# Patient Record
Sex: FEMALE | Race: BLACK OR AFRICAN AMERICAN | Employment: UNEMPLOYED | URBAN - METROPOLITAN AREA
[De-identification: names, ages, dates, MRNs, and addresses within clinical notes are randomized per-mention and may not be internally consistent; named-entity substitution may affect disease eponyms.]

---

## 2017-01-04 ENCOUNTER — APPOINTMENT (OUTPATIENT)
Dept: CT IMAGING | Age: 57
End: 2017-01-04
Attending: EMERGENCY MEDICINE
Payer: MEDICARE

## 2017-01-04 ENCOUNTER — HOSPITAL ENCOUNTER (EMERGENCY)
Age: 57
Discharge: HOME | End: 2017-01-04
Attending: EMERGENCY MEDICINE
Payer: MEDICARE

## 2017-01-04 VITALS
HEART RATE: 99 BPM | RESPIRATION RATE: 17 BRPM | DIASTOLIC BLOOD PRESSURE: 73 MMHG | OXYGEN SATURATION: 99 % | WEIGHT: 211 LBS | BODY MASS INDEX: 34.06 KG/M2 | SYSTOLIC BLOOD PRESSURE: 123 MMHG | TEMPERATURE: 99 F

## 2017-01-04 DIAGNOSIS — F10.10 ALCOHOL ABUSE: ICD-10-CM

## 2017-01-04 DIAGNOSIS — K86.0 ALCOHOL-INDUCED CHRONIC PANCREATITIS (HCC): ICD-10-CM
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
## 2017-01-04 DIAGNOSIS — R58 ECCHYMOSIS: ICD-10-CM

## 2017-01-04 DIAGNOSIS — T14.8XXA HEMATOMA: ICD-10-CM

## 2017-01-04 LAB
ALBUMIN SERPL BCP-MCNC: 3.8 G/DL (ref 3.4–5)
ALBUMIN/GLOB SERPL: 1.2 {RATIO} (ref 0.8–1.7)
ALP SERPL-CCNC: 157 U/L (ref 45–117)
ALT SERPL-CCNC: 55 U/L (ref 13–56)
ANION GAP BLD CALC-SCNC: 11 MMOL/L (ref 3–18)
APPEARANCE UR: CLEAR
AST SERPL W P-5'-P-CCNC: 184 U/L (ref 15–37)
BASOPHILS # BLD AUTO: 0 K/UL (ref 0–0.06)
BASOPHILS # BLD: 1 % (ref 0–2)
BILIRUB SERPL-MCNC: 0.4 MG/DL (ref 0.2–1)
BILIRUB UR QL: NEGATIVE
BUN SERPL-MCNC: 6 MG/DL (ref 7–18)
BUN/CREAT SERPL: 9 (ref 12–20)
CALCIUM SERPL-MCNC: 8.4 MG/DL (ref 8.5–10.1)
CHLORIDE SERPL-SCNC: 103 MMOL/L (ref 100–108)
CO2 SERPL-SCNC: 26 MMOL/L (ref 21–32)
COLOR UR: YELLOW
CREAT SERPL-MCNC: 0.66 MG/DL (ref 0.6–1.3)
DIFFERENTIAL METHOD BLD: ABNORMAL
EOSINOPHIL # BLD: 0 K/UL (ref 0–0.4)
EOSINOPHIL NFR BLD: 0 % (ref 0–5)
ERYTHROCYTE [DISTWIDTH] IN BLOOD BY AUTOMATED COUNT: 13.6 % (ref 11.6–14.5)
ETHANOL SERPL-MCNC: 191 MG/DL (ref 0–3)
GLOBULIN SER CALC-MCNC: 3.2 G/DL (ref 2–4)
GLUCOSE SERPL-MCNC: 127 MG/DL (ref 74–99)
GLUCOSE UR STRIP.AUTO-MCNC: NEGATIVE MG/DL
HCT VFR BLD AUTO: 31.5 % (ref 35–45)
HGB BLD-MCNC: 10.4 G/DL (ref 12–16)
HGB UR QL STRIP: NEGATIVE
KETONES UR QL STRIP.AUTO: NEGATIVE MG/DL
LEUKOCYTE ESTERASE UR QL STRIP.AUTO: NEGATIVE
LIPASE SERPL-CCNC: 484 U/L (ref 73–393)
LYMPHOCYTES # BLD AUTO: 37 % (ref 21–52)
LYMPHOCYTES # BLD: 1.7 K/UL (ref 0.9–3.6)
MCH RBC QN AUTO: 31.6 PG (ref 24–34)
MCHC RBC AUTO-ENTMCNC: 33 G/DL (ref 31–37)
MCV RBC AUTO: 95.7 FL (ref 74–97)
MONOCYTES # BLD: 0.3 K/UL (ref 0.05–1.2)
MONOCYTES NFR BLD AUTO: 7 % (ref 3–10)
NEUTS SEG # BLD: 2.6 K/UL (ref 1.8–8)
NEUTS SEG NFR BLD AUTO: 55 % (ref 40–73)
NITRITE UR QL STRIP.AUTO: NEGATIVE
PH UR STRIP: 6 [PH] (ref 5–8)
PLATELET # BLD AUTO: 216 K/UL (ref 135–420)
PMV BLD AUTO: 9 FL (ref 9.2–11.8)
POTASSIUM SERPL-SCNC: 4.2 MMOL/L (ref 3.5–5.5)
PROT SERPL-MCNC: 7 G/DL (ref 6.4–8.2)
PROT UR STRIP-MCNC: NEGATIVE MG/DL
RBC # BLD AUTO: 3.29 M/UL (ref 4.2–5.3)
SODIUM SERPL-SCNC: 140 MMOL/L (ref 136–145)
SP GR UR REFRACTOMETRY: 1 (ref 1–1.03)
UROBILINOGEN UR QL STRIP.AUTO: 0.2 EU/DL (ref 0.2–1)
WBC # BLD AUTO: 4.7 K/UL (ref 4.6–13.2)

## 2017-01-04 PROCEDURE — 74176 CT ABD & PELVIS W/O CONTRAST: CPT

## 2017-01-04 PROCEDURE — 85025 COMPLETE CBC W/AUTO DIFF WBC: CPT | Performed by: PHYSICIAN ASSISTANT

## 2017-01-04 PROCEDURE — 96376 TX/PRO/DX INJ SAME DRUG ADON: CPT

## 2017-01-04 PROCEDURE — 83690 ASSAY OF LIPASE: CPT | Performed by: PHYSICIAN ASSISTANT

## 2017-01-04 PROCEDURE — 99283 EMERGENCY DEPT VISIT LOW MDM: CPT

## 2017-01-04 PROCEDURE — 80307 DRUG TEST PRSMV CHEM ANLYZR: CPT | Performed by: EMERGENCY MEDICINE

## 2017-01-04 PROCEDURE — 80053 COMPREHEN METABOLIC PANEL: CPT | Performed by: PHYSICIAN ASSISTANT

## 2017-01-04 PROCEDURE — 81003 URINALYSIS AUTO W/O SCOPE: CPT | Performed by: PHYSICIAN ASSISTANT

## 2017-01-04 PROCEDURE — 96374 THER/PROPH/DIAG INJ IV PUSH: CPT

## 2017-01-04 PROCEDURE — 74011250636 HC RX REV CODE- 250/636: Performed by: PHYSICIAN ASSISTANT

## 2017-01-04 PROCEDURE — 96361 HYDRATE IV INFUSION ADD-ON: CPT

## 2017-01-04 RX ORDER — SODIUM CHLORIDE 9 MG/ML
1000 INJECTION, SOLUTION INTRAVENOUS ONCE
Status: COMPLETED | OUTPATIENT
Start: 2017-01-04 | End: 2017-01-04

## 2017-01-04 RX ORDER — HYDROMORPHONE HYDROCHLORIDE 1 MG/ML
0.5 INJECTION, SOLUTION INTRAMUSCULAR; INTRAVENOUS; SUBCUTANEOUS
Status: COMPLETED | OUTPATIENT
Start: 2017-01-04 | End: 2017-01-04

## 2017-01-04 RX ADMIN — HYDROMORPHONE HYDROCHLORIDE 0.5 MG: 1 INJECTION, SOLUTION INTRAMUSCULAR; INTRAVENOUS; SUBCUTANEOUS at 21:02

## 2017-01-04 RX ADMIN — HYDROMORPHONE HYDROCHLORIDE 0.5 MG: 1 INJECTION, SOLUTION INTRAMUSCULAR; INTRAVENOUS; SUBCUTANEOUS at 22:59

## 2017-01-04 RX ADMIN — SODIUM CHLORIDE 1000 ML: 9 INJECTION, SOLUTION INTRAVENOUS at 21:30

## 2017-01-05 NOTE — ED PROVIDER NOTES
HPI Comments: 55 yr old female, hx htn, fatty liver disease, alcohol induced pancreatitis, alcohol induced hepatitis, depression, and bipolar disorder presents to the ED complaining of R hip pain and RLQ abdominal pain after a fall 2 days ago. Pt states she [place] the \"ran over some piece of metal in the road. \" Pt states they pulled the car over and she got out to check the tires. Pt states \"I saw that the piece of metal was stuck under the car so I tried to pull it out. I guess my mother didn't have the car in park or something because the car moved forward and I fell back. I fell over the guard rail and landed on my right side. \" Pt reports continued pain and swelling over the R hip, pain extending down the R side of the body and pain in the R side of the lower abdomen. Bruising over the R elbow is also reported although the pt denies elbow pain at present. Denies head injury, LOC, and neck pain. Pt reports a long hx of loose stool, which she states is \"normal.\" Pt states she thinks she noticed some blood in the stool earlier today. Denies rectal bleeding or constipation. No other complaints. Patient is a 55 y.o. female presenting with hip pain and arm pain. Hip Injury    Pertinent negatives include no numbness, no back pain and no neck pain. Arm Pain    Pertinent negatives include no numbness, no back pain and no neck pain.         Past Medical History:   Diagnosis Date    Alcoholic hepatitis     Alcoholic pancreatitis     Anemia     Bipolar disorder (HCC)      Dr. Cal Brown [facility])    Chronic abdominal pain     Compression fracture of lumbar vertebra (HCC)      L4, L5     Depression      Dr. [facility])    Elevated LFTs     ETOH abuse     Fatty liver     GERD (gastroesophageal reflux disease)     Hyperlipidemia     Hypertension     Hypothyroidism     Lower GI bleeding     Morbid obesity ([address]     Obstructive sleep apnea     Substance induced mood disorder (HCC)     Vitamin D deficiency        Past Surgical History:   Procedure Laterality Date    Diskectomy, anterior, with d  8/1995, 11/1997    Repair nonunion scaphoid carpal bone Right 2010     Wrist    Biopsy liver  08/15/2012     Lap Left hepatic liver wedge by Dr. Kannan Carrion; BX Revealed: Hepatic Steatosis, AVM w/ associated Subcapsular Fibrosis.  Hx hysterectomy  2006    Hx tonsillectomy  1992    Hx abdominal laparoscopy  12/02/2014     Laparoscopic Gastrostomy w/ Partial Gastrectomy for assistance in placement of Endoscopic Retrograde Cholangiopancreatography & Diagnostic Lap.  Hx carpal tunnel release      Hx cholecystectomy  09/16/2014     Dr. Argentina Mcneill Hx colonoscopy  05/12/2012     Colonoscopy by Dr. Hussain Rdz. Abdi Beer: Bx Revealed Colon Polyps (Tubular Adenoma), Hemorrhoids.  Hx hemorrhoidectomy  04/30/2015     Dr. Leandrew Dakins. Meghna Minora Hx gastric bypass  08/15/2012     Lap Christian-en-y         Family History:   Problem Relation Age of Onset    Cancer Father     Cancer Sister     Obesity Brother        Social History     Social History    Marital status:      Spouse name: N/A    Number of children: N/A    Years of education: N/A     Occupational History    Not on file. Social History Main Topics    Smoking status: Never Smoker    Smokeless tobacco: Never Used    Alcohol use 0.0 oz/week     0 Standard drinks or equivalent per week      Comment: 6 pack of beer a day    Drug use: No    Sexual activity: Yes     Partners: Male     Birth control/ protection: Condom     Other Topics Concern    Not on file     Social History Narrative         ALLERGIES: Codeine; Lamictal [lamotrigine]; Morphine; Pcn [penicillins]; Vancomycin; Doxycycline; Percocet [oxycodone-acetaminophen]; Tetracycline; and Tomato    Review of Systems   Constitutional: Negative for chills, diaphoresis, fatigue and fever.    HENT: Negative for congestion, ear pain, rhinorrhea and sore throat. Eyes: Negative for pain and redness. Respiratory: Negative for cough, shortness of breath, wheezing and stridor. Cardiovascular: Negative for chest pain, palpitations and leg swelling. Gastrointestinal: Positive for abdominal pain and blood in stool. Negative for anal bleeding, constipation, diarrhea, nausea and vomiting. Genitourinary: Negative for dysuria, flank pain, frequency and hematuria. Musculoskeletal: Negative for back pain, myalgias, neck pain and neck stiffness. Hip pain   Skin: Positive for wound. Negative for rash. bruising   Neurological: Negative for dizziness, seizures, syncope, light-headedness, numbness and headaches. All other systems reviewed and are negative. Vitals:    01/04/17 2010   BP: (!) 148/103   Pulse: (!) 111   Resp: 17   Temp: 99 °F (37.2 °C)   SpO2: 99%   Weight: 95.7 kg (211 lb)            Physical Exam   Constitutional: She is oriented to person, place, and time. She appears well-developed and well-nourished. She appears distressed. Moderately distressed, morbidly obese   HENT:   Head: Normocephalic. Neck: Normal range of motion. Neck supple. Cardiovascular: Regular rhythm and normal heart sounds. Exam reveals no gallop and no friction rub. No murmur heard. tachycardiac   Pulmonary/Chest: Effort normal and breath sounds normal. No stridor. No respiratory distress. She has no wheezes. She has no rales. Abdominal: Soft. Bowel sounds are normal. She exhibits no distension and no mass. There is tenderness. There is no rebound and no guarding. TTP noted in the RLQ, without guarding   Genitourinary: Rectal exam shows guaiac negative stool. Genitourinary Comments: No hemorrhoids noted on rectal exam, good sphincter tone, No gross blood noted on exam, guaiac negative    Musculoskeletal: Normal range of motion. She exhibits edema and tenderness. She exhibits no deformity.    Edema and TTP noted over the R hip, full ROM of the hip noted, ecchymosis noted over the hip with no obvious deformity. No TTP noted to the R olecranon, humerus, radius, or ulna. Full ROM of the arm noted. Pulses intact. Neurological: She is alert and oriented to person, place, and time. She exhibits normal muscle tone. Gait is steady. Able to ambulate without difficulty. Skin: Skin is warm and dry. She is not diaphoretic. Ecchymosis noted over the R hip and gluteal region, R olecranon, and R proximal radius/ulna   Psychiatric: She has a normal mood and affect. Her behavior is normal. Thought content normal.   Nursing note and vitals reviewed. MDM  Number of Diagnoses or Management Options  Alcohol abuse:   Alcohol-induced chronic pancreatitis ([facility] [address]):   Ecchymosis:   Fall, initial encounter:   Hematoma:   Diagnosis management comments: Impression:  Fall, ecchymosis, alcohol abuse, pancreatitis, hematoma     IV inserted 0.5 mg dilaudid and 1 L saline given  RBC 3.29, Hgb 10.4, HCT 31.5, MPLV 9, glucose 127, BUN 6, BUCR 9,   Ca 8.4, lipase 484, ethyl alcohol 191, UA unremarkable. CT abd/pelvis/hip: hematoma R lateral pelvis soft tissues with surrounding post traumatic stranding, peripancreatic stranding secondary to pancreatitis and concordant with elevated lipase levels, fecalization of small bowel loops due to slow transit time. Hepatic steatosis, underdistended IVC, correlate with hydration status, cholecystectomy, arnulfo en y gastric bypass, hysterectomy, T12 compression fx-chronic, thoracic spondylosis, subcentimeter granuloma, R lung base    Pt still having pain, second dose dilaudid given. Pt's vital signs have improved. Patient is stable for discharge at this time. No new rx given. Rest and follow-up with PCP this week. Return to the ED immediately for any new or worsening sx.   Randee Little PA-C 10:52 PM          Amount and/or Complexity of Data Reviewed  Clinical lab tests: reviewed and ordered  Tests in the radiology section of CPT®: reviewed and ordered    Risk of Complications, Morbidity, and/or Mortality  Presenting problems: moderate  Diagnostic procedures: moderate  Management options: low    Patient Progress  Patient progress: stable    ED Course       Procedures

## 2017-01-05 NOTE — ED NOTES
I have reviewed discharge instructions with the patient. The patient verbalized understanding. Patient armband removed and shredded. Pt d/c'd to home awake, alert and in NAD. Pt driven home by her brother who is present. All questions answered.

## 2017-01-05 NOTE — ED TRIAGE NOTES
Pt reports she was pulling an object stuck under her car when she fell backwards and into a guardrail x 2 days ago. Pt reports large hematoma to right hip area with pain extending down leg and into abdomen and right elbow pain. Pt rocking back and forth reporting pain 8/10.

## 2017-01-05 NOTE — DISCHARGE INSTRUCTIONS
[employer] Activation    Thank you for requesting access to [employer]. Please follow the instructions below to securely access and download your online medical record. [employer] allows you to send messages to your doctor, view your test results, renew your prescriptions, schedule appointments, and more. How Do I Sign Up? 1. In your internet browser, go to [email or web address]  2. Click on the First Time User? Click Here link in the Sign In box. You will be redirect to the New Member Sign Up page. 3. Enter your [employer] Access Code exactly as it appears below. You will not need to use this code after youve completed the sign-up process. If you do not sign up before the expiration date, you must request a new code. [employer] Access Code: [email or web address]  Expires: 2017  8:31 PM (This is the date your [employer] access code will )    4. Enter the last four digits of your Social Security Number (xxxx) and Date of Birth (mm/dd/yyyy) as indicated and click Submit. You will be taken to the next sign-up page. 5. Create a [employer] ID. This will be your [employer] login ID and cannot be changed, so think of one that is secure and easy to remember. 6. Create a [employer] password. You can change your password at any time. 7. Enter your Password Reset Question and Answer. This can be used at a later time if you forget your password. 8. Enter your e-mail address. You will receive e-mail notification when new information is available in [address] 9. Click Sign Up. You can now view and download portions of your medical record. 10. Click the Download Summary menu link to download a portable copy of your medical information. Additional Information    If you have questions, please visit the Frequently Asked Questions section of the [employer] website at [email or web address] Remember, [employer] is NOT to be used for urgent needs. For medical emergencies, dial 911.

## 2017-01-09 ENCOUNTER — APPOINTMENT (OUTPATIENT)
Dept: GENERAL RADIOLOGY | Age: 57
DRG: 871 | End: 2017-01-09
Attending: EMERGENCY MEDICINE
Payer: MEDICARE

## 2017-01-09 ENCOUNTER — HOSPITAL ENCOUNTER (INPATIENT)
Age: 57
LOS: 7 days | Discharge: HOME | DRG: 871 | End: 2017-01-16
Attending: EMERGENCY MEDICINE | Admitting: HOSPITALIST
Payer: MEDICARE

## 2017-01-09 DIAGNOSIS — A41.9 SEPSIS, DUE TO UNSPECIFIED ORGANISM: Primary | ICD-10-CM

## 2017-01-09 DIAGNOSIS — N39.0 URINARY TRACT INFECTION, SITE UNSPECIFIED: ICD-10-CM

## 2017-01-09 DIAGNOSIS — K85.80 OTHER ACUTE PANCREATITIS: ICD-10-CM

## 2017-01-09 DIAGNOSIS — F10.10 ALCOHOL ABUSE: ICD-10-CM

## 2017-01-09 DIAGNOSIS — D64.9 CHRONIC ANEMIA: ICD-10-CM

## 2017-01-09 DIAGNOSIS — R11.2 NON-INTRACTABLE VOMITING WITH NAUSEA, UNSPECIFIED VOMITING TYPE: ICD-10-CM

## 2017-01-09 PROBLEM — R11.10 NON-INTRACTABLE VOMITING: Status: ACTIVE | Noted: 2017-01-09

## 2017-01-09 LAB
ALBUMIN SERPL BCP-MCNC: 3.6 G/DL (ref 3.4–5)
ALBUMIN/GLOB SERPL: 1.2 {RATIO} (ref 0.8–1.7)
ALP SERPL-CCNC: 157 U/L (ref 45–117)
ALT SERPL-CCNC: 56 U/L (ref 13–56)
ANION GAP BLD CALC-SCNC: 11 MMOL/L (ref 3–18)
APPEARANCE UR: CLEAR
AST SERPL W P-5'-P-CCNC: 245 U/L (ref 15–37)
BACTERIA URNS QL MICRO: ABNORMAL /HPF
BASOPHILS # BLD AUTO: 0 K/UL (ref 0–0.06)
BASOPHILS # BLD: 0 % (ref 0–2)
BILIRUB SERPL-MCNC: 0.4 MG/DL (ref 0.2–1)
BILIRUB UR QL: NEGATIVE
BUN SERPL-MCNC: 8 MG/DL (ref 7–18)
BUN/CREAT SERPL: 12 (ref 12–20)
CALCIUM SERPL-MCNC: 7.4 MG/DL (ref 8.5–10.1)
CHLORIDE SERPL-SCNC: 97 MMOL/L (ref 100–108)
CO2 SERPL-SCNC: 23 MMOL/L (ref 21–32)
COLOR UR: YELLOW
CREAT SERPL-MCNC: 0.66 MG/DL (ref 0.6–1.3)
DIFFERENTIAL METHOD BLD: ABNORMAL
EOSINOPHIL # BLD: 0 K/UL (ref 0–0.4)
EOSINOPHIL NFR BLD: 0 % (ref 0–5)
EPITH CASTS URNS QL MICRO: ABNORMAL /LPF (ref 0–5)
ERYTHROCYTE [DISTWIDTH] IN BLOOD BY AUTOMATED COUNT: 13.1 % (ref 11.6–14.5)
FLUAV AG NPH QL IA: NEGATIVE
FLUBV AG NOSE QL IA: NEGATIVE
GLOBULIN SER CALC-MCNC: 2.9 G/DL (ref 2–4)
GLUCOSE SERPL-MCNC: 155 MG/DL (ref 74–99)
GLUCOSE UR STRIP.AUTO-MCNC: NEGATIVE MG/DL
HCT VFR BLD AUTO: 24.1 % (ref 35–45)
HGB BLD-MCNC: 8.2 G/DL (ref 12–16)
HGB UR QL STRIP: NEGATIVE
KETONES UR QL STRIP.AUTO: NEGATIVE MG/DL
LACTATE BLD-SCNC: 3.6 MMOL/L (ref 0.4–2)
LACTATE BLD-SCNC: 4.3 MMOL/L (ref 0.4–2)
LEUKOCYTE ESTERASE UR QL STRIP.AUTO: ABNORMAL
LIPASE SERPL-CCNC: 1227 U/L (ref 73–393)
LYMPHOCYTES # BLD AUTO: 8 % (ref 21–52)
LYMPHOCYTES # BLD: 0.6 K/UL (ref 0.9–3.6)
MCH RBC QN AUTO: 32.2 PG (ref 24–34)
MCHC RBC AUTO-ENTMCNC: 34 G/DL (ref 31–37)
MCV RBC AUTO: 94.5 FL (ref 74–97)
MONOCYTES # BLD: 0.6 K/UL (ref 0.05–1.2)
MONOCYTES NFR BLD AUTO: 9 % (ref 3–10)
NEUTS SEG # BLD: 6.1 K/UL (ref 1.8–8)
NEUTS SEG NFR BLD AUTO: 83 % (ref 40–73)
NITRITE UR QL STRIP.AUTO: NEGATIVE
PH UR STRIP: 5 [PH] (ref 5–8)
PLATELET # BLD AUTO: 173 K/UL (ref 135–420)
PMV BLD AUTO: 9.2 FL (ref 9.2–11.8)
POTASSIUM SERPL-SCNC: 4.1 MMOL/L (ref 3.5–5.5)
PROT SERPL-MCNC: 6.5 G/DL (ref 6.4–8.2)
PROT UR STRIP-MCNC: NEGATIVE MG/DL
RBC # BLD AUTO: 2.55 M/UL (ref 4.2–5.3)
RBC #/AREA URNS HPF: ABNORMAL /HPF (ref 0–5)
SODIUM SERPL-SCNC: 131 MMOL/L (ref 136–145)
SP GR UR REFRACTOMETRY: 1.02 (ref 1–1.03)
UROBILINOGEN UR QL STRIP.AUTO: 0.2 EU/DL (ref 0.2–1)
WBC # BLD AUTO: 7.4 K/UL (ref 4.6–13.2)
WBC URNS QL MICRO: ABNORMAL /HPF (ref 0–4)

## 2017-01-09 PROCEDURE — 81001 URINALYSIS AUTO W/SCOPE: CPT | Performed by: EMERGENCY MEDICINE

## 2017-01-09 PROCEDURE — 74011250636 HC RX REV CODE- 250/636: Performed by: EMERGENCY MEDICINE

## 2017-01-09 PROCEDURE — 96365 THER/PROPH/DIAG IV INF INIT: CPT

## 2017-01-09 PROCEDURE — 85025 COMPLETE CBC W/AUTO DIFF WBC: CPT | Performed by: EMERGENCY MEDICINE

## 2017-01-09 PROCEDURE — 74011000250 HC RX REV CODE- 250: Performed by: HOSPITALIST

## 2017-01-09 PROCEDURE — 73502 X-RAY EXAM HIP UNI 2-3 VIEWS: CPT

## 2017-01-09 PROCEDURE — 87040 BLOOD CULTURE FOR BACTERIA: CPT | Performed by: EMERGENCY MEDICINE

## 2017-01-09 PROCEDURE — 83605 ASSAY OF LACTIC ACID: CPT

## 2017-01-09 PROCEDURE — 87077 CULTURE AEROBIC IDENTIFY: CPT | Performed by: EMERGENCY MEDICINE

## 2017-01-09 PROCEDURE — 87086 URINE CULTURE/COLONY COUNT: CPT | Performed by: EMERGENCY MEDICINE

## 2017-01-09 PROCEDURE — 74011250636 HC RX REV CODE- 250/636: Performed by: HOSPITALIST

## 2017-01-09 PROCEDURE — 93005 ELECTROCARDIOGRAM TRACING: CPT

## 2017-01-09 PROCEDURE — 65270000029 HC RM PRIVATE

## 2017-01-09 PROCEDURE — 80053 COMPREHEN METABOLIC PANEL: CPT | Performed by: EMERGENCY MEDICINE

## 2017-01-09 PROCEDURE — 83690 ASSAY OF LIPASE: CPT | Performed by: EMERGENCY MEDICINE

## 2017-01-09 PROCEDURE — 71010 XR CHEST SNGL V: CPT

## 2017-01-09 PROCEDURE — 74011000250 HC RX REV CODE- 250: Performed by: EMERGENCY MEDICINE

## 2017-01-09 PROCEDURE — 99284 EMERGENCY DEPT VISIT MOD MDM: CPT

## 2017-01-09 PROCEDURE — 96375 TX/PRO/DX INJ NEW DRUG ADDON: CPT

## 2017-01-09 PROCEDURE — 87804 INFLUENZA ASSAY W/OPTIC: CPT | Performed by: EMERGENCY MEDICINE

## 2017-01-09 PROCEDURE — 87186 SC STD MICRODIL/AGAR DIL: CPT | Performed by: EMERGENCY MEDICINE

## 2017-01-09 RX ORDER — ENOXAPARIN SODIUM 100 MG/ML
40 INJECTION SUBCUTANEOUS EVERY 24 HOURS
Status: DISCONTINUED | OUTPATIENT
Start: 2017-01-09 | End: 2017-01-12

## 2017-01-09 RX ORDER — KETOROLAC TROMETHAMINE 30 MG/ML
15 INJECTION, SOLUTION INTRAMUSCULAR; INTRAVENOUS
Status: DISPENSED | OUTPATIENT
Start: 2017-01-10 | End: 2017-01-13

## 2017-01-09 RX ORDER — SODIUM CHLORIDE 0.9 % (FLUSH) 0.9 %
5-10 SYRINGE (ML) INJECTION AS NEEDED
Status: DISCONTINUED | OUTPATIENT
Start: 2017-01-09 | End: 2017-01-15 | Stop reason: SDUPTHER

## 2017-01-09 RX ORDER — SODIUM CHLORIDE 0.9 % (FLUSH) 0.9 %
5-10 SYRINGE (ML) INJECTION AS NEEDED
Status: DISCONTINUED | OUTPATIENT
Start: 2017-01-09 | End: 2017-01-16 | Stop reason: HOSPADM

## 2017-01-09 RX ORDER — SODIUM CHLORIDE 0.9 % (FLUSH) 0.9 %
5-10 SYRINGE (ML) INJECTION EVERY 8 HOURS
Status: DISCONTINUED | OUTPATIENT
Start: 2017-01-09 | End: 2017-01-15 | Stop reason: SDUPTHER

## 2017-01-09 RX ORDER — KETOROLAC TROMETHAMINE 30 MG/ML
30 INJECTION, SOLUTION INTRAMUSCULAR; INTRAVENOUS
Status: COMPLETED | OUTPATIENT
Start: 2017-01-09 | End: 2017-01-09

## 2017-01-09 RX ORDER — TRAMADOL HYDROCHLORIDE 50 MG/1
50 TABLET ORAL
Status: DISCONTINUED | OUTPATIENT
Start: 2017-01-09 | End: 2017-01-09

## 2017-01-09 RX ORDER — LORAZEPAM 2 MG/ML
1 INJECTION INTRAMUSCULAR
Status: COMPLETED | OUTPATIENT
Start: 2017-01-09 | End: 2017-01-09

## 2017-01-09 RX ORDER — LEVOFLOXACIN 5 MG/ML
750 INJECTION, SOLUTION INTRAVENOUS ONCE
Status: COMPLETED | OUTPATIENT
Start: 2017-01-09 | End: 2017-01-09

## 2017-01-09 RX ADMIN — LEVOFLOXACIN 750 MG: 5 INJECTION, SOLUTION INTRAVENOUS at 19:24

## 2017-01-09 RX ADMIN — SODIUM CHLORIDE 25 MG: 9 INJECTION INTRAMUSCULAR; INTRAVENOUS; SUBCUTANEOUS at 22:18

## 2017-01-09 RX ADMIN — ENOXAPARIN SODIUM 40 MG: 40 INJECTION SUBCUTANEOUS at 22:59

## 2017-01-09 RX ADMIN — Medication 10 ML: at 22:20

## 2017-01-09 RX ADMIN — FOLIC ACID: 5 INJECTION, SOLUTION INTRAMUSCULAR; INTRAVENOUS; SUBCUTANEOUS at 22:59

## 2017-01-09 RX ADMIN — LORAZEPAM 1 MG: 2 INJECTION, SOLUTION INTRAMUSCULAR; INTRAVENOUS at 22:15

## 2017-01-09 RX ADMIN — SODIUM CHLORIDE 2871 ML: 900 INJECTION, SOLUTION INTRAVENOUS at 19:24

## 2017-01-09 RX ADMIN — KETOROLAC TROMETHAMINE 30 MG: 30 INJECTION, SOLUTION INTRAMUSCULAR at 21:18

## 2017-01-09 NOTE — IP AVS SNAPSHOT
[address] [address] 
[phone] Patient: Shant So MRN: [id number] You are allergic to the following Allergen Reactions Codeine Rash Lamictal (Lamotrigine) Anaphylaxis Morphine Anaphylaxis Per patient, has tolerated dilaudid in the past.  
    
 Pcn (Penicillins) Anaphylaxis Vancomycin Rash Doxycycline Rash Percocet (Oxycodone-Acetaminophen) Hives Itching Tetracycline Rash Tomato Hives Recent Documentation Height Weight Breastfeeding? BMI OB Status Smoking Status 1.702 m 99.1 kg No 34.21 kg/m2 Hysterectomy Never Smoker Unresulted Labs Order Current Status LUPUS ANTICOAGULANT PANEL W/ REFLEX In process MIXING STUDY, APTT In process MIXING STUDY, PT In process Emergency Contacts Name Discharge Info Relation Home Work Mobile Gena Khan DISCHARGE CAREGIVER [3] Child [2] [phone] About your hospitalization You were admitted on:  January 9, 2017 You last received care in the:  [address] [place], [place] [address] Road You were discharged on:  January 16, 2017 Unit phone number:  [phone] Why you were hospitalized Your primary diagnosis was:  Not on File Your diagnoses also included:  Uti (Urinary Tract Infection), Acute Pancreatitis, Sepsis (Hcc), Non-Intractable Vomiting, Thrombocytopenia (Hcc) Providers Seen During Your Hospitalizations Provider Role Specialty Primary office phone Bert Doan DO Attending Provider Emergency Medicine [phone] Fernando Cheung MD Attending Provider [facility] [phone] Eloina Richard MD Attending Provider Internal Medicine [phone] Steven Rubi MD Attending Provider Internal Medicine [phone] Your Primary Care Physician (PCP) Primary Care Physician Office Phone Office Fax Lavelle Gannon [phone] Follow-up Information Follow up With Details Comments Contact Info Dae Dove MD Schedule an appointment as soon as possible for a visit in 1 week  [address] [facility] [address] [phone] Monika Carranza MD Go on 1/17/2017 EGD/Colonoscopy  [address] [facility] [address] [facility] [address] [phone] Your Appointments Tuesday January 17, 2017 ESOPHAGOGASTRODUODENOSCOPY (EGD), COLONOSCOPY with Estella Quinones MD  
[facility] ENDOSCOPY [facility]) [address]  
[phone] Current Discharge Medication List  
  
START taking these medications Dose & Instructions Dispensing Information Comments Morning Noon Evening Bedtime  
 levoFLOXacin 750 mg tablet Commonly known as:  Lamount Chalet Your next dose is: Today, Tomorrow Other:  _________ Dose:  750 mg Take 1 Tab by mouth daily. Quantity:  6 Tab Refills:  0  
     
   
   
   
  
 potassium chloride 20 mEq tablet Commonly known as:  K-DUR, KLOR-CON Your next dose is: Today, Tomorrow Other:  _________ Dose:  20 mEq Take 1 Tab by mouth two (2) times a day for 2 doses. Quantity:  2 Tab Refills:  0 CONTINUE these medications which have CHANGED Dose & Instructions Dispensing Information Comments Morning Noon Evening Bedtime  
 levothyroxine 25 mcg tablet Commonly known as:  SYNTHROID What changed:  how much to take Your next dose is: Today, Tomorrow Other:  _________ Dose:  25 mcg Take 1 Tab by mouth Daily (before breakfast). Indications: HYPOTHYROIDISM Quantity:  30 Tab Refills:  0  
     
   
   
   
  
 VISTARIL 50 mg capsule Generic drug:  hydrOXYzine pamoate What changed:  Another medication with the same name was removed.  Continue taking this medication, and follow the directions you see here. Your next dose is: Today, Tomorrow Other:  _________ Dose:  50 mg Take 50 mg by mouth two (2) times a day. Refills:  0 CONTINUE these medications which have NOT CHANGED Dose & Instructions Dispensing Information Comments Morning Noon Evening Bedtime  
 alendronate 70 mg tablet Commonly known as:  FOSAMAX Your next dose is: Today, Tomorrow Other:  _________ Dose:  70 mg Take 70 mg by mouth Every Saturday. Refills:  0 BENTYL 20 mg tablet Generic drug:  dicyclomine Your next dose is: Today, Tomorrow Other:  _________ Dose:  20 mg Take 20 mg by mouth every six (6) hours as needed. Refills:  0  
     
   
   
   
  
 biotin 10,000 mcg Cap Your next dose is: Today, Tomorrow Other:  _________ Dose:  1 Cap Take 1 Cap by mouth. Refills:  0  
     
   
   
   
  
 calcium citrate-vitamin d3 315-200 mg-unit Tab Commonly known as:  CITRACAL+D Your next dose is: Today, Tomorrow Other:  _________ Dose:  1 Tab Take 1 Tab by mouth two (2) times daily (with meals). Refills:  0  
     
   
   
   
  
 cholecalciferol 1,000 unit tablet Commonly known as:  VITAMIN D3 Your next dose is: Today, Tomorrow Other:  _________ Take  by mouth daily. Refills:  0  
     
   
   
   
  
 cloNIDine HCl 0.1 mg tablet Commonly known as:  CATAPRES Your next dose is: Today, Tomorrow Other:  _________ Take  by mouth two (2) times a day. Refills:  0  
     
   
   
   
  
 colesevelam 625 mg tablet Commonly known as:  [facility] Your next dose is: Today, Tomorrow Other:  _________ Dose:  1250 mg Take 2 Tabs by mouth two (2) times daily (with meals). Quantity:  120 Tab Refills:  0 ergocalciferol 50,000 unit capsule Commonly known as:  ERGOCALCIFEROL Your next dose is: Today, Tomorrow Other:  _________ Dose:  [address] Units Take 50,000 Units by mouth every seven (7) days. Refills:  0  
     
   
   
   
  
 escitalopram oxalate 10 mg tablet Commonly known as:  Cloria Kohut Your next dose is: Today, Tomorrow Other:  _________ Dose:  10 mg Take 1 Tab by mouth daily. Quantity:  10 Tab Refills:  0  
     
   
   
   
  
 famotidine 20 mg tablet Commonly known as:  PEPCID Your next dose is: Today, Tomorrow Other:  _________ Dose:  20 mg Take 20 mg by mouth daily. Indications: HEARTBURN Refills:  0  
     
   
   
   
  
 FEOSOL 325 mg (65 mg iron) tablet Generic drug:  ferrous sulfate Your next dose is: Today, Tomorrow Other:  _________ Dose:  325 mg Take 325 mg by mouth daily. Refills:  0  
     
   
   
   
  
 fish oil-dha-epa 1,200-144-216 mg Cap Your next dose is: Today, Tomorrow Other:  _________ Take  by mouth. Refills:  0  
     
   
   
   
  
 folic acid 773 mcg tablet Your next dose is: Today, Tomorrow Other:  _________ Dose:  800 mcg Take 800 mcg by mouth daily. Refills:  0 HYDROcodone-acetaminophen 5-325 mg per tablet Commonly known as:  Kayley Parisian Your next dose is: Today, Tomorrow Other:  _________ Dose:  1 Tab Take 1 Tab by mouth every four (4) hours as needed for Pain. Max Daily Amount: 6 Tabs. Quantity:  12 Tab Refills:  0 NEURONTIN 600 mg tablet Generic drug:  gabapentin Your next dose is: Today, Tomorrow Other:  _________ Dose:  600 mg Take 600 mg by mouth three (3) times daily. Indications: NEUROPATHIC PAIN Refills:  0  
     
   
   
   
  
 OLANZapine 10 mg disintegrating tablet Commonly known as:  ZyPREXA zydis Your next dose is: Today, Tomorrow Other:  _________ Dose:  10 mg Take 1 Tab by mouth nightly. Quantity:  30 Tab Refills:  0  
     
   
   
   
  
 ondansetron 4 mg disintegrating tablet Commonly known as:  ZOFRAN ODT Your next dose is: Today, Tomorrow Other:  _________ Dose:  4 mg Take 1 Tab by mouth every eight (8) hours as needed for Nausea. Quantity:  15 Tab Refills:  0  
     
   
   
   
  
 pantoprazole 20 mg tablet Commonly known as:  PROTONIX Your next dose is: Today, Tomorrow Other:  _________ Dose:  20 mg Take 20 mg by mouth daily. Refills:  0  
     
   
   
   
  
 VITAMIN B-12 1,000 mcg sublingual tablet Generic drug:  cyanocobalamin Your next dose is: Today, Tomorrow Other:  _________ Dose:  1000 mcg  
1,000 mcg by SubLINGual route daily. Refills:  0  
     
   
   
   
  
 VITAMIN C 500 mg tablet Generic drug:  ascorbic acid (vitamin C) Your next dose is: Today, Tomorrow Other:  _________ Dose:  1000 mg Take 1,000 mg by mouth daily. Refills:  0 Where to Get Your Medications Information on where to get these meds will be given to you by the nurse or doctor. ! Ask your nurse or doctor about these medications  
  levoFLOXacin 750 mg tablet  
 potassium chloride 20 mEq tablet Discharge Instructions Patient armband removed and shredded MyChart Activation Thank you for requesting access to [facility]. Please follow the instructions below to securely access and download your online medical record. [facility] allows you to send messages to your doctor, view your test results, renew your prescriptions, schedule appointments, and more. How Do I Sign Up? 1. In your internet browser, go to [email or web address] 
 2. Click on the First Time User? Click Here link in the Sign In box. You will be redirect to the New Member Sign Up page. 3. Enter your Shutter Guardian Access Code exactly as it appears below. You will not need to use this code after youve completed the sign-up process. If you do not sign up before the expiration date, you must request a new code. Shutter Guardian Access Code: [email or web address] Expires: 2017  8:31 PM (This is the date your Shutter Guardian access code will ) 4. Enter the last four digits of your Social Security Number (xxxx) and Date of Birth (mm/dd/yyyy) as indicated and click Submit. You will be taken to the next sign-up page. 5. Create a Shutter Guardian ID. This will be your Shutter Guardian login ID and cannot be changed, so think of one that is secure and easy to remember. 6. Create a Shutter Guardian password. You can change your password at any time. 7. Enter your Password Reset Question and Answer. This can be used at a later time if you forget your password. 8. Enter your e-mail address. You will receive e-mail notification when new information is available in [address] 9. Click Sign Up. You can now view and download portions of your medical record. 10. Click the Download Summary menu link to download a portable copy of your medical information. Additional Information If you have questions, please visit the Frequently Asked Questions section of the Shutter Guardian website at [email or web address] Remember, Sh[employer] Guardian is NOT to be used for urgent needs. For medical emergencies, dial 911. DISCHARGE SUMMARY from Nurse The following personal items are in your possession at time of discharge: 
 
Dental Appliances: None Visual Aid: Glasses Home Medications: None Jewelry: None Clothing: Jacket/Coat, Socks, Footwear, Undergarments Other Valuables: Cell Phone, Wallet, Money (comment) ($81) PATIENT INSTRUCTIONS: 
 
 
F-face looks uneven A-arms unable to move or move unevenly S-speech slurred or non-existent T-time-call 911 as soon as signs and symptoms begin-DO NOT go Back to bed or wait to see if you get better-TIME IS BRAIN. Warning Signs of HEART ATTACK Call 911 if you have these symptoms: 
? Chest discomfort. Most heart attacks involve discomfort in the center of the chest that lasts more than a few minutes, or that goes away and comes back. It can feel like uncomfortable pressure, squeezing, fullness, or pain. ? Discomfort in other areas of the upper body. Symptoms can include pain or discomfort in one or both arms, the back, neck, jaw, or stomach. ? Shortness of breath with or without chest discomfort. ? Other signs may include breaking out in a cold sweat, nausea, or lightheadedness. Don't wait more than five minutes to call [address]! Fast action can save your life. Calling 911 is almost always the fastest way to get lifesaving treatment. Emergency Medical Services staff can begin treatment when they arrive  up to an hour sooner than if someone gets to the hospital by car. The discharge information has been reviewed with the patient. The patient verbalized understanding. Discharge medications reviewed with the patient and appropriate educational materials and side effects teaching were provided. Acute Alcohol Intoxication: Care Instructions Your Care Instructions You have had treatment to help your body rid itself of alcohol. Too much alcohol upsets the body's fluid balance. Your doctor may have given you fluids and vitamins. For some people, drinking too much alcohol is a one-time event.  For others, it is an ongoing problem. In either case, it is serious. It can be life-threatening. Follow-up care is a key part of your treatment and safety. Be sure to make and go to all appointments, and call your doctor if you are having problems. It's also a good idea to know your test results and keep a list of the medicines you take. How can you care for yourself at home? · Be safe with medicines. Take your medicines exactly as prescribed. Call your doctor if you think you are having a problem with your medicine. · Your doctor may have prescribed disulfiram (Antabuse). Do not drink any alcohol while you are taking this medicine. You may have severe or even life-threatening side effects from even small amounts of alcohol. · If you were given medicine to prevent nausea, be sure to take it exactly as prescribed. · Before you take any medicine, tell your doctor if: 
¨ You have had a bad reaction to any medicines in the past. 
¨ You are taking other medicines, including over-the-counter ones, or have other health problems. ¨ You are or could be pregnant. · Be prepared to have some symptoms of withdrawal in the next few days. · Drink plenty of liquids in the next few days. · Seek help if you need it to stop drinking. Getting counseling and joining a support group can help you stay sober. Try a support group such as Alcoholics Anonymous. · Avoid alcohol when you take medicines. It can react with many medicines and cause serious problems. When should you call for help? Call 911 anytime you think you may need emergency care. For example, call if: 
· You feel confused and are seeing things that are not there. · You are thinking about killing yourself or hurting others. · You have a seizure. · You vomit blood or what looks like coffee grounds. Call your doctor now or seek immediate medical care if: 
· You have trembling, restlessness, sweating, and other withdrawal symptoms that are new or that get worse. · Your withdrawal symptoms come back after not bothering you for days or weeks. · You can't stop vomiting. Watch closely for changes in your health, and be sure to contact your doctor if: 
· You need help to stop drinking. Where can you learn more? Go to http://salvatore-izabela.info/. Enter T102 in the search box to learn more about \"Acute Alcohol Intoxication: Care Instructions. \" Current as of: May 27, 2016 Content Version: 11.1 © 2512-5358 Flux. Care instructions adapted under license by [employer] (which disclaims liability or warranty for this information). If you have questions about a medical condition or this instruction, always ask your healthcare professional. [address] any warranty or liability for your use of this information. Clotting Factor Deficiencies: Care Instructions Your Care Instructions Clotting factors are substances in the blood that help stop bleeding after a cut or injury. They also prevent sudden bleeding. In people who have clotting factor problems, the clotting factors don't work right or, in some cases, are missing. When blood does not clot well, even minor injuries can cause serious bleeding. This can lead to blood loss, injury to internal organs, or damage to muscles or joints. Several conditions, including hemophilia, can make it hard for the blood to clot. Your doctor can treat you by giving you replacement clotting factors. You also may take medicine to prevent bleeding. You may often have clotting factors transfused into a vein to prevent bleeding, or you may get them as needed. You may eventually learn to do this at home. You can also try to prevent injuries that can cause you to bleed. Follow-up care is a key part of your treatment and safety.  Be sure to make and go to all appointments, and call your doctor if you are having problems. It's also a good idea to know your test results and keep a list of the medicines you take. How can you care for yourself at home? · Take your medicines exactly as prescribed. Call your doctor if you think you are having a problem with your medicine. You will get more details on the specific medicines your doctor prescribes. · Stay at a healthy body weight. If you are overweight, the additional stress on joints can trigger bleeding. · Exercise safely. Avoid contact sports. Swim or walk to avoid excess pressure on your joints. Check with your doctor before doing activities that put you at high risk for falls, such as riding a bike. · Brush and floss your teeth daily. This may help you avoid problems that could lead to having a tooth pulled. · Avoid aspirin and nonsteroidal anti-inflammatory drugs (NSAIDs), such as ibuprofen (Advil, Motrin) or naproxen (Aleve). They can increase the chance of bleeding. · Take pain medicines exactly as directed. ¨ If the doctor gave you a prescription medicine for pain, take it as prescribed. ¨ If you are not taking a prescription pain medicine, ask your doctor if you can take an over-the-counter medicine. · Take care to prevent accidents at home: ¨ Make sure rugs are tacked down so you do not slip. ¨ Keep furniture with sharp edges out of pathways. ¨ Use nonskid floor wax. ¨ Wipe up spills quickly. ¨ If you live in an area that gets snow and ice in the winter, sprinkle salt on steps and sidewalks. ¨ Avoid loose-fitting shoes. You might lose your balance and fall. · Wear medical alert jewelry that lists your clotting problem. You can buy this at most drugsYouSciencees. When should you call for help? Call 911 anytime you think you may need emergency care. For example, call if: 
· You passed out (lost consciousness). · You have a head injury. · You have sudden, severe pain, especially in a joint.  
· You have a sudden, severe headache that is different from past headaches. Call your doctor now or seek immediate medical care if: 
· You are dizzy or lightheaded, or you feel like you may faint. · You are unable to stop bleeding by giving clotting factors after an injury. · You have an injury but are not sure whether you need treatment. · You have any abnormal bleeding, such as: 
¨ Nosebleeds. ¨ Vaginal bleeding that is different (heavier, more frequent, at a different time of the month) than what you are used to. ¨ Bloody or black stools, or rectal bleeding. ¨ Bloody or pink urine. Watch closely for changes in your health, and be sure to contact your doctor if: 
· You have joint pain or swelling. Where can you learn more? Go to http://salvatore-izabela.info/. Enter F781 in the search box to learn more about \"Clotting Factor Deficiencies: Care Instructions. \" Current as of: February 5, 2016 Content Version: 11.1 © 0761-4281 Next Performance. Care instructions adapted under license by [employer] (which disclaims liability or warranty for this information). If you have questions about a medical condition or this instruction, always ask your healthcare professional. [address] [address] any warranty or liability for your use of this information. Discharge Orders None [facility] Announcement We are excited to announce that we are making your provider's discharge notes available to you in [facility]ce. You will see these notes when they are completed and signed by the physician that discharged you from your recent hospital stay. If you have any questions or concerns about any information you see in [facility]ce, please call the Health Information Department where you were seen or reach out to your Primary Care Provider for more information about your plan of care. Introducing [facility]!    
 Vickey Carrero introduces [facility]ce patient portal. Now you can access parts of your medical record, email your doctor's office, and request medication refills online. 1. In your internet browser, go to [email or web address] "[email or web address] ([email or web address]"/[email or web address] TV 2. Click on the First Time User? Click Here link in the Sign In box. You will see the New Member Sign Up page. 3. Enter your RentMatch Access Code exactly as it appears below. You will not need to use this code after youve completed the sign-up process. If you do not sign up before the expiration date, you must request a new code. · RentMatch Access Code: [email or web address] Expires: 4/4/2017  8:31 PM 
 
4. Enter the last four digits of your Social Security Number (xxxx) and Date of Birth (mm/dd/yyyy) as indicated and click Submit. You will be taken to the next sign-up page. 5. Create a RentMatch ID. This will be your RentMatch login ID and cannot be changed, so think of one that is secure and easy to remember. 6. Create a RentMatch password. You can change your password at any time. 7. Enter your Password Reset Question and Answer. This can be used at a later time if you forget your password. 8. Enter your e-mail address. You will receive e-mail notification when new information is available in [address] 9. Click Sign Up. You can now view and download portions of your medical record. 10. Click the Download Summary menu link to download a portable copy of your medical information. If you have questions, please visit the Frequently Asked Questions section of the RentMatch website. Remember, RentMatch is NOT to be used for urgent needs. For medical emergencies, dial 911. Now available from your iPhone and Android! General Information Please provide this summary of care documentation to your next provider. Patient Signature:  ____________________________________________________________ Date:  ____________________________________________________________  
  
Janyth Mins  Provider Signature: ____________________________________________________________ Date:  ____________________________________________________________

## 2017-01-09 NOTE — IP AVS SNAPSHOT
Current Discharge Medication List  
  
Take these medications at their scheduled times Dose & Instructions Dispensing Information Comments Morning Noon Evening Bedtime  
 alendronate 70 mg tablet Commonly known as:  FOSAMAX Your next dose is: Today, Tomorrow Other:  ____________ Dose:  70 mg Take 70 mg by mouth Every Saturday. Refills:  0  
     
   
   
   
  
 calcium citrate-vitamin d3 315-200 mg-unit Tab Commonly known as:  CITRACAL+D Your next dose is: Today, Tomorrow Other:  ____________ Dose:  1 Tab Take 1 Tab by mouth two (2) times daily (with meals). Refills:  0  
     
   
   
   
  
 cholecalciferol 1,000 unit tablet Commonly known as:  VITAMIN D3 Your next dose is: Today, Tomorrow Other:  ____________ Take  by mouth daily. Refills:  0  
     
   
   
   
  
 cloNIDine HCl 0.1 mg tablet Commonly known as:  CATAPRES Your next dose is: Today, Tomorrow Other:  ____________ Take  by mouth two (2) times a day. Refills:  0  
     
   
   
   
  
 colesevelam 625 mg tablet Commonly known as:  [facility] Your next dose is: Today, Tomorrow Other:  ____________ Dose:  1250 mg Take 2 Tabs by mouth two (2) times daily (with meals). Quantity:  120 Tab Refills:  0  
     
   
   
   
  
 ergocalciferol 50,000 unit capsule Commonly known as:  ERGOCALCIFEROL Your next dose is: Today, Tomorrow Other:  ____________ Dose:  33111 Units Take 50,000 Units by mouth every seven (7) days. Refills:  0  
     
   
   
   
  
 escitalopram oxalate 10 mg tablet Commonly known as:  Kristy Von Your next dose is: Today, Tomorrow Other:  ____________ Dose:  10 mg Take 1 Tab by mouth daily. Quantity:  10 Tab Refills:  0  
     
   
   
   
  
 famotidine 20 mg tablet Commonly known as:  PEPCID  
   
 Your next dose is: Today, Tomorrow Other:  ____________ Dose:  20 mg Take 20 mg by mouth daily. Indications: HEARTBURN Refills:  0  
     
   
   
   
  
 FEOSOL 325 mg (65 mg iron) tablet Generic drug:  ferrous sulfate Your next dose is: Today, Tomorrow Other:  ____________ Dose:  325 mg Take 325 mg by mouth daily. Refills:  0  
     
   
   
   
  
 folic acid 042 mcg tablet Your next dose is: Today, Tomorrow Other:  ____________ Dose:  800 mcg Take 800 mcg by mouth daily. Refills:  0  
     
   
   
   
  
 levoFLOXacin 750 mg tablet Commonly known as:  Aldean Lefort Your next dose is: Today, Tomorrow Other:  ____________ Dose:  750 mg Take 1 Tab by mouth daily. Quantity:  6 Tab Refills:  0  
     
   
   
   
  
 levothyroxine 25 mcg tablet Commonly known as:  SYNTHROID Your next dose is: Today, Tomorrow Other:  ____________ Dose:  25 mcg Take 1 Tab by mouth Daily (before breakfast). Indications: HYPOTHYROIDISM Quantity:  30 Tab Refills:  0 NEURONTIN 600 mg tablet Generic drug:  gabapentin Your next dose is: Today, Tomorrow Other:  ____________ Dose:  600 mg Take 600 mg by mouth three (3) times daily. Indications: NEUROPATHIC PAIN Refills:  0  
     
   
   
   
  
 OLANZapine 10 mg disintegrating tablet Commonly known as:  ZyPREXA zydis Your next dose is: Today, Tomorrow Other:  ____________ Dose:  10 mg Take 1 Tab by mouth nightly. Quantity:  30 Tab Refills:  0  
     
   
   
   
  
 pantoprazole 20 mg tablet Commonly known as:  PROTONIX Your next dose is: Today, Tomorrow Other:  ____________ Dose:  20 mg Take 20 mg by mouth daily. Refills:  0  
     
   
   
   
  
 potassium chloride 20 mEq tablet Commonly known as:  K-DUR, KLOR-CON  
   
 Your next dose is: Today, Tomorrow Other:  ____________ Dose:  20 mEq Take 1 Tab by mouth two (2) times a day for 2 doses. Quantity:  2 Tab Refills:  0  
     
   
   
   
  
 VISTARIL 50 mg capsule Generic drug:  hydrOXYzine pamoate Your next dose is: Today, Tomorrow Other:  ____________ Dose:  50 mg Take 50 mg by mouth two (2) times a day. Refills:  0  
     
   
   
   
  
 VITAMIN B-12 1,000 mcg sublingual tablet Generic drug:  cyanocobalamin Your next dose is: Today, Tomorrow Other:  ____________ Dose:  1000 mcg  
1,000 mcg by SubLINGual route daily. Refills:  0  
     
   
   
   
  
 VITAMIN C 500 mg tablet Generic drug:  ascorbic acid (vitamin C) Your next dose is: Today, Tomorrow Other:  ____________ Dose:  1000 mg Take 1,000 mg by mouth daily. Refills:  0 Take these medications as needed Dose & Instructions Dispensing Information Comments Morning Noon Evening Bedtime BENTYL 20 mg tablet Generic drug:  dicyclomine Your next dose is: Today, Tomorrow Other:  ____________ Dose:  20 mg Take 20 mg by mouth every six (6) hours as needed. Refills:  0 HYDROcodone-acetaminophen 5-325 mg per tablet Commonly known as:  Bethel Bent Your next dose is: Today, Tomorrow Other:  ____________ Dose:  1 Tab Take 1 Tab by mouth every four (4) hours as needed for Pain. Max Daily Amount: 6 Tabs. Quantity:  12 Tab Refills:  0  
     
   
   
   
  
 ondansetron 4 mg disintegrating tablet Commonly known as:  ZOFRAN ODT Your next dose is: Today, Tomorrow Other:  ____________ Dose:  4 mg Take 1 Tab by mouth every eight (8) hours as needed for Nausea. Quantity:  15 Tab Refills:  0 Take these medications as directed Dose & Instructions Dispensing Information Comments Morning Noon Evening Bedtime  
 biotin 10,000 mcg Cap Your next dose is: Today, Tomorrow Other:  ____________ Dose:  1 Cap Take 1 Cap by mouth. Refills:  0  
     
   
   
   
  
 fish oil-dha-epa 1,200-144-216 mg Cap Your next dose is: Today, Tomorrow Other:  ____________ Take  by mouth. Refills:  0 Where to Get Your Medications Information about where to get these medications is not yet available ! Ask your nurse or doctor about these medications  
  levoFLOXacin 750 mg tablet  
 potassium chloride 20 mEq tablet

## 2017-01-10 LAB
ALBUMIN SERPL BCP-MCNC: 2.9 G/DL (ref 3.4–5)
ALBUMIN/GLOB SERPL: 1.1 {RATIO} (ref 0.8–1.7)
ALP SERPL-CCNC: 133 U/L (ref 45–117)
ALT SERPL-CCNC: 45 U/L (ref 13–56)
ANION GAP BLD CALC-SCNC: 8 MMOL/L (ref 3–18)
AST SERPL W P-5'-P-CCNC: 202 U/L (ref 15–37)
ATRIAL RATE: 118 BPM
BILIRUB DIRECT SERPL-MCNC: 0.3 MG/DL (ref 0–0.2)
BILIRUB SERPL-MCNC: 0.7 MG/DL (ref 0.2–1)
BUN SERPL-MCNC: 8 MG/DL (ref 7–18)
BUN/CREAT SERPL: 11 (ref 12–20)
CALCIUM SERPL-MCNC: 6.7 MG/DL (ref 8.5–10.1)
CALCULATED P AXIS, ECG09: 50 DEGREES
CALCULATED R AXIS, ECG10: 67 DEGREES
CALCULATED T AXIS, ECG11: -39 DEGREES
CHLORIDE SERPL-SCNC: 102 MMOL/L (ref 100–108)
CHOLEST SERPL-MCNC: 122 MG/DL
CO2 SERPL-SCNC: 25 MMOL/L (ref 21–32)
CREAT SERPL-MCNC: 0.76 MG/DL (ref 0.6–1.3)
DIAGNOSIS, 93000: NORMAL
FOLATE SERPL-MCNC: >20 NG/ML (ref 3.1–17.5)
GLOBULIN SER CALC-MCNC: 2.7 G/DL (ref 2–4)
GLUCOSE SERPL-MCNC: 150 MG/DL (ref 74–99)
HDLC SERPL-MCNC: 28 MG/DL (ref 40–60)
HDLC SERPL: 4.4 {RATIO} (ref 0–5)
IRON SATN MFR SERPL: 13 %
IRON SERPL-MCNC: 35 UG/DL (ref 50–175)
LACTATE BLD-SCNC: 2.4 MMOL/L (ref 0.4–2)
LACTATE SERPL-SCNC: 0.9 MMOL/L (ref 0.4–2)
LDLC SERPL CALC-MCNC: 43 MG/DL (ref 0–100)
LIPASE SERPL-CCNC: 174 U/L (ref 73–393)
LIPASE SERPL-CCNC: 230 U/L (ref 73–393)
LIPID PROFILE,FLP: ABNORMAL
P-R INTERVAL, ECG05: 144 MS
POTASSIUM SERPL-SCNC: 3.9 MMOL/L (ref 3.5–5.5)
PROT SERPL-MCNC: 5.6 G/DL (ref 6.4–8.2)
Q-T INTERVAL, ECG07: 322 MS
QRS DURATION, ECG06: 74 MS
QTC CALCULATION (BEZET), ECG08: 451 MS
SODIUM SERPL-SCNC: 135 MMOL/L (ref 136–145)
TIBC SERPL-MCNC: 268 UG/DL (ref 250–450)
TRIGL SERPL-MCNC: 255 MG/DL (ref ?–150)
VENTRICULAR RATE, ECG03: 118 BPM
VIT B12 SERPL-MCNC: 512 PG/ML (ref 211–911)
VIT B12 SERPL-MCNC: 649 PG/ML (ref 211–911)
VLDLC SERPL CALC-MCNC: 51 MG/DL

## 2017-01-10 PROCEDURE — 65270000029 HC RM PRIVATE

## 2017-01-10 PROCEDURE — 80076 HEPATIC FUNCTION PANEL: CPT | Performed by: HOSPITALIST

## 2017-01-10 PROCEDURE — 80061 LIPID PANEL: CPT | Performed by: HOSPITALIST

## 2017-01-10 PROCEDURE — 82607 VITAMIN B-12: CPT | Performed by: HOSPITALIST

## 2017-01-10 PROCEDURE — 83605 ASSAY OF LACTIC ACID: CPT | Performed by: HOSPITALIST

## 2017-01-10 PROCEDURE — 74011250636 HC RX REV CODE- 250/636: Performed by: HOSPITALIST

## 2017-01-10 PROCEDURE — 83605 ASSAY OF LACTIC ACID: CPT

## 2017-01-10 PROCEDURE — 74011000250 HC RX REV CODE- 250: Performed by: HOSPITALIST

## 2017-01-10 PROCEDURE — 74011250637 HC RX REV CODE- 250/637: Performed by: HOSPITALIST

## 2017-01-10 PROCEDURE — 83540 ASSAY OF IRON: CPT | Performed by: HOSPITALIST

## 2017-01-10 PROCEDURE — 80048 BASIC METABOLIC PNL TOTAL CA: CPT | Performed by: HOSPITALIST

## 2017-01-10 PROCEDURE — 36415 COLL VENOUS BLD VENIPUNCTURE: CPT | Performed by: HOSPITALIST

## 2017-01-10 PROCEDURE — 77030020263 HC SOL INJ SOD CL0.9% LFCR 1000ML

## 2017-01-10 PROCEDURE — 83690 ASSAY OF LIPASE: CPT | Performed by: HOSPITALIST

## 2017-01-10 RX ORDER — LORAZEPAM 1 MG/1
2 TABLET ORAL
Status: DISCONTINUED | OUTPATIENT
Start: 2017-01-10 | End: 2017-01-16 | Stop reason: HOSPADM

## 2017-01-10 RX ORDER — SODIUM CHLORIDE 9 MG/ML
50 INJECTION, SOLUTION INTRAVENOUS CONTINUOUS
Status: DISCONTINUED | OUTPATIENT
Start: 2017-01-10 | End: 2017-01-15

## 2017-01-10 RX ORDER — ASPIRIN 325 MG/1
100 TABLET, FILM COATED ORAL DAILY
Status: DISCONTINUED | OUTPATIENT
Start: 2017-01-10 | End: 2017-01-16 | Stop reason: HOSPADM

## 2017-01-10 RX ORDER — LORAZEPAM 2 MG/ML
2 INJECTION INTRAMUSCULAR
Status: DISCONTINUED | OUTPATIENT
Start: 2017-01-10 | End: 2017-01-16 | Stop reason: HOSPADM

## 2017-01-10 RX ORDER — HYDROXYZINE PAMOATE 50 MG/1
50 CAPSULE ORAL 2 TIMES DAILY
COMMUNITY

## 2017-01-10 RX ORDER — SODIUM CHLORIDE 0.9 % (FLUSH) 0.9 %
5-10 SYRINGE (ML) INJECTION EVERY 8 HOURS
Status: DISCONTINUED | OUTPATIENT
Start: 2017-01-10 | End: 2017-01-16 | Stop reason: HOSPADM

## 2017-01-10 RX ORDER — LORAZEPAM 2 MG/ML
1 INJECTION INTRAMUSCULAR
Status: DISCONTINUED | OUTPATIENT
Start: 2017-01-10 | End: 2017-01-16 | Stop reason: HOSPADM

## 2017-01-10 RX ORDER — SODIUM CHLORIDE 0.9 % (FLUSH) 0.9 %
5-10 SYRINGE (ML) INJECTION AS NEEDED
Status: DISCONTINUED | OUTPATIENT
Start: 2017-01-10 | End: 2017-01-15 | Stop reason: SDUPTHER

## 2017-01-10 RX ORDER — FOLIC ACID 1 MG/1
1 TABLET ORAL DAILY
Status: DISCONTINUED | OUTPATIENT
Start: 2017-01-10 | End: 2017-01-16 | Stop reason: HOSPADM

## 2017-01-10 RX ORDER — LORAZEPAM 2 MG/ML
3 INJECTION INTRAMUSCULAR
Status: DISCONTINUED | OUTPATIENT
Start: 2017-01-10 | End: 2017-01-16 | Stop reason: HOSPADM

## 2017-01-10 RX ORDER — ACETAMINOPHEN AND CODEINE PHOSPHATE 300; 30 MG/1; MG/1
1 TABLET ORAL
Status: DISCONTINUED | OUTPATIENT
Start: 2017-01-10 | End: 2017-01-16 | Stop reason: HOSPADM

## 2017-01-10 RX ORDER — LORAZEPAM 1 MG/1
1 TABLET ORAL
Status: DISCONTINUED | OUTPATIENT
Start: 2017-01-10 | End: 2017-01-16 | Stop reason: HOSPADM

## 2017-01-10 RX ADMIN — KETOROLAC TROMETHAMINE 15 MG: 30 INJECTION, SOLUTION INTRAMUSCULAR at 03:33

## 2017-01-10 RX ADMIN — LORAZEPAM 1 MG: 2 INJECTION, SOLUTION INTRAMUSCULAR; INTRAVENOUS at 18:58

## 2017-01-10 RX ADMIN — FOLIC ACID 1 MG: 1 TABLET ORAL at 18:07

## 2017-01-10 RX ADMIN — LORAZEPAM 1 MG: 2 INJECTION, SOLUTION INTRAMUSCULAR; INTRAVENOUS at 08:37

## 2017-01-10 RX ADMIN — Medication 10 ML: at 09:54

## 2017-01-10 RX ADMIN — Medication 10 ML: at 23:07

## 2017-01-10 RX ADMIN — KETOROLAC TROMETHAMINE 15 MG: 30 INJECTION, SOLUTION INTRAMUSCULAR at 09:51

## 2017-01-10 RX ADMIN — SODIUM CHLORIDE 125 ML/HR: 900 INJECTION, SOLUTION INTRAVENOUS at 21:06

## 2017-01-10 RX ADMIN — THIAMINE HCL TAB 100 MG 100 MG: 100 TAB at 18:07

## 2017-01-10 RX ADMIN — LORAZEPAM 1 MG: 2 INJECTION, SOLUTION INTRAMUSCULAR; INTRAVENOUS at 23:03

## 2017-01-10 RX ADMIN — ACETAMINOPHEN AND CODEINE PHOSPHATE 1 TABLET: 300; 30 TABLET ORAL at 20:57

## 2017-01-10 RX ADMIN — LEVOTHYROXINE SODIUM ANHYDROUS 12.5 MCG: 100 INJECTION, POWDER, LYOPHILIZED, FOR SOLUTION INTRAVENOUS at 11:26

## 2017-01-10 RX ADMIN — LORAZEPAM 1 MG: 2 INJECTION, SOLUTION INTRAMUSCULAR; INTRAVENOUS at 20:57

## 2017-01-10 RX ADMIN — LORAZEPAM 1 MG: 2 INJECTION, SOLUTION INTRAMUSCULAR; INTRAVENOUS at 03:32

## 2017-01-10 RX ADMIN — KETOROLAC TROMETHAMINE 15 MG: 30 INJECTION, SOLUTION INTRAMUSCULAR at 18:13

## 2017-01-10 RX ADMIN — Medication 10 ML: at 02:27

## 2017-01-10 RX ADMIN — LORAZEPAM 1 MG: 2 INJECTION, SOLUTION INTRAMUSCULAR; INTRAVENOUS at 14:46

## 2017-01-10 RX ADMIN — LORAZEPAM 1 MG: 2 INJECTION, SOLUTION INTRAMUSCULAR; INTRAVENOUS at 02:27

## 2017-01-10 RX ADMIN — SODIUM CHLORIDE 125 ML/HR: 900 INJECTION, SOLUTION INTRAVENOUS at 12:29

## 2017-01-10 RX ADMIN — Medication 10 ML: at 14:47

## 2017-01-10 RX ADMIN — ENOXAPARIN SODIUM 40 MG: 40 INJECTION SUBCUTANEOUS at 23:02

## 2017-01-10 NOTE — ROUTINE PROCESS
0800 - assumed care of patient - alert and oriented - on CIWA protocol. 0830 - Ativan 1 mg IV per CIWA protocol    1000 - Toradol for complaints of pain.     1145 - IDR's at bedside - patient has no complaints at this time    0 - daughter at bedside    1445 - Ativan 1 mg IV per CIWA protocol    1600 - up in chair -  No complaints    1845 - alerted by CNA to fever and tachycardia - patient complaining of mild HA - medicated with Ativan per [facility] protocol - Dr. Matti Barclay paged for update and Tylenol order

## 2017-01-10 NOTE — PROGRESS NOTES
Chart reviewed for interdisciplinary rounds/audit purposes. Dr. Keri Cameron requesting PT orders for eval & tx; will enter accordingly.         Amy Sanabria

## 2017-01-10 NOTE — PROGRESS NOTES
Nutrition initial assessment/  Plan of care      RECOMMENDATIONS:     1. Advance diet when medically indicated  2. Monitor weight, labs and PO intake  3. RD to follow     GOALS:     1. Patient tolerates diet advancement by 1/14  2. PO intake meets >75% of protein/calorie needs by 1/14  3. Weight Maintenance/Gradual weight loss (1-2 lb by 1/18)    ASSESSMENT:     Weight status is classified as obese per BMI of 34.1. Currently not meeting nutrition needs due to NPO diet order. Labs noted. Elevated LFT's. Nutrition recommendations listed. RD to follow. Nutrition Diagnoses:   Abnormal nutrition related lab values related to pancratitis as evidenced by lipase level of 1227 on admission. Nutrition Risk:  [x] High  [] Moderate []  Low    SUBJECTIVE/OBJECTIVE:      Patient admitted with sepsis and pancreatitis. S/P fall yesterday. Patient with hx of Gastric By-Pass surgery (2012), liver disease and pancreatitis. Patient with history of alcohol abuse. Patient is allergic to tomatoes. Denies problems with chewing/swallowing. Complains of nausea but denies vomiting and abdominal pain. Patient reports UBW of 211 lb and was eating small frequent meals PTA. Currently with NPO diet order. Will follow diet advancement. Information Obtained from:    [x] Chart Review   [x] Patient   [] Family/Caregiver   [] Nurse/Physician   [] Interdisciplinary Meeting/Rounds    Diet: NPO  Medications: [x] Reviewed    Allergies: [x] Reviewed (tomato)  Encounter Diagnoses     ICD-10-CM ICD-9-CM   1. Sepsis, due to unspecified organism ([facility] A41.9 038.9     995.91   2. Urinary tract infection, site unspecified N39.0    3. Non-intractable vomiting with nausea, unspecified vomiting type R11.2 787.01   4. Other acute pancreatitis K85.80    5. Alcohol abuse F10.10 305.00   6.  Chronic anemia D64.9 285.9     Past Medical History   Diagnosis Date    Alcohol abuse     Alcoholic hepatitis     Alcoholic pancreatitis     Anemia     Bipolar disorder ([place] [address]      Dr. Jade Lovett [facility])    Chronic abdominal pain     Compression fracture of lumbar vertebra ([facility]      L4, L5     Depression      Dr. Jade Lovett [facility])    Elevated LFTs     ETOH abuse     Fatty liver     GERD (gastroesophageal reflux disease)     Hyperlipidemia     Hypertension     Hypothyroidism     Lower GI bleeding     Morbid obesity ([facility]     Obstructive sleep apnea     Substance induced mood disorder ([place] [address].)     Vitamin D deficiency       Labs:    Lab Results   Component Value Date/Time    Sodium 136 01/11/2017 04:50 AM    Potassium 3.5 01/11/2017 04:50 AM    Chloride 103 01/11/2017 04:50 AM    CO2 23 01/11/2017 04:50 AM    Anion gap 10 01/11/2017 04:50 AM    Glucose 166 01/11/2017 04:50 AM    BUN 6 01/11/2017 04:50 AM    Creatinine 0.63 01/11/2017 04:50 AM    Calcium 6.5 01/11/2017 04:50 AM    Magnesium 1.6 01/11/2017 04:50 AM    Phosphorus 2.6 02/07/2016 01:17 AM    Albumin 2.8 01/11/2017 04:50 AM     Anthropometrics: BMI (calculated): 34.1  Last 3 Recorded Weights in this Encounter    01/09/17 1702 01/10/17 0639   Weight: 95.7 kg (211 lb) 98.4 kg (217 lb)      Ht Readings from Last 1 Encounters:   01/10/17 5' 7\" (1.702 m)     No data found. IBW: 135 lb %IBW: 160% UBW: 211 lb %UBW: 103%   [] Weight Loss [x] Weight Gain [] Weight Stable    Estimated Nutrition Needs: [x] MSJ  [] Other:  Calories: 0014-0295 Kcal Based on:   [x] Actual BW    Protein:   80-98 g Based on:   [x] Actual BW    Fluid:       5005-0670 ml Based on:   [x] Actual BW      [x] No Cultural, Confucianist or ethnic dietary need identified.     [] Cultural, Confucianist and ethnic food preferences identified and addressed     Wt Status:  [] Normal (18.6 - 24.9) [] Underweight (<18.5) [] Overweight (25 - 29.9) [x] Mild Obesity (30 - 34.9)  [] Moderate Obesity (35 - 39.9) [] Morbid Obesity (40+)     Nutrition Problems Identified:   [x] Suboptimal PO intake   [x] Food Allergies (tomato)  [] Difficulty chewing/swallowing/poor dentition  [] Constipation/Diarrhea   [] Nausea/Vomiting   [] None  [] Other:     Plan:   [] Therapeutic Diet  []  Obtained/adjusted food preferences/tolerances and/or snacks options   []  Supplements added   [] Occupational therapy following for feeding techniques  []  HS snack added   []  Modify diet texture   []  Modify diet for food allergies   []  Educate patient   []  Assist with menu selection   []  Monitor PO intake on meal rounds   [x]  Continue inpatient monitoring and intervention   []  Participated in discharge planning/Interdisciplinary rounds/Team meetings   []  Other:     Education Needs:   [x] Not appropriate for teaching at this time due to: NPO   [] Identified and addressed    Nutrition Monitoring and Evaluation:  [x] Continue ongoing monitoring and intervention  [] Other    Chencho Leon

## 2017-01-10 NOTE — PROGRESS NOTES
Progress Note      Patient: Jerald Duckworth               Sex: female          DOA: 1/9/2017       YOB: 1960      Age:  55 y.o.        LOS:  LOS: 1 day               Subjective:   Pt has a h/o alcohol dependence . She has mild pancreatitis  By ct scan . She has a h/o gastric bypass and is probably noncompliant with her multivitamins . .she is febrile  At 100.3  Degrees . Her abdomen is only mildly tender .  Expect that her rcovery should be reasonably fast       Objective:      Visit Vitals    /71 (BP 1 Location: Left arm, BP Patient Position: At rest)    Pulse 86    Temp 100.3 °F (37.9 °C)    Resp 22    Ht 5' 7\" (1.702 m)    Wt 98.4 kg (217 lb)    SpO2 93%    Breastfeeding No    BMI 33.99 kg/m2       Physical Exam:  Pt is obese and has probable sleep apnea   Heart reg rate and rhythm   Lungs good breath sounds heard   Abdomen soft and  very little tenderness on palpationn  Neuro no evidence of dts so far      Lab/Data Reviewed:  CMP:   Lab Results   Component Value Date/Time     (L) 01/10/2017 04:50 AM    K 3.9 01/10/2017 04:50 AM     01/10/2017 04:50 AM    CO2 25 01/10/2017 04:50 AM    AGAP 8 01/10/2017 04:50 AM     (H) 01/10/2017 04:50 AM    BUN 8 01/10/2017 04:50 AM    CREA 0.76 01/10/2017 04:50 AM    GFRAA >60 01/10/2017 04:50 AM    GFRNA >60 01/10/2017 04:50 AM    CA 6.7 (L) 01/10/2017 04:50 AM    ALB 2.9 (L) 01/10/2017 04:50 AM    TP 5.6 (L) 01/10/2017 04:50 AM    GLOB 2.7 01/10/2017 04:50 AM    AGRAT 1.1 01/10/2017 04:50 AM    SGOT 202 (H) 01/10/2017 04:50 AM    ALT 45 01/10/2017 04:50 AM     CBC:   Lab Results   Component Value Date/Time    WBC 7.4 01/09/2017 05:25 PM    HGB 8.2 (L) 01/09/2017 05:25 PM    HCT 24.1 (L) 01/09/2017 05:25 PM     01/09/2017 05:25 PM           Assessment/Plan     Active Problems:    Acute pancreatitis (10/20/2014)      UTI (urinary tract infection) (3/25/2016)      Sepsis ([facility] (1/9/2017)      Non-intractable vomiting (1/9/2017)  Alcohol dependence   Hematoma in the right thigh       Plan:continue to hydrate and she will remain npo .

## 2017-01-10 NOTE — H&P
History and Physical    Patient: Ana Laura Burton               Sex: female          DOA: 1/9/2017       YOB: 1960      Age:  55 y.o.        LOS:  LOS: 0 days        HPI:     Ana Laura Burton is a 55 y.o. female who presents to the ED complaining of hip pain, cough and vomiting. Patient states that yesterday while out shopping, she slipped and fell on the ice, landing on her right hip. She reports that it was very painfull especially because over one week ago, she fell onto the same hip on the roadside. She was seen for that fall and released to home with no positive findings. She adds that she has had productive cough and vomiting for one week. Vomiting is usually in the morning and occurs after she has a beer. Diarrhea up to four times daily for the past one week, non bloody. Fever up to 100.4 for 2 days. While in the ED, positive findings included: tachycardia, elevated lactic acid, UTI. Patient admitted for further management.   Past Medical History   Diagnosis Date    Alcohol abuse     Alcoholic hepatitis     Alcoholic pancreatitis     Anemia     Bipolar disorder (HCC)      [facility])    Chronic abdominal pain     Compression fracture of lumbar vertebra (HCC)      L4, L5     Depression      [facility])    Elevated LFTs     ETOH abuse     Fatty liver     GERD (gastroesophageal reflux disease)     Hyperlipidemia     Hypertension     Hypothyroidism     Lower GI bleeding     Morbid obesity ([address]     Obstructive sleep apnea     Substance induced mood disorder ([address]     Vitamin D deficiency        Past Surgical History   Procedure Laterality Date    Diskectomy, anterior, with d  8/1995, 11/1997    Repair nonunion scaphoid carpal bone Right 2010     Wrist    Biopsy liver  08/15/2012     Lap Left hepatic liver wedge by Dr. Sergei Bradford; BX Revealed: Hepatic Steatosis, AVM w/ associated Subcapsular Fibrosis.  Hx hysterectomy  2006    Hx tonsillectomy  1992    Hx abdominal laparoscopy  12/02/2014     Laparoscopic Gastrostomy w/ Partial Gastrectomy for assistance in placement of Endoscopic Retrograde Cholangiopancreatography & Diagnostic Lap.  Hx carpal tunnel release      Hx cholecystectomy  09/16/2014     Dr. Arley Charles Hx colonoscopy  05/12/2012     Colonoscopy by Dr. Sudheer Cervantes. Dennisbisi Perez: Bx Revealed Colon Polyps (Tubular Adenoma), Hemorrhoids.  Hx hemorrhoidectomy  04/30/2015     Dr. Awa Jurado. Ganga Mendoza Hx gastric bypass  08/15/2012     Lap Christian-en-y       Social History     Social History    Marital status:      Spouse name: N/A    Number of children: N/A    Years of education: N/A     Occupational History    Not on file. Social History Main Topics    Smoking status: Never Smoker    Smokeless tobacco: Never Used    Alcohol use 0.0 oz/week     0 Standard drinks or equivalent per week      Comment: 6 pack of beer a week    Drug use: No    Sexual activity: Yes     Partners: Male     Birth control/ protection: Condom     Other Topics Concern    Not on file     Social History Narrative       Family History   Problem Relation Age of Onset    Cancer Father     Cancer Sister     Obesity Brother        Allergies   Allergen Reactions    Codeine Rash    Lamictal [Lamotrigine] Anaphylaxis    Morphine Anaphylaxis     Per patient, has tolerated dilaudid in the past.    Pcn [Penicillins] Anaphylaxis    Vancomycin Rash    Doxycycline Rash    Percocet [Oxycodone-Acetaminophen] Hives and Itching    Tetracycline Rash    Tomato Hives       Review of Systems:  Positive in bold. Constitutional:  No fever or weight loss  HEENT:  No headache or visual changes  Cardiovascular:  No chest pain, no palpitations. Respiratory:  + coughing, wheezing, or shortness of breath. GI:  No abdominal pain. + nausea or vomiting,  diarrhea  :  No hematuria or dysuria.  No frequency, retention, urinary incontinence. Skin:  No rashes or moles  Neuro:  No seizures or syncope  Hematological:  No bruising or bleeding  Endocrine:  No diabetes or thyroid disease    Physical Exam:      Visit Vitals    /90    Pulse (!) 115    Temp 99.6 °F (37.6 °C)    Resp 16    Ht 5' 6\" (1.676 m)    Wt 95.7 kg (211 lb)    SpO2 100%    BMI 34.06 kg/m2       Physical Exam:  Gen:  No distress, alert, awake and oriented x 4  HEENT:  Normal cephalic atraumatic, extra-occular movements are intact. Neck:  Supple, No JVD  Lungs:  Clear bilaterally, no wheeze, no rales, normal effort  Cardiovascular:  Regular Rate and Rhythm, normal S1 and S2, no audible murmur. Capillary refill: < 3 seconds. Abdomen:  Soft, epigastric tenderness only to palpation, non distended, normal bowel sounds, no guarding. Extremities:  Well perfused, no cyanosis, no edema   Peripheral pulse: 2+  Neurological:  Awake and alert, CN's are intact, normal strength throughout extremities  Skin:  No rashes or moles. Turgor and color normal  Mental Status:  Normal thought process, does not appear anxious      Laboratory Studies: All lab results for the last 24 hours reviewed. Assessment/Plan     Active Problems:    Acute pancreatitis (10/20/2014)      UTI (urinary tract infection) (3/25/2016)      Sepsis ([facility] (1/9/2017)      Non-intractable vomiting (1/9/2017)        PLAN:        Infectious: UTI   Continue IV antibiotics- levaquin   Repeat lactic acid levels every 4 hours until normal    CV: HTN- uncontrolled.    Continue home antihypertensive medications    Heme:  DVT prophylaxis   Lovenox 40 mg SQ daily    Bilateral lower extremity compression devices    Metabolic/Endo: hypothyroid, alcoholism   Continue home medication-   Banana bag every 24 hours   CIWA protocol     GI: N/V/D, pancreatitis-    Phenergan as needed for nausea and vomiting   Keep NPO   Normal saline between banana bag    Neuro: alcohol withdrawal    See withdrawal protocol    Misc:  FULL CODE   Physical therapy to evaluate and treat   Fall precautions   Ambulate with assistance. Monitor intake and output   Monitor vitals as per unit   Replace electrolytes as needed. Follow up am labs.              Natividad Allison MD

## 2017-01-10 NOTE — PROGRESS NOTES
Patient has designated her daughter to participate in his/her discharge plan and to receive any needed information.      Name:   Angelic Castillo  daughter   [phone]      Guillermo 10, 2017     Address:  Phone number:

## 2017-01-10 NOTE — ED NOTES
Patient stating she has a hx of alcohol abuse and has had withdrawals in the past. States her last drink was about 1800 last night (1/8/2017)

## 2017-01-10 NOTE — PROGRESS NOTES
Problem: Discharge Planning  Goal: *Discharge to safe environment  Outcome: Progressing Towards Goal  Plan is to discharge to a safe environement

## 2017-01-10 NOTE — PROGRESS NOTES
[facility]   Discharge Planning/ Assessment    Reasons for Intervention: Interviewed patient, she agrees to share her discharge information with her daughter, see below. She lives with her . She was independent prior to admission and see Dr Guillermo Huitron for her primary care needs.   Her discharge plan is to return home, Case management will follow for discharge needs    High Risk Criteria  [x] Yes  []No   Physician Referral  [] Yes  [x]No        Date    Nursing Referral  [] Yes  []No        Date    Patient/Family Request  [] Yes  [x]No        Date       Resources:    Medicare  [x] Yes  []No   Medicaid  [] Yes  [x]No   No Resources  [] Yes  [x]No   Private Insurance  [] Yes  [x]No    Name/Phone Number    Other  [] Yes  [x]No        (i.e. Workman's Comp)         Prior Services:    Prior Services  [] Yes  [x]No   Home Health  [] Yes  [x]No   [address]  [] Yes  [x]No        Number of [address]  [] Yes  [x]No       Meals on Wheels  [] Yes  [x]No   Office on Aging  [] Yes  [x]No   Transportation Services  [] Yes  [x]No   [address]  [] Yes  [x]No        Nursing Home Name    [address]  [] Yes  [x]No        [address] Name    Other       Information Source:      Information obtained from  [x] Patient  [] Parent   [] [address]  [] Child  [] Spouse   [] Significant Other/Partner   [] Friend      [] EMS    [] Nursing Home Chart          [x] Other: chart   Chart Review  [x] Yes  []No     Family/Support System:    Patient lives with  [] Alone    [x] Spouse   [] Significant Other  [] Children  [] Caretaker   [] Parent  [] Sibling     [] Other       Other Support System:    Is the patient responsible for care of others  [] Yes  [x]No   Information of person caring for patient on  discharge self   Managers financial affairs independently  [x] Yes  []No   If no, explain:      Status Prior to Admission:    Mental Status  [x] Awake  [x] Alert [x] Oriented  [] Quiet/Calm [] Lethargic/Sedated   [] Disoriented  [] Restless/Anxious  [] Combative   Personal Care  [] Dependent  [x] Independent Personal Care  [] Requires Assistance   Meal Preparation Ability  [x] Independent   [] Standby Assistance   [] Minimal Assistance   [] Moderate Assistance  [] Maximum Assistance     [] Total Assistance   Chores  [x] Independent with Chores   [] N/A Nursing Home Resident   [] Requires Assistance   Bowel/Bladder  [x] Continent  [] Catheter  [] Incontinent  [] Ostomy Self-Care    [] Urine Diversion Self-Care  [] Maximum Assistance     [] Total Assistance   Number of Persons needed for assistance    DME at home  [] [place], Theodoro Sis  [] [place], Straight   [] Commode    [] Bathroom/Grab Bars  [] Hospital Bed  [] Nebulizer  [] Oxygen           [] Raised Toilet Seat  [] Shower Chair  [] Side Rails for Bed   [] Tub Transfer Bench   [] Antoinette Varma  [] Braden Canavan, Standard      [] Other:   Vendor      Treatment Presently Receiving:    Current Treatments  [] Chemotherapy  [] Dialysis  [] Insulin  [] IVAB [x] IVF   [] O2  [] PCA   [] PT   [] RT   [] Tube Feedings   [] Wound Care     Psychosocial Evaluation:    Verbalized Knowledge of Disease Process  [x] Patient  [x]Family   Coping with Disease Process  [x] Patient  [x]Family   Requires Further Counseling Coping with Disease Process  [] Patient  []Family     Identified Projected Needs:    Home Health Aid  [] Yes  [x]No   Transportation  [] Yes  [x]No   Education  [] Yes  [x]No        Specific Education     Financial Counseling  [] Yes  [x]No   Inability to Care for Self/Will Require 24 hour care  [] Yes  [x]No   Pain Management  [] Yes  [x]No   Home Infusion Therapy  [] Yes  [x]No   Oxygen Therapy  [] Yes  [x]No   DME  [] Yes  [x]No   Long Term Care Placement  [] Yes  [x]No   Rehab  [] Yes  [x]No   Physical Therapy  [] Yes  [x]No   Needs Anticipated At This Time  [] Yes  [x]No     Intra-Hospital Referral:    [facility]  [] Yes [x]No     [] Yes  [x]No   Patient Representative  [] Yes  [x]No   Staff for Teaching Needs  [] Yes  [x]No   Specialty Teaching Needs     Diabetic Educator  [] Yes  [x]No   Referral for Diabetic Educator Needed  [] Yes  [x]No  If Yes, place order for Nutritionist or Diabetic Consult     Tentative Discharge Plan:    Home with No Services  [x] Yes  []No   Home with [address]  [] Yes  [x]No        If Yes, specify type    [address]  [] Yes  [x]No        If Yes, specify type    Meals on Wheels  [] Yes  [x]No   Office of Aging  [] Yes  [x]No   NHP  [] Yes  [x]No   Return to the Nursing Home  [] Yes  [x]No   Rehab Therapy  [] Yes  [x]No   Acute Rehab  [] Yes  [x]No   Subacute Rehab  [] Yes  [x]No   Private Care  [] Yes  [x]No   Substance Abuse Referral  [] Yes  [x]No   Transportation  [] Yes  [x]No   Chore Service  [] Yes  [x]No   Inpatient Hospice  [] Yes  [x]No   OP RT  [] Yes  [x] No   OP Hemo  [] Yes  [x] No   OP PT  [] Yes  [x]No   Support Group  [] Yes  [x]No   Reach to Recovery  [] Yes  [x]No   OP Oncology Clinic  [] Yes  [x]No   Clinic Appointment  [] Yes  [x]No   DME  [] Yes  [x]No   Comments    Name of D/C Planner or  Given to Patient or Family Nikolay Ray RN   Phone Number [phone] [facility]   Date Guillermo 10, 2017   Time 909 am   If you are discharged home, whom do you designate to participate in your discharge plan and receive any information needed?      Enter name of [address]  daughter        Phone # of designee         Address of designee         Updated Guillermo 10, 2017        Patient refused to designate any           individual

## 2017-01-10 NOTE — PROGRESS NOTES
0255: Admitted from ED, AOX4, ambulates with unsteady gait, needs 1-person assist; conversant, answers question appropriately; oriented to unit, room, routines, telemonitoring; Sinus tachycardia on tele; admission history and assessment done; Multivitamin bag infusing well; with large bruised area on right hip from \"falling hard on the ice yesterday\"; with scattered bruises and scabs on bilateral legs; vital signs monitored; bedside commode provided, callbell within reach; bed alarms on    0335: Toradol 15 mg given IV for c/o right upper quadrant abdomen; Ativan 1 mg given IV per CIWA protocol    0420: sleeping; no apparent distress; Sinus Tachycardia on tele    0530: sleeping; needs within reach; bed alarms on    0720: Bedside and Verbal shift change report given to Keaton Moffett RN (oncoming nurse) by Dede Bonds RN (offgoing nurse). Report included the following information SBAR, Kardex, Intake/Output, Recent Results and Cardiac Rhythm sinus tachycardia.

## 2017-01-10 NOTE — ED PROVIDER NOTES
HPI Comments: Mani Mg is a 55 y.o. female who presents to the ED with complaints of R hip pain. Pt states she fell on ice yesterday. Pt denies LOC, neck pain, numbness, weakness, and back pain. Pt also c/o cough and vomiting for 1 week. Minimal abdominal discomfort with subjective fever. No sick contacts. Body aches for 1 week. No other injuries from fall. Ambulaing since fall yesterday. Admits to hx of Hep C, HTN, Anemia, ETOH abuse, and Reflux. Pt states her last etoh drink was yesterday. Pt denies tremors, confusion, or illicit drugs. No further symptoms or complaints expressed at this time. PCP: Sussy Bernstein MD      The history is provided by the patient. Past Medical History:   Diagnosis Date    Alcoholic hepatitis     Alcoholic pancreatitis     Anemia     Bipolar disorder (HCC)      Dr. Cazares [facility])    Chronic abdominal pain     Compression fracture of lumbar vertebra (HCC)      L4, L5     Depression      Dr. Cazares [facility])    Elevated LFTs     ETOH abuse     Fatty liver     GERD (gastroesophageal reflux disease)     Hyperlipidemia     Hypertension     Hypothyroidism     Lower GI bleeding     Morbid obesity ([address]     Obstructive sleep apnea     Substance induced mood disorder ([facility] [address]     Vitamin D deficiency        Past Surgical History:   Procedure Laterality Date    Diskectomy, anterior, with d  8/1995, 11/1997    Repair nonunion scaphoid carpal bone Right 2010     Wrist    Biopsy liver  08/15/2012     Lap Left hepatic liver wedge by Dr. Georgina Lobato; BX Revealed: Hepatic Steatosis, AVM w/ associated Subcapsular Fibrosis.  Hx hysterectomy  2006    Hx tonsillectomy  1992    Hx abdominal laparoscopy  12/02/2014     Laparoscopic Gastrostomy w/ Partial Gastrectomy for assistance in placement of Endoscopic Retrograde Cholangiopancreatography & Diagnostic Lap.     Hx carpal tunnel release      Hx cholecystectomy 09/16/2014     Dr. Itz Reddy Hx colonoscopy  05/12/2012     Colonoscopy by Dr. Aishwarya Vargas. [facility]: Bx Revealed Colon Polyps (Tubular Adenoma), Hemorrhoids.  Hx hemorrhoidectomy  04/30/2015     Dr. Agapito Lowry. Sonia Livingston Hx gastric bypass  08/15/2012     Lap Christian-en-y         Family History:   Problem Relation Age of Onset    Cancer Father     Cancer Sister     Obesity Brother        Social History     Social History    Marital status:      Spouse name: N/A    Number of children: N/A    Years of education: N/A     Occupational History    Not on file. Social History Main Topics    Smoking status: Never Smoker    Smokeless tobacco: Never Used    Alcohol use 0.0 oz/week     0 Standard drinks or equivalent per week      Comment: 6 pack of beer a week    Drug use: No    Sexual activity: Yes     Partners: Male     Birth control/ protection: Condom     Other Topics Concern    Not on file     Social History Narrative         ALLERGIES: Codeine; Lamictal [lamotrigine]; Morphine; Pcn [penicillins]; Vancomycin; Doxycycline; Percocet [oxycodone-acetaminophen]; Tetracycline; and Tomato    Review of Systems   Constitutional: Positive for fever. HENT: Positive for congestion. Negative for postnasal drip, rhinorrhea, sinus pressure and sore throat. Eyes: Negative for visual disturbance. Respiratory: Positive for cough. Negative for shortness of breath and wheezing. Cardiovascular: Negative for chest pain. Gastrointestinal: Positive for vomiting. Abdominal discomfort     Endocrine: Negative for polyuria. Genitourinary: Negative for difficulty urinating and dysuria. Musculoskeletal: Positive for myalgias. Negative for back pain, neck pain and neck stiffness. Right hip pain. No swelling. Skin:        Bruise right hip   Neurological: Negative for dizziness, seizures, speech difficulty, weakness, numbness and headaches.    Psychiatric/Behavioral: Negative for agitation and confusion. Vitals:    01/09/17 1702 01/09/17 2114   BP: 128/82 151/90   Pulse: (!) 130 (!) 115   Resp: 14 16   Temp: 100.3 °F (37.9 °C)    SpO2: 98% 100%   Weight: 95.7 kg (211 lb)    Height: 5' 6\" (1.676 m)             Physical Exam   Constitutional: She is oriented to person, place, and time. She appears well-developed and well-nourished. No distress. Temp 100.3   HENT:   Head: Normocephalic and atraumatic. Dry mucus membranes   Eyes: Conjunctivae and EOM are normal. Right eye exhibits no discharge. Left eye exhibits no discharge. Neck: Normal range of motion. Neck supple. No meningismus   Cardiovascular: Intact distal pulses. Exam reveals no gallop. Cap refill <3 seconds  Tachycardia    Pulmonary/Chest: Effort normal and breath sounds normal. No stridor. No respiratory distress. She has no wheezes. She has no rales. Abdominal: Soft. Bowel sounds are normal. She exhibits no distension and no mass. There is no rebound and no guarding. Minimal mid abdomen tenderness   Musculoskeletal: Normal range of motion. She exhibits tenderness. She exhibits no edema or deformity. Tender right hip, pelvis stable    No swelling of legs, no tibfib, knee, or ankle tenderness. No spinal midline tenderness. Lymphadenopathy:     She has no cervical adenopathy. Neurological: She is alert and oriented to person, place, and time. She has normal reflexes. No cranial nerve deficit. She exhibits normal muscle tone. Coordination normal.   Skin: Skin is warm. No rash noted. She is not diaphoretic. Large bruise/contusion to the R hip area. Psychiatric: Her behavior is normal. Thought content normal.   Nursing note and vitals reviewed. MDM  Number of Diagnoses or Management Options  Alcohol abuse:   Chronic anemia:   Non-intractable vomiting with nausea, unspecified vomiting type:    Other acute pancreatitis:   Sepsis, due to unspecified organism [facility]): new and requires workup  Urinary tract infection, site unspecified:   Diagnosis management comments: ddx sepsis, PNA, URI, metabolic, hip contusion vs fx, dehydration    Labs, LA, IVF  Analgesia, antiemetic   LA 3.5      IVF bolus: had 30cc/kg IVF, IV abx levaquin. Pt allergic to pcn, vancomycin, doxycyclin    CxR, hip xray    Has UTI    WBC wnl  Cr wnl  Lipase 1227    Discussed results and plan to admit with pt who agrees    Pt states she feels anxious, no confusion. will give dose ativan. No etoh since yesterday. Concern for w/d. Discussed this with Dr. Rita Sutton. Amount and/or Complexity of Data Reviewed  Clinical lab tests: ordered and reviewed  Tests in the radiology section of CPT®: ordered and reviewed  Tests in the medicine section of CPT®: ordered and reviewed  Discussion of test results with the performing providers: yes  Discuss the patient with other providers: yes    Risk of Complications, Morbidity, and/or Mortality  Presenting problems: high  Diagnostic procedures: high  Management options: high    Critical Care  Total time providing critical care: 30-74 minutes    Critical care time:   I have spent 40 minutes of critical care time involved in lab review, consultations with specialist, family decision making, and documentation. During this entire length of time I was immediately available to the patient. Critical care: The reason for providing this level of medical care for this critically ill patient was due to a critical illness that impaired one or more vital organ systems such that there was a high probability of imminent or life threatening deterioration in the patients condition. This care involved high complexity decision making to assess, manipulate and support vital system functions, to treat this degree vital organ system failure and to prevent further life threatening deterioration of the patient's condition.        ED Course       Procedures    PROGRESS NOTES  9:12 PM: Ankur Sher DO arrives to the bedside to evaluate the patient. Answered the patient's questions regarding the treatment plan. 9:12 PM Patient states she is not allergic to Toradol. Patient is resting comfortably. Patient has stopped vomiting in the ED. Sepsis 3-6 hour reevaluation and exam:       Reevaluation:    Vital Signs:   Patient Vitals for the past 12 hrs:   Temp Pulse Resp BP SpO2   01/09/17 2150 99.6 °F (37.6 °C) - - - -   01/09/17 2114 - (!) 115 16 151/90 100 %   01/09/17 1702 100.3 °F (37.9 °C) (!) 130 14 128/82 98 %       Cardiopulmonary assessment:  RESP: Chest clear, equal breath sounds. CV: S1 and S2 WNL; Tachycardia  Capillary refill:  <3 seconds  Peripheral pulse:   Symmetric dorsalis pedal pulses    Skin exam:  Skin color: pale   Skin Turgor: good    Sepsis 3-6 hour reevaluation and exam performed at 2125.       ED PHYSICIAN ORDERS  Orders Placed This Encounter    SEVERE SEPSIS AND SEPTIC SHOCK BUNDLE INITIATED     Standing Status:   Standing     Number of Occurrences:   1    CULTURE, BLOOD     Standing Status:   Standing     Number of Occurrences:   1    CULTURE, BLOOD     Standing Status:   Standing     Number of Occurrences:   1    CULTURE, URINE     Standing Status:   Standing     Number of Occurrences:   1     Order Specific Question:   Reason for Culture     Answer:   Eval of sepsis without a clear source    INFLUENZA A & B AG (RAPID TEST)     Standing Status:   Standing     Number of Occurrences:   1    XR HIP RT W OR WO PELV 2-3 VWS     Standing Status:   Standing     Number of Occurrences:   1     Order Specific Question:   Transport     Answer:   Wheelchair [7]     Order Specific Question:   Reason for Exam     Answer:   fall on ice; pain    XR CHEST SNGL V     Standing Status:   Standing     Number of Occurrences:   1     Order Specific Question:   Transport     Answer:   Wheelchair [7]     Order Specific Question:   Reason for Exam     Answer:   Sepsis    CBC WITH AUTOMATED DIFF     Standing Status: Standing     Number of Occurrences:   1    METABOLIC PANEL, COMPREHENSIVE     Standing Status:   Standing     Number of Occurrences:   1    LACTIC ACID, PLASMA     If initial lactate level is greater than or equal to 2, draw second lactate in 4 hours. Standing Status:   Standing     Number of Occurrences:   2    URINALYSIS W/ RFLX MICROSCOPIC     Standing Status:   Standing     Number of Occurrences:   1    LIPASE     Standing Status:   Standing     Number of Occurrences:   1    URINE MICROSCOPIC ONLY     Standing Status:   Standing     Number of Occurrences:   1    POC LACTIC ACID     Standing Status:   Standing     Number of Occurrences:   1    VITAL SIGNS - PER UNIT ROUTINE     PER UNIT ROUTINE     Standing Status:   Standing     Number of Occurrences:   1    STRICT I & O     Standing Status:   Standing     Number of Occurrences:   1    NOTIFY PROVIDER: SPECIFY Notify provider within one hour to start vasopressors if patient is unable to maintain a MAP of greater than or equal to 65 mmHg despite fluid resuscitation CONTINUOUS STAT     Standing Status:   Standing     Number of Occurrences:   1     Order Specific Question:   Please describe the test or procedure you would like to order.      Answer:   Notify provider within one hour to start vasopressors if patient is unable to maintain a MAP of greater than or equal to 65 mmHg despite fluid resuscitation    POC LACTIC ACID     Standing Status:   Standing     Number of Occurrences:   1    EKG, 12 LEAD, INITIAL     Standing Status:   Standing     Number of Occurrences:   1     Order Specific Question:   Reason for Exam:     Answer:   Sepsis    SALINE LOCK IV ONE TIME STAT     Standing Status:   Standing     Number of Occurrences:   1    sodium chloride (NS) flush 5-10 mL    sodium chloride 0.9 % bolus infusion 2,871 mL    levoFLOXacin (LEVAQUIN) 750 mg in D5W IVPB     Order Specific Question:   Antibiotic Indications     Answer:   Sepsis of Unknown Etiology    DISCONTD: traMADol (ULTRAM) tablet 50 mg    ketorolac (TORADOL) injection 30 mg    DISCONTD: promethazine (PHENERGAN) with saline injection 25 mg    promethazine (PHENERGAN) with saline injection 25 mg           MEDICATIONS ORDERED  Medications   sodium chloride (NS) flush 5-10 mL (not administered)   promethazine (PHENERGAN) with saline injection 25 mg (not administered)   LORazepam (ATIVAN) injection 1 mg (not administered)   sodium chloride 0.9 % bolus infusion 2,871 mL (2,871 mL IntraVENous New Bag 1/9/17 1924)   levoFLOXacin (LEVAQUIN) 750 mg in D5W IVPB (0 mg IntraVENous IV Completed 1/9/17 2000)   ketorolac (TORADOL) injection 30 mg (30 mg IntraVENous Given 1/9/17 2118)         Vitals:  Patient Vitals for the past 12 hrs:   Temp Pulse Resp BP SpO2   01/09/17 2114 - (!) 115 16 151/90 100 %   01/09/17 1702 100.3 °F (37.9 °C) (!) 130 14 128/82 98 %           RADIOLOGY INTERPRETATIONS  XR HIP RT W OR WO PELV 2-3 VWS       No acute process. No fractures per Dr Kiara Rai prelim read   XR CHEST SNGL V       No acute process per dr. Fidencio Silva' prelim read           LABORATORY RESULTS  Labs Reviewed   CBC WITH AUTOMATED DIFF - Abnormal; Notable for the following:        Result Value    RBC 2.55 (*)     HGB 8.2 (*)     HCT 24.1 (*)     NEUTROPHILS 83 (*)     LYMPHOCYTES 8 (*)     ABS. LYMPHOCYTES 0.6 (*)     All other components within normal limits   METABOLIC PANEL, COMPREHENSIVE - Abnormal; Notable for the following:     Sodium 131 (*)     Chloride 97 (*)     Glucose 155 (*)     Calcium 7.4 (*)      (*)     Alk.  phosphatase 157 (*)     All other components within normal limits   URINALYSIS W/ RFLX MICROSCOPIC - Abnormal; Notable for the following:     Leukocyte Esterase SMALL (*)     All other components within normal limits   LIPASE - Abnormal; Notable for the following:     Lipase 1227 (*)     All other components within normal limits   URINE MICROSCOPIC ONLY - Abnormal; Notable for the following:     Bacteria 2+ (*)     All other components within normal limits   POC LACTIC ACID - Abnormal; Notable for the following:     Lactic Acid (POC) 3.6 (*)     All other components within normal limits   CULTURE, BLOOD   CULTURE, BLOOD   CULTURE, URINE   INFLUENZA A & B AG (RAPID TEST)   LACTIC ACID, PLASMA         ED DIAGNOSIS & DISPOSITION INFORMATION  Diagnosis:   1. Sepsis, due to unspecified organism ([address]    2. Urinary tract infection, site unspecified    3. Non-intractable vomiting with nausea, unspecified vomiting type    4. Other acute pancreatitis    5. Alcohol abuse    6. Chronic anemia      Discussed case and results with Dr. To Cyr, hospitalist, who accepts admit. Disposition: ADMIT    Follow-up Information     None            ATTESTATION STATEMENT  Scribe Attestation:   ketan Vasquezing for and in the presence of Vinny Hi DO January 09, 2017 at 9:41 PM     Physician Attestation:   I personally performed the services described in this documentation, reviewed and edited the documentation which was dictated to the scribe in my presence, and it accurately records my words and actions. Vinny Hi DO January 09, 2017 at 9:41 PM    Signed by: Yue Banks.  9:24 PM

## 2017-01-10 NOTE — PROGRESS NOTES
conducted an initial consultation and Spiritual Assessment for Elsi Alanis, who is a 55 y.o.,female. Patients Primary Language is: [place]. According to the patients EMR Worship Affiliation is: Episcopalian. The reason the Patient came to the hospital is:   Patient Active Problem List    Diagnosis Date Noted    Sepsis ([address] 01/09/2017    Non-intractable vomiting 01/09/2017    Elevated LFTs     Fatty liver     Bipolar disorder ([address]     Depression     Anemia     Chronic pain     Chronic abdominal pain     Substance induced mood disorder (HCC)     GI bleed     Lower GI bleeding     Compression fracture of lumbar vertebra (HCC)     Alcoholic hepatitis     Alcoholic pancreatitis     Hypothyroidism     Vitamin D deficiency     Hyponatremia 05/22/2016    Alcohol withdrawal ([address] 03/26/2016    UTI (urinary tract infection) 03/25/2016    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    Alcohol abuse 03/24/2016    Acute hyponatremia 02/03/2016    Hemorrhoids with complication 16/64/9697    Gallstone pancreatitis 11/26/2014    Acute pancreatitis 10/20/2014    Pancreatitis 08/28/2014    Bipolar affective disorder, depressed, severe ([address] 05/09/2014    Altered mental status 05/06/2014    Overdose 05/06/2014    Alcohol abuse with intoxication delirium ([address] 05/06/2014    H/O gastric bypass 05/06/2014    HERMELINDA (obstructive sleep apnea) 05/06/2014    Essential hypertension, benign 05/06/2014    Malabsorption     History of Lap Christian-en-Y gastric bypass- 8/15/12 w/BMI 39     Morbid obesity ([address]     Hyperlipidemia     Obstructive sleep apnea         The  provided the following Interventions:  Initiated a relationship of care and support. Explored issues of juana, belief, spirituality and Moravian/ritual needs while hospitalized. Listened empathically. Provided chaplaincy education. Provided information about Spiritual Care Services.   Offered prayer and assurance of continued prayers on patient's behalf. Chart reviewed. The following outcomes were achieved:  Patient shared limited information about both their medical narrative and spiritual journey/beliefs. Patient processed feeling about current hospitalization. Patient expressed gratitude for the 's visit. Assessment:  Patient does not have any Catholic/cultural needs that will affect patients preferences in health care. Patient did not indicate any spiritual or Catholic issues which require Spiritual Care Services interventions at this time. Plan:  Chaplains will continue to follow and will provide pastoral care on an as needed/requested basis.  recommends bedside caregivers page  on duty if patient shows signs of acute spiritual or emotional distress.     [facility]   Staff [address] [facility] [place]   [phone]

## 2017-01-11 LAB
ALBUMIN SERPL BCP-MCNC: 2.8 G/DL (ref 3.4–5)
ALBUMIN/GLOB SERPL: 1.1 {RATIO} (ref 0.8–1.7)
ALP SERPL-CCNC: 121 U/L (ref 45–117)
ALT SERPL-CCNC: 30 U/L (ref 13–56)
ANION GAP BLD CALC-SCNC: 10 MMOL/L (ref 3–18)
APPEARANCE UR: CLEAR
AST SERPL W P-5'-P-CCNC: 120 U/L (ref 15–37)
BACTERIA URNS QL MICRO: NEGATIVE /HPF
BILIRUB SERPL-MCNC: 0.6 MG/DL (ref 0.2–1)
BILIRUB UR QL: NEGATIVE
BUN SERPL-MCNC: 6 MG/DL (ref 7–18)
BUN/CREAT SERPL: 10 (ref 12–20)
CALCIUM SERPL-MCNC: 6.5 MG/DL (ref 8.5–10.1)
CHLORIDE SERPL-SCNC: 103 MMOL/L (ref 100–108)
CK SERPL-CCNC: 130 U/L (ref 26–192)
CO2 SERPL-SCNC: 23 MMOL/L (ref 21–32)
COLOR UR: YELLOW
CREAT SERPL-MCNC: 0.63 MG/DL (ref 0.6–1.3)
EPITH CASTS URNS QL MICRO: NORMAL /LPF (ref 0–5)
ERYTHROCYTE [DISTWIDTH] IN BLOOD BY AUTOMATED COUNT: 13 % (ref 11.6–14.5)
GLOBULIN SER CALC-MCNC: 2.5 G/DL (ref 2–4)
GLUCOSE SERPL-MCNC: 166 MG/DL (ref 74–99)
GLUCOSE UR STRIP.AUTO-MCNC: NEGATIVE MG/DL
HCT VFR BLD AUTO: 15.1 % (ref 35–45)
HCT VFR BLD AUTO: 16.2 % (ref 35–45)
HCT VFR BLD AUTO: 21.2 % (ref 35–45)
HGB BLD-MCNC: 5 G/DL (ref 12–16)
HGB BLD-MCNC: 5.3 G/DL (ref 12–16)
HGB BLD-MCNC: 7 G/DL (ref 12–16)
HGB UR QL STRIP: NEGATIVE
KETONES UR QL STRIP.AUTO: NEGATIVE MG/DL
LDH SERPL L TO P-CCNC: 210 U/L (ref 81–234)
LEUKOCYTE ESTERASE UR QL STRIP.AUTO: NEGATIVE
MAGNESIUM SERPL-MCNC: 1.6 MG/DL (ref 1.8–2.4)
MCH RBC QN AUTO: 31.9 PG (ref 24–34)
MCHC RBC AUTO-ENTMCNC: 32.7 G/DL (ref 31–37)
MCV RBC AUTO: 97.6 FL (ref 74–97)
NITRITE UR QL STRIP.AUTO: NEGATIVE
PH UR STRIP: 5 [PH] (ref 5–8)
PLATELET # BLD AUTO: 87 K/UL (ref 135–420)
PMV BLD AUTO: 9.2 FL (ref 9.2–11.8)
POTASSIUM SERPL-SCNC: 3.5 MMOL/L (ref 3.5–5.5)
PROT SERPL-MCNC: 5.3 G/DL (ref 6.4–8.2)
PROT UR STRIP-MCNC: NEGATIVE MG/DL
RBC # BLD AUTO: 1.66 M/UL (ref 4.2–5.3)
RBC #/AREA URNS HPF: 0 /HPF (ref 0–5)
RETICS/RBC NFR AUTO: 3.2 % (ref 0.5–2.3)
SODIUM SERPL-SCNC: 136 MMOL/L (ref 136–145)
SP GR UR REFRACTOMETRY: 1.01 (ref 1–1.03)
UROBILINOGEN UR QL STRIP.AUTO: 1 EU/DL (ref 0.2–1)
WBC # BLD AUTO: 3.7 K/UL (ref 4.6–13.2)
WBC URNS QL MICRO: NORMAL /HPF (ref 0–4)

## 2017-01-11 PROCEDURE — P9016 RBC LEUKOCYTES REDUCED: HCPCS | Performed by: HOSPITALIST

## 2017-01-11 PROCEDURE — 74011250636 HC RX REV CODE- 250/636: Performed by: HOSPITALIST

## 2017-01-11 PROCEDURE — 74011250637 HC RX REV CODE- 250/637: Performed by: HOSPITALIST

## 2017-01-11 PROCEDURE — 36415 COLL VENOUS BLD VENIPUNCTURE: CPT | Performed by: HOSPITALIST

## 2017-01-11 PROCEDURE — 82550 ASSAY OF CK (CPK): CPT | Performed by: HOSPITALIST

## 2017-01-11 PROCEDURE — 83615 LACTATE (LD) (LDH) ENZYME: CPT | Performed by: HOSPITALIST

## 2017-01-11 PROCEDURE — 86900 BLOOD TYPING SEROLOGIC ABO: CPT | Performed by: HOSPITALIST

## 2017-01-11 PROCEDURE — 81001 URINALYSIS AUTO W/SCOPE: CPT | Performed by: HOSPITALIST

## 2017-01-11 PROCEDURE — 83735 ASSAY OF MAGNESIUM: CPT | Performed by: HOSPITALIST

## 2017-01-11 PROCEDURE — 77030020250 HC SOL INJ D 5% LFCR 1000ML BG LF

## 2017-01-11 PROCEDURE — 83010 ASSAY OF HAPTOGLOBIN QUANT: CPT | Performed by: HOSPITALIST

## 2017-01-11 PROCEDURE — 36430 TRANSFUSION BLD/BLD COMPNT: CPT

## 2017-01-11 PROCEDURE — 85045 AUTOMATED RETICULOCYTE COUNT: CPT | Performed by: HOSPITALIST

## 2017-01-11 PROCEDURE — 65270000029 HC RM PRIVATE

## 2017-01-11 PROCEDURE — 74011000250 HC RX REV CODE- 250: Performed by: HOSPITALIST

## 2017-01-11 PROCEDURE — 86920 COMPATIBILITY TEST SPIN: CPT | Performed by: HOSPITALIST

## 2017-01-11 PROCEDURE — 30233N1 TRANSFUSION OF NONAUTOLOGOUS RED BLOOD CELLS INTO PERIPHERAL VEIN, PERCUTANEOUS APPROACH: ICD-10-PCS | Performed by: HOSPITALIST

## 2017-01-11 PROCEDURE — 85018 HEMOGLOBIN: CPT | Performed by: HOSPITALIST

## 2017-01-11 PROCEDURE — 80053 COMPREHEN METABOLIC PANEL: CPT | Performed by: HOSPITALIST

## 2017-01-11 PROCEDURE — 77030020263 HC SOL INJ SOD CL0.9% LFCR 1000ML

## 2017-01-11 PROCEDURE — 85027 COMPLETE CBC AUTOMATED: CPT | Performed by: EMERGENCY MEDICINE

## 2017-01-11 RX ORDER — SODIUM CHLORIDE 9 MG/ML
250 INJECTION, SOLUTION INTRAVENOUS AS NEEDED
Status: DISCONTINUED | OUTPATIENT
Start: 2017-01-11 | End: 2017-01-15 | Stop reason: SDUPTHER

## 2017-01-11 RX ORDER — DIPHENHYDRAMINE HCL 25 MG
25 CAPSULE ORAL ONCE
Status: COMPLETED | OUTPATIENT
Start: 2017-01-12 | End: 2017-01-11

## 2017-01-11 RX ORDER — MAGNESIUM SULFATE HEPTAHYDRATE 40 MG/ML
2 INJECTION, SOLUTION INTRAVENOUS ONCE
Status: COMPLETED | OUTPATIENT
Start: 2017-01-11 | End: 2017-01-11

## 2017-01-11 RX ADMIN — Medication 10 ML: at 23:15

## 2017-01-11 RX ADMIN — Medication 10 ML: at 23:16

## 2017-01-11 RX ADMIN — ACETAMINOPHEN AND CODEINE PHOSPHATE 1 TABLET: 300; 30 TABLET ORAL at 14:55

## 2017-01-11 RX ADMIN — SODIUM CHLORIDE 125 ML/HR: 900 INJECTION, SOLUTION INTRAVENOUS at 04:16

## 2017-01-11 RX ADMIN — Medication 10 ML: at 04:17

## 2017-01-11 RX ADMIN — LORAZEPAM 1 MG: 2 INJECTION, SOLUTION INTRAMUSCULAR; INTRAVENOUS at 08:54

## 2017-01-11 RX ADMIN — FOLIC ACID 1 MG: 1 TABLET ORAL at 08:59

## 2017-01-11 RX ADMIN — LORAZEPAM 1 MG: 2 INJECTION, SOLUTION INTRAMUSCULAR; INTRAVENOUS at 01:40

## 2017-01-11 RX ADMIN — DIPHENHYDRAMINE HYDROCHLORIDE 25 MG: 25 CAPSULE ORAL at 23:32

## 2017-01-11 RX ADMIN — ENOXAPARIN SODIUM 40 MG: 40 INJECTION SUBCUTANEOUS at 23:14

## 2017-01-11 RX ADMIN — Medication 10 ML: at 04:16

## 2017-01-11 RX ADMIN — Medication 5 ML: at 14:00

## 2017-01-11 RX ADMIN — SODIUM CHLORIDE 125 ML/HR: 900 INJECTION, SOLUTION INTRAVENOUS at 20:31

## 2017-01-11 RX ADMIN — ACETAMINOPHEN AND CODEINE PHOSPHATE 1 TABLET: 300; 30 TABLET ORAL at 20:57

## 2017-01-11 RX ADMIN — LEVOTHYROXINE SODIUM ANHYDROUS 12.5 MCG: 100 INJECTION, POWDER, LYOPHILIZED, FOR SOLUTION INTRAVENOUS at 15:02

## 2017-01-11 RX ADMIN — MULTIPLE VITAMINS W/ MINERALS TAB 1 TABLET: TAB at 08:59

## 2017-01-11 RX ADMIN — THIAMINE HCL TAB 100 MG 100 MG: 100 TAB at 09:00

## 2017-01-11 RX ADMIN — ACETAMINOPHEN AND CODEINE PHOSPHATE 1 TABLET: 300; 30 TABLET ORAL at 09:01

## 2017-01-11 RX ADMIN — ACETAMINOPHEN AND CODEINE PHOSPHATE 1 TABLET: 300; 30 TABLET ORAL at 03:11

## 2017-01-11 RX ADMIN — LORAZEPAM 1 MG: 2 INJECTION, SOLUTION INTRAMUSCULAR; INTRAVENOUS at 04:15

## 2017-01-11 RX ADMIN — LORAZEPAM 1 MG: 1 TABLET ORAL at 20:10

## 2017-01-11 RX ADMIN — MAGNESIUM SULFATE HEPTAHYDRATE 2 G: 40 INJECTION, SOLUTION INTRAVENOUS at 20:32

## 2017-01-11 RX ADMIN — LORAZEPAM 1 MG: 2 INJECTION, SOLUTION INTRAMUSCULAR; INTRAVENOUS at 05:52

## 2017-01-11 NOTE — PROGRESS NOTES
1925-Bedside verbal shift report given to Sri Tapia, [place] by Ying Valle RN. Patient is awake in bed. Patient states that the Toradol is not relieving her pain. Patient is leaned over in bed, with facial grimacing and moaning. Patient rates pain an 8 using the numeric scale. Nurse will follow-up with MD.     2030- Spoke to Dr. Jessy Rojo regarding fever and unrelieved pain. Order received. 2055- NS infusing at 125 ml/hr. Bag changed. Tylenol #3 given for fever and pain. CIWA scored; Ativan 1 mg given IV. Will reassess in 15 minutes. 2110- CIWA reassessed. Score: 8      2230- Temp reassessed. Oral temperature: 100.7     2300-CIWA scored. Score:8 Patient awake and very fidgety. Patient also presents with mild hand tremors. Patient states she feels nauseated but does not want me to call MD to get any new orders. Patient states \"I am going to try and get some rest.\" 1 mg of Ativan IV push given at this time. 1140- Bedside and verbal shift report given to Luis Panda from Sri Tapia RN.

## 2017-01-11 NOTE — PROGRESS NOTES
0745 - Pt alert x 4. C/o R hip pain; +hx back surgery. Does report blood in urine and dysuria. CIWA: 7; noted to be pacing in room (unsteady gait). Some nausea but no vomiting (since 2 days ago). 8821 - Informed Dr. Lm Clementsr of Hgb of 5.3. He wants phlebotomy to re-draw from L arm (where no fluid is infusing). Called phlebotomy; Wendie Aleman will redraw H & H. Pt does have bruises to legs. Says her last episode of hematuria was PTA. Urine franc currently; denies any other active bleeding. 1028 - H & H redrawn by lab from L arm. 5.0, 15.1. Dr. Lm Floyd informed. Plan is to transfuse 2-3 units PRBCs. 1357 - 1st of 2 units of PRBCs started. Pt gave verbal consent. 1600 - First unit of blood completed. Well tolerated. 1610 - Urine sample obtained and sent to lab.    1632 - 2nd of 2 units of PRBCs started. 1847 - 2/2 units PRBCs completed. Vitals stable. Temp has been slightly elevated this pm; currently 100.5 (oral). Pt resting in bed, talking on phone. Alert. ~ 1920 - Bedside and Verbal shift change report given to Jenna Kent RN (oncoming nurse) by Gunjan Caceres RN (offgoing nurse). Report included the following information SBAR, Kardex, Intake/Output, MAR and Recent Results. Pt handed off in stable condition.

## 2017-01-11 NOTE — ROUTINE PROCESS
Bedside and Verbal shift change report given to Alex Castaneda RN (oncoming nurse) by Elsie Dover RN (offgoing nurse). Report included the following information SBAR, Kardex and MAR.

## 2017-01-11 NOTE — PROGRESS NOTES
0100. Writer notified by other RN that pt is requesting Ativan. Went into check on pt, pt is now in bed sleeping. Will monitor. 0145. Ativan given for CIWA of 9. Pt in bed. Call bell in reach will monitor. 0345. Pt up in [place] asking for water. 2126. Ativan Given. For Ciwa of 9.     0440. Notified by aid that pt was not in room or bathroom. Pt found in [place] by elevators. When asked where she was pt states \"looking for a snack machine\". Pt escorted back to room by aid and writer. Pt stated \"Physical therapy said they were supposed to come see me\". Pt reminded that it was 0430 in the morning. Pt states \"Oh okay, I don't know what wrong with me\". Pt repositioned in bed. Bed alarm on. Will monitor. 6278. Writer in room after bed alarm sounding. Pt observed to be standing at bedside with tele monitor in her hand stated \"i'm trying to straighten up'. Pt clearly agitated and anxious. CIWA 14. 1 mg of Ativan given    0600. Pt with MEWS of 5. Charge nurse consulted. Dr. Costa Simmons paged  [phone]. Pt observed to be in bed sleeping.   0620. Bed alarm sounding. Pt sitting on edge of bed \"trying to find brown socks\". Pt assisted back to bed Vitals rechecked. VSA now 3. Waiting on call back for MD. Bed alarm on call bell in reach. 8979. Received call from lab reporting hgb 5.3. Redraw will be ordered to ensure accuracy. 8636. Bedside and Verbal shift change report given to [address] [place] [address] (oncoming nurse) by Julio Fang (offgoing nurse). Report included the following information SBAR, Kardex and MAR. Made aware of call placed to Dr. Costa Simmons with no call back at this time. Also notified of hgb of 5.3 and lab coming to redraw.

## 2017-01-11 NOTE — PROGRESS NOTES
Assumed Care. Bedside and verbal received from [address] , off going nurse. Sbar, Kardex, and Mar reviewed. Pt resting in bed. No distress noted. Will continue to monitor.

## 2017-01-11 NOTE — PROGRESS NOTES
Physical Therapy Note:    PT evaluation order received, chart reviewed. Pt noted to have critical H/H level at 0758 of 5.3/16. 2. Re-checked at 1230 and most recent H/H at 5.0/15.1. Will hold evaluation this date due to critical H/H and f/u as appropriate next date.     Thank you,  TAMIKO HammerT

## 2017-01-11 NOTE — MED STUDENT NOTES
*ATTENTION:  This note has been created by a medical student for educational purposes only. Please do not refer to the content of this note for clinical decision-making, billing, or other purposes. Please see attending physicians note to obtain clinical information on this patient. *         *ATTENTION:  This note has been created by a medical student for educational purposes only. Please do not refer to the content of this note for clinical decision-making, billing, or other purposes. Please see attending physicians note to obtain clinical information on this patient. *       Student Progress Note  Please refer to attendings daily rounding note for full details      Patient: Morgan Burris MRN: [id number]  CSN: [id number]    YOB: 1960  Age: 55 y.o. Sex: female    DOA: 1/9/2017 LOS:  LOS: 2 days          Chief Complaint:  Right hip pain and nausea and dark urine    Subjective:    She complains of dark urine for about 4 days. She denies dysuria but admits to increase in frequency and urgency. She fell on her hip Sunday 1/8/17 and has a large hematoma on her right thigh. She also complains of nausea with mid to RUQ pain. She denies vomiting, diarrhea or constipation. She denies CP or SOB. She said she has a dry cough. She also feels agitated and wants ativan. She is present with her .  Hx of gastric bypass      Objective:      Visit Vitals    /74 (BP 1 Location: Left arm, BP Patient Position: Supine)    Pulse 99    Temp 100.4 °F (38 °C)    Resp 20    Ht 5' 7\" (1.702 m)    Wt 98.4 kg (217 lb)    SpO2 95%    Breastfeeding No    BMI 33.99 kg/m2         Physical Exam:  Gen: AOx3, agitated and restless  CV: tachycardia regular rhythm without murmurs or gallops  Resp:CTA in all fields without wheezing or crackles  Abd: ttp midepigastric and RUQ, normal bowel sounds, no masses  Extrem:  Negative edema, +2 pulse  Skin: large hematoma right thigh  Neuro: no focal deficits       I have reviewed the patient's Labs and Radiology studies. Assessment:   UTI - repeat UA, previous UA was + for bacteria and leukocyte esterase and was treated with Levaquin.    Pancreatitis - mild by CT scan most likely from alcohol consumption- continue fluids and remain npo  Alcohol dependence - discussed the need to stop alcohol consumption - continue folic acid 1 mg, thera- m with iron, and thiamine 100 mg  Anemia - Hgb dropped to 5.0 from 8.2g/dl and low iron at 35 ug/dl - 2 units of PRBCs transfusion administrated - repeat CBC  Hematoma in right thigh - from falling  CK wnl at 130, pending UA to see blood in the urine   Hypomagnesemia - at 1.6mg/dl - start Mg  Hypothyroidism - synthroid 12.5mg  Agitation - administrate ativan 1 mg  DVT ppx - Lovenox 40 mg      Tiki Crouch  1/11/2017  2:46 PM

## 2017-01-12 PROBLEM — D69.6 THROMBOCYTOPENIA (HCC): Status: ACTIVE | Noted: 2017-01-12

## 2017-01-12 LAB
ALBUMIN SERPL BCP-MCNC: 2.5 G/DL (ref 3.4–5)
ALBUMIN/GLOB SERPL: 0.9 {RATIO} (ref 0.8–1.7)
ALP SERPL-CCNC: 99 U/L (ref 45–117)
ALT SERPL-CCNC: 24 U/L (ref 13–56)
ANION GAP BLD CALC-SCNC: 8 MMOL/L (ref 3–18)
APTT PPP: 39.8 SEC (ref 23–36.4)
AST SERPL W P-5'-P-CCNC: 75 U/L (ref 15–37)
BACTERIA SPEC CULT: NORMAL
BILIRUB SERPL-MCNC: 0.8 MG/DL (ref 0.2–1)
BUN SERPL-MCNC: 4 MG/DL (ref 7–18)
BUN/CREAT SERPL: 9 (ref 12–20)
CALCIUM SERPL-MCNC: 7 MG/DL (ref 8.5–10.1)
CHLORIDE SERPL-SCNC: 103 MMOL/L (ref 100–108)
CK SERPL-CCNC: 125 U/L (ref 26–192)
CO2 SERPL-SCNC: 26 MMOL/L (ref 21–32)
CREAT SERPL-MCNC: 0.46 MG/DL (ref 0.6–1.3)
ERYTHROCYTE [DISTWIDTH] IN BLOOD BY AUTOMATED COUNT: 16.6 % (ref 11.6–14.5)
FERRITIN SERPL-MCNC: 279 NG/ML (ref 8–388)
FIBRINOGEN PPP-MCNC: 421 MG/DL (ref 210–451)
GLOBULIN SER CALC-MCNC: 2.9 G/DL (ref 2–4)
GLUCOSE SERPL-MCNC: 127 MG/DL (ref 74–99)
HAPTOGLOB SERPL-MCNC: 170 MG/DL (ref 34–200)
HCT VFR BLD AUTO: 19 % (ref 35–45)
HCT VFR BLD AUTO: 22.2 % (ref 35–45)
HCT VFR BLD AUTO: 22.5 % (ref 35–45)
HEMOCCULT STL QL: POSITIVE
HGB BLD-MCNC: 6.1 G/DL (ref 12–16)
HGB BLD-MCNC: 7.2 G/DL (ref 12–16)
HGB BLD-MCNC: 7.3 G/DL (ref 12–16)
INR PPP: 1.3 (ref 0.8–1.2)
MAGNESIUM SERPL-MCNC: 2.4 MG/DL (ref 1.8–2.4)
MCH RBC QN AUTO: 30.2 PG (ref 24–34)
MCHC RBC AUTO-ENTMCNC: 32.1 G/DL (ref 31–37)
MCV RBC AUTO: 94.1 FL (ref 74–97)
PLATELET # BLD AUTO: 108 K/UL (ref 135–420)
PMV BLD AUTO: 9.6 FL (ref 9.2–11.8)
POTASSIUM SERPL-SCNC: 3.2 MMOL/L (ref 3.5–5.5)
PROT SERPL-MCNC: 5.4 G/DL (ref 6.4–8.2)
PROTHROMBIN TIME: 15.5 SEC (ref 11.5–15.2)
RBC # BLD AUTO: 2.02 M/UL (ref 4.2–5.3)
SERVICE CMNT-IMP: NORMAL
SODIUM SERPL-SCNC: 137 MMOL/L (ref 136–145)
WBC # BLD AUTO: 4.1 K/UL (ref 4.6–13.2)

## 2017-01-12 PROCEDURE — 82272 OCCULT BLD FECES 1-3 TESTS: CPT | Performed by: HOSPITALIST

## 2017-01-12 PROCEDURE — 74011250636 HC RX REV CODE- 250/636: Performed by: HOSPITALIST

## 2017-01-12 PROCEDURE — 65270000029 HC RM PRIVATE

## 2017-01-12 PROCEDURE — 74011250637 HC RX REV CODE- 250/637: Performed by: HOSPITALIST

## 2017-01-12 PROCEDURE — 82550 ASSAY OF CK (CPK): CPT | Performed by: HOSPITALIST

## 2017-01-12 PROCEDURE — 82728 ASSAY OF FERRITIN: CPT | Performed by: INTERNAL MEDICINE

## 2017-01-12 PROCEDURE — 36430 TRANSFUSION BLD/BLD COMPNT: CPT

## 2017-01-12 PROCEDURE — 36415 COLL VENOUS BLD VENIPUNCTURE: CPT | Performed by: HOSPITALIST

## 2017-01-12 PROCEDURE — 85576 BLOOD PLATELET AGGREGATION: CPT | Performed by: INTERNAL MEDICINE

## 2017-01-12 PROCEDURE — P9016 RBC LEUKOCYTES REDUCED: HCPCS | Performed by: HOSPITALIST

## 2017-01-12 PROCEDURE — 74011000250 HC RX REV CODE- 250: Performed by: HOSPITALIST

## 2017-01-12 PROCEDURE — 85384 FIBRINOGEN ACTIVITY: CPT | Performed by: INTERNAL MEDICINE

## 2017-01-12 PROCEDURE — 86022 PLATELET ANTIBODIES: CPT | Performed by: INTERNAL MEDICINE

## 2017-01-12 PROCEDURE — 83735 ASSAY OF MAGNESIUM: CPT | Performed by: HOSPITALIST

## 2017-01-12 PROCEDURE — 85610 PROTHROMBIN TIME: CPT | Performed by: INTERNAL MEDICINE

## 2017-01-12 PROCEDURE — 85018 HEMOGLOBIN: CPT | Performed by: HOSPITALIST

## 2017-01-12 PROCEDURE — 85027 COMPLETE CBC AUTOMATED: CPT | Performed by: HOSPITALIST

## 2017-01-12 PROCEDURE — 85730 THROMBOPLASTIN TIME PARTIAL: CPT | Performed by: INTERNAL MEDICINE

## 2017-01-12 PROCEDURE — 77030020263 HC SOL INJ SOD CL0.9% LFCR 1000ML

## 2017-01-12 PROCEDURE — 80053 COMPREHEN METABOLIC PANEL: CPT | Performed by: HOSPITALIST

## 2017-01-12 RX ORDER — SODIUM CHLORIDE 9 MG/ML
250 INJECTION, SOLUTION INTRAVENOUS AS NEEDED
Status: DISCONTINUED | OUTPATIENT
Start: 2017-01-12 | End: 2017-01-15 | Stop reason: SDUPTHER

## 2017-01-12 RX ADMIN — ACETAMINOPHEN AND CODEINE PHOSPHATE 1 TABLET: 300; 30 TABLET ORAL at 05:04

## 2017-01-12 RX ADMIN — FOLIC ACID 1 MG: 1 TABLET ORAL at 09:07

## 2017-01-12 RX ADMIN — MULTIPLE VITAMINS W/ MINERALS TAB 1 TABLET: TAB at 09:07

## 2017-01-12 RX ADMIN — Medication 10 ML: at 05:12

## 2017-01-12 RX ADMIN — LORAZEPAM 1 MG: 2 INJECTION, SOLUTION INTRAMUSCULAR; INTRAVENOUS at 05:05

## 2017-01-12 RX ADMIN — SODIUM CHLORIDE 125 ML/HR: 900 INJECTION, SOLUTION INTRAVENOUS at 05:56

## 2017-01-12 RX ADMIN — THIAMINE HCL TAB 100 MG 100 MG: 100 TAB at 09:07

## 2017-01-12 RX ADMIN — Medication 10 ML: at 21:15

## 2017-01-12 RX ADMIN — LORAZEPAM 1 MG: 1 TABLET ORAL at 22:13

## 2017-01-12 RX ADMIN — ACETAMINOPHEN AND CODEINE PHOSPHATE 1 TABLET: 300; 30 TABLET ORAL at 16:19

## 2017-01-12 RX ADMIN — LEVOTHYROXINE SODIUM ANHYDROUS 12.5 MCG: 100 INJECTION, POWDER, LYOPHILIZED, FOR SOLUTION INTRAVENOUS at 11:14

## 2017-01-12 NOTE — CDMP QUERY
Please clarify if this patient is being treated/managed for:    =>Acute blood loss anemia in setting of large hematoma in thigh and transfusion  =>Other Explanation of clinical findings  =>Unable to Determine (no explanation of clinical findings)    The medical record reflects the following clinical findings, treatment, and risk factors:    Patient presented with hematoma in right thigh and hemoglobin from 8 down to 5 and was transfused with 2 units prbc. If you DECLINE this query or would like to communicate with [facility], please utilize the \"[facility] message box\" at the TOP of the Progress Note on the right.       Thank you,  Mayte Stark -[phone]

## 2017-01-12 NOTE — PROGRESS NOTES
Progress Note      Patient: Marci Drummond               Sex: female          DOA: 1/9/2017       YOB: 1960      Age:  55 y.o.        LOS:  LOS: 3 days               Subjective:   Pt transfused with 2 units of prbcs and the h/h went up to   6.1/19.0 . Initially went up to 7.0/21.2 .suspect that the pt has a bleeding disorder like factor 8 or 9 deficiency or   Due to a platelet disorder . The plt count is low at 108  Will dc the lovenox. .doubt hit but this will need to be tested. the patient has a massive hematoma in her right thigh. the retic count is 3.2 .hematology has been asked to see the pt the patient has not had any previous bleeding episode. Potassium is 3.2. Pt is not c/o any pain from her pancreatitis which seems to have resolved       Objective:      Visit Vitals    /80 (BP 1 Location: Left arm)    Pulse 98    Temp 100 °F (37.8 °C)    Resp 20    Ht 5' 7\" (1.702 m)    Wt 98.4 kg (217 lb)    SpO2 99%    Breastfeeding No    BMI 33.99 kg/m2       Physical Exam:  Pt is awake and is alert . She walked to the bathroom . heart reg rate and rhythm   Lungs good breath sounds heard   Abdomen soft and obese   Extremities . Right thigh  Has a large hematoma . It is still soft  to palpation. Pt has pulses in the right foot .   Neuro intact       Lab/Data Reviewed:  CMP:   Lab Results   Component Value Date/Time     01/12/2017 04:15 AM    K 3.2 (L) 01/12/2017 04:15 AM     01/12/2017 04:15 AM    CO2 26 01/12/2017 04:15 AM    AGAP 8 01/12/2017 04:15 AM     (H) 01/12/2017 04:15 AM    BUN 4 (L) 01/12/2017 04:15 AM    CREA 0.46 (L) 01/12/2017 04:15 AM    GFRAA >60 01/12/2017 04:15 AM    GFRNA >60 01/12/2017 04:15 AM    CA 7.0 (L) 01/12/2017 04:15 AM    MG 2.4 01/12/2017 04:15 AM    ALB 2.5 (L) 01/12/2017 04:15 AM    TP 5.4 (L) 01/12/2017 04:15 AM    GLOB 2.9 01/12/2017 04:15 AM    AGRAT 0.9 01/12/2017 04:15 AM    SGOT 75 (H) 01/12/2017 04:15 AM    ALT 24 01/12/2017 04:15 AM     CBC:   Lab Results   Component Value Date/Time    WBC 4.1 (L) 01/12/2017 04:15 AM    HGB 6.1 (L) 01/12/2017 04:15 AM    HCT 19.0 (L) 01/12/2017 04:15 AM     (L) 01/12/2017 04:15 AM           Assessment/Plan     Active Problems:    Acute pancreatitis (10/20/2014)      UTI (urinary tract infection) (3/25/2016)      Sepsis ([facility] (1/9/2017)      Non-intractable vomiting (1/9/2017)  Anemia due to a fall with a large hematoma in the right thigh and hip r/o bleeding diathesis   decreased platelets . The lovenox has been dc'd .platelets on admission were 173    Plan:hem onc to see the pt . Will need to transfuse further . wonder about continued bleeding however

## 2017-01-12 NOTE — PROGRESS NOTES
Bedside and Verbal shift change report received from [id number] (offgoing nurse). Report included the following information SBAR, Kardex, Intake/Output, MAR and Recent Results. 1950-Assessment completed, pt bed locked in low position, on room air, on ciwa protocol. 2010-Pt agitated, ativan 1mg administered. H&H received Dr stoner, will recheck again at 0400.    2332-Pt complained of itchiness,  pageeric, order for benadryl received and administered to pt.     2350-Reassessment completed, ciwa protocol, currently sleeping, will continue to monitor pt.    0350-Completed reassessment, pt currently sleeping, no changes from previous. 0530-H/H results 6.1&19.0, Dr Mo Officer pageeric, orders given to transfuse 1 unit PRBCs, waiting on blood bank. 0650-PRBCs hung, vitals taken. Bedside and Verbal shift change report given to [id number] \[facility]\"" (oncoming nurse) by Marcell Tamez RN's (offgoing nurse). Report included the following information SBAR, Kardex, Intake/Output, MAR and Recent Results.

## 2017-01-12 NOTE — PROGRESS NOTES
Progress Note      Patient: Alphia Boxer               Sex: female          DOA: 1/9/2017       YOB: 1960      Age:  55 y.o.        LOS:  LOS: 2 days               Subjective:   Pt dropped her hemoglobin from 8 down to 5 . She has been given 2 units of prbcs , the pt has a large hematoma in the right thigh  And this is where some of the blood loss resides . This was seen in the ct scan theophylline thigh remains relatively soft so at the present time do not worry about a compartment syndrome . Will follow the cpk and myoglobin levels . ronny taylor the pulses in the legs       Objective:      Visit Vitals    BP 95/63    Pulse 95    Temp (!) 100.5 °F (38.1 °C)    Resp 18    Ht 5' 7\" (1.702 m)    Wt 98.4 kg (217 lb)    SpO2 97%    Breastfeeding No    BMI 33.99 kg/m2       Physical Exam:  Pt is awake and alert .  She does not have signs of alcohol withdrawal  Heart reg rate shiva rhythm   Lungs good breath sounds heard   Abdomen soft and obese   Extremities large hematoma in the right thigh   Neuro nonfocal              Lab/Data Reviewed:  BMP:   Lab Results   Component Value Date/Time     01/11/2017 04:50 AM    K 3.5 01/11/2017 04:50 AM     01/11/2017 04:50 AM    CO2 23 01/11/2017 04:50 AM    AGAP 10 01/11/2017 04:50 AM     (H) 01/11/2017 04:50 AM    BUN 6 (L) 01/11/2017 04:50 AM    CREA 0.63 01/11/2017 04:50 AM    GFRAA >60 01/11/2017 04:50 AM    GFRNA >60 01/11/2017 04:50 AM     CMP:   Lab Results   Component Value Date/Time     01/11/2017 04:50 AM    K 3.5 01/11/2017 04:50 AM     01/11/2017 04:50 AM    CO2 23 01/11/2017 04:50 AM    AGAP 10 01/11/2017 04:50 AM     (H) 01/11/2017 04:50 AM    BUN 6 (L) 01/11/2017 04:50 AM    CREA 0.63 01/11/2017 04:50 AM    GFRAA >60 01/11/2017 04:50 AM    GFRNA >60 01/11/2017 04:50 AM    CA 6.5 (L) 01/11/2017 04:50 AM    MG 1.6 (L) 01/11/2017 04:50 AM    ALB 2.8 (L) 01/11/2017 04:50 AM    TP 5.3 (L) 01/11/2017 04:50 AM    GLOB 2.5 01/11/2017 04:50 AM    AGRAT 1.1 01/11/2017 04:50 AM    SGOT 120 (H) 01/11/2017 04:50 AM    ALT 30 01/11/2017 04:50 AM     CBC:   Lab Results   Component Value Date/Time    WBC 3.7 (L) 01/11/2017 07:58 AM    HGB 5.0 (LL) 01/11/2017 09:28 AM    HCT 15.1 (LL) 01/11/2017 09:28 AM    PLT 87 (L) 01/11/2017 07:58 AM           Assessment/Plan     Active Problems:    Acute pancreatitis (10/20/2014)      UTI (urinary tract infection) (3/25/2016)      Sepsis ([address] (1/9/2017)      Non-intractable vomiting (1/9/2017)  Large hematoma in the right thigh the patient has been transfused with 2 units of prbcs   Alcohol dependence     Plan: monitor the h/h closely and the right thigh hematoma

## 2017-01-12 NOTE — CONSULTS
Consult Date: 1/12/2017    ASSESSMENT/ PLAN:    1.) ? Bleeding Diathesis/ Right thigh hematoma s/p fall  -Pt. reports a history of easy (intermittent) bleeding and bruising for many years  -Avoid ASA, NSAIDS, and anticoagulants  -Comprehensive laboratory work-up is in progress (PFA-100 to evaluate platelet function and coagulation studies)    2.) Anemia and thrombocytopenia  -Large hematoma at right thigh  -Lovenox discontinued  -HIT panel ordered due to drop in platelet count  -Stool guiac pending  -Iron studies reviewed  -No lab evidence of hemolysis  -Transfuse PRBCs to keep serum Hb greater than 7.0 gm/dL  -Platelet count is adequate for normal hemostasis  -? Disorder or platelet (qualitative) function vs. Factor deficiency or vWD    3.) Alcohol Abuse  -Heavy alcohol abuse over the past 2 years  -Pt. Reported consuming several beers/ day for past 2 years      Consults: for Hematology service for a history of easy bruising and bleeding and recent severe anemia after a recent accidental fall. ? Bleeding diathesis    Subjective:  Pt. complaning of pain swelling and severe bruising at left hip after fall that occured last weekend. She denies any recent hematuria, melena and/ or hematochezia. She denies hematemesis or hemoptysis. She reports a long history of easy bleeding and or bruising but denies any specific diagnosis of a factor deficiency (e.g. hemophilia), a qualitative platelet dysfunction, or von Willebrand's disease. She denies ever receiving a transfusion of FFP, cryoprecipitate or platelets. She estimates that she has received approximately 6-8 units of PRBC transfusions over her lifetime. She reports that her mother also experience easy bleeding and bruising.     Past Medical History    Alcohol abuse     Alcoholic hepatitis     Alcoholic pancreatitis     Anemia     Bipolar disorder ([facility] [address]     Comment: Dr. Heriberto Hardin [facility] [place] by [facility])    Chronic abdominal pain     Compression fracture of lumbar vertebra (HCC)     Comment: L4, L5     Depression     Comment: Dr. Mary Ramirez [facility])    Elevated LFTs     ETOH abuse     Fatty liver     GERD (gastroesophageal reflux disease)     Hyperlipidemia     Hypertension     Hypothyroidism     Lower GI bleeding     Morbid obesity ([address]     Obstructive sleep apnea     Substance induced mood disorder ([address]     Vitamin D deficiency         Past Surgical History    DISKECTOMY, ANTERIOR, WITH D  8/1995, 11/1997    REPAIR NONUNION SCAPHOID CARPAL BONE Right 2010    Comment Wrist    BIOPSY LIVER  08/15/2012    Comment Lap Left hepatic liver wedge by Dr. Joe Ruiz; BX Revealed: Hepatic Steatosis, AVM w/ associated Subcapsular Fibrosis. HX HYSTERECTOMY  2006    HX TONSILLECTOMY  1992    HX ABDOMINAL LAPAROSCOPY  12/02/2014    Comment Laparoscopic Gastrostomy w/ Partial Gastrectomy for assistance in placement of Endoscopic Retrograde Cholangiopancreatography & Diagnostic Lap. HX CARPAL TUNNEL RELEASE      HX CHOLECYSTECTOMY  09/16/2014    Comment Dr. Joe Ruiz    HX COLONOSCOPY  05/12/2012    Comment Colonoscopy by Dr. Clement Nicholas. Raz Joseph: Bx Revealed Colon Polyps (Tubular Adenoma), Hemorrhoids. HX HEMORRHOIDECTOMY  04/30/2015    Comment Dr. Sheree Burns. Miranda Dallas GASTRIC BYPASS  08/15/2012    Comment Lap Christian-en-y       Family History    Cancer Father     Cancer Sister     Obesity Brother          Smoking status: Never Smoker    Smokeless tobacco: Never Used    Alcohol use Yes  0.0 oz/week    0 Standard drinks or equivalent per week         Comment: 6 pack of beer a week            Review of Systems   Constitutional: Positive for fatigue. HENT: Negative. Eyes: Negative. Respiratory: Negative. Cardiovascular: Negative. Gastrointestinal: Positive for abdominal pain. Negative for anal bleeding and blood in stool. Endocrine: Negative for cold intolerance. Genitourinary: Negative.     Musculoskeletal: Positive for back pain, gait problem, joint swelling and myalgias. Negative for neck pain and neck stiffness. Skin: Positive for color change. Allergic/Immunologic: Negative. Hematological: Negative for adenopathy. Bruises/bleeds easily. Psychiatric/Behavioral: Positive for sleep disturbance. Negative for agitation, behavioral problems, confusion, decreased concentration, dysphoric mood, hallucinations, self-injury and suicidal ideas. The patient is nervous/anxious. The patient is not hyperactive. Objective:    Vital signs for last 24 hours:  /80 (BP 1 Location: Left arm)  Pulse 98  Temp 100 °F (37.8 °C)  Resp 20  Ht 5' 7\" (1.702 m)  Wt 98.4 kg (217 lb)  SpO2 99%  Breastfeeding?  No  BMI 33.99 kg/m2    Intake/Output this shift:  Current Shift:    Last 3 Shifts: 01/10 1901 - 01/12 0700  In: 3936.3 [P.O.:30; I.V.:3906.3]  Out: 400 [Urine:400]    Data Review:   Recent Results (from the past 24 hour(s))  -URINALYSIS W/MICROSCOPIC   Collection Time: 01/11/17  4:00 PM   Result Value Ref Range   Color YELLOW    Appearance CLEAR    Specific gravity 1.010 1.005 - 1.030     pH (UA) 5.0 5.0 - 8.0     Protein NEGATIVE  NEG mg/dL   Glucose NEGATIVE  NEG mg/dL   Ketone NEGATIVE  NEG mg/dL   Bilirubin NEGATIVE  NEG     Blood NEGATIVE  NEG     Urobilinogen 1.0 0.2 - 1.0 EU/dL   Nitrites NEGATIVE  NEG     Leukocyte Esterase NEGATIVE  NEG     WBC 0 to 2 0 - 4 /hpf   RBC 0 0 - 5 /hpf   Epithelial cells FEW 0 - 5 /lpf   Bacteria NEGATIVE  NEG /hpf   -HGB & HCT   Collection Time: 01/11/17  8:54 PM   Result Value Ref Range   HGB 7.0 (L) 12.0 - 16.0 g/dL   HCT 21.2 (L) 35.0 - 45.0 %   -CBC W/O DIFF   Collection Time: 01/12/17  4:15 AM   Result Value Ref Range   WBC 4.1 (L) 4.6 - 13.2 K/uL   RBC 2.02 (L) 4.20 - 5.30 M/uL   HGB 6.1 (L) 12.0 - 16.0 g/dL   HCT 19.0 (L) 35.0 - 45.0 %   MCV 94.1 74.0 - 97.0 FL   MCH 30.2 24.0 - 34.0 PG   MCHC 32.1 31.0 - 37.0 g/dL   RDW 16.6 (H) 11.6 - 14.5 %   PLATELET 202 (L) 739 - 420 K/uL   MPV 9.6 9.2 - 11.8 FL -METABOLIC PANEL, COMPREHENSIVE   Collection Time: 01/12/17  4:15 AM   Result Value Ref Range   Sodium 137 136 - 145 mmol/L   Potassium 3.2 (L) 3.5 - 5.5 mmol/L   Chloride 103 100 - 108 mmol/L   CO2 26 21 - 32 mmol/L   Anion gap 8 3.0 - 18 mmol/L   Glucose 127 (H) 74 - 99 mg/dL   BUN 4 (L) 7.0 - 18 MG/DL   Creatinine 0.46 (L) 0.6 - 1.3 MG/DL   BUN/Creatinine ratio 9 (L) 12 - 20     GFR est AA >60 >60 ml/min/1.73m2   GFR est non-AA >60 >60 ml/min/1.73m2   Calcium 7.0 (L) 8.5 - 10.1 MG/DL   Bilirubin, total 0.8 0.2 - 1.0 MG/DL   ALT 24 13 - 56 U/L   AST 75 (H) 15 - 37 U/L   Alk. phosphatase 99 45 - 117 U/L   Protein, total 5.4 (L) 6.4 - 8.2 g/dL   Albumin 2.5 (L) 3.4 - 5.0 g/dL   Globulin 2.9 2.0 - 4.0 g/dL   A-G Ratio 0.9 0.8 - 1.7     -MAGNESIUM   Collection Time: 01/12/17  4:15 AM   Result Value Ref Range   Magnesium 2.4 1.8 - 2.4 mg/dL   -CK   Collection Time: 01/12/17  4:15 AM   Result Value Ref Range    26 - 192 U/L       Physical Exam   Constitutional: She appears well-developed and well-nourished. No distress. HENT:   Head: Normocephalic and atraumatic. Nose: Nose normal.   Mouth/Throat: No oropharyngeal exudate. Eyes: Conjunctivae and EOM are normal. Pupils are equal, round, and reactive to light. No scleral icterus. Neck: Normal range of motion. No JVD present. Cardiovascular: Normal rate and normal heart sounds. Exam reveals no gallop. No murmur heard. Pulmonary/Chest: Effort normal and breath sounds normal. No respiratory distress. Abdominal: Soft. Bowel sounds are normal. She exhibits no distension. obese   Genitourinary:   Genitourinary Comments: deferred   Musculoskeletal: She exhibits tenderness and deformity. ++ bruising at right hip   Lymphadenopathy:     She has no cervical adenopathy. Skin: Skin is warm and dry. Bruising, ecchymosis and petechiae noted. No rash noted. She is not diaphoretic. No cyanosis. Nails show no clubbing.         Diffuse bruising/ swelling at right hip and thigh   Psychiatric: She has a normal mood and affect.  Her behavior is normal. Judgment and thought content normal.

## 2017-01-12 NOTE — MED STUDENT NOTES
*ATTENTION:  This note has been created by a medical student for educational purposes only. Please do not refer to the content of this note for clinical decision-making, billing, or other purposes. Please see attending physicians note to obtain clinical information on this patient. *       Student Progress Note  Please refer to attendings daily rounding note for full details      Patient: Ana Laura Burton MRN: [id number]  CSN: [id number]    YOB: 1960  Age: 55 y.o. Sex: female    DOA: 1/9/2017 LOS:  LOS: 3 days          Chief Complaint:  Abdominal pain    Assessment/ Plan:   Anemia - after the 2 units of PRBCs the Hgb elevated to 7.0 but after a day it dropped to 6.1g/dl, plts were 87 prior to transfusion but still low at 108 K/uL, WBC are still low at 4.1K/uL - pancytopenia - iron was 35 ug/dl with normal TIBC and Iron% saturation -d/c Lovenox 40mg,  start ferrous sulfate 325 mg, order PT/INR/PTT, stool guaiac, consult hematology   Pancreatitis - improving - continue NPO and IVF,  Hypothyroid - synthroid 12.5mg   UTI - resolved. Alcohol dependence -  Discussed alcohol cessation  Hypomagnesia - resolved. HPI:     Ana Laura Burton is a 55 y.o. female with a hx of gastric bypass, is complaining of abdominal pain at the middle of her stomach. She also complains of nausea without vomiting. She has bowel movements and stated that they looked darker than normal and that it is diarrhea. She has a stool specimen but it was never picked up. She states her right hip pain still hurts. Her urine looks clearer than yesterday. She denies CP or SOB.         Physical Exam:      Visit Vitals    /80 (BP 1 Location: Left arm)    Pulse 98    Temp 100 °F (37.8 °C)    Resp 20    Ht 5' 7\" (1.702 m)    Wt 98.4 kg (217 lb)    SpO2 99%    Breastfeeding No    BMI 33.99 kg/m2         Physical Exam:  Gen: AOx3, groggy   CV: RRR without murmurs or gallops  Resp:  CTA in all fields without wheezing or crackles  Abd: less ttp to midepigastric and RUQ, normal bowel sounds, no masses  Extrem: negative edema, +2 pulses  Skin: Large hematoma right thigh appears to be spreading caudally without necrosis but still soft   Neuro:  No focal deficits      I have reviewed the patient's Labs and Radiology studies.       Emiliano Meng  1/12/2017  1:58 PM

## 2017-01-13 LAB
ALBUMIN SERPL BCP-MCNC: 2.5 G/DL (ref 3.4–5)
ALBUMIN/GLOB SERPL: 0.9 {RATIO} (ref 0.8–1.7)
ALP SERPL-CCNC: 99 U/L (ref 45–117)
ALT SERPL-CCNC: 22 U/L (ref 13–56)
ANION GAP BLD CALC-SCNC: 7 MMOL/L (ref 3–18)
AST SERPL W P-5'-P-CCNC: 59 U/L (ref 15–37)
BASOPHILS # BLD AUTO: 0 K/UL (ref 0–0.06)
BASOPHILS # BLD: 0 % (ref 0–2)
BILIRUB SERPL-MCNC: 0.8 MG/DL (ref 0.2–1)
BUN SERPL-MCNC: 2 MG/DL (ref 7–18)
BUN/CREAT SERPL: 4 (ref 12–20)
CALCIUM SERPL-MCNC: 7.1 MG/DL (ref 8.5–10.1)
CHLORIDE SERPL-SCNC: 103 MMOL/L (ref 100–108)
CLOSURE TME COLL+ADP BLD: 86 SEC (ref 52–125)
CLOSURE TME COLL+EPINEP BLD: 107 SEC (ref 70–180)
CO2 SERPL-SCNC: 28 MMOL/L (ref 21–32)
CREAT SERPL-MCNC: 0.48 MG/DL (ref 0.6–1.3)
DIFFERENTIAL METHOD BLD: ABNORMAL
EOSINOPHIL # BLD: 0 K/UL (ref 0–0.4)
EOSINOPHIL NFR BLD: 1 % (ref 0–5)
ERYTHROCYTE [DISTWIDTH] IN BLOOD BY AUTOMATED COUNT: 16.3 % (ref 11.6–14.5)
GLOBULIN SER CALC-MCNC: 2.9 G/DL (ref 2–4)
GLUCOSE SERPL-MCNC: 122 MG/DL (ref 74–99)
HCT VFR BLD AUTO: 20.7 % (ref 35–45)
HCT VFR BLD AUTO: 24.4 % (ref 35–45)
HCT VFR BLD AUTO: 24.8 % (ref 35–45)
HGB BLD-MCNC: 6.8 G/DL (ref 12–16)
HGB BLD-MCNC: 8 G/DL (ref 12–16)
HGB BLD-MCNC: 8.2 G/DL (ref 12–16)
LYMPHOCYTES # BLD AUTO: 23 % (ref 21–52)
LYMPHOCYTES # BLD: 1.1 K/UL (ref 0.9–3.6)
MAGNESIUM SERPL-MCNC: 2.1 MG/DL (ref 1.8–2.4)
MCH RBC QN AUTO: 30.6 PG (ref 24–34)
MCHC RBC AUTO-ENTMCNC: 32.9 G/DL (ref 31–37)
MCV RBC AUTO: 93.2 FL (ref 74–97)
MONOCYTES # BLD: 0.5 K/UL (ref 0.05–1.2)
MONOCYTES NFR BLD AUTO: 12 % (ref 3–10)
NEUTS SEG # BLD: 2.9 K/UL (ref 1.8–8)
NEUTS SEG NFR BLD AUTO: 64 % (ref 40–73)
PLATELET # BLD AUTO: 134 K/UL (ref 135–420)
PMV BLD AUTO: 9.7 FL (ref 9.2–11.8)
POTASSIUM SERPL-SCNC: 3.2 MMOL/L (ref 3.5–5.5)
PROT SERPL-MCNC: 5.4 G/DL (ref 6.4–8.2)
RBC # BLD AUTO: 2.22 M/UL (ref 4.2–5.3)
SODIUM SERPL-SCNC: 138 MMOL/L (ref 136–145)
WBC # BLD AUTO: 4.5 K/UL (ref 4.6–13.2)

## 2017-01-13 PROCEDURE — 36415 COLL VENOUS BLD VENIPUNCTURE: CPT | Performed by: HOSPITALIST

## 2017-01-13 PROCEDURE — 74011250637 HC RX REV CODE- 250/637: Performed by: HOSPITALIST

## 2017-01-13 PROCEDURE — 83735 ASSAY OF MAGNESIUM: CPT | Performed by: HOSPITALIST

## 2017-01-13 PROCEDURE — 85732 THROMBOPLASTIN TIME PARTIAL: CPT | Performed by: INTERNAL MEDICINE

## 2017-01-13 PROCEDURE — 77030020263 HC SOL INJ SOD CL0.9% LFCR 1000ML

## 2017-01-13 PROCEDURE — 80053 COMPREHEN METABOLIC PANEL: CPT | Performed by: HOSPITALIST

## 2017-01-13 PROCEDURE — 74011250636 HC RX REV CODE- 250/636: Performed by: HOSPITALIST

## 2017-01-13 PROCEDURE — 74011250637 HC RX REV CODE- 250/637: Performed by: INTERNAL MEDICINE

## 2017-01-13 PROCEDURE — 85018 HEMOGLOBIN: CPT | Performed by: HOSPITALIST

## 2017-01-13 PROCEDURE — 85240 CLOT FACTOR VIII AHG 1 STAGE: CPT | Performed by: INTERNAL MEDICINE

## 2017-01-13 PROCEDURE — 36430 TRANSFUSION BLD/BLD COMPNT: CPT

## 2017-01-13 PROCEDURE — 74011000250 HC RX REV CODE- 250: Performed by: HOSPITALIST

## 2017-01-13 PROCEDURE — 85025 COMPLETE CBC W/AUTO DIFF WBC: CPT | Performed by: HOSPITALIST

## 2017-01-13 PROCEDURE — 65270000029 HC RM PRIVATE

## 2017-01-13 PROCEDURE — 85611 PROTHROMBIN TEST: CPT | Performed by: INTERNAL MEDICINE

## 2017-01-13 PROCEDURE — P9016 RBC LEUKOCYTES REDUCED: HCPCS | Performed by: HOSPITALIST

## 2017-01-13 RX ORDER — SODIUM CHLORIDE 9 MG/ML
250 INJECTION, SOLUTION INTRAVENOUS AS NEEDED
Status: DISCONTINUED | OUTPATIENT
Start: 2017-01-13 | End: 2017-01-15 | Stop reason: SDUPTHER

## 2017-01-13 RX ORDER — PHYTONADIONE 5 MG/1
5 TABLET ORAL
Status: COMPLETED | OUTPATIENT
Start: 2017-01-13 | End: 2017-01-13

## 2017-01-13 RX ADMIN — SODIUM CHLORIDE 250 ML: 900 INJECTION, SOLUTION INTRAVENOUS at 09:30

## 2017-01-13 RX ADMIN — Medication 10 ML: at 13:09

## 2017-01-13 RX ADMIN — ACETAMINOPHEN AND CODEINE PHOSPHATE 1 TABLET: 300; 30 TABLET ORAL at 08:44

## 2017-01-13 RX ADMIN — LORAZEPAM 2 MG: 1 TABLET ORAL at 20:55

## 2017-01-13 RX ADMIN — FOLIC ACID 1 MG: 1 TABLET ORAL at 08:42

## 2017-01-13 RX ADMIN — LORAZEPAM 1 MG: 1 TABLET ORAL at 10:03

## 2017-01-13 RX ADMIN — Medication 10 ML: at 23:33

## 2017-01-13 RX ADMIN — Medication 10 ML: at 06:40

## 2017-01-13 RX ADMIN — SODIUM CHLORIDE 50 ML/HR: 900 INJECTION, SOLUTION INTRAVENOUS at 17:59

## 2017-01-13 RX ADMIN — LEVOTHYROXINE SODIUM ANHYDROUS 12.5 MCG: 100 INJECTION, POWDER, LYOPHILIZED, FOR SOLUTION INTRAVENOUS at 13:00

## 2017-01-13 RX ADMIN — ACETAMINOPHEN AND CODEINE PHOSPHATE 1 TABLET: 300; 30 TABLET ORAL at 20:56

## 2017-01-13 RX ADMIN — THIAMINE HCL TAB 100 MG 100 MG: 100 TAB at 08:42

## 2017-01-13 RX ADMIN — MULTIPLE VITAMINS W/ MINERALS TAB 1 TABLET: TAB at 08:42

## 2017-01-13 RX ADMIN — LORAZEPAM 1 MG: 1 TABLET ORAL at 23:33

## 2017-01-13 RX ADMIN — PHYTONADIONE 5 MG: 5 TABLET ORAL at 13:06

## 2017-01-13 NOTE — MED STUDENT NOTES
*ATTENTION:  This note has been created by a medical student for educational purposes only. Please do not refer to the content of this note for clinical decision-making, billing, or other purposes. Please see attending physicians note to obtain clinical information on this patient. *         *ATTENTION:  This note has been created by a medical student for educational purposes only. Please do not refer to the content of this note for clinical decision-making, billing, or other purposes. Please see attending physicians note to obtain clinical information on this patient. *       Student Progress Note  Please refer to attendings daily rounding note for full details      Patient: Fernando Arredondo MRN: [id number]  CSN: [id number]    YOB: 1960  Age: 55 y.o. Sex: female    DOA: 1/9/2017 LOS:  LOS: 4 days          Chief Complaint:  Abdominal pain and right hip    Assessment/ Plan:   Bleeding Diathesis - right thigh hematoma status post fall - avoid ASA, NSAIDs, and anticoagulants, PT/INR (15.5/1.3) and PTT (39.8) are elevated - hematology on board, ortho stated no evidence of compartment syndrome and will not intervene at this time unless becomes warranted. GI bleeding - positive occult stool sample, consult GI for colonoscopy  Anemia and thrombocytopenia - plt count improved today and negative plt function test, large hematoma, d/c lovenox, HIT panel pending, not suggestive of hemolysis - transfuse PRBCs to keep serum Hgb >7.0gm/dl, monitor CBC  Alcohol abuse - discussed cessation of alcohol consumption  Pancreatitis - improving - continue NPO and IVF  Hypothyroid - synthroid 12.5 mg  UTI - resolved  Hypomagnesia - resolved    HPI:     She complains of right hip pain but denies weakness or numbness. Her stomach feels better but stated she has a loose stool today and that the stool was dark. She has had dark stool for awhile.  Hx of gastric bypass      Review of Systems:      Gen:  Feels well, denies fatigue, weight loss, fever, or chills  Cardiovascular: denies CP or palpitations  Pulm: denies SOB or cough  GI:   :  Denies dysuria  Musculoskeletal:  Right hip pain  Skin: large hematoma   Neuro: denies numbness or weakness in her right leg    Physical Exam:      Visit Vitals    /64    Pulse 87    Temp 98.6 °F (37 °C)    Resp 20    Ht 5' 7\" (1.702 m)    Wt 100.1 kg (220 lb 9.6 oz)    SpO2 99%    Breastfeeding No    BMI 34.55 kg/m2         Physical Exam:  Gen: AOx3, she appears tired, NAD  CV:  RRR without murmurs or gallops  Resp:  CTA in all fields without wheezing or crackles  Abd: non tender, soft abdomin, no mass  Extrem:  Negative edema, +1 pulse in her right and left LE  Skin: large hematoma right hip, still soft but more firm than yesterday  Neuro: sensation intact bilaterally, 5/5 strength in all extremities       I have reviewed the patient's Labs and Radiology studies.       Jim Cervantes  1/13/2017  1:18 PM

## 2017-01-13 NOTE — PROGRESS NOTES
@ 7677 Patient for blood transfusion today. Consent signed and in the chart. First unit PRBC started. V/S stable. Will monitor for any adverse reaction. @ 1300 Blood transfusion completed. No adverse reaction noted. Will monitor. @ 1600 Patient resting comfortably in the bed. @ [phone] Call received from Dr Mckeon Gary inquiring about the patient. @ 0765 Bedside and Verbal shift change report given to Kiersten Murrell RN (oncoming nurse) by Vandana Garzon RN (offgoing nurse). Report included the following information SBAR, Kardex, Intake/Output, Recent Results and Cardiac Rhythm NSR.

## 2017-01-13 NOTE — PROGRESS NOTES
Nutrition follow up/  Plan of care      RECOMMENDATIONS:     1. Low fat/GI soft diet (6 small meals)  2. Monitor weight, labs and PO intake  3. RD to follow     GOALS:     1. Met/Ongoing: PO intake meets >75% of protein/calorie needs by 1/18  2. Ongoing: Weight Maintenance/Gradual weight loss (1-2 lb by 1/20)    ASSESSMENT:     Weight status is classified as obese per BMI of 34.6. PO intake is adequate. Labs noted. Nutrition recommendations listed. RD to follow. Nutrition Risk:  [] High  [x] Moderate []  Low    SUBJECTIVE/OBJECTIVE:      Patient admitted with sepsis and pancreatitis. Patient with hx of Gastric By-Pass surgery (2012), liver disease and pancreatitis. Patient with history of alcohol abuse. Patient is allergic to tomatoes. Denies problems with chewing/swallowing. Patient reports UBW of 211 lb and was eating small frequent meals PTA. Patient reports tolerating diet advancement. Observed 100% intake of meal during visit. Requests Ensure in-between meals. Will monitor. Information Obtained from:    [x] Chart Review   [x] Patient   [] Family/Caregiver   [] Nurse/Physician   [] Interdisciplinary Meeting/Rounds    Diet: GI soft diet  Medications: [x] Reviewed    Allergies: [x] Reviewed (tomato)  Encounter Diagnoses     ICD-10-CM ICD-9-CM   1. Sepsis, due to unspecified organism ([facility] A41.9 038.9     995.91   2. Urinary tract infection, site unspecified N39.0    3. Non-intractable vomiting with nausea, unspecified vomiting type R11.2 787.01   4. Other acute pancreatitis K85.80    5. Alcohol abuse F10.10 305.00   6.  Chronic anemia D64.9 285.9     Past Medical History   Diagnosis Date    Alcohol abuse     Alcoholic hepatitis     Alcoholic pancreatitis     Anemia     Bipolar disorder (HCC)      [facility]    Chronic abdominal pain     Compression fracture of lumbar vertebra (HCC)      L4, L5     Depression      [facility]    Elevated LFTs     ETOH abuse     Fatty liver     GERD (gastroesophageal reflux disease)     Hyperlipidemia     Hypertension     Hypothyroidism     Lower GI bleeding     Morbid obesity ([address]     Obstructive sleep apnea     Substance induced mood disorder (HCC)     Vitamin D deficiency       Labs:    Lab Results   Component Value Date/Time    Sodium 138 01/13/2017 04:25 AM    Potassium 3.2 01/13/2017 04:25 AM    Chloride 103 01/13/2017 04:25 AM    CO2 28 01/13/2017 04:25 AM    Anion gap 7 01/13/2017 04:25 AM    Glucose 122 01/13/2017 04:25 AM    BUN 2 01/13/2017 04:25 AM    Creatinine 0.48 01/13/2017 04:25 AM    Calcium 7.1 01/13/2017 04:25 AM    Magnesium 2.1 01/13/2017 04:25 AM    Phosphorus 2.6 02/07/2016 01:17 AM    Albumin 2.5 01/13/2017 04:25 AM     Anthropometrics: BMI (calculated): 34.6  Last 3 Recorded Weights in this Encounter    01/09/17 1702 01/10/17 0639 01/13/17 0225   Weight: 95.7 kg (211 lb) 98.4 kg (217 lb) 100.1 kg (220 lb 9.6 oz)      Ht Readings from Last 1 Encounters:   01/10/17 5' 7\" (1.702 m)     Patient Vitals for the past 100 hrs:   % Diet Eaten   01/12/17 1825 50 %     [] Weight Loss [x] Weight Gain [] Weight Stable    Nutrition Needs:   Calories: 1706-9382 Kcal   Protein:   80-98 g      [x] No Cultural, Hindu or ethnic dietary need identified.     [] Cultural, Hindu and ethnic food preferences identified and addressed     Wt Status:  [] Normal (18.6 - 24.9) [] Underweight (<18.5) [] Overweight (25 - 29.9) [x] Mild Obesity (30 - 34.9)  [] Moderate Obesity (35 - 39.9) [] Morbid Obesity (40+)     Nutrition Problems Identified:   [] Suboptimal PO intake   [x] Food Allergies (tomato)  [] Difficulty chewing/swallowing/poor dentition  [] Constipation/Diarrhea   [] Nausea/Vomiting   [] None  [] Other:     Plan:   [x] Therapeutic Diet  [x]  Obtained/adjusted food preferences/tolerances and/or snacks options   []  Supplements added   [] Occupational therapy following for feeding techniques  []  HS snack added   []  Modify diet texture   [x]  Modify diet for food allergies   []  Assist with menu selection   [x]  Monitor PO intake on meal rounds   [x]  Continue inpatient monitoring and intervention   []  Participated in discharge planning/Interdisciplinary rounds/Team meetings   []  Other:     Education Needs:   [] Not appropriate for teaching at this time due to: NPO   [x] Identified and addressed    Nutrition Monitoring and Evaluation:  [x] Continue ongoing monitoring and intervention  [] Laurie Watson

## 2017-01-13 NOTE — PROGRESS NOTES
Spoke with Dr. Kylie Coy earlier in the night about pt's H/H and Hematology note to transfuse PRBCs to keep serum Hb greater than 7.0. At 2130 pt did not require blood transfusion. Blood draw at 0425 resulted after 0600 and H/H 6.8/20.7, order placed to transfuse 1 unit. Stool obtain on day shift and sent down shortly after shift change. Positive for occult blood.

## 2017-01-13 NOTE — PROGRESS NOTES
Bedside and Verbal shift change report received from [id number] [facility] (offgoing nurse). Report included the following information SBAR, Kardex, Intake/Output, MAR and Recent Results. 2000-Shift assessment completed, no complaints of pain, no distress noted, on CIWA protocol. 2214-Ciwa assessment done, ativan 1mg administered. 0000-Reassessment completed, pt currently sleeping. 0400-Reassessment completed, no changes from previous. Bedside and Verbal shift change report given to Mariana Yanez RN (oncoming nurse) by Shailesh Patient Summey/Lin QUIROGA/RN's (offgoing nurse). Report included the following information SBAR, Kardex, Intake/Output, MAR and Recent Results.

## 2017-01-13 NOTE — PROGRESS NOTES
Hematology/Oncology  Progress Note    Name: mDitriy Jones  Date: 2017  : 1960      ASSESSMENT/ PLAN:     1.) ? Bleeding Diathesis/ Right thigh hematoma s/p fall  -Pt. reports a subjective history of easy (intermittent) bleeding and bruising for many years   -Avoid ASA, NSAIDS, and anticoagulants  -Comprehensive laboratory work-up is in progress (PFA-100 to evaluate platelet function-pending)   -Coagulation studies-PT and INR both slightly prolonged  -PT and aPTT mixing studies ordered (to determine a possible factor deficiency vs. Factor inhibitor)  -Trial of oral vit. K ordered     2.) Anemia and thrombocytopenia  -Platelet count considerably improved today  -Large hematoma at right thigh  -Orthopedics evaluation noted  -Lovenox discontinued  -HIT panel pending  -Stool guiac Positive-GI evaluation is pending  -Iron studies reviewed  -No lab evidence of hemolysis  -Transfuse PRBCs to keep serum Hb greater than 7.0 gm/dL  -Closely monitor serial CBCs  -Platelet count remains adequate for normal hemostasis  -? Disorder or platelet (qualitative) function vs. Factor deficiency or vWD (von Willebrand's disease)     3.) Alcohol Abuse  -Heavy alcohol abuse over the past 2 years  -Pt. Reported consuming several beers/ day for past 2 years  -? Malnourished    -I will also see her one day over this weekend      Ms. Elisabeth Bass is a 55y.o. year old female who was seen for severe anemia (blood loss), hematoma at right thigh s/p fall, and questionable bleeding diathesis. Subjective:     Patient has a complaint of right thigh discomfort. .     Past Medical History   Diagnosis Date    Alcohol abuse     Alcoholic hepatitis     Alcoholic pancreatitis     Anemia     Bipolar disorder (HCC)      Dr. Tatyana Zapata [facility])    Chronic abdominal pain     Compression fracture of lumbar vertebra (HCC)      L4, L5     Depression      Dr. Tatyana Zapata [facility])    Elevated LFTs  ETOH abuse     Fatty liver     GERD (gastroesophageal reflux disease)     Hyperlipidemia     Hypertension     Hypothyroidism     Lower GI bleeding     Morbid obesity ([address]     Obstructive sleep apnea     Substance induced mood disorder ([address]     Vitamin D deficiency      Past Surgical History   Procedure Laterality Date    Diskectomy, anterior, with d  8/1995, 11/1997    Repair nonunion scaphoid carpal bone Right 2010     Wrist    Biopsy liver  08/15/2012     Lap Left hepatic liver wedge by Dr. Cristobal Mohan; BX Revealed: Hepatic Steatosis, AVM w/ associated Subcapsular Fibrosis.  Hx hysterectomy  2006    Hx tonsillectomy  1992    Hx abdominal laparoscopy  12/02/2014     Laparoscopic Gastrostomy w/ Partial Gastrectomy for assistance in placement of Endoscopic Retrograde Cholangiopancreatography & Diagnostic Lap.  Hx carpal tunnel release      Hx cholecystectomy  09/16/2014     Dr. Itz Reddy Hx colonoscopy  05/12/2012     Colonoscopy by Dr. Aishwarya Vargas. [facility]: Bx Revealed Colon Polyps (Tubular Adenoma), Hemorrhoids.  Hx hemorrhoidectomy  04/30/2015     Dr. Agapito Lowry. Sonia Livingston Hx gastric bypass  08/15/2012     Lap Christian-en-y     Social History     Social History    Marital status:      Spouse name: N/A    Number of children: N/A    Years of education: N/A     Occupational History    Not on file.      Social History Main Topics    Smoking status: Never Smoker    Smokeless tobacco: Never Used    Alcohol use 0.0 oz/week     0 Standard drinks or equivalent per week      Comment: 6 pack of beer a week    Drug use: No    Sexual activity: Yes     Partners: Male     Birth control/ protection: Condom     Other Topics Concern    Not on file     Social History Narrative     Family History   Problem Relation Age of Onset    Cancer Father     Cancer Sister     Obesity Brother      Current Facility-Administered Medications   Medication Dose Route Frequency Provider Last Rate Last Dose    0.9% sodium chloride infusion 250 mL  250 mL IntraVENous PRN Lex Allen MD        0.9% sodium chloride infusion 250 mL  250 mL IntraVENous PRN Lex Allen MD        0.9% sodium chloride infusion 250 mL  250 mL IntraVENous PRN Bhaskar Stoner MD        sodium chloride (NS) flush 5-10 mL  5-10 mL IntraVENous Q8H Lex Allen MD   10 mL at 01/13/17 0640    sodium chloride (NS) flush 5-10 mL  5-10 mL IntraVENous PRN Lex Allen MD        LORazepam (ATIVAN) tablet 1 mg  1 mg Oral Q1H PRN Lex Allen MD   1 mg at 01/12/17 2213    Or    LORazepam (ATIVAN) injection 1 mg  1 mg IntraVENous Q1H PRN Lex Allen MD   1 mg at 01/12/17 0505    LORazepam (ATIVAN) tablet 2 mg  2 mg Oral Q1H PRN Lex Allen MD        Or    LORazepam (ATIVAN) injection 2 mg  2 mg IntraVENous Q1H PRN Lex Allen MD        LORazepam (ATIVAN) injection 3 mg  3 mg IntraVENous Q15MIN PRN Lex Allen MD        levothyroxine (SYNTHROID) injection 12.5 mcg  12.5 mcg IntraVENous DAILY Lex Allen MD   12.5 mcg at 01/12/17 1114    0.9% sodium chloride infusion  50 mL/hr IntraVENous CONTINUOUS Bhaskar Stoner MD   Stopped at 01/13/17 0931    multivitamin, tx-iron-ca-min (THERA-M w/ IRON) tablet 1 Tab  1 Tab Oral DAILY Bhaskar Stoner MD   1 Tab at 01/13/17 7711    Thiamine Mononitrate (B-1) tablet 100 mg  100 mg Oral DAILY Bhaskar Stoner MD   100 mg at 74/97/65 8234    folic acid (FOLVITE) tablet 1 mg  1 mg Oral DAILY Bhaskar Stoner MD   1 mg at 01/13/17 0842    acetaminophen-codeine (TYLENOL #3) per tablet 1 Tab  1 Tab Oral Q6H PRN Lex Allen MD   1 Tab at 01/13/17 0844    sodium chloride (NS) flush 5-10 mL  5-10 mL IntraVENous PRN Tahira Aivla DO        sodium chloride (NS) flush 5-10 mL  5-10 mL IntraVENous Q8H Lex Allen MD   10 mL at 01/13/17 0640    sodium chloride (NS) flush 5-10 mL  5-10 mL IntraVENous PRN Lex Allen MD           Review of Systems    Constitutional: Positive for fatigue. HENT: Negative. Eyes: Negative. Respiratory: Negative. Cardiovascular: Negative. Gastrointestinal: Positive for abdominal pain. Negative for anal bleeding and blood in stool. Endocrine: Negative for cold intolerance. Genitourinary: Negative. Musculoskeletal: Positive for back pain, gait problem, joint swelling and myalgias. Negative for neck pain and neck stiffness. Skin: Positive for color change. Allergic/Immunologic: Negative. Hematological: Negative for adenopathy. Bruises/bleeds easily. Psychiatric/Behavioral: Positive for sleep disturbance. Negative for agitation, behavioral problems, confusion, decreased concentration, dysphoric mood, hallucinations, self-injury and suicidal ideas. The patient is nervous/anxious. The patient is not hyperactive. Objective:     Visit Vitals    /74    Pulse 87    Temp 99.2 °F (37.3 °C)    Resp 20    Ht 5' 7\" (1.702 m)    Wt 100.1 kg (220 lb 9.6 oz)    SpO2 100%    Breastfeeding No    BMI 34.55 kg/m2       Physical Exam:     Constitutional: She appears well-developed and well-nourished. No distress. HENT:   Head: Normocephalic and atraumatic. Nose: Nose normal.   Mouth/Throat: No Oropharyngeal exudate present. Eyes: Conjunctivae and EOM are normal. Pupils are equal, round, and reactive to light. No scleral icterus. Neck: Normal range of motion. No JVD present. Cardiovascular: Normal rate and normal heart sounds. Exam reveals no gallop. No murmur heard. Pulmonary/Chest: Effort normal and breath sounds normal. No Stridor present. No respiratory distress. Abdominal: Soft. Bowel sounds are normal. She exhibits no distension. obese   Musculoskeletal: She exhibits tenderness and deformity. ++ bruising at right hip   Lymphadenopathy:   She has no cervical adenopathy. Skin: Skin is warm and dry. Bruising, ecchymosis and petechiae noted. No rash noted.  She is not diaphoretic. No cyanosis. Nails show no clubbing. Lab data:      Recent Results (from the past 24 hour(s))   HGB & HCT    Collection Time: 01/12/17  2:24 PM   Result Value Ref Range    HGB 7.3 (L) 12.0 - 16.0 g/dL    HCT 22.2 (L) 35.0 - 45.0 %   PROTHROMBIN TIME + INR    Collection Time: 01/12/17  2:24 PM   Result Value Ref Range    Prothrombin time 15.5 (H) 11.5 - 15.2 sec    INR 1.3 (H) 0.8 - 1.2     PTT    Collection Time: 01/12/17  2:24 PM   Result Value Ref Range    aPTT 39.8 (H) 23.0 - 36.4 SEC   FIBRINOGEN    Collection Time: 01/12/17  2:24 PM   Result Value Ref Range    Fibrinogen 421 210 - 451 mg/dL   FERRITIN    Collection Time: 01/12/17  2:24 PM   Result Value Ref Range    Ferritin 279 8 - 388 NG/ML   OCCULT BLOOD, STOOL    Collection Time: 01/12/17  4:20 PM   Result Value Ref Range    Occult blood, stool POSITIVE (A) NEG     HGB & HCT    Collection Time: 01/12/17  9:31 PM   Result Value Ref Range    HGB 7.2 (L) 12.0 - 16.0 g/dL    HCT 22.5 (L) 35.0 - 45.0 %   CBC WITH AUTOMATED DIFF    Collection Time: 01/13/17  4:25 AM   Result Value Ref Range    WBC 4.5 (L) 4.6 - 13.2 K/uL    RBC 2.22 (L) 4.20 - 5.30 M/uL    HGB 6.8 (L) 12.0 - 16.0 g/dL    HCT 20.7 (L) 35.0 - 45.0 %    MCV 93.2 74.0 - 97.0 FL    MCH 30.6 24.0 - 34.0 PG    MCHC 32.9 31.0 - 37.0 g/dL    RDW 16.3 (H) 11.6 - 14.5 %    PLATELET 136 (L) 741 - 420 K/uL    MPV 9.7 9.2 - 11.8 FL    NEUTROPHILS 64 40 - 73 %    LYMPHOCYTES 23 21 - 52 %    MONOCYTES 12 (H) 3 - 10 %    EOSINOPHILS 1 0 - 5 %    BASOPHILS 0 0 - 2 %    ABS. NEUTROPHILS 2.9 1.8 - 8.0 K/UL    ABS. LYMPHOCYTES 1.1 0.9 - 3.6 K/UL    ABS. MONOCYTES 0.5 0.05 - 1.2 K/UL    ABS. EOSINOPHILS 0.0 0.0 - 0.4 K/UL    ABS.  BASOPHILS 0.0 0.0 - 0.06 K/UL    DF AUTOMATED     METABOLIC PANEL, COMPREHENSIVE    Collection Time: 01/13/17  4:25 AM   Result Value Ref Range    Sodium 138 136 - 145 mmol/L    Potassium 3.2 (L) 3.5 - 5.5 mmol/L    Chloride 103 100 - 108 mmol/L    CO2 28 21 - 32 mmol/L Anion gap 7 3.0 - 18 mmol/L    Glucose 122 (H) 74 - 99 mg/dL    BUN 2 (L) 7.0 - 18 MG/DL    Creatinine 0.48 (L) 0.6 - 1.3 MG/DL    BUN/Creatinine ratio 4 (L) 12 - 20      GFR est AA >60 >60 ml/min/1.73m2    GFR est non-AA >60 >60 ml/min/1.73m2    Calcium 7.1 (L) 8.5 - 10.1 MG/DL    Bilirubin, total 0.8 0.2 - 1.0 MG/DL    ALT 22 13 - 56 U/L    AST 59 (H) 15 - 37 U/L    Alk.  phosphatase 99 45 - 117 U/L    Protein, total 5.4 (L) 6.4 - 8.2 g/dL    Albumin 2.5 (L) 3.4 - 5.0 g/dL    Globulin 2.9 2.0 - 4.0 g/dL    A-G Ratio 0.9 0.8 - 1.7     MAGNESIUM    Collection Time: 01/13/17  4:25 AM   Result Value Ref Range    Magnesium 2.1 1.8 - 2.4 mg/dL               Arian Winkler MD, Hematology-Medical Oncology  1/13/2017

## 2017-01-13 NOTE — PROGRESS NOTES
Progress Note      Patient: Queen Soledad               Sex: female          DOA: 1/9/2017       YOB: 1960      Age:  55 y.o.        LOS:  LOS: 4 days               Subjective:   Help of hem onc and orthopedica greatly appreciated . The pt has an underlying bleeding diathesis and this is being worked up . The [id number]   seems to be negative and it is unlikely that she has a platelet disorder . The HIT and the Liliana Solis' s panel is pending . The inr with 1\"1  mixing is pending . Need to r/o a factor deficiency  Like factor 8 or 9 etc . R/o antibodies to coagulation factors eg 8 . the pt is being assessed by orthopedics to be sure she does not have a fracture in her right hip .the pt is followed by gi  And she was scheduled presumably for a colonoscopy . The pt has had black stools in the recent past . This may be because she has a clotting disorder . Gi has been asked to see the pt also . Objective:      Visit Vitals    /85    Pulse 93    Temp 99.5 °F (37.5 °C)    Resp 20    Ht 5' 7\" (1.702 m)    Wt 100.1 kg (220 lb 9.6 oz)    SpO2 99%    Breastfeeding No    BMI 34.55 kg/m2       Physical Exam:  Pt is alert and is fairly comfortable   Heart reg rate and rhythm   Lungs fair breath sounds heard   Abdomen soft and nontender   Extremities  Large hematoma in the right hip   it is still soft to touch. the pt has pulses in the right foot .    Neuro nonfocal    Lab/Data Reviewed:  CMP:   Lab Results   Component Value Date/Time     01/13/2017 04:25 AM    K 3.2 (L) 01/13/2017 04:25 AM     01/13/2017 04:25 AM    CO2 28 01/13/2017 04:25 AM    AGAP 7 01/13/2017 04:25 AM     (H) 01/13/2017 04:25 AM    BUN 2 (L) 01/13/2017 04:25 AM    CREA 0.48 (L) 01/13/2017 04:25 AM    GFRAA >60 01/13/2017 04:25 AM    GFRNA >60 01/13/2017 04:25 AM    CA 7.1 (L) 01/13/2017 04:25 AM    MG 2.1 01/13/2017 04:25 AM    ALB 2.5 (L) 01/13/2017 04:25 AM    TP 5.4 (L) 01/13/2017 04:25 AM    GLOB 2.9 01/13/2017 04:25 AM    AGRAT 0.9 01/13/2017 04:25 AM    SGOT 59 (H) 01/13/2017 04:25 AM    ALT 22 01/13/2017 04:25 AM     CBC:   Lab Results   Component Value Date/Time    WBC 4.5 (L) 01/13/2017 04:25 AM    HGB 8.2 (L) 01/13/2017 01:15 PM    HCT 24.8 (L) 01/13/2017 01:15 PM     (L) 01/13/2017 04:25 AM           Assessment/Plan     Active Problems:    Acute pancreatitis (10/20/2014)      UTI (urinary tract infection) (3/25/2016)      Sepsis ([address] (1/9/2017)      Non-intractable vomiting (1/9/2017)      Thrombocytopenia ([address] (1/12/2017)  Large hematoma in the right hip .  R/o bleeding diathesis   H/o probable gi bleeding     Plan:will continue the hematology workup and orthopedics is following Gastroenterology will see the pt

## 2017-01-13 NOTE — ROUTINE PROCESS
Bedside, Verbal and Written shift change report given to Russell Casas RN (oncoming nurse) by Tatum Lan RN/Cinthia Sevilla RN (offgoing nurse). Report included the following information SBAR, Kardex, Intake/Output, MAR, Recent Results, Med Rec Status and Cardiac Rhythm NSR/ST. Pt receiving 1 unit of PRBCs, blood transfusion verified.     0730 - Shift assessment completed. Pt alert and oriented x4. No respiratory distress noted. No c/o pain reported. Call bell within reach, bed in low position. Will continue to monitor. 1000 - 1 unit of PRBCs completed. 1230 - Shift re-assessment completed, no change in pt condition. 1619 - Pt's Temp 99.9 orally, PRN Tylenol #3 administered. 1630 - Shift re-assessment completed, no change in pt condition. 1730 - Pt pulled out PIV to right AC, new PIV started to right wrist.    Bedside, Verbal and Written shift change report given to Tatum Lan RN/Cinthia Eli RN (oncoming nurse) by Russell Casas RN (offgoing nurse). Report included the following information SBAR, Kardex, Intake/Output, MAR, Recent Results, Med Rec Status and Cardiac Rhythm NSR/ST.

## 2017-01-14 LAB
ABO + RH BLD: NORMAL
ALBUMIN SERPL BCP-MCNC: 2.6 G/DL (ref 3.4–5)
ALBUMIN/GLOB SERPL: 0.9 {RATIO} (ref 0.8–1.7)
ALP SERPL-CCNC: 100 U/L (ref 45–117)
ALT SERPL-CCNC: 21 U/L (ref 13–56)
ANION GAP BLD CALC-SCNC: 7 MMOL/L (ref 3–18)
AST SERPL W P-5'-P-CCNC: 52 U/L (ref 15–37)
BILIRUB SERPL-MCNC: 0.8 MG/DL (ref 0.2–1)
BLD PROD TYP BPU: NORMAL
BLOOD GROUP ANTIBODIES SERPL: NORMAL
BPU ID: NORMAL
BUN SERPL-MCNC: 3 MG/DL (ref 7–18)
BUN/CREAT SERPL: 8 (ref 12–20)
CALCIUM SERPL-MCNC: 7.7 MG/DL (ref 8.5–10.1)
CALLED TO:,BCALL1: NORMAL
CALLED TO:,BCALL2: NORMAL
CALLED TO:,BCALL3: NORMAL
CHLORIDE SERPL-SCNC: 104 MMOL/L (ref 100–108)
CO2 SERPL-SCNC: 30 MMOL/L (ref 21–32)
CREAT SERPL-MCNC: 0.38 MG/DL (ref 0.6–1.3)
CROSSMATCH RESULT,%XM: NORMAL
ERYTHROCYTE [DISTWIDTH] IN BLOOD BY AUTOMATED COUNT: 15.8 % (ref 11.6–14.5)
GLOBULIN SER CALC-MCNC: 3 G/DL (ref 2–4)
GLUCOSE SERPL-MCNC: 103 MG/DL (ref 74–99)
HCT VFR BLD AUTO: 23.1 % (ref 35–45)
HCT VFR BLD AUTO: 23.9 % (ref 35–45)
HEPARIN AGGREGATION,XHEAGT: NEGATIVE
HGB BLD-MCNC: 7.5 G/DL (ref 12–16)
HGB BLD-MCNC: 7.9 G/DL (ref 12–16)
MCH RBC QN AUTO: 30.3 PG (ref 24–34)
MCHC RBC AUTO-ENTMCNC: 33.1 G/DL (ref 31–37)
MCV RBC AUTO: 91.6 FL (ref 74–97)
PLATELET # BLD AUTO: 162 K/UL (ref 135–420)
PLATELET FACTOR 4,XPF4T: NEGATIVE
PMV BLD AUTO: 9.6 FL (ref 9.2–11.8)
POTASSIUM SERPL-SCNC: 3.2 MMOL/L (ref 3.5–5.5)
PROT SERPL-MCNC: 5.6 G/DL (ref 6.4–8.2)
RBC # BLD AUTO: 2.61 M/UL (ref 4.2–5.3)
SODIUM SERPL-SCNC: 141 MMOL/L (ref 136–145)
SPECIMEN EXP DATE BLD: NORMAL
STATUS OF UNIT,%ST: NORMAL
UNIT DIVISION, %UDIV: 0
WBC # BLD AUTO: 3 K/UL (ref 4.6–13.2)

## 2017-01-14 PROCEDURE — 97161 PT EVAL LOW COMPLEX 20 MIN: CPT

## 2017-01-14 PROCEDURE — 74011250637 HC RX REV CODE- 250/637: Performed by: HOSPITALIST

## 2017-01-14 PROCEDURE — 74011250636 HC RX REV CODE- 250/636: Performed by: HOSPITALIST

## 2017-01-14 PROCEDURE — 77030020263 HC SOL INJ SOD CL0.9% LFCR 1000ML

## 2017-01-14 PROCEDURE — 36415 COLL VENOUS BLD VENIPUNCTURE: CPT | Performed by: HOSPITALIST

## 2017-01-14 PROCEDURE — 65270000029 HC RM PRIVATE

## 2017-01-14 PROCEDURE — 85027 COMPLETE CBC AUTOMATED: CPT | Performed by: HOSPITALIST

## 2017-01-14 PROCEDURE — 74011000250 HC RX REV CODE- 250: Performed by: HOSPITALIST

## 2017-01-14 PROCEDURE — 85018 HEMOGLOBIN: CPT | Performed by: HOSPITALIST

## 2017-01-14 PROCEDURE — 97116 GAIT TRAINING THERAPY: CPT

## 2017-01-14 PROCEDURE — 80053 COMPREHEN METABOLIC PANEL: CPT | Performed by: HOSPITALIST

## 2017-01-14 RX ORDER — DIPHENHYDRAMINE HCL 25 MG
25 CAPSULE ORAL
Status: DISCONTINUED | OUTPATIENT
Start: 2017-01-14 | End: 2017-01-16 | Stop reason: HOSPADM

## 2017-01-14 RX ADMIN — THIAMINE HCL TAB 100 MG 100 MG: 100 TAB at 08:40

## 2017-01-14 RX ADMIN — LORAZEPAM 1 MG: 1 TABLET ORAL at 08:47

## 2017-01-14 RX ADMIN — LORAZEPAM 1 MG: 1 TABLET ORAL at 18:49

## 2017-01-14 RX ADMIN — Medication 10 ML: at 17:19

## 2017-01-14 RX ADMIN — Medication 10 ML: at 22:05

## 2017-01-14 RX ADMIN — SODIUM CHLORIDE 50 ML/HR: 900 INJECTION, SOLUTION INTRAVENOUS at 17:17

## 2017-01-14 RX ADMIN — ACETAMINOPHEN AND CODEINE PHOSPHATE 1 TABLET: 300; 30 TABLET ORAL at 08:47

## 2017-01-14 RX ADMIN — ACETAMINOPHEN AND CODEINE PHOSPHATE 1 TABLET: 300; 30 TABLET ORAL at 18:48

## 2017-01-14 RX ADMIN — LEVOTHYROXINE SODIUM ANHYDROUS 12.5 MCG: 100 INJECTION, POWDER, LYOPHILIZED, FOR SOLUTION INTRAVENOUS at 12:16

## 2017-01-14 RX ADMIN — Medication 10 ML: at 22:04

## 2017-01-14 RX ADMIN — DIPHENHYDRAMINE HYDROCHLORIDE 25 MG: 25 CAPSULE ORAL at 15:20

## 2017-01-14 RX ADMIN — FOLIC ACID 1 MG: 1 TABLET ORAL at 08:40

## 2017-01-14 RX ADMIN — Medication 10 ML: at 17:18

## 2017-01-14 RX ADMIN — Medication 10 ML: at 06:16

## 2017-01-14 RX ADMIN — Medication 10 ML: at 06:15

## 2017-01-14 RX ADMIN — LORAZEPAM 1 MG: 1 TABLET ORAL at 02:28

## 2017-01-14 RX ADMIN — MULTIPLE VITAMINS W/ MINERALS TAB 1 TABLET: TAB at 08:40

## 2017-01-14 NOTE — PROGRESS NOTES
Progress Note      Assessment:    1. Right thigh hematoma, no evidence of infection      PLAN:    1. Continue to monitor. H/H is stable. No recommendation for operative intervention at this time         HPI: Shiv Hopkins is a 55 y.o. female patient without new complaints  for Acute pancreatitis  UTI (urinary tract infection)  Sepsis ([facility]  Non-intractable vomiting 1/9/2017. No new orthopaedic changes. Blood pressure 125/83, pulse 95, temperature 98.2 °F (36.8 °C), resp. rate 16, height 5' 7\" (1.702 m), weight 100.1 kg (220 lb 9.6 oz), SpO2 94 %, not currently breastfeeding. CBC w/Diff   Lab Results   Component Value Date/Time    WBC 3.0 (L) 01/14/2017 03:58 AM    RBC 2.61 (L) 01/14/2017 03:58 AM    HCT 23.9 (L) 01/14/2017 03:58 AM          Physical Assessment:  General: in no apparent distress   Wound: none   Extremities:  Neurovascular intact. Right thigh hematoma with minimal tenderness at the proximal and lateral hip. No pain with hip motion. Compartments soft   Dressing:  none   DVT Exam:   No exam evidence to suggest DVT.  Compartments soft and NT.               - Jessica Gibson PA-C  1/14/2017  Pager [phone]  Office [phone] [place] [phone]

## 2017-01-14 NOTE — PROGRESS NOTES
Hematology/Oncology  Progress Note    Name: Azael Fofana  Date: 2017  : 1960      ASSESSMENT/ PLAN:     1.) ? Bleeding Diathesis/ Right thigh hematoma s/p fall  -Subjective history of easy (intermittent) bleeding and bruising for many years   -Continue to avoid ASA, NSAIDS, and anticoagulants  -Comprehensive laboratory work-up in progress   -PFA-100 screening-Negative for evidence of qualitative platelet dysfunction)  -PT and INR both slightly prolonged  -Final results of PT and aPTT mixing studies (to determine a possible factor deficiency vs. A factor inhibitor) pending  -Von Willebrand panel pending  -Trial of oral vit. K ordered     2.) Anemia and thrombocytopenia  -Hb/ Hct appear to be stabilizing overall-monitor closely  -Platelet count now WNL  -Large hematoma at right thigh  -Orthopedics comments noted  -Lovenox discontinued  -HIT panel negative  -Stool guiac Positive-Outpt. GI evaluation is pending  -Iron studies reviewed  -No lab evidence of hemolysis  -Transfuse PRBCs to keep serum Hb greater than 7.0 gm/dL     3.) Alcohol Abuse  -Heavy alcohol abuse over the past 2 years    -I will check on her again on 17      Ms. Cano Patient is a 55y.o. year old female who was seen for severe anemia (blood loss), hematoma at right thigh s/p fall, and questionable bleeding diathesis. Subjective:     Overall, she reports feeling improved today. Patient continues to complain of right thigh discomfort.      Past Medical History   Diagnosis Date    Alcohol abuse     Alcoholic hepatitis     Alcoholic pancreatitis     Anemia     Bipolar disorder (HCC)      Dr. Worley [place] [facility])    Chronic abdominal pain     Compression fracture of lumbar vertebra (HCC)      L4, L5     Depression      Dr. Meir Eduardo [facility])    Elevated LFTs     ETOH abuse     Fatty liver     GERD (gastroesophageal reflux disease)     Hyperlipidemia     Hypertension  Hypothyroidism     Lower GI bleeding     Morbid obesity ([facility] [address]     Obstructive sleep apnea     Substance induced mood disorder (HCC)     Vitamin D deficiency      Past Surgical History   Procedure Laterality Date    Diskectomy, anterior, with d  8/1995, 11/1997    Repair nonunion scaphoid carpal bone Right 2010     Wrist    Biopsy liver  08/15/2012     Lap Left hepatic liver wedge by Dr. Heather Sauer; BX Revealed: Hepatic Steatosis, AVM w/ associated Subcapsular Fibrosis.  Hx hysterectomy  2006    Hx tonsillectomy  1992    Hx abdominal laparoscopy  12/02/2014     Laparoscopic Gastrostomy w/ Partial Gastrectomy for assistance in placement of Endoscopic Retrograde Cholangiopancreatography & Diagnostic Lap.  Hx carpal tunnel release      Hx cholecystectomy  09/16/2014     Dr. Aris Kong Hx colonoscopy  05/12/2012     Colonoscopy by Dr. Ele Islas. Zoltan Collier: Bx Revealed Colon Polyps (Tubular Adenoma), Hemorrhoids.  Hx hemorrhoidectomy  04/30/2015     Dr. Lakia Hancock. Asa Agnieszka Hx gastric bypass  08/15/2012     Lap Christian-en-y     Social History     Social History    Marital status:      Spouse name: N/A    Number of children: N/A    Years of education: N/A     Occupational History    Not on file.      Social History Main Topics    Smoking status: Never Smoker    Smokeless tobacco: Never Used    Alcohol use 0.0 oz/week     0 Standard drinks or equivalent per week      Comment: 6 pack of beer a week    Drug use: No    Sexual activity: Yes     Partners: Male     Birth control/ protection: Condom     Other Topics Concern    Not on file     Social History Narrative     Family History   Problem Relation Age of Onset    Cancer Father     Cancer Sister     Obesity Brother      Current Facility-Administered Medications   Medication Dose Route Frequency Provider Last Rate Last Dose    0.9% sodium chloride infusion 250 mL  250 mL IntraVENous PRN Isai Ruby MD 15 mL/hr at 01/13/17 0930 250 mL at 01/13/17 0930    0.9% sodium chloride infusion 250 mL  250 mL IntraVENous PRN Ute Fuentes MD        0.9% sodium chloride infusion 250 mL  250 mL IntraVENous PRN Yasmine Lenz MD        sodium chloride (NS) flush 5-10 mL  5-10 mL IntraVENous Q8H Ute Fuentes MD   10 mL at 01/14/17 0616    sodium chloride (NS) flush 5-10 mL  5-10 mL IntraVENous PRN Ute Fuentes MD        LORazepam (ATIVAN) tablet 1 mg  1 mg Oral Q1H PRN Ute Fuentes MD   1 mg at 01/14/17 0847    Or    LORazepam (ATIVAN) injection 1 mg  1 mg IntraVENous Q1H PRN Ute Fuentes MD   1 mg at 01/12/17 0505    LORazepam (ATIVAN) tablet 2 mg  2 mg Oral Q1H PRN Ute Fuentes MD   2 mg at 01/13/17 2055    Or    LORazepam (ATIVAN) injection 2 mg  2 mg IntraVENous Q1H PRN Ute Fuentes MD        LORazepam (ATIVAN) injection 3 mg  3 mg IntraVENous Q15MIN PRN Ute Fuentes MD        levothyroxine (SYNTHROID) injection 12.5 mcg  12.5 mcg IntraVENous DAILY Ute Fuentes MD   12.5 mcg at 01/14/17 1216    0.9% sodium chloride infusion  50 mL/hr IntraVENous CONTINUOUS Yasmine Lenz MD 50 mL/hr at 01/13/17 1759 50 mL/hr at 01/13/17 1759    multivitamin, tx-iron-ca-min (THERA-M w/ IRON) tablet 1 Tab  1 Tab Oral DAILY Yasmine Lenz MD   1 Tab at 01/14/17 0840    Thiamine Mononitrate (B-1) tablet 100 mg  100 mg Oral DAILY Yasmine Lenz MD   100 mg at 91/38/71 6465    folic acid (FOLVITE) tablet 1 mg  1 mg Oral DAILY Yasmine Lenz MD   1 mg at 01/14/17 0840    acetaminophen-codeine (TYLENOL #3) per tablet 1 Tab  1 Tab Oral Q6H PRN Ute Fuentes MD   1 Tab at 01/14/17 0847    sodium chloride (NS) flush 5-10 mL  5-10 mL IntraVENous PRN Sean Solis DO        sodium chloride (NS) flush 5-10 mL  5-10 mL IntraVENous Q8H Ute Fuentes MD   10 mL at 01/14/17 0615    sodium chloride (NS) flush 5-10 mL  5-10 mL IntraVENous PRN Ute Fuentes MD           Review of Systems    Constitutional: Positive for fatigue. HENT: Negative. Eyes: Negative. Respiratory: Negative. Cardiovascular: Negative. Gastrointestinal: Positive for abdominal pain. Negative for anal bleeding and blood in stool. Endocrine: Negative for cold intolerance. Genitourinary: Negative. Musculoskeletal: Positive for back pain, gait problem, joint swelling and myalgias. Negative for neck pain and neck stiffness. Skin: Positive for color change. Allergic/Immunologic: Negative. Hematological: Negative for adenopathy. Bruises/bleeds easily. Psychiatric/Behavioral: Positive for sleep disturbance. Negative for agitation, behavioral problems, confusion, decreased concentration, dysphoric mood, hallucinations, self-injury and suicidal ideas. The patient is nervous/anxious. The patient is not hyperactive. Objective:     Visit Vitals    /83    Pulse 95    Temp 98.2 °F (36.8 °C)    Resp 16    Ht 5' 7\" (1.702 m)    Wt 100.1 kg (220 lb 9.6 oz)    SpO2 94%    Breastfeeding No    BMI 34.55 kg/m2       Physical Exam:     Constitutional: She appears well-developed and well-nourished. No distress. HENT:   Head: Normocephalic and atraumatic. Nose: Nose normal.   Mouth/Throat: No Oropharyngeal exudate present. Eyes: Conjunctivae and EOM are normal. Pupils are equal, round, and reactive to light. No scleral icterus. Neck: Normal range of motion. No JVD present. Cardiovascular: Normal rate and normal heart sounds. Exam reveals no gallop. No murmur heard. Pulmonary/Chest: Effort normal and breath sounds normal. No Stridor present. No respiratory distress. Abdominal: Soft. Bowel sounds are normal. She exhibits no distension. obese   Musculoskeletal: She exhibits tenderness and deformity at right hip/ thigh.   ++ bruising at right hip   Lymphadenopathy:   She has no cervical adenopathy. Skin: Skin is warm and dry. Bruising, ecchymosis and petechiae noted at right thigh. No rash noted.  She is not diaphoretic. No cyanosis. Nails show no clubbing. Lab data:      Recent Results (from the past 24 hour(s))   HGB & HCT    Collection Time: 01/13/17  1:15 PM   Result Value Ref Range    HGB 8.2 (L) 12.0 - 16.0 g/dL    HCT 24.8 (L) 35.0 - 45.0 %   PROTHROMBIN TIME + INR    Collection Time: 01/13/17  1:15 PM   Result Value Ref Range    Prothrombin time 14.7 (H) 11.5 - 14.0 seconds    INR 1.2 (H) 0.1 - 1.1     MIXING STUDY, PT    Collection Time: 01/13/17  1:15 PM   Result Value Ref Range    PT 1:1 mix patient 15.4 seconds   PTT    Collection Time: 01/13/17  1:15 PM   Result Value Ref Range    aPTT 36.0 (H) 24.9 - 35.6 seconds   MIXING STUDY, APTT    Collection Time: 01/13/17  1:15 PM   Result Value Ref Range    aPTT 1:1 mix patient 38.3 seconds   HGB & HCT    Collection Time: 01/13/17  8:00 PM   Result Value Ref Range    HGB 8.0 (L) 12.0 - 16.0 g/dL    HCT 24.4 (L) 35.0 - 45.0 %   CBC W/O DIFF    Collection Time: 01/14/17  3:58 AM   Result Value Ref Range    WBC 3.0 (L) 4.6 - 13.2 K/uL    RBC 2.61 (L) 4.20 - 5.30 M/uL    HGB 7.9 (L) 12.0 - 16.0 g/dL    HCT 23.9 (L) 35.0 - 45.0 %    MCV 91.6 74.0 - 97.0 FL    MCH 30.3 24.0 - 34.0 PG    MCHC 33.1 31.0 - 37.0 g/dL    RDW 15.8 (H) 11.6 - 14.5 %    PLATELET 346 227 - 385 K/uL    MPV 9.6 9.2 - 47.0 FL   METABOLIC PANEL, COMPREHENSIVE    Collection Time: 01/14/17  3:58 AM   Result Value Ref Range    Sodium 141 136 - 145 mmol/L    Potassium 3.2 (L) 3.5 - 5.5 mmol/L    Chloride 104 100 - 108 mmol/L    CO2 30 21 - 32 mmol/L    Anion gap 7 3.0 - 18 mmol/L    Glucose 103 (H) 74 - 99 mg/dL    BUN 3 (L) 7.0 - 18 MG/DL    Creatinine 0.38 (L) 0.6 - 1.3 MG/DL    BUN/Creatinine ratio 8 (L) 12 - 20      GFR est AA >60 >60 ml/min/1.73m2    GFR est non-AA >60 >60 ml/min/1.73m2    Calcium 7.7 (L) 8.5 - 10.1 MG/DL    Bilirubin, total 0.8 0.2 - 1.0 MG/DL    ALT 21 13 - 56 U/L    AST 52 (H) 15 - 37 U/L    Alk.  phosphatase 100 45 - 117 U/L    Protein, total 5.6 (L) 6.4 - 8.2 g/dL Albumin 2.6 (L) 3.4 - 5.0 g/dL    Globulin 3.0 2.0 - 4.0 g/dL    A-G Ratio 0.9 0.8 - 1.7     HGB & HCT    Collection Time: 01/14/17 11:00 AM   Result Value Ref Range    HGB 7.5 (L) 12.0 - 16.0 g/dL    HCT 23.1 (L) 35.0 - 45.0 %               Jael Amin MD, Hematology-Medical Oncology  1/14/2017

## 2017-01-14 NOTE — PROGRESS NOTES
Progress Note      Patient: Dmitriy Jones               Sex: female          DOA: 1/9/2017       YOB: 1960      Age:  55 y.o.        LOS:  LOS: 5 days               Subjective:   Help of the various subspecialists appreciated . The pt's h/h is  7.5/23.1  The pt 's right hip is hot to the touch but is soft to the touch and her pulses in her right foot are palpable theophylline pt's gi bleeding will be worked up as an outpatient as per gi . The h/h seems to be stable at this point . will discuss further with orthopedics . with respect to her bleeding the pt and th ptt did not correct with the 1:1 mix suspect that an antibody is present  Possibly to ine of the factors . will discuss with hem onc             Objective:      Visit Vitals    /88 (BP 1 Location: Left arm, BP Patient Position: At rest)    Pulse 93    Temp 98 °F (36.7 °C)    Resp 16    Ht 5' 7\" (1.702 m)    Wt 100.1 kg (220 lb 9.6 oz)    SpO2 96%    Breastfeeding No    BMI 34.55 kg/m2       Physical Exam:  Pt is awake and alert . Discussed with pt and her husbabd . Heart reg rate and rhythm   Lungs good breth sounds heard   Abdomen soft obese   extmities  Right hip hematoma is hot to touch and extensive .   neurointact       Lab/Data Reviewed:  CMP:   Lab Results   Component Value Date/Time     01/14/2017 03:58 AM    K 3.2 (L) 01/14/2017 03:58 AM     01/14/2017 03:58 AM    CO2 30 01/14/2017 03:58 AM    AGAP 7 01/14/2017 03:58 AM     (H) 01/14/2017 03:58 AM    BUN 3 (L) 01/14/2017 03:58 AM    CREA 0.38 (L) 01/14/2017 03:58 AM    GFRAA >60 01/14/2017 03:58 AM    GFRNA >60 01/14/2017 03:58 AM    CA 7.7 (L) 01/14/2017 03:58 AM    ALB 2.6 (L) 01/14/2017 03:58 AM    TP 5.6 (L) 01/14/2017 03:58 AM    GLOB 3.0 01/14/2017 03:58 AM    AGRAT 0.9 01/14/2017 03:58 AM    SGOT 52 (H) 01/14/2017 03:58 AM    ALT 21 01/14/2017 03:58 AM     CBC:   Lab Results   Component Value Date/Time    WBC 3.0 (L) 01/14/2017 03:58 AM    HGB 7.5 (L) 01/14/2017 11:00 AM    HCT 23.1 (L) 01/14/2017 11:00 AM     01/14/2017 03:58 AM           Assessment/Plan     Active Problems:    Acute pancreatitis (10/20/2014)      UTI (urinary tract infection) (3/25/2016)      Sepsis ([address] (1/9/2017)      Non-intractable vomiting (1/9/2017)      Thrombocytopenia ([address] (1/12/2017)  Alcohol dependence   Right hip hematoa from a fall .  r/o: bleeding diathesis   bipoplar disorder  Gi bleeding  Plan:as above continue to watch for now .will need to mobilize the pt soon .

## 2017-01-14 NOTE — CONSULTS
Kirk Demond    Name:  Lynnda Holter  MR#:  [id number]  :  1960  Account #:  [de-identified]  Date of Adm:  2017  Date of Consultation:  2017      GASTROENTEROLOGY CONSULTATION    LOCATION: 3008    DATE OF CONSULTATION: 2017    REFERRING PHYSICIAN: Karolyn Merlin. DO KIKE    CONSULTING PHYSICIAN: Caden Fernández MD    REASON FOR CONSULTATION: I was asked to see the patient to  arrange EGD and colonoscopy on Tuesday. It was supposed to be for  an outpatient basis, but performed it while she is in the hospital so as  not to have this postponed. HISTORY OF PRESENT ILLNESS: The patient is a 52-year-old  patient, well known to Dr. Joseph Bean, who just saw her on 2016, for a routine followup visit regarding fatty infiltration of liver in   the  setting of chronic alcoholism, chronic recurrent pancreatitis due to  alcohol, with multiple hospitalizations for this problem. She has a  history of colon polyps herself with family history of colon cancer and  has chronic anemia with a hemoglobin in July of 10.6 and  33 hematocrit, and chronically elevated liver enzymes, again thought  related to alcohol. She drinks beer 6 to 12 at a time, several days a  week. When she saw Dr. Joseph Bean, on December 3, 2016, she was  complaining of recurrent vomiting and nausea, and abdominal pain  which she and Dr. Joseph Bean were attributing to alcohol. She was behind  on followup colonoscopies and Dr. Joseph Bean suggested that she have  her colonoscopy on a routine health maintenance basis and have  upper endoscopy at the same time to rule out significant upper GI  pathology contributing to nausea and vomiting even though it was felt  to be alcohol-related, as she had not had that procedure in quite a few  years. She was not taking NSAIDs on a regular basis. The patient indicates since seeing Dr. Joseph Bean she has continued to  drink with nausea and vomiting.  When we had the snow last weekend  she slipped on the ice, fell on her hip, and had serious pain. She was  brought to the hospital and was found to have a large hematoma of the  hip and has been in the hospital since. She has the EGD and the  colonoscopy scheduled from the office visit December 30, 2016  scheduled for January 17, 2017, with Dr. Barbara Devlin. Apparently because  it is anticipated that she will still be in the hospital at that time, I have  been asked to see the patient to arrange these tests to be performed  on an inpatient basis by Dr. Barbara Devlin on January 17, 2017, as had been  scheduled on an outpatient basis. The patient has had no further  nausea or vomiting since being admitted to the hospital. She does  have mild constipation at times, which is responding to the laxative  ordered by the hospitalist physician. She has had no ongoing  abdominal pain during this admission. She does have significant hip  pain. She has had no melena or hematochezia, although there was  perhaps a streak of blood on a bowel movement yesterday noted by  the nurse, which also prompted us to be called today to see her. PAST MEDICAL HISTORY: Significant for bipolar disorder, chronic  recurrent pancreatitis thought to be alcohol-related, chronic alcoholism,  chronic anemia thought to be secondary to alcoholism, fatty liver, acid  reflux disease, hyperlipidemia, hypertension, hypothyroidism, morbid  obesity, sleep apnea, vitamin B deficiency. PAST SURGICAL HISTORY: She has had back surgery,  hysterectomy, tonsillectomy, carpal tunnel release, cholecystectomy in  2014, gastric bypass surgery, ERCP the excluded stomach 2014. Upper endoscopy in 2014. Last colonoscopy was 2012. Hemorrhoidectomy in 2015. ALLERGIES  1. MORPHINE. 2. TETRACYCLINE. 3. CODEINE. 4. LAMOTRIGINE. 5. DOXYCYCLINE. 6. VANCOMYCIN. 7. PENICILLIN. 8. PERCOCET. 9. LAMICTAL. CURRENT MEDICATIONS: Include:  1. Levothyroxine 50 mcg once a day.   2. Ativan 0.5 mg 4 times a day. 3.  Dicyclomine 20 mg 3 times a day. 4.  Welchol 625 mg twice a day. 5.  Pepcid 40 mg once a day. 6.  Vitamin B12 1000 mcg tablets once a day. 7.  Vitamin C 500 mg once a day. 8.  Biotin 1000 mcg tablets once a day. 9.  Vitamin D 50,000 international units capsules once a week. 10. Calcium carbonate 600 mg tablets 2 tablets twice a day. 11. Gabapentin 300 mg 3 times a day. 12. Lexapro 10 mg once a day. 13. Vitamin B complex once a day. 14. Multivitamin once a day. 15. Tramadol 50 mg every 4 hours. 16. Chromium picolinate 200 mcg tablets once a day. [address] liver oil once a day. 18. CoQ10 once a day. 19. Naltrexone 380 mg powder for injection extended release every 4  weeks. 20. Lorazepam 1 mg 3 times a day. 21. Acamprosate 333 mg delayed release capsule twice a day. SOCIAL HISTORY: The patient is ,  is disabled in a  wheelchair. She is also disabled. Never been a smoker. A 6-pack of  alcohol to 12-pack of alcohol every day or so for many years. Continues to drink regularly. Bridge transfusion in 2015. FAMILY HISTORY: Cancer in the patient's father and sister. Primary  site is not known to the patient. Paternal grandfather also had cancer. She is uncertain of the primary site. ASCVD in the family. Paternal  grandfather had colon cancer in his 76s. Brother had colon polyps. REVIEW OF SYSTEMS: A complete review was taken, positive as per  history of present illness. Negatives will not be listed here. PHYSICAL EXAMINATION  GENERAL: The patient is a morbidly obese female resting comfortably  in bed and in no obvious distress. CHEST: Clear. Symmetric breath sounds. Good air movement. HEART: First and second heart sounds are normal. No murmurs, rubs,  or gallops. ABDOMEN: Soft, nontender. No organomegaly or mass palpable. Bowel sounds are normal. There are no bruits. EXTREMITIES: No pedal edema. Significant hematoma, right hip.   RECTAL: No significant perianal disease. The rectal mucosa was  normal. Small amount of medium to light brown stool in the rectal vault. No red blood on the gloved finger. LABORATORY STUDIES: On January 13, 2017, hemoglobin and  hematocrit 6.8 and 20.7, today is 7.5 and 23.1. BMP today is sodium  141, potassium low at 3.2, chloride 104, bicarbonate of 30, glucose of  103, a BUN of 3, a creatinine of 0.38. Total protein 5.6, albumin 2.6,  ALT of 21, and AST elevated at 52, alkaline phosphatase is 100. Her  ferritin, on the 12th of this month, was 279, which is normal.  Haptoglobin was 170. On November 29, 2016, hemoglobin was 9.2,  with hematocrit of 28, MCV of 92, platelet count of 469. IMPRESSION  1. Trauma to the right hip after a fall resulting in a large hematoma and  significant drop in hemoglobin from a baseline of 9 down to as low as  6.8.  2. Report of a small amount of blood per rectum yesterday. The stool is  medium to light brown on exam today, no evidence of fresh bleeding. The patient does not have significant lower gastrointestinal bleed. 3. Alcoholism, complicated by alcohol abuse, hepatitis, and recurrent  pancreatitis by history. The patient continues to drink alcohol regularly. 4. History of acid reflux disease. 5. Bipolar disorder and depression. 6. Multiple medical problems. PLAN:  1. If the patient is in the hospital tomorrow, I will write orders for  colonoscopy prep for Monday. If the patient is discharged on Monday,  she has instructions already given to her by Dr. Nanette Henriquez which she  understands to follow. She knows she can return on an outpatient on  Tuesday for the planned esophagogastroduodenoscopy and  colonoscopy. 2. No plans for inpatient evaluation of any acute gastrointestinal  problems at this time. 3. This has been explained to the patient in detail today who is  agreeable to proceeding in this fashion.         MD LORE Solo / KARLEY  D:  01/14/2017   15:11  T: 01/14/2017   16:48  Job #:  [id number]    Yanira Ramirez MD

## 2017-01-15 LAB
ANION GAP BLD CALC-SCNC: 6 MMOL/L (ref 3–18)
BACTERIA SPEC CULT: NORMAL
BACTERIA SPEC CULT: NORMAL
BUN SERPL-MCNC: 3 MG/DL (ref 7–18)
BUN/CREAT SERPL: 8 (ref 12–20)
CALCIUM SERPL-MCNC: 7.9 MG/DL (ref 8.5–10.1)
CHLORIDE SERPL-SCNC: 103 MMOL/L (ref 100–108)
CO2 SERPL-SCNC: 30 MMOL/L (ref 21–32)
CREAT SERPL-MCNC: 0.39 MG/DL (ref 0.6–1.3)
ERYTHROCYTE [DISTWIDTH] IN BLOOD BY AUTOMATED COUNT: 15.3 % (ref 11.6–14.5)
FACT VIII ACT/NOR PPP: 258 % (ref 57–163)
GLUCOSE SERPL-MCNC: 116 MG/DL (ref 74–99)
HCT VFR BLD AUTO: 25.7 % (ref 35–45)
HGB BLD-MCNC: 8.2 G/DL (ref 12–16)
INTERPRETATION, 910378, CSIR1: ABNORMAL
MCH RBC QN AUTO: 29.7 PG (ref 24–34)
MCHC RBC AUTO-ENTMCNC: 31.9 G/DL (ref 31–37)
MCV RBC AUTO: 93.1 FL (ref 74–97)
NOTE, CSIR2: ABNORMAL
PLATELET # BLD AUTO: 217 K/UL (ref 135–420)
PMV BLD AUTO: 9.6 FL (ref 9.2–11.8)
POTASSIUM SERPL-SCNC: 3 MMOL/L (ref 3.5–5.5)
RBC # BLD AUTO: 2.76 M/UL (ref 4.2–5.3)
SERVICE CMNT-IMP: NORMAL
SERVICE CMNT-IMP: NORMAL
SODIUM SERPL-SCNC: 139 MMOL/L (ref 136–145)
VWF AG ACT/NOR PPP IA: 297 % (ref 50–200)
VWF:RCO ACT/NOR PPP PL AGG: 280 % (ref 50–200)
WBC # BLD AUTO: 3.5 K/UL (ref 4.6–13.2)

## 2017-01-15 PROCEDURE — 36415 COLL VENOUS BLD VENIPUNCTURE: CPT | Performed by: HOSPITALIST

## 2017-01-15 PROCEDURE — 74011250637 HC RX REV CODE- 250/637: Performed by: HOSPITALIST

## 2017-01-15 PROCEDURE — 80048 BASIC METABOLIC PNL TOTAL CA: CPT | Performed by: HOSPITALIST

## 2017-01-15 PROCEDURE — 65270000029 HC RM PRIVATE

## 2017-01-15 PROCEDURE — 74011000250 HC RX REV CODE- 250: Performed by: HOSPITALIST

## 2017-01-15 PROCEDURE — 85027 COMPLETE CBC AUTOMATED: CPT | Performed by: HOSPITALIST

## 2017-01-15 PROCEDURE — 74011250636 HC RX REV CODE- 250/636: Performed by: HOSPITALIST

## 2017-01-15 RX ORDER — POTASSIUM CHLORIDE 20 MEQ/1
20 TABLET, EXTENDED RELEASE ORAL 2 TIMES DAILY
Status: DISCONTINUED | OUTPATIENT
Start: 2017-01-15 | End: 2017-01-16 | Stop reason: HOSPADM

## 2017-01-15 RX ORDER — LEVOFLOXACIN 5 MG/ML
750 INJECTION, SOLUTION INTRAVENOUS EVERY 24 HOURS
Status: DISCONTINUED | OUTPATIENT
Start: 2017-01-15 | End: 2017-01-16 | Stop reason: HOSPADM

## 2017-01-15 RX ORDER — OLANZAPINE 2.5 MG/1
10 TABLET ORAL EVERY EVENING
Status: DISCONTINUED | OUTPATIENT
Start: 2017-01-15 | End: 2017-01-16 | Stop reason: HOSPADM

## 2017-01-15 RX ADMIN — LORAZEPAM 1 MG: 1 TABLET ORAL at 14:32

## 2017-01-15 RX ADMIN — ACETAMINOPHEN AND CODEINE PHOSPHATE 1 TABLET: 300; 30 TABLET ORAL at 00:08

## 2017-01-15 RX ADMIN — ACETAMINOPHEN AND CODEINE PHOSPHATE 1 TABLET: 300; 30 TABLET ORAL at 14:32

## 2017-01-15 RX ADMIN — MULTIPLE VITAMINS W/ MINERALS TAB 1 TABLET: TAB at 08:53

## 2017-01-15 RX ADMIN — LORAZEPAM 1 MG: 1 TABLET ORAL at 09:05

## 2017-01-15 RX ADMIN — Medication 10 ML: at 06:26

## 2017-01-15 RX ADMIN — DIPHENHYDRAMINE HYDROCHLORIDE 25 MG: 25 CAPSULE ORAL at 02:25

## 2017-01-15 RX ADMIN — FOLIC ACID 1 MG: 1 TABLET ORAL at 08:53

## 2017-01-15 RX ADMIN — Medication 10 ML: at 14:36

## 2017-01-15 RX ADMIN — ACETAMINOPHEN AND CODEINE PHOSPHATE 1 TABLET: 300; 30 TABLET ORAL at 09:05

## 2017-01-15 RX ADMIN — THIAMINE HCL TAB 100 MG 100 MG: 100 TAB at 08:53

## 2017-01-15 RX ADMIN — LEVOFLOXACIN 750 MG: 5 INJECTION, SOLUTION INTRAVENOUS at 14:36

## 2017-01-15 RX ADMIN — LEVOTHYROXINE SODIUM ANHYDROUS 12.5 MCG: 100 INJECTION, POWDER, LYOPHILIZED, FOR SOLUTION INTRAVENOUS at 12:37

## 2017-01-15 RX ADMIN — OLANZAPINE 10 MG: 2.5 TABLET, FILM COATED ORAL at 20:24

## 2017-01-15 RX ADMIN — LORAZEPAM 1 MG: 1 TABLET ORAL at 20:24

## 2017-01-15 RX ADMIN — ACETAMINOPHEN AND CODEINE PHOSPHATE 1 TABLET: 300; 30 TABLET ORAL at 20:24

## 2017-01-15 RX ADMIN — POTASSIUM CHLORIDE 20 MEQ: 20 TABLET, EXTENDED RELEASE ORAL at 17:51

## 2017-01-15 NOTE — ROUTINE PROCESS
@ 0767 Patient complained of pain to her right hip - pain med given. Rest of meds given. @ 1000 Patient watching TV. Denies any pain. PIV inserted to Right arm. IVF continued. @ 1300 Family at bedside. PIV infiltrated and removed. @ 1700 New PIV inserted to right arm near [facility] using 24 G needle. IVF continued. @ 1318 Patient starting to get agitated, CIWA done - ativan 1 mg given. Also complaining of pain - pain med given. @ 1915 Bedside and Verbal shift change report given to Manan RN (oncoming nurse) by Bhumika Hayden RN (offgoing nurse). Report included the following information SBAR, Kardex, Intake/Output, MAR, Recent Results and Cardiac Rhythm NSR.

## 2017-01-15 NOTE — PROGRESS NOTES
Problem: Mobility Impaired (Adult and Pediatric)  Goal: *Acute Goals and Plan of Care (Insert Text)  Physical Therapy Goals  Initiated 1/14/2017 and to be accomplished within 7 day(s)  1. Patient will move from supine to sit and sit to supine , scoot up and down and roll side to side in bed with independence. 2. Patient will transfer from bed to chair and chair to bed with independence using the least restrictive device. 3. Patient will perform sit to stand with independence. 4. Patient will ambulate with independence for >/= 150 feet with the least restrictive device. PHYSICAL THERAPY EVALUATION     Patient: Hilda Mondragon (38 y.o. female)  Date: 1/14/2017  Primary Diagnosis: Acute pancreatitis  UTI (urinary tract infection)  Sepsis (HCC)  Non-intractable vomiting        Precautions: Fall         ASSESSMENT :  Based on the objective data described below, the patient presents with c/o pain, decreased balance, decreased transfer independence, decreased gait independence compared with baseline. Patient will benefit from skilled intervention to address the above impairments. Patients rehabilitation potential is considered to be Good  Factors which may influence rehabilitation potential include:   [ ]         None noted  [ ]         Mental ability/status  [X]         Medical condition  [ ]         Home/family situation and support systems  [ ]         Safety awareness  [ ]         Pain tolerance/management  [ ]         Other:      Recommendations for nursing:  Written on communication board: OOB for meals 3x/day with Assist of 1. May ambulate in hallway with Assist of 1 and rolling walker.   Verbally communicated to: nurse Rowan       PLAN :  Recommendations and Planned Interventions:  [X]           Bed Mobility Training             [ ]    Neuromuscular Re-Education  [X]           Transfer Training                   [ ]    Orthotic/Prosthetic Training  [X]           Gait Training [ ]    Modalities  [ ]           Therapeutic Exercises          [ ]    Edema Management/Control  [ ]           Therapeutic Activities            [X]    Patient and Family      Training/Education  [ ]           Other (comment): Role of PT in acute care setting. Plan of care. Reminded not to get up without assistance for safety. Frequency/Duration: Patient will be followed by physical therapy 3-5x/week to address goals. Discharge Recommendations: ? Home Health  Further Equipment Recommendations for Discharge: Rolling Walker       SUBJECTIVE:   Patient stated 7/10 pain right hip area with weight bearing without walker and 4/10 with walker.       OBJECTIVE DATA SUMMARY:       Past Medical History   Diagnosis Date    Alcohol abuse      Alcoholic hepatitis      Alcoholic pancreatitis      Anemia      Bipolar disorder (HCC)         Dr. Cheyenne Rivas [facility])    Chronic abdominal pain      Compression fracture of lumbar vertebra (HCC)         L4, L5     Depression         Dr. Quinn [facility])    Elevated LFTs      ETOH abuse      Fatty liver      GERD (gastroesophageal reflux disease)      Hyperlipidemia      Hypertension      Hypothyroidism      Lower GI bleeding      Morbid obesity ([address]      Obstructive sleep apnea      Substance induced mood disorder ([facility]      Vitamin D deficiency       Past Surgical History   Procedure Laterality Date    Diskectomy, anterior, with d   8/1995, 11/1997    Repair nonunion scaphoid carpal bone Right 2010       Wrist    Biopsy liver   08/15/2012       Lap Left hepatic liver wedge by Dr. Morgan Andrea; BX Revealed: Hepatic Steatosis, AVM w/ associated Subcapsular Fibrosis.  Hx hysterectomy   2006    Hx tonsillectomy   1992    Hx abdominal laparoscopy   12/02/2014       Laparoscopic Gastrostomy w/ Partial Gastrectomy for assistance in placement of Endoscopic Retrograde Cholangiopancreatography & Diagnostic Lap.  Hx carpal tunnel release        Hx cholecystectomy   09/16/2014       Dr. Prashant Gudino Hx colonoscopy   05/12/2012       Colonoscopy by Dr. Ofelia Leyva. Siena Gonzalez: Bx Revealed Colon Polyps (Tubular Adenoma), Hemorrhoids.  Hx hemorrhoidectomy   04/30/2015       Dr. Yifan Hinds. Shasta Hartmann Hx gastric bypass   08/15/2012       Lap Christian-en-y     Barriers to Learning/Limitations: None  Compensate with: visual, verbal, tactile, kinesthetic cues/model     G CODE:Mobility  Current  CJ= 20-39%   Goal  CH= 0%. The severity rating is based on the Other [address] Sitting Balance Scale  0: Pt performs 25% or less of sitting activity (Max assist) CN, 100% impaired. 1: Pt supports self with upper extremities but requires therapist assistance. Pt performs 25-50% of effort (Mod assist) CM, 80% to <100% impaired. 1+: Pt supports self with upper extremities but requires therapist assistance. Pt performs >50% effort. (Min assist). CL, 60% to <80% impaired. 2: Pt supports self independently with both upper extremities. CL, 60% to <80% impaired. 2+: Pt support self independently with 1 upper extremity. CK, 40% to <60% impaired. 3: Pt sits without upper extremity support for up to 30 seconds. CK, 40% to <60% impaired. 3+: Pt sits without upper extremity support for 30 seconds or greater. CJ, 20% to <40% impaired. 4: Pt moves and returns trunkal midpoint 1-2 inches in one plane. CJ, 20% to <40% impaired. 4+: Pt moves and returns trunkal midpoint 1-2 inches in multiple planes. CI, 1% to <20% impaired. 5: Pt moves and returns trunkal midpoint in all planes greater than 2 inches. CH, 0% impaired. [address] Standing Balance Scale  0: Pt performs 25% or less of standing activity (Max assist) CN, 100% impaired. 1: Pt supports self with upper extremities but requires therapist assistance.  Pt performs 25-50% of effort (Mod assist) CM, 80% to <100% impaired. 1+: Pt supports self with upper extremities but requires therapist assistance. Pt performs >50% effort. (Min assist). CL, 60% to <80% impaired. 2: Pt supports self independently with both upper extremities (walker, crutches, parallel bars). CL, 60% to <80% impaired. 2+: Pt support self independently with 1 upper extremity (cane, crutch, 1 parallel bar). CK, 40% to <60% impaired. 3: Pt stands without upper extremity support for up to 30 seconds. CK, 40% to <60% impaired. 3+: Pt stands without upper extremity support for 30 seconds or greater. CJ, 20% to <40% impaired. 4: Pt independently moves and returns center of gravity 1-2 inches in one plane. CJ, 20% to <40% impaired. 4+: Pt independently moves and returns center of gravity 1-2 inches in multiple planes. CI, 1% to <20% impaired. 5: Pt independently moves and returns center of gravity in all planes greater than 2 inches. CH, 0% impaired.      Eval Complexity: History: HIGH Complexity :3+ comorbidities / personal factors will impact the outcome/ POC Exam:HIGH Complexity : 4+ Standardized tests and measures addressing body structure, function, activity limitation and / or participation in recreation  Presentation: LOW Complexity : Stable, uncomplicated  Clinical Decision Making:Low Complexity [facility] Sitting Balance Scale 4+/5 and [employer] Balance Scale 3/5 Overall Complexity:LOW      Prior Level of Function/Home Situation: Independent Ambulation without any assistive device  Home Situation  Home Environment: Private residence  # Steps to Enter: 0  One/Two Story Residence: One story  Living Alone: No  Support Systems: Family member(s)  Patient Expects to be Discharged to[de-identified] Private residence  Current DME Used/Available at Home: None     Strength:    Strength: Generally decreased, functional      Tone & Sensation:   Tone: Normal       Range Of Motion:  AROM: Generally decreased, functional      Functional Mobility:  Bed Mobility:  Rolling: Modified independent  Supine to Sit: Modified independent  Sit to Supine: Modified independent  Transfers:  Sit to Stand: Stand-by asssistance  Stand to Sit: Stand-by asssistance  Balance:   Sitting - Static: Good (unsupported)  Sitting - Dynamic: Good (unsupported)  Standing: With support  Standing - Static: Good  Standing - Dynamic : Fair  Ambulation/Gait Training:  Distance (ft): 100 Feet (ft)  Assistive Device: Walker, rolling  Ambulation - Level of Assistance: Stand-by asssistance  Gait Abnormalities: Decreased step clearance  Base of Support: Center of gravity altered  Speed/Cara: Pace decreased (<100 feet/min)  Step Length: Left shortened;Right shortened     Pain:  Pre treatment pain level:  7  Post treatment pain level: 4  Pain Scale 1: Numeric (0 - 10)  Pain Intensity 1: 8  Pain Location 1: Leg;Hip  Pain Orientation 1: Right  Pain Description 1: Aching  Pain Intervention(s) 1: Medication (see MAR)      Activity Tolerance:   Fair     Please refer to the flowsheet for vital signs taken during this treatment. After treatment:   [ ]         Patient left in no apparent distress sitting up in chair  [X]         Patient left in no apparent distress in bed  [X]         Call bell left within reach  [X]         Nursing notified  [ ]         Caregiver present  [ ]         Bed alarm activated      COMMUNICATION/EDUCATION:   [X]         Fall prevention education was provided and the patient/caregiver indicated understanding. [ ]         Patient/family have participated as able in goal setting and plan of care. [X]         Patient/family agree to work toward stated goals and plan of care. [ ]         Patient understands intent and goals of therapy, but is neutral about his/her participation. [ ]         Patient is unable to participate in goal setting and plan of care.      Thank you for this referral.  Sergo Hogan, PT   Time Calculation: 23 mins

## 2017-01-15 NOTE — PROGRESS NOTES
Progress Note      Patient: Arminda Marie               Sex: female          DOA: 1/9/2017       YOB: 1960      Age:  55 y.o.        LOS:  LOS: 6 days               Subjective:   Pt's h/h remains fairly stable . It is 8.2/25.7. The pt is afebrile and her wbc is   3.5 . The potassium is low at 3.0 and will be supplemented . Workup for the etiology of her bleeding diathesis is still underway . Results are pending . will discuss with hem onc . the pt is having more pain from her right hip as the blood in the hip congeals theright hip is now hot to the touch and is firmer . Her pulses in the right foot are present. Pt is asking for more pain meds . Lora Peterson Objective:      Visit Vitals    /87    Pulse 86    Temp 98.1 °F (36.7 °C)    Resp 18    Ht 5' 7\" (1.702 m)    Wt 100.1 kg (220 lb 9.6 oz)    SpO2 96%    Breastfeeding No    BMI 34.55 kg/m2       Physical Exam:  Pt is awake and is c/opain in her right hip   hert reg rate and rhythm   Lungs good breath sounds heard   Abdomen soft and obese   Extremities right hip is hot to the touch and is much firmer than before .  pulses are present however   Neuro intact       Lab/Data Reviewed:  CMP:   Lab Results   Component Value Date/Time     01/15/2017 04:02 AM    K 3.0 (L) 01/15/2017 04:02 AM     01/15/2017 04:02 AM    CO2 30 01/15/2017 04:02 AM    AGAP 6 01/15/2017 04:02 AM     (H) 01/15/2017 04:02 AM    BUN 3 (L) 01/15/2017 04:02 AM    CREA 0.39 (L) 01/15/2017 04:02 AM    GFRAA >60 01/15/2017 04:02 AM    GFRNA >60 01/15/2017 04:02 AM    CA 7.9 (L) 01/15/2017 04:02 AM     CBC:   Lab Results   Component Value Date/Time    WBC 3.5 (L) 01/15/2017 04:02 AM    HGB 8.2 (L) 01/15/2017 04:02 AM    HCT 25.7 (L) 01/15/2017 04:02 AM     01/15/2017 04:02 AM           Assessment/Plan     Active Problems:    Acute pancreatitis (10/20/2014)now resolved       UTI (urinary tract infection) (3/25/2016)on levaquin Non-intractable vomiting (1/9/2017)      Thrombocytopenia ([facility] [address] [address] (1/12/2017) resolved   Hemorrhage into the right hip  From the fall . Alcohol dependence   Bipolar disorder    Hemorrhage into the right  hip     Plan: pt will be given iv analgesics as the pain is expected to become more severe.

## 2017-01-15 NOTE — PROGRESS NOTES
Progress Note    Assessment:    1. Right thigh hematoma    PLAN:    1. Her H/H has remained stable. Ready for d/c from ortho standpoint possibly tomorrow if mobilization has improved with PT            HPI: Elaine Shea is a 55 y.o. female patient without new complaints for Acute pancreatitis  UTI (urinary tract infection)  Sepsis ([facility]  Non-intractable vomiting 1/9/2017. No new orthopaedic changes. Blood pressure 135/76, pulse 90, temperature 98.7 °F (37.1 °C), resp. rate 18, height 5' 7\" (1.702 m), weight 100.1 kg (220 lb 9.6 oz), SpO2 93 %, not currently breastfeeding. CBC w/Diff   Lab Results   Component Value Date/Time    WBC 3.5 (L) 01/15/2017 04:02 AM    RBC 2.76 (L) 01/15/2017 04:02 AM    HCT 25.7 (L) 01/15/2017 04:02 AM          Physical Assessment:  General: in no apparent distress   Wound: none   Extremities:  Neurovascular intact. Hematoma proximal and lateral thigh with ecchymosis to the posterior aspect of the thigh. DVT Exam:   No exam evidence to suggest DVT. Compartments soft and NT.           Radiology: none ortho         Clare Delgadillo PA-C  1/15/2017  Pager [phone]  Office [phone]  Cell [phone]

## 2017-01-15 NOTE — ROUTINE PROCESS
@ 0900 Med given. Patient complaining of pain to her right hip - pain med given. @ 1000 Spoke with Dr Anthony Monte regarding IV pain medicine, home medications and potassium of 3.0.   @ 1200 Patient resting comfortably in the bed. @ 1330 Patient sitting in the sitting area, watching TV with . @ 1430 Patient back in her room. Pain med given. @ [phone] Patient resting comfortably in the bed. @ 1930 Bedside and Verbal shift change report given to THERON Castle RN (oncoming nurse) by Estefani Sinha RN (offgoing nurse). Report included the following information SBAR, Kardex, Intake/Output, MAR, Recent Results and Cardiac Rhythm NSR.

## 2017-01-15 NOTE — PROGRESS NOTES
Bedside and Verbal shift change report given to Kimberly Lei RN (oncoming nurse) Report included the following information SBAR, Kardex, Intake/Output and MAR

## 2017-01-15 NOTE — PROGRESS NOTES
Gastrointestinal Progress Note    Patient Name: Coleen Sarkar    RCIWX'F Date: 1/15/2017    Admit Date: 1/9/2017    Subjective: Coleen Sarkar is a 55 y.o. Female who we are seeing b/o profound anemia, N/V thought to be related to alcoholism, and personal history of colonic adenomas. She reports significant improvement in her right hip pain since yesterday. She is ambulating more easily. She has had no N/V in the hospital and reports no melena or hematochezia. She is hungry.     Current Facility-Administered Medications   Medication Dose Route Frequency    OLANZapine (ZyPREXA) tablet 10 mg  10 mg Oral QPM    levoFLOXacin (LEVAQUIN) 750 mg in D5W IVPB  750 mg IntraVENous Q24H    diphenhydrAMINE (BENADRYL) capsule 25 mg  25 mg Oral Q6H PRN    0.9% sodium chloride infusion 250 mL  250 mL IntraVENous PRN    0.9% sodium chloride infusion 250 mL  250 mL IntraVENous PRN    0.9% sodium chloride infusion 250 mL  250 mL IntraVENous PRN    sodium chloride (NS) flush 5-10 mL  5-10 mL IntraVENous Q8H    sodium chloride (NS) flush 5-10 mL  5-10 mL IntraVENous PRN    LORazepam (ATIVAN) tablet 1 mg  1 mg Oral Q1H PRN    Or    LORazepam (ATIVAN) injection 1 mg  1 mg IntraVENous Q1H PRN    LORazepam (ATIVAN) tablet 2 mg  2 mg Oral Q1H PRN    Or    LORazepam (ATIVAN) injection 2 mg  2 mg IntraVENous Q1H PRN    LORazepam (ATIVAN) injection 3 mg  3 mg IntraVENous Q15MIN PRN    levothyroxine (SYNTHROID) injection 12.5 mcg  12.5 mcg IntraVENous DAILY    multivitamin, tx-iron-ca-min (THERA-M w/ IRON) tablet 1 Tab  1 Tab Oral DAILY    Thiamine Mononitrate (B-1) tablet 100 mg  100 mg Oral DAILY    folic acid (FOLVITE) tablet 1 mg  1 mg Oral DAILY    acetaminophen-codeine (TYLENOL #3) per tablet 1 Tab  1 Tab Oral Q6H PRN    sodium chloride (NS) flush 5-10 mL  5-10 mL IntraVENous PRN    sodium chloride (NS) flush 5-10 mL  5-10 mL IntraVENous Q8H    sodium chloride (NS) flush 5-10 mL  5-10 mL IntraVENous PRN          Objective:     Visit Vitals    /87    Pulse 86    Temp 98.1 °F (36.7 °C)    Resp 18    Ht 5' 7\" (1.702 m)    Wt 100.1 kg (220 lb 9.6 oz)    SpO2 96%    Breastfeeding No    BMI 34.55 kg/m2       General appearance: alert, cooperative, no distress, appears stated age    Data Review:      Labs: Results:       Chemistry Recent Labs      01/15/17   0402  01/14/17 0358 01/13/17 0425   GLU  116*  103*  122*   NA  139  141  138   K  3.0*  3.2*  3.2*   CL  103  104  103   CO2  30  30  28   BUN  3*  3*  2*   CREA  0.39*  0.38*  0.48*   CA  7.9*  7.7*  7.1*   AGAP  6  7  7   BUCR  8*  8*  4*      CBC w/Diff Recent Labs      01/15/17   0402  01/14/17   1100  01/14/17 0358 01/13/17 0425   WBC  3.5*   --   3.0*   --   4.5*   RBC  2.76*   --   2.61*   --   2.22*   HGB  8.2*  7.5*  7.9*   < >  6.8*   HCT  25.7*  23.1*  23.9*   < >  20.7*   PLT  217   --   162   --   134*   GRANS   --    --    --    --   64   LYMPH   --    --    --    --   23   EOS   --    --    --    --   1    < > = values in this interval not displayed. Coagulation Recent Labs      01/13/17   1315  01/12/17   1424   PTP  14.7*  15.5*   INR  1.2*  1.3*   APTT  36.0*  39.8*       Liver Enzymes Recent Labs      01/14/17 0358 01/13/17   0425   TP  5.6*  5.4*   ALB  2.6*  2.5*   TBILI  0.8  0.8   AP  100  99   SGOT  52*  59*   ALT  21  22      Lipase No results for input(s): LPSE in the last 72 hours. IMPRESSION  1. Trauma to the right hip after a fall resulting in a large hematoma and  significant drop in hemoglobin from a baseline of 9 down to as low as  6.8.  2. Report of a small amount of blood per rectum yesterday. The stool was  medium to light brown on exam yesterday, no evidence of fresh bleeding. The patient does not have significant lower gastrointestinal bleed. 3. Alcoholism, complicated by alcohol abuse, hepatitis, and recurrent  pancreatitis by history.  The patient continues to drink alcohol regularly. 4. History of acid reflux disease. 5. Bipolar disorder and depression. 6. Multiple medical problems.     PLAN:  1. I will write orders for colonoscopy prep for tomorrow/Tues AM. If the patient is discharged on tomorrow, then  she has instructions already given to her by Dr. Susanne Luis which she  understands to follow. She knows she can return on an outpatient on  Tuesday for the planned esophagogastroduodenoscopy and  colonoscopy. 2. No plans for inpatient evaluation of any acute gastrointestinal  problems at this time----unless still inpatient on Tuesday Luis Alberto Bellamy MD, [place] Ulloa, [facility]  January 15, 2017  Meghann Atkins

## 2017-01-15 NOTE — PROGRESS NOTES
Bedside and Verbal shift change report received from [address] [place] [address] (offgoing nurse). Report included the following information SBAR, Kardex, Intake/Output, MAR and Recent Results. 1945-Shift assessment completed,bed locked in low position, call bell within reach, no distress noted, pt on ciwa protocol. 2345-Reassessment completed, no changes from previous, pt currently sleeping. 0010-Pt complains of pain on right hip, tylenol #3 administered. 0225-Benadryl administered, pt c/o itching. 0345-Reassessment completed, no changes from previous. Pt currently sleeping. Bedside and Verbal shift change report given to Isadora Cavanaugh RN (oncoming nurse) by Jossue Castle RN's (offgoing nurse). Report included the following information SBAR, Kardex, Intake/Output, MAR and Recent Results.

## 2017-01-16 VITALS
OXYGEN SATURATION: 96 % | HEIGHT: 67 IN | BODY MASS INDEX: 34.28 KG/M2 | SYSTOLIC BLOOD PRESSURE: 116 MMHG | HEART RATE: 87 BPM | DIASTOLIC BLOOD PRESSURE: 80 MMHG | TEMPERATURE: 98.2 F | WEIGHT: 218.4 LBS | RESPIRATION RATE: 20 BRPM

## 2017-01-16 LAB
ALBUMIN SERPL BCP-MCNC: 2.7 G/DL (ref 3.4–5)
ALBUMIN/GLOB SERPL: 0.9 {RATIO} (ref 0.8–1.7)
ALP SERPL-CCNC: 113 U/L (ref 45–117)
ALT SERPL-CCNC: 27 U/L (ref 13–56)
ANION GAP BLD CALC-SCNC: 6 MMOL/L (ref 3–18)
AST SERPL W P-5'-P-CCNC: 55 U/L (ref 15–37)
BILIRUB SERPL-MCNC: 0.8 MG/DL (ref 0.2–1)
BUN SERPL-MCNC: 3 MG/DL (ref 7–18)
BUN/CREAT SERPL: 7 (ref 12–20)
CALCIUM SERPL-MCNC: 8.5 MG/DL (ref 8.5–10.1)
CHLORIDE SERPL-SCNC: 102 MMOL/L (ref 100–108)
CO2 SERPL-SCNC: 34 MMOL/L (ref 21–32)
CREAT SERPL-MCNC: 0.42 MG/DL (ref 0.6–1.3)
ERYTHROCYTE [DISTWIDTH] IN BLOOD BY AUTOMATED COUNT: 15.1 % (ref 11.6–14.5)
GLOBULIN SER CALC-MCNC: 3 G/DL (ref 2–4)
GLUCOSE SERPL-MCNC: 120 MG/DL (ref 74–99)
HCT VFR BLD AUTO: 25.2 % (ref 35–45)
HGB BLD-MCNC: 8 G/DL (ref 12–16)
MAGNESIUM SERPL-MCNC: 1.8 MG/DL (ref 1.8–2.4)
MCH RBC QN AUTO: 30 PG (ref 24–34)
MCHC RBC AUTO-ENTMCNC: 31.7 G/DL (ref 31–37)
MCV RBC AUTO: 94.4 FL (ref 74–97)
PLATELET # BLD AUTO: 281 K/UL (ref 135–420)
PMV BLD AUTO: 9.5 FL (ref 9.2–11.8)
POTASSIUM SERPL-SCNC: 3.7 MMOL/L (ref 3.5–5.5)
PROT SERPL-MCNC: 5.7 G/DL (ref 6.4–8.2)
RBC # BLD AUTO: 2.67 M/UL (ref 4.2–5.3)
SODIUM SERPL-SCNC: 142 MMOL/L (ref 136–145)
WBC # BLD AUTO: 3.3 K/UL (ref 4.6–13.2)

## 2017-01-16 PROCEDURE — 74011250637 HC RX REV CODE- 250/637: Performed by: HOSPITALIST

## 2017-01-16 PROCEDURE — 85613 RUSSELL VIPER VENOM DILUTED: CPT | Performed by: INTERNAL MEDICINE

## 2017-01-16 PROCEDURE — 85027 COMPLETE CBC AUTOMATED: CPT | Performed by: HOSPITALIST

## 2017-01-16 PROCEDURE — 83735 ASSAY OF MAGNESIUM: CPT | Performed by: HOSPITALIST

## 2017-01-16 PROCEDURE — 36415 COLL VENOUS BLD VENIPUNCTURE: CPT | Performed by: HOSPITALIST

## 2017-01-16 PROCEDURE — 80053 COMPREHEN METABOLIC PANEL: CPT | Performed by: HOSPITALIST

## 2017-01-16 RX ORDER — POTASSIUM CHLORIDE 20 MEQ/1
20 TABLET, EXTENDED RELEASE ORAL 2 TIMES DAILY
Qty: 2 TAB | Refills: 0 | Status: SHIPPED | OUTPATIENT
Start: 2017-01-16 | End: 2017-01-17

## 2017-01-16 RX ORDER — POLYETHYLENE GLYCOL 3350, SODIUM SULFATE, SODIUM CHLORIDE, POTASSIUM CHLORIDE, SODIUM ASCORBATE, AND ASCORBIC ACID 7.5-2.691G
1 KIT ORAL EVERY 12 HOURS
Status: CANCELLED | OUTPATIENT
Start: 2017-01-16 | End: 2017-01-17

## 2017-01-16 RX ORDER — MAGNESIUM CITRATE
296 SOLUTION, ORAL ORAL
Status: CANCELLED | OUTPATIENT
Start: 2017-01-17 | End: 2017-01-17

## 2017-01-16 RX ORDER — LEVOFLOXACIN 750 MG/1
750 TABLET ORAL DAILY
Qty: 6 TAB | Refills: 0 | Status: SHIPPED | OUTPATIENT
Start: 2017-01-16 | End: 2017-02-10 | Stop reason: ALTCHOICE

## 2017-01-16 RX ADMIN — FOLIC ACID 1 MG: 1 TABLET ORAL at 09:46

## 2017-01-16 RX ADMIN — MULTIPLE VITAMINS W/ MINERALS TAB 1 TABLET: TAB at 09:46

## 2017-01-16 RX ADMIN — POTASSIUM CHLORIDE 20 MEQ: 20 TABLET, EXTENDED RELEASE ORAL at 09:46

## 2017-01-16 RX ADMIN — Medication 10 ML: at 06:00

## 2017-01-16 RX ADMIN — THIAMINE HCL TAB 100 MG 100 MG: 100 TAB at 09:46

## 2017-01-16 RX ADMIN — ACETAMINOPHEN AND CODEINE PHOSPHATE 1 TABLET: 300; 30 TABLET ORAL at 09:46

## 2017-01-16 NOTE — PROGRESS NOTES
Ordered the pt's rolling walker from [facility] # [phone]. Sent the referral to them via [address]. Pt verfiid the contact information on the face sheet is correct for delivery. Informed the pt delivery may be made in 1-2 if she is willing to provide [employer] with a credit card to keep on record if there will be a copay. Pt verbalized understanding.

## 2017-01-16 NOTE — PROGRESS NOTES
conducted a Follow up consultation and Spiritual Assessment for Dmitriy Jones, who is a 55 y.o.,female. The  provided the following Interventions:  Continued the relationship of care and support. Listened empathically. Offered prayer and assurance of continued prayer on patients behalf. Chart reviewed. The following outcomes were achieved:  Patient expressed gratitude for pastoral care visit. Assessment:  There are no further spiritual or Spiritism issues which require Spiritual Care Services interventions at this time. Plan:  Chaplains will continue to follow and will provide pastoral care on an as needed/requested basis.  recommends bedside caregivers page  on duty if patient shows signs of acute spiritual or emotional distress.      [address] [facility]   Staff [address] [facility]   [phone]

## 2017-01-16 NOTE — PROGRESS NOTES
conducted a Follow up consultation and Spiritual Assessment for Mandy Ferguson, who is a 55 y.o.,female. The  provided the following Interventions:  Continued the relationship of care and support. Listened empathically. Patient expressed niki that she may get discharged today, even if she may need to return tomorrow for tests, or just stay until after tomorrow's tests. Offered assurance of prayer on patient's behalf.  reiterated how [address] [place] [address] may be supportive of patient. Chart reviewed. The following outcomes were achieved:  Patient expressed gratitude for pastoral care visit. Assessment:  There are no further spiritual or Nondenominational issues which require Spiritual Care Services interventions at this time. Plan:  Chaplains will continue to follow and will provide pastoral care as needed or requested.  recommends bedside caregivers page  on duty if patient shows signs of acute spiritual or emotional distress. The .  [facility], [address] [place] [address],[address]  [facility] [phone] / [facility] [phone]

## 2017-01-16 NOTE — DISCHARGE INSTRUCTIONS
Patient armband removed and shredded  MyChart Activation    Thank you for requesting access to [facility]. Please follow the instructions below to securely access and download your online medical record. AppFirst allows you to send messages to your doctor, view your test results, renew your prescriptions, schedule appointments, and more. How Do I Sign Up? 1. In your internet browser, go to [email or web address]  2. Click on the First Time User? Click Here link in the Sign In box. You will be redirect to the New Member Sign Up page. 3. Enter your AppFirst Access Code exactly as it appears below. You will not need to use this code after youve completed the sign-up process. If you do not sign up before the expiration date, you must request a new code. AppFirst Access Code: [email or web address]  Expires: 2017  8:31 PM (This is the date your AppFirst access code will )    4. Enter the last four digits of your Social Security Number (xxxx) and Date of Birth (mm/dd/yyyy) as indicated and click Submit. You will be taken to the next sign-up page. 5. Create a AppFirst ID. This will be your AppFirst login ID and cannot be changed, so think of one that is secure and easy to remember. 6. Create a AppFirst password. You can change your password at any time. 7. Enter your Password Reset Question and Answer. This can be used at a later time if you forget your password. 8. Enter your e-mail address. You will receive e-mail notification when new information is available in [address]. 9. Click Sign Up. You can now view and download portions of your medical record. 10. Click the Download Summary menu link to download a portable copy of your medical information. Additional Information    If you have questions, please visit the Frequently Asked Questions section of the AppFirst website at [email or web address] Remember, AppFirst is NOT to be used for urgent needs.  For medical emergencies, dial 911.        DISCHARGE SUMMARY from Nurse    The following personal items are in your possession at time of discharge:    Dental Appliances: None  Visual Aid: Glasses     Home Medications: None  Jewelry: None  Clothing: Jacket/Coat, Socks, Footwear, Undergarments  Other Valuables: Cell Phone, Wallet, Money (comment) ($27)             PATIENT INSTRUCTIONS:    After general anesthesia or intravenous sedation, for 24 hours or while taking prescription Narcotics:  · Limit your activities  · Do not drive and operate hazardous machinery  · Do not make important personal or business decisions  · Do  not drink alcoholic beverages  · If you have not urinated within 8 hours after discharge, please contact your surgeon on call. Report the following to your surgeon:  · Excessive pain, swelling, redness or odor of or around the surgical area  · Temperature over 100.5  · Nausea and vomiting lasting longer than 4 hours or if unable to take medications  · Any signs of decreased circulation or nerve impairment to extremity: change in color, persistent  numbness, tingling, coldness or increase pain  · Any questions        What to do at Home:  Recommended activity: Activity as tolerated, no alcohol    If you experience any of the following symptoms bleeding, please follow up with MD or ER. *  Please give a list of your current medications to your Primary Care Provider. *  Please update this list whenever your medications are discontinued, doses are      changed, or new medications (including over-the-counter products) are added. *  Please carry medication information at all times in case of emergency situations. These are general instructions for a healthy lifestyle:    No smoking/ No tobacco products/ Avoid exposure to second hand smoke    Surgeon General's Warning:  Quitting smoking now greatly reduces serious risk to your health.     Obesity, smoking, and sedentary lifestyle greatly increases your risk for illness    A healthy diet, regular physical exercise & weight monitoring are important for maintaining a healthy lifestyle    You may be retaining fluid if you have a history of heart failure or if you experience any of the following symptoms:  Weight gain of 3 pounds or more overnight or 5 pounds in a week, increased swelling in our hands or feet or shortness of breath while lying flat in bed. Please call your doctor as soon as you notice any of these symptoms; do not wait until your next office visit. Recognize signs and symptoms of STROKE:    F-face looks uneven    A-arms unable to move or move unevenly    S-speech slurred or non-existent    T-time-call 911 as soon as signs and symptoms begin-DO NOT go       Back to bed or wait to see if you get better-TIME IS BRAIN. Warning Signs of HEART ATTACK     Call 911 if you have these symptoms:   Chest discomfort. Most heart attacks involve discomfort in the center of the chest that lasts more than a few minutes, or that goes away and comes back. It can feel like uncomfortable pressure, squeezing, fullness, or pain.  Discomfort in other areas of the upper body. Symptoms can include pain or discomfort in one or both arms, the back, neck, jaw, or stomach.  Shortness of breath with or without chest discomfort.  Other signs may include breaking out in a cold sweat, nausea, or lightheadedness. Don't wait more than five minutes to call 911 - MINUTES MATTER! Fast action can save your life. Calling 911 is almost always the fastest way to get lifesaving treatment. Emergency Medical Services staff can begin treatment when they arrive -- up to an hour sooner than if someone gets to the hospital by car. The discharge information has been reviewed with the patient. The patient verbalized understanding. Discharge medications reviewed with the patient and appropriate educational materials and side effects teaching were provided.                Acute Alcohol Intoxication: Care Instructions  Your Care Instructions  You have had treatment to help your body rid itself of alcohol. Too much alcohol upsets the body's fluid balance. Your doctor may have given you fluids and vitamins. For some people, drinking too much alcohol is a one-time event. For others, it is an ongoing problem. In either case, it is serious. It can be life-threatening. Follow-up care is a key part of your treatment and safety. Be sure to make and go to all appointments, and call your doctor if you are having problems. It's also a good idea to know your test results and keep a list of the medicines you take. How can you care for yourself at home? · Be safe with medicines. Take your medicines exactly as prescribed. Call your doctor if you think you are having a problem with your medicine. · Your doctor may have prescribed disulfiram (Antabuse). Do not drink any alcohol while you are taking this medicine. You may have severe or even life-threatening side effects from even small amounts of alcohol. · If you were given medicine to prevent nausea, be sure to take it exactly as prescribed. · Before you take any medicine, tell your doctor if:  ¨ You have had a bad reaction to any medicines in the past.  ¨ You are taking other medicines, including over-the-counter ones, or have other health problems. ¨ You are or could be pregnant. · Be prepared to have some symptoms of withdrawal in the next few days. · Drink plenty of liquids in the next few days. · Seek help if you need it to stop drinking. Getting counseling and joining a support group can help you stay sober. Try a support group such as Alcoholics Anonymous. · Avoid alcohol when you take medicines. It can react with many medicines and cause serious problems. When should you call for help? Call 911 anytime you think you may need emergency care. For example, call if:  · You feel confused and are seeing things that are not there.   · You are thinking about killing yourself or hurting others. · You have a seizure. · You vomit blood or what looks like coffee grounds. Call your doctor now or seek immediate medical care if:  · You have trembling, restlessness, sweating, and other withdrawal symptoms that are new or that get worse. · Your withdrawal symptoms come back after not bothering you for days or weeks. · You can't stop vomiting. Watch closely for changes in your health, and be sure to contact your doctor if:  · You need help to stop drinking. Where can you learn more? Go to http://salvatore91 [employer].info/. Enter T102 in the search box to learn more about \"Acute Alcohol Intoxication: Care Instructions. \"  Current as of: May 27, 2016  Content Version: 11.1  © 1588-1710 [employer]. Care instructions adapted under license by [employer] (which disclaims liability or warranty for this information). If you have questions about a medical condition or this instruction, always ask your healthcare professional. [address] any warranty or liability for your use of this information. Clotting Factor Deficiencies: Care Instructions  Your Care Instructions    Clotting factors are substances in the blood that help stop bleeding after a cut or injury. They also prevent sudden bleeding. In people who have clotting factor problems, the clotting factors don't work right or, in some cases, are missing. When blood does not clot well, even minor injuries can cause serious bleeding. This can lead to blood loss, injury to internal organs, or damage to muscles or joints. Several conditions, including hemophilia, can make it hard for the blood to clot. Your doctor can treat you by giving you replacement clotting factors. You also may take medicine to prevent bleeding. You may often have clotting factors transfused into a vein to prevent bleeding, or you may get them as needed.  You may eventually learn to do this at home. You can also try to prevent injuries that can cause you to bleed. Follow-up care is a key part of your treatment and safety. Be sure to make and go to all appointments, and call your doctor if you are having problems. It's also a good idea to know your test results and keep a list of the medicines you take. How can you care for yourself at home? · Take your medicines exactly as prescribed. Call your doctor if you think you are having a problem with your medicine. You will get more details on the specific medicines your doctor prescribes. · Stay at a healthy body weight. If you are overweight, the additional stress on joints can trigger bleeding. · Exercise safely. Avoid contact sports. Swim or walk to avoid excess pressure on your joints. Check with your doctor before doing activities that put you at high risk for falls, such as riding a bike. · Brush and floss your teeth daily. This may help you avoid problems that could lead to having a tooth pulled. · Avoid aspirin and nonsteroidal anti-inflammatory drugs (NSAIDs), such as ibuprofen (Advil, Motrin) or naproxen (Aleve). They can increase the chance of bleeding. · Take pain medicines exactly as directed. ¨ If the doctor gave you a prescription medicine for pain, take it as prescribed. ¨ If you are not taking a prescription pain medicine, ask your doctor if you can take an over-the-counter medicine. · Take care to prevent accidents at home:  ¨ Make sure rugs are tacked down so you do not slip. ¨ Keep furniture with sharp edges out of pathways. ¨ Use nonskid floor wax. ¨ Wipe up spills quickly. ¨ If you live in an area that gets snow and ice in the winter, sprinkle salt on steps and sidewalks. ¨ Avoid loose-fitting shoes. You might lose your balance and fall. · Wear medical alert jewelry that lists your clotting problem. You can buy this at most drugstores. When should you call for help?   Call 911 anytime you think you may need emergency care. For example, call if:  · You passed out (lost consciousness). · You have a head injury. · You have sudden, severe pain, especially in a joint. · You have a sudden, severe headache that is different from past headaches. Call your doctor now or seek immediate medical care if:  · You are dizzy or lightheaded, or you feel like you may faint. · You are unable to stop bleeding by giving clotting factors after an injury. · You have an injury but are not sure whether you need treatment. · You have any abnormal bleeding, such as:  ¨ Nosebleeds. ¨ Vaginal bleeding that is different (heavier, more frequent, at a different time of the month) than what you are used to. ¨ Bloody or black stools, or rectal bleeding. ¨ Bloody or pink urine. Watch closely for changes in your health, and be sure to contact your doctor if:  · You have joint pain or swelling. Where can you learn more? Go to http://salvatore-izabela.info/. Enter F043 in the search box to learn more about \"Clotting Factor Deficiencies: Care Instructions. \"  Current as of: February 5, 2016  Content Version: 11.1  © 1367-6009 [employer]. Care instructions adapted under license by [employer] (which disclaims liability or warranty for this information). If you have questions about a medical condition or this instruction, always ask your healthcare professional. [address] any warranty or liability for your use of this information.

## 2017-01-16 NOTE — DISCHARGE SUMMARY
[facility]    Discharge Summary    Patient: Arminda Marie MRN: [id number]  CSN: [id number]    YOB: 1960  Age: 55 y.o. Sex: female    DOA: 1/9/2017 LOS:  LOS: 7 days   Discharge Date:      Admission Diagnoses: Acute pancreatitis  UTI (urinary tract infection)  Sepsis ([facility]  Non-intractable vomiting    Discharge Diagnoses:    Problem List as of 1/16/2017  Date Reviewed: 9/30/2016          Codes Class Noted - Resolved    Thrombocytopenia ([facility] ICD-10-CM: D69.6  ICD-9-CM: 287.5  1/12/2017 - Present        Sepsis ([facility] ICD-10-CM: A41.9  ICD-9-CM: 038.9, 995.91  1/9/2017 - Present        Non-intractable vomiting ICD-10-CM: R11.10  ICD-9-CM: 787.03  1/9/2017 - Present        Elevated LFTs ICD-10-CM: R79.89  ICD-9-CM: 790.6  Unknown - Present        Fatty liver ICD-10-CM: K76.0  ICD-9-CM: 571.8  Unknown - Present        Bipolar disorder ([facility] ICD-10-CM: F31.9  ICD-9-CM: 296.80  Unknown - Present        Depression ICD-10-CM: F32.9  ICD-9-CM: 311  Unknown - Present        Anemia ICD-10-CM: D64.9  ICD-9-CM: 285. 9  Unknown - Present        Chronic pain ICD-10-CM: G89.29  ICD-9-CM: 338.29  Unknown - Present    Overview Signed 8/19/2016  1:31 PM by JOHN Pires     abd pain             Chronic abdominal pain ICD-10-CM: R10.9, G89.29  ICD-9-CM: 789.00, 338.29  Unknown - Present        Substance induced mood disorder ([facility] ICD-10-CM: F19.94  ICD-9-CM: 292.84  Unknown - Present        GI bleed ICD-10-CM: K92.2  ICD-9-CM: 578.9  Unknown - Present        Lower GI bleeding ICD-10-CM: K92.2  ICD-9-CM: 578.9  Unknown - Present        Compression fracture of lumbar vertebra (HCC) ICD-10-CM: S32.000A  ICD-9-CM: 805.4  Unknown - Present    Overview Signed 8/19/2016  1:39 PM by JOHN Pires     L4, L5              Alcoholic hepatitis ZBT-06-IL: K70.10  ICD-9-CM: 571.1  Unknown - Present        Alcoholic pancreatitis JZP-33-JA: K85.20  ICD-9-CM: 577.0  Unknown - Present        Hypothyroidism ICD-10-CM: E03.9  ICD-9-CM: 244.9  Unknown - Present        Vitamin D deficiency ICD-10-CM: E55.9  ICD-9-CM: 268.9  Unknown - Present        Hyponatremia ICD-10-CM: E87.1  ICD-9-CM: 276.1  5/22/2016 - Present        Alcohol withdrawal ([facility] ICD-10-CM: E91.714  ICD-9-CM: 291.81  3/26/2016 - Present        UTI (urinary tract infection) ICD-10-CM: N39.0  ICD-9-CM: 599.0  3/25/2016 - Present        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  ICD-9-CM: 577.8  3/24/2016 - Present        Alcohol abuse ICD-10-CM: F10.10  ICD-9-CM: 305.00  3/24/2016 - Present        Acute hyponatremia ICD-10-CM: E87.1  ICD-9-CM: 276.1  2/3/2016 - Present        Hemorrhoids with complication [facility]-ND: T23.6  ICD-9-CM: 455.8  4/21/2015 - Present        Gallstone pancreatitis ICD-10-CM: K85.10  ICD-9-CM: 577.0, 574.20  11/26/2014 - Present        Acute pancreatitis (Chronic) ICD-10-CM: K85.90  ICD-9-CM: 577.0  10/20/2014 - Present        Pancreatitis ICD-10-CM: K85.90  ICD-9-CM: 577.0  8/28/2014 - Present        Bipolar affective disorder, depressed, severe ([facility] ICD-10-CM: F31.4  ICD-9-CM: 296.53  5/9/2014 - Present        Altered mental status ICD-10-CM: R41.82  ICD-9-CM: 780.97  5/6/2014 - Present        Overdose ICD-10-CM: T50.901A  ICD-9-CM: 977.9, E980.5  5/6/2014 - Present        Alcohol abuse with intoxication delirium ([facility] ICD-10-CM: F10.121  ICD-9-CM: 291.0, 305.00  5/6/2014 - Present        H/O gastric bypass ICD-10-CM: Z98.890  ICD-9-CM: V45.86  5/6/2014 - Present        HERMELINDA (obstructive sleep apnea) ICD-10-CM: G47.33  ICD-9-CM: 327.23  5/6/2014 - Present        Essential hypertension, benign ICD-10-CM: I10  ICD-9-CM: 401.1  5/6/2014 - Present        Malabsorption ICD-10-CM: K90.9  ICD-9-CM: 579.9  Unknown - Present        History of Lap Christian-en-Y gastric bypass- 8/15/12 w/BMI 39 ICD-10-CM: Z98.84  ICD-9-CM: V45.86  Unknown - Present        Morbid obesity ([address] ICD-10-CM: E66.01  ICD-9-CM: 278.01  Unknown - Present        Hyperlipidemia ICD-10-CM: E78.5  ICD-9-CM: 272.4  Unknown - Present        Obstructive sleep apnea ICD-10-CM: G47.33  ICD-9-CM: 327.23  Unknown - Present              Discharge Condition: Stable    Discharge To: Home    Hospital Course: Duane Cramer is a 55 y.o. female who presented to the ED complaining of hip pain, cough and vomiting. Patient stated that yesterday while out shopping, she slipped and fell on the ice, landing on her right hip. She reported that it was very painfull especially because over one week ago, she fell onto the same hip on the roadside. She was seen for that fall and released to home with no positive findings. She adds that she has had productive cough and vomiting for one week. Vomiting is usually in the morning and occurs after she has a beer. Diarrhea up to four times daily for the past one week, non bloody. Fever up to 100.4 for 2 days. While in the ED, positive findings included: tachycardia, elevated lactic acid, UTI. Patient was admitted for further management. Hgb decreased to 5.0. The patient was transfused with a total of 4 units of PRBC. Heme/Onc was consulted 2/2 to anemia and thrombocytopenia. Patient was found to have consistencies with a factor inhibitor to Factor (s) V, or IX or X. Factor VIII was also elevated. Ad Héctor was negative. Patient was counseled on the importance of ETOH cessation. Dr. Renate Serrano will discuss outpatient management from Heme/Onc perspective with the patient's PCP, Dr. Vern De La Paz. GI was consulted due to patient being scheduled for outpatient EGD/Conoloscopy on 1/17/17 and streak of blood with bowel movement to be noted on 1/13. Patient continued to mobilize and Hgb remained stable over there weekend and is stable for discharge home. Patient will complete EGD/Colonoscopy as planned as an outpatient on 1/17/17. Colonoscopy prep and clear liquid diet were reviewed with patient in addition to ETOH cessation.        Consults: Gastroenterology and Hospitalist    Significant Diagnostic Studies:   Recent Results (from the past 24 hour(s))   CBC W/O DIFF    Collection Time: 01/16/17  4:50 AM   Result Value Ref Range    WBC 3.3 (L) 4.6 - 13.2 K/uL    RBC 2.67 (L) 4.20 - 5.30 M/uL    HGB 8.0 (L) 12.0 - 16.0 g/dL    HCT 25.2 (L) 35.0 - 45.0 %    MCV 94.4 74.0 - 97.0 FL    MCH 30.0 24.0 - 34.0 PG    MCHC 31.7 31.0 - 37.0 g/dL    RDW 15.1 (H) 11.6 - 14.5 %    PLATELET 612 045 - 010 K/uL    MPV 9.5 9.2 - 15.3 FL   METABOLIC PANEL, COMPREHENSIVE    Collection Time: 01/16/17  4:50 AM   Result Value Ref Range    Sodium 142 136 - 145 mmol/L    Potassium 3.7 3.5 - 5.5 mmol/L    Chloride 102 100 - 108 mmol/L    CO2 34 (H) 21 - 32 mmol/L    Anion gap 6 3.0 - 18 mmol/L    Glucose 120 (H) 74 - 99 mg/dL    BUN 3 (L) 7.0 - 18 MG/DL    Creatinine 0.42 (L) 0.6 - 1.3 MG/DL    BUN/Creatinine ratio 7 (L) 12 - 20      GFR est AA >60 >60 ml/min/1.73m2    GFR est non-AA >60 >60 ml/min/1.73m2    Calcium 8.5 8.5 - 10.1 MG/DL    Bilirubin, total 0.8 0.2 - 1.0 MG/DL    ALT 27 13 - 56 U/L    AST 55 (H) 15 - 37 U/L    Alk. phosphatase 113 45 - 117 U/L    Protein, total 5.7 (L) 6.4 - 8.2 g/dL    Albumin 2.7 (L) 3.4 - 5.0 g/dL    Globulin 3.0 2.0 - 4.0 g/dL    A-G Ratio 0.9 0.8 - 1.7     MAGNESIUM    Collection Time: 01/16/17  4:50 AM   Result Value Ref Range    Magnesium 1.8 1.8 - 2.4 mg/dL         Discharge Medications:     Current Discharge Medication List      START taking these medications    Details   potassium chloride (K-DUR, KLOR-CON) 20 mEq tablet Take 1 Tab by mouth two (2) times a day for 2 doses. Qty: 2 Tab, Refills: 0      levoFLOXacin (LEVAQUIN) 750 mg tablet Take 1 Tab by mouth daily. Qty: 6 Tab, Refills: 0         CONTINUE these medications which have NOT CHANGED    Details   hydrOXYzine pamoate (VISTARIL) 50 mg capsule Take 50 mg by mouth two (2) times a day.       ondansetron (ZOFRAN ODT) 4 mg disintegrating tablet Take 1 Tab by mouth every eight (8) hours as needed for Nausea. Qty: 15 Tab, Refills: 0      HYDROcodone-acetaminophen (NORCO) 5-325 mg per tablet Take 1 Tab by mouth every four (4) hours as needed for Pain. Max Daily Amount: 6 Tabs. Qty: 12 Tab, Refills: 0      gabapentin (NEURONTIN) 600 mg tablet Take 600 mg by mouth three (3) times daily. Indications: NEUROPATHIC PAIN      ferrous sulfate (FEOSOL) 325 mg (65 mg iron) tablet Take 325 mg by mouth daily. biotin 10,000 mcg cap Take 1 Cap by mouth. famotidine (PEPCID) 20 mg tablet Take 20 mg by mouth daily. Indications: HEARTBURN      cholecalciferol (VITAMIN D3) 1,000 unit tablet Take  by mouth daily. folic acid 880 mcg tablet Take 800 mcg by mouth daily. calcium citrate-vitamin d3 (CITRACAL+D) 315-200 mg-unit tab Take 1 Tab by mouth two (2) times daily (with meals). fish oil-dha-epa 1,200-144-216 mg cap Take  by mouth.      pantoprazole (PROTONIX) 20 mg tablet Take 20 mg by mouth daily. cloNIDine HCl (CATAPRES) 0.1 mg tablet Take  by mouth two (2) times a day. alendronate (FOSAMAX) 70 mg tablet Take 70 mg by mouth Every Saturday. ergocalciferol (ERGOCALCIFEROL) 50,000 unit capsule Take 50,000 Units by mouth every seven (7) days. colesevelam (WELCHOL) 625 mg tablet Take 2 Tabs by mouth two (2) times daily (with meals). Qty: 120 Tab, Refills: 0      escitalopram oxalate (LEXAPRO) 10 mg tablet Take 1 Tab by mouth daily. Qty: 10 Tab, Refills: 0      levothyroxine (SYNTHROID) 25 mcg tablet Take 1 Tab by mouth Daily (before breakfast). Indications: HYPOTHYROIDISM  Qty: 30 Tab, Refills: 0      OLANZapine (ZYPREXA ZYDIS) 10 mg disintegrating tablet Take 1 Tab by mouth nightly. Qty: 30 Tab, Refills: 0      ascorbic acid (VITAMIN C) 500 mg tablet Take 1,000 mg by mouth daily. dicyclomine (BENTYL) 20 mg tablet Take 20 mg by mouth every six (6) hours as needed. cyanocobalamin (VITAMIN B-12) 1,000 mcg Subl 1,000 mcg by SubLINGual route daily.              Activity: Activity as tolerated    Diet: Clear liquids, Colonoscopy prep evening of 1/16/2017, further diet orders to be given by GI after EGD/Colonoscopy on 1/17/20173    Wound Care: None needed    Follow-up: EGD/Conolonoscopy at outpatient on 1/17/17  PCP within 1 week     Discharge time: 45 minutes   Gerardo Woodward NP  1/16/2017, 11:45 AM      I have  reviewed the history, labs, and radiology reports  independently. I have reviewed the NP documentation, agree with the documentation, medical decision making and treatment plan as outlined by my NP.     Bruno Bowen MD

## 2017-01-16 NOTE — PROGRESS NOTES
Patient without new complaints, r buttock /thigh bruise/hematoma pain slowly resolving    Visit Vitals    /80 (BP 1 Location: Right arm)    Pulse 87    Temp 98.2 °F (36.8 °C)    Resp 20    Ht 5' 7\" (1.702 m)    Wt 99.1 kg (218 lb 6.4 oz)    SpO2 96%    Breastfeeding No    BMI 34.21 kg/m2       CBC w/Diff    Lab Results   Component Value Date/Time    WBC 3.3 (L) 01/16/2017 04:50 AM    RBC 2.67 (L) 01/16/2017 04:50 AM    HCT 25.2 (L) 01/16/2017 04:50 AM    MCV 94.4 01/16/2017 04:50 AM    MCH 30.0 01/16/2017 04:50 AM    MCHC 31.7 01/16/2017 04:50 AM    RDW 15.1 (H) 01/16/2017 04:50 AM    Lab Results   Component Value Date/Time    MONOS 12 (H) 01/13/2017 04:25 AM    EOS 1 01/13/2017 04:25 AM    BASOS 0 01/13/2017 04:25 AM    RDW 15.1 (H) 01/16/2017 04:50 AM          Physical exam: aaox3, ttp and echymosis over right lumbar and buttock region extending to posterior thigh, all compartments soft and supple ,. Buttock with edema and tender but no evidence of compartment syndrome, RLE warm and perfused, no pain with range of motion at hip, knee nontender, calf soft, At/GS/EHL are 5/5, sensation intact, 2+ DP pulse . Assessment:  56 yo female with R thigh hematoma after fall    PLAN:    H/H has remains stable. Ready for d/c from ortho standpoint. Will sign off. No surgical intervention recommended.       JOHN Gibbs  January 16, 2017

## 2017-01-16 NOTE — PROGRESS NOTES
Pt with discharge orders, states she has prep and instructions for scheduled colonoscopy at home and understands all instructions.

## 2017-01-16 NOTE — PROGRESS NOTES
Gastrointestinal Progress Note    Patient Name: Ana Laura Burton    WDUNA'G Date: 1/16/2017    Admit Date: 1/9/2017      Assessment:   1. Large post-traumatic right hip hematoma w/o fracture. 2.   Severe acute on chronic anemia. Possible underlying chronic occult GI bleeding lesion with exacerbation from hematoma. 3.   Personal hx of colon polyps. 4.   Suspected bleeding diathesis but no discrete evidence yet. Thrombocytopenia resolved. Recommendation:   1. Patient prefers to go home and prep then return for her scheduled EGD and Colonoscopy tomorrow. Agree. 2.   Have placed prep orders in chart in case she stays in hospital overnight. Otherwise if discharged then she will follow the prep orders given to her by our office. Subjective:   She is feeling well. No weakness or dizziness. Able to ambulate with walker. Ortho not planning any intervention for her right hip hematoma. Hgb 8.0 today. Platelets 066,573.     Current Facility-Administered Medications   Medication Dose Route Frequency    OLANZapine (ZyPREXA) tablet 10 mg  10 mg Oral QPM    levoFLOXacin (LEVAQUIN) 750 mg in D5W IVPB  750 mg IntraVENous Q24H    potassium chloride (K-DUR, KLOR-CON) SR tablet 20 mEq  20 mEq Oral BID    diphenhydrAMINE (BENADRYL) capsule 25 mg  25 mg Oral Q6H PRN    sodium chloride (NS) flush 5-10 mL  5-10 mL IntraVENous Q8H    LORazepam (ATIVAN) tablet 1 mg  1 mg Oral Q1H PRN    Or    LORazepam (ATIVAN) injection 1 mg  1 mg IntraVENous Q1H PRN    LORazepam (ATIVAN) tablet 2 mg  2 mg Oral Q1H PRN    Or    LORazepam (ATIVAN) injection 2 mg  2 mg IntraVENous Q1H PRN    LORazepam (ATIVAN) injection 3 mg  3 mg IntraVENous Q15MIN PRN    levothyroxine (SYNTHROID) injection 12.5 mcg  12.5 mcg IntraVENous DAILY    multivitamin, tx-iron-ca-min (THERA-M w/ IRON) tablet 1 Tab  1 Tab Oral DAILY    Thiamine Mononitrate (B-1) tablet 100 mg  100 mg Oral DAILY    folic acid (FOLVITE) tablet 1 mg  1 mg Oral DAILY    acetaminophen-codeine (TYLENOL #3) per tablet 1 Tab  1 Tab Oral Q6H PRN    sodium chloride (NS) flush 5-10 mL  5-10 mL IntraVENous PRN          Objective:     Visit Vitals    /80 (BP 1 Location: Right arm)    Pulse 87    Temp 98.2 °F (36.8 °C)    Resp 20    Ht 5' 7\" (1.702 m)    Wt 99.1 kg (218 lb 6.4 oz)    SpO2 96%    Breastfeeding No    BMI 34.21 kg/m2           Intake/Output Summary (Last 24 hours) at 01/16/17 1124  Last data filed at 01/16/17 1015   Gross per 24 hour   Intake              630 ml   Output              201 ml   Net              429 ml       Examination:  Awake, alert, oriented. Abdomen soft, nontender, no mass or ascites. Large right hip hematoma. 2+ pulses, no edema. Data Review:    Labs: Results:   Chemistry Recent Labs      01/16/17   0450  01/15/17   0402  01/14/17   0358   GLU  120*  116*  103*   NA  142  139  141   K  3.7  3.0*  3.2*   CL  102  103  104   CO2  34*  30  30   BUN  3*  3*  3*   CREA  0.42*  0.39*  0.38*   CA  8.5  7.9*  7.7*   AGAP  6  6  7   BUCR  7*  8*  8*   AP  113   --   100   TP  5.7*   --   5.6*   ALB  2.7*   --   2.6*   GLOB  3.0   --   3.0   AGRAT  0.9   --   0.9    Estimated Creatinine Clearance: 183 mL/min (based on Cr of 0.42). CBC w/Diff Recent Labs      01/16/17   0450  01/15/17   0402  01/14/17   1100  01/14/17   0358   WBC  3.3*  3.5*   --   3.0*   RBC  2.67*  2.76*   --   2.61*   HGB  8.0*  8.2*  7.5*  7.9*   HCT  25.2*  25.7*  23.1*  23.9*   PLT  281  217   --   162      Coagulation Recent Labs      01/13/17   1315   PTP  14.7*   INR  1.2*   APTT  36.0*       Hepatitis Panel Lab Results   Component Value Date/Time    Hepatitis A, IgM NEGATIVE  10/01/2016 06:01 PM    Hepatitis B surface Ag <0.10 10/01/2016 06:01 PM    Hepatitis B core, IgM NEGATIVE  10/01/2016 06:01 PM    Hepatitis C virus Ab <0.02 10/01/2016 06:01 PM      Amylase Lipase .    Liver Enzymes Recent Labs      01/16/17   0450   TP  5.7*   ALB  2.7*   AP  113 SGOT  55*   ALT  27      Thyroid Studies No results for input(s): T4, T3U, TSH, TSHEXT in the last 72 hours. No lab exists for component: T3RU     Pathology pathology       Ancelmo Cotton MD, [place]  Pager: [phone]  January 16, 2017

## 2017-01-16 NOTE — ANCILLARY DISCHARGE INSTRUCTIONS
Patient and/or next of kin has been given the [facility] Important Message From Medicare About Your Rights\" letter and all questions were answered. Paper copy signed and placed in chart.

## 2017-01-16 NOTE — PROGRESS NOTES
Hematology/Oncology  Progress Note    Name: Arminda Marie  Date: 2017  : 1960      ASSESSMENT/ PLAN:     1.) ? Bleeding Diathesis/ Right thigh hematoma s/p fall  -Continue to avoid ASA, NSAIDS, and anticoagulants  -Comprehensive laboratory work-up reviewed   -PFA-100 screening-Negative for evidence of qualitative platelet dysfunction   -PT and INR both slightly prolonged  -Final results of PT and aPTT mixing studies consistent with a factor inhibitor to Factor(s) V (more likely) or IX, or X (less likely)  -A Factor VIII level was actually elevated  -Von Willebrand panel negative     2.) Anemia and thrombocytopenia  -Hb/ Hct has stabilized overall-monitor closely  -Platelet count now WNL  -Large hematoma at right thigh  -Orthopedics comments noted  -Lovenox discontinued  -HIT panel negative  -Stool guiac Positive-Outpt. GI evaluation is pending  -Iron studies reviewed   -No lab evidence of hemolysis  -Transfuse PRBCs to keep serum Hb greater than 7.0 gm/dL     3.) Alcohol Abuse  -Heavy alcohol abuse over the past 2 years    -I will discuss further outpatient management with Dr. Kaitlin Milan Son-PCP      Ms. Anjelica Ospina is a 55y.o. year old female who was seen for severe anemia (blood loss), hematoma at right thigh s/p fall, with clinical and laboratory evidence of a  bleeding diathesis. Subjective:     Overall, she reports feeling better today. Patient continues to complain of right thigh discomfort which is unchanged.      Past Medical History   Diagnosis Date    Alcohol abuse     Alcoholic hepatitis     Alcoholic pancreatitis     Anemia     Bipolar disorder (HCC)      Dr. Radha Nazario [facility])    Chronic abdominal pain     Compression fracture of lumbar vertebra (HCC)      L4, L5     Depression      Dr. Radha Nazario [facility])    Elevated LFTs     ETOH abuse     Fatty liver     GERD (gastroesophageal reflux disease)     Hyperlipidemia     Hypertension  Hypothyroidism     Lower GI bleeding     Morbid obesity ([facility] [address]     Obstructive sleep apnea     Substance induced mood disorder (HCC)     Vitamin D deficiency      Past Surgical History   Procedure Laterality Date    Diskectomy, anterior, with d  8/1995, 11/1997    Repair nonunion scaphoid carpal bone Right 2010     Wrist    Biopsy liver  08/15/2012     Lap Left hepatic liver wedge by Dr. Keshawn De Santiago; BX Revealed: Hepatic Steatosis, AVM w/ associated Subcapsular Fibrosis.  Hx hysterectomy  2006    Hx tonsillectomy  1992    Hx abdominal laparoscopy  12/02/2014     Laparoscopic Gastrostomy w/ Partial Gastrectomy for assistance in placement of Endoscopic Retrograde Cholangiopancreatography & Diagnostic Lap.  Hx carpal tunnel release      Hx cholecystectomy  09/16/2014     Dr. Arley Charles Hx colonoscopy  05/12/2012     Colonoscopy by Dr. Sudheer Cervantes. Dennis Perez: Bx Revealed Colon Polyps (Tubular Adenoma), Hemorrhoids.  Hx hemorrhoidectomy  04/30/2015     Dr. Awa Jurado. Ganga Mendoza Hx gastric bypass  08/15/2012     Lap Christian-en-y     Social History     Social History    Marital status:      Spouse name: N/A    Number of children: N/A    Years of education: N/A     Occupational History    Not on file.      Social History Main Topics    Smoking status: Never Smoker    Smokeless tobacco: Never Used    Alcohol use 0.0 oz/week     0 Standard drinks or equivalent per week      Comment: 6 pack of beer a week    Drug use: No    Sexual activity: Yes     Partners: Male     Birth control/ protection: Condom     Other Topics Concern    Not on file     Social History Narrative     Family History   Problem Relation Age of Onset    Cancer Father     Cancer Sister     Obesity Brother      Current Facility-Administered Medications   Medication Dose Route Frequency Provider Last Rate Last Dose    OLANZapine (ZyPREXA) tablet 10 mg  10 mg Oral QPM Jolie Cardoza MD   10 mg at 01/15/17 2024    levoFLOXacin (LEVAQUIN) 750 mg in D5W IVPB  750 mg IntraVENous Q24H Jenna Lowe  mL/hr at 01/15/17 1436 750 mg at 01/15/17 1436    potassium chloride (K-DUR, KLOR-CON) SR tablet 20 mEq  20 mEq Oral BID Jenna Loew MD   20 mEq at 01/16/17 0946    diphenhydrAMINE (BENADRYL) capsule 25 mg  25 mg Oral Q6H PRN Jenna Lowe MD   25 mg at 01/15/17 0225    sodium chloride (NS) flush 5-10 mL  5-10 mL IntraVENous Q8H Katey Hunter MD   10 mL at 01/16/17 0600    LORazepam (ATIVAN) tablet 1 mg  1 mg Oral Q1H PRN Martha Pierce MD   1 mg at 01/15/17 2024    Or    LORazepam (ATIVAN) injection 1 mg  1 mg IntraVENous Q1H PRN Martha Pierce MD   1 mg at 01/12/17 0505    LORazepam (ATIVAN) tablet 2 mg  2 mg Oral Q1H PRN Martha Pierce MD   2 mg at 01/13/17 2055    Or    LORazepam (ATIVAN) injection 2 mg  2 mg IntraVENous Q1H PRN Martha Pierce MD        LORazepam (ATIVAN) injection 3 mg  3 mg IntraVENous Q15MIN PRN Martha Pierce MD        levothyroxine (SYNTHROID) injection 12.5 mcg  12.5 mcg IntraVENous DAILY Martha Pierce MD   12.5 mcg at 01/15/17 1237    multivitamin, tx-iron-ca-min (THERA-M w/ IRON) tablet 1 Tab  1 Tab Oral DAILY Jenna Lowe MD   1 Tab at 01/16/17 0946    Thiamine Mononitrate (B-1) tablet 100 mg  100 mg Oral DAILY Jenna Lowe MD   100 mg at 00/86/91 0747    folic acid (FOLVITE) tablet 1 mg  1 mg Oral DAILY Jenna Lowe MD   1 mg at 01/16/17 0946    acetaminophen-codeine (TYLENOL #3) per tablet 1 Tab  1 Tab Oral Q6H PRN Martha Pierce MD   1 Tab at 01/16/17 0946    sodium chloride (NS) flush 5-10 mL  5-10 mL IntraVENous PRN Conrado Velázquez DO           Review of Systems    Constitutional: Positive for fatigue. HENT: Negative. Eyes: Negative. Respiratory: Negative. Cardiovascular: Negative. Gastrointestinal: Positive for abdominal pain. Negative for anal bleeding and blood in stool. Endocrine: Negative for cold intolerance.    Genitourinary: Negative. Musculoskeletal: Positive for back pain, gait problem, joint swelling and myalgias. Negative for neck pain and neck stiffness. Skin: Positive for color change. Allergic/Immunologic: Negative. Hematological: Negative for adenopathy. Bruises/bleeds easily. Psychiatric/Behavioral: Positive for sleep disturbance. Negative for agitation, behavioral problems, confusion, decreased concentration, dysphoric mood, hallucinations, self-injury and suicidal ideas. The patient is nervous/anxious. The patient is not hyperactive. Objective:     Visit Vitals    /80 (BP 1 Location: Right arm)    Pulse 87    Temp 98.2 °F (36.8 °C)    Resp 20    Ht 5' 7\" (1.702 m)    Wt 99.1 kg (218 lb 6.4 oz)    SpO2 96%    Breastfeeding No    BMI 34.21 kg/m2       Physical Exam:     Constitutional: She appears well-developed and well-nourished. No distress. HENT: Head: Normocephalic and atraumatic. Nose: Nose normal.   Eyes: Conjunctivae and EOM are normal. Pupils are equal, round, and reactive to light. No scleral icterus. Neck: Normal range of motion. No JVD present. Cardiovascular: Normal rate and normal heart sounds. Exam reveals no gallop. No murmur heard. Pulmonary/Chest: Effort normal and breath sounds normal.   Abdominal: Soft. Bowel sounds are normal. She exhibits no distension. obese   Musculoskeletal: She exhibits tenderness and deformity at right hip/ thigh.   ++ bruising at right hip   Lymphadenopathy:   She has no cervical adenopathy. Skin: Skin is warm and dry. Bruising, ecchymosis and petechiae noted at right thigh. No rash noted. She is not diaphoretic. No cyanosis. Nails show no clubbing.       Lab data:      Recent Results (from the past 24 hour(s))   CBC W/O DIFF    Collection Time: 01/16/17  4:50 AM   Result Value Ref Range    WBC 3.3 (L) 4.6 - 13.2 K/uL    RBC 2.67 (L) 4.20 - 5.30 M/uL    HGB 8.0 (L) 12.0 - 16.0 g/dL    HCT 25.2 (L) 35.0 - 45.0 %    MCV 94.4 74.0 - 97.0 FL    MCH 30.0 24.0 - 34.0 PG    MCHC 31.7 31.0 - 37.0 g/dL    RDW 15.1 (H) 11.6 - 14.5 %    PLATELET 244 866 - 374 K/uL    MPV 9.5 9.2 - 75.8 FL   METABOLIC PANEL, COMPREHENSIVE    Collection Time: 01/16/17  4:50 AM   Result Value Ref Range    Sodium 142 136 - 145 mmol/L    Potassium 3.7 3.5 - 5.5 mmol/L    Chloride 102 100 - 108 mmol/L    CO2 34 (H) 21 - 32 mmol/L    Anion gap 6 3.0 - 18 mmol/L    Glucose 120 (H) 74 - 99 mg/dL    BUN 3 (L) 7.0 - 18 MG/DL    Creatinine 0.42 (L) 0.6 - 1.3 MG/DL    BUN/Creatinine ratio 7 (L) 12 - 20      GFR est AA >60 >60 ml/min/1.73m2    GFR est non-AA >60 >60 ml/min/1.73m2    Calcium 8.5 8.5 - 10.1 MG/DL    Bilirubin, total 0.8 0.2 - 1.0 MG/DL    ALT 27 13 - 56 U/L    AST 55 (H) 15 - 37 U/L    Alk.  phosphatase 113 45 - 117 U/L    Protein, total 5.7 (L) 6.4 - 8.2 g/dL    Albumin 2.7 (L) 3.4 - 5.0 g/dL    Globulin 3.0 2.0 - 4.0 g/dL    A-G Ratio 0.9 0.8 - 1.7     MAGNESIUM    Collection Time: 01/16/17  4:50 AM   Result Value Ref Range    Magnesium 1.8 1.8 - 2.4 mg/dL           Brian Dela Cruz MD, Hematology-Medical Oncology  1/16/2017

## 2017-01-16 NOTE — PROGRESS NOTES
2000: bedside shift report received from [address] [place] [address],[address]; Kady Byrne, on room air, resting in bed, watching tv; shift assessment done; NSR on tele    2024: Ativan given per CIWA protocol; Tylenol given po for c/o right hip pain; see flowsheets    2200: resting comfortably; no apparent distress noted    0030: sleeping; NSR on tele    0230: sleeping    0600: sleeping, NSR, No apparent distress noted    0735: Bedside and Verbal shift change report given to Annmarie Negrete RN (oncoming nurse) by Ragini Goyal RN (offgoing nurse). Report included the following information SBAR, Kardex, Intake/Output, Recent Results and Cardiac Rhythm NSR.

## 2017-01-17 ENCOUNTER — HOSPITAL ENCOUNTER (OUTPATIENT)
Age: 57
Setting detail: OUTPATIENT SURGERY
Discharge: HOME | End: 2017-01-17
Attending: INTERNAL MEDICINE | Admitting: INTERNAL MEDICINE
Payer: MEDICARE

## 2017-01-17 ENCOUNTER — ANESTHESIA EVENT (OUTPATIENT)
Dept: ENDOSCOPY | Age: 57
End: 2017-01-17
Payer: MEDICARE

## 2017-01-17 ENCOUNTER — ANESTHESIA (OUTPATIENT)
Dept: ENDOSCOPY | Age: 57
End: 2017-01-17
Payer: MEDICARE

## 2017-01-17 ENCOUNTER — SURGERY (OUTPATIENT)
Age: 57
End: 2017-01-17

## 2017-01-17 VITALS
BODY MASS INDEX: 33.91 KG/M2 | SYSTOLIC BLOOD PRESSURE: 109 MMHG | HEIGHT: 66 IN | DIASTOLIC BLOOD PRESSURE: 72 MMHG | TEMPERATURE: 98.4 F | RESPIRATION RATE: 16 BRPM | HEART RATE: 86 BPM | OXYGEN SATURATION: 97 % | WEIGHT: 211 LBS

## 2017-01-17 LAB
LA PPP-IMP: NORMAL
SCREEN APTT: 34.7 SEC (ref 0–40.6)
SCREEN DRVVT: 26 SEC (ref 0–44)

## 2017-01-17 PROCEDURE — 74011250636 HC RX REV CODE- 250/636

## 2017-01-17 PROCEDURE — 74011000250 HC RX REV CODE- 250

## 2017-01-17 PROCEDURE — 76060000031 HC ANESTHESIA FIRST 0.5 HR: Performed by: INTERNAL MEDICINE

## 2017-01-17 PROCEDURE — 77030009426 HC FCPS BIOP ENDOSC BSC -B: Performed by: INTERNAL MEDICINE

## 2017-01-17 PROCEDURE — 88305 TISSUE EXAM BY PATHOLOGIST: CPT | Performed by: COLON & RECTAL SURGERY

## 2017-01-17 PROCEDURE — 76040000019: Performed by: INTERNAL MEDICINE

## 2017-01-17 PROCEDURE — 74011250636 HC RX REV CODE- 250/636: Performed by: NURSE ANESTHETIST, CERTIFIED REGISTERED

## 2017-01-17 PROCEDURE — 77030031670 HC DEV INFL ENDOTEK BIG60 MRTM -B: Performed by: INTERNAL MEDICINE

## 2017-01-17 RX ORDER — SODIUM CHLORIDE 0.9 % (FLUSH) 0.9 %
5-10 SYRINGE (ML) INJECTION EVERY 8 HOURS
Status: CANCELLED | OUTPATIENT
Start: 2017-01-17 | End: 2017-01-17

## 2017-01-17 RX ORDER — MAGNESIUM CITRATE
296 SOLUTION, ORAL ORAL
Status: DISCONTINUED | OUTPATIENT
Start: 2017-01-17 | End: 2017-01-17 | Stop reason: HOSPADM

## 2017-01-17 RX ORDER — SODIUM CHLORIDE 0.9 % (FLUSH) 0.9 %
5-10 SYRINGE (ML) INJECTION AS NEEDED
Status: CANCELLED | OUTPATIENT
Start: 2017-01-17 | End: 2017-01-17

## 2017-01-17 RX ORDER — POLYETHYLENE GLYCOL 3350, SODIUM SULFATE, SODIUM CHLORIDE, POTASSIUM CHLORIDE, SODIUM ASCORBATE, AND ASCORBIC ACID 7.5-2.691G
1 KIT ORAL EVERY 12 HOURS
Status: DISCONTINUED | OUTPATIENT
Start: 2017-01-17 | End: 2017-01-17 | Stop reason: HOSPADM

## 2017-01-17 RX ORDER — ONDANSETRON 2 MG/ML
4 INJECTION INTRAMUSCULAR; INTRAVENOUS ONCE
Status: CANCELLED | OUTPATIENT
Start: 2017-01-17 | End: 2017-01-17

## 2017-01-17 RX ORDER — PROPOFOL 10 MG/ML
INJECTION, EMULSION INTRAVENOUS AS NEEDED
Status: DISCONTINUED | OUTPATIENT
Start: 2017-01-17 | End: 2017-01-17 | Stop reason: HOSPADM

## 2017-01-17 RX ORDER — TRAMADOL HYDROCHLORIDE 50 MG/1
50 TABLET ORAL
COMMUNITY
End: 2017-02-27

## 2017-01-17 RX ORDER — PROPOFOL 10 MG/ML
INJECTION, EMULSION INTRAVENOUS
Status: DISCONTINUED | OUTPATIENT
Start: 2017-01-17 | End: 2017-01-17 | Stop reason: HOSPADM

## 2017-01-17 RX ORDER — DEXTROMETHORPHAN/PSEUDOEPHED 2.5-7.5/.8
1.2 DROPS ORAL
Status: CANCELLED | OUTPATIENT
Start: 2017-01-17

## 2017-01-17 RX ORDER — SODIUM CHLORIDE, SODIUM LACTATE, POTASSIUM CHLORIDE, CALCIUM CHLORIDE 600; 310; 30; 20 MG/100ML; MG/100ML; MG/100ML; MG/100ML
25 INJECTION, SOLUTION INTRAVENOUS CONTINUOUS
Status: DISCONTINUED | OUTPATIENT
Start: 2017-01-17 | End: 2017-01-17 | Stop reason: HOSPADM

## 2017-01-17 RX ORDER — LIDOCAINE HYDROCHLORIDE 20 MG/ML
INJECTION, SOLUTION EPIDURAL; INFILTRATION; INTRACAUDAL; PERINEURAL AS NEEDED
Status: DISCONTINUED | OUTPATIENT
Start: 2017-01-17 | End: 2017-01-17 | Stop reason: HOSPADM

## 2017-01-17 RX ORDER — SODIUM CHLORIDE, SODIUM LACTATE, POTASSIUM CHLORIDE, CALCIUM CHLORIDE 600; 310; 30; 20 MG/100ML; MG/100ML; MG/100ML; MG/100ML
25 INJECTION, SOLUTION INTRAVENOUS CONTINUOUS
Status: CANCELLED | OUTPATIENT
Start: 2017-01-17

## 2017-01-17 RX ADMIN — PROPOFOL 50 MG: 10 INJECTION, EMULSION INTRAVENOUS at 08:42

## 2017-01-17 RX ADMIN — LIDOCAINE HYDROCHLORIDE 50 MG: 20 INJECTION, SOLUTION EPIDURAL; INFILTRATION; INTRACAUDAL; PERINEURAL at 08:41

## 2017-01-17 RX ADMIN — PROPOFOL 10 MG: 10 INJECTION, EMULSION INTRAVENOUS at 08:53

## 2017-01-17 RX ADMIN — PROPOFOL 50 MG: 10 INJECTION, EMULSION INTRAVENOUS at 08:48

## 2017-01-17 RX ADMIN — PROPOFOL 50 MG: 10 INJECTION, EMULSION INTRAVENOUS at 08:43

## 2017-01-17 RX ADMIN — SODIUM CHLORIDE, SODIUM LACTATE, POTASSIUM CHLORIDE, AND CALCIUM CHLORIDE 25 ML/HR: 600; 310; 30; 20 INJECTION, SOLUTION INTRAVENOUS at 07:59

## 2017-01-17 RX ADMIN — PROPOFOL 140 MCG/KG/MIN: 10 INJECTION, EMULSION INTRAVENOUS at 08:43

## 2017-01-17 RX ADMIN — PROPOFOL 10 MG: 10 INJECTION, EMULSION INTRAVENOUS at 08:50

## 2017-01-17 NOTE — ANESTHESIA PREPROCEDURE EVALUATION
Anesthetic History     PONV          Review of Systems / Medical History  Patient summary reviewed    Pulmonary  Within defined limits      Sleep apnea           Neuro/Psych   Within defined limits           Cardiovascular    Hypertension                   GI/Hepatic/Renal     GERD      Liver disease     Endo/Other      Hypothyroidism  Morbid obesity     Other Findings   Comments: Current Smoker? NO       Elective Surgery? Yes       Abstained from smoking 24 hours prior to anesthesia? N/A    Risk Factors for Postoperative nausea/vomiting:       History of postoperative nausea/vomiting? NO       Female? YES       Motion sickness? NO       Intended opioid administration for postoperative analgesia?   NO           Physical Exam    Airway  Mallampati: II  TM Distance: 4 - 6 cm  Neck ROM: normal range of motion   Mouth opening: Normal     Cardiovascular    Rhythm: regular  Rate: normal         Dental      Comments: Broken teeth   Pulmonary  Breath sounds clear to auscultation               Abdominal  GI exam deferred       Other Findings            Anesthetic Plan    ASA: 3  Anesthesia type: MAC          Induction: Intravenous  Anesthetic plan and risks discussed with: Patient

## 2017-01-17 NOTE — DISCHARGE INSTRUCTIONS
Patient Discharge Instructions    Adina Willis / [id number] : 1960    Admitted 2017 Discharged: 2017         Procedure Impression:  1. Mild gastritis or gastropathy changes. Biopsies taken for H. Pylori. 2. 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 3. Small internal hemorrhoids. 4. Otherwise normal colonoscopy including terminal ileum evaluation. Recommendation:  1. Resume regular diet. 2. Will contact with biopsy results in 2 weeks   3. Continue protonix. 4. Avoid Alcohol, NSAIDS and Tobacco.    5. Repeat colonoscopy in 5 years. Recommended Diet: Regular Diet    Recommended Activity:    1. Do not drink alcohol, drive or operate machinery for 12 hours   2. Call if any fever, abdominal pain or bleeding noted. Signed By: Squire Soulier, MD     2017            Upper GI Endoscopy: What to Expect at [address] [place] [address]  After you have an endoscopy, you will stay at the hospital or clinic for 1 to 2 hours. This will allow the medicine to wear off. You will be able to go home after your doctor or nurse checks to make sure you are not having any problems. You may have to stay overnight if you had treatment during the test. You may have a sore throat for a day or two after the test.  This care sheet gives you a general idea about what to expect after the test.  How can you care for yourself at home? Activity  · Rest as much as you need to after you go home. · You should be able to go back to your usual activities the day after the test.  Diet  · Follow your doctor's directions for eating after the test.  · Drink plenty of fluids (unless your doctor has told you not to). Medications  · If you have a sore throat the day after the test, use an over-the-counter spray to numb your throat. Follow-up care is a key part of your treatment and safety. Be sure to make and go to all appointments, and call your doctor if you are having problems.  It's also a good idea to know your test results and keep a list of the medicines you take. When should you call for help? Call 911 anytime you think you may need emergency care. For example, call if:  · You passed out (lost consciousness). · You cough up blood. · You vomit blood or what looks like coffee grounds. · You pass maroon or very bloody stools. Call your doctor now or seek immediate medical care if:  · You have trouble swallowing. · You have belly pain. · Your stools are black and tarlike or have streaks of blood. · You are sick to your stomach or cannot keep fluids down. Watch closely for changes in your health, and be sure to contact your doctor if:  · Your throat still hurts after a day or two. · You do not get better as expected. Where can you learn more? Go to http://salvatore-izabela.info/. Enter [phone] in the search box to learn more about \"Upper GI Endoscopy: What to Expect at Home. \"  Current as of: August 9, 2016  Content Version: 11.1  © 3997-4979 [employer]. Care instructions adapted under license by [employer] (which disclaims liability or warranty for this information). If you have questions about a medical condition or this instruction, always ask your healthcare professional. [place] [address] any warranty or liability for your use of this information. Colonoscopy: What to Expect at [address] [place] [address]  After you have a colonoscopy, you will stay at the clinic for 1 to 2 hours until the medicines wear off. Then you can go home. But you will need to arrange for a ride. Your doctor will tell you when you can eat and do your other usual activities. Your doctor will talk to you about when you will need your next colonoscopy. Your doctor can help you decide how often you need to be checked. This will depend on the results of your test and your risk for colorectal cancer. After the test, you may be bloated or have gas pains. You may need to pass gas. If a biopsy was done or a polyp was removed, you may have streaks of blood in your stool (feces) for a few days. This care sheet gives you a general idea about how long it will take for you to recover. But each person recovers at a different pace. Follow the steps below to get better as quickly as possible. How can you care for yourself at home? Activity  · Rest when you feel tired. · You can do your normal activities when it feels okay to do so. Diet  · Follow your doctor's directions for eating. · Unless your doctor has told you not to, drink plenty of fluids. This helps to replace the fluids that were lost during the colon prep. · Do not drink alcohol. Medicines  · Your doctor will tell you if and when you can restart your medicines. He or she will also give you instructions about taking any new medicines. · If you take blood thinners, such as warfarin (Coumadin), clopidogrel (Plavix), or aspirin, be sure to talk to your doctor. He or she will tell you if and when to start taking those medicines again. Make sure that you understand exactly what your doctor wants you to do. · If polyps were removed or a biopsy was done during the test, your doctor may tell you not to take aspirin or other anti-inflammatory medicines for a few days. These include ibuprofen (Advil, Motrin) and naproxen (Aleve). Other instructions  · For your safety, do not drive or operate machinery until the medicine wears off and you can think clearly. Your doctor may tell you not to drive or operate machinery until the day after your test.  · Do not sign legal documents or make major decisions until the medicine wears off and you can think clearly. The anesthesia can make it hard for you to fully understand what you are agreeing to. Follow-up care is a key part of your treatment and safety. Be sure to make and go to all appointments, and call your doctor if you are having problems.  It's also a good idea to know your test results and keep a list of the medicines you take. When should you call for help? Call 911 anytime you think you may need emergency care. For example, call if:  · You passed out (lost consciousness). · You pass maroon or bloody stools. · You have severe belly pain. Call your doctor now or seek immediate medical care if:  · Your stools are black and tarlike. · Your stools have streaks of blood, but you did not have a biopsy or any polyps removed. · You have belly pain, or your belly is swollen and firm. · You vomit. · You have a fever. · You are very dizzy. Watch closely for changes in your health, and be sure to contact your doctor if you have any problems. Where can you learn more? Go to http://salvatore-izabela.info/. Enter E264 in the search box to learn more about \"Colonoscopy: What to Expect at Home. \"  Current as of: August 9, 2016  Content Version: 11.1  © 9236-2142 [employer]. Care instructions adapted under license by [employer] (which disclaims liability or warranty for this information). If you have questions about a medical condition or this instruction, always ask your healthcare professional. [place] [address] any warranty or liability for your use of this information. DISCHARGE SUMMARY from Nurse    The following personal items are in your possession at time of discharge:    Dental Appliances: None  Visual Aid: Glasses, Contacts                            PATIENT INSTRUCTIONS:    After general anesthesia or intravenous sedation, for 24 hours or while taking prescription Narcotics:  · Limit your activities  · Do not drive and operate hazardous machinery  · Do not make important personal or business decisions  · Do  not drink alcoholic beverages  · If you have not urinated within 8 hours after discharge, please contact your surgeon on call.     Report the following to your surgeon:  · Excessive pain, swelling, redness or odor of or around the surgical area  · Temperature over 100.5  · Nausea and vomiting lasting longer than 4 hours or if unable to take medications  · Any signs of decreased circulation or nerve impairment to extremity: change in color, persistent  numbness, tingling, coldness or increase pain  · Any questions        What to do at Home:  These are general instructions for a healthy lifestyle:    No smoking/ No tobacco products/ Avoid exposure to second hand smoke    Surgeon General's Warning:  Quitting smoking now greatly reduces serious risk to your health. Obesity, smoking, and sedentary lifestyle greatly increases your risk for illness    A healthy diet, regular physical exercise & weight monitoring are important for maintaining a healthy lifestyle    You may be retaining fluid if you have a history of heart failure or if you experience any of the following symptoms:  Weight gain of 3 pounds or more overnight or 5 pounds in a week, increased swelling in our hands or feet or shortness of breath while lying flat in bed. Please call your doctor as soon as you notice any of these symptoms; do not wait until your next office visit. Recognize signs and symptoms of STROKE:    F-face looks uneven    A-arms unable to move or move unevenly    S-speech slurred or non-existent    T-time-call 911 as soon as signs and symptoms begin-DO NOT go       Back to bed or wait to see if you get better-TIME IS BRAIN. Warning Signs of HEART ATTACK     Call 911 if you have these symptoms:   Chest discomfort. Most heart attacks involve discomfort in the center of the chest that lasts more than a few minutes, or that goes away and comes back. It can feel like uncomfortable pressure, squeezing, fullness, or pain.  Discomfort in other areas of the upper body. Symptoms can include pain or discomfort in one or both arms, the back, neck, jaw, or stomach.  Shortness of breath with or without chest discomfort.    Other signs may include breaking out in a cold sweat, nausea, or lightheadedness. Don't wait more than five minutes to call 911 - MINUTES MATTER! Fast action can save your life. Calling 911 is almost always the fastest way to get lifesaving treatment. Emergency Medical Services staff can begin treatment when they arrive -- up to an hour sooner than if someone gets to the hospital by car. The discharge information has been reviewed with the patient. The patient verbalized understanding. Discharge medications reviewed with the patient and appropriate educational materials and side effects teaching were provided. Patient armband removed and given to patient to take home.   Patient was informed of the privacy risks if armband lost or stolen

## 2017-01-17 NOTE — IP AVS SNAPSHOT
Ivania Brooks 
 
 
 [address] 
[phone] Patient: Elsi Alanis MRN: [id number] KVI:[birth year] You are allergic to the following Allergen Reactions Codeine Rash Lamictal (Lamotrigine) Anaphylaxis Morphine Anaphylaxis Per patient, has tolerated dilaudid in the past.  
    
 Pcn (Penicillins) Anaphylaxis Vancomycin Rash Doxycycline Rash Percocet (Oxycodone-Acetaminophen) Hives Itching Tetracycline Rash Tomato Hives Recent Documentation Height Weight BMI OB Status Smoking Status 1.676 m 95.7 kg 34.06 kg/m2 Hysterectomy Never Smoker Emergency Contacts Name Discharge Info Relation Home Work Mobile [address] CAREGIVER [3] Other Relative [6] [phone] Gena Khan DISCHARGE CAREGIVER [3] Child [2] [phone] Troy Lopez N/A  AT THIS TIME [6] Spouse [3] [phone] About your hospitalization You were admitted on:  January 17, 2017 [facility] You were discharged on:  January 17, 2017 Unit phone number:  [phone] Why you were hospitalized Your primary diagnosis was:  Not on File Providers Seen During Your Hospitalizations Provider Role Specialty Primary office phone Lucila Felix MD Attending Provider Gastroenterology [phone] Your Primary Care Physician (PCP) Primary Care Physician Office Phone Office Fax Anamika Lan [phone] Follow-up Information Follow up With Details Comments Contact Info Otoniel Herrera MD   [address] [place] [address] [phone] Current Discharge Medication List  
  
CONTINUE these medications which have CHANGED Dose & Instructions Dispensing Information Comments Morning Noon Evening Bedtime levothyroxine 25 mcg tablet Commonly known as:  SYNTHROID What changed:  how much to take Your next dose is: Today, Tomorrow Other:  _________ Dose:  25 mcg Take 1 Tab by mouth Daily (before breakfast). Indications: HYPOTHYROIDISM Quantity:  30 Tab Refills:  0 CONTINUE these medications which have NOT CHANGED Dose & Instructions Dispensing Information Comments Morning Noon Evening Bedtime  
 alendronate 70 mg tablet Commonly known as:  FOSAMAX Your next dose is: Today, Tomorrow Other:  _________ Dose:  70 mg Take 70 mg by mouth Every Saturday. Refills:  0 BENTYL 20 mg tablet Generic drug:  dicyclomine Your next dose is: Today, Tomorrow Other:  _________ Dose:  20 mg Take 20 mg by mouth every six (6) hours as needed. Refills:  0  
     
   
   
   
  
 biotin 10,000 mcg Cap Your next dose is: Today, Tomorrow Other:  _________ Dose:  1 Cap Take 1 Cap by mouth. Refills:  0  
     
   
   
   
  
 calcium citrate-vitamin d3 315-200 mg-unit Tab Commonly known as:  CITRACAL+D Your next dose is: Today, Tomorrow Other:  _________ Dose:  1 Tab Take 1 Tab by mouth two (2) times daily (with meals). Refills:  0  
     
   
   
   
  
 cholecalciferol 1,000 unit tablet Commonly known as:  VITAMIN D3 Your next dose is: Today, Tomorrow Other:  _________ Take  by mouth daily. Refills:  0  
     
   
   
   
  
 cloNIDine HCl 0.1 mg tablet Commonly known as:  CATAPRES Your next dose is: Today, Tomorrow Other:  _________ Take  by mouth two (2) times a day. Refills:  0  
     
   
   
   
  
 colesevelam 625 mg tablet Commonly known as:  [facility] Your next dose is: Today, Tomorrow Other:  _________  Dose:  1250 mg  
 Take 2 Tabs by mouth two (2) times daily (with meals). Quantity:  120 Tab Refills:  0  
     
   
   
   
  
 ergocalciferol 50,000 unit capsule Commonly known as:  ERGOCALCIFEROL Your next dose is: Today, Tomorrow Other:  _________ Dose:  81374 Units Take 50,000 Units by mouth every seven (7) days. Refills:  0  
     
   
   
   
  
 escitalopram oxalate 10 mg tablet Commonly known as:  Darcus Skillern Your next dose is: Today, Tomorrow Other:  _________ Dose:  10 mg Take 1 Tab by mouth daily. Quantity:  10 Tab Refills:  0  
     
   
   
   
  
 famotidine 20 mg tablet Commonly known as:  PEPCID Your next dose is: Today, Tomorrow Other:  _________ Dose:  20 mg Take 20 mg by mouth daily. Indications: HEARTBURN Refills:  0  
     
   
   
   
  
 FEOSOL 325 mg (65 mg iron) tablet Generic drug:  ferrous sulfate Your next dose is: Today, Tomorrow Other:  _________ Dose:  325 mg Take 325 mg by mouth daily. Refills:  0  
     
   
   
   
  
 fish oil-dha-epa 1,200-144-216 mg Cap Your next dose is: Today, Tomorrow Other:  _________ Take  by mouth. Refills:  0  
     
   
   
   
  
 folic acid 559 mcg tablet Your next dose is: Today, Tomorrow Other:  _________ Dose:  800 mcg Take 800 mcg by mouth daily. Refills:  0 HYDROcodone-acetaminophen 5-325 mg per tablet Commonly known as:  March Castles Your next dose is: Today, Tomorrow Other:  _________ Dose:  1 Tab Take 1 Tab by mouth every four (4) hours as needed for Pain. Max Daily Amount: 6 Tabs. Quantity:  12 Tab Refills:  0  
     
   
   
   
  
 levoFLOXacin 750 mg tablet Commonly known as:  Jocy Radon Your next dose is: Today, Tomorrow Other:  _________ Dose:  750 mg Take 1 Tab by mouth daily. Quantity:  6 Tab Refills:  0 NEURONTIN 600 mg tablet Generic drug:  gabapentin Your next dose is: Today, Tomorrow Other:  _________ Dose:  600 mg Take 600 mg by mouth three (3) times daily. Indications: NEUROPATHIC PAIN Refills:  0  
     
   
   
   
  
 OLANZapine 10 mg disintegrating tablet Commonly known as:  ZyPREXA zydis Your next dose is: Today, Tomorrow Other:  _________ Dose:  10 mg Take 1 Tab by mouth nightly. Quantity:  30 Tab Refills:  0  
     
   
   
   
  
 ondansetron 4 mg disintegrating tablet Commonly known as:  ZOFRAN ODT Your next dose is: Today, Tomorrow Other:  _________ Dose:  4 mg Take 1 Tab by mouth every eight (8) hours as needed for Nausea. Quantity:  15 Tab Refills:  0  
     
   
   
   
  
 pantoprazole 20 mg tablet Commonly known as:  PROTONIX Your next dose is: Today, Tomorrow Other:  _________ Dose:  20 mg Take 20 mg by mouth daily. Refills:  0  
     
   
   
   
  
 potassium chloride 20 mEq tablet Commonly known as:  K-DUR, KLOR-CON Your next dose is: Today, Tomorrow Other:  _________ Dose:  20 mEq Take 1 Tab by mouth two (2) times a day for 2 doses. Quantity:  2 Tab Refills:  0  
     
   
   
   
  
 traMADol 50 mg tablet Commonly known as:  ULTRAM  
   
Your next dose is: Today, Tomorrow Other:  _________ Dose:  50 mg Take 50 mg by mouth every six (6) hours as needed for Pain. Refills:  0  
     
   
   
   
  
 VISTARIL 50 mg capsule Generic drug:  hydrOXYzine pamoate Your next dose is: Today, Tomorrow Other:  _________ Dose:  50 mg Take 50 mg by mouth two (2) times a day. Refills:  0  
     
   
   
   
  
 VITAMIN B-12 1,000 mcg sublingual tablet Generic drug:  cyanocobalamin Your next dose is: Today, Tomorrow Other:  _________ Dose:  1000 mcg  
1,000 mcg by SubLINGual route daily. Refills:  0  
     
   
   
   
  
 VITAMIN C 500 mg tablet Generic drug:  ascorbic acid (vitamin C) Your next dose is: Today, Tomorrow Other:  _________ Dose:  1000 mg Take 1,000 mg by mouth daily. Refills:  0 Discharge Instructions Patient Discharge Instructions Seamus Betancourt / [id number] : 1960 Admitted 2017 Discharged: 2017 Procedure Impression: 1. Mild gastritis or gastropathy changes. Biopsies taken for H. Pylori. 2. 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 3. Small internal hemorrhoids. 4. Otherwise normal colonoscopy including terminal ileum evaluation. Recommendation: 1. Resume regular diet. 2. Will contact with biopsy results in 2 weeks 3. Continue protonix. 4. Avoid Alcohol, NSAIDS and Tobacco.   
5. Repeat colonoscopy in 5 years. Recommended Diet: Regular Diet Recommended Activity: 1. Do not drink alcohol, drive or operate machinery for 12 hours 2. Call if any fever, abdominal pain or bleeding noted. Signed By: North De Jesus MD   
 2017 Upper GI Endoscopy: What to Expect at [facility] Your Recovery After you have an endoscopy, you will stay at the hospital or clinic for 1 to 2 hours. This will allow the medicine to wear off. You will be able to go home after your doctor or nurse checks to make sure you are not having any problems. You may have to stay overnight if you had treatment during the test. You may have a sore throat for a day or two after the test. 
This care sheet gives you a general idea about what to expect after the test. 
How can you care for yourself at home? Activity · Rest as much as you need to after you go home. · You should be able to go back to your usual activities the day after the test. 
Diet · Follow your doctor's directions for eating after the test. 
· Drink plenty of fluids (unless your doctor has told you not to). Medications · If you have a sore throat the day after the test, use an over-the-counter spray to numb your throat. Follow-up care is a key part of your treatment and safety. Be sure to make and go to all appointments, and call your doctor if you are having problems. It's also a good idea to know your test results and keep a list of the medicines you take. When should you call for help? Call 911 anytime you think you may need emergency care. For example, call if: 
· You passed out (lost consciousness). · You cough up blood. · You vomit blood or what looks like coffee grounds. · You pass maroon or very bloody stools. Call your doctor now or seek immediate medical care if: 
· You have trouble swallowing. · You have belly pain. · Your stools are black and tarlike or have streaks of blood. · You are sick to your stomach or cannot keep fluids down. Watch closely for changes in your health, and be sure to contact your doctor if: 
· Your throat still hurts after a day or two. · You do not get better as expected. Where can you learn more? Go to http://salvatore-izabela.info/. Enter [phone] in the search box to learn more about \"Upper GI Endoscopy: What to Expect at Home. \" Current as of: August 9, 2016 Content Version: 11.1 © 1907-8562 [employer]. Care instructions adapted under license by [employer] (which disclaims liability or warranty for this information). If you have questions about a medical condition or this instruction, always ask your healthcare professional. [place] [address] any warranty or liability for your use of this information. Colonoscopy: What to Expect at [facility] Your Recovery After you have a colonoscopy, you will stay at the clinic for 1 to 2 hours until the medicines wear off. Then you can go home. But you will need to arrange for a ride. Your doctor will tell you when you can eat and do your other usual activities. Your doctor will talk to you about when you will need your next colonoscopy. Your doctor can help you decide how often you need to be checked. This will depend on the results of your test and your risk for colorectal cancer. After the test, you may be bloated or have gas pains. You may need to pass gas. If a biopsy was done or a polyp was removed, you may have streaks of blood in your stool (feces) for a few days. This care sheet gives you a general idea about how long it will take for you to recover. But each person recovers at a different pace. Follow the steps below to get better as quickly as possible. How can you care for yourself at home? Activity · Rest when you feel tired. · You can do your normal activities when it feels okay to do so. Diet · Follow your doctor's directions for eating. · Unless your doctor has told you not to, drink plenty of fluids. This helps to replace the fluids that were lost during the colon prep. · Do not drink alcohol. Medicines · Your doctor will tell you if and when you can restart your medicines. He or she will also give you instructions about taking any new medicines. · If you take blood thinners, such as warfarin (Coumadin), clopidogrel (Plavix), or aspirin, be sure to talk to your doctor. He or she will tell you if and when to start taking those medicines again. Make sure that you understand exactly what your doctor wants you to do. · If polyps were removed or a biopsy was done during the test, your doctor may tell you not to take aspirin or other anti-inflammatory medicines for a few days. These include ibuprofen (Advil, Motrin) and naproxen (Aleve). Other instructions · For your safety, do not drive or operate machinery until the medicine wears off and you can think clearly. Your doctor may tell you not to drive or operate machinery until the day after your test. 
· Do not sign legal documents or make major decisions until the medicine wears off and you can think clearly. The anesthesia can make it hard for you to fully understand what you are agreeing to. Follow-up care is a key part of your treatment and safety. Be sure to make and go to all appointments, and call your doctor if you are having problems. It's also a good idea to know your test results and keep a list of the medicines you take. When should you call for help? Call 911 anytime you think you may need emergency care. For example, call if: 
· You passed out (lost consciousness). · You pass maroon or bloody stools. · You have severe belly pain. Call your doctor now or seek immediate medical care if: 
· Your stools are black and tarlike. · Your stools have streaks of blood, but you did not have a biopsy or any polyps removed. · You have belly pain, or your belly is swollen and firm. · You vomit. · You have a fever. · You are very dizzy. Watch closely for changes in your health, and be sure to contact your doctor if you have any problems. Where can you learn more? Go to http://salvatore-izabela.info/. Enter E264 in the search box to learn more about \"Colonoscopy: What to Expect at Home. \" Current as of: August 9, 2016 Content Version: 11.1 © 8890-6705 [employer]. Care instructions adapted under license by [employer] (which disclaims liability or warranty for this information). If you have questions about a medical condition or this instruction, always ask your healthcare professional. [place] [address] any warranty or liability for your use of this information. DISCHARGE SUMMARY from Nurse The following personal items are in your possession at time of discharge: 
 
Dental Appliances: None Visual Aid: Glasses, Contacts PATIENT INSTRUCTIONS: 
 
After general anesthesia or intravenous sedation, for 24 hours or while taking prescription Narcotics: · Limit your activities · Do not drive and operate hazardous machinery · Do not make important personal or business decisions · Do  not drink alcoholic beverages · If you have not urinated within 8 hours after discharge, please contact your surgeon on call. Report the following to your surgeon: 
· Excessive pain, swelling, redness or odor of or around the surgical area · Temperature over 100.5 · Nausea and vomiting lasting longer than 4 hours or if unable to take medications · Any signs of decreased circulation or nerve impairment to extremity: change in color, persistent  numbness, tingling, coldness or increase pain · Any questions What to do at Home: These are general instructions for a healthy lifestyle: No smoking/ No tobacco products/ Avoid exposure to second hand smoke Surgeon General's Warning:  Quitting smoking now greatly reduces serious risk to your health. Obesity, smoking, and sedentary lifestyle greatly increases your risk for illness A healthy diet, regular physical exercise & weight monitoring are important for maintaining a healthy lifestyle You may be retaining fluid if you have a history of heart failure or if you experience any of the following symptoms:  Weight gain of 3 pounds or more overnight or 5 pounds in a week, increased swelling in our hands or feet or shortness of breath while lying flat in bed. Please call your doctor as soon as you notice any of these symptoms; do not wait until your next office visit. Recognize signs and symptoms of STROKE: 
 
F-face looks uneven A-arms unable to move or move unevenly S-speech slurred or non-existent T-time-call 911 as soon as signs and symptoms begin-DO NOT go  
 Back to bed or wait to see if you get better-TIME IS BRAIN. Warning Signs of HEART ATTACK Call 911 if you have these symptoms: 
? Chest discomfort. Most heart attacks involve discomfort in the center of the chest that lasts more than a few minutes, or that goes away and comes back. It can feel like uncomfortable pressure, squeezing, fullness, or pain. ? Discomfort in other areas of the upper body. Symptoms can include pain or discomfort in one or both arms, the back, neck, jaw, or stomach. ? Shortness of breath with or without chest discomfort. ? Other signs may include breaking out in a cold sweat, nausea, or lightheadedness. Don't wait more than five minutes to call [address]! Fast action can save your life. Calling 911 is almost always the fastest way to get lifesaving treatment. Emergency Medical Services staff can begin treatment when they arrive  up to an hour sooner than if someone gets to the hospital by car. The discharge information has been reviewed with the patient. The patient verbalized understanding. Discharge medications reviewed with the patient and appropriate educational materials and side effects teaching were provided. Patient armband removed and given to patient to take home. Patient was informed of the privacy risks if armband lost or stolen Discharge Orders None Introducing [facility] SERVICES! Torri Escudero introduces [employer] patient portal. Now you can access parts of your medical record, email your doctor's office, and request medication refills online. 1. In your internet browser, go to [email or web address] Up A Child Toyst 2. Click on the First Time User? Click Here link in the Sign In box. You will see the New Member Sign Up page. 3. Enter your [employer] Access Code exactly as it appears below. You will not need to use this code after youve completed the sign-up process.  If you do not sign up before the expiration date, you must request a new code. · [employer] Access Code: [email or web address] Expires: 4/4/2017  8:31 PM 
 
4. Enter the last four digits of your Social Security Number (xxxx) and Date of Birth (mm/dd/yyyy) as indicated and click Submit. You will be taken to the next sign-up page. 5. Create a [employer] ID. This will be your [employer] login ID and cannot be changed, so think of one that is secure and easy to remember. 6. Create a [employer] password. You can change your password at any time. 7. Enter your Password Reset Question and Answer. This can be used at a later time if you forget your password. 8. Enter your e-mail address. You will receive e-mail notification when new information is available in [address] 9. Click Sign Up. You can now view and download portions of your medical record. 10. Click the Download Summary menu link to download a portable copy of your medical information. If you have questions, please visit the Frequently Asked Questions section of the [employer] website. Remember, [employer] is NOT to be used for urgent needs. For medical emergencies, dial 911. Now available from your iPhone and Android! General Information Please provide this summary of care documentation to your next provider. Patient Signature:  ____________________________________________________________ Date:  ____________________________________________________________  
  
Irlanda Carpenter Provider Signature:  ____________________________________________________________ Date:  ____________________________________________________________

## 2017-01-17 NOTE — ANESTHESIA POSTPROCEDURE EVALUATION
Post-Anesthesia Evaluation & Assessment    Visit Vitals    /67 (BP 1 Location: Left arm, BP Patient Position: At rest)    Pulse 86    Temp 36.9 °C (98.4 °F)    Resp 16    Ht 5' 6\" (1.676 m)    Wt 95.7 kg (211 lb)    SpO2 94%    BMI 34.06 kg/m2       Nausea/Vomiting: no nausea and no vomiting    Pain score (VAS): 0    Post-operative hydration adequate.     Mental status & Level of consciousness: orientation per pre-anesthetic level    Neurological status: moves all extremities, sensation grossly intact    Pulmonary status: airway patent, no supplemental oxygen required    Complications related to anesthesia: none    Additional comments:        Paulette Villalba CRNA  January 17, 2017

## 2017-01-18 ENCOUNTER — OFFICE VISIT (OUTPATIENT)
Dept: ONCOLOGY | Age: 57
End: 2017-01-18

## 2017-01-18 ENCOUNTER — HOSPITAL ENCOUNTER (OUTPATIENT)
Dept: INFUSION THERAPY | Age: 57
Discharge: HOME | End: 2017-01-18
Payer: MEDICARE

## 2017-01-18 VITALS
TEMPERATURE: 98.4 F | BODY MASS INDEX: 35.39 KG/M2 | WEIGHT: 220.2 LBS | HEART RATE: 83 BPM | HEIGHT: 66 IN | RESPIRATION RATE: 16 BRPM | OXYGEN SATURATION: 96 % | DIASTOLIC BLOOD PRESSURE: 70 MMHG | SYSTOLIC BLOOD PRESSURE: 104 MMHG

## 2017-01-18 VITALS
DIASTOLIC BLOOD PRESSURE: 64 MMHG | TEMPERATURE: 99.1 F | SYSTOLIC BLOOD PRESSURE: 106 MMHG | OXYGEN SATURATION: 94 % | RESPIRATION RATE: 18 BRPM | HEART RATE: 86 BPM

## 2017-01-18 DIAGNOSIS — D69.9 BLEEDING DISORDER (HCC): ICD-10-CM

## 2017-01-18 DIAGNOSIS — D69.6 THROMBOCYTOPENIA (HCC): ICD-10-CM

## 2017-01-18 DIAGNOSIS — D64.89 ANEMIA DUE TO OTHER CAUSE: Primary | ICD-10-CM

## 2017-01-18 DIAGNOSIS — D64.89 ANEMIA DUE TO OTHER CAUSE: ICD-10-CM

## 2017-01-18 LAB
BASO+EOS+MONOS # BLD AUTO: 0.8 K/UL (ref 0–2.3)
BASO+EOS+MONOS # BLD AUTO: 16 % (ref 0.1–17)
DIFFERENTIAL METHOD BLD: ABNORMAL
ERYTHROCYTE [DISTWIDTH] IN BLOOD BY AUTOMATED COUNT: 14.4 % (ref 11.5–14.5)
HCT VFR BLD AUTO: 27.2 % (ref 36–48)
HGB BLD-MCNC: 8.5 G/DL (ref 12–16)
LYMPHOCYTES # BLD AUTO: 27 % (ref 14–44)
LYMPHOCYTES # BLD: 1.3 K/UL (ref 1.1–5.9)
MCH RBC QN AUTO: 30 PG (ref 25–35)
MCHC RBC AUTO-ENTMCNC: 31.3 G/DL (ref 31–37)
MCV RBC AUTO: 96.1 FL (ref 78–102)
NEUTS SEG # BLD: 2.9 K/UL (ref 1.8–9.5)
NEUTS SEG NFR BLD AUTO: 57 % (ref 40–70)
PLATELET # BLD AUTO: 511 K/UL (ref 140–440)
RBC # BLD AUTO: 2.83 M/UL (ref 4.1–5.1)
WBC # BLD AUTO: 5 K/UL (ref 4.5–13)

## 2017-01-18 PROCEDURE — 85250 CLOT FACTOR IX PTC/CHRSTMAS: CPT | Performed by: INTERNAL MEDICINE

## 2017-01-18 PROCEDURE — 85220 BLOOC CLOT FACTOR V TEST: CPT | Performed by: INTERNAL MEDICINE

## 2017-01-18 PROCEDURE — 85025 COMPLETE CBC W/AUTO DIFF WBC: CPT | Performed by: INTERNAL MEDICINE

## 2017-01-18 PROCEDURE — 85260 CLOT FACTOR X STUART-POWER: CPT | Performed by: INTERNAL MEDICINE

## 2017-01-18 PROCEDURE — 36415 COLL VENOUS BLD VENIPUNCTURE: CPT

## 2017-01-18 NOTE — PROGRESS NOTES
[facility] Progress Note    Date: 2017    Name: Hecotr Gomez    MRN: [id number]         : 1960      Ms. Anton Locke was assessed and education was provided. Ms. Latesha Ivey vitals were reviewed and patient was observed for 5 minutes prior to treatment. Visit Vitals    /64 (BP 1 Location: Left arm, BP Patient Position: Sitting)    Pulse 86    Temp 99.1 °F (37.3 °C)    Resp 18    SpO2 94%       Lab results were obtained and reviewed. Recent Results (from the past 12 hour(s))   CBC WITH 3 PART DIFF    Collection Time: 17  3:45 PM   Result Value Ref Range    WBC 5.0 4.5 - 13.0 K/uL    RBC 2.83 (L) 4.10 - 5.10 M/uL    HGB 8.5 (L) 12.0 - 16.0 g/dL    HCT 27.2 (L) 36 - 48 %    MCV 96.1 78 - 102 FL    MCH 30.0 25.0 - 35.0 PG    MCHC 31.3 31 - 37 g/dL    RDW 14.4 11.5 - 14.5 %    PLATELET 781 (H) 458 - 440 K/uL    NEUTROPHILS 57 40 - 70 %    MIXED CELLS 16 0.1 - 17 %    LYMPHOCYTES 27 14 - 44 %    ABS. NEUTROPHILS 2.9 1.8 - 9.5 K/UL    ABS. MIXED CELLS 0.8 0.0 - 2.3 K/uL    ABS. LYMPHOCYTES 1.3 1.1 - 5.9 K/UL    DF AUTOMATED       Venipuncture to left hand x1 attempt using butterfly collection needle. Gauze and coban applied to site. Ms. Anton Locke tolerated the infusion, and had no complaints. Patient armband removed and shredded. Ms. Anton Locke was discharged from [place] [address] in stable condition at [phone]. She is to follow up with physician for her next appointment.     Bhumi Gibson RN  2017  4:29 PM

## 2017-01-18 NOTE — PROGRESS NOTES
Hematology/Oncology Consultation Note    Name: Andrea Mars  Date: 2017  : 1960    Harry Lee MD       Ms. Santy Kevin  is a 55y.o.  year old female who was seen for severe anemia (blood loss), hematoma at right thigh s/p fall, with clinical and laboratory evidence of a  bleeding diathesis. Her recent lab workup was consistent with a possible factor inhibitor. Subjective: She continues to have discomfort at her right hip. Overall, she is feeling better. Past Medical History   Diagnosis Date    Alcohol abuse     Alcoholic hepatitis     Alcoholic pancreatitis     Anemia     Bipolar disorder (HCC)      Dr. Stephan Anders [facility])    Chronic abdominal pain     Chronic pancreatitis ([facility] [address]     Compression fracture of lumbar vertebra (HCC)      L4, L5     Depression      Dr. Stephan Anders ([facility])    Elevated LFTs     ETOH abuse     Fatty liver     GERD (gastroesophageal reflux disease)     Hyperlipidemia     Hypertension     Hypothyroidism     Lower GI bleeding     Morbid obesity ([address]     Obstructive sleep apnea     Substance induced mood disorder (HCC)     Vitamin D deficiency        Allergies   Allergen Reactions    Codeine Rash    Lamictal [Lamotrigine] Anaphylaxis    Morphine Anaphylaxis     Per patient, has tolerated dilaudid in the past.    Pcn [Penicillins] Anaphylaxis    Vancomycin Rash    Doxycycline Rash    Percocet [Oxycodone-Acetaminophen] Hives and Itching    Tetracycline Rash    Tomato Hives       Past Surgical History   Procedure Laterality Date    Diskectomy, anterior, with d  1995, 1997    Repair nonunion scaphoid carpal bone Right 2010     Wrist    Biopsy liver  08/15/2012     Lap Left hepatic liver wedge by Dr. Joe Wood; BX Revealed: Hepatic Steatosis, AVM w/ associated Subcapsular Fibrosis.      Hx hysterectomy      Hx tonsillectomy      Hx abdominal laparoscopy  2014 Laparoscopic Gastrostomy w/ Partial Gastrectomy for assistance in placement of Endoscopic Retrograde Cholangiopancreatography & Diagnostic Lap.  Hx carpal tunnel release      Hx cholecystectomy  09/16/2014     Dr. Luann Kirkland Hx colonoscopy  05/12/2012     Colonoscopy by Dr. Magnus Jones. Bell Correa: Bx Revealed Colon Polyps (Tubular Adenoma), Hemorrhoids.  Hx hemorrhoidectomy  04/30/2015     Dr. Jefferson Gomez    Hx gastric bypass  08/15/2012     Lap Christian-en-y    Colonoscopy N/A 1/17/2017     COLONOSCOPY performed by Hayley Pereira MD at [address] Marital status:      Spouse name: N/A    Number of children: N/A    Years of education: N/A     Occupational History    Not on file. Social History Main Topics    Smoking status: Never Smoker    Smokeless tobacco: Never Used    Alcohol use 0.0 oz/week     0 Standard drinks or equivalent per week      Comment: 6 pack of beer a week- 1/2/17 last use    Drug use: No    Sexual activity: Yes     Partners: Male     Birth control/ protection: Condom     Other Topics Concern    Not on file     Social History Narrative       Family History   Problem Relation Age of Onset    Cancer Father     Cancer Sister     Obesity Brother        Current Outpatient Prescriptions   Medication Sig Dispense Refill    traMADol (ULTRAM) 50 mg tablet Take 50 mg by mouth every six (6) hours as needed for Pain.  levoFLOXacin (LEVAQUIN) 750 mg tablet Take 1 Tab by mouth daily. 6 Tab 0    hydrOXYzine pamoate (VISTARIL) 50 mg capsule Take 50 mg by mouth two (2) times a day.  gabapentin (NEURONTIN) 600 mg tablet Take 600 mg by mouth three (3) times daily. Indications: NEUROPATHIC PAIN      ferrous sulfate (FEOSOL) 325 mg (65 mg iron) tablet Take 325 mg by mouth daily.  biotin 10,000 mcg cap Take 1 Cap by mouth.  famotidine (PEPCID) 20 mg tablet Take 20 mg by mouth daily.  Indications: HEARTBURN      cholecalciferol (VITAMIN D3) 1,000 unit tablet Take  by mouth daily.  folic acid 295 mcg tablet Take 800 mcg by mouth daily.  calcium citrate-vitamin d3 (CITRACAL+D) 315-200 mg-unit tab Take 1 Tab by mouth two (2) times daily (with meals).  fish oil-dha-epa 1,200-144-216 mg cap Take  by mouth.  pantoprazole (PROTONIX) 20 mg tablet Take 20 mg by mouth daily.  cloNIDine HCl (CATAPRES) 0.1 mg tablet Take  by mouth two (2) times a day.  alendronate (FOSAMAX) 70 mg tablet Take 70 mg by mouth Every Saturday.  ergocalciferol (ERGOCALCIFEROL) 50,000 unit capsule Take 50,000 Units by mouth every seven (7) days.  colesevelam (WELCHOL) 625 mg tablet Take 2 Tabs by mouth two (2) times daily (with meals). 120 Tab 0    escitalopram oxalate (LEXAPRO) 10 mg tablet Take 1 Tab by mouth daily. 10 Tab 0    levothyroxine (SYNTHROID) 25 mcg tablet Take 1 Tab by mouth Daily (before breakfast). Indications: HYPOTHYROIDISM (Patient taking differently: Take 50 mcg by mouth Daily (before breakfast). Indications: hypothyroidism) 30 Tab 0    OLANZapine (ZYPREXA ZYDIS) 10 mg disintegrating tablet Take 1 Tab by mouth nightly. 30 Tab 0    ascorbic acid (VITAMIN C) 500 mg tablet Take 1,000 mg by mouth daily.  dicyclomine (BENTYL) 20 mg tablet Take 20 mg by mouth every six (6) hours as needed.  cyanocobalamin (VITAMIN B-12) 1,000 mcg Subl 1,000 mcg by SubLINGual route daily.  ondansetron (ZOFRAN ODT) 4 mg disintegrating tablet Take 1 Tab by mouth every eight (8) hours as needed for Nausea. 15 Tab 0    HYDROcodone-acetaminophen (NORCO) 5-325 mg per tablet Take 1 Tab by mouth every four (4) hours as needed for Pain. Max Daily Amount: 6 Tabs. 12 Tab 0                   Review of Systems    Constitutional: Positive for fatigue. HENT: Negative. Eyes: Negative. Respiratory: Negative. Cardiovascular: Negative. Gastrointestinal: Positive for abdominal pain.  Negative for anal bleeding and blood in stool. Endocrine: Negative for cold intolerance. Genitourinary: Negative. Musculoskeletal: Positive for back pain, gait problem, joint swelling and myalgias. Negative for neck pain and neck stiffness. Skin: Positive for color change. Allergic/Immunologic: Negative. Hematological: Negative for adenopathy. Bruises/bleeds easily. Psychiatric/Behavioral: Positive for sleep disturbance. Negative for agitation, behavioral problems, confusion, decreased concentration, dysphoric mood, hallucinations, self-injury and suicidal ideas. The patient is nervous/anxious. The patient is not hyperactive      Objective:     Visit Vitals    /70 (BP 1 Location: Left arm, BP Patient Position: Sitting)    Pulse 83    Temp 98.4 °F (36.9 °C) (Oral)    Resp 16    Ht 5' 6\" (1.676 m)    Wt 99.9 kg (220 lb 3.2 oz)    SpO2 96%    BMI 35.54 kg/m2        Physical Exam:   Constitutional: She appears well-developed and well-nourished. No distress. HENT: Head: Normocephalic and atraumatic. Nose: Nose normal.   Eyes: Conjunctivae and EOM are normal. Pupils are equal, round, and reactive to light. No scleral icterus. Neck: Normal range of motion. No JVD present. Cardiovascular: Normal rate and normal heart sounds. Exam reveals no gallop. No murmur heard. Pulmonary/Chest: Effort normal and breath sounds normal.   Abdominal: Soft. Bowel sounds are normal. She exhibits no distension. obese   Musculoskeletal: She exhibits tenderness and deformity at right hip/ thigh.   ++ bruising at right hip   Lymphadenopathy:   She has no cervical adenopathy. Skin: Skin is warm and dry. Bruising, ecchymosis and petechiae noted at right thigh. No rash noted. She is not diaphoretic. No cyanosis. Nails show no clubbing.         Lab data: pending               Assessment/ Plan:         1.) ?  Bleeding Diathesis/ Right thigh hematoma s/p fall  -Continue to avoid ASA, NSAIDS, and anticoagulants  -PFA-100 screening-Negative for evidence of qualitative platelet dysfunction   -PT and INR both slightly prolonged  -Final results of PT and aPTT mixing studies consistent with a factor inhibitor to Factor(s) V (more likely) or IX, or X (less likely)  -A Factor VIII level was actually elevated  -A Von Willebrand panel was negative    -Serum Factor V, IX, and X activity levels ordered today  -RTC. In 3 weeks         2.) Anemia and thrombocytopenia  -Hb/ Hct had stabilized overall by the time of discharge from her recent hospitalization  -Platelet count now WNL  -Large hematoma at right thigh  -A HIT panel negative  -Stool guiac Positive-Outpt. GI evaluation apparently revealed gastritis  -Iron studies reviewed   -No lab evidence of hemolysis  -Transfuse PRBCs to keep serum Hb greater than 7.0 gm/dL  -Repeat CBC from today is pending      3.) Alcohol Abuse  -Heavy alcohol abuse over the past 2 years       Kendrick Sheriff MD Hematology-Medical Oncology  1/18/2017

## 2017-01-19 ENCOUNTER — APPOINTMENT (OUTPATIENT)
Dept: INFUSION THERAPY | Age: 57
End: 2017-01-19

## 2017-01-19 LAB
FACT IX ACT/NOR PPP: 124 % (ref 60–177)
FACT V ACT/NOR PPP: 70 % (ref 70–150)
FACT X ACT/NOR PPP: 98 % (ref 76–183)

## 2017-01-23 LAB
APTT IMM NP PPP: 38.3 SEC
APTT PPP: 36 SEC
PROTHROMBIN TIME: 14.7 SEC
PT IMM NP PPP: 15.4 SEC

## 2017-01-23 NOTE — ANCILLARY DISCHARGE INSTRUCTIONS
[facility] [address]  Discharge Phone Call       After-Care Discharge Phone Call Questions:    Were you able to get your prescriptions filled? Comment:      [x] Yes  []No    Comment if answer is \"No\"   Are you taking your medication(s) as your doctor ordered? Do you understand the purpose of your medications? Comment:    [x] Yes  []No    Comment if answer is \"No\"   Are you taking any other medications that are not on the list?  Comment:      [x] Yes  []No    Comment if answer is \"Yes\"   Do you have any questions about your medications? Comment:    [] Yes  [x]No    Comment if answer is \"Yes\"   Did you make your follow-up appointments (if the hospital did not do this before  discharge)? Comment:    [x] Yes  []No    Comment if answer is \"No\"   Is there any reason you might not be able to keep your follow-up appointments? Comment:     [] Yes  [x]No    Comment if answer is \"Yes\"   Do you have any questions about your care plan? Comment:    [] Yes  [x]No    Comment if answer is \"Yes\"   Do you have a good understanding of how you should manage your health? Comment:    [x] Yes  []No    Comment if answer is \"Yes\"   Do you know which symptoms to watch for that would mean you would need to call your doctor right away? Comment:      [x] Yes  []No    Comment if answer is \"No\"   Do you have any questions about the follow up process or any instructions that we have provided? Comment:    [] Yes  [x]No    Comment if answer is \"Yes\"   Did staff take your preferences into account?

## 2017-01-31 DIAGNOSIS — D69.9 BLEEDING DISORDER (HCC): Primary | ICD-10-CM

## 2017-02-08 DIAGNOSIS — D69.9 BLEEDING DISORDER (HCC): ICD-10-CM

## 2017-02-10 ENCOUNTER — HOSPITAL ENCOUNTER (OUTPATIENT)
Dept: INFUSION THERAPY | Age: 57
Discharge: HOME | End: 2017-02-10
Payer: MEDICARE

## 2017-02-10 ENCOUNTER — OFFICE VISIT (OUTPATIENT)
Dept: ONCOLOGY | Age: 57
End: 2017-02-10

## 2017-02-10 VITALS
SYSTOLIC BLOOD PRESSURE: 127 MMHG | RESPIRATION RATE: 18 BRPM | OXYGEN SATURATION: 95 % | HEART RATE: 99 BPM | TEMPERATURE: 98.2 F | DIASTOLIC BLOOD PRESSURE: 76 MMHG

## 2017-02-10 VITALS
HEART RATE: 91 BPM | BODY MASS INDEX: 29.89 KG/M2 | SYSTOLIC BLOOD PRESSURE: 115 MMHG | DIASTOLIC BLOOD PRESSURE: 71 MMHG | WEIGHT: 186 LBS | HEIGHT: 66 IN | OXYGEN SATURATION: 91 % | TEMPERATURE: 97 F | RESPIRATION RATE: 17 BRPM

## 2017-02-10 DIAGNOSIS — D69.6 THROMBOCYTOPENIA (HCC): ICD-10-CM

## 2017-02-10 DIAGNOSIS — D69.9 BLEEDING DISORDER (HCC): Primary | ICD-10-CM

## 2017-02-10 DIAGNOSIS — D64.89 ANEMIA DUE TO OTHER CAUSE: ICD-10-CM

## 2017-02-10 LAB
APTT PPP: 35.6 SEC (ref 23–36.4)
BASOPHILS # BLD AUTO: 0 K/UL (ref 0–0.06)
BASOPHILS # BLD: 1 % (ref 0–2)
DIFFERENTIAL METHOD BLD: ABNORMAL
EOSINOPHIL # BLD: 0.1 K/UL (ref 0–0.4)
EOSINOPHIL NFR BLD: 2 % (ref 0–5)
ERYTHROCYTE [DISTWIDTH] IN BLOOD BY AUTOMATED COUNT: 14.3 % (ref 11.6–14.5)
HCT VFR BLD AUTO: 36.2 % (ref 35–45)
HGB BLD-MCNC: 11.4 G/DL (ref 12–16)
INR PPP: 1 (ref 0.8–1.2)
LYMPHOCYTES # BLD AUTO: 34 % (ref 21–52)
LYMPHOCYTES # BLD: 1.4 K/UL (ref 0.9–3.6)
MCH RBC QN AUTO: 28.6 PG (ref 24–34)
MCHC RBC AUTO-ENTMCNC: 31.5 G/DL (ref 31–37)
MCV RBC AUTO: 91 FL (ref 74–97)
MONOCYTES # BLD: 0.4 K/UL (ref 0.05–1.2)
MONOCYTES NFR BLD AUTO: 9 % (ref 3–10)
NEUTS SEG # BLD: 2.2 K/UL (ref 1.8–8)
NEUTS SEG NFR BLD AUTO: 54 % (ref 40–73)
PLATELET # BLD AUTO: 282 K/UL (ref 135–420)
PMV BLD AUTO: 10.2 FL (ref 9.2–11.8)
PROTHROMBIN TIME: 13.1 SEC (ref 11.5–15.2)
RBC # BLD AUTO: 3.98 M/UL (ref 4.2–5.3)
WBC # BLD AUTO: 4.1 K/UL (ref 4.6–13.2)

## 2017-02-10 PROCEDURE — 36415 COLL VENOUS BLD VENIPUNCTURE: CPT

## 2017-02-10 PROCEDURE — 85025 COMPLETE CBC W/AUTO DIFF WBC: CPT | Performed by: INTERNAL MEDICINE

## 2017-02-10 PROCEDURE — 85730 THROMBOPLASTIN TIME PARTIAL: CPT | Performed by: INTERNAL MEDICINE

## 2017-02-10 PROCEDURE — 85210 CLOT FACTOR II PROTHROM SPEC: CPT | Performed by: INTERNAL MEDICINE

## 2017-02-10 PROCEDURE — 85610 PROTHROMBIN TIME: CPT | Performed by: INTERNAL MEDICINE

## 2017-02-10 NOTE — PROGRESS NOTES
[facility] Short Consult Note                  (Labs/Type & Cross/Portflush/Antibiotic/Non-Chemo injections)      Date: February 10, 2017    Name: Chinmay Gabriel    MRN: [id number]         : 1960    Treatment: Routine labs      Ms. Kristan Baxter was assessed and education was provided. Ms. Brooklynn Amanda vitals were reviewed and patient was observed for 5 minutes prior to treatment. Visit Vitals    /76    Pulse 99    Temp 98.2 °F (36.8 °C)    Resp 18    SpO2 95%       Lab results were obtained and reviewed. Recent Results (from the past 12 hour(s))   CBC WITH AUTOMATED DIFF    Collection Time: 02/10/17 12:10 PM   Result Value Ref Range    WBC 4.1 (L) 4.6 - 13.2 K/uL    RBC 3.98 (L) 4.20 - 5.30 M/uL    HGB 11.4 (L) 12.0 - 16.0 g/dL    HCT 36.2 35.0 - 45.0 %    MCV 91.0 74.0 - 97.0 FL    MCH 28.6 24.0 - 34.0 PG    MCHC 31.5 31.0 - 37.0 g/dL    RDW 14.3 11.6 - 14.5 %    PLATELET 976 583 - 117 K/uL    MPV 10.2 9.2 - 11.8 FL    NEUTROPHILS 54 40 - 73 %    LYMPHOCYTES 34 21 - 52 %    MONOCYTES 9 3 - 10 %    EOSINOPHILS 2 0 - 5 %    BASOPHILS 1 0 - 2 %    ABS. NEUTROPHILS 2.2 1.8 - 8.0 K/UL    ABS. LYMPHOCYTES 1.4 0.9 - 3.6 K/UL    ABS. MONOCYTES 0.4 0.05 - 1.2 K/UL    ABS. EOSINOPHILS 0.1 0.0 - 0.4 K/UL    ABS. BASOPHILS 0.0 0.0 - 0.06 K/UL    DF AUTOMATED     PROTHROMBIN TIME + INR    Collection Time: 02/10/17 12:10 PM   Result Value Ref Range    Prothrombin time 13.1 11.5 - 15.2 sec    INR 1.0 0.8 - 1.2     PTT    Collection Time: 02/10/17 12:10 PM   Result Value Ref Range    aPTT 35.6 23.0 - 36.4 SEC       Labs drawn via venipuncture from right hand X1 attempt. Ms. Kristan Baxter was discharged from [place] [address] in stable condition at 1215. She is to return as scheduled with Dr Varun Benedict  as for her next appointment.     Ivory Srivastava RN  February 10, 2017  4:08 PM

## 2017-02-10 NOTE — PROGRESS NOTES
Hematology/Oncology Consultation Note    Name: Silver Drummond  Date: 2/10/2017  : 1960    Dae Dove MD       Ms. Melissa Goel  is a 55y.o.  year old female who was seen for severe anemia (blood loss), hematoma at right thigh s/p fall, with clinical and laboratory evidence of a  bleeding diathesis. Her recent lab workup was consistent with a possible factor inhibitor, but her lab results have been non-diagnostic. Subjective:     She continues to have swelling and discomfort at her right hip. She denies any more falls. Overall, she is feeling improved. Problem List:    1.) ? Bleeding Diathesis/ Right Thigh Hematoma s/p fall  -Continue to avoid ASA, NSAIDS, and anticoagulants  -A PFA-100 screening-Negative for evidence of qualitative platelet dysfunction   -Most recent PT and INR both slightly prolonged 2017, repeat a PT/INR today  -Results of PT and aPTT mixing studies consistent with a factor inhibitor -Her Factor(s) V  or IX, or X level were a normal  -A Factor VIII level was actually elevated  -A Von Willebrand panel was negative    2.) Anemia and Thrombocytopenia      3.) Alcohol Abuse  -Heavy alcohol abuse over the past 2 years up to recent fall episode  -She reports that she has been abstaining from alcohol from mid.  2017 to now      Past Medical History   Diagnosis Date    Alcohol abuse     Alcoholic hepatitis     Alcoholic pancreatitis     Anemia     Bipolar disorder (HCC)      Dr. Nunes Anruadha [facility])    Chronic abdominal pain     Chronic pancreatitis ([facility] [address]     Compression fracture of lumbar vertebra (HCC)      L4, L5     Depression      Dr. Nunes Anuradha [facility])    Elevated LFTs     ETOH abuse     Fatty liver     GERD (gastroesophageal reflux disease)     Hyperlipidemia     Hypertension     Hypothyroidism     Lower GI bleeding     Morbid obesity ([address]     Obstructive sleep apnea     Substance induced mood disorder ([facility] [address]     Vitamin D deficiency        Allergies   Allergen Reactions    Codeine Rash    Lamictal [Lamotrigine] Anaphylaxis    Morphine Anaphylaxis     Per patient, has tolerated dilaudid in the past.    Pcn [Penicillins] Anaphylaxis    Vancomycin Rash    Doxycycline Rash    Percocet [Oxycodone-Acetaminophen] Hives and Itching    Tetracycline Rash    Tomato Hives       Past Surgical History   Procedure Laterality Date    Diskectomy, anterior, with d  8/1995, 11/1997    Repair nonunion scaphoid carpal bone Right 2010     Wrist    Biopsy liver  08/15/2012     Lap Left hepatic liver wedge by Dr. Sade Santana; BX Revealed: Hepatic Steatosis, AVM w/ associated Subcapsular Fibrosis.  Hx hysterectomy  2006    Hx tonsillectomy  1992    Hx abdominal laparoscopy  12/02/2014     Laparoscopic Gastrostomy w/ Partial Gastrectomy for assistance in placement of Endoscopic Retrograde Cholangiopancreatography & Diagnostic Lap.  Hx carpal tunnel release      Hx cholecystectomy  09/16/2014     Dr. Yasemin Moses Hx colonoscopy  05/12/2012     Colonoscopy by Dr. Denise Garcia. Anu Menendez: Bx Revealed Colon Polyps (Tubular Adenoma), Hemorrhoids.  Hx hemorrhoidectomy  04/30/2015     Dr. Jordana Vazquez. Jason    Hx gastric bypass  08/15/2012     Lap Christian-en-y    Colonoscopy N/A 1/17/2017     COLONOSCOPY performed by Kimberlee Ortiz MD at [address] [address] [address] Marital status:      Spouse name: N/A    Number of children: N/A    Years of education: N/A     Occupational History    Not on file.      Social History Main Topics    Smoking status: Never Smoker    Smokeless tobacco: Never Used    Alcohol use 0.0 oz/week     0 Standard drinks or equivalent per week      Comment: 6 pack of beer a week- 1/2/17 last use    Drug use: No    Sexual activity: Yes     Partners: Male     Birth control/ protection: Condom     Other Topics Concern    Not on file     Social History Narrative Family History   Problem Relation Age of Onset    Cancer Father     Cancer Sister     Obesity Brother        Current Outpatient Prescriptions   Medication Sig Dispense Refill    traMADol (ULTRAM) 50 mg tablet Take 50 mg by mouth every six (6) hours as needed for Pain.  hydrOXYzine pamoate (VISTARIL) 50 mg capsule Take 50 mg by mouth two (2) times a day.  gabapentin (NEURONTIN) 600 mg tablet Take 600 mg by mouth three (3) times daily. Indications: NEUROPATHIC PAIN      ferrous sulfate (FEOSOL) 325 mg (65 mg iron) tablet Take 325 mg by mouth daily.  biotin 10,000 mcg cap Take 1 Cap by mouth.  famotidine (PEPCID) 20 mg tablet Take 20 mg by mouth daily. Indications: HEARTBURN      cholecalciferol (VITAMIN D3) 1,000 unit tablet Take  by mouth daily.  folic acid 277 mcg tablet Take 800 mcg by mouth daily.  calcium citrate-vitamin d3 (CITRACAL+D) 315-200 mg-unit tab Take 1 Tab by mouth two (2) times daily (with meals).  fish oil-dha-epa 1,200-144-216 mg cap Take  by mouth.  pantoprazole (PROTONIX) 20 mg tablet Take 20 mg by mouth daily.  cloNIDine HCl (CATAPRES) 0.1 mg tablet Take  by mouth two (2) times a day.  alendronate (FOSAMAX) 70 mg tablet Take 70 mg by mouth Every Saturday.  ergocalciferol (ERGOCALCIFEROL) 50,000 unit capsule Take 50,000 Units by mouth every seven (7) days.  colesevelam (WELCHOL) 625 mg tablet Take 2 Tabs by mouth two (2) times daily (with meals). 120 Tab 0    escitalopram oxalate (LEXAPRO) 10 mg tablet Take 1 Tab by mouth daily. 10 Tab 0    levothyroxine (SYNTHROID) 25 mcg tablet Take 1 Tab by mouth Daily (before breakfast). Indications: HYPOTHYROIDISM (Patient taking differently: Take 50 mcg by mouth Daily (before breakfast). Indications: hypothyroidism) 30 Tab 0    OLANZapine (ZYPREXA ZYDIS) 10 mg disintegrating tablet Take 1 Tab by mouth nightly.  30 Tab 0    ascorbic acid (VITAMIN C) 500 mg tablet Take 1,000 mg by mouth daily.      dicyclomine (BENTYL) 20 mg tablet Take 20 mg by mouth every six (6) hours as needed.  cyanocobalamin (VITAMIN B-12) 1,000 mcg Subl 1,000 mcg by SubLINGual route daily.  ondansetron (ZOFRAN ODT) 4 mg disintegrating tablet Take 1 Tab by mouth every eight (8) hours as needed for Nausea. 15 Tab 0    HYDROcodone-acetaminophen (NORCO) 5-325 mg per tablet Take 1 Tab by mouth every four (4) hours as needed for Pain. Max Daily Amount: 6 Tabs. 12 Tab 0         Review of Systems    Constitutional: Positive for fatigue. HENT: Negative. Eyes: Negative. Respiratory: Negative. Cardiovascular: Negative. Gastrointestinal: Positive for abdominal pain. Negative for anal bleeding and blood in stool. Endocrine: Negative for cold intolerance. Genitourinary: Negative. Musculoskeletal: Positive for back pain, gait problem, joint swelling and myalgias. Negative for neck pain and neck stiffness. Skin: Positive for color change. Allergic/Immunologic: Negative. Hematological: Negative for adenopathy. Bruises/bleeds easily. Psychiatric/Behavioral: Positive for sleep disturbance. Negative for agitation, behavioral problems, confusion, decreased concentration, dysphoric mood, hallucinations, self-injury and suicidal ideas. The patient is nervous/anxious. The patient is not hyperactive      Objective:     Visit Vitals    /71 (BP 1 Location: Right arm, BP Patient Position: Sitting)    Pulse 91    Temp 97 °F (36.1 °C) (Oral)    Resp 17    Ht 5' 6\" (1.676 m)    Wt 84.4 kg (186 lb)    SpO2 91%    BMI 30.02 kg/m2        Physical Exam:   Constitutional: She appears well-developed and well-nourished. No distress. HENT: Head: Normocephalic and atraumatic. Nose: Nose normal.   Eyes: Conjunctivae and EOM are normal. Pupils are equal, round, and reactive to light. No scleral icterus. Neck: Normal range of motion. No JVD present. Cardiovascular: Normal rate and normal heart sounds.  Exam reveals no gallop. No murmur heard. Pulmonary/Chest: Effort normal and breath sounds normal.   Abdominal: Soft. Bowel sounds are normal. She exhibits no distension. obese   Musculoskeletal: She exhibits tenderness and deformity at right hip/ thigh.   ++ swelling at right hip with mild TTP  Lymphadenopathy:   She has no cervical adenopathy. Skin: Skin is warm and dry. Bruising, ecchymosis and petechiae noted at right thigh. No rash noted. She is not diaphoretic. No cyanosis. Nails show no clubbing.         Lab data: pending           Assessment/ Plan:       1.) ? Bleeding Diathesis/ Right Thigh Hematoma s/p fall  -Continue to avoid ASA, NSAIDS, and anticoagulants  -A PFA-100 screening-Negative for evidence of qualitative platelet dysfunction   -Most recent PT and INR both slightly prolonged 1/2017, repeat a PT/INR today  -Results of PT and aPTT mixing studies consistent with a factor inhibitor -Her Factor(s) V  or IX, or X level were a normal  -A Factor VIII level was actually elevated  -A Von Willebrand panel was also negative  -A repeat PT/INr and aPTT from today are pending    2.) Anemia and Thrombocytopenia   -A repeat CBC from today is pending      3.) Alcohol Abuse  -Heavy alcohol abuse over the past 2 years up to recent fall episode  -She reports that she has been abstaining from alcohol     -RTC. In 2 months    John Judge MD, Hematology-Medical Oncology  2/10/2017

## 2017-02-12 LAB — PROTHROM ACT/NOR PPP: 129 % (ref 50–154)

## 2017-02-27 ENCOUNTER — HOSPITAL ENCOUNTER (EMERGENCY)
Age: 57
Discharge: HOME | End: 2017-02-28
Attending: EMERGENCY MEDICINE
Payer: MEDICARE

## 2017-02-27 ENCOUNTER — APPOINTMENT (OUTPATIENT)
Dept: GENERAL RADIOLOGY | Age: 57
End: 2017-02-27
Attending: NURSE PRACTITIONER
Payer: MEDICARE

## 2017-02-27 ENCOUNTER — APPOINTMENT (OUTPATIENT)
Dept: CT IMAGING | Age: 57
End: 2017-02-27
Attending: NURSE PRACTITIONER
Payer: MEDICARE

## 2017-02-27 DIAGNOSIS — F10.929 ALCOHOL INTOXICATION, WITH UNSPECIFIED COMPLICATION (HCC): ICD-10-CM

## 2017-02-27 DIAGNOSIS — K86.0 CHRONIC PANCREATITIS DUE TO ACUTE ALCOHOL INTOXICATION (HCC): Primary | ICD-10-CM

## 2017-02-27 DIAGNOSIS — R79.89 INCREASED AMMONIA LEVEL: ICD-10-CM

## 2017-02-27 DIAGNOSIS — R74.8 ELEVATED LIVER ENZYMES: ICD-10-CM

## 2017-02-27 DIAGNOSIS — F10.929 CHRONIC PANCREATITIS DUE TO ACUTE ALCOHOL INTOXICATION (HCC): Primary | ICD-10-CM

## 2017-02-27 LAB
ALBUMIN SERPL BCP-MCNC: 3.7 G/DL (ref 3.4–5)
ALBUMIN/GLOB SERPL: 1 {RATIO} (ref 0.8–1.7)
ALP SERPL-CCNC: 251 U/L (ref 45–117)
ALT SERPL-CCNC: 160 U/L (ref 13–56)
AMMONIA PLAS-SCNC: 41 UMOL/L (ref 11–32)
AMPHET UR QL SCN: NEGATIVE
ANION GAP BLD CALC-SCNC: 8 MMOL/L (ref 3–18)
APPEARANCE UR: CLEAR
APTT PPP: 35.3 SEC (ref 23–36.4)
AST SERPL W P-5'-P-CCNC: 460 U/L (ref 15–37)
BACTERIA URNS QL MICRO: NEGATIVE /HPF
BARBITURATES UR QL SCN: NEGATIVE
BASOPHILS # BLD AUTO: 0.1 K/UL (ref 0–0.06)
BASOPHILS # BLD: 2 % (ref 0–2)
BENZODIAZ UR QL: NEGATIVE
BILIRUB DIRECT SERPL-MCNC: 0.2 MG/DL (ref 0–0.2)
BILIRUB SERPL-MCNC: 0.3 MG/DL (ref 0.2–1)
BILIRUB UR QL: NEGATIVE
BUN SERPL-MCNC: 12 MG/DL (ref 7–18)
BUN/CREAT SERPL: 16 (ref 12–20)
CALCIUM SERPL-MCNC: 8.1 MG/DL (ref 8.5–10.1)
CANNABINOIDS UR QL SCN: NEGATIVE
CHLORIDE SERPL-SCNC: 100 MMOL/L (ref 100–108)
CK MB CFR SERPL CALC: NORMAL % (ref 0–4)
CK MB SERPL-MCNC: <1 NG/ML (ref 5–25)
CK SERPL-CCNC: 109 U/L (ref 26–192)
CO2 SERPL-SCNC: 26 MMOL/L (ref 21–32)
COCAINE UR QL SCN: NEGATIVE
COLOR UR: YELLOW
CREAT SERPL-MCNC: 0.76 MG/DL (ref 0.6–1.3)
DIFFERENTIAL METHOD BLD: ABNORMAL
EOSINOPHIL # BLD: 0 K/UL (ref 0–0.4)
EOSINOPHIL NFR BLD: 1 % (ref 0–5)
EPITH CASTS URNS QL MICRO: NORMAL /LPF (ref 0–5)
ERYTHROCYTE [DISTWIDTH] IN BLOOD BY AUTOMATED COUNT: 14.1 % (ref 11.6–14.5)
ETHANOL SERPL-MCNC: 220 MG/DL (ref 0–3)
GLOBULIN SER CALC-MCNC: 3.6 G/DL (ref 2–4)
GLUCOSE SERPL-MCNC: 121 MG/DL (ref 74–99)
GLUCOSE UR STRIP.AUTO-MCNC: NEGATIVE MG/DL
HCT VFR BLD AUTO: 36.8 % (ref 35–45)
HDSCOM,HDSCOM: NORMAL
HGB BLD-MCNC: 12.4 G/DL (ref 12–16)
HGB UR QL STRIP: NEGATIVE
INR PPP: 1.3 (ref 0.8–1.2)
KETONES UR QL STRIP.AUTO: NEGATIVE MG/DL
LEUKOCYTE ESTERASE UR QL STRIP.AUTO: ABNORMAL
LIPASE SERPL-CCNC: 558 U/L (ref 73–393)
LYMPHOCYTES # BLD AUTO: 47 % (ref 21–52)
LYMPHOCYTES # BLD: 2 K/UL (ref 0.9–3.6)
MCH RBC QN AUTO: 28.6 PG (ref 24–34)
MCHC RBC AUTO-ENTMCNC: 33.7 G/DL (ref 31–37)
MCV RBC AUTO: 84.8 FL (ref 74–97)
METHADONE UR QL: NEGATIVE
MONOCYTES # BLD: 0.5 K/UL (ref 0.05–1.2)
MONOCYTES NFR BLD AUTO: 11 % (ref 3–10)
NEUTS SEG # BLD: 1.6 K/UL (ref 1.8–8)
NEUTS SEG NFR BLD AUTO: 39 % (ref 40–73)
NITRITE UR QL STRIP.AUTO: NEGATIVE
OPIATES UR QL: NEGATIVE
PCP UR QL: NEGATIVE
PH UR STRIP: 5 [PH] (ref 5–8)
PLATELET # BLD AUTO: 260 K/UL (ref 135–420)
PMV BLD AUTO: 8.7 FL (ref 9.2–11.8)
POTASSIUM SERPL-SCNC: 4 MMOL/L (ref 3.5–5.5)
PROT SERPL-MCNC: 7.3 G/DL (ref 6.4–8.2)
PROT UR STRIP-MCNC: NEGATIVE MG/DL
PROTHROMBIN TIME: 15.4 SEC (ref 11.5–15.2)
RBC # BLD AUTO: 4.34 M/UL (ref 4.2–5.3)
RBC #/AREA URNS HPF: 0 /HPF (ref 0–5)
SODIUM SERPL-SCNC: 134 MMOL/L (ref 136–145)
SP GR UR REFRACTOMETRY: 1.01 (ref 1–1.03)
TROPONIN I SERPL-MCNC: <0.02 NG/ML (ref 0–0.04)
UROBILINOGEN UR QL STRIP.AUTO: 0.2 EU/DL (ref 0.2–1)
WBC # BLD AUTO: 4.1 K/UL (ref 4.6–13.2)
WBC URNS QL MICRO: NORMAL /HPF (ref 0–4)

## 2017-02-27 PROCEDURE — 81001 URINALYSIS AUTO W/SCOPE: CPT | Performed by: EMERGENCY MEDICINE

## 2017-02-27 PROCEDURE — 80307 DRUG TEST PRSMV CHEM ANLYZR: CPT | Performed by: EMERGENCY MEDICINE

## 2017-02-27 PROCEDURE — 96372 THER/PROPH/DIAG INJ SC/IM: CPT

## 2017-02-27 PROCEDURE — 85025 COMPLETE CBC W/AUTO DIFF WBC: CPT | Performed by: EMERGENCY MEDICINE

## 2017-02-27 PROCEDURE — 80076 HEPATIC FUNCTION PANEL: CPT | Performed by: EMERGENCY MEDICINE

## 2017-02-27 PROCEDURE — 85730 THROMBOPLASTIN TIME PARTIAL: CPT | Performed by: EMERGENCY MEDICINE

## 2017-02-27 PROCEDURE — 80048 BASIC METABOLIC PNL TOTAL CA: CPT | Performed by: EMERGENCY MEDICINE

## 2017-02-27 PROCEDURE — 96361 HYDRATE IV INFUSION ADD-ON: CPT

## 2017-02-27 PROCEDURE — 85610 PROTHROMBIN TIME: CPT | Performed by: EMERGENCY MEDICINE

## 2017-02-27 PROCEDURE — 99284 EMERGENCY DEPT VISIT MOD MDM: CPT

## 2017-02-27 PROCEDURE — 74000 XR ABD (KUB): CPT

## 2017-02-27 PROCEDURE — 83690 ASSAY OF LIPASE: CPT | Performed by: EMERGENCY MEDICINE

## 2017-02-27 PROCEDURE — 96374 THER/PROPH/DIAG INJ IV PUSH: CPT

## 2017-02-27 PROCEDURE — 70450 CT HEAD/BRAIN W/O DYE: CPT

## 2017-02-27 PROCEDURE — 82550 ASSAY OF CK (CPK): CPT | Performed by: EMERGENCY MEDICINE

## 2017-02-27 PROCEDURE — 82140 ASSAY OF AMMONIA: CPT | Performed by: EMERGENCY MEDICINE

## 2017-02-27 PROCEDURE — 74011250636 HC RX REV CODE- 250/636: Performed by: NURSE PRACTITIONER

## 2017-02-27 RX ORDER — ONDANSETRON 2 MG/ML
4 INJECTION INTRAMUSCULAR; INTRAVENOUS
Status: COMPLETED | OUTPATIENT
Start: 2017-02-27 | End: 2017-02-27

## 2017-02-27 RX ORDER — SODIUM CHLORIDE 0.9 % (FLUSH) 0.9 %
5-10 SYRINGE (ML) INJECTION AS NEEDED
Status: DISCONTINUED | OUTPATIENT
Start: 2017-02-27 | End: 2017-02-28 | Stop reason: HOSPADM

## 2017-02-27 RX ORDER — SODIUM CHLORIDE 0.9 % (FLUSH) 0.9 %
5-10 SYRINGE (ML) INJECTION EVERY 8 HOURS
Status: DISCONTINUED | OUTPATIENT
Start: 2017-02-27 | End: 2017-02-28 | Stop reason: HOSPADM

## 2017-02-27 RX ORDER — LACTULOSE 10 G/15ML
15 SOLUTION ORAL 3 TIMES DAILY
Qty: 15 CUP | Refills: 0 | Status: SHIPPED | OUTPATIENT
Start: 2017-02-27 | End: 2017-08-31 | Stop reason: ALTCHOICE

## 2017-02-27 RX ORDER — THIAMINE HYDROCHLORIDE 100 MG/ML
100 INJECTION, SOLUTION INTRAMUSCULAR; INTRAVENOUS
Status: COMPLETED | OUTPATIENT
Start: 2017-02-27 | End: 2017-02-27

## 2017-02-27 RX ADMIN — ONDANSETRON 4 MG: 2 INJECTION INTRAMUSCULAR; INTRAVENOUS at 22:10

## 2017-02-27 RX ADMIN — THIAMINE HYDROCHLORIDE 100 MG: 100 INJECTION, SOLUTION INTRAMUSCULAR; INTRAVENOUS at 23:34

## 2017-02-27 RX ADMIN — SODIUM CHLORIDE 2000 ML: 900 INJECTION, SOLUTION INTRAVENOUS at 23:34

## 2017-02-27 NOTE — ED TRIAGE NOTES
\" I keep falling, I can't stand on my feet, can't hold anything\", c/o abdominal pain , nausea, diarrhea, unable to eat for three days. Patient reports alcohol abuse. She drank last night. Pt reports hemophelia A. Patient reports falling this afternoon.

## 2017-02-27 NOTE — ED NOTES
I performed a brief evaluation, including history and physical, of the patient here in triage and I have determined that pt will need further treatment and evaluation from the main side ER physician. I have placed initial orders to help in expediting patients care.      February 27, 2017 at 6:43 PM - Kylee Schulz DO        Visit Vitals    /68 (BP 1 Location: Right arm, BP Patient Position: Sitting)    Pulse 84    Temp 98.1 °F (36.7 °C)    Resp 16    Ht 5' 6\" (1.676 m)    Wt 89.4 kg (197 lb)    SpO2 97%    BMI 31.8 kg/m2

## 2017-02-28 VITALS
OXYGEN SATURATION: 99 % | RESPIRATION RATE: 16 BRPM | SYSTOLIC BLOOD PRESSURE: 136 MMHG | HEIGHT: 66 IN | WEIGHT: 197 LBS | DIASTOLIC BLOOD PRESSURE: 89 MMHG | TEMPERATURE: 98 F | HEART RATE: 88 BPM | BODY MASS INDEX: 31.66 KG/M2

## 2017-02-28 PROCEDURE — 74011250637 HC RX REV CODE- 250/637: Performed by: NURSE PRACTITIONER

## 2017-02-28 RX ADMIN — LACTULOSE 30 G: 10 SOLUTION ORAL at 01:15

## 2017-02-28 NOTE — ED NOTES
Pt leaving the unit without prescription NP Thur ordered. Pt verbalized understanding when RN educate pt regard discharged instruction and regard new meds ordered. Staff attempt to find pt, but unsuccessful to locate pt.

## 2017-02-28 NOTE — DISCHARGE INSTRUCTIONS
Acute Alcohol Intoxication: Care Instructions  Your Care Instructions  You have had treatment to help your body rid itself of alcohol. Too much alcohol upsets the body's fluid balance. Your doctor may have given you fluids and vitamins. For some people, drinking too much alcohol is a one-time event. For others, it is an ongoing problem. In either case, it is serious. It can be life-threatening. Follow-up care is a key part of your treatment and safety. Be sure to make and go to all appointments, and call your doctor if you are having problems. It's also a good idea to know your test results and keep a list of the medicines you take. How can you care for yourself at home? · Be safe with medicines. Take your medicines exactly as prescribed. Call your doctor if you think you are having a problem with your medicine. · Your doctor may have prescribed disulfiram (Antabuse). Do not drink any alcohol while you are taking this medicine. You may have severe or even life-threatening side effects from even small amounts of alcohol. · If you were given medicine to prevent nausea, be sure to take it exactly as prescribed. · Before you take any medicine, tell your doctor if:  ¨ You have had a bad reaction to any medicines in the past.  ¨ You are taking other medicines, including over-the-counter ones, or have other health problems. ¨ You are or could be pregnant. · Be prepared to have some symptoms of withdrawal in the next few days. · Drink plenty of liquids in the next few days. · Seek help if you need it to stop drinking. Getting counseling and joining a support group can help you stay sober. Try a support group such as Alcoholics Anonymous. · Avoid alcohol when you take medicines. It can react with many medicines and cause serious problems. When should you call for help? Call 911 anytime you think you may need emergency care.  For example, call if:  · You feel confused and are seeing things that are not there.  · You are thinking about killing yourself or hurting others. · You have a seizure. · You vomit blood or what looks like coffee grounds. Call your doctor now or seek immediate medical care if:  · You have trembling, restlessness, sweating, and other withdrawal symptoms that are new or that get worse. · Your withdrawal symptoms come back after not bothering you for days or weeks. · You can't stop vomiting. Watch closely for changes in your health, and be sure to contact your doctor if:  · You need help to stop drinking. Where can you learn more? Go to http://salvatore-izabela.info/. Enter T102 in the search box to learn more about \"Acute Alcohol Intoxication: Care Instructions. \"  Current as of: May 27, 2016  Content Version: 11.1  © 9832-6913 [employer]. Care instructions adapted under license by [employer] (which disclaims liability or warranty for this information). If you have questions about a medical condition or this instruction, always ask your healthcare professional. [address] any warranty or liability for your use of this information. FedBid Activation    Thank you for requesting access to FedBid. Please follow the instructions below to securely access and download your online medical record. FedBid allows you to send messages to your doctor, view your test results, renew your prescriptions, schedule appointments, and more. How Do I Sign Up? 1. In your internet browser, go to [email or web address]  2. Click on the First Time User? Click Here link in the Sign In box. You will be redirect to the New Member Sign Up page. 3. Enter your FedBid Access Code exactly as it appears below. You will not need to use this code after youve completed the sign-up process. If you do not sign up before the expiration date, you must request a new code. FedBid Access Code:  Activation code not generated  Current FedBid Status: Patient Declined (This is the date your ensembli access code will )    4. Enter the last four digits of your Social Security Number (xxxx) and Date of Birth (mm/dd/yyyy) as indicated and click Submit. You will be taken to the next sign-up page. 5. Create a ensembli ID. This will be your ensembli login ID and cannot be changed, so think of one that is secure and easy to remember. 6. Create a ensembli password. You can change your password at any time. 7. Enter your Password Reset Question and Answer. This can be used at a later time if you forget your password. 8. Enter your e-mail address. You will receive e-mail notification when new information is available in [address] 9. Click Sign Up. You can now view and download portions of your medical record. 10. Click the Download Summary menu link to download a portable copy of your medical information. Additional Information    If you have questions, please visit the Frequently Asked Questions section of the ensembli website at [email or web address] Remember, ensembli is NOT to be used for urgent needs. For medical emergencies, dial 911. ]Keep well hydrated. Be sure to drink 2-3  Liters of water daily. It is very important that you  Stop drinking alcohol.

## 2017-02-28 NOTE — ED PROVIDER NOTES
HPI Comments: Aicha Summers is a 65year old female who presetns to the ED with a c/o upper quadrants abdominal pain that has persisted all day today. Pt states she has a Hx of pancreatitis and alcohol abuse, as well as alcohol related hepatitis. The pain has persisted all day today, adding that she has been nauseated and dizzy as well as having abdominal pain. Patient is a 65 y.o. female presenting with abdominal pain, dizziness, nausea, and alcohol problem. The history is provided by the patient. History limited by: No communication barrier. Abdominal Pain    This is a new problem. The current episode started 12 to 24 hours ago. The problem occurs constantly. The problem has not changed since onset. Associated with: Pt makes an association with previously diagnosed pancreatitis. The pain is moderate. Associated symptoms include nausea. Nothing worsens the pain. The pain is relieved by nothing. Dizziness   Associated symptoms include nausea. Nausea    Associated symptoms include abdominal pain. Alcohol Problem   Associated symptoms include nausea.         Past Medical History:   Diagnosis Date    Alcohol abuse     Alcoholic hepatitis     Alcoholic pancreatitis     Anemia     Bipolar disorder (HCC)     Dr. Cao [facility])    Chronic abdominal pain     Chronic pancreatitis ([address]     Compression fracture of lumbar vertebra (HCC)     L4, L5     Depression     Dr. Cao [facility])    Elevated LFTs     ETOH abuse     Fatty liver     GERD (gastroesophageal reflux disease)     Hyperlipidemia     Hypertension     Hypothyroidism     Lower GI bleeding     Morbid obesity ([address]     Obstructive sleep apnea     Substance induced mood disorder ([address]     Vitamin D deficiency        Past Surgical History:   Procedure Laterality Date    BIOPSY LIVER  08/15/2012    Lap Left hepatic liver wedge by Dr. Heather Sauer; BX Revealed: Hepatic Steatosis, AVM w/ associated Subcapsular Fibrosis.  COLONOSCOPY N/A 1/17/2017    COLONOSCOPY performed by Nicolas Mahoney MD at [address] DISKECTOMY, ANTERIOR, WITH D  8/1995, 11/1997    HX ABDOMINAL LAPAROSCOPY  12/02/2014    Laparoscopic Gastrostomy w/ Partial Gastrectomy for assistance in placement of Endoscopic Retrograde Cholangiopancreatography & Diagnostic Lap.  HX CARPAL TUNNEL RELEASE      HX CHOLECYSTECTOMY  09/16/2014    Dr. Arley Charles HX COLONOSCOPY  05/12/2012    Colonoscopy by Dr. Sudheer Cervantes. Dennis Perez: Bx Revealed Colon Polyps (Tubular Adenoma), Hemorrhoids.  HX GASTRIC BYPASS  08/15/2012    Lap Christian-en-y    HX HEMORRHOIDECTOMY  04/30/2015    Dr. Awa Jurado. Jason    HX HYSTERECTOMY  2006    HX TONSILLECTOMY  1992    REPAIR NONUNION SCAPHOID CARPAL BONE Right 2010    Wrist         Family History:   Problem Relation Age of Onset    Cancer Father     Cancer Sister     Obesity Brother        Social History     Social History    Marital status:      Spouse name: N/A    Number of children: N/A    Years of education: N/A     Occupational History    Not on file. Social History Main Topics    Smoking status: Never Smoker    Smokeless tobacco: Never Used    Alcohol use 0.0 oz/week     0 Standard drinks or equivalent per week      Comment: 6 pack of beer a week- 1/2/17 last use    Drug use: No    Sexual activity: Yes     Partners: Male     Birth control/ protection: Condom     Other Topics Concern    Not on file     Social History Narrative         ALLERGIES: Codeine; Lamictal [lamotrigine]; Morphine; Pcn [penicillins]; Vancomycin; Doxycycline; Percocet [oxycodone-acetaminophen]; Tetracycline; and Tomato    Review of Systems   Constitutional: Negative. HENT: Negative. Eyes: Negative. Respiratory: Negative. Cardiovascular: Negative. Gastrointestinal: Positive for abdominal pain and nausea. Endocrine: Negative. Genitourinary: Negative.     Musculoskeletal: Negative. Skin: Negative. Allergic/Immunologic: Negative. Neurological: Positive for dizziness. Hematological: Negative. Psychiatric/Behavioral: Negative. Vitals:    02/27/17 1840   BP: 107/68   Pulse: 84   Resp: 16   Temp: 98.1 °F (36.7 °C)   SpO2: 97%   Weight: 89.4 kg (197 lb)   Height: 5' 6\" (1.676 m)            Physical Exam   Constitutional: She is oriented to person, place, and time. She appears well-developed and well-nourished. No distress. HENT:   Head: Normocephalic and atraumatic. Eyes: EOM are normal. Pupils are equal, round, and reactive to light. Neck: Normal range of motion. Neck supple. Cardiovascular: Normal rate, regular rhythm, normal heart sounds and intact distal pulses. Pulmonary/Chest: Effort normal and breath sounds normal. No respiratory distress. She has no wheezes. She has no rales. Abdominal: Soft. She exhibits distension (secondary to obesity). There is no tenderness. There is no rebound and no guarding. Genitourinary:   Genitourinary Comments: NE   Musculoskeletal: Normal range of motion. Neurological: She is alert and oriented to person, place, and time. No cranial nerve deficit. Coordination normal.   Skin: Skin is warm and dry. Psychiatric: She has a normal mood and affect. Nursing note and vitals reviewed. MDM  Number of Diagnoses or Management Options  Alcohol intoxication, with unspecified complication [facility]):   Chronic pancreatitis due to acute alcohol intoxication [facility]):   Elevated liver enzymes:   Increased ammonia level:   Diagnosis management comments: The CT scan wet read was reviewed. No cute findings. The abdomen plain film was reviewed. There is non obstructive stooling pattern noted. Padmini Carpenter NP  11:38 PM    CONHSULT:  Discussed disposition with Dr Garrett Brown, who agreed with the plan of action.   Padmini Carpenter NP  11:46 PM           Amount and/or Complexity of Data Reviewed  Clinical lab tests: ordered  Tests in the radiology section of CPT®: ordered and reviewed  Discuss the patient with other providers: yes (Dr Francis Arteaga  )  Independent visualization of images, tracings, or specimens: yes (Abdomen plain film  )    Risk of Complications, Morbidity, and/or Mortality  Presenting problems: moderate  Diagnostic procedures: moderate  Management options: moderate    Patient Progress  Patient progress: stable    ED Course       Procedures    Diagnosis:   1. Chronic pancreatitis due to acute alcohol intoxication (HCC)    2. Elevated liver enzymes    3. Increased ammonia level    4. Alcohol intoxication, with unspecified complication ([facility]          Disposition:   Discharged to Home. Follow-up Information     Follow up With Details Comments Amelia Bradford MD Call in the morning to arrange follow up. [facility]  [phone]            Patient's Medications   Start Taking    LACTULOSE 10 GRAM/15 ML (15 ML) SOLN    Take 15 mL by mouth three (3) times daily. Continue Taking    ALENDRONATE (FOSAMAX) 70 MG TABLET    Take 70 mg by mouth Every Saturday. ASCORBIC ACID (VITAMIN C) 500 MG TABLET    Take 1,000 mg by mouth daily. BIOTIN 10,000 MCG CAP    Take 1 Cap by mouth. CALCIUM CITRATE-VITAMIN D3 (CITRACAL+D) 315-200 MG-UNIT TAB    Take 1 Tab by mouth two (2) times daily (with meals). CHOLECALCIFEROL (VITAMIN D3) 1,000 UNIT TABLET    Take  by mouth daily. CLONIDINE HCL (CATAPRES) 0.1 MG TABLET    Take  by mouth two (2) times a day. COLESEVELAM (WELCHOL) 625 MG TABLET    Take 2 Tabs by mouth two (2) times daily (with meals). CYANOCOBALAMIN (VITAMIN B-12) 1,000 MCG SUBL    1,000 mcg by SubLINGual route daily. DICYCLOMINE (BENTYL) 20 MG TABLET    Take 20 mg by mouth every six (6) hours as needed. ERGOCALCIFEROL (ERGOCALCIFEROL) 50,000 UNIT CAPSULE    Take 50,000 Units by mouth every seven (7) days.     ESCITALOPRAM OXALATE (LEXAPRO) 10 MG TABLET    Take 1 Tab by mouth daily.    FAMOTIDINE (PEPCID) 20 MG TABLET    Take 20 mg by mouth daily. Indications: HEARTBURN    FERROUS SULFATE (FEOSOL) 325 MG (65 MG IRON) TABLET    Take 325 mg by mouth daily. FISH OIL-DHA-EPA 1,200-144-216 MG CAP    Take  by mouth. FOLIC ACID 568 MCG TABLET    Take 800 mcg by mouth daily. GABAPENTIN (NEURONTIN) 600 MG TABLET    Take 600 mg by mouth three (3) times daily. Indications: NEUROPATHIC PAIN    HYDROXYZINE PAMOATE (VISTARIL) 50 MG CAPSULE    Take 50 mg by mouth two (2) times a day. LEVOTHYROXINE (SYNTHROID) 25 MCG TABLET    Take 1 Tab by mouth Daily (before breakfast). Indications: HYPOTHYROIDISM    OLANZAPINE (ZYPREXA ZYDIS) 10 MG DISINTEGRATING TABLET    Take 1 Tab by mouth nightly. PANTOPRAZOLE (PROTONIX) 20 MG TABLET    Take 20 mg by mouth daily. These Medications have changed    No medications on file   Stop Taking    HYDROCODONE-ACETAMINOPHEN (NORCO) 5-325 MG PER TABLET    Take 1 Tab by mouth every four (4) hours as needed for Pain. Max Daily Amount: 6 Tabs. ONDANSETRON (ZOFRAN ODT) 4 MG DISINTEGRATING TABLET    Take 1 Tab by mouth every eight (8) hours as needed for Nausea. TRAMADOL (ULTRAM) 50 MG TABLET    Take 50 mg by mouth every six (6) hours as needed for Pain.

## 2017-05-18 ENCOUNTER — APPOINTMENT (OUTPATIENT)
Dept: CT IMAGING | Age: 57
End: 2017-05-18
Attending: PHYSICIAN ASSISTANT
Payer: MEDICARE

## 2017-05-18 ENCOUNTER — APPOINTMENT (OUTPATIENT)
Dept: GENERAL RADIOLOGY | Age: 57
End: 2017-05-18
Attending: PHYSICIAN ASSISTANT
Payer: MEDICARE

## 2017-05-18 ENCOUNTER — HOSPITAL ENCOUNTER (EMERGENCY)
Age: 57
Discharge: HOME | End: 2017-05-18
Attending: EMERGENCY MEDICINE
Payer: MEDICARE

## 2017-05-18 VITALS
SYSTOLIC BLOOD PRESSURE: 128 MMHG | HEART RATE: 104 BPM | OXYGEN SATURATION: 97 % | RESPIRATION RATE: 21 BRPM | WEIGHT: 210 LBS | TEMPERATURE: 99.3 F | BODY MASS INDEX: 33.89 KG/M2 | DIASTOLIC BLOOD PRESSURE: 84 MMHG

## 2017-05-18 DIAGNOSIS — R03.0 ELEVATED BLOOD PRESSURE READING: ICD-10-CM

## 2017-05-18 DIAGNOSIS — K85.90 ACUTE PANCREATITIS, UNSPECIFIED COMPLICATION STATUS, UNSPECIFIED PANCREATITIS TYPE: Primary | ICD-10-CM

## 2017-05-18 LAB
ALBUMIN SERPL BCP-MCNC: 4.2 G/DL (ref 3.4–5)
ALBUMIN/GLOB SERPL: 1.1 {RATIO} (ref 0.8–1.7)
ALP SERPL-CCNC: 228 U/L (ref 45–117)
ALT SERPL-CCNC: 76 U/L (ref 13–56)
ANION GAP BLD CALC-SCNC: 12 MMOL/L (ref 3–18)
APPEARANCE UR: CLEAR
AST SERPL W P-5'-P-CCNC: 165 U/L (ref 15–37)
ATRIAL RATE: 94 BPM
BACTERIA URNS QL MICRO: ABNORMAL /HPF
BASOPHILS # BLD AUTO: 0 K/UL (ref 0–0.06)
BASOPHILS # BLD: 0 % (ref 0–2)
BILIRUB SERPL-MCNC: 0.7 MG/DL (ref 0.2–1)
BILIRUB UR QL: NEGATIVE
BUN SERPL-MCNC: 9 MG/DL (ref 7–18)
BUN/CREAT SERPL: 13 (ref 12–20)
CALCIUM SERPL-MCNC: 8.9 MG/DL (ref 8.5–10.1)
CALCULATED P AXIS, ECG09: 66 DEGREES
CALCULATED R AXIS, ECG10: 9 DEGREES
CALCULATED T AXIS, ECG11: 50 DEGREES
CHLORIDE SERPL-SCNC: 94 MMOL/L (ref 100–108)
CO2 SERPL-SCNC: 25 MMOL/L (ref 21–32)
COLOR UR: YELLOW
CREAT SERPL-MCNC: 0.67 MG/DL (ref 0.6–1.3)
DIAGNOSIS, 93000: NORMAL
DIFFERENTIAL METHOD BLD: ABNORMAL
EOSINOPHIL # BLD: 0 K/UL (ref 0–0.4)
EOSINOPHIL NFR BLD: 0 % (ref 0–5)
EPITH CASTS URNS QL MICRO: ABNORMAL /LPF (ref 0–5)
ERYTHROCYTE [DISTWIDTH] IN BLOOD BY AUTOMATED COUNT: 14.7 % (ref 11.6–14.5)
GLOBULIN SER CALC-MCNC: 3.7 G/DL (ref 2–4)
GLUCOSE SERPL-MCNC: 132 MG/DL (ref 74–99)
GLUCOSE UR STRIP.AUTO-MCNC: NEGATIVE MG/DL
HCT VFR BLD AUTO: 38.5 % (ref 35–45)
HGB BLD-MCNC: 13.3 G/DL (ref 12–16)
HGB UR QL STRIP: NEGATIVE
KETONES UR QL STRIP.AUTO: NEGATIVE MG/DL
LACTATE BLD-SCNC: 2.4 MMOL/L (ref 0.4–2)
LACTATE BLD-SCNC: 3.6 MMOL/L (ref 0.4–2)
LEUKOCYTE ESTERASE UR QL STRIP.AUTO: ABNORMAL
LIPASE SERPL-CCNC: 317 U/L (ref 73–393)
LYMPHOCYTES # BLD AUTO: 11 % (ref 21–52)
LYMPHOCYTES # BLD: 1.1 K/UL (ref 0.9–3.6)
MCH RBC QN AUTO: 30.5 PG (ref 24–34)
MCHC RBC AUTO-ENTMCNC: 34.5 G/DL (ref 31–37)
MCV RBC AUTO: 88.3 FL (ref 74–97)
MONOCYTES # BLD: 0.6 K/UL (ref 0.05–1.2)
MONOCYTES NFR BLD AUTO: 6 % (ref 3–10)
NEUTS SEG # BLD: 7.8 K/UL (ref 1.8–8)
NEUTS SEG NFR BLD AUTO: 83 % (ref 40–73)
NITRITE UR QL STRIP.AUTO: NEGATIVE
P-R INTERVAL, ECG05: 150 MS
PH UR STRIP: 5 [PH] (ref 5–8)
PLATELET # BLD AUTO: 284 K/UL (ref 135–420)
PMV BLD AUTO: 8.7 FL (ref 9.2–11.8)
POTASSIUM SERPL-SCNC: 4.5 MMOL/L (ref 3.5–5.5)
PROT SERPL-MCNC: 7.9 G/DL (ref 6.4–8.2)
PROT UR STRIP-MCNC: NEGATIVE MG/DL
Q-T INTERVAL, ECG07: 370 MS
QRS DURATION, ECG06: 84 MS
QTC CALCULATION (BEZET), ECG08: 462 MS
RBC # BLD AUTO: 4.36 M/UL (ref 4.2–5.3)
RBC #/AREA URNS HPF: NEGATIVE /HPF (ref 0–5)
SODIUM SERPL-SCNC: 131 MMOL/L (ref 136–145)
SP GR UR REFRACTOMETRY: 1.02 (ref 1–1.03)
UROBILINOGEN UR QL STRIP.AUTO: 0.2 EU/DL (ref 0.2–1)
VENTRICULAR RATE, ECG03: 94 BPM
WBC # BLD AUTO: 9.5 K/UL (ref 4.6–13.2)
WBC URNS QL MICRO: ABNORMAL /HPF (ref 0–4)

## 2017-05-18 PROCEDURE — 99285 EMERGENCY DEPT VISIT HI MDM: CPT

## 2017-05-18 PROCEDURE — 83690 ASSAY OF LIPASE: CPT | Performed by: EMERGENCY MEDICINE

## 2017-05-18 PROCEDURE — 85025 COMPLETE CBC W/AUTO DIFF WBC: CPT | Performed by: EMERGENCY MEDICINE

## 2017-05-18 PROCEDURE — 74011250636 HC RX REV CODE- 250/636: Performed by: PHYSICIAN ASSISTANT

## 2017-05-18 PROCEDURE — 80053 COMPREHEN METABOLIC PANEL: CPT | Performed by: EMERGENCY MEDICINE

## 2017-05-18 PROCEDURE — 74011000258 HC RX REV CODE- 258: Performed by: PHYSICIAN ASSISTANT

## 2017-05-18 PROCEDURE — 96365 THER/PROPH/DIAG IV INF INIT: CPT

## 2017-05-18 PROCEDURE — 74011000250 HC RX REV CODE- 250: Performed by: PHYSICIAN ASSISTANT

## 2017-05-18 PROCEDURE — 87040 BLOOD CULTURE FOR BACTERIA: CPT | Performed by: PHYSICIAN ASSISTANT

## 2017-05-18 PROCEDURE — 74177 CT ABD & PELVIS W/CONTRAST: CPT

## 2017-05-18 PROCEDURE — 96368 THER/DIAG CONCURRENT INF: CPT

## 2017-05-18 PROCEDURE — 74011250636 HC RX REV CODE- 250/636: Performed by: EMERGENCY MEDICINE

## 2017-05-18 PROCEDURE — 74011636320 HC RX REV CODE- 636/320: Performed by: EMERGENCY MEDICINE

## 2017-05-18 PROCEDURE — 96376 TX/PRO/DX INJ SAME DRUG ADON: CPT

## 2017-05-18 PROCEDURE — 83605 ASSAY OF LACTIC ACID: CPT

## 2017-05-18 PROCEDURE — 96375 TX/PRO/DX INJ NEW DRUG ADDON: CPT

## 2017-05-18 PROCEDURE — 81001 URINALYSIS AUTO W/SCOPE: CPT | Performed by: EMERGENCY MEDICINE

## 2017-05-18 PROCEDURE — 71010 XR CHEST PORT: CPT

## 2017-05-18 PROCEDURE — 96361 HYDRATE IV INFUSION ADD-ON: CPT

## 2017-05-18 PROCEDURE — 93005 ELECTROCARDIOGRAM TRACING: CPT

## 2017-05-18 RX ORDER — HYDROMORPHONE HYDROCHLORIDE 1 MG/ML
1 INJECTION, SOLUTION INTRAMUSCULAR; INTRAVENOUS; SUBCUTANEOUS
Status: COMPLETED | OUTPATIENT
Start: 2017-05-18 | End: 2017-05-18

## 2017-05-18 RX ORDER — HYDROCODONE BITARTRATE AND ACETAMINOPHEN 5; 325 MG/1; MG/1
1 TABLET ORAL
Qty: 20 TAB | Refills: 0 | Status: SHIPPED | OUTPATIENT
Start: 2017-05-18 | End: 2017-05-22

## 2017-05-18 RX ORDER — SODIUM CHLORIDE 0.9 % (FLUSH) 0.9 %
5-10 SYRINGE (ML) INJECTION AS NEEDED
Status: DISCONTINUED | OUTPATIENT
Start: 2017-05-18 | End: 2017-05-18 | Stop reason: HOSPADM

## 2017-05-18 RX ORDER — METRONIDAZOLE 500 MG/100ML
500 INJECTION, SOLUTION INTRAVENOUS EVERY 8 HOURS
Status: DISCONTINUED | OUTPATIENT
Start: 2017-05-18 | End: 2017-05-18 | Stop reason: HOSPADM

## 2017-05-18 RX ORDER — ONDANSETRON 4 MG/1
4 TABLET, ORALLY DISINTEGRATING ORAL
Qty: 12 TAB | Refills: 0 | Status: SHIPPED | OUTPATIENT
Start: 2017-05-18 | End: 2017-05-22

## 2017-05-18 RX ORDER — ONDANSETRON 2 MG/ML
4 INJECTION INTRAMUSCULAR; INTRAVENOUS
Status: COMPLETED | OUTPATIENT
Start: 2017-05-18 | End: 2017-05-18

## 2017-05-18 RX ADMIN — HYDROMORPHONE HYDROCHLORIDE 1 MG: 1 INJECTION, SOLUTION INTRAMUSCULAR; INTRAVENOUS; SUBCUTANEOUS at 16:29

## 2017-05-18 RX ADMIN — IOPAMIDOL 100 ML: 612 INJECTION, SOLUTION INTRAVENOUS at 14:02

## 2017-05-18 RX ADMIN — ONDANSETRON 4 MG: 2 INJECTION INTRAMUSCULAR; INTRAVENOUS at 13:28

## 2017-05-18 RX ADMIN — SODIUM CHLORIDE 2859 ML: 900 INJECTION, SOLUTION INTRAVENOUS at 13:29

## 2017-05-18 RX ADMIN — AZTREONAM 2 G: 2 INJECTION, POWDER, LYOPHILIZED, FOR SOLUTION INTRAMUSCULAR; INTRAVENOUS at 13:45

## 2017-05-18 RX ADMIN — METRONIDAZOLE 500 MG: 500 INJECTION, SOLUTION INTRAVENOUS at 14:34

## 2017-05-18 RX ADMIN — HYDROMORPHONE HYDROCHLORIDE 1 MG: 1 INJECTION, SOLUTION INTRAMUSCULAR; INTRAVENOUS; SUBCUTANEOUS at 13:28

## 2017-05-18 NOTE — ED NOTES
The Sepsis Screening has been completed on arrival in the Emergency Department. Vital signs:  Patient Vitals for the past 4 hrs:   Temp Pulse Resp BP SpO2   05/18/17 1330 - 90 16 111/64 97 %   05/18/17 1305 - - - 144/90 -   05/18/17 1226 99.3 °F (37.4 °C) 96 15 (!) 147/101 100 %       MAP (Monitor): 75    =monitored (data validate)  MAP (Calculated): 116    =calculated (manual entry)    Is patient is 31y/o or older, meets 2 or more ABNORMAL VITAL SIGNS below, associated with SUSPECTED INFECTION?   NO         Temperature < 96.8°F (36°C) or > 100.9°F (38.3°C)   Heart Rate > 90 beats per minute   Respiratory Rate > 20 beats per minute   BP < 90 systolic    MAP < 65    IF ANSWER IS YES, CALL A CODE SEPSIS  POC LACTIC ACID ASAP AND BEGIN NURSE DRIVEN SEPSIS ORDERS WHILE AWAITING PROVIDER

## 2017-05-18 NOTE — ED PROVIDER NOTES
Patient is a 65 y.o. female presenting with abdominal pain. Abdominal Pain      Marian Larger is a 65 y.o. female presents with c/o abdominal pain, nausea, and vomiting for the past day. States pain shoots through her chest to her back from her abdomen. Has history of pancreatitis. Denies fever. States feels like her pancreatitis flare up. Past Medical History:   Diagnosis Date    Alcohol abuse     Alcoholic hepatitis     Alcoholic pancreatitis     Anemia     Bipolar disorder (HCC)     Dr. Shay Mercer [facility] [place] [facility])    Chronic abdominal pain     Chronic pancreatitis ([facility]     Compression fracture of lumbar vertebra (HCC)     L4, L5     Depression     Dr. Shay Mercer [facility] [place] [facility])    Elevated LFTs     ETOH abuse     Fatty liver     GERD (gastroesophageal reflux disease)     Hyperlipidemia     Hypertension     Hypothyroidism     Lower GI bleeding     Morbid obesity ([facility]     Obstructive sleep apnea     [facility]     Vitamin D deficiency        Past Surgical History:   Procedure Laterality Date    BIOPSY LIVER  08/15/2012    Lap Left hepatic liver wedge by Dr. Wilmer Drummond; BX Revealed: Hepatic Steatosis, AVM w/ associated Subcapsular Fibrosis.  COLONOSCOPY N/A 1/17/2017    COLONOSCOPY performed by Chavez Herring MD at [address] [place] [address] DISKECTOMY, ANTERIOR, WITH D  8/1995, 11/1997    HX ABDOMINAL LAPAROSCOPY  12/02/2014    Laparoscopic Gastrostomy w/ Partial Gastrectomy for assistance in placement of Endoscopic Retrograde Cholangiopancreatography & Diagnostic Lap.  HX CARPAL TUNNEL RELEASE      HX CHOLECYSTECTOMY  09/16/2014    Dr. Charisse Carrel HX COLONOSCOPY  05/12/2012    Colonoscopy by Dr. Guero Ballesteros. Albert Laurent: Bx Revealed Colon Polyps (Tubular Adenoma), Hemorrhoids.  HX GASTRIC BYPASS  08/15/2012    Lap Christian-en-y    HX HEMORRHOIDECTOMY  04/30/2015    Dr. Pavan Gaytan.  Martha Pennington    HX HYSTERECTOMY  2006    HX TONSILLECTOMY  1992    REPAIR NONUNION SCAPHOID CARPAL BONE Right 2010    Wrist         Family History:   Problem Relation Age of Onset    Cancer Father     Cancer Sister     Obesity Brother        Social History     Social History    Marital status:      Spouse name: N/A    Number of children: N/A    Years of education: N/A     Occupational History    Not on file. Social History Main Topics    Smoking status: Never Smoker    Smokeless tobacco: Never Used    Alcohol use 0.0 oz/week     0 Standard drinks or equivalent per week      Comment: 6 pack of beer a week- 1/2/17 last use    Drug use: No    Sexual activity: Yes     Partners: Male     Birth control/ protection: Condom     Other Topics Concern    Not on file     Social History Narrative         ALLERGIES: Codeine; Lamictal [lamotrigine]; Morphine; Pcn [penicillins]; Vancomycin; Doxycycline; Percocet [oxycodone-acetaminophen]; Tetracycline; and Tomato    Review of Systems   Gastrointestinal: Positive for abdominal pain. Constitutional:  Denies malaise, fever, chills. Head:  Denies injury. Face:  Denies injury or pain. ENMT:  Denies sore throat. Neck:  Denies injury or pain. Chest:  Denies injury. Cardiac:  Denies chest pain or palpitations. Respiratory:  Denies cough, wheezing, difficulty breathing, shortness of breath. GI/ABD:   pain, nausea, vomiting, Denies injury,diarrhea. :  Denies injury, pain, dysuria or urgency. Back:  Denies injury or pain. Pelvis:  Denies injury or pain. Extremity/MS:  Denies injury or pain. Neuro:  Denies headache, LOC, dizziness, neurologic symptoms/deficits/paresthesias. Skin: Denies injury, rash, itching or skin changes.       Vitals:    05/18/17 1410 05/18/17 1430 05/18/17 1500 05/18/17 1530   BP:  127/79 118/74 127/79   Pulse: 95 98 99 97   Resp: 20 19 19 20   Temp:       SpO2: 90% 95% 95% 96%   Weight:                Physical Exam   Nursing note and vitals reviewed. CONSTITUTIONAL: Alert, in no apparent distress; well-developed; well-nourished. HEAD:  Normocephalic, atraumatic. EYES: PERRL; EOM's intact. ENTM: Nose: No rhinorrhea; Throat: mucous membranes moist. Posterior pharynx-normal.  Neck:  No JVD, supple without lymphadenopathy. RESP: Chest clear, equal breath sounds. CV: S1 and S2 WNL; No murmurs, gallops or rubs. GI: Abdomen soft and generalized tenderness, +BS, NG, NR. No masses or organomegaly. UPPER EXT:  Normal inspection. LOWER EXT: Normal inspection. NEURO: strength 5/5 and sym, sensation intact. SKIN: No rashes; Normal for age and stage. PSYCH:  Alert and oriented, normal affect. MDM  Number of Diagnoses or Management Options  Diagnosis management comments: DDx: gastroenteritis, GERD, hernia, hepatitis, pancreatitis, gallbladder etiology, constipation, adhesions, UTI, pyelo, kidney stones, STD,  Hghz-Akko-Rivadx syndrome, preg, ectopic, ovarian cyst, ovarian torsion, tubo-ovarian abscess, appendicitis, diverticulitis, SBO, GI bleed, mesenteric ischemia, AAA, cardiac etiology, musculoskeletal pain/spasm, malignancy  IMPRESSION AND MEDICAL DECISION MAKING:  Based upon the patient's presentation with noted HPI and PE, along with the work up done in the emergency department, I believe that the patient is having a flare up of her Pancreatitis. She is able to tolerate PO fluids at this time. Her lactic is trending down. Believe she is safe to go home with follow up tomorrow with her GI provider. PROGRESS NOTE: One or more blood pressure readings were noted elevated during the Pt's presentation in the emergency department this date. This abnormal reading has been cited in the Pt's diagnosis, and they have been encouraged to follow up with their primary care physician, or referred to a consultant for further evaluation and treatment. Pt advised of elevated BP. Advised Pt the need for follow up for the elevated BP.  Advised Pt to monitor and record BP readings. ACEP guidelines are  Initiating treatment for asymptomatic hypertension in the ED is not necessary when patients have follow-up; (2) Rapidly lowering blood pressure in asymptomatic patients in the ED is unnecessary and may be harmful in some patients; (3) When ED treatment for asymptomatic hypertension is initiated, blood pressure management should attempt to gradually lower blood pressure and should not be expected to be normalized during the initial ED visit. The patient will be discharged home. Warning signs of worsening condition were discussed and understood by the patient. Based on patient's age, coexisting illness, exam, and the results of this ED evaluation, the decision to treat as an outpatient was made. Based on the information available at time of discharge, acute pathology requiring immediate intervention was deemed relative unlikely. While it is impossible to completely exclude the possibility of underlying serious disease or worsening of condition, I feel the relative likelihood is extremely low. I discussed this uncertainty with the patient, who understood ED evaluation and treatment and felt comfortable with the outpatient treatment plan. All questions regarding care, test results, and follow up were answered. The patient is stable and appropriate to discharge. They understand that they should return to the emergency department for any new or worsening symptoms. I stressed the importance of follow up for repeat assessment and possibly further evaluation/treatment.            Amount and/or Complexity of Data Reviewed  Clinical lab tests: ordered and reviewed  Tests in the radiology section of CPT®: ordered and reviewed  Tests in the medicine section of CPT®: ordered and reviewed  Review and summarize past medical records: yes  Independent visualization of images, tracings, or specimens: yes      ED Course     Sepsis 3-6 hour reevaluation and exam: Reevaluation:    Vital Signs:   Patient Vitals for the past 12 hrs:   Temp Pulse Resp BP SpO2   05/18/17 1530 - 97 20 127/79 96 %   05/18/17 1500 - 99 19 118/74 95 %   05/18/17 1430 - 98 19 127/79 95 %   05/18/17 1410 - 95 20 - 90 %   05/18/17 1330 - 90 16 111/64 97 %   05/18/17 1305 - - - 144/90 -   05/18/17 1226 99.3 °F (37.4 °C) 96 15 (!) 147/101 100 %       Cardiopulmonary assessment:  RESP: Chest clear, equal breath sounds. CV: S1 and S2 WNL; No murmurs, gallops or rubs.   Capillary refill:  <2  Peripheral pulse:   good    Skin exam:  Skin color:   Skin Turgor:     Sepsis 3-6 hour reevaluation and exam performed at 4:25 PM       Procedures        Vitals:  Patient Vitals for the past 12 hrs:   Temp Pulse Resp BP SpO2   05/18/17 1630 - 98 16 131/79 96 %   05/18/17 1530 - 97 20 127/79 96 %   05/18/17 1500 - 99 19 118/74 95 %   05/18/17 1430 - 98 19 127/79 95 %   05/18/17 1410 - 95 20 - 90 %   05/18/17 1330 - 90 16 111/64 97 %   05/18/17 1305 - - - 144/90 -   05/18/17 1226 99.3 °F (37.4 °C) 96 15 (!) 147/101 100 %         Medications ordered:   Medications   sodium chloride (NS) flush 5-10 mL (not administered)   aztreonam (AZACTAM) 2 g in 0.9% sodium chloride (MBP/ADV) 100 mL MBP (0 g IntraVENous IV Completed 5/18/17 1435)   metroNIDAZOLE (FLAGYL) IVPB premix 500 mg (0 mg IntraVENous IV Completed 5/18/17 1543)   ondansetron (ZOFRAN) injection 4 mg (4 mg IntraVENous Given 5/18/17 1328)   HYDROmorphone (PF) (DILAUDID) injection 1 mg (1 mg IntraVENous Given 5/18/17 1328)   sodium chloride 0.9 % bolus infusion 2,859 mL (0 mL/kg × 95.3 kg IntraVENous IV Completed 5/18/17 1700)   iopamidol (ISOVUE 300) 61 % contrast injection 100 mL (100 mL IntraVENous Given 5/18/17 1402)   HYDROmorphone (PF) (DILAUDID) injection 1 mg (1 mg IntraVENous Given 5/18/17 1629)         Lab findings:  Recent Results (from the past 12 hour(s))   EKG, 12 LEAD, INITIAL    Collection Time: 05/18/17 12:29 PM   Result Value Ref Range Ventricular Rate 94 BPM    Atrial Rate 94 BPM    P-R Interval 150 ms    QRS Duration 84 ms    Q-T Interval 370 ms    QTC Calculation (Bezet) 462 ms    Calculated P Axis 66 degrees    Calculated R Axis 9 degrees    Calculated T Axis 50 degrees    Diagnosis       Normal sinus rhythm  Prolonged QT  Abnormal ECG  When compared with ECG of 09-JAN-2017 17:39,  Nonspecific ST and T wave abnormality , decrease of  Confirmed by Hope Liriano ([id number]) on 5/18/2017 4:06:30 PM     CBC WITH AUTOMATED DIFF    Collection Time: 05/18/17 12:30 PM   Result Value Ref Range    WBC 9.5 4.6 - 13.2 K/uL    RBC 4.36 4.20 - 5.30 M/uL    HGB 13.3 12.0 - 16.0 g/dL    HCT 38.5 35.0 - 45.0 %    MCV 88.3 74.0 - 97.0 FL    MCH 30.5 24.0 - 34.0 PG    MCHC 34.5 31.0 - 37.0 g/dL    RDW 14.7 (H) 11.6 - 14.5 %    PLATELET 945 525 - 462 K/uL    MPV 8.7 (L) 9.2 - 11.8 FL    NEUTROPHILS 83 (H) 40 - 73 %    LYMPHOCYTES 11 (L) 21 - 52 %    MONOCYTES 6 3 - 10 %    EOSINOPHILS 0 0 - 5 %    BASOPHILS 0 0 - 2 %    ABS. NEUTROPHILS 7.8 1.8 - 8.0 K/UL    ABS. LYMPHOCYTES 1.1 0.9 - 3.6 K/UL    ABS. MONOCYTES 0.6 0.05 - 1.2 K/UL    ABS. EOSINOPHILS 0.0 0.0 - 0.4 K/UL    ABS. BASOPHILS 0.0 0.0 - 0.06 K/UL    DF AUTOMATED     METABOLIC PANEL, COMPREHENSIVE    Collection Time: 05/18/17 12:30 PM   Result Value Ref Range    Sodium 131 (L) 136 - 145 mmol/L    Potassium 4.5 3.5 - 5.5 mmol/L    Chloride 94 (L) 100 - 108 mmol/L    CO2 25 21 - 32 mmol/L    Anion gap 12 3.0 - 18 mmol/L    Glucose 132 (H) 74 - 99 mg/dL    BUN 9 7.0 - 18 MG/DL    Creatinine 0.67 0.6 - 1.3 MG/DL    BUN/Creatinine ratio 13 12 - 20      GFR est AA >60 >60 ml/min/1.73m2    GFR est non-AA >60 >60 ml/min/1.73m2    Calcium 8.9 8.5 - 10.1 MG/DL    Bilirubin, total 0.7 0.2 - 1.0 MG/DL    ALT (SGPT) 76 (H) 13 - 56 U/L    AST (SGOT) 165 (H) 15 - 37 U/L    Alk.  phosphatase 228 (H) 45 - 117 U/L    Protein, total 7.9 6.4 - 8.2 g/dL    Albumin 4.2 3.4 - 5.0 g/dL    Globulin 3.7 2.0 - 4.0 g/dL    A-G Ratio 1.1 0.8 - 1.7     LIPASE    Collection Time: 05/18/17 12:30 PM   Result Value Ref Range    Lipase 317 73 - 393 U/L   URINALYSIS W/ RFLX MICROSCOPIC    Collection Time: 05/18/17 12:48 PM   Result Value Ref Range    Color YELLOW      Appearance CLEAR      Specific gravity 1.019 1.005 - 1.030      pH (UA) 5.0 5.0 - 8.0      Protein NEGATIVE  NEG mg/dL    Glucose NEGATIVE  NEG mg/dL    Ketone NEGATIVE  NEG mg/dL    Bilirubin NEGATIVE  NEG      Blood NEGATIVE  NEG      Urobilinogen 0.2 0.2 - 1.0 EU/dL    Nitrites NEGATIVE  NEG      Leukocyte Esterase SMALL (A) NEG     URINE MICROSCOPIC ONLY    Collection Time: 05/18/17 12:48 PM   Result Value Ref Range    WBC 4 to 10 0 - 4 /hpf    RBC NEGATIVE  0 - 5 /hpf    Epithelial cells 4+ 0 - 5 /lpf    Bacteria 4+ (A) NEG /hpf   POC LACTIC ACID    Collection Time: 05/18/17  1:02 PM   Result Value Ref Range    Lactic Acid (POC) 3.6 (HH) 0.4 - 2.0 mmol/L   POC LACTIC ACID    Collection Time: 05/18/17  5:02 PM   Result Value Ref Range    Lactic Acid (POC) 2.4 (HH) 0.4 - 2.0 mmol/L       EKG interpretation by ED Physician:      X-Ray, CT or other radiology findings or impressions:  XR CHEST PORT   Final Result      CT ABD PELV W CONT   Final Result          Progress notes, Consult notes or additional Procedure notes:       Disposition:  Diagnosis:   1. Acute pancreatitis, unspecified complication status, unspecified pancreatitis type    2. Elevated blood pressure reading        Disposition:   5:31 PM  Pt reevaluated at this time and is resting comfortably in NAD. Discussed results and findings, as well as, diagnosis and plan for discharge. Pt verbalizes understanding and agreement with plan. All questions addressed at this time.      Follow-up Information     Follow up With Details Comments Amelia Bradford MD Schedule an appointment as soon as possible for a visit in 2 days  [facility] [address]      Andrzej Gonzalez MD Schedule an appointment as soon as possible for a visit in 1 day  [address]  [address] [facility],[address]  [facility] [address] [facility] EMERGENCY DEPT  If symptoms worsen [address]  [phone]           Patient's Medications   Start Taking    HYDROCODONE-ACETAMINOPHEN (NORCO) 5-325 MG PER TABLET    Take 1 Tab by mouth every four (4) hours as needed for Pain. Max Daily Amount: 6 Tabs. ONDANSETRON (ZOFRAN ODT) 4 MG DISINTEGRATING TABLET    Take 1 Tab by mouth every eight (8) hours as needed for Nausea. Continue Taking    ALENDRONATE (FOSAMAX) 70 MG TABLET    Take 70 mg by mouth Every Saturday. ASCORBIC ACID (VITAMIN C) 500 MG TABLET    Take 1,000 mg by mouth daily. BIOTIN 10,000 MCG CAP    Take 1 Cap by mouth. CALCIUM CITRATE-VITAMIN D3 (CITRACAL+D) 315-200 MG-UNIT TAB    Take 1 Tab by mouth two (2) times daily (with meals). CHOLECALCIFEROL (VITAMIN D3) 1,000 UNIT TABLET    Take  by mouth daily. CLONIDINE HCL (CATAPRES) 0.1 MG TABLET    Take  by mouth two (2) times a day. COLESEVELAM (WELCHOL) 625 MG TABLET    Take 2 Tabs by mouth two (2) times daily (with meals). CYANOCOBALAMIN (VITAMIN B-12) 1,000 MCG SUBL    1,000 mcg by SubLINGual route daily. DICYCLOMINE (BENTYL) 20 MG TABLET    Take 20 mg by mouth every six (6) hours as needed. ERGOCALCIFEROL (ERGOCALCIFEROL) 50,000 UNIT CAPSULE    Take 50,000 Units by mouth every seven (7) days. ESCITALOPRAM OXALATE (LEXAPRO) 10 MG TABLET    Take 1 Tab by mouth daily. FAMOTIDINE (PEPCID) 20 MG TABLET    Take 20 mg by mouth daily. Indications: HEARTBURN    FERROUS SULFATE (FEOSOL) 325 MG (65 MG IRON) TABLET    Take 325 mg by mouth daily. FISH OIL-DHA-EPA 1,200-144-216 MG CAP    Take  by mouth. FOLIC ACID 892 MCG TABLET    Take 800 mcg by mouth daily. GABAPENTIN (NEURONTIN) 600 MG TABLET    Take 600 mg by mouth three (3) times daily.  Indications: NEUROPATHIC PAIN    HYDROXYZINE PAMOATE (VISTARIL) 50 MG CAPSULE Take 50 mg by mouth two (2) times a day. LACTULOSE 10 GRAM/15 ML (15 ML) SOLN    Take 15 mL by mouth three (3) times daily. LEVOTHYROXINE (SYNTHROID) 25 MCG TABLET    Take 1 Tab by mouth Daily (before breakfast). Indications: HYPOTHYROIDISM    OLANZAPINE (ZYPREXA ZYDIS) 10 MG DISINTEGRATING TABLET    Take 1 Tab by mouth nightly. PANTOPRAZOLE (PROTONIX) 20 MG TABLET    Take 20 mg by mouth daily.    These Medications have changed    No medications on file   Stop Taking    No medications on file

## 2017-05-18 NOTE — ED NOTES
Assumed care of patient for discharge. Patient had 20 gauge IV in right ac that was removed prior to discharge. I have reviewed discharge instructions with the patient. The patient verbalized understanding. Patient armband removed and shredded. VSS.

## 2017-05-18 NOTE — DISCHARGE INSTRUCTIONS
Elevated Blood Pressure: Care Instructions  Your Care Instructions    Blood pressure is a measure of how hard the blood pushes against the walls of your arteries. It's normal for blood pressure to go up and down throughout the day. But if it stays up over time, you have high blood pressure. Two numbers tell you your blood pressure. The first number is the systolic pressure. It shows how hard the blood pushes when your heart is pumping. The second number is the diastolic pressure. It shows how hard the blood pushes between heartbeats, when your heart is relaxed and filling with blood. An ideal blood pressure in adults is less than 120/80 (say \"120 over 80\"). High blood pressure is 140/90 or higher. You have high blood pressure if your top number is 140 or higher or your bottom number is 90 or higher, or both. The main test for high blood pressure is simple, fast, and painless. To diagnose high blood pressure, your doctor will test your blood pressure at different times. After testing your blood pressure, your doctor may ask you to test it again when you are home. If you are diagnosed with high blood pressure, you can work with your doctor to make a long-term plan to manage it. Follow-up care is a key part of your treatment and safety. Be sure to make and go to all appointments, and call your doctor if you are having problems. It's also a good idea to know your test results and keep a list of the medicines you take. How can you care for yourself at home? · Do not smoke. Smoking increases your risk for heart attack and stroke. If you need help quitting, talk to your doctor about stop-smoking programs and medicines. These can increase your chances of quitting for good. · Stay at a healthy weight. · Try to limit how much sodium you eat to less than 2,300 milligrams (mg) a day. Your doctor may ask you to try to eat less than 1,500 mg a day. · Be physically active.  Get at least 30 minutes of exercise on most days of the week. Walking is a good choice. You also may want to do other activities, such as running, swimming, cycling, or playing tennis or team sports. · Avoid or limit alcohol. Talk to your doctor about whether you can drink any alcohol. · Eat plenty of fruits, vegetables, and low-fat dairy products. Eat less saturated and total fats. · Learn how to check your blood pressure at home. When should you call for help? Call your doctor now or seek immediate medical care if:  · Your blood pressure is much higher than normal (such as 180/110 or higher). · You think high blood pressure is causing symptoms such as:  ¨ Severe headache. ¨ Blurry vision. Watch closely for changes in your health, and be sure to contact your doctor if:  · You do not get better as expected. Where can you learn more? Go to http://salvatore-izabela.info/. Enter T048 in the search box to learn more about \"Elevated Blood Pressure: Care Instructions. \"  Current as of: October 19, 2016  Content Version: 11.2  © 1532-9054 [employer]. Care instructions adapted under license by [employer] (which disclaims liability or warranty for this information). If you have questions about a medical condition or this instruction, always ask your healthcare professional. [address] any warranty or liability for your use of this information.

## 2017-05-22 ENCOUNTER — HOSPITAL ENCOUNTER (EMERGENCY)
Age: 57
Discharge: HOME | End: 2017-05-22
Attending: EMERGENCY MEDICINE
Payer: MEDICARE

## 2017-05-22 VITALS
HEART RATE: 100 BPM | TEMPERATURE: 99.2 F | RESPIRATION RATE: 20 BRPM | OXYGEN SATURATION: 97 % | DIASTOLIC BLOOD PRESSURE: 90 MMHG | SYSTOLIC BLOOD PRESSURE: 136 MMHG

## 2017-05-22 DIAGNOSIS — F10.10 ALCOHOL ABUSE: Primary | ICD-10-CM

## 2017-05-22 DIAGNOSIS — D69.6 THROMBOCYTOPENIA (HCC): ICD-10-CM

## 2017-05-22 DIAGNOSIS — G89.29 OTHER CHRONIC PAIN: ICD-10-CM

## 2017-05-22 DIAGNOSIS — R10.13 ABDOMINAL PAIN, EPIGASTRIC: ICD-10-CM

## 2017-05-22 LAB
ALBUMIN SERPL BCP-MCNC: 3 G/DL (ref 3.4–5)
ALBUMIN/GLOB SERPL: 0.8 {RATIO} (ref 0.8–1.7)
ALP SERPL-CCNC: 163 U/L (ref 45–117)
ALT SERPL-CCNC: 25 U/L (ref 13–56)
ANION GAP BLD CALC-SCNC: 12 MMOL/L (ref 3–18)
APPEARANCE UR: CLEAR
AST SERPL W P-5'-P-CCNC: 34 U/L (ref 15–37)
BACTERIA URNS QL MICRO: NEGATIVE /HPF
BASOPHILS # BLD AUTO: 0 K/UL (ref 0–0.06)
BASOPHILS # BLD: 0 % (ref 0–2)
BILIRUB SERPL-MCNC: 0.3 MG/DL (ref 0.2–1)
BILIRUB UR QL: NEGATIVE
BUN SERPL-MCNC: 4 MG/DL (ref 7–18)
BUN/CREAT SERPL: 6 (ref 12–20)
CALCIUM SERPL-MCNC: 8.3 MG/DL (ref 8.5–10.1)
CHLORIDE SERPL-SCNC: 95 MMOL/L (ref 100–108)
CK MB CFR SERPL CALC: NORMAL % (ref 0–4)
CK MB SERPL-MCNC: <1 NG/ML (ref 5–25)
CK SERPL-CCNC: 53 U/L (ref 26–192)
CO2 SERPL-SCNC: 24 MMOL/L (ref 21–32)
COLOR UR: YELLOW
CREAT SERPL-MCNC: 0.65 MG/DL (ref 0.6–1.3)
DIFFERENTIAL METHOD BLD: ABNORMAL
EOSINOPHIL # BLD: 0 K/UL (ref 0–0.4)
EOSINOPHIL NFR BLD: 0 % (ref 0–5)
EPITH CASTS URNS QL MICRO: NORMAL /LPF (ref 0–5)
ERYTHROCYTE [DISTWIDTH] IN BLOOD BY AUTOMATED COUNT: 14 % (ref 11.6–14.5)
ETHANOL SERPL-MCNC: 88 MG/DL (ref 0–3)
GLOBULIN SER CALC-MCNC: 3.7 G/DL (ref 2–4)
GLUCOSE SERPL-MCNC: 205 MG/DL (ref 74–99)
GLUCOSE UR STRIP.AUTO-MCNC: NEGATIVE MG/DL
HCT VFR BLD AUTO: 32 % (ref 35–45)
HGB BLD-MCNC: 10.9 G/DL (ref 12–16)
HGB UR QL STRIP: NEGATIVE
INR PPP: 1.2 (ref 0.8–1.2)
KETONES UR QL STRIP.AUTO: NEGATIVE MG/DL
LACTATE BLD-SCNC: 1.7 MMOL/L (ref 0.4–2)
LEUKOCYTE ESTERASE UR QL STRIP.AUTO: ABNORMAL
LIPASE SERPL-CCNC: 260 U/L (ref 73–393)
LYMPHOCYTES # BLD AUTO: 14 % (ref 21–52)
LYMPHOCYTES # BLD: 1.2 K/UL (ref 0.9–3.6)
MAGNESIUM SERPL-MCNC: 2.2 MG/DL (ref 1.6–2.6)
MCH RBC QN AUTO: 30.9 PG (ref 24–34)
MCHC RBC AUTO-ENTMCNC: 34.1 G/DL (ref 31–37)
MCV RBC AUTO: 90.7 FL (ref 74–97)
MONOCYTES # BLD: 0.7 K/UL (ref 0.05–1.2)
MONOCYTES NFR BLD AUTO: 9 % (ref 3–10)
NEUTS SEG # BLD: 6.4 K/UL (ref 1.8–8)
NEUTS SEG NFR BLD AUTO: 77 % (ref 40–73)
NITRITE UR QL STRIP.AUTO: NEGATIVE
PH UR STRIP: 5.5 [PH] (ref 5–8)
PLATELET # BLD AUTO: 102 K/UL (ref 135–420)
PMV BLD AUTO: 9.8 FL (ref 9.2–11.8)
POTASSIUM SERPL-SCNC: 4.2 MMOL/L (ref 3.5–5.5)
PROT SERPL-MCNC: 6.7 G/DL (ref 6.4–8.2)
PROT UR STRIP-MCNC: NEGATIVE MG/DL
PROTHROMBIN TIME: 14.8 SEC (ref 11.5–15.2)
RBC # BLD AUTO: 3.53 M/UL (ref 4.2–5.3)
RBC #/AREA URNS HPF: 0 /HPF (ref 0–5)
SODIUM SERPL-SCNC: 131 MMOL/L (ref 136–145)
SP GR UR REFRACTOMETRY: 1 (ref 1–1.03)
TROPONIN I SERPL-MCNC: <0.02 NG/ML (ref 0–0.04)
UROBILINOGEN UR QL STRIP.AUTO: 0.2 EU/DL (ref 0.2–1)
WBC # BLD AUTO: 8.3 K/UL (ref 4.6–13.2)
WBC URNS QL MICRO: NORMAL /HPF (ref 0–4)

## 2017-05-22 PROCEDURE — 82550 ASSAY OF CK (CPK): CPT | Performed by: EMERGENCY MEDICINE

## 2017-05-22 PROCEDURE — 85610 PROTHROMBIN TIME: CPT | Performed by: EMERGENCY MEDICINE

## 2017-05-22 PROCEDURE — 81001 URINALYSIS AUTO W/SCOPE: CPT

## 2017-05-22 PROCEDURE — 85025 COMPLETE CBC W/AUTO DIFF WBC: CPT | Performed by: EMERGENCY MEDICINE

## 2017-05-22 PROCEDURE — 80053 COMPREHEN METABOLIC PANEL: CPT | Performed by: EMERGENCY MEDICINE

## 2017-05-22 PROCEDURE — 99284 EMERGENCY DEPT VISIT MOD MDM: CPT

## 2017-05-22 PROCEDURE — 80307 DRUG TEST PRSMV CHEM ANLYZR: CPT | Performed by: EMERGENCY MEDICINE

## 2017-05-22 PROCEDURE — 93005 ELECTROCARDIOGRAM TRACING: CPT

## 2017-05-22 PROCEDURE — 96375 TX/PRO/DX INJ NEW DRUG ADDON: CPT

## 2017-05-22 PROCEDURE — 83690 ASSAY OF LIPASE: CPT | Performed by: EMERGENCY MEDICINE

## 2017-05-22 PROCEDURE — 74011250636 HC RX REV CODE- 250/636: Performed by: PHYSICIAN ASSISTANT

## 2017-05-22 PROCEDURE — 96361 HYDRATE IV INFUSION ADD-ON: CPT

## 2017-05-22 PROCEDURE — 83735 ASSAY OF MAGNESIUM: CPT | Performed by: EMERGENCY MEDICINE

## 2017-05-22 PROCEDURE — 96374 THER/PROPH/DIAG INJ IV PUSH: CPT

## 2017-05-22 PROCEDURE — 83605 ASSAY OF LACTIC ACID: CPT

## 2017-05-22 RX ORDER — ONDANSETRON 2 MG/ML
4 INJECTION INTRAMUSCULAR; INTRAVENOUS
Status: COMPLETED | OUTPATIENT
Start: 2017-05-22 | End: 2017-05-22

## 2017-05-22 RX ORDER — HYDROMORPHONE HYDROCHLORIDE 1 MG/ML
2 INJECTION, SOLUTION INTRAMUSCULAR; INTRAVENOUS; SUBCUTANEOUS
Status: COMPLETED | OUTPATIENT
Start: 2017-05-22 | End: 2017-05-22

## 2017-05-22 RX ORDER — HYDROMORPHONE HYDROCHLORIDE 1 MG/ML
2 INJECTION, SOLUTION INTRAMUSCULAR; INTRAVENOUS; SUBCUTANEOUS
Status: DISCONTINUED | OUTPATIENT
Start: 2017-05-22 | End: 2017-05-22

## 2017-05-22 RX ORDER — ONDANSETRON 4 MG/1
4 TABLET, FILM COATED ORAL
Qty: 12 TAB | Refills: 0 | Status: SHIPPED | OUTPATIENT
Start: 2017-05-22 | End: 2017-08-31 | Stop reason: ALTCHOICE

## 2017-05-22 RX ADMIN — SODIUM CHLORIDE 1000 ML: 900 INJECTION, SOLUTION INTRAVENOUS at 20:12

## 2017-05-22 RX ADMIN — HYDROMORPHONE HYDROCHLORIDE 2 MG: 1 INJECTION, SOLUTION INTRAMUSCULAR; INTRAVENOUS; SUBCUTANEOUS at 20:25

## 2017-05-22 RX ADMIN — ONDANSETRON 4 MG: 2 INJECTION INTRAMUSCULAR; INTRAVENOUS at 20:13

## 2017-05-22 NOTE — ED PROVIDER NOTES
HPI Comments: Pt is a 64 yo female with PMH ETOH abuse, chronic pancreatitis, and chronic abd pain presents to the ED for epigastric abd pain that has been ongoing for a week. Pt was seen for the pain a week ago, reports she \"was almost admitted but I was able to keep food down so I was discharged home\". Has not seen GI since then. Pt abd pain has persisted. She reports not being able to keep down food/liquids for 2 days. 3 episodes non-bloody emesis today. She has had right flank pain as well since yesterday. She went to PCP today and was told she had a fever, given tylenol, sent here. Pt tried tylenol, pepcid, and reglan at home without relief. Pt denies ETOH use in past week. Pt has chronic diarrhea unchanged from baseline. No other complaints at this time. Patient is a 65 y.o. female presenting with abdominal pain. The history is provided by the patient. Abdominal Pain    The pain is located in the epigastric region. The pain is severe. Associated symptoms include diarrhea, nausea and vomiting. Pertinent negatives include no anorexia, no fever, no belching, no flatus, no hematochezia, no melena, no constipation, no dysuria, no frequency, no hematuria, no headaches, no arthralgias, no myalgias, no trauma, no chest pain and no back pain. Past workup includes CT scan, esophagogastroduodenoscopy. Her past medical history is significant for pancreatitis. The patient's surgical history includes cholecystectomy, hysterectomy and gastric bypass.        Past Medical History:   Diagnosis Date    Alcohol abuse     Alcoholic hepatitis     Alcoholic pancreatitis     Anemia     Bipolar disorder (HCC)     Dr. [facility])    Chronic abdominal pain     Chronic pancreatitis ([address]     Compression fracture of lumbar vertebra (HCC)     L4, L5     Depression     [facility])    Elevated LFTs     ETOH abuse     Fatty liver     GERD (gastroesophageal reflux disease)  Hyperlipidemia     Hypertension     Hypothyroidism     Lower GI bleeding     Morbid obesity ([facility] [address] [facility]     Obstructive sleep apnea     Substance induced mood disorder (HCC)     Vitamin D deficiency        Past Surgical History:   Procedure Laterality Date    BIOPSY LIVER  08/15/2012    Lap Left hepatic liver wedge by Dr. Delfino Uriostegui; BX Revealed: Hepatic Steatosis, AVM w/ associated Subcapsular Fibrosis.  COLONOSCOPY N/A 1/17/2017    COLONOSCOPY performed by Johanna Espinal MD at [address] [place] [address] DISKECTOMY, ANTERIOR, WITH D  8/1995, 11/1997    HX ABDOMINAL LAPAROSCOPY  12/02/2014    Laparoscopic Gastrostomy w/ Partial Gastrectomy for assistance in placement of Endoscopic Retrograde Cholangiopancreatography & Diagnostic Lap.  HX CARPAL TUNNEL RELEASE      HX CHOLECYSTECTOMY  09/16/2014     [facility] HX COLONOSCOPY  05/12/2012    Colonoscopy by Dr. Karen Rose. [place]: Bx Revealed Colon Polyps (Tubular Adenoma), Hemorrhoids.  HX GASTRIC BYPASS  08/15/2012    Lap Christian-en-y    HX HEMORRHOIDECTOMY  04/30/2015    Dr. Natalie Fuller. [place]    HX HYSTERECTOMY  2006    HX TONSILLECTOMY  1992    REPAIR NONUNION SCAPHOID CARPAL BONE Right 2010    Wrist         Family History:   Problem Relation Age of Onset    Cancer Father     Cancer Sister     Obesity Brother        Social History     Social History    Marital status:      Spouse name: N/A    Number of children: N/A    Years of education: N/A     Occupational History    Not on file. Social History Main Topics    Smoking status: Never Smoker    Smokeless tobacco: Never Used    Alcohol use 0.0 oz/week     0 Standard drinks or equivalent per week      Comment: 6 pack of beer a week- 1/2/17 last use    Drug use: No    Sexual activity: Yes     Partners: Male     Birth control/ protection: Condom     Other Topics Concern    Not on file     Social History Narrative         ALLERGIES: Codeine; Lamictal [lamotrigine];  Morphine; Pcn [penicillins]; Vancomycin; Doxycycline; Percocet [oxycodone-acetaminophen]; Tetracycline; and Tomato    Review of Systems   Constitutional: Negative for chills and fever. HENT: Negative for ear pain, rhinorrhea and sore throat. Eyes: Negative for pain and redness. Respiratory: Negative for cough and shortness of breath. Cardiovascular: Negative for chest pain. Gastrointestinal: Positive for abdominal pain, diarrhea, nausea and vomiting. Negative for abdominal distention, anal bleeding, anorexia, blood in stool, constipation, flatus, hematochezia, melena and rectal pain. Genitourinary: Positive for flank pain. Negative for dysuria, frequency, hematuria, vaginal bleeding, vaginal discharge and vaginal pain. Musculoskeletal: Negative for arthralgias, back pain and myalgias. Skin: Negative. Neurological: Negative for dizziness, weakness, light-headedness and headaches. Psychiatric/Behavioral: Negative. Vitals:    05/22/17 1810   BP: 136/90   Pulse: 100   Resp: 20   Temp: 99.2 °F (37.3 °C)   SpO2: 97%            Physical Exam   Constitutional: She is oriented to person, place, and time. She appears well-developed and well-nourished. No distress. HENT:   Head: Normocephalic and atraumatic. Right Ear: Tympanic membrane, external ear and ear canal normal.   Left Ear: Tympanic membrane, external ear and ear canal normal.   Nose: Nose normal.   Mouth/Throat: Oropharynx is clear and moist and mucous membranes are normal. No oropharyngeal exudate. Eyes: Conjunctivae and EOM are normal. Pupils are equal, round, and reactive to light. Right eye exhibits no discharge. Left eye exhibits no discharge. No scleral icterus. Neck: Normal range of motion. Neck supple. Cardiovascular: Normal rate, regular rhythm, normal heart sounds and intact distal pulses. Exam reveals no gallop and no friction rub. No murmur heard.   Pulmonary/Chest: Effort normal and breath sounds normal. No respiratory distress. She has no wheezes. She has no rales. Abdominal: Soft. Bowel sounds are normal. She exhibits no distension. There is tenderness in the epigastric area. There is no rigidity, no rebound, no guarding, no CVA tenderness, no tenderness at McBurney's point and negative Fuentes's sign. Genitourinary: Rectum normal. Rectal exam shows no external hemorrhoid, no internal hemorrhoid, no fissure, no mass, no tenderness, anal tone normal and guaiac negative stool. Musculoskeletal: Normal range of motion. Neurological: She is alert and oriented to person, place, and time. Skin: Skin is warm and dry. She is not diaphoretic. Psychiatric: She has a normal mood and affect. Her behavior is normal. Judgment and thought content normal.   Nursing note and vitals reviewed. MDM  Number of Diagnoses or Management Options  Abdominal pain, epigastric: established and worsening  Alcohol abuse: established and worsening  Other chronic pain: established and worsening  Thrombocytopenia ([address]):   Diagnosis management comments: DDx: gastroenteritis, GERD, hernia, hepatitis, pancreatitis, gallbladder etiology, constipation, adhesions, UTI, pyelo, kidney stones, STD,  Pfmd-Wuxx-Yxtocu syndrome, preg, ectopic, ovarian cyst, ovarian torsion, tubo-ovarian abscess, appendicitis, diverticulitis, SBO, GI bleed, mesenteric ischemia, AAA, cardiac etiology, musculoskeletal pain/spasm, malignancy    EKG NSR, cardiac panel neg. Lipase is normal. Some elev alk phos, c/w alcohol, ETOH present, although pt denies drinking in week. Discussed again importance of ceasing alcohol. CBC with decreased RBC, Hgb, Hct, platelets from last week, however c/w history of multiple draws with low counts. Will check guaiac. Guaiac negative. Carin Cortes PA-C 9:25 PM     UA without UTI. Do not feel further workup indicated. Pt reassessed and without any vomiting. Tolerating PO. Pt feels improved. Pt to follow up with GI, PCP, and hematology.  Pt results have been reviewed with them. They have been counseled regarding diagnosis, treatment, and plan. Pt verbally conveys understanding and agreement of the signs, symptoms, diagnosis, treatment and prognosis and additionally agrees to follow up as discussed. Pt also agrees with the care-plan and conveys that all of their questions have been answered. I have also provided discharge instructions for them that include: educational information regarding their diagnosis and treatment, and list of reasons why they would want to return to the ED prior to their follow-up appointment, should their condition change. Mook Lozada PA-C 10:01 PM        Amount and/or Complexity of Data Reviewed  Clinical lab tests: ordered and reviewed  Tests in the radiology section of CPT®: reviewed  Tests in the medicine section of CPT®: ordered and reviewed  Decide to obtain previous medical records or to obtain history from someone other than the patient: yes  Obtain history from someone other than the patient: yes  Review and summarize past medical records: yes  Discuss the patient with other providers: yes    Risk of Complications, Morbidity, and/or Mortality  Presenting problems: moderate  Diagnostic procedures: moderate  Management options: moderate    Patient Progress  Patient progress: stable    ED Course       EKG  Date/Time: 5/22/2017 6:55 PM  Performed by: Mary Contreras   Authorized by: Mary Contreras     ECG reviewed by ED Physician in the absence of a cardiologist: yes    Interpretation:     Interpretation: normal    Rate:     ECG rate:  97    ECG rate assessment: normal    Rhythm:     Rhythm: sinus rhythm        Labs Reviewed   CBC WITH AUTOMATED DIFF - Abnormal; Notable for the following:        Result Value    RBC 3.53 (*)     HGB 10.9 (*)     HCT 32.0 (*)     PLATELET 465 (*)     NEUTROPHILS 77 (*)     LYMPHOCYTES 14 (*)     All other components within normal limits   METABOLIC PANEL, COMPREHENSIVE - Abnormal; Notable for the following:     Sodium 131 (*)     Chloride 95 (*)     Glucose 205 (*)     BUN 4 (*)     BUN/Creatinine ratio 6 (*)     Calcium 8.3 (*)     Alk. phosphatase 163 (*)     Albumin 3.0 (*)     All other components within normal limits   ETHYL ALCOHOL - Abnormal; Notable for the following:     ALCOHOL(ETHYL),SERUM 88 (*)     All other components within normal limits   URINALYSIS W/ RFLX MICROSCOPIC - Abnormal; Notable for the following:     Leukocyte Esterase TRACE (*)     All other components within normal limits   MAGNESIUM   LIPASE   CARDIAC PANEL,(CK, CKMB & TROPONIN)   PROTHROMBIN TIME + INR   URINE MICROSCOPIC ONLY   POC LACTIC ACID      Diagnosis:   1. Alcohol abuse    2. Abdominal pain, epigastric    3. Thrombocytopenia ([address]    4. Other chronic pain          Disposition: home    Follow-up Information     Follow up With Details Comments Contact [facility] EMERGENCY DEPT  As needed, If symptoms worsen [address] [facility]    Tashi Grissom MD Go in 1 day  [place] [address]      Andrzej Gonzalez MD Go in 1 week  [address]  [address] [place] [address]  [phone]            Patient's Medications   Start Taking    ONDANSETRON HCL (ZOFRAN, AS HYDROCHLORIDE,) 4 MG TABLET    Take 1 Tab by mouth every eight (8) hours as needed for Nausea. Continue Taking    ALENDRONATE (FOSAMAX) 70 MG TABLET    Take 70 mg by mouth Every Saturday. ASCORBIC ACID (VITAMIN C) 500 MG TABLET    Take 1,000 mg by mouth daily. BIOTIN 10,000 MCG CAP    Take 1 Cap by mouth. CALCIUM CITRATE-VITAMIN D3 (CITRACAL+D) 315-200 MG-UNIT TAB    Take 1 Tab by mouth two (2) times daily (with meals). CHOLECALCIFEROL (VITAMIN D3) 1,000 UNIT TABLET    Take  by mouth daily. CLONIDINE HCL (CATAPRES) 0.1 MG TABLET    Take  by mouth two (2) times a day. COLESEVELAM (WELCHOL) 625 MG TABLET    Take 2 Tabs by mouth two (2) times daily (with meals). CYANOCOBALAMIN (VITAMIN B-12) 1,000 MCG SUBL    1,000 mcg by SubLINGual route daily. DICYCLOMINE (BENTYL) 20 MG TABLET    Take 20 mg by mouth every six (6) hours as needed. ERGOCALCIFEROL (ERGOCALCIFEROL) 50,000 UNIT CAPSULE    Take 50,000 Units by mouth every seven (7) days. ESCITALOPRAM OXALATE (LEXAPRO) 10 MG TABLET    Take 1 Tab by mouth daily. FAMOTIDINE (PEPCID) 20 MG TABLET    Take 20 mg by mouth daily. Indications: HEARTBURN    FERROUS SULFATE (FEOSOL) 325 MG (65 MG IRON) TABLET    Take 325 mg by mouth daily. FISH OIL-DHA-EPA 1,200-144-216 MG CAP    Take  by mouth. FOLIC ACID 272 MCG TABLET    Take 800 mcg by mouth daily. GABAPENTIN (NEURONTIN) 600 MG TABLET    Take 600 mg by mouth three (3) times daily. Indications: NEUROPATHIC PAIN    HYDROXYZINE PAMOATE (VISTARIL) 50 MG CAPSULE    Take 50 mg by mouth two (2) times a day. LACTULOSE 10 GRAM/15 ML (15 ML) SOLN    Take 15 mL by mouth three (3) times daily. LEVOTHYROXINE (SYNTHROID) 25 MCG TABLET    Take 1 Tab by mouth Daily (before breakfast). Indications: HYPOTHYROIDISM    OLANZAPINE (ZYPREXA ZYDIS) 10 MG DISINTEGRATING TABLET    Take 1 Tab by mouth nightly. PANTOPRAZOLE (PROTONIX) 20 MG TABLET    Take 20 mg by mouth daily. These Medications have changed    No medications on file   Stop Taking    HYDROCODONE-ACETAMINOPHEN (NORCO) 5-325 MG PER TABLET    Take 1 Tab by mouth every four (4) hours as needed for Pain. Max Daily Amount: 6 Tabs. ONDANSETRON (ZOFRAN ODT) 4 MG DISINTEGRATING TABLET    Take 1 Tab by mouth every eight (8) hours as needed for Nausea.

## 2017-05-22 NOTE — Clinical Note
PLEASE FOLLOW-UP AS DIRECTED WITHOUT FAIL WITHIN THE TIME FRAME RECOMMENDED AS FAILURE TO DO SO COULD RESULT IN WORSENING OF YOUR PHYSICAL CONDITION, DEATH, AND OR PERMANENT DISABILITY.  
  
RETURN TO THE EMERGENCY DEPARTMENT AT IF YOU ARE UNABLE TO FOLL OW-UP AS DIRECTED.  
  
RETURN TO THE EMERGENCY DEPARTMENT AT ONCE IF YOU HAVE SYMPTOMS THAT DO NOT IMPROVE WITH TREATMENT, NEW SYMPTOMS, WORSENING SYMPTOMS, OR ANY OTHER CONCERNS.  
  
You are the most important component in your health care. Please fol low up with the GI referral below for management of your chronic abdominal pain as the ED is not equipped to manage chronic pain, and will not be able to continue to treat these symptoms without proper follow up.

## 2017-05-22 NOTE — ED TRIAGE NOTES
PT ARRIVED THROUGH TRIAGE WITH C/O ABDOMINAL PAIN AND FLANK PAIN. SEEN LAST WEEK FOR SAME AND TOLD SHE HAS PANCREATITIS.

## 2017-05-23 LAB
ATRIAL RATE: 97 BPM
CALCULATED P AXIS, ECG09: 44 DEGREES
CALCULATED R AXIS, ECG10: 23 DEGREES
CALCULATED T AXIS, ECG11: 53 DEGREES
DIAGNOSIS, 93000: NORMAL
P-R INTERVAL, ECG05: 152 MS
Q-T INTERVAL, ECG07: 358 MS
QRS DURATION, ECG06: 86 MS
QTC CALCULATION (BEZET), ECG08: 454 MS
VENTRICULAR RATE, ECG03: 97 BPM

## 2017-05-23 NOTE — DISCHARGE INSTRUCTIONS

## 2017-05-24 LAB
BACTERIA SPEC CULT: NORMAL
BACTERIA SPEC CULT: NORMAL
SERVICE CMNT-IMP: NORMAL
SERVICE CMNT-IMP: NORMAL

## 2017-06-21 ENCOUNTER — APPOINTMENT (OUTPATIENT)
Dept: CT IMAGING | Age: 57
End: 2017-06-21
Attending: EMERGENCY MEDICINE
Payer: MEDICARE

## 2017-06-21 ENCOUNTER — HOSPITAL ENCOUNTER (EMERGENCY)
Age: 57
Discharge: HOME | End: 2017-06-21
Attending: EMERGENCY MEDICINE
Payer: MEDICARE

## 2017-06-21 ENCOUNTER — APPOINTMENT (OUTPATIENT)
Dept: GENERAL RADIOLOGY | Age: 57
End: 2017-06-21
Attending: EMERGENCY MEDICINE
Payer: MEDICARE

## 2017-06-21 VITALS
HEART RATE: 112 BPM | SYSTOLIC BLOOD PRESSURE: 129 MMHG | TEMPERATURE: 98.4 F | DIASTOLIC BLOOD PRESSURE: 99 MMHG | WEIGHT: 163 LBS | OXYGEN SATURATION: 97 % | BODY MASS INDEX: 26.31 KG/M2 | RESPIRATION RATE: 17 BRPM
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
## 2017-06-21 DIAGNOSIS — S69.91XA THUMB INJURY, RIGHT, INITIAL ENCOUNTER: ICD-10-CM

## 2017-06-21 DIAGNOSIS — S62.511A CLOSED DISPLACED FRACTURE OF PROXIMAL PHALANX OF RIGHT THUMB, INITIAL ENCOUNTER: ICD-10-CM

## 2017-06-21 DIAGNOSIS — R03.0 ELEVATED BLOOD PRESSURE READING: ICD-10-CM

## 2017-06-21 LAB
ALBUMIN SERPL BCP-MCNC: 4.2 G/DL (ref 3.4–5)
ALBUMIN/GLOB SERPL: 1.1 {RATIO} (ref 0.8–1.7)
ALP SERPL-CCNC: 211 U/L (ref 45–117)
ALT SERPL-CCNC: 77 U/L (ref 13–56)
ANION GAP BLD CALC-SCNC: 14 MMOL/L (ref 3–18)
APPEARANCE UR: CLEAR
AST SERPL W P-5'-P-CCNC: 179 U/L (ref 15–37)
BACTERIA URNS QL MICRO: NEGATIVE /HPF
BILIRUB SERPL-MCNC: 0.5 MG/DL (ref 0.2–1)
BILIRUB UR QL: NEGATIVE
BUN SERPL-MCNC: 10 MG/DL (ref 7–18)
BUN/CREAT SERPL: 14 (ref 12–20)
CALCIUM SERPL-MCNC: 8.5 MG/DL (ref 8.5–10.1)
CHLORIDE SERPL-SCNC: 91 MMOL/L (ref 100–108)
CK MB CFR SERPL CALC: NORMAL % (ref 0–4)
CK MB SERPL-MCNC: <1 NG/ML (ref 5–25)
CK SERPL-CCNC: 179 U/L (ref 26–192)
CO2 SERPL-SCNC: 23 MMOL/L (ref 21–32)
COLOR UR: YELLOW
CREAT SERPL-MCNC: 0.72 MG/DL (ref 0.6–1.3)
EPITH CASTS URNS QL MICRO: NORMAL /LPF (ref 0–5)
ERYTHROCYTE [DISTWIDTH] IN BLOOD BY AUTOMATED COUNT: 13.2 % (ref 11.6–14.5)
GLOBULIN SER CALC-MCNC: 4 G/DL (ref 2–4)
GLUCOSE SERPL-MCNC: 131 MG/DL (ref 74–99)
GLUCOSE UR STRIP.AUTO-MCNC: NEGATIVE MG/DL
HCT VFR BLD AUTO: 38 % (ref 35–45)
HGB BLD-MCNC: 13.3 G/DL (ref 12–16)
HGB UR QL STRIP: NEGATIVE
HYALINE CASTS URNS QL MICRO: NORMAL /LPF (ref 0–2)
INR PPP: 1.2 (ref 0.8–1.2)
KETONES UR QL STRIP.AUTO: ABNORMAL MG/DL
LEUKOCYTE ESTERASE UR QL STRIP.AUTO: NEGATIVE
MCH RBC QN AUTO: 30.1 PG (ref 24–34)
MCHC RBC AUTO-ENTMCNC: 35 G/DL (ref 31–37)
MCV RBC AUTO: 86 FL (ref 74–97)
NITRITE UR QL STRIP.AUTO: NEGATIVE
PH UR STRIP: 5 [PH] (ref 5–8)
PLATELET # BLD AUTO: 334 K/UL (ref 135–420)
PMV BLD AUTO: 8.5 FL (ref 9.2–11.8)
POTASSIUM SERPL-SCNC: 4 MMOL/L (ref 3.5–5.5)
PROT SERPL-MCNC: 8.2 G/DL (ref 6.4–8.2)
PROT UR STRIP-MCNC: ABNORMAL MG/DL
PROTHROMBIN TIME: 15.1 SEC (ref 11.5–15.2)
RBC # BLD AUTO: 4.42 M/UL (ref 4.2–5.3)
RBC #/AREA URNS HPF: NORMAL /HPF (ref 0–5)
SODIUM SERPL-SCNC: 128 MMOL/L (ref 136–145)
SP GR UR REFRACTOMETRY: 1.02 (ref 1–1.03)
TROPONIN I SERPL-MCNC: <0.02 NG/ML (ref 0–0.04)
UROBILINOGEN UR QL STRIP.AUTO: 0.2 EU/DL (ref 0.2–1)
WBC # BLD AUTO: 6.1 K/UL (ref 4.6–13.2)
WBC URNS QL MICRO: NORMAL /HPF (ref 0–4)

## 2017-06-21 PROCEDURE — 93005 ELECTROCARDIOGRAM TRACING: CPT

## 2017-06-21 PROCEDURE — 85610 PROTHROMBIN TIME: CPT | Performed by: EMERGENCY MEDICINE

## 2017-06-21 PROCEDURE — 70450 CT HEAD/BRAIN W/O DYE: CPT

## 2017-06-21 PROCEDURE — 96360 HYDRATION IV INFUSION INIT: CPT

## 2017-06-21 PROCEDURE — 82550 ASSAY OF CK (CPK): CPT | Performed by: EMERGENCY MEDICINE

## 2017-06-21 PROCEDURE — 96361 HYDRATE IV INFUSION ADD-ON: CPT

## 2017-06-21 PROCEDURE — 80053 COMPREHEN METABOLIC PANEL: CPT | Performed by: EMERGENCY MEDICINE

## 2017-06-21 PROCEDURE — 85027 COMPLETE CBC AUTOMATED: CPT | Performed by: EMERGENCY MEDICINE

## 2017-06-21 PROCEDURE — 74011250636 HC RX REV CODE- 250/636: Performed by: EMERGENCY MEDICINE

## 2017-06-21 PROCEDURE — 71010 XR CHEST SNGL V: CPT

## 2017-06-21 PROCEDURE — 99284 EMERGENCY DEPT VISIT MOD MDM: CPT

## 2017-06-21 PROCEDURE — 81001 URINALYSIS AUTO W/SCOPE: CPT | Performed by: EMERGENCY MEDICINE

## 2017-06-21 PROCEDURE — 73130 X-RAY EXAM OF HAND: CPT

## 2017-06-21 RX ADMIN — SODIUM CHLORIDE 1000 ML: 900 INJECTION, SOLUTION INTRAVENOUS at 13:44

## 2017-06-21 NOTE — ED PROVIDER NOTES
Patient is a 65 y.o. female presenting with fall. Fall   Pertinent negatives include no fever, no abdominal pain, no nausea, no vomiting and no hematuria. Louis Garcia is a 65 y.o. female with hx of alcohol abuse, pancreatitis chronic, thrombocytopenia, presents with R thumb pain x \" since yesterday\". Per patient she had some beer last night \" and I passed out and woke up today with pain around my R thumb today\"  Denies of any pain to R elbow, shoulder, chest pain or headaches. Denies of any abdominal pain, n/v or blood in stools. Past Medical History:   Diagnosis Date    Alcohol abuse     Alcoholic hepatitis     Alcoholic pancreatitis     Anemia     Bipolar disorder (HCC)     [facility]. [facility])    Chronic abdominal pain     Chronic pancreatitis ([facility] [address]     Compression fracture of lumbar vertebra (HCC)     L4, L5     Depression     Dr. Silvestre [facility])    Elevated LFTs     ETOH abuse     Fatty liver     GERD (gastroesophageal reflux disease)     Hyperlipidemia     Hypertension     Hypothyroidism     Lower GI bleeding     Morbid obesity ([facility] [address] [facility]     Obstructive sleep apnea     Substance induced mood disorder ([facility] [address] [facility]     Vitamin D deficiency        Past Surgical History:   Procedure Laterality Date    BIOPSY LIVER  08/15/2012    Lap Left hepatic liver wedge by Dr. Hussain Rodriguez; BX Revealed: Hepatic Steatosis, AVM w/ associated Subcapsular Fibrosis.  COLONOSCOPY N/A 1/17/2017    COLONOSCOPY performed by Lula Cabrera MD at [address] [place] [address], [facility], WITH D  8/1995, 11/1997    HX ABDOMINAL LAPAROSCOPY  12/02/2014    Laparoscopic Gastrostomy w/ Partial Gastrectomy for assistance in placement of Endoscopic Retrograde Cholangiopancreatography & Diagnostic Lap.  HX CARPAL TUNNEL RELEASE      HX CHOLECYSTECTOMY  09/16/2014    Dr. Sri Weber HX COLONOSCOPY  05/12/2012    Colonoscopy by Dr. Eun Liang. Selina Lanier:  Bx Revealed Colon Polyps (Tubular Adenoma), Hemorrhoids.  HX GASTRIC BYPASS  08/15/2012    Lap Christian-en-y    HX HEMORRHOIDECTOMY  04/30/2015    Dr. Mihaela Dickinson. [place]    HX HYSTERECTOMY  2006    HX TONSILLECTOMY  1992    REPAIR NONUNION SCAPHOID CARPAL BONE Right 2010    Wrist         Family History:   Problem Relation Age of Onset    Cancer Father     Cancer Sister     Obesity Brother        Social History     Social History    Marital status:      Spouse name: N/A    Number of children: N/A    Years of education: N/A     Occupational History    Not on file. Social History Main Topics    Smoking status: Never Smoker    Smokeless tobacco: Never Used    Alcohol use 0.0 oz/week     0 Standard drinks or equivalent per week      Comment: 6 pack of beer a week- 1/2/17 last use    Drug use: No    Sexual activity: Yes     Partners: Male     Birth control/ protection: Condom     Other Topics Concern    Not on file     Social History Narrative         ALLERGIES: Codeine; Lamictal [lamotrigine]; Morphine; Pcn [penicillins]; Vancomycin; Doxycycline; Percocet [oxycodone-acetaminophen]; Tetracycline; and Tomato    Review of Systems   Constitutional: Negative for chills, diaphoresis, fatigue and fever. HENT: Negative for dental problem, ear discharge, ear pain, facial swelling, hearing loss, nosebleeds and voice change. Eyes: Negative for photophobia, pain, redness and visual disturbance. Respiratory: Negative for cough and wheezing. Cardiovascular: Negative for chest pain, palpitations and leg swelling. Gastrointestinal: Negative for abdominal pain, blood in stool, diarrhea, nausea and vomiting. Genitourinary: Negative for hematuria. Musculoskeletal: Negative for back pain, gait problem, joint swelling, myalgias, neck pain and neck stiffness.        Vitals:    06/21/17 1210   BP: (!) 129/99   Pulse: (!) 112   Resp: 17   Temp: 98.4 °F (36.9 °C)   SpO2: 97%   Weight: 73.9 kg (163 lb) Physical Exam   Constitutional: She appears well-developed and well-nourished. HENT:   Head: Normocephalic. Eyes: Conjunctivae and EOM are normal. Pupils are equal, round, and reactive to light. Cardiovascular: Normal rate and regular rhythm. Pulmonary/Chest: Effort normal and breath sounds normal.   Abdominal: Soft. Bowel sounds are normal.   Musculoskeletal:        Arms:       MDM  Number of Diagnoses or Management Options  Closed displaced fracture of proximal phalanx of right thumb, initial encounter:   Elevated blood pressure reading:   Fall, initial encounter:   Thumb injury, right, initial encounter:   Diagnosis management comments: CT head, CXR wnl  R thumb fracture/   Labs reviewed. Referral given to orthopedic.      ED Course       Procedures  Recent Results (from the past 12 hour(s))   EKG, 12 LEAD, INITIAL    Collection Time: 06/21/17 12:24 PM   Result Value Ref Range    Ventricular Rate 99 BPM    Atrial Rate 99 BPM    P-R Interval 146 ms    QRS Duration 86 ms    Q-T Interval 372 ms    QTC Calculation (Bezet) 477 ms    Calculated P Axis 73 degrees    Calculated R Axis 56 degrees    Calculated T Axis 66 degrees    Diagnosis       Normal sinus rhythm  Possible Left atrial enlargement  Nonspecific T wave abnormality  Prolonged QT  Abnormal ECG  When compared with ECG of 22-MAY-2017 18:55,  No significant change was found     CARDIAC PANEL,(CK, CKMB & TROPONIN)    Collection Time: 06/21/17 12:42 PM   Result Value Ref Range     26 - 192 U/L    CK - MB <1.0 <3.6 ng/ml    CK-MB Index Cannot be calulated 0.0 - 4.0 %    Troponin-I, Qt. <0.02 0.0 - 0.045 NG/ML   CBC W/O DIFF    Collection Time: 06/21/17 12:42 PM   Result Value Ref Range    WBC 6.1 4.6 - 13.2 K/uL    RBC 4.42 4.20 - 5.30 M/uL    HGB 13.3 12.0 - 16.0 g/dL    HCT 38.0 35.0 - 45.0 %    MCV 86.0 74.0 - 97.0 FL    MCH 30.1 24.0 - 34.0 PG    MCHC 35.0 31.0 - 37.0 g/dL    RDW 13.2 11.6 - 14.5 %    PLATELET 692 942 - 851 K/uL    MPV 8.5 (L) 9.2 - 81.4 FL   METABOLIC PANEL, COMPREHENSIVE    Collection Time: 06/21/17 12:42 PM   Result Value Ref Range    Sodium 128 (L) 136 - 145 mmol/L    Potassium 4.0 3.5 - 5.5 mmol/L    Chloride 91 (L) 100 - 108 mmol/L    CO2 23 21 - 32 mmol/L    Anion gap 14 3.0 - 18 mmol/L    Glucose 131 (H) 74 - 99 mg/dL    BUN 10 7.0 - 18 MG/DL    Creatinine 0.72 0.6 - 1.3 MG/DL    BUN/Creatinine ratio 14 12 - 20      GFR est AA >60 >60 ml/min/1.73m2    GFR est non-AA >60 >60 ml/min/1.73m2    Calcium 8.5 8.5 - 10.1 MG/DL    Bilirubin, total 0.5 0.2 - 1.0 MG/DL    ALT (SGPT) 77 (H) 13 - 56 U/L    AST (SGOT) 179 (H) 15 - 37 U/L    Alk. phosphatase 211 (H) 45 - 117 U/L    Protein, total 8.2 6.4 - 8.2 g/dL    Albumin 4.2 3.4 - 5.0 g/dL    Globulin 4.0 2.0 - 4.0 g/dL    A-G Ratio 1.1 0.8 - 1.7     PROTHROMBIN TIME + INR    Collection Time: 06/21/17 12:42 PM   Result Value Ref Range    Prothrombin time 15.1 11.5 - 15.2 sec    INR 1.2 0.8 - 1.2     URINALYSIS W/ RFLX MICROSCOPIC    Collection Time: 06/21/17  1:50 PM   Result Value Ref Range    Color YELLOW      Appearance CLEAR      Specific gravity 1.020 1.005 - 1.030      pH (UA) 5.0 5.0 - 8.0      Protein TRACE (A) NEG mg/dL    Glucose NEGATIVE  NEG mg/dL    Ketone TRACE (A) NEG mg/dL    Bilirubin NEGATIVE  NEG      Blood NEGATIVE  NEG      Urobilinogen 0.2 0.2 - 1.0 EU/dL    Nitrites NEGATIVE  NEG      Leukocyte Esterase NEGATIVE  NEG     URINE MICROSCOPIC ONLY    Collection Time: 06/21/17  1:50 PM   Result Value Ref Range    WBC 0 to 3 0 - 4 /hpf    RBC 0 to 3 0 - 5 /hpf    Epithelial cells 2+ 0 - 5 /lpf    Bacteria NEGATIVE  NEG /hpf    Hyaline cast 4 to 10 0 - 2 /lpf           DISCHARGE NOTE:  2:42 PM    Og Garcia  results have been reviewed with her. She has been counseled regarding her diagnosis, treatment, and plan.   She verbally conveys understanding and agreement of the signs, symptoms, diagnosis, treatment and prognosis and additionally agrees to follow up as discussed. She also agrees with the care-plan and conveys that all of her questions have been answered. I have also provided discharge instructions for her that include: educational information regarding their diagnosis and treatment, and list of reasons why they would want to return to the ED prior to their follow-up appointment, should her condition change. CLINICAL IMPRESSION:    1. Fall, initial encounter    2. Thumb injury, right, initial encounter    3. Elevated blood pressure reading    4.  Closed displaced fracture of proximal phalanx of right thumb, initial encounter        AFTER VISIT PLAN:    Current Discharge Medication List           Follow-up Information     Follow up With Details Comments Amelia [address] MD Sanchez In 2 days  [address]      Ibrahima Hamlin MD Schedule an appointment as soon as possible for a visit in 1 day  [address] [facility] [address] [facility] [address]  [phone]      [facility] EMERGENCY DEPT  If symptoms worsen [address]  [phone]           Written by Liz Valderrama PA-C

## 2017-06-21 NOTE — DISCHARGE INSTRUCTIONS
Elevated Blood Pressure: Care Instructions  Your Care Instructions    Blood pressure is a measure of how hard the blood pushes against the walls of your arteries. It's normal for blood pressure to go up and down throughout the day. But if it stays up over time, you have high blood pressure. Two numbers tell you your blood pressure. The first number is the systolic pressure. It shows how hard the blood pushes when your heart is pumping. The second number is the diastolic pressure. It shows how hard the blood pushes between heartbeats, when your heart is relaxed and filling with blood. An ideal blood pressure in adults is less than 120/80 (say \"120 over 80\"). High blood pressure is 140/90 or higher. You have high blood pressure if your top number is 140 or higher or your bottom number is 90 or higher, or both. The main test for high blood pressure is simple, fast, and painless. To diagnose high blood pressure, your doctor will test your blood pressure at different times. After testing your blood pressure, your doctor may ask you to test it again when you are home. If you are diagnosed with high blood pressure, you can work with your doctor to make a long-term plan to manage it. Follow-up care is a key part of your treatment and safety. Be sure to make and go to all appointments, and call your doctor if you are having problems. It's also a good idea to know your test results and keep a list of the medicines you take. How can you care for yourself at home? · Do not smoke. Smoking increases your risk for heart attack and stroke. If you need help quitting, talk to your doctor about stop-smoking programs and medicines. These can increase your chances of quitting for good. · Stay at a healthy weight. · Try to limit how much sodium you eat to less than 2,300 milligrams (mg) a day. Your doctor may ask you to try to eat less than 1,500 mg a day. · Be physically active.  Get at least 30 minutes of exercise on most days of the week. Walking is a good choice. You also may want to do other activities, such as running, swimming, cycling, or playing tennis or team sports. · Avoid or limit alcohol. Talk to your doctor about whether you can drink any alcohol. · Eat plenty of fruits, vegetables, and low-fat dairy products. Eat less saturated and total fats. · Learn how to check your blood pressure at home. When should you call for help? Call your doctor now or seek immediate medical care if:  · Your blood pressure is much higher than normal (such as 180/110 or higher). · You think high blood pressure is causing symptoms such as:  ¨ Severe headache. ¨ Blurry vision. Watch closely for changes in your health, and be sure to contact your doctor if:  · You do not get better as expected. Where can you learn more? Go to http://salvatoreMomspotizabela.info/. Enter Q906 in the search box to learn more about \"Elevated Blood Pressure: Care Instructions. \"  Current as of: April 3, 2017  Content Version: 11.3  © 1101-9956 [employer]. Care instructions adapted under license by [employer] (which disclaims liability or warranty for this information). If you have questions about a medical condition or this instruction, always ask your healthcare professional. [address] any warranty or liability for your use of this information. Preventing Falls: Care Instructions  Your Care Instructions  Getting around your home safely can be a challenge if you have injuries or health problems that make it easy for you to fall. Loose rugs and furniture in walkways are among the dangers for many older people who have problems walking or who have poor eyesight. People who have conditions such as arthritis, osteoporosis, or dementia also have to be careful not to fall. You can make your home safer with a few simple measures. Follow-up care is a key part of your treatment and safety.  Be sure to make and go to all appointments, and call your doctor if you are having problems. It's also a good idea to know your test results and keep a list of the medicines you take. How can you care for yourself at home? Taking care of yourself  · You may get dizzy if you do not drink enough water. To prevent dehydration, drink plenty of fluids, enough so that your urine is light yellow or clear like water. Choose water and other caffeine-free clear liquids. If you have kidney, heart, or liver disease and have to limit fluids, talk with your doctor before you increase the amount of fluids you drink. · Exercise regularly to improve your strength, muscle tone, and balance. Walk if you can. Swimming may be a good choice if you cannot walk easily. · Have your vision and hearing checked each year or any time you notice a change. If you have trouble seeing and hearing, you might not be able to avoid objects and could lose your balance. · Know the side effects of the medicines you take. Ask your doctor or pharmacist whether the medicines you take can affect your balance. Sleeping pills or sedatives can affect your balance. · Limit the amount of alcohol you drink. Alcohol can impair your balance and other senses. · Ask your doctor whether calluses or corns on your feet need to be removed. If you wear loose-fitting shoes because of calluses or corns, you can lose your balance and fall. · Talk to your doctor if you have numbness in your feet. Preventing falls at home  · Remove raised doorway thresholds, throw rugs, and clutter. Repair loose carpet or raised areas in the floor. · Move furniture and electrical cords to keep them out of walking paths. · Use nonskid floor wax, and wipe up spills right away, especially on ceramic tile floors. · If you use a walker or cane, put rubber tips on it. If you use crutches, clean the bottoms of them regularly with an abrasive pad, such as steel wool.   · Keep your house well lit, especially stairways, porches, and outside walkways. Use night-lights in areas such as hallways and bathrooms. Add extra light switches or use remote switches (such as switches that go on or off when you clap your hands) to make it easier to turn lights on if you have to get up during the night. · Install sturdy handrails on stairways. · Move items in your cabinets so that the things you use a lot are on the lower shelves (about waist level). · Keep a cordless phone and a flashlight with new batteries by your bed. If possible, put a phone in each of the main rooms of your house, or carry a cell phone in case you fall and cannot reach a phone. Or, you can wear a device around your neck or wrist. You push a button that sends a signal for help. · Wear low-heeled shoes that fit well and give your feet good support. Use footwear with nonskid soles. Check the heels and soles of your shoes for wear. Repair or replace worn heels or soles. · Do not wear socks without shoes on wood floors. · Walk on the grass when the sidewalks are slippery. If you live in an area that gets snow and ice in the winter, sprinkle salt on slippery steps and sidewalks. Preventing falls in the bath  · Install grab bars and nonskid mats inside and outside your shower or tub and near the toilet and sinks. · Use shower chairs and bath benches. · Use a hand-held shower head that will allow you to sit while showering. · Get into a tub or shower by putting the weaker leg in first. Get out of a tub or shower with your strong side first.  · Repair loose toilet seats and consider installing a raised toilet seat to make getting on and off the toilet easier. · Keep your bathroom door unlocked while you are in the shower. Where can you learn more? Go to http://salvatore-izabela.info/. Enter [id number] [id number] in the search box to learn more about \"Preventing Falls: Care Instructions. \"  Current as of: August 4, 2016  Content Version: 11.3  © 9310-0087 [employer]. Care instructions adapted under license by [employer] (which disclaims liability or warranty for this information). If you have questions about a medical condition or this instruction, always ask your healthcare professional. [address] any warranty or liability for your use of this information. Finger Fracture: Care Instructions  Your Care Instructions    Breaks in the bones of the finger usually heal well in about 3 to 4 weeks. The pain and swelling from a broken finger can last for weeks. But it should steadily improve, starting a few days after you break it. It is very important that you wear and take care of the cast or splint exactly as your doctor tells you to so that your finger heals properly and does not end up crooked. Wearing a splint may interfere with your normal activities. Ask for help with daily tasks if you need it. You heal best when you take good care of yourself. Eat a variety of healthy foods, and don't smoke. Follow-up care is a key part of your treatment and safety. Be sure to make and go to all appointments, and call your doctor if you are having problems. It's also a good idea to know your test results and keep a list of the medicines you take. How can you care for yourself at home? · If your doctor put a splint on your finger, wear the splint exactly as directed. Do not remove it until your doctor says that you can. · Keep your hand raised above the level of your heart as much as you can. This will help reduce swelling. · Put ice or a cold pack on your finger for 10 to 20 minutes at a time. Try to do this every 1 to 2 hours for the next 3 days (when you are awake) or until the swelling goes down. Put a thin cloth between the ice and your skin. Keep the splint dry. · Be safe with medicines. Take pain medicines exactly as directed.   ¨ If the doctor gave you a prescription medicine for pain, take it as prescribed. ¨ If you are not taking a prescription pain medicine, ask your doctor if you can take an over-the-counter medicine. When should you call for help? Call 911 anytime you think you may need emergency care. For example, call if:  · Your finger is cool or pale or changes color. Call your doctor now or seek immediate medical care if:  · Your pain gets much worse. · You have tingling, weakness, or numbness in your finger. · You have signs of infection, such as:  ¨ Increased pain, swelling, warmth, or redness. ¨ Red streaks leading from the area. ¨ Pus draining from the area. ¨ Swollen lymph nodes in your neck, armpits, or groin. ¨ A fever. Watch closely for changes in your health, and be sure to contact your doctor if:  · Your finger is not steadily improving. Where can you learn more? Go to http://salvatore-izbaela.info/. Enter B987 in the search box to learn more about \"Finger Fracture: Care Instructions. \"  Current as of: March 21, 2017  Content Version: 11.3  © 1589-9859 [employer]. Care instructions adapted under license by [employer] (which disclaims liability or warranty for this information). If you have questions about a medical condition or this instruction, always ask your healthcare professional. [address] any warranty or liability for your use of this information. [employer] Activation    Thank you for requesting access to [employer]. Please follow the instructions below to securely access and download your online medical record. [employer] allows you to send messages to your doctor, view your test results, renew your prescriptions, schedule appointments, and more. How Do I Sign Up? 1. In your internet browser, go to [email or web address]  2. Click on the First Time User? Click Here link in the Sign In box. You will be redirect to the New Member Sign Up page. 3. Enter your [employer] Access Code exactly as it appears below. You will not need to use this code after youve completed the sign-up process. If you do not sign up before the expiration date, you must request a new code. [employer] Access Code: Activation code not generated  Current [facility] Status: Patient Declined (This is the date your ilabt access code will )    4. Enter the last four digits of your Social Security Number (xxxx) and Date of Birth (mm/dd/yyyy) as indicated and click Submit. You will be taken to the next sign-up page. 5. Create a ilabt ID. This will be your [facility] login ID and cannot be changed, so think of one that is secure and easy to remember. 6. Create a ilabt password. You can change your password at any time. 7. Enter your Password Reset Question and Answer. This can be used at a later time if you forget your password. 8. Enter your e-mail address. You will receive e-mail notification when new information is available in [address]. 9. Click Sign Up. You can now view and download portions of your medical record. 10. Click the Download Summary menu link to download a portable copy of your medical information. Additional Information    If you have questions, please visit the Frequently Asked Questions section of the [facility] website at [email or web address] Remember, [facility] is NOT to be used for urgent needs. For medical emergencies, dial 911.

## 2017-06-21 NOTE — ED TRIAGE NOTES
Ginny Quinones  2 days ago, falling out of bed during drinking binge. Now right thumb swollen and bruised.  Hx thrombocytopenia

## 2017-06-22 LAB
ATRIAL RATE: 99 BPM
CALCULATED P AXIS, ECG09: 73 DEGREES
CALCULATED R AXIS, ECG10: 56 DEGREES
CALCULATED T AXIS, ECG11: 66 DEGREES
DIAGNOSIS, 93000: NORMAL
P-R INTERVAL, ECG05: 146 MS
Q-T INTERVAL, ECG07: 372 MS
QRS DURATION, ECG06: 86 MS
QTC CALCULATION (BEZET), ECG08: 477 MS
VENTRICULAR RATE, ECG03: 99 BPM

## 2017-06-30 ENCOUNTER — APPOINTMENT (OUTPATIENT)
Dept: CT IMAGING | Age: 57
DRG: 092 | End: 2017-06-30
Attending: EMERGENCY MEDICINE
Payer: MEDICARE

## 2017-06-30 ENCOUNTER — APPOINTMENT (OUTPATIENT)
Dept: GENERAL RADIOLOGY | Age: 57
DRG: 092 | End: 2017-06-30
Attending: EMERGENCY MEDICINE
Payer: MEDICARE

## 2017-06-30 ENCOUNTER — HOSPITAL ENCOUNTER (INPATIENT)
Age: 57
LOS: 2 days | Discharge: HOME | DRG: 092 | End: 2017-07-03
Attending: EMERGENCY MEDICINE | Admitting: HOSPITALIST
Payer: MEDICARE

## 2017-06-30 DIAGNOSIS — R50.9 FEVER, UNSPECIFIED FEVER CAUSE: Primary | ICD-10-CM

## 2017-06-30 DIAGNOSIS — A41.9 SEPTIC SHOCK (HCC): ICD-10-CM

## 2017-06-30 DIAGNOSIS — R40.0 SOMNOLENCE: ICD-10-CM

## 2017-06-30 DIAGNOSIS — R65.21 SEPTIC SHOCK (HCC): ICD-10-CM

## 2017-06-30 LAB
ALBUMIN SERPL BCP-MCNC: 3.1 G/DL (ref 3.4–5)
ALBUMIN/GLOB SERPL: 0.8 {RATIO} (ref 0.8–1.7)
ALP SERPL-CCNC: 210 U/L (ref 45–117)
ALT SERPL-CCNC: 55 U/L (ref 13–56)
AMPHET UR QL SCN: NEGATIVE
ANION GAP BLD CALC-SCNC: 10 MMOL/L (ref 3–18)
APAP SERPL-MCNC: <2 UG/ML (ref 10–30)
APPEARANCE UR: CLEAR
AST SERPL W P-5'-P-CCNC: 77 U/L (ref 15–37)
BACTERIA URNS QL MICRO: NEGATIVE /HPF
BARBITURATES UR QL SCN: NEGATIVE
BASOPHILS # BLD AUTO: 0 K/UL (ref 0–0.06)
BASOPHILS # BLD: 0 % (ref 0–2)
BENZODIAZ UR QL: POSITIVE
BILIRUB SERPL-MCNC: 0.3 MG/DL (ref 0.2–1)
BILIRUB UR QL: NEGATIVE
BUN SERPL-MCNC: 7 MG/DL (ref 7–18)
BUN/CREAT SERPL: 5 (ref 12–20)
CALCIUM SERPL-MCNC: 8.8 MG/DL (ref 8.5–10.1)
CANNABINOIDS UR QL SCN: NEGATIVE
CAOX CRY URNS QL MICRO: ABNORMAL
CHLORIDE SERPL-SCNC: 94 MMOL/L (ref 100–108)
CO2 SERPL-SCNC: 27 MMOL/L (ref 21–32)
COCAINE UR QL SCN: NEGATIVE
COLOR UR: YELLOW
CREAT SERPL-MCNC: 1.31 MG/DL (ref 0.6–1.3)
DIFFERENTIAL METHOD BLD: ABNORMAL
EOSINOPHIL # BLD: 0 K/UL (ref 0–0.4)
EOSINOPHIL NFR BLD: 0 % (ref 0–5)
EPITH CASTS URNS QL MICRO: NEGATIVE /LPF (ref 0–5)
ERYTHROCYTE [DISTWIDTH] IN BLOOD BY AUTOMATED COUNT: 13.1 % (ref 11.6–14.5)
ETHANOL SERPL-MCNC: <3 MG/DL (ref 0–3)
GLOBULIN SER CALC-MCNC: 3.8 G/DL (ref 2–4)
GLUCOSE SERPL-MCNC: 143 MG/DL (ref 74–99)
GLUCOSE UR STRIP.AUTO-MCNC: 100 MG/DL
GRAN CASTS URNS QL MICRO: ABNORMAL /LPF
HCT VFR BLD AUTO: 31.8 % (ref 35–45)
HDSCOM,HDSCOM: ABNORMAL
HGB BLD-MCNC: 10.6 G/DL (ref 12–16)
HGB UR QL STRIP: NEGATIVE
KETONES UR QL STRIP.AUTO: NEGATIVE MG/DL
LACTATE BLD-SCNC: 2.7 MMOL/L (ref 0.4–2)
LEUKOCYTE ESTERASE UR QL STRIP.AUTO: NEGATIVE
LIPASE SERPL-CCNC: 176 U/L (ref 73–393)
LYMPHOCYTES # BLD AUTO: 8 % (ref 21–52)
LYMPHOCYTES # BLD: 0.5 K/UL (ref 0.9–3.6)
MCH RBC QN AUTO: 29.6 PG (ref 24–34)
MCHC RBC AUTO-ENTMCNC: 33.3 G/DL (ref 31–37)
MCV RBC AUTO: 88.8 FL (ref 74–97)
METHADONE UR QL: NEGATIVE
MONOCYTES # BLD: 0.4 K/UL (ref 0.05–1.2)
MONOCYTES NFR BLD AUTO: 7 % (ref 3–10)
NEUTS SEG # BLD: 4.5 K/UL (ref 1.8–8)
NEUTS SEG NFR BLD AUTO: 85 % (ref 40–73)
NITRITE UR QL STRIP.AUTO: NEGATIVE
OPIATES UR QL: NEGATIVE
PCP UR QL: NEGATIVE
PH UR STRIP: 7 [PH] (ref 5–8)
PLATELET # BLD AUTO: 212 K/UL (ref 135–420)
PMV BLD AUTO: 9.3 FL (ref 9.2–11.8)
POTASSIUM SERPL-SCNC: 4.5 MMOL/L (ref 3.5–5.5)
PROT SERPL-MCNC: 6.9 G/DL (ref 6.4–8.2)
PROT UR STRIP-MCNC: ABNORMAL MG/DL
RBC # BLD AUTO: 3.58 M/UL (ref 4.2–5.3)
RBC #/AREA URNS HPF: NEGATIVE /HPF (ref 0–5)
SALICYLATES SERPL-MCNC: <2.8 MG/DL (ref 2.8–20)
SODIUM SERPL-SCNC: 131 MMOL/L (ref 136–145)
SP GR UR REFRACTOMETRY: 1.01 (ref 1–1.03)
UROBILINOGEN UR QL STRIP.AUTO: 0.2 EU/DL (ref 0.2–1)
WBC # BLD AUTO: 5.4 K/UL (ref 4.6–13.2)
WBC URNS QL MICRO: ABNORMAL /HPF (ref 0–4)

## 2017-06-30 PROCEDURE — 83605 ASSAY OF LACTIC ACID: CPT

## 2017-06-30 PROCEDURE — 87040 BLOOD CULTURE FOR BACTERIA: CPT | Performed by: EMERGENCY MEDICINE

## 2017-06-30 PROCEDURE — 80307 DRUG TEST PRSMV CHEM ANLYZR: CPT | Performed by: EMERGENCY MEDICINE

## 2017-06-30 PROCEDURE — 74011250636 HC RX REV CODE- 250/636

## 2017-06-30 PROCEDURE — 80053 COMPREHEN METABOLIC PANEL: CPT | Performed by: EMERGENCY MEDICINE

## 2017-06-30 PROCEDURE — 87086 URINE CULTURE/COLONY COUNT: CPT | Performed by: EMERGENCY MEDICINE

## 2017-06-30 PROCEDURE — 70450 CT HEAD/BRAIN W/O DYE: CPT

## 2017-06-30 PROCEDURE — 77030011943

## 2017-06-30 PROCEDURE — 96367 TX/PROPH/DG ADDL SEQ IV INF: CPT

## 2017-06-30 PROCEDURE — 71010 XR CHEST PORT: CPT

## 2017-06-30 PROCEDURE — 96365 THER/PROPH/DIAG IV INF INIT: CPT

## 2017-06-30 PROCEDURE — 87186 SC STD MICRODIL/AGAR DIL: CPT | Performed by: EMERGENCY MEDICINE

## 2017-06-30 PROCEDURE — 83690 ASSAY OF LIPASE: CPT | Performed by: EMERGENCY MEDICINE

## 2017-06-30 PROCEDURE — 74011636320 HC RX REV CODE- 636/320: Performed by: EMERGENCY MEDICINE

## 2017-06-30 PROCEDURE — 74177 CT ABD & PELVIS W/CONTRAST: CPT

## 2017-06-30 PROCEDURE — 96375 TX/PRO/DX INJ NEW DRUG ADDON: CPT

## 2017-06-30 PROCEDURE — 85025 COMPLETE CBC W/AUTO DIFF WBC: CPT | Performed by: EMERGENCY MEDICINE

## 2017-06-30 PROCEDURE — 74011250637 HC RX REV CODE- 250/637: Performed by: EMERGENCY MEDICINE

## 2017-06-30 PROCEDURE — 81001 URINALYSIS AUTO W/SCOPE: CPT | Performed by: EMERGENCY MEDICINE

## 2017-06-30 PROCEDURE — 75810000137 HC PLCMT CENT VENOUS CATH

## 2017-06-30 PROCEDURE — 74011250636 HC RX REV CODE- 250/636: Performed by: EMERGENCY MEDICINE

## 2017-06-30 PROCEDURE — 96361 HYDRATE IV INFUSION ADD-ON: CPT

## 2017-06-30 PROCEDURE — 93005 ELECTROCARDIOGRAM TRACING: CPT

## 2017-06-30 PROCEDURE — 99285 EMERGENCY DEPT VISIT HI MDM: CPT

## 2017-06-30 PROCEDURE — 96366 THER/PROPH/DIAG IV INF ADDON: CPT

## 2017-06-30 PROCEDURE — 96376 TX/PRO/DX INJ SAME DRUG ADON: CPT

## 2017-06-30 PROCEDURE — 02HV33Z INSERTION OF INFUSION DEVICE INTO SUPERIOR VENA CAVA, PERCUTANEOUS APPROACH: ICD-10-PCS | Performed by: EMERGENCY MEDICINE

## 2017-06-30 PROCEDURE — 87077 CULTURE AEROBIC IDENTIFY: CPT | Performed by: EMERGENCY MEDICINE

## 2017-06-30 RX ORDER — ONDANSETRON 2 MG/ML
4 INJECTION INTRAMUSCULAR; INTRAVENOUS
Status: COMPLETED | OUTPATIENT
Start: 2017-06-30 | End: 2017-06-30

## 2017-06-30 RX ORDER — LEVOFLOXACIN 5 MG/ML
750 INJECTION, SOLUTION INTRAVENOUS
Status: COMPLETED | OUTPATIENT
Start: 2017-06-30 | End: 2017-06-30

## 2017-06-30 RX ORDER — NALOXONE HYDROCHLORIDE 0.4 MG/ML
0.4 INJECTION, SOLUTION INTRAMUSCULAR; INTRAVENOUS; SUBCUTANEOUS
Status: COMPLETED | OUTPATIENT
Start: 2017-06-30 | End: 2017-06-30

## 2017-06-30 RX ORDER — SODIUM CHLORIDE 9 MG/ML
1000 INJECTION, SOLUTION INTRAVENOUS CONTINUOUS
Status: DISCONTINUED | OUTPATIENT
Start: 2017-06-30 | End: 2017-07-03 | Stop reason: HOSPADM

## 2017-06-30 RX ORDER — NALOXONE HYDROCHLORIDE 0.4 MG/ML
INJECTION, SOLUTION INTRAMUSCULAR; INTRAVENOUS; SUBCUTANEOUS
Status: DISPENSED
Start: 2017-06-30 | End: 2017-07-01

## 2017-06-30 RX ORDER — NALOXONE HYDROCHLORIDE 0.4 MG/ML
INJECTION, SOLUTION INTRAMUSCULAR; INTRAVENOUS; SUBCUTANEOUS
Status: COMPLETED
Start: 2017-06-30 | End: 2017-06-30

## 2017-06-30 RX ORDER — HYDROMORPHONE HYDROCHLORIDE 1 MG/ML
1 INJECTION, SOLUTION INTRAMUSCULAR; INTRAVENOUS; SUBCUTANEOUS ONCE
Status: COMPLETED | OUTPATIENT
Start: 2017-06-30 | End: 2017-06-30

## 2017-06-30 RX ORDER — ACETAMINOPHEN 500 MG
1000 TABLET ORAL ONCE
Status: COMPLETED | OUTPATIENT
Start: 2017-06-30 | End: 2017-06-30

## 2017-06-30 RX ORDER — SODIUM CHLORIDE 0.9 % (FLUSH) 0.9 %
5-10 SYRINGE (ML) INJECTION AS NEEDED
Status: DISCONTINUED | OUTPATIENT
Start: 2017-06-30 | End: 2017-07-03 | Stop reason: HOSPADM

## 2017-06-30 RX ADMIN — SODIUM CHLORIDE 2367 ML: 900 INJECTION, SOLUTION INTRAVENOUS at 20:29

## 2017-06-30 RX ADMIN — HYDROMORPHONE HYDROCHLORIDE 1 MG: 1 INJECTION, SOLUTION INTRAMUSCULAR; INTRAVENOUS; SUBCUTANEOUS at 21:14

## 2017-06-30 RX ADMIN — LEVOFLOXACIN 750 MG: 5 INJECTION, SOLUTION INTRAVENOUS at 20:42

## 2017-06-30 RX ADMIN — IOPAMIDOL 100 ML: 612 INJECTION, SOLUTION INTRAVENOUS at 21:37

## 2017-06-30 RX ADMIN — NALOXONE HYDROCHLORIDE 0.4 MG: 0.4 INJECTION, SOLUTION INTRAMUSCULAR; INTRAVENOUS; SUBCUTANEOUS at 23:10

## 2017-06-30 RX ADMIN — SODIUM CHLORIDE 1000 ML: 9 INJECTION, SOLUTION INTRAVENOUS at 22:48

## 2017-06-30 RX ADMIN — ONDANSETRON 4 MG: 2 INJECTION INTRAMUSCULAR; INTRAVENOUS at 21:15

## 2017-06-30 RX ADMIN — ACETAMINOPHEN 1000 MG: 500 TABLET ORAL at 21:15

## 2017-06-30 NOTE — IP AVS SNAPSHOT
Naomy Gill 
 
 
 [address] [address] 
[phone] Patient: Lucas Winn MRN: [id number] AIR:4/26/2065 You are allergic to the following Allergen Reactions Codeine Rash Lamictal (Lamotrigine) Anaphylaxis Morphine Anaphylaxis Per patient, has tolerated dilaudid in the past.  
    
 Pcn (Penicillins) Anaphylaxis Vancomycin Rash Doxycycline Rash Percocet (Oxycodone-Acetaminophen) Hives Itching Tetracycline Rash Tomato Hives Recent Documentation Height Weight BMI OB Status Smoking Status 1.702 m 87.6 kg 30.26 kg/m2 Hysterectomy Never Smoker Unresulted Labs Order Current Status CULTURE, BLOOD Preliminary result CULTURE, BLOOD Preliminary result CULTURE, BLOOD Preliminary result Emergency Contacts Name Discharge Info Relation Home Work Mobile [address] CAREGIVER [3] Other Relative [6] [phone] Gena Khan DISCHARGE CAREGIVER [3] Child [2] [phone] Troy Lopez N/A  AT THIS TIME [6] Spouse [3] [phone] About your hospitalization You were admitted on:  July 1, 2017 You last received care in the:  [address] [facility] You were discharged on:  July 3, 2017 Unit phone number:  [phone] Why you were hospitalized Your primary diagnosis was:  Hypotension Your diagnoses also included:  Fever Of Unknown Origin, Altered Mental State, Altered Mental Status Providers Seen During Your Hospitalizations Provider Role Specialty Primary office phone Mg Galvez MD Attending Provider Emergency Medicine [phone] Fela Triplett MD Attending Provider [facility] [phone] Avila Torres MD Attending Provider Internal Medicine [phone] Julianna Combs DO Attending Provider Internal Medicine [phone] Your Primary Care Physician (PCP) Primary Care Physician Office Phone Office Fax Bethany Roberts [phone] Follow-up Information Follow up With Details Comments Contact Info Melodi Landau, MD  Please follow up with your PCP within 7 days to discuss your recent hospitalization. Patient to arrange. [address] [facility] [id number] 
[phone] Current Discharge Medication List  
  
START taking these medications Dose & Instructions Dispensing Information Comments Morning Noon Evening Bedtime  
 levoFLOXacin 750 mg tablet Commonly known as:  Romayne Potters Your last dose was: Your next dose is:    
   
   
 Dose:  750 mg Take 1 Tab by mouth daily for 5 days. Quantity:  5 Tab Refills:  0 CONTINUE these medications which have CHANGED Dose & Instructions Dispensing Information Comments Morning Noon Evening Bedtime  
 levothyroxine 25 mcg tablet Commonly known as:  SYNTHROID What changed:  how much to take Your last dose was: Your next dose is:    
   
   
 Dose:  25 mcg Take 1 Tab by mouth Daily (before breakfast). Indications: HYPOTHYROIDISM Quantity:  30 Tab Refills:  0 CONTINUE these medications which have NOT CHANGED Dose & Instructions Dispensing Information Comments Morning Noon Evening Bedtime  
 alendronate 70 mg tablet Commonly known as:  FOSAMAX Your last dose was: Your next dose is:    
   
   
 Dose:  70 mg Take 70 mg by mouth Every Saturday. Refills:  0 BENTYL 20 mg tablet Generic drug:  dicyclomine Your last dose was: Your next dose is:    
   
   
 Dose:  20 mg Take 20 mg by mouth three (3) times daily. Refills:  0  
     
   
   
   
  
 biotin 10,000 mcg Cap Your last dose was: Your next dose is:    
   
   
 Dose:  1 Cap Take 1 Cap by mouth. Refills:  0  
     
   
   
   
  
 calcium citrate-vitamin d3 315-200 mg-unit Tab Commonly known as:  CITRACAL+D Your last dose was: Your next dose is:    
   
   
 Dose:  1 Tab Take 1 Tab by mouth two (2) times daily (with meals). Refills:  0  
     
   
   
   
  
 cholecalciferol 1,000 unit tablet Commonly known as:  VITAMIN D3 Your last dose was: Your next dose is: Take  by mouth daily. Refills:  0  
     
   
   
   
  
 cloNIDine HCl 0.1 mg tablet Commonly known as:  CATAPRES Your last dose was: Your next dose is: Take  by mouth two (2) times a day. Refills:  0  
     
   
   
   
  
 colesevelam 625 mg tablet Commonly known as:  [facility] Your last dose was: Your next dose is:    
   
   
 Dose:  1250 mg Take 2 Tabs by mouth two (2) times daily (with meals). Quantity:  120 Tab Refills:  0  
     
   
   
   
  
 ergocalciferol 50,000 unit capsule Commonly known as:  ERGOCALCIFEROL Your last dose was: Your next dose is:    
   
   
 Dose:  82990 Units Take 50,000 Units by mouth every seven (7) days. Refills:  0  
     
   
   
   
  
 escitalopram oxalate 10 mg tablet Commonly known as:  Robert Estrin Your last dose was: Your next dose is:    
   
   
 Dose:  10 mg Take 1 Tab by mouth daily. Quantity:  10 Tab Refills:  0  
     
   
   
   
  
 famotidine 20 mg tablet Commonly known as:  PEPCID Your last dose was: Your next dose is:    
   
   
 Dose:  20 mg Take 20 mg by mouth daily. Indications: HEARTBURN Refills:  0  
     
   
   
   
  
 FEOSOL 325 mg (65 mg iron) tablet Generic drug:  ferrous sulfate Your last dose was: Your next dose is:    
   
   
 Dose:  325 mg Take 325 mg by mouth daily. Refills:  0  
     
   
   
   
  
 fish oil-dha-epa 1,200-144-216 mg Cap Your last dose was: Your next dose is: Take  by mouth. Refills:  0  
     
   
   
   
  
 folic acid 186 mcg tablet Your last dose was: Your next dose is:    
   
   
 Dose:  800 mcg Take 800 mcg by mouth daily. Refills:  0  
     
   
   
   
  
 lactulose 10 gram/15 mL (15 mL) Soln Your last dose was: Your next dose is:    
   
   
 Dose:  15 mL Take 15 mL by mouth three (3) times daily. Quantity:  15 CUP Refills:  0 NEURONTIN 600 mg tablet Generic drug:  gabapentin Your last dose was: Your next dose is:    
   
   
 Dose:  600 mg Take 600 mg by mouth three (3) times daily. Indications: NEUROPATHIC PAIN Refills:  0  
     
   
   
   
  
 OLANZapine 10 mg disintegrating tablet Commonly known as:  ZyPREXA zydis Your last dose was: Your next dose is:    
   
   
 Dose:  10 mg Take 1 Tab by mouth nightly. Quantity:  30 Tab Refills:  0  
     
   
   
   
  
 ondansetron hcl 4 mg tablet Commonly known as:  ZOFRAN (AS HYDROCHLORIDE) Your last dose was: Your next dose is:    
   
   
 Dose:  4 mg Take 1 Tab by mouth every eight (8) hours as needed for Nausea. Quantity:  12 Tab Refills:  0  
     
   
   
   
  
 pantoprazole 20 mg tablet Commonly known as:  PROTONIX Your last dose was: Your next dose is:    
   
   
 Dose:  40 mg Take 40 mg by mouth daily. Refills:  0  
     
   
   
   
  
 VISTARIL 50 mg capsule Generic drug:  hydrOXYzine pamoate Your last dose was: Your next dose is:    
   
   
 Dose:  50 mg Take 50 mg by mouth two (2) times a day. Refills:  0  
     
   
   
   
  
 VITAMIN B-12 1,000 mcg sublingual tablet Generic drug:  cyanocobalamin Your last dose was: Your next dose is:    
   
   
 Dose:  1000 mcg  
1,000 mcg by SubLINGual route daily. Refills:  0  
     
   
   
   
  
 VITAMIN C 500 mg tablet Generic drug:  ascorbic acid (vitamin C) Your last dose was: Your next dose is:    
   
   
 Dose:  1000 mg Take 1,000 mg by mouth daily. Refills:  0 Where to Get Your Medications These medications were sent to [address] [place] [address]  AT [address]  [address] [facility], [address] [place] [id number] Phone:  [phone]  
  levoFLOXacin 750 mg tablet Discharge Instructions DISCHARGE SUMMARY from Nurse The following personal items are in your possession at time of discharge: 
 
Dental Appliances: None Visual Aid: None Home Medications: None Jewelry: None Clothing: At bedside Other Valuables: None PATIENT INSTRUCTIONS: 
 
After general anesthesia or intravenous sedation, for 24 hours or while taking prescription Narcotics: · Limit your activities · Do not drive and operate hazardous machinery · Do not make important personal or business decisions · Do  not drink alcoholic beverages · If you have not urinated within 8 hours after discharge, please contact your surgeon on call. Report the following to your surgeon: 
· Excessive pain, swelling, redness or odor of or around the surgical area · Temperature over 100.5 · Nausea and vomiting lasting longer than 4 hours or if unable to take medications · Any signs of decreased circulation or nerve impairment to extremity: change in color, persistent  numbness, tingling, coldness or increase pain · Any questions What to do at Home: 
* Recommended activity: as tolerated * If you experience any of the following symptoms as listed under Recognize signs and symptoms of STROKE: FAST and/ or Warning Signs of HEART ATTACK Call 911. And if you experience any of the following symptoms as listed in your teaching for Hypotension;  Altered Mental Status; and Fever under \"When should you call for help? \", please call 911 and/ or follow up with your Primary Care Physician. *  Please give a list of your current medications to your Primary Care Provider. *  Please update this list whenever your medications are discontinued, doses are 
    changed, or new medications (including over-the-counter products) are added. *  Please carry medication information at all times in case of emergency situations. These are general instructions for a healthy lifestyle: No smoking/ No tobacco products/ Avoid exposure to second hand smoke Surgeon General's Warning:  Quitting smoking now greatly reduces serious risk to your health. Obesity, smoking, and sedentary lifestyle greatly increases your risk for illness A healthy diet, regular physical exercise & weight monitoring are important for maintaining a healthy lifestyle You may be retaining fluid if you have a history of heart failure or if you experience any of the following symptoms:  Weight gain of 3 pounds or more overnight or 5 pounds in a week, increased swelling in our hands or feet or shortness of breath while lying flat in bed. Please call your doctor as soon as you notice any of these symptoms; do not wait until your next office visit. Recognize signs and symptoms of STROKE: 
 
F-face looks uneven A-arms unable to move or move unevenly S-speech slurred or non-existent T-time-call 911 as soon as signs and symptoms begin-DO NOT go Back to bed or wait to see if you get better-TIME IS BRAIN. Warning Signs of HEART ATTACK Call 911 if you have these symptoms: 
? Chest discomfort. Most heart attacks involve discomfort in the center of the chest that lasts more than a few minutes, or that goes away and comes back. It can feel like uncomfortable pressure, squeezing, fullness, or pain. ? Discomfort in other areas of the upper body.  Symptoms can include pain or discomfort in one or both arms, the back, neck, jaw, or stomach. ? Shortness of breath with or without chest discomfort. ? Other signs may include breaking out in a cold sweat, nausea, or lightheadedness. Don't wait more than five minutes to call [address]! Fast action can save your life. Calling 911 is almost always the fastest way to get lifesaving treatment. Emergency Medical Services staff can begin treatment when they arrive  up to an hour sooner than if someone gets to the hospital by car. MyChart Activation Thank you for requesting access to [employer]. Please follow the instructions below to securely access and download your online medical record. [employer] allows you to send messages to your doctor, view your test results, renew your prescriptions, schedule appointments, and more. How Do I Sign Up? 1. In your internet browser, go to [email or web address]. 2. Click on the First Time User? Click Here link in the Sign In box. You will see the New Member Sign Up page. 3. Enter your [employer] Access Code exactly as it appears below. You will not need to use this code after youve completed the sign-up process. If you do not sign up before the expiration date, you must request a new code. MyChart Access Code: Activation code not generated Current [employer] Status: Patient Declined (This is the date your [employer] access code will ) 4. Enter the last four digits of your Social Security Number (xxxx) and Date of Birth (mm/dd/yyyy) as indicated and click Submit. You will be taken to the next sign-up page. 5. Create a [employer] ID. This will be your [employer] login ID and cannot be changed, so think of one that is secure and easy to remember. 6. Create a [employer] password. You can change your password at any time. 7. Enter your Password Reset Question and Answer. This can be used at a later time if you forget your password. 8. Enter your e-mail address. You will receive e-mail notification when new information is available in [address] 9. Click Sign Up. You can now view and download portions of your medical record. 10. Click the Download Summary menu link to download a portable copy of your medical information. Additional Information If you have questions, please visit the Frequently Asked Questions section of the [employer] website at [email or web address] Remember, [employer] is NOT to be used for urgent needs. For medical emergencies, dial 911. Patient armband removed and shredded The discharge information has been reviewed with the patient. The patient verbalized understanding. Discharge medications reviewed with the patient and appropriate educational materials and side effects teaching were provided. Discharge Instructions Attachments/References HYPOTENSION (ENGLISH) ALTERED MENTAL STATUS (ENGLISH) FEVER: GENERAL INFO (ENGLISH) Discharge Orders None General Information Please provide this summary of care documentation to your next provider. Patient Signature:  ____________________________________________________________ Date:  ____________________________________________________________  
  
Rowena Miguel Provider Signature:  ____________________________________________________________ Date:  ____________________________________________________________ More Information Low Blood Pressure: Care Instructions Your Care Instructions Blood pressure is a measurement of the force of the blood against the walls of the blood vessels during and after each beat of the heart. Low blood pressure (hypotension) means that your blood pressure is much lower than normal. Some people, especially young, slim women, may have slightly low blood pressure without symptoms.  However, in many people, low blood pressure can cause symptoms such as dizziness or lightheadedness. When your blood pressure is too low, your heart, brain, and other organs do not get enough blood. Low blood pressure can be caused by many things, including heart problems and some medicines. Uncontrolled diabetes can cause your blood pressure to drop, and so can a severe allergic reaction or infection. Another cause is dehydration, which is when your body loses too much fluid. Treatment for low blood pressure depends on the cause. Follow-up care is a key part of your treatment and safety. Be sure to make and go to all appointments, and call your doctor if you are having problems. It's also a good idea to know your test results and keep a list of the medicines you take. How can you care for yourself at home? · Drink plenty of fluids, enough so that your urine is light yellow or clear like water. If you have kidney, heart, or liver disease and have to limit fluids, talk with your doctor before you increase the amount of fluids you drink. · Be safe with medicines. Call your doctor if you think you are having a problem with your medicine. You will get more details on the specific medicines your doctor prescribes. · Stand up or get out of bed very slowly to allow your body to adjust. 
· Get plenty of rest. 
· Do not smoke. Smoking increases your risk of heart attack. If you need help quitting, talk to your doctor about stop-smoking programs and medicines. These can increase your chances of quitting for good. · Limit alcohol to 2 drinks a day for men and 1 drink a day for women. Alcohol may interfere with your medicine. In addition, alcohol can make your low blood pressure worse by causing your body to lose water. When should you call for help? Call 911 anytime you think you may need emergency care. For example, call if: 
· You have symptoms of a heart attack. These may include: ¨ Chest pain or pressure, or a strange feeling in the chest. 
 ¨ Sweating. ¨ Shortness of breath. ¨ Nausea or vomiting. ¨ Pain, pressure, or a strange feeling in the back, neck, jaw, or upper belly or in one or both shoulders or arms. ¨ Lightheadedness or sudden weakness. ¨ A fast or irregular heartbeat. After you call 911, the  may tell you to chew 1 adult-strength or 2 to 4 low-dose aspirin. Wait for an ambulance. Do not try to drive yourself. · You have symptoms of a stroke. These may include: 
¨ Sudden numbness, tingling, weakness, or loss of movement in your face, arm, or leg, especially on only one side of your body. ¨ Sudden vision changes. ¨ Sudden trouble speaking. ¨ Sudden confusion or trouble understanding simple statements. ¨ Sudden problems with walking or balance. ¨ A sudden, severe headache that is different from past headaches. · You passed out (lost consciousness). Call your doctor now or seek immediate medical care if: 
· You are dizzy or lightheaded, or you feel like you may faint. · You have signs of needing more fluids. You have sunken eyes and a dry mouth, and you pass only a little dark urine. · You cannot keep down fluids. Watch closely for changes in your health, and be sure to contact your doctor if: 
· You do not get better as expected. Where can you learn more? Go to http://salvatore-izabela.info/. Enter C304 in the search box to learn more about \"Low Blood Pressure: Care Instructions. \" Current as of: April 21, 2016 Content Version: 11.3 © 1615-5311 InfoDif. Care instructions adapted under license by [employer] (which disclaims liability or warranty for this information). If you have questions about a medical condition or this instruction, always ask your healthcare professional. [place] [address] any warranty or liability for your use of this information. Altered Mental Status: Care Instructions Your Care Instructions Altered mental status is a change in how well your brain is working. As a result, you may be confused, be less alert than usual, or act in odd ways. This may include seeing or hearing things that aren't really there (hallucinations). A mental status change has many possible causes. For example, it may be the result of an infection, an imbalance of chemicals in the body, or a chronic disease such as diabetes or COPD. It can also be caused by things such as a head injury, taking certain medicines, or using alcohol or drugs. The doctor may do tests to look for the cause. These tests may include urine tests, blood tests, and imaging tests such as a CT scan. Sometimes a clear cause isn't found. But tests can help the doctor rule out a serious cause of your symptoms. A change in mental status can be scary. But mental status will often return to normal when the cause is treated. So it is important to get any follow-up testing or treatment the doctor has suggested. The doctor has checked you carefully, but problems can develop later. If you notice any problems or new symptoms, get medical treatment right away. Follow-up care is a key part of your treatment and safety. Be sure to make and go to all appointments, and call your doctor if you are having problems. It's also a good idea to know your test results and keep a list of the medicines you take. How can you care for yourself at home? · Be safe with medicines. Take your medicines exactly as prescribed. Call your doctor if you think you are having a problem with your medicine. · Have another adult stay with you until you are better. This can help keep you safe. Ask that person to watch for signs that your mental status is getting worse. When should you call for help? Call 911 anytime you think you may need emergency care. For example, call if: 
· You passed out (lost consciousness). Call your doctor now or seek immediate medical care if: · Your mental status is getting worse. · You have new symptoms, such as a fever, chills, or shortness of breath. · You do not feel safe. Watch closely for changes in your health, and be sure to contact your doctor if: 
· You do not get better as expected. Where can you learn more? Go to http://salvatore-izabela.info/. Enter D807 in the search box to learn more about \"Altered Mental Status: Care Instructions. \" Current as of: October 14, 2016 Content Version: 11.3 © 9994-0287 Tivra. Care instructions adapted under license by [employer] (which disclaims liability or warranty for this information). If you have questions about a medical condition or this instruction, always ask your healthcare professional. [address] any warranty or liability for your use of this information. Learning About Fever What is a fever? A fever is a high body temperature. It's one way your body fights being sick. A fever shows that the body is responding to infection or other illnesses, both minor and severe. A fever is a symptom, not an illness by itself. A fever can be a sign that you are ill, but most fevers are not caused by a serious problem. You may have a fever with a minor illness, such as a cold. But sometimes a very serious infection may cause little or no fever. It is important to look at other symptoms, other conditions you have, and how you feel in general. In children, notice how they act and see what symptoms they complain of. What is a normal body temperature? A normal body temperature is about 98. 6ºF. Some people have a normal temperature that is a little higher or a little lower than this. Your temperature may be a little lower in the morning than it is later in the day. It may go up during hot weather or when you exercise, wear heavy clothes, or take a hot bath. Your temperature may also be different depending on how you take it. A temperature taken in the mouth (oral) or under the arm may be a little lower than your core temperature (rectal). What is a fever temperature? A core temperature of 100.4°F or above is considered a fever. What can cause a fever? A fever may be caused by: · Infections. This is the most common cause of a fever. Examples of infections that can cause a fever include the flu, a kidney infection, or pneumonia. · Some medicines. · Severe trauma or injury, such as a heart attack, stroke, heatstroke, or burns. · Other medical conditions, such as arthritis and some cancers. How can you treat a fever at home? · Ask your doctor if you can take an over-the-counter pain medicine, such as acetaminophen (Tylenol), ibuprofen (Advil, Motrin), or naproxen (Aleve). Be safe with medicines. Read and follow all instructions on the label. · To prevent dehydration, drink plenty of fluids. Choose water and other caffeine-free clear liquids until you feel better. If you have kidney, heart, or liver disease and have to limit fluids, talk with your doctor before you increase the amount of fluids you drink. Follow-up care is a key part of your treatment and safety. Be sure to make and go to all appointments, and call your doctor if you are having problems. It's also a good idea to know your test results and keep a list of the medicines you take. Where can you learn more? Go to http://salvatore-izabela.info/. Enter G956 in the search box to learn more about \"Learning About Fever. \" Current as of: March 20, 2017 Content Version: 11.3 © 3227-4201 [employer] Labs. Care instructions adapted under license by [employer] (which disclaims liability or warranty for this information).  If you have questions about a medical condition or this instruction, always ask your healthcare professional. [employer] disclaims any warranty or liability for your use of this information.

## 2017-06-30 NOTE — Clinical Note
Status[de-identified] Inpatient [101] Type of Bed: Medical [8] Inpatient Hospitalization Certified Necessary for the Following Reasons: 3. Patient receiving treatment that can only be provided in an inpatient setting (further clarification in H&P documentation) Admitting Diagnosis: Fever of unknown origin [813004] Admitting Diagnosis: Altered mental state [708704] Admitting Physician: Ollie GÓMEZ [[id number]] Attending Physician: Carolyne Valles [[id number]] Estimated Length of Stay: 2 Midnights Discharge Plan[de-identified] Home with Office Follow-up

## 2017-06-30 NOTE — IP AVS SNAPSHOT
Current Discharge Medication List  
  
START taking these medications Dose & Instructions Dispensing Information Comments Morning Noon Evening Bedtime  
 levoFLOXacin 750 mg tablet Commonly known as:  Fanny Carnes Your last dose was: Your next dose is:    
   
   
 Dose:  750 mg Take 1 Tab by mouth daily for 5 days. Quantity:  5 Tab Refills:  0 CONTINUE these medications which have CHANGED Dose & Instructions Dispensing Information Comments Morning Noon Evening Bedtime  
 levothyroxine 25 mcg tablet Commonly known as:  SYNTHROID What changed:  how much to take Your last dose was: Your next dose is:    
   
   
 Dose:  25 mcg Take 1 Tab by mouth Daily (before breakfast). Indications: HYPOTHYROIDISM Quantity:  30 Tab Refills:  0 CONTINUE these medications which have NOT CHANGED Dose & Instructions Dispensing Information Comments Morning Noon Evening Bedtime  
 alendronate 70 mg tablet Commonly known as:  FOSAMAX Your last dose was: Your next dose is:    
   
   
 Dose:  70 mg Take 70 mg by mouth Every Saturday. Refills:  0 BENTYL 20 mg tablet Generic drug:  dicyclomine Your last dose was: Your next dose is:    
   
   
 Dose:  20 mg Take 20 mg by mouth three (3) times daily. Refills:  0  
     
   
   
   
  
 biotin 10,000 mcg Cap Your last dose was: Your next dose is:    
   
   
 Dose:  1 Cap Take 1 Cap by mouth. Refills:  0  
     
   
   
   
  
 calcium citrate-vitamin d3 315-200 mg-unit Tab Commonly known as:  CITRACAL+D Your last dose was: Your next dose is:    
   
   
 Dose:  1 Tab Take 1 Tab by mouth two (2) times daily (with meals). Refills:  0  
     
   
   
   
  
 cholecalciferol 1,000 unit tablet Commonly known as:  VITAMIN D3 Your last dose was: Your next dose is: Take  by mouth daily. Refills:  0  
     
   
   
   
  
 cloNIDine HCl 0.1 mg tablet Commonly known as:  CATAPRES Your last dose was: Your next dose is: Take  by mouth two (2) times a day. Refills:  0  
     
   
   
   
  
 colesevelam 625 mg tablet Commonly known as:  [facility] Your last dose was: Your next dose is:    
   
   
 Dose:  1250 mg Take 2 Tabs by mouth two (2) times daily (with meals). Quantity:  120 Tab Refills:  0  
     
   
   
   
  
 ergocalciferol 50,000 unit capsule Commonly known as:  ERGOCALCIFEROL Your last dose was: Your next dose is:    
   
   
 Dose:  19226 Units Take 50,000 Units by mouth every seven (7) days. Refills:  0  
     
   
   
   
  
 escitalopram oxalate 10 mg tablet Commonly known as:  Cy Null Your last dose was: Your next dose is:    
   
   
 Dose:  10 mg Take 1 Tab by mouth daily. Quantity:  10 Tab Refills:  0  
     
   
   
   
  
 famotidine 20 mg tablet Commonly known as:  PEPCID Your last dose was: Your next dose is:    
   
   
 Dose:  20 mg Take 20 mg by mouth daily. Indications: HEARTBURN Refills:  0  
     
   
   
   
  
 FEOSOL 325 mg (65 mg iron) tablet Generic drug:  ferrous sulfate Your last dose was: Your next dose is:    
   
   
 Dose:  325 mg Take 325 mg by mouth daily. Refills:  0  
     
   
   
   
  
 fish oil-dha-epa 1,200-144-216 mg Cap Your last dose was: Your next dose is: Take  by mouth. Refills:  0  
     
   
   
   
  
 folic acid 110 mcg tablet Your last dose was: Your next dose is:    
   
   
 Dose:  800 mcg Take 800 mcg by mouth daily. Refills:  0  
     
   
   
   
  
 lactulose 10 gram/15 mL (15 mL) Soln Your last dose was: Your next dose is:    
   
   
 Dose:  15 mL Take 15 mL by mouth three (3) times daily. Quantity:  15 CUP Refills:  0 NEURONTIN 600 mg tablet Generic drug:  gabapentin Your last dose was: Your next dose is:    
   
   
 Dose:  600 mg Take 600 mg by mouth three (3) times daily. Indications: NEUROPATHIC PAIN Refills:  0  
     
   
   
   
  
 OLANZapine 10 mg disintegrating tablet Commonly known as:  ZyPREXA zydis Your last dose was: Your next dose is:    
   
   
 Dose:  10 mg Take 1 Tab by mouth nightly. Quantity:  30 Tab Refills:  0  
     
   
   
   
  
 ondansetron hcl 4 mg tablet Commonly known as:  ZOFRAN (AS HYDROCHLORIDE) Your last dose was: Your next dose is:    
   
   
 Dose:  4 mg Take 1 Tab by mouth every eight (8) hours as needed for Nausea. Quantity:  12 Tab Refills:  0  
     
   
   
   
  
 pantoprazole 20 mg tablet Commonly known as:  PROTONIX Your last dose was: Your next dose is:    
   
   
 Dose:  40 mg Take 40 mg by mouth daily. Refills:  0  
     
   
   
   
  
 VISTARIL 50 mg capsule Generic drug:  hydrOXYzine pamoate Your last dose was: Your next dose is:    
   
   
 Dose:  50 mg Take 50 mg by mouth two (2) times a day. Refills:  0  
     
   
   
   
  
 VITAMIN B-12 1,000 mcg sublingual tablet Generic drug:  cyanocobalamin Your last dose was: Your next dose is:    
   
   
 Dose:  1000 mcg  
1,000 mcg by SubLINGual route daily. Refills:  0  
     
   
   
   
  
 VITAMIN C 500 mg tablet Generic drug:  ascorbic acid (vitamin C) Your last dose was: Your next dose is:    
   
   
 Dose:  1000 mg Take 1,000 mg by mouth daily. Refills:  0 Where to Get Your Medications These medications were sent to [address]  [place] [address], [address] [facility] [address] Phone:  [phone]  
  levoFLOXacin 750 mg tablet

## 2017-06-30 NOTE — ED TRIAGE NOTES
Patient arrived out front of ER with  and husbands caregiver. Caregiver stated that patient took more medication than she was supposed to but patient states she did not take more than normal dosage. Patient is alert and oriented X3.

## 2017-07-01 ENCOUNTER — APPOINTMENT (OUTPATIENT)
Dept: GENERAL RADIOLOGY | Age: 57
DRG: 092 | End: 2017-07-01
Attending: EMERGENCY MEDICINE
Payer: MEDICARE

## 2017-07-01 PROBLEM — R50.9 FEVER OF UNKNOWN ORIGIN: Status: ACTIVE | Noted: 2017-07-01

## 2017-07-01 PROBLEM — I95.9 HYPOTENSION: Status: ACTIVE | Noted: 2017-07-01

## 2017-07-01 PROBLEM — R41.82 ALTERED MENTAL STATE: Status: ACTIVE | Noted: 2017-07-01

## 2017-07-01 LAB
ANION GAP BLD CALC-SCNC: 8 MMOL/L (ref 3–18)
ARTERIAL PATENCY WRIST A: ABNORMAL
ATRIAL RATE: 111 BPM
BASE EXCESS BLDV CALC-SCNC: 3 MMOL/L
BASOPHILS # BLD AUTO: 0 K/UL (ref 0–0.06)
BASOPHILS # BLD: 0 % (ref 0–2)
BDY SITE: ABNORMAL
BODY TEMPERATURE: 100.5
BUN SERPL-MCNC: 5 MG/DL (ref 7–18)
BUN/CREAT SERPL: 7 (ref 12–20)
CALCIUM SERPL-MCNC: 7.7 MG/DL (ref 8.5–10.1)
CALCULATED P AXIS, ECG09: 18 DEGREES
CALCULATED R AXIS, ECG10: 6 DEGREES
CALCULATED T AXIS, ECG11: 19 DEGREES
CHLORIDE SERPL-SCNC: 104 MMOL/L (ref 100–108)
CO2 SERPL-SCNC: 27 MMOL/L (ref 21–32)
CREAT SERPL-MCNC: 0.7 MG/DL (ref 0.6–1.3)
DIAGNOSIS, 93000: NORMAL
DIFFERENTIAL METHOD BLD: ABNORMAL
EOSINOPHIL # BLD: 0 K/UL (ref 0–0.4)
EOSINOPHIL NFR BLD: 0 % (ref 0–5)
ERYTHROCYTE [DISTWIDTH] IN BLOOD BY AUTOMATED COUNT: 13.5 % (ref 11.6–14.5)
GAS FLOW.O2 O2 DELIVERY SYS: ABNORMAL L/MIN
GAS FLOW.O2 SETTING OXYMISER: 4 L/M
GLUCOSE SERPL-MCNC: 124 MG/DL (ref 74–99)
HCO3 BLDV-SCNC: 28.1 MMOL/L (ref 23–28)
HCT VFR BLD AUTO: 29.2 % (ref 35–45)
HGB BLD-MCNC: 9.7 G/DL (ref 12–16)
LACTATE BLD-SCNC: 0.5 MMOL/L (ref 0.4–2)
LYMPHOCYTES # BLD AUTO: 14 % (ref 21–52)
LYMPHOCYTES # BLD: 0.7 K/UL (ref 0.9–3.6)
MCH RBC QN AUTO: 29.8 PG (ref 24–34)
MCHC RBC AUTO-ENTMCNC: 33.2 G/DL (ref 31–37)
MCV RBC AUTO: 89.6 FL (ref 74–97)
MONOCYTES # BLD: 1 K/UL (ref 0.05–1.2)
MONOCYTES NFR BLD AUTO: 20 % (ref 3–10)
NEUTS SEG # BLD: 3.1 K/UL (ref 1.8–8)
NEUTS SEG NFR BLD AUTO: 66 % (ref 40–73)
P-R INTERVAL, ECG05: 132 MS
PCO2 BLDV: 52.4 MMHG (ref 41–51)
PH BLDV: 7.34 [PH] (ref 7.32–7.42)
PLATELET # BLD AUTO: 217 K/UL (ref 135–420)
PMV BLD AUTO: 8.7 FL (ref 9.2–11.8)
PO2 BLDV: 82 MMHG (ref 25–40)
POTASSIUM SERPL-SCNC: 3.7 MMOL/L (ref 3.5–5.5)
Q-T INTERVAL, ECG07: 312 MS
QRS DURATION, ECG06: 74 MS
QTC CALCULATION (BEZET), ECG08: 424 MS
RBC # BLD AUTO: 3.26 M/UL (ref 4.2–5.3)
SAO2 % BLDV: 94 % (ref 65–88)
SERVICE CMNT-IMP: ABNORMAL
SODIUM SERPL-SCNC: 139 MMOL/L (ref 136–145)
SPECIMEN TYPE: ABNORMAL
TOTAL RESP. RATE, ITRR: 9
VENTRICULAR RATE, ECG03: 111 BPM
WBC # BLD AUTO: 4.8 K/UL (ref 4.6–13.2)

## 2017-07-01 PROCEDURE — 82803 BLOOD GASES ANY COMBINATION: CPT

## 2017-07-01 PROCEDURE — 74011250636 HC RX REV CODE- 250/636: Performed by: HOSPITALIST

## 2017-07-01 PROCEDURE — 74011250636 HC RX REV CODE- 250/636: Performed by: EMERGENCY MEDICINE

## 2017-07-01 PROCEDURE — 71010 XR CHEST PORT: CPT

## 2017-07-01 PROCEDURE — 36415 COLL VENOUS BLD VENIPUNCTURE: CPT | Performed by: HOSPITALIST

## 2017-07-01 PROCEDURE — 74011250636 HC RX REV CODE- 250/636

## 2017-07-01 PROCEDURE — 74011250636 HC RX REV CODE- 250/636: Performed by: INTERNAL MEDICINE

## 2017-07-01 PROCEDURE — C1751 CATH, INF, PER/CENT/MIDLINE: HCPCS

## 2017-07-01 PROCEDURE — 87040 BLOOD CULTURE FOR BACTERIA: CPT | Performed by: HOSPITALIST

## 2017-07-01 PROCEDURE — 74011250637 HC RX REV CODE- 250/637: Performed by: HOSPITALIST

## 2017-07-01 PROCEDURE — 77030034850

## 2017-07-01 PROCEDURE — 80048 BASIC METABOLIC PNL TOTAL CA: CPT | Performed by: HOSPITALIST

## 2017-07-01 PROCEDURE — 77030011256 HC DRSG MEPILEX <16IN NO BORD MOLN -A

## 2017-07-01 PROCEDURE — 74011000250 HC RX REV CODE- 250: Performed by: EMERGENCY MEDICINE

## 2017-07-01 PROCEDURE — 83605 ASSAY OF LACTIC ACID: CPT

## 2017-07-01 PROCEDURE — 77030032490 HC SLV COMPR SCD KNE COVD -B

## 2017-07-01 PROCEDURE — 77030020263 HC SOL INJ SOD CL0.9% LFCR 1000ML

## 2017-07-01 PROCEDURE — 65270000029 HC RM PRIVATE

## 2017-07-01 PROCEDURE — 85025 COMPLETE CBC W/AUTO DIFF WBC: CPT | Performed by: HOSPITALIST

## 2017-07-01 RX ORDER — NALOXONE HYDROCHLORIDE 1 MG/ML
2 INJECTION INTRAMUSCULAR; INTRAVENOUS; SUBCUTANEOUS
Status: COMPLETED | OUTPATIENT
Start: 2017-07-01 | End: 2017-07-01

## 2017-07-01 RX ORDER — MICONAZOLE NITRATE 2 %
1 CREAM WITH APPLICATOR VAGINAL 2 TIMES DAILY WITH MEALS
Status: DISCONTINUED | OUTPATIENT
Start: 2017-07-01 | End: 2017-07-03 | Stop reason: HOSPADM

## 2017-07-01 RX ORDER — OLANZAPINE 5 MG/1
15 TABLET, ORALLY DISINTEGRATING ORAL
Status: DISCONTINUED | OUTPATIENT
Start: 2017-07-01 | End: 2017-07-03 | Stop reason: HOSPADM

## 2017-07-01 RX ORDER — ALENDRONATE SODIUM 70 MG/1
70 TABLET ORAL
Status: DISCONTINUED | OUTPATIENT
Start: 2017-07-01 | End: 2017-07-03 | Stop reason: HOSPADM

## 2017-07-01 RX ORDER — PANTOPRAZOLE SODIUM 20 MG/1
20 TABLET, DELAYED RELEASE ORAL
Status: DISCONTINUED | OUTPATIENT
Start: 2017-07-01 | End: 2017-07-03 | Stop reason: HOSPADM

## 2017-07-01 RX ORDER — SODIUM CHLORIDE 9 MG/ML
125 INJECTION, SOLUTION INTRAVENOUS CONTINUOUS
Status: DISCONTINUED | OUTPATIENT
Start: 2017-07-01 | End: 2017-07-03 | Stop reason: HOSPADM

## 2017-07-01 RX ORDER — ASCORBIC ACID 250 MG
1000 TABLET ORAL DAILY
Status: DISCONTINUED | OUTPATIENT
Start: 2017-07-01 | End: 2017-07-03 | Stop reason: HOSPADM

## 2017-07-01 RX ORDER — POTASSIUM CHLORIDE 7.45 MG/ML
10 INJECTION INTRAVENOUS
Status: DISPENSED | OUTPATIENT
Start: 2017-07-01 | End: 2017-07-01

## 2017-07-01 RX ORDER — ENOXAPARIN SODIUM 100 MG/ML
40 INJECTION SUBCUTANEOUS EVERY 24 HOURS
Status: DISCONTINUED | OUTPATIENT
Start: 2017-07-01 | End: 2017-07-03 | Stop reason: HOSPADM

## 2017-07-01 RX ORDER — CLONIDINE HYDROCHLORIDE 0.1 MG/1
0.1 TABLET ORAL 2 TIMES DAILY
Status: DISCONTINUED | OUTPATIENT
Start: 2017-07-01 | End: 2017-07-03 | Stop reason: HOSPADM

## 2017-07-01 RX ORDER — LEVOTHYROXINE SODIUM 50 UG/1
50 TABLET ORAL
Status: DISCONTINUED | OUTPATIENT
Start: 2017-07-02 | End: 2017-07-03 | Stop reason: HOSPADM

## 2017-07-01 RX ORDER — GABAPENTIN 400 MG/1
400 CAPSULE ORAL 3 TIMES DAILY
Status: DISCONTINUED | OUTPATIENT
Start: 2017-07-01 | End: 2017-07-03 | Stop reason: HOSPADM

## 2017-07-01 RX ORDER — NOREPINEPHRINE BITARTRATE/D5W 8 MG/250ML
2-16 PLASTIC BAG, INJECTION (ML) INTRAVENOUS
Status: DISCONTINUED | OUTPATIENT
Start: 2017-07-01 | End: 2017-07-03 | Stop reason: HOSPADM

## 2017-07-01 RX ORDER — NALOXONE HYDROCHLORIDE 1 MG/ML
0.4 INJECTION INTRAMUSCULAR; INTRAVENOUS; SUBCUTANEOUS
Status: COMPLETED | OUTPATIENT
Start: 2017-07-01 | End: 2017-07-01

## 2017-07-01 RX ADMIN — Medication 1 TABLET: at 17:00

## 2017-07-01 RX ADMIN — NALOXONE HYDROCHLORIDE 0.4 MG: 1 INJECTION PARENTERAL at 00:18

## 2017-07-01 RX ADMIN — SODIUM CHLORIDE 125 ML/HR: 900 INJECTION, SOLUTION INTRAVENOUS at 20:28

## 2017-07-01 RX ADMIN — LACTULOSE 10 G: 20 SOLUTION ORAL at 15:58

## 2017-07-01 RX ADMIN — POTASSIUM CHLORIDE 10 MEQ: 7.46 INJECTION, SOLUTION INTRAVENOUS at 11:40

## 2017-07-01 RX ADMIN — POTASSIUM CHLORIDE 10 MEQ: 7.46 INJECTION, SOLUTION INTRAVENOUS at 09:55

## 2017-07-01 RX ADMIN — PANTOPRAZOLE SODIUM 20 MG: 40 TABLET, DELAYED RELEASE ORAL at 16:00

## 2017-07-01 RX ADMIN — ENOXAPARIN SODIUM 40 MG: 40 INJECTION SUBCUTANEOUS at 08:15

## 2017-07-01 RX ADMIN — GABAPENTIN 400 MG: 400 CAPSULE ORAL at 23:34

## 2017-07-01 RX ADMIN — POTASSIUM CHLORIDE 10 MEQ: 7.46 INJECTION, SOLUTION INTRAVENOUS at 11:08

## 2017-07-01 RX ADMIN — POTASSIUM CHLORIDE 10 MEQ: 7.46 INJECTION, SOLUTION INTRAVENOUS at 14:00

## 2017-07-01 RX ADMIN — GABAPENTIN 400 MG: 400 CAPSULE ORAL at 15:59

## 2017-07-01 RX ADMIN — NALOXONE HYDROCHLORIDE 0.4 MG: 1 INJECTION PARENTERAL at 00:31

## 2017-07-01 RX ADMIN — ALENDRONATE SODIUM 70 MG: 70 TABLET ORAL at 11:49

## 2017-07-01 RX ADMIN — Medication 1000 MG: at 09:55

## 2017-07-01 RX ADMIN — NALOXONE HYDROCHLORIDE 0.4 MG: 1 INJECTION PARENTERAL at 00:26

## 2017-07-01 RX ADMIN — GABAPENTIN 400 MG: 400 CAPSULE ORAL at 09:55

## 2017-07-01 RX ADMIN — Medication 5 MCG/MIN: at 02:09

## 2017-07-01 RX ADMIN — SODIUM CHLORIDE 125 ML/HR: 900 INJECTION, SOLUTION INTRAVENOUS at 08:16

## 2017-07-01 RX ADMIN — CLONIDINE HYDROCHLORIDE 0.1 MG: 0.1 TABLET ORAL at 09:55

## 2017-07-01 RX ADMIN — OLANZAPINE 15 MG: 5 TABLET, ORALLY DISINTEGRATING ORAL at 23:35

## 2017-07-01 RX ADMIN — Medication 8 MCG/MIN: at 08:51

## 2017-07-01 RX ADMIN — CLONIDINE HYDROCHLORIDE 0.1 MG: 0.1 TABLET ORAL at 17:41

## 2017-07-01 RX ADMIN — LACTULOSE 10 G: 20 SOLUTION ORAL at 09:55

## 2017-07-01 RX ADMIN — NALOXONE HYDROCHLORIDE 0.4 MG: 1 INJECTION PARENTERAL at 00:02

## 2017-07-01 NOTE — PROGRESS NOTES
0900 .TRANSFER - IN REPORT:    Verbal report received from [address] [place] [address] RN(name) on Rosa M Blanchard  being received from Tonio RN(unit) for change in patient condition(Hypotensive)      Report consisted of patients Situation, Background, Assessment and   Recommendations(SBAR). Information from the following report(s) {SBAR REPORTS [id number]} was reviewed with the receiving nurse. Opportunity for questions and clarification was provided. Assessment completed upon patients arrival to unit and care assumed.      0900 Performed shift assessment  1000 Initially patient was alert but has became very drowsy but she is still oriented  1035 Turned off patients Levophed

## 2017-07-01 NOTE — CONSULTS
Pulmonary / Critical Care Medicine  7/1/2017  EMR reviewed and case discussed on Multidisciplinary Rounds with [facility]. Pt appears on the Intensivist/Pulmonary Service List but I was not called.     Dx: brief hypotension due to sedative medications (librium). She has already been transferred out of ICU. CXR report:   1. Right jugular central line good position. No pneumothorax. 2. Suboptimal inspiration which limits evaluation. No definite acute infiltrate. 3. Borderline heart size with possible mild central vascular congestion which is  probably accentuated by the poor inspiration. IMPRESSION:  Management by Hospitalist.  I have ordered continuous pulse-ox & telemetry but will not plan to see.    Please call for questions or concerns.  -blanca [phone]

## 2017-07-01 NOTE — ED NOTES
Purposeful rounding completed:     Side rails up x 2:  YES  Bed low and wheels and locked: YES  Call bell in reach: YES  Comfort addressed: YES     Toileting needs addressed: YES  Plan of care reviewed/updated with patient and or family members: YES  IV site assessed: YES  Pain assessed and addressed: Pt resting comfortably on the stretcher in no apparent distress.  Rousable but falls right back to sleep.

## 2017-07-01 NOTE — ED NOTES
MD questioned on why ABT hasn't been ordered yet at this point. Informed that there are allergies to the routine ABT and he has asked the pharmacist to help with determining appropriate ABT for the patient.

## 2017-07-01 NOTE — ED NOTES
Narcan administered, pt is more awake and RR icreased from 7 to 20. BP, however, continues to be low (70/46). Pt states she just feels tired and is easily wakened. Dr. Jose Travis at the bedside.

## 2017-07-01 NOTE — ED NOTES
Purposeful rounding completed:     Side rails up x 2:  YES  Bed low and wheels and locked: YES  Call bell in reach: YES  Comfort addressed: YES     Toileting needs addressed: YES  Plan of care reviewed/updated with patient and or family members: YES  IV site assessed: YES  Pain assessed and addressed: Pt resting comfortably on the stretcher in no apparent distress.  Woke patient up - she then asked for her cellular phone

## 2017-07-01 NOTE — H&P
Progress Note      Patient: Hayes Chilel               Sex: female          DOA: 6/30/2017       YOB: 1960      Age:  65 y.o.        LOS:  LOS: 0 days               Subjective:   Pt is bipolar and has been in [facility] . She has been on large dose of librium as well blane zyprexq . she has become hypotensive probably dure to the librium which has a long half life . She is now on levophed to support her bp . Presently the pt looks well and will send her out of the icu when she is off the levophed   She is presently febrile at 100.2    was repeated and her temp is 98.2 .she is not toxic appearing. Her   wbc is 4.8      Objective:      Visit Vitals    /77    Pulse 77    Temp 100.2 °F (37.9 °C)    Resp 13    Ht 5' 7\" (1.702 m)    Wt 78.9 kg (174 lb)    SpO2 100%    BMI 27.25 kg/m2       Physical Exam:  Pt is awake and is alert . she has no evidence of confusion   Heart reg rate and rhythm  Lungs good breath sounds heard   Abdomen soft and nontender   Neuro nonfocal      Lab/Data Reviewed:  CMP:   Lab Results   Component Value Date/Time     07/01/2017 08:00 AM    K 3.7 07/01/2017 08:00 AM     07/01/2017 08:00 AM    CO2 27 07/01/2017 08:00 AM    AGAP 8 07/01/2017 08:00 AM     (H) 07/01/2017 08:00 AM    BUN 5 (L) 07/01/2017 08:00 AM    CREA 0.70 07/01/2017 08:00 AM    GFRAA >60 07/01/2017 08:00 AM    GFRNA >60 07/01/2017 08:00 AM    CA 7.7 (L) 07/01/2017 08:00 AM    ALB 3.1 (L) 06/30/2017 08:10 PM    TP 6.9 06/30/2017 08:10 PM    GLOB 3.8 06/30/2017 08:10 PM    AGRAT 0.8 06/30/2017 08:10 PM    SGOT 77 (H) 06/30/2017 08:10 PM    ALT 55 06/30/2017 08:10 PM     CBC:   Lab Results   Component Value Date/Time    WBC 4.8 07/01/2017 08:00 AM    HGB 9.7 (L) 07/01/2017 08:00 AM    HCT 29.2 (L) 07/01/2017 08:00 AM     07/01/2017 08:00 AM           Assessment/Plan     Principal Problem:    Hypotension (7/1/2017)    Active Problems: Altered mental status (5/6/2014)      Fever of unknown origin (7/1/2017)      Altered mental state (7/1/2017)  Bipolar disorder . Pt haqs been treated with high doses of librium and with the zyprexa she became hypotensive       Plan: will continue the fluids and levophed . when she is taopered off the zyprexa will put her on the floor

## 2017-07-01 NOTE — ED NOTES
This RN was called to assist patient out of car, patient arrived with  and husbands caregiver,  is nonverbal.  Per the caregiver the patient started taking \"all her pills together\" around 2pm today and then \"started acting weird\". The caregiver states that she called 911 but patient refused to go to hospital until they drove here. Per patient [facility] \"[facility]\" and is still wearing the wrist band.  Patient was verbal but slurring and unable to stand on her arrival.

## 2017-07-01 NOTE — ED NOTES
The Sepsis Screening has been completed on arrival in the Emergency Department.     Vital signs:  Patient Vitals for the past 4 hrs:   Temp Pulse Resp BP SpO2   06/30/17 2005 - (!) 108 18 121/75 96 %   06/30/17 1958 - (!) 110 21 - 98 %   06/30/17 1957 (!) 103.2 °F (39.6 °C) - - - -            =monitored (data validate)  MAP (Calculated): 90    =calculated (manual entry)    Is patient is 29y/o or older, meets 2 or more ABNORMAL VITAL SIGNS below, associated with SUSPECTED INFECTION?  YES     Temperature < 96.8°F (36°C) or > 100.9°F (38.3°C)   Heart Rate > 90 beats per minute   Respiratory Rate > 20 beats per minute   BP < 90 systolic    MAP < 65    IF ANSWER IS YES, CALL A CODE SEPSIS  POC LACTIC ACID ASAP AND BEGIN NURSE DRIVEN SEPSIS ORDERS WHILE AWAITING PROVIDER

## 2017-07-01 NOTE — ED PROVIDER NOTES
HPI Comments: 8:03 PM Reginaldo Cardoza is a 65 y.o. female with a hx of Bipolar disorder, substance induced mood disorder, HTN, fatty liver disease, EtOH abuse, alcoholic Hepatitis, alcoholic Pancreatitis, EtOH abuse, and other noted PMH who presents to the ED for evaluation of AMS. Per husbands caregiver, pt took more medication that than she was suppose to. Pt is denying overdosing on medications.  stated that the patient was drowsy, falling, and confused so he brought her in to the ED. Pt states that she was not feeling well last week after being discharged from an EtOH rehab center. She states that she was constantly vomiting her food, and was only able to \"hold down [place]". This evening, the patient states that when ever she tried to stand her legs became weak. She denies diarrhea, dysuria, hematuria, vaginal bleeding or discharge, and recreational drug use. She also denies taking Depakote and Tegretol. No other associated symptoms or modifying factors at this time. The history is provided by the spouse. Past Medical History:   Diagnosis Date    Alcohol abuse     Alcoholic hepatitis     Alcoholic pancreatitis     Anemia     Bipolar disorder (HCC)      [facility])    Chronic abdominal pain     Chronic pancreatitis ([address]     Compression fracture of lumbar vertebra (HCC)     L4, L5     Depression      [facility])    Elevated LFTs     ETOH abuse     Fatty liver     GERD (gastroesophageal reflux disease)     Hyperlipidemia     Hypertension     Hypothyroidism     Lower GI bleeding     Morbid obesity ([address]     Obstructive sleep apnea     Substance induced mood disorder ([facility]     Vitamin D deficiency        Past Surgical History:   Procedure Laterality Date    BIOPSY LIVER  08/15/2012    Lap Left hepatic liver wedge by Dr. Yvette Munguia; BX Revealed: Hepatic Steatosis, AVM w/ associated Subcapsular Fibrosis.  COLONOSCOPY N/A 1/17/2017    COLONOSCOPY performed by Radha Neri MD at [facility] DISKECTOMY, ANTERIOR, WITH D  8/1995, 11/1997    HX ABDOMINAL LAPAROSCOPY  12/02/2014    Laparoscopic Gastrostomy w/ Partial Gastrectomy for assistance in placement of Endoscopic Retrograde Cholangiopancreatography & Diagnostic Lap.  HX CARPAL TUNNEL RELEASE      HX CHOLECYSTECTOMY  09/16/2014    Dr. Juan Snider HX COLONOSCOPY  05/12/2012    Colonoscopy by Dr. Gretchen Coats Hernandez: Bx Revealed Colon Polyps (Tubular Adenoma), Hemorrhoids.  HX GASTRIC BYPASS  08/15/2012    Lap Christian-en-y    HX HEMORRHOIDECTOMY  04/30/2015    Dr. Leena Tellez. Jason    HX HYSTERECTOMY  2006    HX TONSILLECTOMY  1992    REPAIR NONUNION SCAPHOID CARPAL BONE Right 2010    Wrist         Family History:   Problem Relation Age of Onset    Cancer Father     Cancer Sister     Obesity Brother        Social History     Social History    Marital status:      Spouse name: N/A    Number of children: N/A    Years of education: N/A     Occupational History    Not on file. Social History Main Topics    Smoking status: Never Smoker    Smokeless tobacco: Never Used    Alcohol use 0.0 oz/week     0 Standard drinks or equivalent per week      Comment: 6 pack of beer a week- 1/2/17 last use    Drug use: No    Sexual activity: Yes     Partners: Male     Birth control/ protection: Condom     Other Topics Concern    Not on file     Social History Narrative         ALLERGIES: Codeine; Lamictal [lamotrigine]; Morphine; Pcn [penicillins]; Vancomycin; Doxycycline; Percocet [oxycodone-acetaminophen]; Tetracycline; and Tomato    Review of Systems   Constitutional: Positive for fever (103.2). Negative for diaphoresis. HENT: Negative for congestion and sore throat. Eyes: Negative for pain and itching. Respiratory: Negative for cough and shortness of breath. Cardiovascular: Negative for chest pain and palpitations. Gastrointestinal: Positive for vomiting. Negative for abdominal pain and diarrhea. Endocrine: Negative for polydipsia and polyuria. Genitourinary: Negative for dysuria, hematuria, vaginal bleeding and vaginal discharge. Musculoskeletal: Negative for arthralgias and myalgias. Skin: Negative for rash and wound. Neurological: Positive for weakness (leg). Negative for seizures and syncope. Hematological: Does not bruise/bleed easily. Psychiatric/Behavioral: Negative for agitation and hallucinations. All other systems reviewed and are negative. Vitals:    07/01/17 0200 07/01/17 0224 07/01/17 0227 07/01/17 0230   BP: (!) 76/47 (!) 88/69 97/70 102/63   Pulse: 75 74 76 75   Resp: 10 9 10 10   Temp:       SpO2: 99% 100% 100% 100%   Weight:       Height:                Physical Exam   Constitutional: She appears well-developed and well-nourished. HENT:   Head: Normocephalic and atraumatic. Eyes: Conjunctivae are normal. No scleral icterus. Neck: Normal range of motion. Neck supple. No JVD present. Cardiovascular: Normal rate, regular rhythm and normal heart sounds. 4 intact extremity pulses   Pulmonary/Chest: Effort normal and breath sounds normal.   Abdominal: Soft. Bowel sounds are normal.   Musculoskeletal: Normal range of motion. Lymphadenopathy:     She has no cervical adenopathy. Neurological: She is alert. She has normal strength. No cranial nerve deficit or sensory deficit. Coordination normal.   Vertical nystagmus noted. Skin: Skin is warm and dry. Nursing note and vitals reviewed. MDM  ED Course   Infect, drug reaction, intoxication. Altered MS, nystagmus - etoh neg, will get head CT  Fever - SIRS, abx ordered, cxr, cultured, tylenol  Abd pain - lipase, ct abd pel. S/p cholecystectomy.      Considered neuroleptic malig syndrome and serotonin synd but no abnormal muscle tone  Considered meningitis but no meningismus    CRITICAL CARE (ASAP ONLY)  Performed by: Darvin Richards  Authorized by: Darvin Richards     Critical care provider statement:     Critical care time (minutes):  60    Critical care was necessary to treat or prevent imminent or life-threatening deterioration of the following conditions:  Circulatory failure and respiratory failure    Critical care was time spent personally by me on the following activities:  Obtaining history from patient or surrogate, interpretation of cardiac output measurements, examination of patient, discussions with consultants, blood draw for specimens, ordering and performing treatments and interventions, ordering and review of laboratory studies, ordering and review of radiographic studies, re-evaluation of patient's condition, review of old charts and evaluation of patient's response to treatment  Central Line  Date/Time: 7/1/2017 1:25 AM  Performed by: Jannette Yost by: Darvin Richards     Consent:     Consent obtained:  Verbal    Consent given by:  Patient    Risks discussed:  Bleeding, infection, incorrect placement, nerve damage, pneumothorax and arterial puncture    Alternatives discussed:  No treatment  Universal protocol:     Procedure explained and questions answered to patient or proxy's satisfaction: yes      Patient identity confirmed:  Verbally with patient  Pre-procedure details:     Sterile barrier technique: All elements of maximal sterile technique followed      Skin preparation agent: Skin preparation agent completely dried prior to procedure    Anesthesia (see MAR for exact dosages):      Anesthesia method:  Local infiltration    Local anesthetic:  Lidocaine 1% w/o epi  Procedure details:     Location:  R internal jugular    Patient position:  Flat    Procedural supplies:  Triple lumen    Landmarks identified: yes      Ultrasound guidance: yes      Sterile ultrasound techniques: Sterile gel and sterile probe covers were used      Number of attempts:  1    Successful placement: yes    Post-procedure details:     Post-procedure:  Dressing applied and line sutured    Assessment:  Blood return through all ports, free fluid flow, no pneumothorax on x-ray and placement verified by x-ray    Patient tolerance of procedure: Tolerated well, no immediate complications  Comments:      vbg c/w venous placement. Guidewire visible on ultrasound in IJ prior to dialation.        Vitals:  Patient Vitals for the past 12 hrs:   Temp Pulse Resp BP SpO2   07/01/17 0230 - 75 10 102/63 100 %   07/01/17 0227 - 76 10 97/70 100 %   07/01/17 0224 - 74 9 (!) 88/69 100 %   07/01/17 0200 - 75 10 (!) 76/47 99 %   07/01/17 0150 - 75 9 (!) 85/53 100 %   07/01/17 0140 - 75 10 (!) 81/59 100 %   07/01/17 0130 - 74 10 (!) 80/48 100 %   07/01/17 0120 - 75 11 (!) 82/49 100 %   07/01/17 0110 - 79 10 (!) 80/57 100 %   07/01/17 0100 - 72 9 (!) 75/51 100 %   07/01/17 0050 - 72 12 (!) 75/43 100 %   07/01/17 0040 - 75 10 (!) 75/51 100 %   07/01/17 0030 - 76 10 (!) 74/49 100 %   07/01/17 0021 - 78 11 - 99 %   07/01/17 0020 - 78 10 (!) 71/45 99 %   07/01/17 0010 - 83 11 (!) 69/38 91 %   07/01/17 0006 - 81 22 (!) 62/42 98 %   06/30/17 2350 - 83 9 (!) 77/44 98 %   06/30/17 2348 - 81 8 (!) 80/51 99 %   06/30/17 2340 - 81 9 (!) 73/44 99 %   06/30/17 2330 - 84 10 (!) 80/49 98 %   06/30/17 2328 - 85 10 (!) 84/47 98 %   06/30/17 2315 - 86 14 - 98 %   06/30/17 2314 - 87 20 (!) 79/39 99 %   06/30/17 2310 - 86 11 (!) 70/46 99 %   06/30/17 2308 - 87 12 (!) 72/42 100 %   06/30/17 2300 - 87 8 (!) 75/52 97 %   06/30/17 2250 - 85 (!) 6 (!) 70/47 98 %   06/30/17 2247 100.2 °F (37.9 °C) - - - -   06/30/17 2242 - 85 (!) 7 - 98 %   06/30/17 2241 - 84 12 (!) 70/45 99 %   06/30/17 2230 - 96 16 (!) 67/53 95 %   06/30/17 2220 - 93 13 (!) 70/46 96 %   06/30/17 2210 - 92 9 (!) 80/50 97 %   06/30/17 2209 - 92 14 (!) 83/54 98 %   06/30/17 2156 - 89 11 (!) 80/47 95 %   06/30/17 2153 - 93 13 (!) 65/36 (!) 88 %   06/30/17 2100 - 100 18 115/79 97 %   06/30/17 2040 - (!) 101 17 120/71 97 %   06/30/17 2034 - (!) 103 20 116/80 95 %   06/30/17 2030 - (!) 102 17 (!) 87/39 98 %   06/30/17 2020 - (!) 104 20 107/73 97 %   06/30/17 2005 - (!) 108 18 121/75 96 %   06/30/17 2000 - (!) 111 18 121/75 97 %   06/30/17 1958 - (!) 110 21 - 98 %   06/30/17 1957 (!) 103.2 °F (39.6 °C) - - - -       Medications ordered:   Medications   sodium chloride (NS) flush 5-10 mL (not administered)   0.9% sodium chloride infusion 1,000 mL (0 mL IntraVENous IV Completed 6/30/17 2328)   NOREPINephrine (LEVOPHED) 8,000 mcg in dextrose 5% 250 mL infusion (5 mcg/min IntraVENous New Bag 7/1/17 0209)   sodium chloride 0.9 % bolus infusion 2,367 mL (0 mL/kg × 78.9 kg IntraVENous IV Completed 6/30/17 2250)   levoFLOXacin (LEVAQUIN) 750 mg in D5W IVPB (0 mg IntraVENous IV Completed 6/30/17 2250)   acetaminophen (TYLENOL) tablet 1,000 mg (1,000 mg Oral Given 6/30/17 2115)   HYDROmorphone (PF) (DILAUDID) injection 1 mg (1 mg IntraVENous Given 6/30/17 2114)   ondansetron (ZOFRAN) injection 4 mg (4 mg IntraVENous Given 6/30/17 2115)   iopamidol (ISOVUE 300) 61 % contrast injection 100 mL (100 mL IntraVENous Given 6/30/17 2137)   naloxone [facility]) injection 0.4 mg (0.4 mg IntraVENous Canceled Entry 7/1/17 0001)   naloxone (NARCAN) injection 0.4 mg (0.4 mg IntraVENous Given 7/1/17 0002)   naloxone (NARCAN) injection 2 mg (0.4 mg IntraVENous Given 7/1/17 0031)         Lab findings:  Recent Results (from the past 12 hour(s))   EKG, 12 LEAD, INITIAL    Collection Time: 06/30/17  8:01 PM   Result Value Ref Range    Ventricular Rate 111 BPM    Atrial Rate 111 BPM    P-R Interval 132 ms    QRS Duration 74 ms    Q-T Interval 312 ms    QTC Calculation (Bezet) 424 ms    Calculated P Axis 18 degrees    Calculated R Axis 6 degrees    Calculated T Axis 19 degrees    Diagnosis       Sinus tachycardia  Nonspecific T wave abnormality  Abnormal ECG  When compared with ECG of 21-JUN-2017 12:24,  ST now depressed in Inferior leads  Nonspecific T wave abnormality now evident in Lateral leads     CBC WITH AUTOMATED DIFF    Collection Time: 06/30/17  8:10 PM   Result Value Ref Range    WBC 5.4 4.6 - 13.2 K/uL    RBC 3.58 (L) 4.20 - 5.30 M/uL    HGB 10.6 (L) 12.0 - 16.0 g/dL    HCT 31.8 (L) 35.0 - 45.0 %    MCV 88.8 74.0 - 97.0 FL    MCH 29.6 24.0 - 34.0 PG    MCHC 33.3 31.0 - 37.0 g/dL    RDW 13.1 11.6 - 14.5 %    PLATELET 689 321 - 021 K/uL    MPV 9.3 9.2 - 11.8 FL    NEUTROPHILS 85 (H) 40 - 73 %    LYMPHOCYTES 8 (L) 21 - 52 %    MONOCYTES 7 3 - 10 %    EOSINOPHILS 0 0 - 5 %    BASOPHILS 0 0 - 2 %    ABS. NEUTROPHILS 4.5 1.8 - 8.0 K/UL    ABS. LYMPHOCYTES 0.5 (L) 0.9 - 3.6 K/UL    ABS. MONOCYTES 0.4 0.05 - 1.2 K/UL    ABS. EOSINOPHILS 0.0 0.0 - 0.4 K/UL    ABS. BASOPHILS 0.0 0.0 - 0.06 K/UL    DF AUTOMATED     METABOLIC PANEL, COMPREHENSIVE    Collection Time: 06/30/17  8:10 PM   Result Value Ref Range    Sodium 131 (L) 136 - 145 mmol/L    Potassium 4.5 3.5 - 5.5 mmol/L    Chloride 94 (L) 100 - 108 mmol/L    CO2 27 21 - 32 mmol/L    Anion gap 10 3.0 - 18 mmol/L    Glucose 143 (H) 74 - 99 mg/dL    BUN 7 7.0 - 18 MG/DL    Creatinine 1.31 (H) 0.6 - 1.3 MG/DL    BUN/Creatinine ratio 5 (L) 12 - 20      GFR est AA 51 (L) >60 ml/min/1.73m2    GFR est non-AA 42 (L) >60 ml/min/1.73m2    Calcium 8.8 8.5 - 10.1 MG/DL    Bilirubin, total 0.3 0.2 - 1.0 MG/DL    ALT (SGPT) 55 13 - 56 U/L    AST (SGOT) 77 (H) 15 - 37 U/L    Alk.  phosphatase 210 (H) 45 - 117 U/L    Protein, total 6.9 6.4 - 8.2 g/dL    Albumin 3.1 (L) 3.4 - 5.0 g/dL    Globulin 3.8 2.0 - 4.0 g/dL    A-G Ratio 0.8 0.8 - 1.7     CULTURE, BLOOD    Collection Time: 06/30/17  8:10 PM   Result Value Ref Range    Special Requests: PERIPHERAL      Culture result: PENDING    URINALYSIS W/ RFLX MICROSCOPIC    Collection Time: 06/30/17  8:10 PM   Result Value Ref Range    Color YELLOW      Appearance CLEAR      Specific gravity 1.013 1.005 - 1.030      pH (UA) 7.0 5.0 - 8.0      Protein TRACE (A) NEG mg/dL    Glucose 100 (A) NEG mg/dL    Ketone NEGATIVE  NEG mg/dL    Bilirubin NEGATIVE  NEG      Blood NEGATIVE  NEG      Urobilinogen 0.2 0.2 - 1.0 EU/dL    Nitrites NEGATIVE  NEG      Leukocyte Esterase NEGATIVE  NEG     ACETAMINOPHEN    Collection Time: 06/30/17  8:10 PM   Result Value Ref Range    Acetaminophen level <2 (L) 10 - 30 ug/mL   ETHYL ALCOHOL    Collection Time: 06/30/17  8:10 PM   Result Value Ref Range    ALCOHOL(ETHYL),SERUM <3 0 - 3 MG/DL   DRUG SCREEN, URINE    Collection Time: 06/30/17  8:10 PM   Result Value Ref Range    BENZODIAZEPINE POSITIVE (A) NEG      BARBITURATES NEGATIVE  NEG      THC (TH-CANNABINOL) NEGATIVE  NEG      OPIATES NEGATIVE  NEG      PCP(PHENCYCLIDINE) NEGATIVE  NEG      COCAINE NEGATIVE  NEG      AMPHETAMINE NEGATIVE  NEG      METHADONE NEGATIVE       HDSCOM (NOTE)    SALICYLATE    Collection Time: 06/30/17  8:10 PM   Result Value Ref Range    SALICYLATE <6.1 (L) 2.8 - 20.0 MG/DL   URINE MICROSCOPIC ONLY    Collection Time: 06/30/17  8:10 PM   Result Value Ref Range    WBC 0 to 3 0 - 4 /hpf    RBC NEGATIVE  0 - 5 /hpf    Epithelial cells NEGATIVE  0 - 5 /lpf    Bacteria NEGATIVE  NEG /hpf    CA Oxalate crystals 2+ (A) NEG    Granular cast 4 to 10 NEG /lpf   LIPASE    Collection Time: 06/30/17  8:10 PM   Result Value Ref Range    Lipase 176 73 - 393 U/L   POC LACTIC ACID    Collection Time: 06/30/17  8:13 PM   Result Value Ref Range    Lactic Acid (POC) 2.7 (HH) 0.4 - 2.0 mmol/L   CULTURE, BLOOD    Collection Time: 06/30/17  8:15 PM   Result Value Ref Range    Special Requests: PERIPHERAL      Culture result: PENDING    POC LACTIC ACID    Collection Time: 07/01/17 12:09 AM   Result Value Ref Range    Lactic Acid (POC) 0.5 0.4 - 2.0 mmol/L   POC VENOUS BLOOD GAS    Collection Time: 07/01/17  2:07 AM   Result Value Ref Range    Device: NASAL CANNULA      Flow rate (POC) 4 L/M    pH, venous (POC) 7.342 7.32 - 7.42      pCO2, venous (POC) 52.4 (H) 41 - 51 MMHG    pO2, venous (POC) 82 (H) 25 - 40 mmHg    HCO3, venous (POC) 28.1 (H) 23.0 - 28.0 MMOL/L    sO2, venous (POC) 94 (H) 65 - 88 %    Base excess, venous (POC) 3 mmol/L    Allens test (POC) N/A      Total resp. rate 9      Site OTHER      Patient temp. 100.5      Specimen type (POC) VENOUS BLOOD      Performed by Ferny Conley        EKG interpretation by ED Physician:  8:09 PM Sinus tachycardia with a rate of 111. Nonspecific T-wave abnormality. No ischemia. X-Ray, CT or other radiology findings or impressions:  XR CHEST PORT    IMPRESSION: No acute disease. Per Swathi Cho MD 9:36 PM     CT ABD PELV W CONT   Findings:   -Stranding around the pancreas, especially the tail, mildly decreased from prior, c/w pancreatitis. No abscess or pseudocyst. Few small retroperitoneal LNs may be reactive   -Steatosis and hepatomegaly   -Irregular collection in the R lateral buttock, midlly decreased in size from prior, could be chronic hematoma or other fluid collection   -Appendix normal   -DDD, stable height loss T12   -Gastric bypass. hysterectomy. CT HEAD WO CONT    Findings:   No acute intracranial abnormality      XR CHEST PORT (2)  IMPRESSION: Internal jugular line in superior vena cava. Per Swathi Cho MD 2:42 AM       Reevaluation of patient:   20:02 06/30/17 SEPSIS BUNDLE INITIATED  Vitals:    07/01/17 0200 07/01/17 0224 07/01/17 0227 07/01/17 0230   BP: (!) 76/47 (!) 88/69 97/70 102/63   Pulse: 75 74 76 75   Resp: 10 9 10 10   Temp:       SpO2: 99% 100% 100% 100%     8:18 PM Patient has an anaphylactic reaction to Penicillin. Consulted pharmacy for drug compatibility with Fluoroquinolones. 8:33 PM Pharmacist recommends levoquin, low risk of complications and has tolerated before. Also vanc, but pt has rash to vanc. 10:01 PM Patient rechecked. MAP of 47. She is alert and has no specific pain. 11:07 PM Patient has been persistently hypotensive and respiration rate as low as 7. Appears drowsy. Labs are not revealing a source of sepsis. May be a reaction to the Dilaudid. Her drug screen resulted positive foe Benzo's. Will give a small amount of Narcan.     Sepsis 3-6 hour reevaluation and exam:  12:24 AM   07/01/17    Patient Vitals for the past 12 hrs:   Temp Pulse Resp BP SpO2   07/01/17 0230 - 75 10 102/63 100 %   07/01/17 0227 - 76 10 97/70 100 %   07/01/17 0224 - 74 9 (!) 88/69 100 %   07/01/17 0200 - 75 10 (!) 76/47 99 %   07/01/17 0150 - 75 9 (!) 85/53 100 %   07/01/17 0140 - 75 10 (!) 81/59 100 %   07/01/17 0130 - 74 10 (!) 80/48 100 %   07/01/17 0120 - 75 11 (!) 82/49 100 %   07/01/17 0110 - 79 10 (!) 80/57 100 %   07/01/17 0100 - 72 9 (!) 75/51 100 %   07/01/17 0050 - 72 12 (!) 75/43 100 %   07/01/17 0040 - 75 10 (!) 75/51 100 %   07/01/17 0030 - 76 10 (!) 74/49 100 %   07/01/17 0021 - 78 11 - 99 %   07/01/17 0020 - 78 10 (!) 71/45 99 %   07/01/17 0010 - 83 11 (!) 69/38 91 %   07/01/17 0006 - 81 22 (!) 62/42 98 %   06/30/17 2350 - 83 9 (!) 77/44 98 %   06/30/17 2348 - 81 8 (!) 80/51 99 %   06/30/17 2340 - 81 9 (!) 73/44 99 %   06/30/17 2330 - 84 10 (!) 80/49 98 %   06/30/17 2328 - 85 10 (!) 84/47 98 %   06/30/17 2315 - 86 14 - 98 %   06/30/17 2314 - 87 20 (!) 79/39 99 %   06/30/17 2310 - 86 11 (!) 70/46 99 %   06/30/17 2308 - 87 12 (!) 72/42 100 %   06/30/17 2300 - 87 8 (!) 75/52 97 %   06/30/17 2250 - 85 (!) 6 (!) 70/47 98 %   06/30/17 2247 100.2 °F (37.9 °C) - - - -   06/30/17 2242 - 85 (!) 7 - 98 %   06/30/17 2241 - 84 12 (!) 70/45 99 %   06/30/17 2230 - 96 16 (!) 67/53 95 %   06/30/17 2220 - 93 13 (!) 70/46 96 %   06/30/17 2210 - 92 9 (!) 80/50 97 %   06/30/17 2209 - 92 14 (!) 83/54 98 %   06/30/17 2156 - 89 11 (!) 80/47 95 %   06/30/17 2153 - 93 13 (!) 65/36 (!) 88 %   06/30/17 2100 - 100 18 115/79 97 %   06/30/17 2040 - (!) 101 17 120/71 97 %   06/30/17 2034 - (!) 103 20 116/80 95 %   06/30/17 2030 - (!) 102 17 (!) 87/39 98 %   06/30/17 2020 - (!) 104 20 107/73 97 %   06/30/17 2005 - (!) 108 18 121/75 96 %   06/30/17 2000 - (!) 111 18 121/75 97 %   06/30/17 1958 - (!) 110 21 - 98 %   06/30/17 1957 (!) 103.2 °F (39.6 °C) - - - -     Sepsis 3-6 hour reevaluation and exam performed at 12:24 AM     1:56 AM Pt tolerated central line well, seems well sedated. So wonder if etiology of her condition is mostly drug interaction. 2:09 AM Paged hospitalist for admission. 2:37 AM Discussed with Dr. Diana Garzon, hospitalist. She agrees to admit the patient. Calling intensivist    Disposition:  Diagnosis:   1. Fever, unspecified fever cause    2. Septic shock ([address]    3. Somnolence        Disposition: Admit    Follow-up Information     None            Patient's Medications   Start Taking    No medications on file   Continue Taking    ALENDRONATE (FOSAMAX) 70 MG TABLET    Take 70 mg by mouth Every Saturday. ASCORBIC ACID (VITAMIN C) 500 MG TABLET    Take 1,000 mg by mouth daily. BIOTIN 10,000 MCG CAP    Take 1 Cap by mouth. CALCIUM CITRATE-VITAMIN D3 (CITRACAL+D) 315-200 MG-UNIT TAB    Take 1 Tab by mouth two (2) times daily (with meals). CHOLECALCIFEROL (VITAMIN D3) 1,000 UNIT TABLET    Take  by mouth daily. CLONIDINE HCL (CATAPRES) 0.1 MG TABLET    Take  by mouth two (2) times a day. COLESEVELAM (WELCHOL) 625 MG TABLET    Take 2 Tabs by mouth two (2) times daily (with meals). CYANOCOBALAMIN (VITAMIN B-12) 1,000 MCG SUBL    1,000 mcg by SubLINGual route daily. DICYCLOMINE (BENTYL) 20 MG TABLET    Take 20 mg by mouth three (3) times daily. ERGOCALCIFEROL (ERGOCALCIFEROL) 50,000 UNIT CAPSULE    Take 50,000 Units by mouth every seven (7) days. ESCITALOPRAM OXALATE (LEXAPRO) 10 MG TABLET    Take 1 Tab by mouth daily. FAMOTIDINE (PEPCID) 20 MG TABLET    Take 20 mg by mouth daily. Indications: HEARTBURN    FERROUS SULFATE (FEOSOL) 325 MG (65 MG IRON) TABLET    Take 325 mg by mouth daily. FISH OIL-DHA-EPA 1,200-144-216 MG CAP    Take  by mouth.     FOLIC ACID 501 MCG TABLET    Take 800 mcg by mouth daily. GABAPENTIN (NEURONTIN) 600 MG TABLET    Take 600 mg by mouth three (3) times daily. Indications: NEUROPATHIC PAIN    HYDROXYZINE PAMOATE (VISTARIL) 50 MG CAPSULE    Take 50 mg by mouth two (2) times a day. LACTULOSE 10 GRAM/15 ML (15 ML) SOLN    Take 15 mL by mouth three (3) times daily. LEVOTHYROXINE (SYNTHROID) 25 MCG TABLET    Take 1 Tab by mouth Daily (before breakfast). Indications: HYPOTHYROIDISM    OLANZAPINE (ZYPREXA ZYDIS) 10 MG DISINTEGRATING TABLET    Take 1 Tab by mouth nightly. ONDANSETRON HCL (ZOFRAN, AS HYDROCHLORIDE,) 4 MG TABLET    Take 1 Tab by mouth every eight (8) hours as needed for Nausea. PANTOPRAZOLE (PROTONIX) 20 MG TABLET    Take 40 mg by mouth daily. These Medications have changed    No medications on file   Stop Taking    No medications on file       SCRIBE ATTESTATION STATEMENT  Documented by: Amanda Harkins for, and in the presence of, Tristen Nicholas MD 8:02 PM    Signed by: Yue Ramirez, 06/30/17 8:02 PM    PROVIDER ATTESTATION STATEMENT  I personally performed the services described in the documentation, reviewed the documentation, as recorded by the scribe in my presence, and it accurately and completely records my words and actions.   Tristen Nicholas MD

## 2017-07-01 NOTE — ED NOTES
Pt more alert and awake and texting her family on her phone. Asking for breakfast- order placed by Dr. Kannan Ocasio. Will continue to monitor pt.

## 2017-07-01 NOTE — ED NOTES
Antibiotics started. While in room pt indicated pain to the left upper quadrant of her abdomen but stated she had a hx of pancreatitis, pt also states she has been vomiting all day and would like something for pain and nausea. Provider made aware, and in to see the patient.

## 2017-07-01 NOTE — ED NOTES
BP continues to be low 70/50. Pt is responsive but sleepy. Another liter of NS hung as per Dr. Saurabh Miner, who is at the bedside. pt remains in slight trendelenburg. Family updated regarding pt CT scan results. BP and cardiac monitoring continued.

## 2017-07-01 NOTE — ED NOTES
Rounded on patient. IV levoquin infusing to left hand- it was noted that above the site there are raised, reddened areas that have appeared. Pt denies itching and airway is patent. Dr. Easton Call to pt bedside to evaluate the area- instructed to continue antibiotics and continue to monitor patient.

## 2017-07-01 NOTE — ED NOTES
The Sepsis Screening has been completed on arrival in the Emergency Department. Vital signs:  Patient Vitals for the past 4 hrs:   Temp Pulse Resp SpO2   06/30/17 1958 - (!) 110 21 98 %   06/30/17 1957 (!) 103.2 °F (39.6 °C) - - -            =monitored (data validate)       =calculated (manual entry)    Is patient is 29y/o or older, meets 2 or more ABNORMAL VITAL SIGNS below, associated with SUSPECTED INFECTION?  YES    Assumed care of patient for triage. Dr. Heriberto Warren notified of code sepsis and triage sepsis bundle initiated.  Charge nurse Stacia Whiteside and primary nurse Jordan Cavazos made aware of code sepsis     Temperature < 96.8°F (36°C) or > 100.9°F (38.3°C)   Heart Rate > 90 beats per minute   Respiratory Rate > 20 beats per minute   BP < 90 systolic    MAP < 65    IF ANSWER IS YES, CALL A CODE SEPSIS  POC LACTIC ACID ASAP AND BEGIN NURSE DRIVEN SEPSIS ORDERS WHILE AWAITING PROVIDER

## 2017-07-01 NOTE — ED NOTES
Pt respirations improved with the narcan, however, she remains very drowsy. Verbal order for narcan given by Dr. Tristan Ricci for 2mg.

## 2017-07-01 NOTE — PROGRESS NOTES
1505 critical result taken on pt, blood culture collected 6/30/17, aerobic bottle, gram negative rods. Dr Amber Cope called to inform, he ordered repeat blood culture x2.

## 2017-07-01 NOTE — ED NOTES
Pt continues to be hypotensive with BP improved to 85/53 and a MAP of 61. Monitoring continued  Dr. Farheen mahan.

## 2017-07-01 NOTE — ED NOTES
Pt on the stretcher with snoring respirations 6 per min, BP 74/40  Dr. Farheen Haywood made aware and narcan ordered.

## 2017-07-01 NOTE — ED NOTES
Pt returned from CT scan, rash to left arm is no longer present. Pt blood pressure, however dropped to 65/47. Fluid bolus continued as they were held during CT scan. Pt also placed on 2LNC as saturations dropped to 87% on room air. Pt is responsive but sleepy and had received pain medication prior to CT scan. Dr. Yara Garcia at bedside to evaluate patient. Fluids placed to pressure bags.

## 2017-07-01 NOTE — ED NOTES
Pt BP is 70/45- sepsis protocol NS infustion completed and another liter infusing with pressure bag. Pt is responsive but drowsy. Erik Willis and Dr. Clint Yang aware of pt blood pressure.  Monitoring continued

## 2017-07-02 LAB
ALBUMIN SERPL BCP-MCNC: 2.3 G/DL (ref 3.4–5)
ALBUMIN/GLOB SERPL: 0.8 {RATIO} (ref 0.8–1.7)
ALP SERPL-CCNC: 158 U/L (ref 45–117)
ALT SERPL-CCNC: 50 U/L (ref 13–56)
ANION GAP BLD CALC-SCNC: 10 MMOL/L (ref 3–18)
AST SERPL W P-5'-P-CCNC: 79 U/L (ref 15–37)
BACTERIA SPEC CULT: NORMAL
BASOPHILS # BLD AUTO: 0 K/UL (ref 0–0.06)
BASOPHILS # BLD: 1 % (ref 0–2)
BILIRUB SERPL-MCNC: 0.2 MG/DL (ref 0.2–1)
BUN SERPL-MCNC: 5 MG/DL (ref 7–18)
BUN/CREAT SERPL: 11 (ref 12–20)
CALCIUM SERPL-MCNC: 7.9 MG/DL (ref 8.5–10.1)
CHLORIDE SERPL-SCNC: 109 MMOL/L (ref 100–108)
CO2 SERPL-SCNC: 25 MMOL/L (ref 21–32)
CREAT SERPL-MCNC: 0.44 MG/DL (ref 0.6–1.3)
DIFFERENTIAL METHOD BLD: ABNORMAL
EOSINOPHIL # BLD: 0.1 K/UL (ref 0–0.4)
EOSINOPHIL NFR BLD: 2 % (ref 0–5)
ERYTHROCYTE [DISTWIDTH] IN BLOOD BY AUTOMATED COUNT: 13.5 % (ref 11.6–14.5)
GLOBULIN SER CALC-MCNC: 3 G/DL (ref 2–4)
GLUCOSE SERPL-MCNC: 92 MG/DL (ref 74–99)
HCT VFR BLD AUTO: 27.2 % (ref 35–45)
HGB BLD-MCNC: 8.7 G/DL (ref 12–16)
LYMPHOCYTES # BLD AUTO: 32 % (ref 21–52)
LYMPHOCYTES # BLD: 1.1 K/UL (ref 0.9–3.6)
MCH RBC QN AUTO: 29.2 PG (ref 24–34)
MCHC RBC AUTO-ENTMCNC: 32 G/DL (ref 31–37)
MCV RBC AUTO: 91.3 FL (ref 74–97)
MONOCYTES # BLD: 0.7 K/UL (ref 0.05–1.2)
MONOCYTES NFR BLD AUTO: 21 % (ref 3–10)
NEUTS SEG # BLD: 1.4 K/UL (ref 1.8–8)
NEUTS SEG NFR BLD AUTO: 44 % (ref 40–73)
PLATELET # BLD AUTO: 231 K/UL (ref 135–420)
PMV BLD AUTO: 9.2 FL (ref 9.2–11.8)
POTASSIUM SERPL-SCNC: 3.4 MMOL/L (ref 3.5–5.5)
PROT SERPL-MCNC: 5.3 G/DL (ref 6.4–8.2)
RBC # BLD AUTO: 2.98 M/UL (ref 4.2–5.3)
SERVICE CMNT-IMP: NORMAL
SODIUM SERPL-SCNC: 144 MMOL/L (ref 136–145)
TSH SERPL DL<=0.05 MIU/L-ACNC: 1.61 UIU/ML (ref 0.36–3.74)
WBC # BLD AUTO: 3.2 K/UL (ref 4.6–13.2)

## 2017-07-02 PROCEDURE — 84443 ASSAY THYROID STIM HORMONE: CPT | Performed by: HOSPITALIST

## 2017-07-02 PROCEDURE — 80053 COMPREHEN METABOLIC PANEL: CPT | Performed by: HOSPITALIST

## 2017-07-02 PROCEDURE — 74011250637 HC RX REV CODE- 250/637: Performed by: HOSPITALIST

## 2017-07-02 PROCEDURE — 74011250636 HC RX REV CODE- 250/636: Performed by: HOSPITALIST

## 2017-07-02 PROCEDURE — 36415 COLL VENOUS BLD VENIPUNCTURE: CPT | Performed by: HOSPITALIST

## 2017-07-02 PROCEDURE — 77030020263 HC SOL INJ SOD CL0.9% LFCR 1000ML

## 2017-07-02 PROCEDURE — 65270000029 HC RM PRIVATE

## 2017-07-02 PROCEDURE — 85025 COMPLETE CBC W/AUTO DIFF WBC: CPT | Performed by: HOSPITALIST

## 2017-07-02 RX ADMIN — Medication 1000 MG: at 09:40

## 2017-07-02 RX ADMIN — PANTOPRAZOLE SODIUM 20 MG: 40 TABLET, DELAYED RELEASE ORAL at 09:41

## 2017-07-02 RX ADMIN — PANTOPRAZOLE SODIUM 20 MG: 40 TABLET, DELAYED RELEASE ORAL at 16:58

## 2017-07-02 RX ADMIN — GABAPENTIN 400 MG: 400 CAPSULE ORAL at 09:40

## 2017-07-02 RX ADMIN — ENOXAPARIN SODIUM 40 MG: 40 INJECTION SUBCUTANEOUS at 03:36

## 2017-07-02 RX ADMIN — SODIUM CHLORIDE 125 ML/HR: 900 INJECTION, SOLUTION INTRAVENOUS at 22:13

## 2017-07-02 RX ADMIN — OLANZAPINE 15 MG: 5 TABLET, ORALLY DISINTEGRATING ORAL at 22:12

## 2017-07-02 RX ADMIN — LACTULOSE 10 G: 20 SOLUTION ORAL at 09:42

## 2017-07-02 RX ADMIN — SODIUM CHLORIDE 125 ML/HR: 900 INJECTION, SOLUTION INTRAVENOUS at 13:33

## 2017-07-02 RX ADMIN — GABAPENTIN 400 MG: 400 CAPSULE ORAL at 22:12

## 2017-07-02 RX ADMIN — LEVOTHYROXINE SODIUM 50 MCG: 50 TABLET ORAL at 09:39

## 2017-07-02 RX ADMIN — Medication 1 TABLET: at 17:18

## 2017-07-02 RX ADMIN — SODIUM CHLORIDE 125 ML/HR: 900 INJECTION, SOLUTION INTRAVENOUS at 04:18

## 2017-07-02 RX ADMIN — CLONIDINE HYDROCHLORIDE 0.1 MG: 0.1 TABLET ORAL at 18:10

## 2017-07-02 RX ADMIN — Medication 1 TABLET: at 09:42

## 2017-07-02 RX ADMIN — GABAPENTIN 400 MG: 400 CAPSULE ORAL at 16:57

## 2017-07-02 RX ADMIN — CLONIDINE HYDROCHLORIDE 0.1 MG: 0.1 TABLET ORAL at 09:44

## 2017-07-02 NOTE — ROUTINE PROCESS
Bedside and Verbal shift change report given to Ad Knights (oncoming nurse) by alfonzo schwab rn (offgoing nurse). Report given with SBAR, Kardex, Intake/Output and Recent Results.

## 2017-07-02 NOTE — PROGRESS NOTES
Assumed patient care. Received patient asleep. No s/s of pain/ discomfort noted. On O2 @2L/min going on per nasal cannula. Bed is locked and in lowest position and call bell is within reach. Not in acute distress.

## 2017-07-02 NOTE — PROGRESS NOTES
Received awake,alert,in no acute distress. Hob elevated. Call light,phone with in reach. Instructed to call for assistance for safety.

## 2017-07-02 NOTE — PROGRESS NOTES
Progress Note      Patient: Rocco Escudero               Sex: female          DOA: 6/30/2017       YOB: 1960      Age:  65 y.o.        LOS:  LOS: 1 day               Subjective: The pt had one blood culture positive from cultures done in the er   Repeat  Blood  cultures x 2 have been done and the results are pending the. pt is not toxic and is afebrile . She has not ambrocio treated with antibiotics . she is reaching her baseline in terms of mental status ,. She will be dc'd once the culture results are available probably in the am. bp is 119/70       Objective:      Visit Vitals    /70 (BP 1 Location: Left arm, BP Patient Position: At rest)    Pulse 94    Temp 98 °F (36.7 °C)    Resp 16    Ht 5' 7\" (1.702 m)    Wt 88.2 kg (194 lb 7.1 oz)    SpO2 96%    BMI 30.45 kg/m2       Physical Exam:  Pt is awake and seems to be alert . she said that  She has been walking in the halls   heart reg rate and rhythm   Lungs good breath sounds heard   Abdomen soft and no masses found   Neuro  Pt is alert and is oriented she moves all extremities probably at her baseline         Lab/Data Reviewed:  CMP:   Lab Results   Component Value Date/Time     07/02/2017 04:40 AM    K 3.4 (L) 07/02/2017 04:40 AM     (H) 07/02/2017 04:40 AM    CO2 25 07/02/2017 04:40 AM    AGAP 10 07/02/2017 04:40 AM    GLU 92 07/02/2017 04:40 AM    BUN 5 (L) 07/02/2017 04:40 AM    CREA 0.44 (L) 07/02/2017 04:40 AM    GFRAA >60 07/02/2017 04:40 AM    GFRNA >60 07/02/2017 04:40 AM    CA 7.9 (L) 07/02/2017 04:40 AM    ALB 2.3 (L) 07/02/2017 04:40 AM    TP 5.3 (L) 07/02/2017 04:40 AM    GLOB 3.0 07/02/2017 04:40 AM    AGRAT 0.8 07/02/2017 04:40 AM    SGOT 79 (H) 07/02/2017 04:40 AM    ALT 50 07/02/2017 04:40 AM     CBC:   Lab Results   Component Value Date/Time    WBC 3.2 (L) 07/02/2017 04:40 AM    HGB 8.7 (L) 07/02/2017 04:40 AM    HCT 27.2 (L) 07/02/2017 04:40 AM     07/02/2017 04:40 AM Assessment/Plan     Principal Problem:    Hypotension (7/1/2017)now normotensive    Active Problems:    Altered mental status (5/6/2014)      Fever of unknown origin (7/1/2017)      Altered mental state (7/1/2017)        Plan: dc in the anm once the blood culture results are known

## 2017-07-03 VITALS
OXYGEN SATURATION: 96 % | HEART RATE: 88 BPM | BODY MASS INDEX: 30.32 KG/M2 | DIASTOLIC BLOOD PRESSURE: 85 MMHG | HEIGHT: 67 IN | RESPIRATION RATE: 18 BRPM | WEIGHT: 193.2 LBS | SYSTOLIC BLOOD PRESSURE: 134 MMHG | TEMPERATURE: 98.3 F

## 2017-07-03 LAB
BACTERIA SPEC CULT: ABNORMAL
GRAM STN SPEC: ABNORMAL
SERVICE CMNT-IMP: ABNORMAL

## 2017-07-03 PROCEDURE — 74011250637 HC RX REV CODE- 250/637: Performed by: HOSPITALIST

## 2017-07-03 PROCEDURE — 74011250636 HC RX REV CODE- 250/636: Performed by: HOSPITALIST

## 2017-07-03 RX ORDER — LEVOFLOXACIN 750 MG/1
750 TABLET ORAL DAILY
Qty: 5 TAB | Refills: 0 | Status: SHIPPED | OUTPATIENT
Start: 2017-07-03 | End: 2017-07-08

## 2017-07-03 RX ORDER — ESCITALOPRAM OXALATE 10 MG/1
10 TABLET ORAL DAILY
Status: DISCONTINUED | OUTPATIENT
Start: 2017-07-03 | End: 2017-07-03 | Stop reason: HOSPADM

## 2017-07-03 RX ORDER — HYDROXYZINE PAMOATE 50 MG/1
50 CAPSULE ORAL 2 TIMES DAILY
Status: DISCONTINUED | OUTPATIENT
Start: 2017-07-03 | End: 2017-07-03 | Stop reason: HOSPADM

## 2017-07-03 RX ORDER — LANOLIN ALCOHOL/MO/W.PET/CERES
325 CREAM (GRAM) TOPICAL DAILY
Status: DISCONTINUED | OUTPATIENT
Start: 2017-07-03 | End: 2017-07-03 | Stop reason: HOSPADM

## 2017-07-03 RX ADMIN — Medication 1 TABLET: at 09:27

## 2017-07-03 RX ADMIN — CLONIDINE HYDROCHLORIDE 0.1 MG: 0.1 TABLET ORAL at 09:24

## 2017-07-03 RX ADMIN — ENOXAPARIN SODIUM 40 MG: 40 INJECTION SUBCUTANEOUS at 04:20

## 2017-07-03 RX ADMIN — PANTOPRAZOLE SODIUM 20 MG: 40 TABLET, DELAYED RELEASE ORAL at 09:24

## 2017-07-03 RX ADMIN — Medication 1000 MG: at 09:24

## 2017-07-03 RX ADMIN — LEVOTHYROXINE SODIUM 50 MCG: 50 TABLET ORAL at 09:24

## 2017-07-03 RX ADMIN — GABAPENTIN 400 MG: 400 CAPSULE ORAL at 09:24

## 2017-07-03 RX ADMIN — ESCITALOPRAM OXALATE 10 MG: 10 TABLET ORAL at 09:24

## 2017-07-03 RX ADMIN — SODIUM CHLORIDE 125 ML/HR: 900 INJECTION, SOLUTION INTRAVENOUS at 06:40

## 2017-07-03 RX ADMIN — FERROUS SULFATE TAB 325 MG (65 MG ELEMENTAL FE) 325 MG: 325 (65 FE) TAB at 09:24

## 2017-07-03 NOTE — PROGRESS NOTES
Patient has designated ________________daughter________ to participate in his/her discharge plan and to receive any needed information.      Name: Waldo Roberts  Address:  Phone number:[phone]

## 2017-07-03 NOTE — DISCHARGE SUMMARY
Internal Medicine Discharge Summary        Patient: Eloisa Almeida    YOB: 1960    Age:  65 y.o. Admit Date: 6/30/2017    Discharge Date: 7/3/2017    LOS:  LOS: 2 days     Discharge To: Home    Consults: None    Admission Diagnoses: Fever of unknown origin  Altered mental state  Fever of unknown origin  Hypotension  Altered mental status    Discharge Diagnoses:    Problem List as of 7/3/2017  Date Reviewed: 2/10/2017          Codes Class Noted - Resolved    Fever of unknown origin ICD-10-CM: R50.9  ICD-9-CM: 780.60  7/1/2017 - Present        Altered mental state ICD-10-CM: R41.82  ICD-9-CM: 780.97  7/1/2017 - Present        * (Principal)Hypotension ICD-10-CM: I95.9  ICD-9-CM: 458.9  7/1/2017 - Present        Bleeding disorder ([facility] ICD-10-CM: D68.9  ICD-9-CM: 287.9  1/18/2017 - Present        Thrombocytopenia ([facility] ICD-10-CM: D69.6  ICD-9-CM: 287.5  1/12/2017 - Present        Sepsis ([facility] ICD-10-CM: A41.9  ICD-9-CM: 038.9, 995.91  1/9/2017 - Present        Non-intractable vomiting ICD-10-CM: R11.10  ICD-9-CM: 787.03  1/9/2017 - Present        Elevated LFTs ICD-10-CM: R94.5  ICD-9-CM: 790.6  Unknown - Present        Fatty liver ICD-10-CM: K76.0  ICD-9-CM: 571.8  Unknown - Present        Bipolar disorder ([facility] ICD-10-CM: F31.9  ICD-9-CM: 296.80  Unknown - Present        Depression ICD-10-CM: F32.9  ICD-9-CM: 311  Unknown - Present        Anemia ICD-10-CM: D64.9  ICD-9-CM: 285. 9  Unknown - Present        Chronic pain ICD-10-CM: G89.29  ICD-9-CM: 338.29  Unknown - Present    Overview Signed 8/19/2016  1:31 PM by JOHN Rodrigues     abd pain             Chronic abdominal pain ICD-10-CM: R10.9, G89.29  ICD-9-CM: 789.00, 338.29  Unknown - Present        Substance induced mood disorder ([facility] ICD-10-CM: F19.94  ICD-9-CM: 292.84  Unknown - Present        GI bleed ICD-10-CM: K92.2  ICD-9-CM: 578.9  Unknown - Present        Lower GI bleeding ICD-10-CM: K92.2  ICD-9-CM: 578.9 Unknown - Present        Compression fracture of lumbar vertebra (HCC) ICD-10-CM: S32.000A  ICD-9-CM: 805.4  Unknown - Present    Overview Signed 8/19/2016  1:39 PM by JOHN Vaz     L4, L5              Alcoholic hepatitis ZNB-24-FH: K70.10  ICD-9-CM: 571.1  Unknown - Present        Alcoholic pancreatitis EKQ-48-EX: K85.20  ICD-9-CM: 130.5  Unknown - Present        Hypothyroidism ICD-10-CM: E03.9  ICD-9-CM: 244.9  Unknown - Present        Vitamin D deficiency ICD-10-CM: E55.9  ICD-9-CM: 268.9  Unknown - Present        Hyponatremia ICD-10-CM: E87.1  ICD-9-CM: 276.1  5/22/2016 - Present        Alcohol withdrawal ([facility] ICD-10-CM: C75.251  ICD-9-CM: 291.81  3/26/2016 - Present        UTI (urinary tract infection) ICD-10-CM: N39.0  ICD-9-CM: 599.0  3/25/2016 - Present        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  ICD-9-CM: 577.8  3/24/2016 - Present        Alcohol abuse ICD-10-CM: F10.10  ICD-9-CM: 305.00  3/24/2016 - Present        Acute hyponatremia ICD-10-CM: E87.1  ICD-9-CM: 276.1  2/3/2016 - Present        Hemorrhoids with complication SAMM-03-NW: Q31.4  ICD-9-CM: 455.8  4/21/2015 - Present        Gallstone pancreatitis ICD-10-CM: K85.10  ICD-9-CM: 577.0, 574.20  11/26/2014 - Present        Acute pancreatitis (Chronic) ICD-10-CM: K85.90  ICD-9-CM: 577.0  10/20/2014 - Present        Pancreatitis ICD-10-CM: K85.90  ICD-9-CM: 577.0  8/28/2014 - Present        Bipolar affective disorder, depressed, severe ([facility] ICD-10-CM: F31.4  ICD-9-CM: 296.53  5/9/2014 - Present        Altered mental status ICD-10-CM: R41.82  ICD-9-CM: 780.97  5/6/2014 - Present        Overdose ICD-10-CM: T50.901A  ICD-9-CM: 977.9, E980.5  5/6/2014 - Present        Alcohol abuse with intoxication delirium ([facility] ICD-10-CM: F10.121  ICD-9-CM: 291.0, 305.00  5/6/2014 - Present        H/O gastric bypass ICD-10-CM: Z98.890  ICD-9-CM: V45.86  5/6/2014 - Present        HERMELINDA (obstructive sleep apnea) ICD-10-CM: A15.58  ICD-9-CM: 327.23  5/6/2014 - Present        Essential hypertension, benign ICD-10-CM: I10  ICD-9-CM: 401.1  5/6/2014 - Present        Malabsorption ICD-10-CM: K90.9  ICD-9-CM: 579.9  Unknown - Present        History of Lap Christian-en-Y gastric bypass- 8/15/12 w/BMI 39 ICD-10-CM: Z98.84  ICD-9-CM: V45.86  Unknown - Present        Morbid obesity ([address] ICD-10-CM: E66.01  ICD-9-CM: 278.01  Unknown - Present        Hyperlipidemia ICD-10-CM: E78.5  ICD-9-CM: 272.4  Unknown - Present        Obstructive sleep apnea ICD-10-CM: G47.33  ICD-9-CM: 327.23  Unknown - Present            Metabolic/Toxic Encephalopathy in the setting of confusion, lethargy s/p taking \"high doses od librium\"      Discharge Condition:  Improved    Procedures: None         Hospital Course: Pt is bipolar and has been in [facility] [place] [place] AT [facility] . She has been on large dose of librium as well blane zyprexq . she has become hypotensive probably dure to the librium which has a long half life . She is now on levophed to support her bp . Presently the pt looks well and will send her out of the icu when she is off the levophed   She is presently febrile at 100.2. Was repeated and her temp is 98.2 .she is not toxic appearing. Her wbc is 4.8. The pt had one blood culture positive from cultures done in the er   Repeat  Blood  cultures x 2 have been done and the results were negative. Initial culture came back as klebisella pneum. Will restart levaquin and discharge with 5 days to cover. Pt remained non-toxic and afebrile though her admission and was eager for discharge to home. The rest of the patient's chronic conditions were managed appropriately during their admission. They were medically stable at the time of discharge.     Visit Vitals    /85 (BP 1 Location: Left arm, BP Patient Position: At rest;Sitting)    Pulse 88    Temp 98.3 °F (36.8 °C)    Resp 18    Ht 5' 7\" (1.702 m)    Wt 87.6 kg (193 lb 3.2 oz)    SpO2 96%    BMI 30.26 kg/m2       Physical Exam at Discharge:  General Appearance: NAD, conversant  HENT: normocephalic/atraumatic, moist mucus membranes  Lungs: CTA with normal respiratory effort  CV: RRR, no m/r/g  Abdomen: soft, non-tender, normal bowel sounds  Extremities: no cyanosis, no peripheral edema  Neuro: moves all extremities, no focal deficits  Psych: appropriate affect, alert and oriented to person, place and time    Labs Prior to Discharge:  Labs: Results:       Chemistry Recent Labs      07/02/17 0440 07/01/17   0800 06/30/17 2010   GLU  92  124*  143*   NA  144  139  131*   K  3.4*  3.7  4.5   CL  109*  104  94*   CO2  25  27  27   BUN  5*  5*  7   CREA  0.44*  0.70  1.31*   CA  7.9*  7.7*  8.8   AGAP  10  8  10   BUCR  11*  7*  5*   AP  158*   --   210*   TP  5.3*   --   6.9   ALB  2.3*   --   3.1*   GLOB  3.0   --   3.8   AGRAT  0.8   --   0.8      CBC w/Diff Recent Labs      07/02/17 0440 07/01/17 0800 06/30/17 2010   WBC  3.2*  4.8  5.4   RBC  2.98*  3.26*  3.58*   HGB  8.7*  9.7*  10.6*   HCT  27.2*  29.2*  31.8*   PLT  231  217  212   GRANS  44  66  85*   LYMPH  32  14*  8*   EOS  2  0  0      Cardiac Enzymes No results for input(s): CPK, CKND1, MÓNICA in the last 72 hours. No lab exists for component: CKRMB, TROIP   Coagulation No results for input(s): PTP, INR, APTT in the last 72 hours. No lab exists for component: INREXT    Lipid Panel Lab Results   Component Value Date/Time    Cholesterol, total 122 01/10/2017 04:50 AM    HDL Cholesterol 28 01/10/2017 04:50 AM    LDL, calculated 43 01/10/2017 04:50 AM    VLDL, calculated 51 01/10/2017 04:50 AM    Triglyceride 255 01/10/2017 04:50 AM    CHOL/HDL Ratio 4.4 01/10/2017 04:50 AM      BNP No results for input(s): BNPP in the last 72 hours.    Liver Enzymes Recent Labs      07/02/17   0440   TP  5.3*   ALB  2.3*   AP  158*   SGOT  79*      Thyroid Studies Lab Results   Component Value Date/Time    TSH 1.61 07/02/2017 04:40 AM            Significant Imaging:  Xr Chest Sngl V    Result Date: 6/21/2017  CHEST PA Indication: Thrombocytopenia, sepsis. Comparison: 05/18/2017. Findings: The lungs appear clear. Cardiac silhouette and pulmonary vascularity appear within normal limits. Costophrenic angles appear sharp. No pneumothorax. IMPRESSION: No acute cardiopulmonary disease. Xr Hand Rt Min 3 V    Result Date: 6/21/2017  Indication: Fall, pain. Comments: AP, lateral, and oblique views of the right hand were obtained. There is slightly displaced intra-articular fracture at the base of the first proximal phalanx. There is no other evidence of fracture or dislocation. The regional soft tissue structures are unremarkable. Osseous mineralization is radiographically decreased. IMPRESSION: Slightly displaced intra-articular fracture at the base of the first proximal phalanx. Radiographic decreased mineralization. Ct Head Wo Cont    Result Date: 7/1/2017  EXAM: CT head INDICATION: Nystagmus COMPARISON: Noncontrast CT brain 6/21/2017 TECHNIQUE: Axial CT imaging of the head was performed without intravenous contrast. Coronal and sagittal reconstructions were performed. One or more dose reduction techniques were used on this CT: automated exposure control, adjustment of the mAs and/or kVp according to patient's size, and iterative reconstruction techniques. The specific techniques utilized on this CT exam have been documented in the patient's electronic medical record.  _______________ FINDINGS: BRAIN AND POSTERIOR FOSSA: The sulci, folia, ventricles and basal cisterns are within normal limits for the patient?s age. There is no intracranial hemorrhage, mass effect, or midline shift. There are no areas of abnormal parenchymal attenuation. EXTRA-AXIAL SPACES AND MENINGES: There are no abnormal extra-axial fluid collections. CALVARIUM: Intact. SINUSES: Mild mucosal thickening of the maxillary sinuses bilaterally.  OTHER: Visualized orbital structures and mastoid air cells are unremarkable. _______________     IMPRESSION: 1. No evidence of acute infarct, hemorrhage or mass. Normal noncontrast CT brain. As the attending radiologist I have personally reviewed the study and preliminary report, and concur with the preliminary findings. Ct Head Wo Cont    Result Date: 6/21/2017  EXAM: CT head INDICATION: Savanna Roy out of bed. COMPARISON: 2/27/2017. TECHNIQUE: Axial CT imaging of the head was performed without intravenous contrast. Coronal and sagittal reconstructions were obtained. Dose reduction: One or more dose reduction techniques were used on this CT: automated exposure control, adjustment of the mAs and/or kVp according to patient's size, and iterative reconstruction techniques. The specific techniques utilized on this CT exam have been documented in the patient's electronic medical record. _______________ FINDINGS: BRAIN PARENCHYMA: There is no evidence of acute intracranial hemorrhage, mass effect, midline shift, or herniation. No definite CT evidence of acute cortical infarct is seen. The gray-white matter differentiation is within normal limits. VENTRICLES/EXTRA-AXIAL SPACES/MENINGES: The ventricles and sulci are normal in their size and configuration. OSSEOUS STRUCTURES: No fracture is seen. PARANASAL SINUSES/MASTOIDS: Visualized paranasal sinuses and mastoid air cells are clear. ORBITS: The visualized orbits are unremarkable. OTHER: None.  _______________     IMPRESSION: No acute intracranial hemorrhage, mass effect, midline shift, or herniation. No definite CT evidence of acute cortical infarct is seen. No evidence of an acute traumatic injury noted. No significant new abnormality is seen developing since the prior study of 2/27/2017.     Ct Abd Pelv W Cont    Result Date: 7/1/2017  EXAM: CT of the abdomen and pelvis INDICATION: Abdominal pain and fever COMPARISON: CT abdomen and pelvis 5/18/2017 TECHNIQUE: Axial CT imaging of the abdomen and pelvis was performed with intravenous contrast. Multiplanar reformats were generated. One or more dose reduction techniques were used on this CT: automated exposure control, adjustment of the mAs and/or kVp according to patient's size, and iterative reconstruction techniques. The specific techniques utilized on this CT exam have been documented in the patient's electronic medical record. _______________ FINDINGS: LOWER CHEST: Mild dependent atelectasis bilaterally not significantly changed. Lung bases are otherwise unremarkable. LIVER, BILIARY: Liver is mildly decreased in attenuation compared to the spleen. No focal mass. Minimal intrahepatic biliary ductal dilatation is probably related to previous cholecystectomy. Gallbladder is surgically absent. PANCREAS: Minimal stranding adjacent to the body and tail of the pancreas are definitely improved compared to previous exam. Normal attenuation of the pancreas. No dilatation of the pancreatic duct. No pancreatic mass. SPLEEN: Upper limits of normal without focal abnormality. ADRENALS: Normal. KIDNEYS: Normal. LYMPH NODES: There are several stable left periaortic lymph nodes. GASTROINTESTINAL TRACT: Patient is status post gastric bypass. No evidence of bowel obstruction. Normal appendix. PELVIC ORGANS: Status post hysterectomy. Bladder is distended and grossly normal. VASCULATURE: Unremarkable. BONES: No acute or aggressive osseous abnormalities identified. Stable mild superior endplate compression deformity T12. OTHER: Stable or slightly decreased fluid density mass lateral right buttock region. _______________     IMPRESSION: 1. Improved pancreatitis compared to previous exam. No evidence of pseudocyst or abscess. 2. No acute findings to explain fever. No evidence of bowel obstruction. Normal appendix. 3. Stable or slightly decreased size fluid collection right lateral buttock region.  As the attending radiologist I have personally reviewed the study and preliminary report, and concur with the preliminary findings. Xr Chest Port    Result Date: 7/1/2017  EXAM: Chest portable INDICATION: Central line placement COMPARISON: Single view chest 6/30/2017 _______________ FINDINGS: AP portable chest film was performed. Right jugular central line is present. Tip is in the superior vena cava. No pneumothorax. Suboptimal inspiration. This may account for accentuated markings in the lower lung fields bilaterally. No definite acute infiltrate. No effusion or pneumothorax. Heart is probably at the upper limits of normal. Central vascular congestion may be accentuated by the poor inspiration. _______________     IMPRESSION: 1. Right jugular central line good position. No pneumothorax. 2. Suboptimal inspiration which limits evaluation. No definite acute infiltrate. 3. Borderline heart size with possible mild central vascular congestion which is probably accentuated by the poor inspiration. Xr Chest Port    Result Date: 7/1/2017  EXAM: Chest portable INDICATION: Sepsis COMPARISON: Single view chest 6/21/2017 _______________ FINDINGS: AP portable chest film was performed. Slightly lesser inspiration than comparison exam. This probably accounts for increased opacity in the right infrahilar region. No definite focal infiltrate, effusion or pneumothorax. Heart and pulmonary vascularity are normal for AP technique. _______________     IMPRESSION: 1. Mildly suboptimal inspiration. No acute cardiopulmonary disease. Discharge Medications:     Discharge Medication List as of 7/3/2017 12:14 PM      START taking these medications    Details   levoFLOXacin (LEVAQUIN) 750 mg tablet Take 1 Tab by mouth daily for 5 days. , Normal, Disp-5 Tab, R-0         CONTINUE these medications which have NOT CHANGED    Details   gabapentin (NEURONTIN) 600 mg tablet Take 600 mg by mouth three (3) times daily. Indications: NEUROPATHIC PAIN, Historical Med      pantoprazole (PROTONIX) 20 mg tablet Take 40 mg by mouth daily. , Historical Med cloNIDine HCl (CATAPRES) 0.1 mg tablet Take  by mouth two (2) times a day., Historical Med      colesevelam (WELCHOL) 625 mg tablet Take 2 Tabs by mouth two (2) times daily (with meals). , Print, Disp-120 Tab, R-0      escitalopram oxalate (LEXAPRO) 10 mg tablet Take 1 Tab by mouth daily. , Print, Disp-10 Tab, R-0      dicyclomine (BENTYL) 20 mg tablet Take 20 mg by mouth three (3) times daily. , Historical Med      ondansetron hcl (ZOFRAN, AS HYDROCHLORIDE,) 4 mg tablet Take 1 Tab by mouth every eight (8) hours as needed for Nausea. , Print, Disp-12 Tab, R-0      lactulose 10 gram/15 mL (15 mL) soln Take 15 mL by mouth three (3) times daily. , Print, Disp-15 CUP, R-0      hydrOXYzine pamoate (VISTARIL) 50 mg capsule Take 50 mg by mouth two (2) times a day., Historical Med      ferrous sulfate (FEOSOL) 325 mg (65 mg iron) tablet Take 325 mg by mouth daily. , Historical Med      biotin 10,000 mcg cap Take 1 Cap by mouth., Historical Med      famotidine (PEPCID) 20 mg tablet Take 20 mg by mouth daily. Indications: HEARTBURN, Historical Med      cholecalciferol (VITAMIN D3) 1,000 unit tablet Take  by mouth daily. , Historical Med      folic acid 295 mcg tablet Take 800 mcg by mouth daily. , Historical Med      calcium citrate-vitamin d3 (CITRACAL+D) 315-200 mg-unit tab Take 1 Tab by mouth two (2) times daily (with meals). , Historical Med      fish oil-dha-epa 1,200-144-216 mg cap Take  by mouth., Historical Med      alendronate (FOSAMAX) 70 mg tablet Take 70 mg by mouth Every Saturday., Historical Med      ergocalciferol (ERGOCALCIFEROL) 50,000 unit capsule Take 50,000 Units by mouth every seven (7) days. , Historical Med      levothyroxine (SYNTHROID) 25 mcg tablet Take 1 Tab by mouth Daily (before breakfast). Indications: HYPOTHYROIDISM, Print, Disp-30 Tab, R-0      OLANZapine (ZYPREXA ZYDIS) 10 mg disintegrating tablet Take 1 Tab by mouth nightly. , Print, Disp-30 Tab, R-0      ascorbic acid (VITAMIN C) 500 mg tablet Take 1,000 mg by mouth daily. , Historical Med      cyanocobalamin (VITAMIN B-12) 1,000 mcg Subl 1,000 mcg by SubLINGual route daily. , Historical Med             Activity: Activity as tolerated    Diet: Resume previous diet    Wound Care: None needed    Follow-up:   Please follow up with your PCP within 7 days to discuss your recent hospitalization. Patient to arrange.          Total time spent including time spent on final examination and discharge discussion, discharge documentation and records reviewed and medication reconciliation: > 30 minutes    Gabe Reynolds DO  Internal Medicine, Hospitalist  Pager: [facility]

## 2017-07-03 NOTE — PROGRESS NOTES
Patient received in bed awake. Patient A&Ox4, denies pain and discomfort. No distress noted. Frequently use items within reach. Bed locked in low position. Call bell within reach and Patient verbalized understanding of use for assistance and needs. 1026- Patient with orders for lab collection and has right IJ CVP. Dr. Mason Pablo was called; awaiting call back. 80- Dr. Mason Pablo return call made aware of issue as documented above said \" we don't need no more blood, you can dc the central.\"    [phone] 02- Pt discharge home, accompanied by WILMAN Fenton via of w/c to front entrance to friend awaiting to car; no distress noted. Pt has discharge instructions and belongings; Voiced understanding of discharge instructions.

## 2017-07-03 NOTE — PROGRESS NOTES
1930> Assumed care from Dragan Setswana, [facility]. Pt is alert, oriented x 4, denies any pain. Verbalized concern of still on morelos. Pt denies any history of retention. Pt is ambulatory and has been going to the bathroom by herself.   2055> Talked to Dr. Patrick Benoit re: Morelos still in place. MD ok'd to remove morelos. 2100> Morelos dc'd. Tolerated well.    0715> Bedside and Verbal shift change report given to Chan Vang RN (oncoming nurse) by Marilyn Delgado RN (offgoing nurse). Report included the following information SBAR, Kardex, Intake/Output, MAR, Recent Results and Cardiac Rhythm NSR.

## 2017-07-03 NOTE — PROGRESS NOTES
conducted a Follow up consultation and Spiritual Assessment for Mallie Mcardle, who is a 65 y.o.,female. The  provided the following Interventions:  Continued the relationship of care and support. Patient also stated that she had met the  during a previous hospital stay. Listened empathically to patient's concern about finding a Druze so she can go back to practicing her juana. Offered prayer and assurance of continued prayer on patient's behalf. Chart reviewed. The following outcomes were achieved:  Patient expressed gratitude for pastoral care visit. Assessment:  There are no further spiritual or Uatsdin issues which require Spiritual Care Services interventions at this time. Plan:  Chaplains will continue to follow and will provide pastoral care as needed or requested. The Rev.  [address]rahan Malone [address]  [facility] [phone] / [facility] [phone]

## 2017-07-03 NOTE — PROGRESS NOTES
conducted an initial consultation and Spiritual Assessment for Raji Pace, who is a 65 y.o.,female. Patients Primary Language is: [place]. According to the patients EMR Quaker Affiliation is: Methodist. The reason the Patient came to the hospital is:   Patient Active Problem List    Diagnosis Date Noted    Fever of unknown origin 07/01/2017    Altered mental state 07/01/2017    Hypotension 07/01/2017    Bleeding disorder ([address] 01/18/2017    Thrombocytopenia (HCC) 01/12/2017    Sepsis ([address] 01/09/2017    Non-intractable vomiting 01/09/2017    Elevated LFTs     Fatty liver     Bipolar disorder (HCC)     Depression     Anemia     Chronic pain     Chronic abdominal pain     Substance induced mood disorder (HCC)     GI bleed     Lower GI bleeding     Compression fracture of lumbar vertebra (HCC)     Alcoholic hepatitis     Alcoholic pancreatitis     Hypothyroidism     Vitamin D deficiency     Hyponatremia 05/22/2016    Alcohol withdrawal ([address] 03/26/2016    UTI (urinary tract infection) 03/25/2016    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    Alcohol abuse 03/24/2016    Acute hyponatremia 02/03/2016    Hemorrhoids with complication 62/48/9274    Gallstone pancreatitis 11/26/2014    Acute pancreatitis 10/20/2014    Pancreatitis 08/28/2014    Bipolar affective disorder, depressed, severe ([address] 05/09/2014    Altered mental status 05/06/2014    Overdose 05/06/2014    Alcohol abuse with intoxication delirium ([address] 05/06/2014    H/O gastric bypass 05/06/2014    HERMELINDA (obstructive sleep apnea) 05/06/2014    Essential hypertension, benign 05/06/2014    Malabsorption     History of Lap Christian-en-Y gastric bypass- 8/15/12 w/BMI 39     Morbid obesity ([address]     Hyperlipidemia     Obstructive sleep apnea         The  provided the following Interventions:  Initiated a relationship of care and support with patient in room 2108 around 0954 this morning.   Listened empathically as patient talked about her story and shared her hopes for recovery, knowing that she may be having some procedure done today. Provided information about Spiritual Care Services. Offered prayer and assurance of continued prayers on patients behalf. The following outcomes were achieved:  Patient shared limited information about her medical narrative and spiritual journey/beliefs. Patient processed feeling about current hospitalization. Patient expressed gratitude for pastoral care visit. Assessment:  Patient does not have any Judaism/cultural needs that will affect patients preferences in health care. There are no further spiritual or Judaism issues which require Spiritual Care Services interventions at this time. Plan:  Chaplains will continue to follow and will provide pastoral care on an as needed/requested basis    . Kim Phillips   Spiritual Care   [phone]

## 2017-07-03 NOTE — DISCHARGE INSTRUCTIONS
DISCHARGE SUMMARY from Nurse    The following personal items are in your possession at time of discharge:    Dental Appliances: None  Visual Aid: None     Home Medications: None  Jewelry: None  Clothing: At bedside  Other Valuables: None             PATIENT INSTRUCTIONS:    After general anesthesia or intravenous sedation, for 24 hours or while taking prescription Narcotics:  · Limit your activities  · Do not drive and operate hazardous machinery  · Do not make important personal or business decisions  · Do  not drink alcoholic beverages  · If you have not urinated within 8 hours after discharge, please contact your surgeon on call. Report the following to your surgeon:  · Excessive pain, swelling, redness or odor of or around the surgical area  · Temperature over 100.5  · Nausea and vomiting lasting longer than 4 hours or if unable to take medications  · Any signs of decreased circulation or nerve impairment to extremity: change in color, persistent  numbness, tingling, coldness or increase pain  · Any questions        What to do at Home:  * Recommended activity: as tolerated    * If you experience any of the following symptoms as listed under Recognize signs and symptoms of STROKE: FAST and/ or Warning Signs of HEART ATTACK Call 911. And if you experience any of the following symptoms as listed in your teaching for Hypotension; Altered Mental Status; and Fever under \"When should you call for help? \", please call 911 and/ or follow up with your Primary Care Physician. *  Please give a list of your current medications to your Primary Care Provider. *  Please update this list whenever your medications are discontinued, doses are      changed, or new medications (including over-the-counter products) are added. *  Please carry medication information at all times in case of emergency situations.           These are general instructions for a healthy lifestyle:    No smoking/ No tobacco products/ Avoid exposure to second hand smoke    Surgeon General's Warning:  Quitting smoking now greatly reduces serious risk to your health. Obesity, smoking, and sedentary lifestyle greatly increases your risk for illness    A healthy diet, regular physical exercise & weight monitoring are important for maintaining a healthy lifestyle    You may be retaining fluid if you have a history of heart failure or if you experience any of the following symptoms:  Weight gain of 3 pounds or more overnight or 5 pounds in a week, increased swelling in our hands or feet or shortness of breath while lying flat in bed. Please call your doctor as soon as you notice any of these symptoms; do not wait until your next office visit. Recognize signs and symptoms of STROKE:    F-face looks uneven    A-arms unable to move or move unevenly    S-speech slurred or non-existent    T-time-call 911 as soon as signs and symptoms begin-DO NOT go       Back to bed or wait to see if you get better-TIME IS BRAIN. Warning Signs of HEART ATTACK     Call 911 if you have these symptoms:   Chest discomfort. Most heart attacks involve discomfort in the center of the chest that lasts more than a few minutes, or that goes away and comes back. It can feel like uncomfortable pressure, squeezing, fullness, or pain.  Discomfort in other areas of the upper body. Symptoms can include pain or discomfort in one or both arms, the back, neck, jaw, or stomach.  Shortness of breath with or without chest discomfort.  Other signs may include breaking out in a cold sweat, nausea, or lightheadedness. Don't wait more than five minutes to call 911 - MINUTES MATTER! Fast action can save your life. Calling 911 is almost always the fastest way to get lifesaving treatment. Emergency Medical Services staff can begin treatment when they arrive -- up to an hour sooner than if someone gets to the hospital by car.        MyChart Activation    Thank you for requesting access to [employer]. Please follow the instructions below to securely access and download your online medical record. [facility] allows you to send messages to your doctor, view your test results, renew your prescriptions, schedule appointments, and more. How Do I Sign Up? 1. In your internet browser, go to [email or web address]. 2. Click on the First Time User? Click Here link in the Sign In box. You will see the New Member Sign Up page. 3. Enter your [employer] Access Code exactly as it appears below. You will not need to use this code after youve completed the sign-up process. If you do not sign up before the expiration date, you must request a new code. [email or web address] Access Code: Activation code not generated  Current [facility] Status: Patient Declined (This is the date your [employer] access code will )    4. Enter the last four digits of your Social Security Number (xxxx) and Date of Birth (mm/dd/yyyy) as indicated and click Submit. You will be taken to the next sign-up page. 5. Create a Social[facility]as ID. This will be your [employer] login ID and cannot be changed, so think of one that is secure and easy to remember. 6. Create a [employer] password. You can change your password at any time. 7. Enter your Password Reset Question and Answer. This can be used at a later time if you forget your password. 8. Enter your e-mail address. You will receive e-mail notification when new information is available in [address] 9. Click Sign Up. You can now view and download portions of your medical record. 10. Click the Download Summary menu link to download a portable copy of your medical information. Additional Information    If you have questions, please visit the Frequently Asked Questions section of the [employer] website at [email or web address] Remember, [employer] is NOT to be used for urgent needs. For medical emergencies, dial 911.         Patient armband removed and shredded    The discharge information has been reviewed with the patient. The patient verbalized understanding. Discharge medications reviewed with the patient and appropriate educational materials and side effects teaching were provided.

## 2017-07-03 NOTE — PROGRESS NOTES
[facility]   Discharge Planning/ Assessment    Reasons for Intervention:  spoke with pt  Lives with . States she  Was independent  With adls and amb. [facility]  On  Friday. . Designates  Her dtr  For dcp. pcp is dr katz. Plan home, dtr will likely transport home.     High Risk Criteria  [x] Yes  []No   Physician Referral  [] Yes  [x]No        Date    Nursing Referral  [] Yes  [x]No        Date    Patient/Family Request  [] Yes  [x]No        Date       Resources:    Medicare  [] Yes  []No   Medicaid  [] Yes  []No   No Resources  [] Yes  []No   Private Insurance  [x] Yes  []No    Name/Phone Number    Other  [] Yes  []No        (i.e. Workman's Comp)         Prior Services:    Prior Services  [] Yes  [x]No   Home Health  [] Yes  []No   [address]  [] Yes  []No        Number of [address]  [] Yes  []No       Meals on Wheels  [] Yes  []No   Office on Aging  [] Yes  []No   Transportation Services  [] Yes  []No   Nursing Home  [] Yes  []No        Nursing Home Name    [address]  [] Yes  []No        [address] Name    Other       Information Source:      Information obtained from  [x] Patient  [] Parent   [] [facility]  [] Child  [] Spouse   [] Significant Other/Partner   [] Friend      [] EMS    [] Nursing Home Chart          [] Other:   Chart Review  [] Yes  []No     Family/Support System:    Patient lives with  [] Alone    [x] Spouse   [] Significant Other  [] Children  [] Caretaker   [] Parent  [] Sibling     [] Other       Other Support System:    Is the patient responsible for care of others  [] Yes  [x]No   Information of person caring for patient on  discharge    Managers financial affairs independently  [] Yes  [x]No   If no, explain:      Status Prior to Admission:    Mental Status  [] Awake  [] Alert  [x] Oriented  [] Quiet/Calm [] Lethargic/Sedated   [] Disoriented  [] Restless/Anxious  [] Combative   Personal Care  [] Dependent  [x] Independent Personal Care  [] Requires Assistance   Meal Preparation Ability  [x] Independent   [] Standby Assistance   [] Minimal Assistance   [] Moderate Assistance  [] Maximum Assistance     [] Total Assistance   Chores  [x] Independent with Chores   [] N/A Nursing Home Resident   [] Requires Assistance   Bowel/Bladder  [x] Continent  [] Catheter  [] Incontinent  [] Ostomy Self-Care    [] Urine Diversion Self-Care  [] Maximum Assistance     [] Total Assistance   Number of Persons needed for assistance    DME at home  [] [address], [place]  [] [address] [facility], Straight   [] Commode    [] Bathroom/Grab Bars  [] Hospital Bed  [] Nebulizer  [] Oxygen           [] Raised Toilet Seat  [] Shower Chair  [] Side Rails for Bed   [] Tub Transfer Bench   [] Marion Analia  [] Aura Feldman, Standard      [] Other:   Vendor      Treatment Presently Receiving:    Current Treatments  [] Chemotherapy  [] Dialysis  [] Insulin  [] IVAB [] IVF   [] O2  [] PCA   [] PT   [] RT   [] Tube Feedings   [] Wound Care     Psychosocial Evaluation:    Verbalized Knowledge of Disease Process  [x] Patient  []Family   Coping with Disease Process  [] Patient  []Family   Requires Further Counseling Coping with Disease Process  [] Patient  []Family     Identified Projected Needs:    Home Health Aid  [] Yes  []No   Transportation  [] Yes  []No   Education  [] Yes  []No        Specific Education     Financial Counseling  [] Yes  []No   Inability to Care for Self/Will Require 24 hour care  [] Yes  []No   Pain Management  [] Yes  []No   Home Infusion Therapy  [] Yes  []No   Oxygen Therapy  [] Yes  []No   DME  [] Yes  []No   Long Term Care Placement  [] Yes  []No   Rehab  [] Yes  []No   Physical Therapy  [] Yes  []No   Needs Anticipated At This Time  [] Yes  [x]No     Intra-Hospital Referral:    [facility]  [] Yes  [x]No     [] Yes  [x]No   Patient Representative  [] Yes  [x]No   Staff for Teaching Needs  [] Yes  [x]No   Specialty Teaching Needs     Diabetic Educator  [] Yes  [x]No   Referral for Diabetic Educator Needed  [] Yes  [x]No  If Yes, place order for Nutritionist or Diabetic Consult     Tentative Discharge Plan:    Home with No Services  [x] Yes  []No   Home with [address]  [] Yes  []No        If Yes, specify type    Home Care Program  [] Yes  []No        If Yes, specify type    Meals on Wheels  [] Yes  []No   Office of Aging  [] Yes  []No   NHP  [] Yes  []No   Return to the Nursing Home  [] Yes  []No   Rehab Therapy  [] Yes  []No   Acute Rehab  [] Yes  []No   Subacute Rehab  [] Yes  []No   Private Care  [] Yes  []No   Substance Abuse Referral  [] Yes  []No   Transportation  [] Yes  []No   Chore Service  [] Yes  []No   Inpatient Hospice  [] Yes  []No   OP RT  [] Yes  [] No   OP Hemo  [] Yes  [] No   OP PT  [] Yes  []No   Support Group  [] Yes  []No   Reach to Recovery  [] Yes  []No   OP Oncology Clinic  [] Yes  []No   Clinic Appointment  [] Yes  []No   DME  [] Yes  []No   Comments    Name of D/C Planner or  Given to Patient or Family Sierra valenzuela rn cm    Phone Number [phone]        Extension    Date 7/3/17   Time    If you are discharged home, whom do you designate to participate in your discharge plan and receive any information needed?      Enter name of designee         Phone # of designee         Address of designee         Updated         Patient refused to designate any           individual

## 2017-07-06 LAB
BACTERIA SPEC CULT: NORMAL
SERVICE CMNT-IMP: NORMAL

## 2017-07-28 ENCOUNTER — APPOINTMENT (OUTPATIENT)
Dept: CT IMAGING | Age: 57
End: 2017-07-28
Attending: EMERGENCY MEDICINE
Payer: MEDICARE

## 2017-07-28 ENCOUNTER — HOSPITAL ENCOUNTER (EMERGENCY)
Age: 57
Discharge: HOME | End: 2017-07-28
Attending: EMERGENCY MEDICINE | Admitting: EMERGENCY MEDICINE
Payer: MEDICARE

## 2017-07-28 ENCOUNTER — APPOINTMENT (OUTPATIENT)
Dept: GENERAL RADIOLOGY | Age: 57
End: 2017-07-28
Attending: EMERGENCY MEDICINE
Payer: MEDICARE

## 2017-07-28 VITALS
OXYGEN SATURATION: 98 % | DIASTOLIC BLOOD PRESSURE: 75 MMHG | TEMPERATURE: 98.4 F | BODY MASS INDEX: 28.93 KG/M2 | WEIGHT: 180 LBS | RESPIRATION RATE: 17 BRPM | SYSTOLIC BLOOD PRESSURE: 116 MMHG | HEART RATE: 64 BPM | HEIGHT: 66 IN

## 2017-07-28 DIAGNOSIS — R07.89 OTHER CHEST PAIN: Primary | ICD-10-CM

## 2017-07-28 LAB
ALBUMIN SERPL BCP-MCNC: 3.8 G/DL (ref 3.4–5)
ALBUMIN/GLOB SERPL: 1.1 {RATIO} (ref 0.8–1.7)
ALP SERPL-CCNC: 115 U/L (ref 45–117)
ALT SERPL-CCNC: 37 U/L (ref 13–56)
ANION GAP BLD CALC-SCNC: 10 MMOL/L (ref 3–18)
AST SERPL W P-5'-P-CCNC: 35 U/L (ref 15–37)
BASOPHILS # BLD AUTO: 0.1 K/UL (ref 0–0.06)
BASOPHILS # BLD: 1 % (ref 0–2)
BILIRUB SERPL-MCNC: 0.3 MG/DL (ref 0.2–1)
BNP SERPL-MCNC: 209 PG/ML (ref 0–900)
BUN SERPL-MCNC: 7 MG/DL (ref 7–18)
BUN/CREAT SERPL: 9 (ref 12–20)
CALCIUM SERPL-MCNC: 8.8 MG/DL (ref 8.5–10.1)
CHLORIDE SERPL-SCNC: 97 MMOL/L (ref 100–108)
CK MB CFR SERPL CALC: 1 % (ref 0–4)
CK MB SERPL-MCNC: 1.1 NG/ML (ref 5–25)
CK SERPL-CCNC: 110 U/L (ref 26–192)
CO2 SERPL-SCNC: 25 MMOL/L (ref 21–32)
CREAT SERPL-MCNC: 0.81 MG/DL (ref 0.6–1.3)
D DIMER PPP FEU-MCNC: 0.52 UG/ML(FEU)
DIFFERENTIAL METHOD BLD: ABNORMAL
EOSINOPHIL # BLD: 0.2 K/UL (ref 0–0.4)
EOSINOPHIL NFR BLD: 4 % (ref 0–5)
ERYTHROCYTE [DISTWIDTH] IN BLOOD BY AUTOMATED COUNT: 13.2 % (ref 11.6–14.5)
GLOBULIN SER CALC-MCNC: 3.4 G/DL (ref 2–4)
GLUCOSE SERPL-MCNC: 72 MG/DL (ref 74–99)
HCT VFR BLD AUTO: 32.9 % (ref 35–45)
HGB BLD-MCNC: 11.1 G/DL (ref 12–16)
LIPASE SERPL-CCNC: 210 U/L (ref 73–393)
LYMPHOCYTES # BLD AUTO: 34 % (ref 21–52)
LYMPHOCYTES # BLD: 1.8 K/UL (ref 0.9–3.6)
MCH RBC QN AUTO: 28.7 PG (ref 24–34)
MCHC RBC AUTO-ENTMCNC: 33.7 G/DL (ref 31–37)
MCV RBC AUTO: 85 FL (ref 74–97)
MONOCYTES # BLD: 0.3 K/UL (ref 0.05–1.2)
MONOCYTES NFR BLD AUTO: 6 % (ref 3–10)
NEUTS SEG # BLD: 2.9 K/UL (ref 1.8–8)
NEUTS SEG NFR BLD AUTO: 55 % (ref 40–73)
PLATELET # BLD AUTO: 234 K/UL (ref 135–420)
PMV BLD AUTO: 9 FL (ref 9.2–11.8)
POTASSIUM SERPL-SCNC: 4 MMOL/L (ref 3.5–5.5)
PROT SERPL-MCNC: 7.2 G/DL (ref 6.4–8.2)
RBC # BLD AUTO: 3.87 M/UL (ref 4.2–5.3)
SODIUM SERPL-SCNC: 132 MMOL/L (ref 136–145)
TROPONIN I SERPL-MCNC: <0.02 NG/ML (ref 0–0.04)
TROPONIN I SERPL-MCNC: <0.02 NG/ML (ref 0–0.04)
WBC # BLD AUTO: 5.3 K/UL (ref 4.6–13.2)

## 2017-07-28 PROCEDURE — 74011250636 HC RX REV CODE- 250/636: Performed by: EMERGENCY MEDICINE

## 2017-07-28 PROCEDURE — 96375 TX/PRO/DX INJ NEW DRUG ADDON: CPT

## 2017-07-28 PROCEDURE — 74011250637 HC RX REV CODE- 250/637: Performed by: EMERGENCY MEDICINE

## 2017-07-28 PROCEDURE — 74011636320 HC RX REV CODE- 636/320: Performed by: EMERGENCY MEDICINE

## 2017-07-28 PROCEDURE — 83690 ASSAY OF LIPASE: CPT | Performed by: EMERGENCY MEDICINE

## 2017-07-28 PROCEDURE — 80053 COMPREHEN METABOLIC PANEL: CPT | Performed by: EMERGENCY MEDICINE

## 2017-07-28 PROCEDURE — 85379 FIBRIN DEGRADATION QUANT: CPT | Performed by: EMERGENCY MEDICINE

## 2017-07-28 PROCEDURE — 96374 THER/PROPH/DIAG INJ IV PUSH: CPT

## 2017-07-28 PROCEDURE — 85025 COMPLETE CBC W/AUTO DIFF WBC: CPT | Performed by: EMERGENCY MEDICINE

## 2017-07-28 PROCEDURE — 96361 HYDRATE IV INFUSION ADD-ON: CPT

## 2017-07-28 PROCEDURE — 84484 ASSAY OF TROPONIN QUANT: CPT | Performed by: EMERGENCY MEDICINE

## 2017-07-28 PROCEDURE — 71275 CT ANGIOGRAPHY CHEST: CPT

## 2017-07-28 PROCEDURE — 99284 EMERGENCY DEPT VISIT MOD MDM: CPT

## 2017-07-28 PROCEDURE — 83880 ASSAY OF NATRIURETIC PEPTIDE: CPT | Performed by: EMERGENCY MEDICINE

## 2017-07-28 PROCEDURE — 93005 ELECTROCARDIOGRAM TRACING: CPT

## 2017-07-28 PROCEDURE — 71020 XR CHEST PA LAT: CPT

## 2017-07-28 RX ORDER — HYDROMORPHONE HYDROCHLORIDE 1 MG/ML
0.5 INJECTION, SOLUTION INTRAMUSCULAR; INTRAVENOUS; SUBCUTANEOUS ONCE
Status: COMPLETED | OUTPATIENT
Start: 2017-07-28 | End: 2017-07-28

## 2017-07-28 RX ORDER — IBUPROFEN 400 MG/1
800 TABLET ORAL
Status: COMPLETED | OUTPATIENT
Start: 2017-07-28 | End: 2017-07-28

## 2017-07-28 RX ORDER — IBUPROFEN 600 MG/1
600 TABLET ORAL
Qty: 20 TAB | Refills: 0 | Status: SHIPPED | OUTPATIENT
Start: 2017-07-28 | End: 2017-08-12

## 2017-07-28 RX ORDER — KETOROLAC TROMETHAMINE 30 MG/ML
30 INJECTION, SOLUTION INTRAMUSCULAR; INTRAVENOUS
Status: COMPLETED | OUTPATIENT
Start: 2017-07-28 | End: 2017-07-28

## 2017-07-28 RX ADMIN — HYDROMORPHONE HYDROCHLORIDE 0.5 MG: 1 INJECTION, SOLUTION INTRAMUSCULAR; INTRAVENOUS; SUBCUTANEOUS at 18:04

## 2017-07-28 RX ADMIN — IBUPROFEN 800 MG: 400 TABLET, FILM COATED ORAL at 18:04

## 2017-07-28 RX ADMIN — KETOROLAC TROMETHAMINE 30 MG: 30 INJECTION, SOLUTION INTRAMUSCULAR at 20:38

## 2017-07-28 RX ADMIN — SODIUM CHLORIDE 1000 ML: 900 INJECTION, SOLUTION INTRAVENOUS at 19:21

## 2017-07-28 RX ADMIN — IOPAMIDOL 73 ML: 755 INJECTION, SOLUTION INTRAVENOUS at 21:18

## 2017-07-28 NOTE — DISCHARGE INSTRUCTIONS
Chest Pain: Care Instructions  Your Care Instructions  There are many things that can cause chest pain. Some are not serious and will get better on their own in a few days. But some kinds of chest pain need more testing and treatment. Your doctor may have recommended a follow-up visit in the next 8 to 12 hours. If you are not getting better, you may need more tests or treatment. Even though your doctor has released you, you still need to watch for any problems. The doctor carefully checked you, but sometimes problems can develop later. If you have new symptoms or if your symptoms do not get better, get medical care right away. If you have worse or different chest pain or pressure that lasts more than 5 minutes or you passed out (lost consciousness), call 911 or seek other emergency help right away. A medical visit is only one step in your treatment. Even if you feel better, you still need to do what your doctor recommends, such as going to all suggested follow-up appointments and taking medicines exactly as directed. This will help you recover and help prevent future problems. How can you care for yourself at home? · Rest until you feel better. · Take your medicine exactly as prescribed. Call your doctor if you think you are having a problem with your medicine. · Do not drive after taking a prescription pain medicine. When should you call for help? Call 911 if:  · You passed out (lost consciousness). · You have severe difficulty breathing. · You have symptoms of a heart attack. These may include:  ¨ Chest pain or pressure, or a strange feeling in your chest.  ¨ Sweating. ¨ Shortness of breath. ¨ Nausea or vomiting. ¨ Pain, pressure, or a strange feeling in your back, neck, jaw, or upper belly or in one or both shoulders or arms. ¨ Lightheadedness or sudden weakness. ¨ A fast or irregular heartbeat.   After you call 911, the  may tell you to chew 1 adult-strength or 2 to 4 low-dose aspirin. Wait for an ambulance. Do not try to drive yourself. Call your doctor today if:  · You have any trouble breathing. · Your chest pain gets worse. · You are dizzy or lightheaded, or you feel like you may faint. · You are not getting better as expected. · You are having new or different chest pain. Where can you learn more? Go to http://salvatore-izabela.info/. Enter A120 in the search box to learn more about \"Chest Pain: Care Instructions. \"  Current as of: March 20, 2017  Content Version: 11.3  © 0631-2396 [employer]. Care instructions adapted under license by [employer] (which disclaims liability or warranty for this information). If you have questions about a medical condition or this instruction, always ask your healthcare professional. [address] any warranty or liability for your use of this information.

## 2017-07-28 NOTE — ED PROVIDER NOTES
HPI Comments: 4:59 PM Cuauhtemoc Hernandez is a 65 y.o. female with h/o HTN, Bipolar, Anemia, and Pancreatitis who presents to ED complaining of constant mid chest pain onset 4 days ago. Patient says that the chest pain was not radiating before but is now radiating to the breast and back. She takes 800 mg of ibuprofen every 8 hrs with no relief. The pain is exacerbated with touch and deep breath. She denies cough, congestion, runny nose, and troubles urinating. No other concerns or symptoms at this time. PCP: Tamanna Ghosh MD      The history is provided by the patient. Past Medical History:   Diagnosis Date    Alcohol abuse     Alcoholic hepatitis     Alcoholic pancreatitis     Anemia     Bipolar disorder (HCC)     Dr. [facility])    Chronic abdominal pain     Chronic pancreatitis ([facility]     Compression fracture of lumbar vertebra (HCC)     L4, L5     Depression     Dr. [facility])    Elevated LFTs     ETOH abuse     Fatty liver     GERD (gastroesophageal reflux disease)     Hyperlipidemia     Hypertension     Hypothyroidism     Lower GI bleeding     Morbid obesity ([address]     Obstructive sleep apnea     Substance induced mood disorder ([facility] [address]     Vitamin D deficiency        Past Surgical History:   Procedure Laterality Date    BIOPSY LIVER  08/15/2012    Lap Left hepatic liver wedge by Dr. Luis E Duggan; BX Revealed: Hepatic Steatosis, AVM w/ associated Subcapsular Fibrosis.  COLONOSCOPY N/A 1/17/2017    COLONOSCOPY performed by Rj Kirk MD at [address] [place] [address] DISKECTOMY, ANTERIOR, WITH D  8/1995, 11/1997    HX ABDOMINAL LAPAROSCOPY  12/02/2014    Laparoscopic Gastrostomy w/ Partial Gastrectomy for assistance in placement of Endoscopic Retrograde Cholangiopancreatography & Diagnostic Lap.     HX CARPAL TUNNEL RELEASE      HX CHOLECYSTECTOMY  09/16/2014    Dr. Aquilino Mello COLONOSCOPY  05/12/2012    Colonoscopy by Dr. Marilu Montes. Aliza Zepeda: Bx Revealed Colon Polyps (Tubular Adenoma), Hemorrhoids.  HX GASTRIC BYPASS  08/15/2012    Lap Christian-en-y    HX HEMORRHOIDECTOMY  04/30/2015    Dr. Ming Loco. Jason    HX HYSTERECTOMY  2006    HX TONSILLECTOMY  1992    REPAIR NONUNION SCAPHOID CARPAL BONE Right 2010    Wrist         Family History:   Problem Relation Age of Onset    Cancer Father     Cancer Sister     Obesity Brother        Social History     Social History    Marital status:      Spouse name: N/A    Number of children: N/A    Years of education: N/A     Occupational History    Not on file. Social History Main Topics    Smoking status: Never Smoker    Smokeless tobacco: Never Used    Alcohol use 0.0 oz/week     0 Standard drinks or equivalent per week      Comment: 6 pack of beer a week- 1/2/17 last use    Drug use: No    Sexual activity: Yes     Partners: Male     Birth control/ protection: Condom     Other Topics Concern    Not on file     Social History Narrative         ALLERGIES: Codeine; Lamictal [lamotrigine]; Morphine; Pcn [penicillins]; Vancomycin; Doxycycline; Percocet [oxycodone-acetaminophen]; Tetracycline; and Tomato    Review of Systems   Constitutional: Negative for diaphoresis and fever. HENT: Negative for congestion, rhinorrhea, sneezing and sore throat. Eyes: Negative for visual disturbance. Respiratory: Negative for cough and shortness of breath. Cardiovascular: Positive for chest pain (mid chest pain). Gastrointestinal: Negative for abdominal pain, nausea and vomiting. Neurological: Negative for dizziness and headaches. Vitals:    07/28/17 1625 07/28/17 1632 07/28/17 1700 07/28/17 1710   BP: (!) 163/100 147/88 124/77 127/65   Pulse: 79  71 71   Resp: 20  19 18   Temp: 98.4 °F (36.9 °C)      SpO2: 100%  99% 100%   Weight: 81.6 kg (180 lb)      Height: 5' 6\" (1.676 m)               Physical Exam   Constitutional: She is oriented to person, place, and time. She appears well-developed and well-nourished. HENT:   Head: Normocephalic and atraumatic. Mouth/Throat: Mucous membranes are dry. Eyes: Pupils are equal, round, and reactive to light. Neck: Normal range of motion. Cardiovascular: Normal rate, regular rhythm and normal heart sounds. Pulmonary/Chest: Effort normal and breath sounds normal.   Abdominal: Soft. Musculoskeletal: Normal range of motion. Neurological: She is alert and oriented to person, place, and time. Skin: Skin is warm and dry. MDM  Number of Diagnoses or Management Options  Diagnosis management comments: Patient has chest pain onset 4 days ago. Will check D-dimer for possible PE. One set Troponin for constant pain; nonexertional. No traveling, hormonal therapy or calf swelling. Patient has no history of DVT. Will get chest Xray to rule out pathology. Will prescribe pain meds. DC home if all tests are negative.      ED Course       Procedures  Vitals:  Patient Vitals for the past 12 hrs:   Temp Pulse Resp BP SpO2   07/28/17 1710 - 71 18 127/65 100 %   07/28/17 1700 - 71 19 124/77 99 %   07/28/17 1632 - - - 147/88 -   07/28/17 1625 98.4 °F (36.9 °C) 79 20 (!) 163/100 100 %       Medications ordered:   Medications   HYDROmorphone (PF) (DILAUDID) injection 0.5 mg (0.5 mg IntraVENous Given 7/28/17 1804)   ibuprofen (MOTRIN) tablet 800 mg (800 mg Oral Given 7/28/17 1804)         Lab findings:  Recent Results (from the past 12 hour(s))   EKG, 12 LEAD, INITIAL    Collection Time: 07/28/17  4:33 PM   Result Value Ref Range    Ventricular Rate 77 BPM    Atrial Rate 77 BPM    P-R Interval 146 ms    QRS Duration 80 ms    Q-T Interval 386 ms    QTC Calculation (Bezet) 436 ms    Calculated P Axis 47 degrees    Calculated R Axis 24 degrees    Calculated T Axis 46 degrees    Diagnosis       Normal sinus rhythm  Normal ECG  When compared with ECG of 30-JUN-2017 20:01,  ST no longer depressed in Inferior leads  T wave inversion less evident in Anterolateral leads     CBC WITH AUTOMATED DIFF    Collection Time: 07/28/17  5:05 PM   Result Value Ref Range    WBC 5.3 4.6 - 13.2 K/uL    RBC 3.87 (L) 4.20 - 5.30 M/uL    HGB 11.1 (L) 12.0 - 16.0 g/dL    HCT 32.9 (L) 35.0 - 45.0 %    MCV 85.0 74.0 - 97.0 FL    MCH 28.7 24.0 - 34.0 PG    MCHC 33.7 31.0 - 37.0 g/dL    RDW 13.2 11.6 - 14.5 %    PLATELET 073 906 - 139 K/uL    MPV 9.0 (L) 9.2 - 11.8 FL    NEUTROPHILS 55 40 - 73 %    LYMPHOCYTES 34 21 - 52 %    MONOCYTES 6 3 - 10 %    EOSINOPHILS 4 0 - 5 %    BASOPHILS 1 0 - 2 %    ABS. NEUTROPHILS 2.9 1.8 - 8.0 K/UL    ABS. LYMPHOCYTES 1.8 0.9 - 3.6 K/UL    ABS. MONOCYTES 0.3 0.05 - 1.2 K/UL    ABS. EOSINOPHILS 0.2 0.0 - 0.4 K/UL    ABS. BASOPHILS 0.1 (H) 0.0 - 0.06 K/UL    DF AUTOMATED     METABOLIC PANEL, COMPREHENSIVE    Collection Time: 07/28/17  5:05 PM   Result Value Ref Range    Sodium 132 (L) 136 - 145 mmol/L    Potassium 4.0 3.5 - 5.5 mmol/L    Chloride 97 (L) 100 - 108 mmol/L    CO2 25 21 - 32 mmol/L    Anion gap 10 3.0 - 18 mmol/L    Glucose 72 (L) 74 - 99 mg/dL    BUN 7 7.0 - 18 MG/DL    Creatinine 0.81 0.6 - 1.3 MG/DL    BUN/Creatinine ratio 9 (L) 12 - 20      GFR est AA >60 >60 ml/min/1.73m2    GFR est non-AA >60 >60 ml/min/1.73m2    Calcium 8.8 8.5 - 10.1 MG/DL    Bilirubin, total 0.3 0.2 - 1.0 MG/DL    ALT (SGPT) 37 13 - 56 U/L    AST (SGOT) 35 15 - 37 U/L    Alk.  phosphatase 115 45 - 117 U/L    Protein, total 7.2 6.4 - 8.2 g/dL    Albumin 3.8 3.4 - 5.0 g/dL    Globulin 3.4 2.0 - 4.0 g/dL    A-G Ratio 1.1 0.8 - 1.7     CARDIAC PANEL,(CK, CKMB & TROPONIN)    Collection Time: 07/28/17  5:05 PM   Result Value Ref Range     26 - 192 U/L    CK - MB 1.1 <3.6 ng/ml    CK-MB Index 1.0 0.0 - 4.0 %    Troponin-I, Qt. <0.02 0.0 - 0.045 NG/ML   D DIMER    Collection Time: 07/28/17  5:05 PM   Result Value Ref Range    D DIMER 0.52 (H) <0.46 ug/ml(FEU)   LIPASE    Collection Time: 07/28/17  5:05 PM   Result Value Ref Range    Lipase 210 73 - 393 U/L   NT-PRO BNP    Collection Time: 07/28/17  5:05 PM   Result Value Ref Range    NT pro- 0 - 900 PG/ML       EKG interpretation by ED Physician  Sr, nl ashwini, nl axis    X-Ray, CT or other radiology findings or impressions:  XR CHEST PA LAT    (Results Pending)   IMPRESSION:  1) No acute process. Interpreted by Almita Ansari MD 6:13 PM      1929 much improved. No distress. No shortness of breath. No vomit or diarrhea. No rash. Chest pain much improved. Disposition:  Diagnosis:   1. Other chest pain        Disposition: TBD    Follow-up Information     None           Patient's Medications   Start Taking    No medications on file   Continue Taking    ALENDRONATE (FOSAMAX) 70 MG TABLET    Take 70 mg by mouth Every Saturday. ASCORBIC ACID (VITAMIN C) 500 MG TABLET    Take 1,000 mg by mouth daily. BIOTIN 10,000 MCG CAP    Take 1 Cap by mouth. CALCIUM CITRATE-VITAMIN D3 (CITRACAL+D) 315-200 MG-UNIT TAB    Take 1 Tab by mouth two (2) times daily (with meals). CHOLECALCIFEROL (VITAMIN D3) 1,000 UNIT TABLET    Take  by mouth daily. CLONIDINE HCL (CATAPRES) 0.1 MG TABLET    Take  by mouth two (2) times a day. COLESEVELAM (WELCHOL) 625 MG TABLET    Take 2 Tabs by mouth two (2) times daily (with meals). CYANOCOBALAMIN (VITAMIN B-12) 1,000 MCG SUBL    1,000 mcg by SubLINGual route daily. DICYCLOMINE (BENTYL) 20 MG TABLET    Take 20 mg by mouth three (3) times daily. ERGOCALCIFEROL (ERGOCALCIFEROL) 50,000 UNIT CAPSULE    Take 50,000 Units by mouth every seven (7) days. ESCITALOPRAM OXALATE (LEXAPRO) 10 MG TABLET    Take 1 Tab by mouth daily. FAMOTIDINE (PEPCID) 20 MG TABLET    Take 20 mg by mouth daily. Indications: HEARTBURN    FERROUS SULFATE (FEOSOL) 325 MG (65 MG IRON) TABLET    Take 325 mg by mouth daily. FISH OIL-DHA-EPA 1,200-144-216 MG CAP    Take  by mouth. FOLIC ACID 355 MCG TABLET    Take 800 mcg by mouth daily.     GABAPENTIN (NEURONTIN) 600 MG TABLET    Take 600 mg by mouth three (3) times daily. Indications: NEUROPATHIC PAIN    HYDROXYZINE PAMOATE (VISTARIL) 50 MG CAPSULE    Take 50 mg by mouth two (2) times a day. LACTULOSE 10 GRAM/15 ML (15 ML) SOLN    Take 15 mL by mouth three (3) times daily. LEVOTHYROXINE (SYNTHROID) 25 MCG TABLET    Take 1 Tab by mouth Daily (before breakfast). Indications: HYPOTHYROIDISM    OLANZAPINE (ZYPREXA ZYDIS) 10 MG DISINTEGRATING TABLET    Take 1 Tab by mouth nightly. ONDANSETRON HCL (ZOFRAN, AS HYDROCHLORIDE,) 4 MG TABLET    Take 1 Tab by mouth every eight (8) hours as needed for Nausea. PANTOPRAZOLE (PROTONIX) 20 MG TABLET    Take 40 mg by mouth daily. These Medications have changed    No medications on file   Stop Taking    No medications on file     [address] for and in the presence of Rhianna Bello MD (07/28/17)      Physician Attestation  I personally performed the services described in this documentation, reviewed and edited the documentation which was dictated to the scribe in my presence, and it accurately records my words and actions. Rhianna Bello MD (07/28/17)      Signed by: Yue Bermudez, July 28, 2017 at 6:13 PM     7:23 PM : Pt care transferred to Dr. Marcus Modi ,ED provider. History of patient complaint(s), available diagnostic reports and current treatment plan has been discussed thoroughly.    Intended disposition of patient : TBD  Pending diagnostics reports and/or labs (please list): check CTA chest for PE

## 2017-07-28 NOTE — ED NOTES
I performed a brief evaluation, including history and physical, of the patient here in triage and I have determined that pt will need further treatment and evaluation from the main side ER physician. I have placed initial orders to help in expediting patients care.      July 28, 2017 at 4:26 PM - Rainer Prakash MD        Visit Vitals    BP (!) 163/100 (BP 1 Location: Right arm, BP Patient Position: Sitting)    Pulse 79    Temp 98.4 °F (36.9 °C)    Resp 20    Ht 5' 6\" (1.676 m)    Wt 81.6 kg (180 lb)    SpO2 100%    BMI 29.05 kg/m2

## 2017-07-28 NOTE — ED NOTES
Received care of patient from [facility]. Pt eating some fast food on the stretcher- asked patient not to eat any more until all her scan and her workup is completed. Pt states understanding. LOPEZ Valdivia in room attempting to obtain an 18G for CTA.

## 2017-07-29 LAB
ATRIAL RATE: 77 BPM
CALCULATED P AXIS, ECG09: 47 DEGREES
CALCULATED R AXIS, ECG10: 24 DEGREES
CALCULATED T AXIS, ECG11: 46 DEGREES
DIAGNOSIS, 93000: NORMAL
P-R INTERVAL, ECG05: 146 MS
Q-T INTERVAL, ECG07: 386 MS
QRS DURATION, ECG06: 80 MS
QTC CALCULATION (BEZET), ECG08: 436 MS
VENTRICULAR RATE, ECG03: 77 BPM

## 2017-07-29 NOTE — ED NOTES
1945: I took over care of the pt from Dr. Shena Hoyt at this time pending CTA Chest.    11:05 PM  CTA negative, trop x 2 neg. Pt doing well. Discussed results and poc for dc home, symptom management, follow-up, return precautions. Radiology:  CTA CHEST W OR W WO CONT       Findings:  - No acute pulmonary embolus. Minimal basilar atelectasis  - Subtle cortical irregularity at the right manubrium appears chronic  - Cholecystectomy and gastric bypass         Disposition: Discharged      Scribe Attestation:   Nishant Alonso acting as a scribe for and in the presence of Eleni Heart MD July 28, 2017 at 8:33 PM     Signed by: Yue Brock, July 28, 2017 at 8:33 PM     Provider Attestation:   I personally performed the services described in the documentation, reviewed the documentation, as recorded by the scribe in my presence, and it accurately and completely records my words and actions.      Reviewed and signed by:  Eleni Heart MD

## 2017-07-29 NOTE — ED NOTES
Purposeful rounding completed:    Side rails up x 2:  YES  Bed low and wheels and locked: YES  Call bell in reach: YES  Comfort addressed: YES     Toileting needs addressed: YES  Plan of care reviewed/updated with patient and or family members: YES  IV site assessed: YES  Pain assessed and addressed: denies pain  At this time.  Pt waiting for CT scan results

## 2017-08-09 ENCOUNTER — DOCUMENTATION ONLY (OUTPATIENT)
Dept: SURGERY | Age: 57
End: 2017-08-09

## 2017-08-09 NOTE — PROGRESS NOTES
Per [facility] requirements;  E-mail and letter sent for follow up appointment. Britta Faye Jean Loss [address] [facility]  [facility]      Dear Oscar Lundberg,  Your health is our main concern. It is important for your health to have follow-up lab work and to see you surgeon at 3 months, 6 months and annually after your weight loss surgery. Additionally, the Department of bariatric Surgery at our hospital is a member of the [employer] [address] Surgical Quality Improvement Program ([facility] NSQIP). As a participant in this program, we gather information on the outcomes of our patients after surgery. Please call the office for a follow up appointment at [phone] with BORIS Velez. If you have moved out of the area or have changed surgeons please call us and let us know the name of your doctor. Your health and feedback are important to us. We greatly appreciate your response.        Thank you,  Britta Pena Loss [address] [facility]

## 2017-08-12 ENCOUNTER — HOSPITAL ENCOUNTER (EMERGENCY)
Age: 57
Discharge: HOME | End: 2017-08-12
Attending: EMERGENCY MEDICINE
Payer: MEDICARE

## 2017-08-12 ENCOUNTER — APPOINTMENT (OUTPATIENT)
Dept: GENERAL RADIOLOGY | Age: 57
End: 2017-08-12
Attending: EMERGENCY MEDICINE
Payer: MEDICARE

## 2017-08-12 VITALS
RESPIRATION RATE: 18 BRPM | SYSTOLIC BLOOD PRESSURE: 123 MMHG | HEIGHT: 66 IN | OXYGEN SATURATION: 95 % | TEMPERATURE: 97.2 F | HEART RATE: 102 BPM | DIASTOLIC BLOOD PRESSURE: 69 MMHG | BODY MASS INDEX: 28.12 KG/M2 | WEIGHT: 175 LBS

## 2017-08-12 DIAGNOSIS — R07.9 CHEST PAIN, UNSPECIFIED TYPE: Primary | ICD-10-CM

## 2017-08-12 LAB
ANION GAP BLD CALC-SCNC: 11 MMOL/L (ref 3–18)
BASOPHILS # BLD AUTO: 0.1 K/UL (ref 0–0.06)
BASOPHILS # BLD: 1 % (ref 0–2)
BUN SERPL-MCNC: 9 MG/DL (ref 7–18)
BUN/CREAT SERPL: 13 (ref 12–20)
CALCIUM SERPL-MCNC: 9 MG/DL (ref 8.5–10.1)
CHLORIDE SERPL-SCNC: 100 MMOL/L (ref 100–108)
CK MB CFR SERPL CALC: NORMAL % (ref 0–4)
CK MB SERPL-MCNC: <1 NG/ML (ref 5–25)
CK SERPL-CCNC: 120 U/L (ref 26–192)
CO2 SERPL-SCNC: 24 MMOL/L (ref 21–32)
CREAT SERPL-MCNC: 0.68 MG/DL (ref 0.6–1.3)
DIFFERENTIAL METHOD BLD: ABNORMAL
EOSINOPHIL # BLD: 0 K/UL (ref 0–0.4)
EOSINOPHIL NFR BLD: 0 % (ref 0–5)
ERYTHROCYTE [DISTWIDTH] IN BLOOD BY AUTOMATED COUNT: 13.7 % (ref 11.6–14.5)
GLUCOSE SERPL-MCNC: 94 MG/DL (ref 74–99)
HCT VFR BLD AUTO: 34.1 % (ref 35–45)
HGB BLD-MCNC: 11.6 G/DL (ref 12–16)
LYMPHOCYTES # BLD AUTO: 32 % (ref 21–52)
LYMPHOCYTES # BLD: 1.6 K/UL (ref 0.9–3.6)
MAGNESIUM SERPL-MCNC: 2.3 MG/DL (ref 1.6–2.6)
MCH RBC QN AUTO: 29.1 PG (ref 24–34)
MCHC RBC AUTO-ENTMCNC: 34 G/DL (ref 31–37)
MCV RBC AUTO: 85.5 FL (ref 74–97)
MONOCYTES # BLD: 0.3 K/UL (ref 0.05–1.2)
MONOCYTES NFR BLD AUTO: 5 % (ref 3–10)
NEUTS SEG # BLD: 3 K/UL (ref 1.8–8)
NEUTS SEG NFR BLD AUTO: 62 % (ref 40–73)
PLATELET # BLD AUTO: 458 K/UL (ref 135–420)
PMV BLD AUTO: 8.1 FL (ref 9.2–11.8)
POTASSIUM SERPL-SCNC: 4.7 MMOL/L (ref 3.5–5.5)
RBC # BLD AUTO: 3.99 M/UL (ref 4.2–5.3)
SODIUM SERPL-SCNC: 135 MMOL/L (ref 136–145)
TROPONIN I SERPL-MCNC: <0.02 NG/ML (ref 0–0.04)
WBC # BLD AUTO: 5 K/UL (ref 4.6–13.2)

## 2017-08-12 PROCEDURE — 80048 BASIC METABOLIC PNL TOTAL CA: CPT | Performed by: EMERGENCY MEDICINE

## 2017-08-12 PROCEDURE — 74011250636 HC RX REV CODE- 250/636: Performed by: EMERGENCY MEDICINE

## 2017-08-12 PROCEDURE — 99285 EMERGENCY DEPT VISIT HI MDM: CPT

## 2017-08-12 PROCEDURE — 71010 XR CHEST PORT: CPT

## 2017-08-12 PROCEDURE — 85025 COMPLETE CBC W/AUTO DIFF WBC: CPT | Performed by: EMERGENCY MEDICINE

## 2017-08-12 PROCEDURE — 83735 ASSAY OF MAGNESIUM: CPT | Performed by: EMERGENCY MEDICINE

## 2017-08-12 PROCEDURE — 93005 ELECTROCARDIOGRAM TRACING: CPT

## 2017-08-12 PROCEDURE — 96374 THER/PROPH/DIAG INJ IV PUSH: CPT

## 2017-08-12 PROCEDURE — 82550 ASSAY OF CK (CPK): CPT | Performed by: EMERGENCY MEDICINE

## 2017-08-12 RX ORDER — HYDROCODONE BITARTRATE AND ACETAMINOPHEN 5; 325 MG/1; MG/1
TABLET ORAL
Qty: 6 TAB | Refills: 0 | Status: SHIPPED | OUTPATIENT
Start: 2017-08-12 | End: 2017-08-31 | Stop reason: ALTCHOICE

## 2017-08-12 RX ORDER — IBUPROFEN 800 MG/1
800 TABLET ORAL EVERY 8 HOURS
Qty: 15 TAB | Refills: 0 | Status: SHIPPED | OUTPATIENT
Start: 2017-08-12 | End: 2017-08-17

## 2017-08-12 RX ORDER — CYCLOBENZAPRINE HCL 5 MG
10 TABLET ORAL 3 TIMES DAILY
Qty: 18 TAB | Refills: 0 | Status: SHIPPED | OUTPATIENT
Start: 2017-08-12 | End: 2017-08-15

## 2017-08-12 RX ORDER — KETOROLAC TROMETHAMINE 30 MG/ML
30 INJECTION, SOLUTION INTRAMUSCULAR; INTRAVENOUS
Status: COMPLETED | OUTPATIENT
Start: 2017-08-12 | End: 2017-08-12

## 2017-08-12 RX ADMIN — KETOROLAC TROMETHAMINE 30 MG: 30 INJECTION, SOLUTION INTRAMUSCULAR at 20:54

## 2017-08-13 NOTE — DISCHARGE INSTRUCTIONS
SPECIFIC PATIENT INSTRUCTIONS FROM THE EMERGENCY PHYSICIAN WHO TREATED YOU TODAY:  1. Ibuprofen and flexeril as prescribed until finished. 2. Norco for pain not controlled with the ibuprofen and flexeril. 3. Follow up with your primary doctor or your cardiologist (if you have one) in the next 2-4 days for reevaluation. Chest Pain: Care Instructions  Your Care Instructions  There are many things that can cause chest pain. Some are not serious and will get better on their own in a few days. But some kinds of chest pain need more testing and treatment. Your doctor may have recommended a follow-up visit in the next 8 to 12 hours. If you are not getting better, you may need more tests or treatment. Even though your doctor has released you, you still need to watch for any problems. The doctor carefully checked you, but sometimes problems can develop later. If you have new symptoms or if your symptoms do not get better, get medical care right away. If you have worse or different chest pain or pressure that lasts more than 5 minutes or you passed out (lost consciousness), call 911 or seek other emergency help right away. A medical visit is only one step in your treatment. Even if you feel better, you still need to do what your doctor recommends, such as going to all suggested follow-up appointments and taking medicines exactly as directed. This will help you recover and help prevent future problems. How can you care for yourself at home? · Rest until you feel better. · Take your medicine exactly as prescribed. Call your doctor if you think you are having a problem with your medicine. · Do not drive after taking a prescription pain medicine. When should you call for help? Call 911 if:  · You passed out (lost consciousness). · You have severe difficulty breathing. · You have symptoms of a heart attack.  These may include:  ¨ Chest pain or pressure, or a strange feeling in your chest.  ¨ Sweating. ¨ Shortness of breath. ¨ Nausea or vomiting. ¨ Pain, pressure, or a strange feeling in your back, neck, jaw, or upper belly or in one or both shoulders or arms. ¨ Lightheadedness or sudden weakness. ¨ A fast or irregular heartbeat. After you call 911, the  may tell you to chew 1 adult-strength or 2 to 4 low-dose aspirin. Wait for an ambulance. Do not try to drive yourself. Call your doctor today if:  · You have any trouble breathing. · Your chest pain gets worse. · You are dizzy or lightheaded, or you feel like you may faint. · You are not getting better as expected. · You are having new or different chest pain. Where can you learn more? Go to http://salvatore-izabela.info/. Enter A120 in the search box to learn more about \"Chest Pain: Care Instructions. \"  Current as of: March 20, 2017  Content Version: 11.3  © 8752-0970 [employer]. Care instructions adapted under license by [employer] (which disclaims liability or warranty for this information). If you have questions about a medical condition or this instruction, always ask your healthcare professional. [address] any warranty or liability for your use of this information. [employer] Activation    Thank you for requesting access to [employer]. Please follow the instructions below to securely access and download your online medical record. [employer] allows you to send messages to your doctor, view your test results, renew your prescriptions, schedule appointments, and more. How Do I Sign Up? 1. In your internet browser, go to [email or web address]. 2. Click on the First Time User? Click Here link in the Sign In box. You will see the New Member Sign Up page. 3. Enter your [employer] Access Code exactly as it appears below. You will not need to use this code after youve completed the sign-up process.  If you do not sign up before the expiration date, you must request a new code. [employer] Access Code: Activation code not generated  Current [employer] Status: Patient Declined (This is the date your [employer] access code will )    4. Enter the last four digits of your Social Security Number (xxxx) and Date of Birth (mm/dd/yyyy) as indicated and click Submit. You will be taken to the next sign-up page. 5. Create a [employer] ID. This will be your [employer] login ID and cannot be changed, so think of one that is secure and easy to remember. 6. Create a [employer] password. You can change your password at any time. 7. Enter your Password Reset Question and Answer. This can be used at a later time if you forget your password. 8. Enter your e-mail address. You will receive e-mail notification when new information is available in [address] 9. Click Sign Up. You can now view and download portions of your medical record. 10. Click the Download Summary menu link to download a portable copy of your medical information. Additional Information    If you have questions, please visit the Frequently Asked Questions section of the [employer] website at [email or web address] Remember, [employer] is NOT to be used for urgent needs. For medical emergencies, dial 911.

## 2017-08-13 NOTE — ED TRIAGE NOTES
Pt c/o chest pain x 1 week. Was seen recently and worked up for the same thing last week. States that \"lump in the middle of my chest is still there. \"

## 2017-08-13 NOTE — ED PROVIDER NOTES
[facility] EMERGENCY DEPT      65 y.o. female with noted past medical history HTN, HLD, GERD, bipolar, and depression, who presents to the emergency department for constant sharp chest pain starting 2 weeks ago. Per pt, there is a \"lump\" over upper middle chest, increasing in size since sx onset. Pain overlays the \"lump\", with radiation to bilateral flank and right side of the body. Pain is exacerbated by pressure, lifting, movement, and deep breathing, and is relieved by sitting up. She denies hx of heart of lung disease or recent long trip. No other complaints. Pt was seen for the same last week. Nursing nurses regarding the HPI and triage nursing notes were reviewed. No current facility-administered medications for this encounter. Current Outpatient Prescriptions   Medication Sig    ibuprofen (MOTRIN) 600 mg tablet Take 1 Tab by mouth every six (6) hours as needed for Pain for up to 20 doses.  ondansetron hcl (ZOFRAN, AS HYDROCHLORIDE,) 4 mg tablet Take 1 Tab by mouth every eight (8) hours as needed for Nausea.  lactulose 10 gram/15 mL (15 mL) soln Take 15 mL by mouth three (3) times daily.  hydrOXYzine pamoate (VISTARIL) 50 mg capsule Take 50 mg by mouth two (2) times a day.  gabapentin (NEURONTIN) 600 mg tablet Take 600 mg by mouth three (3) times daily. Indications: NEUROPATHIC PAIN    ferrous sulfate (FEOSOL) 325 mg (65 mg iron) tablet Take 325 mg by mouth daily.  biotin 10,000 mcg cap Take 1 Cap by mouth.  famotidine (PEPCID) 20 mg tablet Take 20 mg by mouth daily. Indications: HEARTBURN    cholecalciferol (VITAMIN D3) 1,000 unit tablet Take  by mouth daily.  folic acid 273 mcg tablet Take 800 mcg by mouth daily.  calcium citrate-vitamin d3 (CITRACAL+D) 315-200 mg-unit tab Take 1 Tab by mouth two (2) times daily (with meals).  fish oil-dha-epa 1,200-144-216 mg cap Take  by mouth.  pantoprazole (PROTONIX) 20 mg tablet Take 40 mg by mouth daily.     cloNIDine HCl (CATAPRES) 0.1 mg tablet Take  by mouth two (2) times a day.  alendronate (FOSAMAX) 70 mg tablet Take 70 mg by mouth Every Saturday.  ergocalciferol (ERGOCALCIFEROL) 50,000 unit capsule Take 50,000 Units by mouth every seven (7) days.  colesevelam (WELCHOL) 625 mg tablet Take 2 Tabs by mouth two (2) times daily (with meals).  escitalopram oxalate (LEXAPRO) 10 mg tablet Take 1 Tab by mouth daily.  levothyroxine (SYNTHROID) 25 mcg tablet Take 1 Tab by mouth Daily (before breakfast). Indications: HYPOTHYROIDISM (Patient taking differently: Take 50 mcg by mouth Daily (before breakfast). Indications: hypothyroidism)    OLANZapine (ZYPREXA ZYDIS) 10 mg disintegrating tablet Take 1 Tab by mouth nightly.  ascorbic acid (VITAMIN C) 500 mg tablet Take 1,000 mg by mouth daily.  dicyclomine (BENTYL) 20 mg tablet Take 20 mg by mouth three (3) times daily.  cyanocobalamin (VITAMIN B-12) 1,000 mcg Subl 1,000 mcg by SubLINGual route daily. Past Medical History:   Diagnosis Date    Alcohol abuse     Alcoholic hepatitis     Alcoholic pancreatitis     Anemia     Bipolar disorder (HCC)     Dr. Vibha Abarca [facility])    Chronic abdominal pain     Chronic pancreatitis ([facility]     Compression fracture of lumbar vertebra (HCC)     L4, L5     Depression     Dr. Vibha Abarca [facility])    Elevated LFTs     ETOH abuse     Fatty liver     GERD (gastroesophageal reflux disease)     Hyperlipidemia     Hypertension     Hypothyroidism     Lower GI bleeding     Morbid obesity ([facility]     Obstructive sleep apnea     Substance induced mood disorder ([facility]     Vitamin D deficiency        Past Surgical History:   Procedure Laterality Date    BIOPSY LIVER  08/15/2012    Lap Left hepatic liver wedge by Dr. Elvis Stanford; BX Revealed: Hepatic Steatosis, AVM w/ associated Subcapsular Fibrosis.      COLONOSCOPY N/A 1/17/2017    COLON[facility] performed by [facility]ice [facility] at [facility] ENDOSCOPY    DISKECTOMY, ANTERIOR, WITH D  8/1995, 11/1997    HX ABDOMINAL LAPAROSCOPY  12/02/2014    Laparoscopic Gastrostomy w/ Partial Gastrectomy for assistance in placement of Endoscopic Retrograde Cholangiopancreatography & Diagnostic Lap.  HX CARPAL TUNNEL RELEASE      HX CHOLECYSTECTOMY  09/16/2014    Dr. Farooq De Jesus HX COLONOSCOPY  05/12/2012    Colonoscopy by Dr. Nikolas Aldana. Ayah Goes: Bx Revealed Colon Polyps (Tubular Adenoma), Hemorrhoids.  HX GASTRIC BYPASS  08/15/2012    Lap Christian-en-y    HX HEMORRHOIDECTOMY  04/30/2015    Dr. Kenna Campo. [place]    HX HYSTERECTOMY  2006    HX TONSILLECTOMY  1992    REPAIR NONUNION SCAPHOID CARPAL BONE Right 2010    Wrist       Family History   Problem Relation Age of Onset    Cancer Father     Cancer Sister     Obesity Brother        Social History     Social History    Marital status:      Spouse name: N/A    Number of children: N/A    Years of education: N/A     Occupational History    Not on file. Social History Main Topics    Smoking status: Never Smoker    Smokeless tobacco: Never Used    Alcohol use 0.0 oz/week     0 Standard drinks or equivalent per week      Comment: 6 pack of beer a week- 1/2/17 last use    Drug use: No    Sexual activity: Yes     Partners: Male     Birth control/ protection: Condom     Other Topics Concern    Not on file     Social History Narrative       Allergies   Allergen Reactions    Codeine Rash    Lamictal [Lamotrigine] Anaphylaxis    Morphine Anaphylaxis     Per patient, has tolerated dilaudid in the past.    Pcn [Penicillins] Anaphylaxis    Vancomycin Rash    Doxycycline Rash    Percocet [Oxycodone-Acetaminophen] Hives and Itching    Tetracycline Rash    Tomato Hives       Patient's primary care provider (as noted in EPIC):  Jessica Meraz MD    REVIEW OF SYSTEMS:    Constitutional:  Negative for diaphoresis. Eyes:  Negative for diploplia. HENT:  Negative for congestion.     Respiratory: Negative for stridor or shortness of breath. Cardiovascular:  Negative for palpitations. Positive for chest pain. Gastrointestinal:  Negative for diarrhea. Genitourinary:  Negative for flank pain. Musculoskeletal:  Negative for back pain. Skin:  Negative for pallor. Neurological:  Negative for weakness. Psychiatric:  Negative for hallucinations. Visit Vitals    /73    Pulse (!) 105    Temp 97.2 °F (36.2 °C)    Resp 20    Ht 5' 6\" (1.676 m)    Wt 79.4 kg (175 lb)    SpO2 98%    BMI 28.25 kg/m2       PHYSICAL EXAM:    CONSTITUTIONAL:  Alert, in no apparent distress;  well developed;  well nourished. HEAD:  Normocephalic, atraumatic. EYES:  EOMI. Non-icteric sclera. Normal conjunctiva. ENTM:  Nose:  no rhinorrhea. Throat:  no erythema or exudate, mucous membranes moist.  NECK:  No JVD. Supple  RESPIRATORY:  Chest clear, equal breath sounds, good air movement. CARDIOVASCULAR:  Regular rate and rhythm. No murmurs, rubs, or gallops. Chest:  No rash, lesions, bruising. Focal left mid parasternal mild reproducible tenderness to palpation. GI:  Normal bowel sounds, abdomen soft and non-tender. No rebound or guarding. BACK:  Non-tender. UPPER EXT:  Normal inspection. LOWER EXT:  No edema, no calf tenderness. Distal pulses intact. NEURO:  Moves all four extremities, and grossly normal motor exam.  SKIN:  No rashes;  Normal for age. PSYCH:  Alert and normal affect. DIFFERENTIAL DIAGNOSES/ MEDICAL DECISION MAKING:  Chest pain etiologies include acute cardiac events to include possible acute myocardial infarction, acute coronary syndrome, pneumonia, chest wall pain (myofascial/ musculoskeletal etiology), chronic obstructive pulmonary disease (copd), acute asthma exacerbation, congestive heart failure, acute bronchitis, pulmonary embolism, upper respiratory infection, referred abdominal pain, other etiologies, versus combination of the above.     Abnormal lab results from this emergency department encounter:  Labs Reviewed   CBC WITH AUTOMATED DIFF - Abnormal; Notable for the following:        Result Value    RBC 3.99 (*)     HGB 11.6 (*)     HCT 34.1 (*)     PLATELET 490 (*)     MPV 8.1 (*)     ABS. BASOPHILS 0.1 (*)     All other components within normal limits   METABOLIC PANEL, BASIC - Abnormal; Notable for the following:     Sodium 135 (*)     All other components within normal limits   MAGNESIUM   CARDIAC PANEL,(CK, CKMB & TROPONIN)       Lab values for this patient within approximately the last 12 hours:  Recent Results (from the past 12 hour(s))   EKG, 12 LEAD, INITIAL    Collection Time: 08/12/17  8:26 PM   Result Value Ref Range    Ventricular Rate 111 BPM    Atrial Rate 111 BPM    P-R Interval 144 ms    QRS Duration 86 ms    Q-T Interval 340 ms    QTC Calculation (Bezet) 462 ms    Calculated P Axis 55 degrees    Calculated R Axis 10 degrees    Calculated T Axis 56 degrees    Diagnosis       Sinus tachycardia  Possible Left atrial enlargement  Nonspecific T wave abnormality  Abnormal ECG  When compared with ECG of 28-JUL-2017 16:33,  Nonspecific T wave abnormality now evident in Lateral leads     CBC WITH AUTOMATED DIFF    Collection Time: 08/12/17  8:33 PM   Result Value Ref Range    WBC 5.0 4.6 - 13.2 K/uL    RBC 3.99 (L) 4.20 - 5.30 M/uL    HGB 11.6 (L) 12.0 - 16.0 g/dL    HCT 34.1 (L) 35.0 - 45.0 %    MCV 85.5 74.0 - 97.0 FL    MCH 29.1 24.0 - 34.0 PG    MCHC 34.0 31.0 - 37.0 g/dL    RDW 13.7 11.6 - 14.5 %    PLATELET 422 (H) 217 - 420 K/uL    MPV 8.1 (L) 9.2 - 11.8 FL    NEUTROPHILS 62 40 - 73 %    LYMPHOCYTES 32 21 - 52 %    MONOCYTES 5 3 - 10 %    EOSINOPHILS 0 0 - 5 %    BASOPHILS 1 0 - 2 %    ABS. NEUTROPHILS 3.0 1.8 - 8.0 K/UL    ABS. LYMPHOCYTES 1.6 0.9 - 3.6 K/UL    ABS. MONOCYTES 0.3 0.05 - 1.2 K/UL    ABS. EOSINOPHILS 0.0 0.0 - 0.4 K/UL    ABS.  BASOPHILS 0.1 (H) 0.0 - 0.06 K/UL    DF AUTOMATED     METABOLIC PANEL, BASIC    Collection Time: 08/12/17  8:33 PM Result Value Ref Range    Sodium 135 (L) 136 - 145 mmol/L    Potassium 4.7 3.5 - 5.5 mmol/L    Chloride 100 100 - 108 mmol/L    CO2 24 21 - 32 mmol/L    Anion gap 11 3.0 - 18 mmol/L    Glucose 94 74 - 99 mg/dL    BUN 9 7.0 - 18 MG/DL    Creatinine 0.68 0.6 - 1.3 MG/DL    BUN/Creatinine ratio 13 12 - 20      GFR est AA >60 >60 ml/min/1.73m2    GFR est non-AA >60 >60 ml/min/1.73m2    Calcium 9.0 8.5 - 10.1 MG/DL   MAGNESIUM    Collection Time: 08/12/17  8:33 PM   Result Value Ref Range    Magnesium 2.3 1.6 - 2.6 mg/dL   CARDIAC PANEL,(CK, CKMB & TROPONIN)    Collection Time: 08/12/17  8:33 PM   Result Value Ref Range     26 - 192 U/L    CK - MB <1.0 <3.6 ng/ml    CK-MB Index  0.0 - 4.0 %     CALCULATION NOT PERFORMED WHEN RESULT IS BELOW LINEAR LIMIT    Troponin-I, Qt. <0.02 0.0 - 0.045 NG/ML       Radiologist and cardiologist interpretations if available at time of this note:  No results found. Portable (A-P view) CXR:  Preliminary review of x-rays by ED Physician. Interpretation of chest X-ray shows, no infiltrates, no pneumothorax, no CHF, no effusion. Medication(s) ordered for patient during this emergency visit encounter:  Medications   ketorolac (TORADOL) injection 30 mg (30 mg IntraVENous Given 8/12/17 2054)       Initial EKG interpretation by attending emergency physician:  Interpreted by ED Physician:  Cardiac Monitor Strip interpretation is Sinus Tachycardia about 110 bpm, No ST changes noted, NORMAL WIDTH QRS. 12 lead EKG interpreted by ED Physician is Sinus Tachycardia about 110 bpm, non-specific EKG. ED COURSE:  One set of cardiac enzymes was normal.      IMPRESSION AND MEDICAL DECISION MAKING:  Based upon the patients presentation with noted HPI and PE, along with the work up done in the emergency department, I believe that the patient is having non-cardiac chest pain as noted.    Given the time frame of the patients chest pain, an acute cardiac event could be ruled out with one set of normal cardiac enzymes. DIAGNOSIS:  1. Chest pain    SPECIFIC PATIENT INSTRUCTIONS FROM THE EMERGENCY PHYSICIAN WHO TREATED YOU TODAY:  1. Ibuprofen and flexeril as prescribed until finished. 2. Norco for pain not controlled with the ibuprofen and flexeril. 3. Follow up with your primary doctor or your cardiologist (if you have one) in the next 2-4 days for reevaluation. Graeme Hills M.D. Provider Attestation:  If a scribe was utilized in generation of this patient record, I personally performed the services described in the documentation, reviewed the documentation, as recorded by the scribe in my presence, and it accurately records the patient's history of presenting illness, review of systems, patient physical examination, and procedures performed by me as the attending physician. DEMETRI Morales Board Certified Emergency Physician  8/12/2017.  8:47 PM    Scribe Attestation      Braulio Vincent acting as a scribe for and in the presence of Linda Larsen MD  August 12, 2017 at 8:53 PM

## 2017-08-14 LAB
ATRIAL RATE: 111 BPM
CALCULATED P AXIS, ECG09: 55 DEGREES
CALCULATED R AXIS, ECG10: 10 DEGREES
CALCULATED T AXIS, ECG11: 56 DEGREES
DIAGNOSIS, 93000: NORMAL
P-R INTERVAL, ECG05: 144 MS
Q-T INTERVAL, ECG07: 340 MS
QRS DURATION, ECG06: 86 MS
QTC CALCULATION (BEZET), ECG08: 462 MS
VENTRICULAR RATE, ECG03: 111 BPM

## 2017-08-31 ENCOUNTER — OFFICE VISIT (OUTPATIENT)
Dept: SURGERY | Age: 57
End: 2017-08-31

## 2017-08-31 VITALS
TEMPERATURE: 98.9 F | HEART RATE: 90 BPM | BODY MASS INDEX: 29.41 KG/M2 | WEIGHT: 183 LBS | HEIGHT: 66 IN | DIASTOLIC BLOOD PRESSURE: 62 MMHG | RESPIRATION RATE: 20 BRPM | SYSTOLIC BLOOD PRESSURE: 98 MMHG

## 2017-08-31 DIAGNOSIS — K90.89 OTHER SPECIFIED INTESTINAL MALABSORPTION: ICD-10-CM

## 2017-08-31 DIAGNOSIS — E78.5 HYPERLIPIDEMIA, UNSPECIFIED HYPERLIPIDEMIA TYPE: ICD-10-CM

## 2017-08-31 DIAGNOSIS — K56.1 INTUSSUSCEPTION INTESTINE (HCC): Primary | ICD-10-CM

## 2017-08-31 DIAGNOSIS — G47.33 OBSTRUCTIVE SLEEP APNEA: ICD-10-CM

## 2017-08-31 DIAGNOSIS — Z98.84 HISTORY OF ROUX-EN-Y GASTRIC BYPASS: ICD-10-CM

## 2017-08-31 RX ORDER — ERGOCALCIFEROL 1.25 MG/1
50000 CAPSULE ORAL
COMMUNITY
End: 2020-01-01

## 2017-08-31 NOTE — PROGRESS NOTES
Gerry Nichols is a 65 y.o. female who presents today with   Chief Complaint   Patient presents with    Morbid Obesity     Pt s/p LGBP 2012 , Lap Rayne 9/16/2014 with hx of pancreatitis                 1. Have you been to the ER, urgent care clinic since your last visit? Hospitalized since your last visit? No    2. Have you seen or consulted any other health care providers outside of the [address] [place] [address] since your last visit? Include any pap smears or colon screening.  No

## 2017-08-31 NOTE — LETTER
8/31/2017 3:17 PM 
 
Patient:  Jimmy Lyons YOB: 1960 Date of Visit: 8/31/2017 Nafisa Smith MD 
[address] [facility] [address] [id number] VIA Facsimile: [phone] Dear Nafisa Smith MD, Thank you for referring Ms. Luiz Young to [place] [address] for evaluation and treatment. Below are the relevant portions of my assessment and plan of care. Subjective: Jimmy Lyons is a 65 y.o. female is now 5 years status post laparoscopic gastric bypass surgery and 3 years s/p lap robinson with ERCP by me for chronic pancreatitis. At that time she was drinking heavily. She was lost to follow-up. She fell last year and has a large mass on her right hip. She also has been having diarrhea for the last 3 weeks and went to ER 4 days ago when she could not tolerate the pain. At that time she was found by CT to have a non-obstructing SB intussusception. She was instructed to follow-up with me. She is still having diarrhea and is having occasional periumbilical pain. She also has chronic pancreatitis. She is states that she will not eat for 4 days and then binge which causes the diarrhea and then the pain. She thinks this is because her bipolar is no longer well controlled. She has recently started drinking again after 64 days dry. Weight Loss Metrics 8/31/2017 8/12/2017 7/28/2017 7/3/2017 6/21/2017 5/18/2017 2/27/2017 Pre op / Initial Wt - - - - - - - Today's Wt 183 lb 175 lb 180 lb 193 lb 3.2 oz 163 lb 210 lb 197 lb BMI 29.54 kg/m2 28.25 kg/m2 29.05 kg/m2 30.26 kg/m2 26.31 kg/m2 33.89 kg/m2 31.8 kg/m2 Ideal Body Wt - - - - - - - Excess Body Wt - - - - - - -  
Goal Wt - - - - - - - Wt loss to date - - - - - - -  
% Wt Loss - - - - - - -  
80% EBW - - - - - - - Body mass index is 29.54 kg/(m^2). Comorbidities: Hypertension: improved Diabetes: not applicable Obstructive Sleep Apnea: resolved Hyperlipidemia: resolved Stress Urinary Incontinence: not applicable Gastroesophageal Reflux: resolved Weight related arthropathy:not applicable Past Medical History:  
Diagnosis Date  Alcohol abuse  Alcoholic hepatitis  Alcoholic pancreatitis  Anemia  Bipolar disorder ([facility] Dr. Adelia Dickey [facility])  Chronic abdominal pain  Chronic pancreatitis ([facility]  Compression fracture of lumbar vertebra (HCC) L4, L5   
 [facility]  Elevated LFTs  ETOH abuse  Fatty liver  GERD (gastroesophageal reflux disease)  Hyperlipidemia  Hypertension  Hypothyroidism  Lower GI bleeding  Morbid obesity ([facility]  Obstructive sleep apnea  Substance induced mood disorder ([facility]  Vitamin D deficiency Past Surgical History:  
Procedure Laterality Date  BIOPSY LIVER  08/15/2012 Lap Left hepatic liver wedge by Dr. Yolanda Palomino; BX Revealed: Hepatic Steatosis, AVM w/ associated Subcapsular Fibrosis.  COLONOSCOPY N/A 1/17/2017 COLONOSCOPY performed by Angela Gonzales MD at [facility] ENDOSCOPY  DISKECTOMY, ANTERIOR, WITH D  8/1995, 11/1997  HX ABDOMINAL LAPAROSCOPY  12/02/2014 Laparoscopic Gastrostomy w/ Partial Gastrectomy for assistance in placement of Endoscopic Retrograde Cholangiopancreatography & Diagnostic Lap.  HX CARPAL TUNNEL RELEASE  HX CHOLECYSTECTOMY  09/16/2014 Dr. Yolanda Palomino  HX COLONOSCOPY  05/12/2012 Colonoscopy by Dr. Rajinder Barone. Rita Kim: Bx Revealed Colon Polyps (Tubular Adenoma), Hemorrhoids.  HX GASTRIC BYPASS  08/15/2012 Lap Christian-en-y  HX HEMORRHOIDECTOMY  04/30/2015 Dr. Dilan Graham. [place] Sender  HX HYSTERECTOMY  2006 [address] BONE Right 2010 Wrist  
 
 
Current Outpatient Prescriptions Medication Sig Dispense Refill  ergocalciferol (VITAMIN D2) 50,000 unit capsule Take 50,000 Units by mouth.  hydrOXYzine pamoate (VISTARIL) 50 mg capsule Take 50 mg by mouth two (2) times a day.  gabapentin (NEURONTIN) 600 mg tablet Take 600 mg by mouth three (3) times daily. Indications: NEUROPATHIC PAIN    
 ferrous sulfate (FEOSOL) 325 mg (65 mg iron) tablet Take 325 mg by mouth daily.  biotin 10,000 mcg cap Take 1 Cap by mouth.  calcium citrate-vitamin d3 (CITRACAL+D) 315-200 mg-unit tab Take 1 Tab by mouth two (2) times daily (with meals).  pantoprazole (PROTONIX) 20 mg tablet Take 40 mg by mouth daily.  cloNIDine HCl (CATAPRES) 0.1 mg tablet Take  by mouth two (2) times a day.  ergocalciferol (ERGOCALCIFEROL) 50,000 unit capsule Take 50,000 Units by mouth every seven (7) days.  colesevelam (WELCHOL) 625 mg tablet Take 2 Tabs by mouth two (2) times daily (with meals). 120 Tab 0  
 escitalopram oxalate (LEXAPRO) 10 mg tablet Take 1 Tab by mouth daily. 10 Tab 0  
 levothyroxine (SYNTHROID) 25 mcg tablet Take 1 Tab by mouth Daily (before breakfast). Indications: HYPOTHYROIDISM (Patient taking differently: Take 50 mcg by mouth Daily (before breakfast). Indications: hypothyroidism) 30 Tab 0  
 OLANZapine (ZYPREXA ZYDIS) 10 mg disintegrating tablet Take 1 Tab by mouth nightly. 30 Tab 0  
 ascorbic acid (VITAMIN C) 500 mg tablet Take 1,000 mg by mouth daily.  dicyclomine (BENTYL) 20 mg tablet Take 20 mg by mouth three (3) times daily.  cyanocobalamin (VITAMIN B-12) 1,000 mcg Subl 1,000 mcg by SubLINGual route daily. Allergies Allergen Reactions  Codeine Rash  Lamictal [Lamotrigine] Anaphylaxis  Morphine Anaphylaxis Per patient, has tolerated dilaudid in the past.  
 Pcn [Penicillins] Anaphylaxis  Vancomycin Rash  Doxycycline Rash  Percocet [Oxycodone-Acetaminophen] Hives and Itching  Tetracycline Rash  Tomato Hives Objective:  
 
Visit Vitals  BP 98/62 (BP 1 Location: Left arm, BP Patient Position: At rest)  Pulse 90  Temp 98.9 °F (37.2 °C) (Oral)  Resp 20  
 Ht 5' 6\" (1.676 m)  Wt 83 kg (183 lb)  BMI 29.54 kg/m2 General:  alert, cooperative, no distress, appears stated age Chest: no accessory muscle use Cor:   Regular rate and rhythm Abdomen: soft, bowel sounds active, non-tender  
Hip:   15 x 10 cm calcified mass to right hip Labs: elevated LFTs, normal CBC, hyponatremia Assessment:  
 
Non-obstructing intussusception - this is caused by eating indiscretion dumpingIncreased LFTs and hyponatremia - caused by acute beer ingestion in setting of chronic alcoholism. SHE MUST QUIT DRINKING Pain - most likely related to dumping and alcoholic hepatitis. Need to r/o intussusception for pain cause. Plan:  
 
1. QUIT DRINKING 2. See nutrition about eating guidelines 3. Repeat CT - if intusssception resolved, nothing else to do. If present, to OR to correct 4. See plastics about hip resection Thank you very much for your referral of MsMelissa oJseph Stearns. If you have questions, please do not hesitate to call me. I look forward to following Ms. Nathaniel Garcia along with you and will keep you updated as to her progress.   
 
 
 
 
Sincerely, 
 
 
Marisol Romero MD

## 2017-08-31 NOTE — COMMUNICATION BODY
Subjective: Leanna Chi is a 65 y.o. female is now 5 years status post laparoscopic gastric bypass surgery and 3 years s/p lap robinson with ERCP by me for chronic pancreatitis. At that time she was drinking heavily. She was lost to follow-up. She fell last year and has a large mass on her right hip. She also has been having diarrhea for the last 3 weeks and went to ER 4 days ago when she could not tolerate the pain. At that time she was found by CT to have a non-obstructing SB intussusception. She was instructed to follow-up with me. She is still having diarrhea and is having occasional periumbilical pain. She also has chronic pancreatitis. She is states that she will not eat for 4 days and then binge which causes the diarrhea and then the pain. She thinks this is because her bipolar is no longer well controlled. She has recently started drinking again after 64 days dry. Weight Loss Metrics 8/31/2017 8/12/2017 7/28/2017 7/3/2017 6/21/2017 5/18/2017 2/27/2017   Pre op / Initial Wt - - - - - - -   Today's Wt 183 lb 175 lb 180 lb 193 lb 3.2 oz 163 lb 210 lb 197 lb   BMI 29.54 kg/m2 28.25 kg/m2 29.05 kg/m2 30.26 kg/m2 26.31 kg/m2 33.89 kg/m2 31.8 kg/m2   Ideal Body Wt - - - - - - -   Excess Body Wt - - - - - - -   Goal Wt - - - - - - -   Wt loss to date - - - - - - -   % Wt Loss - - - - - - -   80% EBW - - - - - - -       Body mass index is 29.54 kg/(m^2).     Comorbidities:    Hypertension: improved  Diabetes: not applicable  Obstructive Sleep Apnea: resolved  Hyperlipidemia: resolved  Stress Urinary Incontinence: not applicable  Gastroesophageal Reflux: resolved  Weight related arthropathy:not applicable        Past Medical History:   Diagnosis Date    Alcohol abuse     Alcoholic hepatitis     Alcoholic pancreatitis     Anemia     Bipolar disorder ([facility] [address]     Dr. Donnell Esquivel [facility])    Chronic abdominal pain     Chronic pancreatitis ([facility] [address]     Compression fracture of lumbar vertebra (HCC)     L4, L5     Depression     Dr. Thierry Reid [facility])    Elevated LFTs     ETOH abuse     Fatty liver     GERD (gastroesophageal reflux disease)     Hyperlipidemia     Hypertension     Hypothyroidism     Lower GI bleeding     Morbid obesity ([address]     Obstructive sleep apnea     Substance induced mood disorder ([facility] [address]     Vitamin D deficiency        Past Surgical History:   Procedure Laterality Date    BIOPSY LIVER  08/15/2012    Lap Left hepatic liver wedge by Dr. Ganga Pereyra; BX Revealed: Hepatic Steatosis, AVM w/ associated Subcapsular Fibrosis.  COLONOSCOPY N/A 1/17/2017    COLONOSCOPY performed by Edward Kerns MD at [address] [place] [address] DISKECTOMY, ANTERIOR, WITH D  8/1995, 11/1997    HX ABDOMINAL LAPAROSCOPY  12/02/2014    Laparoscopic Gastrostomy w/ Partial Gastrectomy for assistance in placement of Endoscopic Retrograde Cholangiopancreatography & Diagnostic Lap.  HX CARPAL TUNNEL RELEASE      HX CHOLECYSTECTOMY  09/16/2014    Dr. Naveed Navarro HX COLONOSCOPY  05/12/2012    Colonoscopy by Dr. Melia Galicia. Skip Neva: Bx Revealed Colon Polyps (Tubular Adenoma), Hemorrhoids.  HX GASTRIC BYPASS  08/15/2012    Lap Christian-en-y    HX HEMORRHOIDECTOMY  04/30/2015    Dr. Conchis Montes. Jason    HX HYSTERECTOMY  2006    HX TONSILLECTOMY  1992    REPAIR NONUNION SCAPHOID CARPAL BONE Right 2010    Wrist       Current Outpatient Prescriptions   Medication Sig Dispense Refill    ergocalciferol (VITAMIN D2) 50,000 unit capsule Take 50,000 Units by mouth.  hydrOXYzine pamoate (VISTARIL) 50 mg capsule Take 50 mg by mouth two (2) times a day.  gabapentin (NEURONTIN) 600 mg tablet Take 600 mg by mouth three (3) times daily. Indications: NEUROPATHIC PAIN      ferrous sulfate (FEOSOL) 325 mg (65 mg iron) tablet Take 325 mg by mouth daily.  biotin 10,000 mcg cap Take 1 Cap by mouth.       calcium citrate-vitamin d3 (CITRACAL+D) 315-200 mg-unit tab Take 1 Tab by mouth two (2) times daily (with meals).  pantoprazole (PROTONIX) 20 mg tablet Take 40 mg by mouth daily.  cloNIDine HCl (CATAPRES) 0.1 mg tablet Take  by mouth two (2) times a day.  ergocalciferol (ERGOCALCIFEROL) 50,000 unit capsule Take 50,000 Units by mouth every seven (7) days.  colesevelam (WELCHOL) 625 mg tablet Take 2 Tabs by mouth two (2) times daily (with meals). 120 Tab 0    escitalopram oxalate (LEXAPRO) 10 mg tablet Take 1 Tab by mouth daily. 10 Tab 0    levothyroxine (SYNTHROID) 25 mcg tablet Take 1 Tab by mouth Daily (before breakfast). Indications: HYPOTHYROIDISM (Patient taking differently: Take 50 mcg by mouth Daily (before breakfast). Indications: hypothyroidism) 30 Tab 0    OLANZapine (ZYPREXA ZYDIS) 10 mg disintegrating tablet Take 1 Tab by mouth nightly. 30 Tab 0    ascorbic acid (VITAMIN C) 500 mg tablet Take 1,000 mg by mouth daily.  dicyclomine (BENTYL) 20 mg tablet Take 20 mg by mouth three (3) times daily.  cyanocobalamin (VITAMIN B-12) 1,000 mcg Subl 1,000 mcg by SubLINGual route daily.          Allergies   Allergen Reactions    Codeine Rash    Lamictal [Lamotrigine] Anaphylaxis    Morphine Anaphylaxis     Per patient, has tolerated dilaudid in the past.    Pcn [Penicillins] Anaphylaxis    Vancomycin Rash    Doxycycline Rash    Percocet [Oxycodone-Acetaminophen] Hives and Itching    Tetracycline Rash    Tomato Hives         Objective:     Visit Vitals    BP 98/62 (BP 1 Location: Left arm, BP Patient Position: At rest)    Pulse 90    Temp 98.9 °F (37.2 °C) (Oral)    Resp 20    Ht 5' 6\" (1.676 m)    Wt 83 kg (183 lb)    BMI 29.54 kg/m2       General:  alert, cooperative, no distress, appears stated age   Chest: no accessory muscle use   Cor:   Regular rate and rhythm   Abdomen: soft, bowel sounds active, non-tender   Hip:   15 x 10 cm calcified mass to right hip       Labs: elevated LFTs, normal CBC, hyponatremia    Assessment: Non-obstructing intussusception - this is caused by eating indiscretion dumpingIncreased LFTs and hyponatremia - caused by acute beer ingestion in setting of chronic alcoholism. SHE MUST QUIT DRINKING  Pain - most likely related to dumping and alcoholic hepatitis. Need to r/o intussusception for pain cause. Plan:     1. QUIT DRINKING  2. See nutrition about eating guidelines  3. Repeat CT - if intusssception resolved, nothing else to do. If present, to OR to correct  4.  See plastics about hip resection

## 2017-08-31 NOTE — PROGRESS NOTES
Subjective: Rocco Escudero is a 65 y.o. female is now 5 years status post laparoscopic gastric bypass surgery and 3 years s/p lap robinson with ERCP by me for chronic pancreatitis. At that time she was drinking heavily. She was lost to follow-up. She fell last year and has a large mass on her right hip. She also has been having diarrhea for the last 3 weeks and went to ER 4 days ago when she could not tolerate the pain. At that time she was found by CT to have a non-obstructing SB intussusception. She was instructed to follow-up with me. She is still having diarrhea and is having occasional periumbilical pain. She also has chronic pancreatitis. She is states that she will not eat for 4 days and then binge which causes the diarrhea and then the pain. She thinks this is because her bipolar is no longer well controlled. She has recently started drinking again after 64 days dry. Weight Loss Metrics 8/31/2017 8/12/2017 7/28/2017 7/3/2017 6/21/2017 5/18/2017 2/27/2017   Pre op / Initial Wt - - - - - - -   Today's Wt 183 lb 175 lb 180 lb 193 lb 3.2 oz 163 lb 210 lb 197 lb   BMI 29.54 kg/m2 28.25 kg/m2 29.05 kg/m2 30.26 kg/m2 26.31 kg/m2 33.89 kg/m2 31.8 kg/m2   Ideal Body Wt - - - - - - -   Excess Body Wt - - - - - - -   Goal Wt - - - - - - -   Wt loss to date - - - - - - -   % Wt Loss - - - - - - -   80% EBW - - - - - - -       Body mass index is 29.54 kg/(m^2).     Comorbidities:    Hypertension: improved  Diabetes: not applicable  Obstructive Sleep Apnea: resolved  Hyperlipidemia: resolved  Stress Urinary Incontinence: not applicable  Gastroesophageal Reflux: resolved  Weight related arthropathy:not applicable        Past Medical History:   Diagnosis Date    Alcohol abuse     Alcoholic hepatitis     Alcoholic pancreatitis     Anemia     Bipolar disorder ([facility] [address]     Dr. Zachery Chen [facility])    Chronic abdominal pain     Chronic pancreatitis ([facility] [address]     Compression fracture of lumbar vertebra (HCC)     L4, L5     Depression     Dr. Adelia Dickey [facility])    Elevated LFTs     ETOH abuse     Fatty liver     GERD (gastroesophageal reflux disease)     Hyperlipidemia     Hypertension     Hypothyroidism     Lower GI bleeding     Morbid obesity ([address]     Obstructive sleep apnea     Substance induced mood disorder ([facility] [address]     Vitamin D deficiency        Past Surgical History:   Procedure Laterality Date    BIOPSY LIVER  08/15/2012    Lap Left hepatic liver wedge by Dr. Yolanda Palomino; BX Revealed: Hepatic Steatosis, AVM w/ associated Subcapsular Fibrosis.  COLONOSCOPY N/A 1/17/2017    COLONOSCOPY performed by Angela Gonzales MD at [address] [place] [address] DISKECTOMY, ANTERIOR, WITH D  8/1995, 11/1997    HX ABDOMINAL LAPAROSCOPY  12/02/2014    Laparoscopic Gastrostomy w/ Partial Gastrectomy for assistance in placement of Endoscopic Retrograde Cholangiopancreatography & Diagnostic Lap.  HX CARPAL TUNNEL RELEASE      HX CHOLECYSTECTOMY  09/16/2014    Dr. Dana Lynch HX COLONOSCOPY  05/12/2012    Colonoscopy by Dr. Rajinder Barone. Rita Kim: Bx Revealed Colon Polyps (Tubular Adenoma), Hemorrhoids.  HX GASTRIC BYPASS  08/15/2012    Lap Christian-en-y    HX HEMORRHOIDECTOMY  04/30/2015    Dr. Dilan Graham. Jason    HX HYSTERECTOMY  2006    HX TONSILLECTOMY  1992    REPAIR NONUNION SCAPHOID CARPAL BONE Right 2010    Wrist       Current Outpatient Prescriptions   Medication Sig Dispense Refill    ergocalciferol (VITAMIN D2) 50,000 unit capsule Take 50,000 Units by mouth.  hydrOXYzine pamoate (VISTARIL) 50 mg capsule Take 50 mg by mouth two (2) times a day.  gabapentin (NEURONTIN) 600 mg tablet Take 600 mg by mouth three (3) times daily. Indications: NEUROPATHIC PAIN      ferrous sulfate (FEOSOL) 325 mg (65 mg iron) tablet Take 325 mg by mouth daily.  biotin 10,000 mcg cap Take 1 Cap by mouth.       calcium citrate-vitamin d3 (CITRACAL+D) 315-200 mg-unit tab Take 1 Tab by mouth two (2) times daily (with meals).  pantoprazole (PROTONIX) 20 mg tablet Take 40 mg by mouth daily.  cloNIDine HCl (CATAPRES) 0.1 mg tablet Take  by mouth two (2) times a day.  ergocalciferol (ERGOCALCIFEROL) 50,000 unit capsule Take 50,000 Units by mouth every seven (7) days.  colesevelam (WELCHOL) 625 mg tablet Take 2 Tabs by mouth two (2) times daily (with meals). 120 Tab 0    escitalopram oxalate (LEXAPRO) 10 mg tablet Take 1 Tab by mouth daily. 10 Tab 0    levothyroxine (SYNTHROID) 25 mcg tablet Take 1 Tab by mouth Daily (before breakfast). Indications: HYPOTHYROIDISM (Patient taking differently: Take 50 mcg by mouth Daily (before breakfast). Indications: hypothyroidism) 30 Tab 0    OLANZapine (ZYPREXA ZYDIS) 10 mg disintegrating tablet Take 1 Tab by mouth nightly. 30 Tab 0    ascorbic acid (VITAMIN C) 500 mg tablet Take 1,000 mg by mouth daily.  dicyclomine (BENTYL) 20 mg tablet Take 20 mg by mouth three (3) times daily.  cyanocobalamin (VITAMIN B-12) 1,000 mcg Subl 1,000 mcg by SubLINGual route daily.          Allergies   Allergen Reactions    Codeine Rash    Lamictal [Lamotrigine] Anaphylaxis    Morphine Anaphylaxis     Per patient, has tolerated dilaudid in the past.    Pcn [Penicillins] Anaphylaxis    Vancomycin Rash    Doxycycline Rash    Percocet [Oxycodone-Acetaminophen] Hives and Itching    Tetracycline Rash    Tomato Hives         Objective:     Visit Vitals    BP 98/62 (BP 1 Location: Left arm, BP Patient Position: At rest)    Pulse 90    Temp 98.9 °F (37.2 °C) (Oral)    Resp 20    Ht 5' 6\" (1.676 m)    Wt 83 kg (183 lb)    BMI 29.54 kg/m2       General:  alert, cooperative, no distress, appears stated age   Chest: no accessory muscle use   Cor:   Regular rate and rhythm   Abdomen: soft, bowel sounds active, non-tender   Hip:   15 x 10 cm calcified mass to right hip       Labs: elevated LFTs, normal CBC, hyponatremia    Assessment: Non-obstructing intussusception - this is caused by eating indiscretion dumpingIncreased LFTs and hyponatremia - caused by acute beer ingestion in setting of chronic alcoholism. SHE MUST QUIT DRINKING  Pain - most likely related to dumping and alcoholic hepatitis. Need to r/o intussusception for pain cause. Plan:     1. QUIT DRINKING  2. See nutrition about eating guidelines  3. Repeat CT - if intusssception resolved, nothing else to do. If present, to OR to correct  4.  See plastics about hip resection

## 2017-08-31 NOTE — PATIENT INSTRUCTIONS
If you have any questions or concerns about today's appointment, the verbal and/or written instructions you were given for follow up care, please call our office at [phone].     [facility] [address]    [phone] office  [phone]

## 2017-09-05 ENCOUNTER — APPOINTMENT (OUTPATIENT)
Dept: CT IMAGING | Age: 57
DRG: 439 | End: 2017-09-05
Attending: EMERGENCY MEDICINE
Payer: MEDICARE

## 2017-09-05 ENCOUNTER — APPOINTMENT (OUTPATIENT)
Dept: GENERAL RADIOLOGY | Age: 57
DRG: 439 | End: 2017-09-05
Attending: EMERGENCY MEDICINE
Payer: MEDICARE

## 2017-09-05 ENCOUNTER — HOSPITAL ENCOUNTER (INPATIENT)
Age: 57
LOS: 3 days | Discharge: HOME | DRG: 439 | End: 2017-09-08
Attending: EMERGENCY MEDICINE | Admitting: INTERNAL MEDICINE
Payer: MEDICARE

## 2017-09-05 DIAGNOSIS — R10.84 ABDOMINAL PAIN, GENERALIZED: Primary | ICD-10-CM

## 2017-09-05 DIAGNOSIS — R79.89 ELEVATED LFTS: ICD-10-CM

## 2017-09-05 DIAGNOSIS — K85.20 ALCOHOL-INDUCED ACUTE PANCREATITIS, UNSPECIFIED COMPLICATION STATUS: ICD-10-CM

## 2017-09-05 LAB
ALBUMIN SERPL-MCNC: 3.6 G/DL (ref 3.4–5)
ALBUMIN/GLOB SERPL: 1.1 {RATIO} (ref 0.8–1.7)
ALP SERPL-CCNC: 347 U/L (ref 45–117)
ALT SERPL-CCNC: 399 U/L (ref 13–56)
ANION GAP SERPL CALC-SCNC: 10 MMOL/L (ref 3–18)
APPEARANCE UR: CLEAR
AST SERPL-CCNC: 1298 U/L (ref 15–37)
BACTERIA URNS QL MICRO: ABNORMAL /HPF
BASOPHILS # BLD: 0 K/UL (ref 0–0.06)
BASOPHILS NFR BLD: 1 % (ref 0–2)
BILIRUB SERPL-MCNC: 1.5 MG/DL (ref 0.2–1)
BILIRUB UR QL: NEGATIVE
BUN SERPL-MCNC: 16 MG/DL (ref 7–18)
BUN/CREAT SERPL: 20 (ref 12–20)
CALCIUM SERPL-MCNC: 8.6 MG/DL (ref 8.5–10.1)
CHLORIDE SERPL-SCNC: 94 MMOL/L (ref 100–108)
CO2 SERPL-SCNC: 24 MMOL/L (ref 21–32)
COLOR UR: YELLOW
CREAT SERPL-MCNC: 0.8 MG/DL (ref 0.6–1.3)
DIFFERENTIAL METHOD BLD: ABNORMAL
EOSINOPHIL # BLD: 0 K/UL (ref 0–0.4)
EOSINOPHIL NFR BLD: 1 % (ref 0–5)
EPITH CASTS URNS QL MICRO: ABNORMAL /LPF (ref 0–5)
ERYTHROCYTE [DISTWIDTH] IN BLOOD BY AUTOMATED COUNT: 14.8 % (ref 11.6–14.5)
EST. AVERAGE GLUCOSE BLD GHB EST-MCNC: 120 MG/DL
ETHANOL SERPL-MCNC: 97 MG/DL (ref 0–3)
GLOBULIN SER CALC-MCNC: 3.4 G/DL (ref 2–4)
GLUCOSE BLD STRIP.AUTO-MCNC: 107 MG/DL (ref 70–110)
GLUCOSE SERPL-MCNC: 173 MG/DL (ref 74–99)
GLUCOSE UR STRIP.AUTO-MCNC: 250 MG/DL
HBA1C MFR BLD: 5.8 % (ref 4.2–5.6)
HCT VFR BLD AUTO: 30.8 % (ref 35–45)
HGB BLD-MCNC: 10.5 G/DL (ref 12–16)
HGB UR QL STRIP: ABNORMAL
HYALINE CASTS URNS QL MICRO: ABNORMAL /LPF (ref 0–2)
KETONES UR QL STRIP.AUTO: NEGATIVE MG/DL
LEUKOCYTE ESTERASE UR QL STRIP.AUTO: ABNORMAL
LIPASE SERPL-CCNC: 1165 U/L (ref 73–393)
LYMPHOCYTES # BLD: 0.7 K/UL (ref 0.9–3.6)
LYMPHOCYTES NFR BLD: 19 % (ref 21–52)
MAGNESIUM SERPL-MCNC: 2.2 MG/DL (ref 1.6–2.6)
MCH RBC QN AUTO: 29.2 PG (ref 24–34)
MCHC RBC AUTO-ENTMCNC: 34.1 G/DL (ref 31–37)
MCV RBC AUTO: 85.8 FL (ref 74–97)
MONOCYTES # BLD: 0.7 K/UL (ref 0.05–1.2)
MONOCYTES NFR BLD: 20 % (ref 3–10)
NEUTS SEG # BLD: 2 K/UL (ref 1.8–8)
NEUTS SEG NFR BLD: 59 % (ref 40–73)
NITRITE UR QL STRIP.AUTO: NEGATIVE
PH UR STRIP: 5 [PH] (ref 5–8)
PLATELET # BLD AUTO: 208 K/UL (ref 135–420)
PMV BLD AUTO: 9.1 FL (ref 9.2–11.8)
POTASSIUM SERPL-SCNC: 3.5 MMOL/L (ref 3.5–5.5)
PROT SERPL-MCNC: 7 G/DL (ref 6.4–8.2)
PROT UR STRIP-MCNC: NEGATIVE MG/DL
RBC # BLD AUTO: 3.59 M/UL (ref 4.2–5.3)
RBC #/AREA URNS HPF: ABNORMAL /HPF (ref 0–5)
SODIUM SERPL-SCNC: 128 MMOL/L (ref 136–145)
SP GR UR REFRACTOMETRY: 1.01 (ref 1–1.03)
UROBILINOGEN UR QL STRIP.AUTO: 0.2 EU/DL (ref 0.2–1)
WBC # BLD AUTO: 3.4 K/UL (ref 4.6–13.2)
WBC URNS QL MICRO: ABNORMAL /HPF (ref 0–4)

## 2017-09-05 PROCEDURE — 96361 HYDRATE IV INFUSION ADD-ON: CPT

## 2017-09-05 PROCEDURE — 80053 COMPREHEN METABOLIC PANEL: CPT | Performed by: EMERGENCY MEDICINE

## 2017-09-05 PROCEDURE — 65270000029 HC RM PRIVATE

## 2017-09-05 PROCEDURE — 81001 URINALYSIS AUTO W/SCOPE: CPT | Performed by: EMERGENCY MEDICINE

## 2017-09-05 PROCEDURE — 83690 ASSAY OF LIPASE: CPT | Performed by: EMERGENCY MEDICINE

## 2017-09-05 PROCEDURE — 82962 GLUCOSE BLOOD TEST: CPT

## 2017-09-05 PROCEDURE — 99284 EMERGENCY DEPT VISIT MOD MDM: CPT

## 2017-09-05 PROCEDURE — 85025 COMPLETE CBC W/AUTO DIFF WBC: CPT | Performed by: EMERGENCY MEDICINE

## 2017-09-05 PROCEDURE — 74011000250 HC RX REV CODE- 250: Performed by: INTERNAL MEDICINE

## 2017-09-05 PROCEDURE — 96374 THER/PROPH/DIAG INJ IV PUSH: CPT

## 2017-09-05 PROCEDURE — 71010 XR CHEST PORT: CPT

## 2017-09-05 PROCEDURE — 74011250636 HC RX REV CODE- 250/636: Performed by: INTERNAL MEDICINE

## 2017-09-05 PROCEDURE — 74011000250 HC RX REV CODE- 250: Performed by: EMERGENCY MEDICINE

## 2017-09-05 PROCEDURE — 96375 TX/PRO/DX INJ NEW DRUG ADDON: CPT

## 2017-09-05 PROCEDURE — 74177 CT ABD & PELVIS W/CONTRAST: CPT

## 2017-09-05 PROCEDURE — 80307 DRUG TEST PRSMV CHEM ANLYZR: CPT | Performed by: EMERGENCY MEDICINE

## 2017-09-05 PROCEDURE — 74011250636 HC RX REV CODE- 250/636: Performed by: EMERGENCY MEDICINE

## 2017-09-05 PROCEDURE — 83735 ASSAY OF MAGNESIUM: CPT | Performed by: EMERGENCY MEDICINE

## 2017-09-05 PROCEDURE — 74011636320 HC RX REV CODE- 636/320: Performed by: EMERGENCY MEDICINE

## 2017-09-05 PROCEDURE — 96376 TX/PRO/DX INJ SAME DRUG ADON: CPT

## 2017-09-05 PROCEDURE — 83036 HEMOGLOBIN GLYCOSYLATED A1C: CPT | Performed by: INTERNAL MEDICINE

## 2017-09-05 RX ORDER — SODIUM CHLORIDE 0.9 % (FLUSH) 0.9 %
5-10 SYRINGE (ML) INJECTION EVERY 8 HOURS
Status: DISCONTINUED | OUTPATIENT
Start: 2017-09-05 | End: 2017-09-08 | Stop reason: HOSPADM

## 2017-09-05 RX ORDER — HEPARIN SODIUM 5000 [USP'U]/ML
5000 INJECTION, SOLUTION INTRAVENOUS; SUBCUTANEOUS EVERY 8 HOURS
Status: DISCONTINUED | OUTPATIENT
Start: 2017-09-05 | End: 2017-09-08 | Stop reason: HOSPADM

## 2017-09-05 RX ORDER — MAGNESIUM SULFATE 100 %
4 CRYSTALS MISCELLANEOUS AS NEEDED
Status: DISCONTINUED | OUTPATIENT
Start: 2017-09-05 | End: 2017-09-08 | Stop reason: HOSPADM

## 2017-09-05 RX ORDER — LORAZEPAM 2 MG/ML
1 INJECTION INTRAMUSCULAR ONCE
Status: COMPLETED | OUTPATIENT
Start: 2017-09-05 | End: 2017-09-05

## 2017-09-05 RX ORDER — LORAZEPAM 2 MG/ML
2 INJECTION INTRAMUSCULAR
Status: DISCONTINUED | OUTPATIENT
Start: 2017-09-05 | End: 2017-09-08 | Stop reason: HOSPADM

## 2017-09-05 RX ORDER — HYDROMORPHONE HYDROCHLORIDE 1 MG/ML
1 INJECTION, SOLUTION INTRAMUSCULAR; INTRAVENOUS; SUBCUTANEOUS
Status: DISCONTINUED | OUTPATIENT
Start: 2017-09-05 | End: 2017-09-08 | Stop reason: HOSPADM

## 2017-09-05 RX ORDER — SODIUM CHLORIDE 0.9 % (FLUSH) 0.9 %
5-10 SYRINGE (ML) INJECTION AS NEEDED
Status: DISCONTINUED | OUTPATIENT
Start: 2017-09-05 | End: 2017-09-08 | Stop reason: HOSPADM

## 2017-09-05 RX ORDER — LORAZEPAM 1 MG/1
1 TABLET ORAL
Status: DISCONTINUED | OUTPATIENT
Start: 2017-09-05 | End: 2017-09-08 | Stop reason: HOSPADM

## 2017-09-05 RX ORDER — DEXTROSE 50 % IN WATER (D50W) INTRAVENOUS SYRINGE
25-50 AS NEEDED
Status: DISCONTINUED | OUTPATIENT
Start: 2017-09-05 | End: 2017-09-08 | Stop reason: HOSPADM

## 2017-09-05 RX ORDER — INSULIN LISPRO 100 [IU]/ML
INJECTION, SOLUTION INTRAVENOUS; SUBCUTANEOUS
Status: DISCONTINUED | OUTPATIENT
Start: 2017-09-05 | End: 2017-09-08 | Stop reason: HOSPADM

## 2017-09-05 RX ORDER — LORAZEPAM 2 MG/ML
1 INJECTION INTRAMUSCULAR
Status: DISCONTINUED | OUTPATIENT
Start: 2017-09-05 | End: 2017-09-08 | Stop reason: HOSPADM

## 2017-09-05 RX ORDER — HYDROMORPHONE HYDROCHLORIDE 1 MG/ML
0.5 INJECTION, SOLUTION INTRAMUSCULAR; INTRAVENOUS; SUBCUTANEOUS ONCE
Status: COMPLETED | OUTPATIENT
Start: 2017-09-05 | End: 2017-09-05

## 2017-09-05 RX ORDER — LORAZEPAM 1 MG/1
2 TABLET ORAL
Status: DISCONTINUED | OUTPATIENT
Start: 2017-09-05 | End: 2017-09-08 | Stop reason: HOSPADM

## 2017-09-05 RX ORDER — SODIUM CHLORIDE 9 MG/ML
100 INJECTION, SOLUTION INTRAVENOUS CONTINUOUS
Status: DISCONTINUED | OUTPATIENT
Start: 2017-09-05 | End: 2017-09-08 | Stop reason: HOSPADM

## 2017-09-05 RX ORDER — LORAZEPAM 2 MG/ML
3 INJECTION INTRAMUSCULAR
Status: DISCONTINUED | OUTPATIENT
Start: 2017-09-05 | End: 2017-09-08 | Stop reason: HOSPADM

## 2017-09-05 RX ORDER — ONDANSETRON 2 MG/ML
4 INJECTION INTRAMUSCULAR; INTRAVENOUS
Status: DISCONTINUED | OUTPATIENT
Start: 2017-09-05 | End: 2017-09-08 | Stop reason: HOSPADM

## 2017-09-05 RX ORDER — FAMOTIDINE 10 MG/ML
20 INJECTION INTRAVENOUS ONCE
Status: COMPLETED | OUTPATIENT
Start: 2017-09-05 | End: 2017-09-05

## 2017-09-05 RX ADMIN — IOPAMIDOL 97 ML: 612 INJECTION, SOLUTION INTRAVENOUS at 15:58

## 2017-09-05 RX ADMIN — IOHEXOL 50 ML: 240 INJECTION, SOLUTION INTRATHECAL; INTRAVASCULAR; INTRAVENOUS; ORAL at 14:42

## 2017-09-05 RX ADMIN — HYDROMORPHONE HYDROCHLORIDE 0.5 MG: 1 INJECTION, SOLUTION INTRAMUSCULAR; INTRAVENOUS; SUBCUTANEOUS at 18:30

## 2017-09-05 RX ADMIN — HYDROMORPHONE HYDROCHLORIDE 0.5 MG: 1 INJECTION, SOLUTION INTRAMUSCULAR; INTRAVENOUS; SUBCUTANEOUS at 16:39

## 2017-09-05 RX ADMIN — FAMOTIDINE 20 MG: 10 INJECTION, SOLUTION INTRAVENOUS at 21:31

## 2017-09-05 RX ADMIN — FAMOTIDINE 20 MG: 10 INJECTION, SOLUTION INTRAVENOUS at 11:41

## 2017-09-05 RX ADMIN — LORAZEPAM 1 MG: 2 INJECTION INTRAMUSCULAR; INTRAVENOUS at 18:30

## 2017-09-05 RX ADMIN — HEPARIN SODIUM 5000 UNITS: 5000 INJECTION, SOLUTION INTRAVENOUS; SUBCUTANEOUS at 21:31

## 2017-09-05 RX ADMIN — LORAZEPAM 1 MG: 2 INJECTION INTRAMUSCULAR; INTRAVENOUS at 21:33

## 2017-09-05 RX ADMIN — SODIUM CHLORIDE 1000 ML: 900 INJECTION, SOLUTION INTRAVENOUS at 11:41

## 2017-09-05 RX ADMIN — Medication 10 ML: at 21:33

## 2017-09-05 RX ADMIN — FOLIC ACID: 5 INJECTION, SOLUTION INTRAMUSCULAR; INTRAVENOUS; SUBCUTANEOUS at 18:31

## 2017-09-05 RX ADMIN — SODIUM CHLORIDE 1000 ML: 900 INJECTION, SOLUTION INTRAVENOUS at 18:31

## 2017-09-05 RX ADMIN — HYDROMORPHONE HYDROCHLORIDE 0.5 MG: 1 INJECTION, SOLUTION INTRAMUSCULAR; INTRAVENOUS; SUBCUTANEOUS at 11:58

## 2017-09-05 RX ADMIN — LORAZEPAM 1 MG: 2 INJECTION INTRAMUSCULAR at 11:58

## 2017-09-05 NOTE — IP AVS SNAPSHOT
Freddie Azul 
 
 
 [address] [place] [address] 
[phone] Patient: Carli Moreno MRN: [id number] VMP:8/23/1606 You are allergic to the following Allergen Reactions Codeine Rash Lamictal (Lamotrigine) Anaphylaxis Morphine Anaphylaxis Per patient, has tolerated dilaudid in the past.  
    
 Pcn (Penicillins) Anaphylaxis Vancomycin Rash Doxycycline Rash Percocet (Oxycodone-Acetaminophen) Hives Itching Tetracycline Rash Tomato Hives Recent Documentation Height Weight Breastfeeding? BMI OB Status Smoking Status 1.676 m 83.5 kg No 29.7 kg/m2 Hysterectomy Never Smoker Emergency Contacts Name Discharge Info Relation Home Work Mobile [address] CAREGIVER [3] Other Relative [6] [phone] Gena Khan DISCHARGE CAREGIVER [3] Child [2] [phone] Troy Lopez N/A  AT THIS TIME [6] Spouse [3] [phone] About your hospitalization You were admitted on:  September 5, 2017 You last received care in the:  [facility] [address] You were discharged on:  September 8, 2017 Unit phone number:  [phone] Why you were hospitalized Your primary diagnosis was:  Alcohol Induced Acute Pancreatitis Your diagnoses also included:  Acute Hyponatremia, Alcohol Abuse, Alcoholic Hepatitis, Bipolar Affective Disorder, Depressed, Severe (Hcc), Depression, Elevated Lfts, Essential Hypertension, Benign, Fatty Liver, Acute Pancreatitis Providers Seen During Your Hospitalizations Provider Role Specialty Primary office phone Andres Womack MD Attending Provider Emergency Medicine [phone] Graeme Burton MD Attending Provider Internal Medicine [phone] Your Primary Care Physician (PCP) Primary Care Physician Office Phone Office Fax  OTHER, PHYS ** None ** ** None **  
  
 Follow-up Information Follow up With Details Comments Contact Info Sandro Sullivan MD   Patient can only remember the practice name and not the physician Milton Miranda MD Schedule an appointment as soon as possible for a visit in 1 week Your Appointments Monday September 11, 2017 12:45 PM EDT  
CT ABDOMEN PELVIS W CONT with [facility] CT RM 2  
[facility] RAD CT [facility]) [address] ORAL CONTRAST/PREP   Oral Contrast/Prep from radiology  no later than one day prior to study date/time. DIET RESTRICTIONS  Nothing to eat or drink, 4 hours prior to study  May have water to take meds  May drink oral contrast if directed  GENERAL INSTRUCTIONS  If you were given premedications for IV contrast to take prior to having your study, please arrange to have someone drive you to your appointment. If you have had a creatinine level drawn within the past 30 days, please bring most recent results to your appt. MEDICATIONS  Bring a complete list of all medications you are currently taking to include prescriptions, over-the-counter meds, herbals, vitamins & any dietary supplements. RELATED STUDY INFORMATION  Bring any films, CDs, and reports related with you on the day of your exam.  This only includes studies done outside of [address] [place] [address], [employer], [facility], and [facility]. QUESTIONS  Notify the CT Department if you have any questions concerning your study. [facility] - [phone] [facility] - [phone] CHECK IN INSTRUCTIONS  Check in to Patient Access at the [address] 30 minutes prior to your appointment. For appointments after 8pm weekdays and after 3pm on Saturdays, check in to Emergency Room Registration. [facility] [address]  Thursday September 14, 2017  3:15 PM EDT  
 Follow Up with Vianca Oconnor MD  
[address] [facility] [address] [address] [id number] [phone] Current Discharge Medication List  
  
START taking these medications Dose & Instructions Dispensing Information Comments Morning Noon Evening Bedtime  
 ondansetron hcl 4 mg tablet Commonly known as:  ZOFRAN (AS HYDROCHLORIDE) Dose:  4 mg Take 1 Tab by mouth every eight (8) hours as needed for Nausea. Quantity:  10 Tab Refills:  0  
     
   
   
   
  
 oxyCODONE IR 5 mg immediate release tablet Commonly known as:  Nathalie Balk Your next dose is:  today Dose:  10 mg Take 2 Tabs by mouth every four (4) hours as needed for Pain. Max Daily Amount: 60 mg.  
 Quantity:  12 Tab Refills:  0  
     
   
today CONTINUE these medications which have CHANGED Dose & Instructions Dispensing Information Comments Morning Noon Evening Bedtime  
 levothyroxine 25 mcg tablet Commonly known as:  SYNTHROID What changed:  how much to take Your next dose is:  9/9/17 Dose:  25 mcg Take 1 Tab by mouth Daily (before breakfast). Indications: HYPOTHYROIDISM Quantity:  30 Tab Refills:  0  
     
9/9/17 CONTINUE these medications which have NOT CHANGED Dose & Instructions Dispensing Information Comments Morning Noon Evening Bedtime BENTYL 20 mg tablet Generic drug:  dicyclomine Dose:  20 mg Take 20 mg by mouth three (3) times daily. Refills:  0  
     
   
   
   
  
 biotin 10,000 mcg Cap Dose:  1 Cap Take 1 Cap by mouth. Refills:  0  
     
   
   
   
  
 calcium citrate-vitamin d3 315-200 mg-unit Tab Commonly known as:  CITRACAL+D Dose:  1 Tab Take 1 Tab by mouth two (2) times daily (with meals). Refills:  0  
     
   
   
   
  
 cloNIDine HCl 0.1 mg tablet Commonly known as:  CATAPRES Take  by mouth two (2) times a day. Refills:  0  
     
   
   
   
  
 colesevelam 625 mg tablet Commonly known as:  [facility] Dose:  1250 mg Take 2 Tabs by mouth two (2) times daily (with meals). Quantity:  120 Tab Refills:  0  
     
   
   
   
  
 * ergocalciferol 50,000 unit capsule Commonly known as:  ERGOCALCIFEROL Dose:  49981 Units Take 50,000 Units by mouth every seven (7) days. Refills:  0  
     
   
   
   
  
 * VITAMIN D2 50,000 unit capsule Generic drug:  ergocalciferol Dose:  47263 Units Take 50,000 Units by mouth. Refills:  0  
     
   
   
   
  
 escitalopram oxalate 10 mg tablet Commonly known as:  Robert Estrin Dose:  10 mg Take 1 Tab by mouth daily. Quantity:  10 Tab Refills:  0  
     
   
   
   
  
 FEOSOL 325 mg (65 mg iron) tablet Generic drug:  ferrous sulfate Dose:  325 mg Take 325 mg by mouth daily. Refills:  0 NEURONTIN 600 mg tablet Generic drug:  gabapentin Dose:  600 mg Take 600 mg by mouth three (3) times daily. Indications: NEUROPATHIC PAIN Refills:  0  
     
   
   
   
  
 OLANZapine 10 mg disintegrating tablet Commonly known as:  ZyPREXA zydis Dose:  10 mg Take 1 Tab by mouth nightly. Quantity:  30 Tab Refills:  0  
     
   
   
   
  
 pantoprazole 20 mg tablet Commonly known as:  PROTONIX Your next dose is:  9/9/17 Dose:  40 mg Take 40 mg by mouth daily. Refills:  0  
     
9/9/17 VISTARIL 50 mg capsule Generic drug:  hydrOXYzine pamoate Dose:  50 mg Take 50 mg by mouth two (2) times a day. Refills:  0  
     
   
   
   
  
 VITAMIN B-12 1,000 mcg sublingual tablet Generic drug:  cyanocobalamin Dose:  1000 mcg  
1,000 mcg by SubLINGual route daily. Refills:  0  
     
   
   
   
  
 VITAMIN C 500 mg tablet Generic drug:  ascorbic acid (vitamin C) Dose:  1000 mg Take 1,000 mg by mouth daily. Refills:  0  
     
   
   
   
  
 * Notice: This list has 2 medication(s) that are the same as other medications prescribed for you. Read the directions carefully, and ask your doctor or other care provider to review them with you. Where to Get Your Medications Information on where to get these meds will be given to you by the nurse or doctor. ! Ask your nurse or doctor about these medications  
  ondansetron hcl 4 mg tablet  
 oxyCODONE IR 5 mg immediate release tablet Discharge Instructions Acute Alcohol Intoxication: Care Instructions Your Care Instructions You have had treatment to help your body rid itself of alcohol. Too much alcohol upsets the body's fluid balance. Your doctor may have given you fluids and vitamins. For some people, drinking too much alcohol is a one-time event. For others, it is an ongoing problem. In either case, it is serious. It can be life-threatening. Follow-up care is a key part of your treatment and safety. Be sure to make and go to all appointments, and call your doctor if you are having problems. It's also a good idea to know your test results and keep a list of the medicines you take. How can you care for yourself at home? · Be safe with medicines. Take your medicines exactly as prescribed. Call your doctor if you think you are having a problem with your medicine. · Your doctor may have prescribed disulfiram (Antabuse). Do not drink any alcohol while you are taking this medicine. You may have severe or even life-threatening side effects from even small amounts of alcohol. · If you were given medicine to prevent nausea, be sure to take it exactly as prescribed. · Before you take any medicine, tell your doctor if: 
¨ You have had a bad reaction to any medicines in the past. 
¨ You are taking other medicines, including over-the-counter ones, or have other health problems. ¨ You are or could be pregnant. · Be prepared to have some symptoms of withdrawal in the next few days. · Drink plenty of liquids in the next few days. · Seek help if you need it to stop drinking. Getting counseling and joining a support group can help you stay sober. Try a support group such as Alcoholics Anonymous. · Avoid alcohol when you take medicines. It can react with many medicines and cause serious problems. When should you call for help? Call 911 anytime you think you may need emergency care. For example, call if: 
· You feel confused and are seeing things that are not there. · You are thinking about killing yourself or hurting others. · You have a seizure. · You vomit blood or what looks like coffee grounds. Call your doctor now or seek immediate medical care if: 
· You have trembling, restlessness, sweating, and other withdrawal symptoms that are new or that get worse. · Your withdrawal symptoms come back after not bothering you for days or weeks. · You can't stop vomiting. Watch closely for changes in your health, and be sure to contact your doctor if: 
· You need help to stop drinking. Where can you learn more? Go to http://salvatore-izabela.info/. Enter T102 in the search box to learn more about \"Acute Alcohol Intoxication: Care Instructions. \" Current as of: March 20, 2017 Content Version: 11.3 © 0075-5783 [employer]. Care instructions adapted under license by [employer] (which disclaims liability or warranty for this information). If you have questions about a medical condition or this instruction, always ask your healthcare professional. [place] [address] any warranty or liability for your use of this information. Learning About High Blood Pressure What is high blood pressure?  
 
Blood pressure is a measure of how hard the blood pushes against the walls of your arteries. It's normal for blood pressure to go up and down throughout the day, but if it stays up, you have high blood pressure. Another name for high blood pressure is hypertension. Two numbers tell you your blood pressure. The first number is the systolic pressure. It shows how hard the blood pushes when your heart is pumping. The second number is the diastolic pressure. It shows how hard the blood pushes between heartbeats, when your heart is relaxed and filling with blood. A blood pressure of less than 120/80 (say \"120 over 80\") is ideal for an adult. High blood pressure is 140/90 or higher. You have high blood pressure if your top number is 140 or higher or your bottom number is 90 or higher, or both. Many people fall into the category in between, called prehypertension. People with prehypertension need to make lifestyle changes to bring their blood pressure down and help prevent or delay high blood pressure. What happens when you have high blood pressure? · Blood flows through your arteries with too much force. Over time, this damages the walls of your arteries. But you can't feel it. High blood pressure usually doesn't cause symptoms. · Fat and calcium start to build up in your arteries. This buildup is called plaque. Plaque makes your arteries narrower and stiffer. Blood can't flow through them as easily. · This lack of good blood flow starts to damage some of the organs in your body. This can lead to problems such as coronary artery disease and heart attack, heart failure, stroke, kidney failure, and eye damage. How can you prevent high blood pressure? · Stay at a healthy weight. · Try to limit how much sodium you eat to less than 2,300 milligrams (mg) a day. If you limit your sodium to 1,500 mg a day, you can lower your blood pressure even more. ¨ Buy foods that are labeled \"unsalted,\" \"sodium-free,\" or \"low-sodium. \" Foods labeled \"reduced-sodium\" and \"light sodium\" may still have too much sodium. ¨ Flavor your food with garlic, lemon juice, onion, vinegar, herbs, and spices instead of salt. Do not use soy sauce, steak sauce, onion salt, garlic salt, mustard, or ketchup on your food. ¨ Use less salt (or none) when recipes call for it. You can often use half the salt a recipe calls for without losing flavor. · Be physically active. Get at least 30 minutes of exercise on most days of the week. Walking is a good choice. You also may want to do other activities, such as running, swimming, cycling, or playing tennis or team sports. · Limit alcohol to 2 drinks a day for men and 1 drink a day for women. · Eat plenty of fruits, vegetables, and low-fat dairy products. Eat less saturated and total fats. How is high blood pressure treated? · Your doctor will suggest making lifestyle changes. For example, your doctor may ask you to eat healthy foods, quit smoking, lose extra weight, and be more active. · If lifestyle changes don't help enough or your blood pressure is very high, you will have to take medicine every day. Follow-up care is a key part of your treatment and safety. Be sure to make and go to all appointments, and call your doctor if you are having problems. It's also a good idea to know your test results and keep a list of the medicines you take. Where can you learn more? Go to http://salvatore-izabela.info/. Enter P501 in the search box to learn more about \"Learning About High Blood Pressure. \" Current as of: March 23, 2016 Content Version: 11.3 © 7552-5218 [employer]. Care instructions adapted under license by [employer] (which disclaims liability or warranty for this information).  If you have questions about a medical condition or this instruction, always ask your healthcare professional. [address] any warranty or liability for your use of this information. Pancreatitis: Care Instructions Your Care Instructions The pancreas is an organ behind the stomach. It makes hormones and enzymes to help your body digest food. But if these enzymes attack the pancreas, it can get inflamed. This is called pancreatitis. Most cases are caused by gallstones or by heavy alcohol use. If you take care of yourself at home, it will help you get better. It will also help you avoid more problems with your pancreas. Follow-up care is a key part of your treatment and safety. Be sure to make and go to all appointments, and call your doctor if you are having problems. It's also a good idea to know your test results and keep a list of the medicines you take. How can you care for yourself at home? · Drink clear liquids and eat bland foods until you feel better. Wirt foods include rice, dry toast, and crackers. They also include bananas and applesauce. · Eat a low-fat diet until your doctor says your pancreas is healed. · Do not drink alcohol. Tell your doctor if you need help to quit. Counseling, support groups, and sometimes medicines can help you stay sober. · Be safe with medicines. Read and follow all instructions on the label. ¨ If the doctor gave you a prescription medicine for pain, take it as prescribed. ¨ If you are not taking a prescription pain medicine, ask your doctor if you can take an over-the-counter medicine. · If your doctor prescribed antibiotics, take them as directed. Do not stop taking them just because you feel better. You need to take the full course of antibiotics. · Get extra rest until you feel better. To prevent future problems with your pancreas · Do not drink alcohol. · Tell your doctors and pharmacist that you've had pancreatitis. They can help you avoid medicines that may cause this problem again. When should you call for help? Call your doctor now or seek immediate medical care if: 
· You have new or severe belly pain. · You have a new or higher fever. · You can't keep fluid or medicines down. Watch closely for changes in your health, and be sure to contact your doctor if: · The symptoms you had when you first started feeling sick come back. · You do not get better as expected. · You need help to stop drinking alcohol. Where can you learn more? Go to http://salvatore-izabela.info/. Enter G523 in the search box to learn more about \"Pancreatitis: Care Instructions. \" Current as of: August 9, 2016 Content Version: 11.3 © 9850-8524 [employer] Care instructions adapted under license by [employer] (which disclaims liability or warranty for this information). If you have questions about a medical condition or this instruction, always ask your healthcare professional. [address] any warranty or liability for your use of this information. Patient armband removed and shredded DISCHARGE SUMMARY from Nurse The following personal items are in your possession at time of discharge: 
 
  
Visual Aid: None PATIENT INSTRUCTIONS: 
 
 
F-face looks uneven A-arms unable to move or move unevenly S-speech slurred or non-existent T-time-call 911 as soon as signs and symptoms begin-DO NOT go Back to bed or wait to see if you get better-TIME IS BRAIN. Warning Signs of HEART ATTACK Call 911 if you have these symptoms: 
? Chest discomfort. Most heart attacks involve discomfort in the center of the chest that lasts more than a few minutes, or that goes away and comes back. It can feel like uncomfortable pressure, squeezing, fullness, or pain. ? Discomfort in other areas of the upper body. Symptoms can include pain or discomfort in one or both arms, the back, neck, jaw, or stomach. ? Shortness of breath with or without chest discomfort. ? Other signs may include breaking out in a cold sweat, nausea, or lightheadedness. Don't wait more than five minutes to call [address]! Fast action can save your life. Calling 911 is almost always the fastest way to get lifesaving treatment. Emergency Medical Services staff can begin treatment when they arrive  up to an hour sooner than if someone gets to the hospital by car. The discharge information has been reviewed with the patient. The patient verbalized understanding. Discharge medications reviewed with the patient and appropriate educational materials and side effects teaching were provided. Discharge Instructions Attachments/References ONDANSETRON (BY MOUTH, INTO THE MOUTH) (ENGLISH) OXYCODONE, RAPID RELEASE (BY MOUTH) (ENGLISH) Discharge Orders None [facility] Announcement We are excited to announce that we are making your provider's discharge notes available to you in [employer]. You will see these notes when they are completed and signed by the physician that discharged you from your recent hospital stay. If you have any questions or concerns about any information you see in [facility], please call the Health Information Department where you were seen or reach out to your Primary Care Provider for more information about your plan of care. General Information Please provide this summary of care documentation to your next provider. Patient Signature:  ____________________________________________________________ Date:  ____________________________________________________________  
  
Larri Hazard Provider Signature:  ____________________________________________________________ Date:  ____________________________________________________________ More Information Ondansetron (By mouth, Into the mouth) Ondansetron (on-DAN-se-darwin) Prevents nausea and vomiting. Brand Name(s): Zofran, Zofran ODT, Sandralee Prophet There may be other brand names for this medicine. When This Medicine Should Not Be Used: This medicine is not right for everyone. Do not use it if you had an allergic reaction to ondansetron. How to Use This Medicine: Thin Sheet, Liquid, Tablet, Dissolving Tablet · Your doctor will tell you how much medicine to use. Do not use more than directed. · Measure the oral liquid medicine with a marked measuring spoon, oral syringe, or medicine cup. · To use the disintegrating tablet:  
¨ Do not open the blister pack that contains the tablet until you are ready to take it. ¨ Make sure your hands are dry. Peel back the foil, then remove the tablet from the blister pack. Do not push the tablet through the foil. ¨ Place the tablet on top of your tongue where it will dissolve in seconds. After the tablet has melted, swallow or take a sip of water. · To use the soluble film: ¨ Make sure your hands are clean and dry. ¨ Fold the pouch along the dotted line. ¨ While still folded, tear the pouch carefully along the edge. Remove the film from the pouch. ¨ Place the film on top of your tongue. It will dissolve in 4 to 20 seconds. Do not chew or swallow the film whole. ¨ After the film has dissolved, you may swallow with or without water. · Read and follow the patient instructions that come with this medicine. Talk to your doctor or pharmacist if you have any questions. · Missed dose: Take a dose as soon as you remember. If it is almost time for your next dose, wait until then and take a regular dose. Do not take extra medicine to make up for a missed dose.  
· Store the medicine in a closed container at room temperature, away from heat, moisture, and direct light. Keep the soluble film in the foil pouch until you ready to use it. Drugs and Foods to Avoid: Ask your doctor or pharmacist before using any other medicine, including over-the-counter medicines, vitamins, and herbal products. · Do not use this medicine together with apomorphine. · Some medicines may affect how ondansetron works. Tell your doctor if you are using tramadol, diuretics (water pills), or any other medicine for nausea and vomiting. Warnings While Using This Medicine: · Tell your doctor if you are pregnant or breastfeeding, or if you have liver disease, congestive heart failure, heart rhythm problems (such as prolonged QT interval, slow heartbeat), low magnesium or potassium levels, stomach or bowel problems, or phenylketonuria (PKU). · This medicine may cause heart rhythm problems (such as QT prolongation). · This medicine may make you dizzy. Do not drive or do anything else that could be dangerous until you know how this medicine affects you. · Keep all medicine out of the reach of children. Never share your medicine with anyone. Possible Side Effects While Using This Medicine:  
Call your doctor right away if you notice any of these side effects: · Allergic reaction: Itching or hives, swelling in your face or hands, swelling or tingling in your mouth or throat, chest tightness, trouble breathing · Fainting, dizziness, or lightheadedness · Fast, pounding, or uneven heartbeat · Trouble breathing If you notice these less serious side effects, talk with your doctor: · Constipation or diarrhea 
· Headache · Tiredness or weakness If you notice other side effects that you think are caused by this medicine, tell your doctor. Call your doctor for medical advice about side effects. You may report side effects to FDA at 9-838-FDA-0662 © 2017 [address] Information is for End User's use only and may not be sold, redistributed or otherwise used for commercial purposes. The above information is an  only. It is not intended as medical advice for individual conditions or treatments. Talk to your doctor, nurse or pharmacist before following any medical regimen to see if it is safe and effective for you. Oxycodone, Rapid Release (By mouth) Oxycodone Hydrochloride (ez-s-BMD-done tom-droe-KLOR-sae) Treats moderate to severe pain. This medicine is a narcotic pain reliever. Brand Name(s): Oxaydo, Oxy IR, Roxicodone There may be other brand names for this medicine. When This Medicine Should Not Be Used: This medicine is not right for everyone. Do not use it if you had an allergic reaction to oxycodone, codeine, hydrocodone, dihydrocodeine, or morphine, or you have a stomach or bowel blockage. How to Use This Medicine:  
Capsule, Liquid, Tablet · Take your medicine as directed. Your dose may need to be changed several times to find what works best for you. · An overdose can be dangerous. Follow directions carefully so you do not get too much medicine at one time. · Oral liquid: Measure the oral liquid medicine with a marked measuring spoon, oral syringe, or medicine cup. · Oxaydo® tablet: Swallow it whole with enough water to swallow it completely. Do not break, crush, chew, or dissolve it. Do not wet the tablet before you put it in your mouth. · This medicine should come with a Medication Guide. Ask your pharmacist for a copy if you do not have one. · Missed dose: Take a dose as soon as you remember. If it is almost time for your next dose, wait until then and take a regular dose. Do not take extra medicine to make up for a missed dose. · Store the medicine in a closed container at room temperature, away from heat, moisture, and direct light. Store the medicine in a secure place to prevent others from getting it.  Ask your pharmacist about the best way to dispose of medicine you do not use. Drugs and Foods to Avoid: Ask your doctor or pharmacist before using any other medicine, including over-the-counter medicines, vitamins, and herbal products. · Do not use this medicine if you are using or have used an MAO inhibitor within the past 14 days. · Some medicines can affect how oxycodone works. Tell your doctor if you are using any of the following: ¨ Amiodarone, carbamazepine, erythromycin, ketoconazole, phenytoin, quinidine, rifampin, ritonavir ¨ Diuretic (water pill) ¨ Medicine to treat depression or anxiety ¨ Medicine to treat migraine headaches ¨ Phenothiazine medicine · Tell your doctor if you use anything else that makes you sleepy. Some examples are allergy medicine, narcotic pain medicine, and alcohol. Tell your doctor if you are using buprenorphine, butorphanol, nalbuphine, pentazocine, or a muscle relaxer. · Do not drink alcohol while you are using this medicine. Warnings While Using This Medicine: · Tell your doctor if you are pregnant or breastfeeding, or if you have kidney disease, liver disease, heart disease, low blood pressure, lung disease or breathing problems (such as asthma, COPD), scoliosis, an enlarged prostate or trouble urinating, an underactive thyroid, Thayer disease, gallbladder or pancreas problems, or digestion problems. Tell your doctor if you have a history of head injury, brain tumor, mental health problems, seizures, or alcohol or drug addiction. · This medicine may cause the following problems: 
¨ High risk of overdose, which can lead to death ¨ Respiratory depression (serious breathing problem that can be life-threatening) ¨ Serotonin syndrome, when used with certain medicines · This medicine may make you dizzy, drowsy, or faint. Do not drive or do anything else that could be dangerous until you know how this medicine affects you. Sit or lie down if you feel dizzy. Stand up carefully. · This medicine can be habit-forming. Do not use more than your prescribed dose. Call your doctor if you think your medicine is not working. · Do not stop using this medicine suddenly. Your doctor will need to slowly decrease your dose before you stop it completely. · This medicine may cause constipation, especially with long-term use. Ask your doctor if you should use a laxative to prevent and treat constipation. Drink plenty of liquids to help avoid constipation. · This medicine could cause infertility. Talk with your doctor before using this medicine if you plan to have children. · Keep all medicine out of the reach of children. Never share your medicine with anyone. Possible Side Effects While Using This Medicine:  
Call your doctor right away if you notice any of these side effects: · Allergic reaction: Itching or hives, swelling in your face or hands, swelling or tingling in your mouth or throat, chest tightness, trouble breathing · Anxiety, restlessness, fast heartbeat, fever, sweating, muscle spasms, twitching, nausea, vomiting, diarrhea, seeing or hearing things that are not there · Blue lips, fingernails, or skin, trouble breathing · Extreme dizziness or weakness, shallow breathing, slow heartbeat, sweating, cold or clammy skin, seizures · Lightheadedness, dizziness, fainting · Severe constipation, stomach pain If you notice these less serious side effects, talk with your doctor: · Mild constipation · Sleepiness, tiredness If you notice other side effects that you think are caused by this medicine, tell your doctor. Call your doctor for medical advice about side effects. You may report side effects to FDA at 0-402-FDA-9287 © 2017 [address] Information is for End User's use only and may not be sold, redistributed or otherwise used for commercial purposes. The above information is an  only.  It is not intended as medical advice for individual conditions or treatments. Talk to your doctor, nurse or pharmacist before following any medical regimen to see if it is safe and effective for you.

## 2017-09-05 NOTE — ED TRIAGE NOTES
\"I been having nausea and vomiting for a few days and nothing is helping. I had a gastric bypass in 2012 and I have chronic pancreatitis. I haven't drank anything in 64 days. \"

## 2017-09-05 NOTE — H&P
Hospitalist Admission Note    NAME:  Flower Ellis   :   1960   MRN:   [id number]     Date/Time:  2017 6:05 PM    Subjective:     CHIEF COMPLAINT:    Chief Complaint   Patient presents with    Vomiting    Abdominal Pain       HISTORY OF PRESENT ILLNESS:     Aj Dougherty is a 65 y.o.  female with h/o alcohol abuse,pancreatitis,came to the emergency room because of abdominal pain,nausea,vomitng. Patient says that she can not keep anything she eats. The pain is sharp and located in the mid-section of the abdomen,radiating to the back. Although she vomited several times,there was not blood noted in the vomitus. Patient has denied any recent alcohol use but after she was told that lab work was positive for [employer] stated that she just took her medications with a glass of beer instead of water. CT abdomen/pelvis in ER c/w acute pancreatitis. Lipase 1165,AST 1298 and . Patient was then admitted for management of acute pancreatitis,alcoholic hepatitis.     Past Medical History:   Diagnosis Date    Alcohol abuse     Alcoholic hepatitis     Alcoholic pancreatitis     Anemia     Bipolar disorder (HCC)     Dr. Amarilys Nichols [facility])    Chronic abdominal pain     Chronic pancreatitis ([facility] [address]     Compression fracture of lumbar vertebra (HCC)     L4, L5     Depression     Dr. Amarilys Nichols ([facility]  [facility])    Elevated LFTs     ETOH abuse     Fatty liver     GERD (gastroesophageal reflux disease)     Hyperlipidemia     Hypertension     Hypothyroidism     Lower GI bleeding     Morbid obesity ([address]     Obstructive sleep apnea     Substance induced mood disorder ([facility] [address]     Vitamin D deficiency         Social History   Substance Use Topics    Smoking status: Never Smoker    Smokeless tobacco: Never Used    Alcohol use 0.0 oz/week     0 Standard drinks or equivalent per week      Comment: 6 pack of beer a week- 17 last use        Family History   Problem Relation Age of Onset    Cancer Father     Cancer Sister     Obesity Brother         Allergies   Allergen Reactions    Codeine Rash    Lamictal [Lamotrigine] Anaphylaxis    Morphine Anaphylaxis     Per patient, has tolerated dilaudid in the past.    Pcn [Penicillins] Anaphylaxis    Vancomycin Rash    Doxycycline Rash    Percocet [Oxycodone-Acetaminophen] Hives and Itching    Tetracycline Rash    Tomato Hives        Prior to Admission medications    Medication Sig Start Date End Date Taking? Authorizing Provider   ergocalciferol (VITAMIN D2) 50,000 unit capsule Take 50,000 Units by mouth. Historical Provider   hydrOXYzine pamoate (VISTARIL) 50 mg capsule Take 50 mg by mouth two (2) times a day. Historical Provider   gabapentin (NEURONTIN) 600 mg tablet Take 600 mg by mouth three (3) times daily. Indications: NEUROPATHIC PAIN    Phys Other, MD   ferrous sulfate (FEOSOL) 325 mg (65 mg iron) tablet Take 325 mg by mouth daily. Phys Other, MD   biotin 10,000 mcg cap Take 1 Cap by mouth. Phys Other, MD   calcium citrate-vitamin d3 (CITRACAL+D) 315-200 mg-unit tab Take 1 Tab by mouth two (2) times daily (with meals). Historical Provider   pantoprazole (PROTONIX) 20 mg tablet Take 40 mg by mouth daily. Historical Provider   cloNIDine HCl (CATAPRES) 0.1 mg tablet Take  by mouth two (2) times a day. Historical Provider   ergocalciferol (ERGOCALCIFEROL) 50,000 unit capsule Take 50,000 Units by mouth every seven (7) days. Historical Provider   [facility]) 625 mg tablet Take 2 Tabs by mouth two (2) times daily (with meals). 2/8/16   Gilma Mccollum DO   escitalopram oxalate (LEXAPRO) 10 mg tablet Take 1 Tab by mouth daily. 2/8/16   Gilma Mccollum DO   levothyroxine (SYNTHROID) 25 mcg tablet Take 1 Tab by mouth Daily (before breakfast). Indications: HYPOTHYROIDISM  Patient taking differently: Take 50 mcg by mouth Daily (before breakfast).  Indications: hypothyroidism 2/8/16   Gilma Mccollum DO OLANZapine (ZYPREXA ZYDIS) 10 mg disintegrating tablet Take 1 Tab by mouth nightly. 2/8/16   Colletta Romans, DO   ascorbic acid (VITAMIN C) 500 mg tablet Take 1,000 mg by mouth daily. Historical Provider   dicyclomine (BENTYL) 20 mg tablet Take 20 mg by mouth three (3) times daily. Sandro Sullivan, MD   cyanocobalamin (VITAMIN B-12) 1,000 mcg Subl 1,000 mcg by SubLINGual route daily. Historical Provider       REVIEW OF SYSTEMS:    Constitutional:  No fever or weight loss  HEENT:  No headache or visual changes  Cardiovascular:  No chest pain, no palpitations. Respiratory:  No coughing, wheezing, or shortness of breath. GI:  Abdominal pain. Nausea and vomiting. No diarrhea  :  No hematuria or dysuria. No frequency, retention, urinary incontinence. Skin:  No rashes or moles  Neuro:  No seizures or syncope  Hematological:  No bruising or bleeding  Endocrine:  No diabetes or thyroid disease  Objective:   VITALS:       Visit Vitals    /71    Pulse 72    Temp 98 °F (36.7 °C)    Resp 13    Ht 5' 6\" (1.676 m)    Wt 81.6 kg (180 lb)    SpO2 94%    BMI 29.05 kg/m2       O2 Device: Room air    PHYSICAL EXAM:   General:    Lying in bed in no acute distress. HEENT:  Pupils equal.  Sclera anicteric. Conjunctiva pink. Mucous membranes                           moist  Neck:  Supple. Trachea midline. No accessory muscle use. No thyromegaly. No jugular venous distention  CV:                  Regular rate and rhythm. Lungs:   Clear to auscultation bilaterally. No Wheezing or Rhonchi. No rales. Normal to percussion  Abdomen:   Diffuse tenderness,bs present,guarding+  Extremities: No cyanosis. No edema. No clubbing  Neurologic: Alert and oriented X 3. Skin:                Warm and dry. No rashes.        LAB DATA REVIEWED:    Recent Results (from the past 24 hour(s))   CBC WITH AUTOMATED DIFF    Collection Time: 09/05/17 11:35 AM   Result Value Ref Range    WBC 3.4 (L) 4.6 - 13.2 K/uL    RBC 3.59 (L) 4.20 - 5.30 M/uL    HGB 10.5 (L) 12.0 - 16.0 g/dL    HCT 30.8 (L) 35.0 - 45.0 %    MCV 85.8 74.0 - 97.0 FL    MCH 29.2 24.0 - 34.0 PG    MCHC 34.1 31.0 - 37.0 g/dL    RDW 14.8 (H) 11.6 - 14.5 %    PLATELET 451 226 - 728 K/uL    MPV 9.1 (L) 9.2 - 11.8 FL    NEUTROPHILS 59 40 - 73 %    LYMPHOCYTES 19 (L) 21 - 52 %    MONOCYTES 20 (H) 3 - 10 %    EOSINOPHILS 1 0 - 5 %    BASOPHILS 1 0 - 2 %    ABS. NEUTROPHILS 2.0 1.8 - 8.0 K/UL    ABS. LYMPHOCYTES 0.7 (L) 0.9 - 3.6 K/UL    ABS. MONOCYTES 0.7 0.05 - 1.2 K/UL    ABS. EOSINOPHILS 0.0 0.0 - 0.4 K/UL    ABS. BASOPHILS 0.0 0.0 - 0.06 K/UL    DF AUTOMATED     METABOLIC PANEL, COMPREHENSIVE    Collection Time: 09/05/17 11:35 AM   Result Value Ref Range    Sodium 128 (L) 136 - 145 mmol/L    Potassium 3.5 3.5 - 5.5 mmol/L    Chloride 94 (L) 100 - 108 mmol/L    CO2 24 21 - 32 mmol/L    Anion gap 10 3.0 - 18 mmol/L    Glucose 173 (H) 74 - 99 mg/dL    BUN 16 7.0 - 18 MG/DL    Creatinine 0.80 0.6 - 1.3 MG/DL    BUN/Creatinine ratio 20 12 - 20      GFR est AA >60 >60 ml/min/1.73m2    GFR est non-AA >60 >60 ml/min/1.73m2    Calcium 8.6 8.5 - 10.1 MG/DL    Bilirubin, total 1.5 (H) 0.2 - 1.0 MG/DL    ALT (SGPT) 399 (H) 13 - 56 U/L    AST (SGOT) 1298 (H) 15 - 37 U/L    Alk.  phosphatase 347 (H) 45 - 117 U/L    Protein, total 7.0 6.4 - 8.2 g/dL    Albumin 3.6 3.4 - 5.0 g/dL    Globulin 3.4 2.0 - 4.0 g/dL    A-G Ratio 1.1 0.8 - 1.7     ETHYL ALCOHOL    Collection Time: 09/05/17 11:35 AM   Result Value Ref Range    ALCOHOL(ETHYL),SERUM 97 (H) 0 - 3 MG/DL   MAGNESIUM    Collection Time: 09/05/17 11:35 AM   Result Value Ref Range    Magnesium 2.2 1.6 - 2.6 mg/dL   LIPASE    Collection Time: 09/05/17 11:35 AM   Result Value Ref Range    Lipase 1165 (H) 73 - 393 U/L   URINALYSIS W/ RFLX MICROSCOPIC    Collection Time: 09/05/17 11:40 AM   Result Value Ref Range    Color YELLOW      Appearance CLEAR      Specific gravity 1.010 1.005 - 1.030 pH (UA) 5.0 5.0 - 8.0      Protein NEGATIVE  NEG mg/dL    Glucose 250 (A) NEG mg/dL    Ketone NEGATIVE  NEG mg/dL    Bilirubin NEGATIVE  NEG      Blood TRACE (A) NEG      Urobilinogen 0.2 0.2 - 1.0 EU/dL    Nitrites NEGATIVE  NEG      Leukocyte Esterase MODERATE (A) NEG     URINE MICROSCOPIC ONLY    Collection Time: 09/05/17 11:40 AM   Result Value Ref Range    WBC 0 to 3 0 - 4 /hpf    RBC 0 to 3 0 - 5 /hpf    Epithelial cells 1+ 0 - 5 /lpf    Bacteria 2+ (A) NEG /hpf    Hyaline cast 0 to 3 0 - 2 /lpf       Assessment/Plan:      Principal Problem:    Alcohol induced acute pancreatitis (9/5/2017)    Active Problems:    Essential hypertension, benign (5/6/2014)      Bipolar affective disorder, depressed, severe ([facility] [address] (5/9/2014)      Acute pancreatitis (10/20/2014)      Acute hyponatremia (2/3/2016)      Alcohol abuse (3/24/2016)      Elevated LFTs ()      Fatty liver ()      Depression ()      Alcoholic hepatitis ()       ___________________________________________________  PLAN:    1.NPO.IV fluid  2. Dilaudid iv for pain and zofran for nausea. 3.CIWA protocol  4. Educated about quitting alcohol  DVT prophylaxis:sc heparin  Full code  Disposition:tbd  __________________________________________________  Admitting Physician: Annalisa Lopez MD

## 2017-09-05 NOTE — IP AVS SNAPSHOT
Karis Goodman 
 
 
 [address] [place] [address] 
[phone] Patient: Sonia Renner MRN: [id number] Current Discharge Medication List  
  
START taking these medications Dose & Instructions Dispensing Information Comments Morning Noon Evening Bedtime  
 ondansetron hcl 4 mg tablet Commonly known as:  ZOFRAN (AS HYDROCHLORIDE) Dose:  4 mg Take 1 Tab by mouth every eight (8) hours as needed for Nausea. Quantity:  10 Tab Refills:  0  
     
   
   
   
  
 oxyCODONE IR 5 mg immediate release tablet Commonly known as:  Meño Renea Your next dose is:  today Dose:  10 mg Take 2 Tabs by mouth every four (4) hours as needed for Pain. Max Daily Amount: 60 mg.  
 Quantity:  12 Tab Refills:  0  
     
   
today CONTINUE these medications which have CHANGED Dose & Instructions Dispensing Information Comments Morning Noon Evening Bedtime  
 levothyroxine 25 mcg tablet Commonly known as:  SYNTHROID What changed:  how much to take Your next dose is:  9/9/17 Dose:  25 mcg Take 1 Tab by mouth Daily (before breakfast). Indications: HYPOTHYROIDISM Quantity:  30 Tab Refills:  0  
     
9/9/17 CONTINUE these medications which have NOT CHANGED Dose & Instructions Dispensing Information Comments Morning Noon Evening Bedtime BENTYL 20 mg tablet Generic drug:  dicyclomine Dose:  20 mg Take 20 mg by mouth three (3) times daily. Refills:  0  
     
   
   
   
  
 biotin 10,000 mcg Cap Dose:  1 Cap Take 1 Cap by mouth. Refills:  0  
     
   
   
   
  
 calcium citrate-vitamin d3 315-200 mg-unit Tab Commonly known as:  CITRACAL+D Dose:  1 Tab Take 1 Tab by mouth two (2) times daily (with meals). Refills:  0  
     
   
   
   
  
 cloNIDine HCl 0.1 mg tablet Commonly known as:  CATAPRES  
   
 Take  by mouth two (2) times a day. Refills:  0  
     
   
   
   
  
 colesevelam 625 mg tablet Commonly known as:  [facility] Dose:  1250 mg Take 2 Tabs by mouth two (2) times daily (with meals). Quantity:  120 Tab Refills:  0  
     
   
   
   
  
 * ergocalciferol 50,000 unit capsule Commonly known as:  ERGOCALCIFEROL Dose:  10042 Units Take 50,000 Units by mouth every seven (7) days. Refills:  0  
     
   
   
   
  
 * VITAMIN D2 50,000 unit capsule Generic drug:  ergocalciferol Dose:  96695 Units Take 50,000 Units by mouth. Refills:  0  
     
   
   
   
  
 escitalopram oxalate 10 mg tablet Commonly known as:  Minor [place] Dose:  10 mg Take 1 Tab by mouth daily. Quantity:  10 Tab Refills:  0  
     
   
   
   
  
 FEOSOL 325 mg (65 mg iron) tablet Generic drug:  ferrous sulfate Dose:  325 mg Take 325 mg by mouth daily. Refills:  0 NEURONTIN 600 mg tablet Generic drug:  gabapentin Dose:  600 mg Take 600 mg by mouth three (3) times daily. Indications: NEUROPATHIC PAIN Refills:  0  
     
   
   
   
  
 OLANZapine 10 mg disintegrating tablet Commonly known as:  ZyPREXA zydis Dose:  10 mg Take 1 Tab by mouth nightly. Quantity:  30 Tab Refills:  0  
     
   
   
   
  
 pantoprazole 20 mg tablet Commonly known as:  PROTONIX Your next dose is:  9/9/17 Dose:  40 mg Take 40 mg by mouth daily. Refills:  0  
     
9/9/17 VISTARIL 50 mg capsule Generic drug:  hydrOXYzine pamoate Dose:  50 mg Take 50 mg by mouth two (2) times a day. Refills:  0  
     
   
   
   
  
 VITAMIN B-12 1,000 mcg sublingual tablet Generic drug:  cyanocobalamin Dose:  1000 mcg  
1,000 mcg by SubLINGual route daily. Refills:  0  
     
   
   
   
  
 VITAMIN C 500 mg tablet Generic drug:  ascorbic acid (vitamin C)  Dose:  1000 mg  
 Take 1,000 mg by mouth daily. Refills:  0  
     
   
   
   
  
 * Notice: This list has 2 medication(s) that are the same as other medications prescribed for you. Read the directions carefully, and ask your doctor or other care provider to review them with you. Where to Get Your Medications Information on where to get these meds will be given to you by the nurse or doctor. ! Ask your nurse or doctor about these medications  
  ondansetron hcl 4 mg tablet  
 oxyCODONE IR 5 mg immediate release tablet

## 2017-09-05 NOTE — ED PROVIDER NOTES
HPI Comments: 11:17 AM Ngoc Briones is a 65 y.o. female with a h/o Anemia, HLD, HTN, chronic pancreatitis, EtOH abuse, and other noted PMHx who presents to the ED c/o intermittent NVD x 3 days. The patient is also complaining of associated diffuse abd pain and body cramps. The patient states that she has difficulty tolerating po solids and liquids, noting that she does not eat for like 4 days and then binge eats exacerbating her abd pain. She states that her vomit is projectile. She notes that the last thing she ate was [employer] for breakfast this morning and that she vomited after arriving to the ED. The patient states that her sx feel similar to prior pancreatitis attacks, last episode 1 month ago. She reports a recent diagnosis of Intussusception at [facility]. She states that she ceased EtOH consumption 64 days ago. The patient denies black or bloody stool, CP, SOB, fever, chills, and additional complaints or concerns. Past surgical hx includes a gastric bypass. PCP: Marine Loyola MD          The history is provided by the patient.         Past Medical History:   Diagnosis Date    Alcohol abuse     Alcoholic hepatitis     Alcoholic pancreatitis     Anemia     Bipolar disorder (HCC)     Dr. Mc[facility])    Chronic abdominal pain     Chronic pancreatitis ([address]     Compression fracture of lumbar vertebra (HCC)     L4, L5     Depression     Dr. Mc[facility])    Elevated LFTs     ETOH abuse     Fatty liver     GERD (gastroesophageal reflux disease)     Hyperlipidemia     Hypertension     Hypothyroidism     Lower GI bleeding     Morbid obesity ([address]     Obstructive sleep apnea     Substance induced mood disorder ([facility]     Vitamin D deficiency        Past Surgical History:   Procedure Laterality Date    BIOPSY LIVER  08/15/2012    Lap Left hepatic liver wedge by Dr. Margy Mckeon; BX Revealed: Hepatic Steatosis, AVM w/ associated Subcapsular Fibrosis.  COLONOSCOPY N/A 1/17/2017    COLONOSCOPY performed by Luis Butcher MD at [address] [facility] DISKECTOMY, ANTERIOR, WITH D  8/1995, 11/1997    HX ABDOMINAL LAPAROSCOPY  12/02/2014    Laparoscopic Gastrostomy w/ Partial Gastrectomy for assistance in placement of Endoscopic Retrograde Cholangiopancreatography & Diagnostic Lap.  HX CARPAL TUNNEL RELEASE      HX CHOLECYSTECTOMY  09/16/2014    Dr. Emily Wray HX COLONOSCOPY  05/12/2012    Colonoscopy by Dr. Nita Cifuentes. Ronal Gaytan: Bx Revealed Colon Polyps (Tubular Adenoma), Hemorrhoids.  HX GASTRIC BYPASS  08/15/2012    Lap Christian-en-y    HX HEMORRHOIDECTOMY  04/30/2015    Dr. Riccardo Arvizu. Jason    HX HYSTERECTOMY  2006    HX TONSILLECTOMY  1992    REPAIR NONUNION SCAPHOID CARPAL BONE Right 2010    Wrist         Family History:   Problem Relation Age of Onset    Cancer Father     Cancer Sister     Obesity Brother        Social History     Social History    Marital status:      Spouse name: N/A    Number of children: N/A    Years of education: N/A     Occupational History    Not on file. Social History Main Topics    Smoking status: Never Smoker    Smokeless tobacco: Never Used    Alcohol use 0.0 oz/week     0 Standard drinks or equivalent per week      Comment: 6 pack of beer a week- 1/2/17 last use    Drug use: No    Sexual activity: Yes     Partners: Male     Birth control/ protection: Condom     Other Topics Concern    Not on file     Social History Narrative         ALLERGIES: Codeine; Lamictal [lamotrigine]; Morphine; Pcn [penicillins]; Vancomycin; Doxycycline; Percocet [oxycodone-acetaminophen]; Tetracycline; and Tomato    Review of Systems   Constitutional: Negative for fever. Eyes: Negative for visual disturbance. Respiratory: Negative for shortness of breath. Cardiovascular: Negative for chest pain and leg swelling. Gastrointestinal: Positive for abdominal pain, diarrhea, nausea and vomiting. Negative for blood in stool. Genitourinary: Negative for dysuria and flank pain. Musculoskeletal: Positive for myalgias. Negative for arthralgias and neck pain. Skin: Negative for rash. Neurological: Negative for headaches. Hematological: Does not bruise/bleed easily. Psychiatric/Behavioral: Negative for confusion. All other systems reviewed and are negative. Vitals:    09/05/17 1330 09/05/17 1345 09/05/17 1400 09/05/17 1415   BP: 120/78 123/80 113/80 106/71   Pulse: 76 77 77 72   Resp: 19 15 15 13   Temp:       SpO2: 95% 94% 96% 94%   Weight:       Height:                Physical Exam   Constitutional: She is oriented to person, place, and time. She appears well-developed and well-nourished. No distress. HENT:   Head: Normocephalic and atraumatic. Right Ear: External ear normal.   Left Ear: External ear normal.   Nose: Nose normal.   Mouth/Throat: Oropharynx is clear and moist. Mucous membranes are dry. Eyes: Conjunctivae and EOM are normal. Pupils are equal, round, and reactive to light. No scleral icterus. Neck: Normal range of motion. Neck supple. No JVD present. No tracheal deviation present. No thyromegaly present. Cardiovascular: Normal rate, regular rhythm, normal heart sounds and intact distal pulses. Exam reveals no gallop and no friction rub. No murmur heard. Pulmonary/Chest: Effort normal and breath sounds normal. She exhibits no tenderness. Abdominal: Soft. Bowel sounds are normal. She exhibits no distension. There is generalized tenderness. There is no rebound and no guarding. Musculoskeletal: Normal range of motion. She exhibits no edema or tenderness. Lymphadenopathy:     She has no cervical adenopathy. Neurological: She is alert and oriented to person, place, and time. No cranial nerve deficit. Coordination normal.   No sensory loss, Gait normal, Motor 5/5   Skin: Skin is warm and dry. Psychiatric: She has a normal mood and affect.  Her behavior is normal. Judgment and thought content normal.   Nursing note and vitals reviewed. MDM  Number of Diagnoses or Management Options  Diagnosis management comments: Pt w/ abd pain, NV, and dehydration. Pt w/ history of intussusception and gastric bypass, concern for acute exacerbation of the same. Dr. Daja Krishnamurthy consulted. Awaiting CT abd pelvis.      ED Course       Procedures    Vitals:  Patient Vitals for the past 12 hrs:   Temp Pulse Resp BP SpO2   09/05/17 1415 - 72 13 106/71 94 %   09/05/17 1400 - 77 15 113/80 96 %   09/05/17 1345 - 77 15 123/80 94 %   09/05/17 1330 - 76 19 120/78 95 %   09/05/17 1300 - 76 12 105/67 93 %   09/05/17 1245 - 78 13 102/72 91 %   09/05/17 1230 - 78 16 107/78 95 %   09/05/17 1215 - 92 27 125/81 98 %   09/05/17 1200 - 82 20 120/79 96 %   09/05/17 1145 - 75 17 124/83 96 %   09/05/17 1130 - 81 16 120/80 98 %   09/05/17 1115 - - - 128/85 98 %   09/05/17 1055 98 °F (36.7 °C) 90 18 112/83 100 %         Medications ordered:   Medications   sodium chloride (NS) flush 5-10 mL (not administered)   sodium chloride 0.9 % bolus infusion 1,000 mL (1,000 mL IntraVENous New Bag 9/5/17 1831)   sodium chloride (NS) flush 5-10 mL (not administered)   sodium chloride (NS) flush 5-10 mL (not administered)   LORazepam (ATIVAN) tablet 1 mg ( Oral See Alternative 9/5/17 1830)     Or   LORazepam (ATIVAN) injection 1 mg (1 mg IntraVENous Given 9/5/17 1830)   LORazepam (ATIVAN) tablet 2 mg (not administered)     Or   LORazepam (ATIVAN) injection 2 mg (not administered)   LORazepam (ATIVAN) injection 3 mg (not administered)   famotidine (PF) (PEPCID) injection 20 mg (20 mg IntraVENous Given 9/5/17 1141)   sodium chloride 0.9 % bolus infusion 1,000 mL (0 mL IntraVENous IV Completed 9/5/17 1241)   LORazepam (ATIVAN) injection 1 mg (1 mg IntraVENous Given 9/5/17 1158)   HYDROmorphone (PF) (DILAUDID) injection 0.5 mg (0.5 mg IntraVENous Given 9/5/17 1158)   iohexol (OMNIPAQUE) solution 50 mL (50 mL Oral Given 9/5/17 1442) HYDROmorphone (PF) (DILAUDID) injection 0.5 mg (0.5 mg IntraVENous Given 9/5/17 1639)   iopamidol (ISOVUE 300) 61 % contrast injection 100 mL (97 mL IntraVENous Given 9/5/17 1558)   HYDROmorphone (PF) (DILAUDID) injection 0.5 mg (0.5 mg IntraVENous Given 9/5/17 1830)   0.9% sodium chloride 1,000 mL with mvi, adult no. 4 with vit K 10 mL, thiamine 952 mg, folic acid 1 mg infusion ( IntraVENous New Bag 9/5/17 1831)         Lab findings:  Recent Results (from the past 12 hour(s))   CBC WITH AUTOMATED DIFF    Collection Time: 09/05/17 11:35 AM   Result Value Ref Range    WBC 3.4 (L) 4.6 - 13.2 K/uL    RBC 3.59 (L) 4.20 - 5.30 M/uL    HGB 10.5 (L) 12.0 - 16.0 g/dL    HCT 30.8 (L) 35.0 - 45.0 %    MCV 85.8 74.0 - 97.0 FL    MCH 29.2 24.0 - 34.0 PG    MCHC 34.1 31.0 - 37.0 g/dL    RDW 14.8 (H) 11.6 - 14.5 %    PLATELET 466 939 - 169 K/uL    MPV 9.1 (L) 9.2 - 11.8 FL    NEUTROPHILS 59 40 - 73 %    LYMPHOCYTES 19 (L) 21 - 52 %    MONOCYTES 20 (H) 3 - 10 %    EOSINOPHILS 1 0 - 5 %    BASOPHILS 1 0 - 2 %    ABS. NEUTROPHILS 2.0 1.8 - 8.0 K/UL    ABS. LYMPHOCYTES 0.7 (L) 0.9 - 3.6 K/UL    ABS. MONOCYTES 0.7 0.05 - 1.2 K/UL    ABS. EOSINOPHILS 0.0 0.0 - 0.4 K/UL    ABS. BASOPHILS 0.0 0.0 - 0.06 K/UL    DF AUTOMATED     METABOLIC PANEL, COMPREHENSIVE    Collection Time: 09/05/17 11:35 AM   Result Value Ref Range    Sodium 128 (L) 136 - 145 mmol/L    Potassium 3.5 3.5 - 5.5 mmol/L    Chloride 94 (L) 100 - 108 mmol/L    CO2 24 21 - 32 mmol/L    Anion gap 10 3.0 - 18 mmol/L    Glucose 173 (H) 74 - 99 mg/dL    BUN 16 7.0 - 18 MG/DL    Creatinine 0.80 0.6 - 1.3 MG/DL    BUN/Creatinine ratio 20 12 - 20      GFR est AA >60 >60 ml/min/1.73m2    GFR est non-AA >60 >60 ml/min/1.73m2    Calcium 8.6 8.5 - 10.1 MG/DL    Bilirubin, total 1.5 (H) 0.2 - 1.0 MG/DL    ALT (SGPT) 399 (H) 13 - 56 U/L    AST (SGOT) 1298 (H) 15 - 37 U/L    Alk.  phosphatase 347 (H) 45 - 117 U/L    Protein, total 7.0 6.4 - 8.2 g/dL    Albumin 3.6 3.4 - 5.0 g/dL Globulin 3.4 2.0 - 4.0 g/dL    A-G Ratio 1.1 0.8 - 1.7     ETHYL ALCOHOL    Collection Time: 09/05/17 11:35 AM   Result Value Ref Range    ALCOHOL(ETHYL),SERUM 97 (H) 0 - 3 MG/DL   MAGNESIUM    Collection Time: 09/05/17 11:35 AM   Result Value Ref Range    Magnesium 2.2 1.6 - 2.6 mg/dL   LIPASE    Collection Time: 09/05/17 11:35 AM   Result Value Ref Range    Lipase 1165 (H) 73 - 393 U/L   URINALYSIS W/ RFLX MICROSCOPIC    Collection Time: 09/05/17 11:40 AM   Result Value Ref Range    Color YELLOW      Appearance CLEAR      Specific gravity 1.010 1.005 - 1.030      pH (UA) 5.0 5.0 - 8.0      Protein NEGATIVE  NEG mg/dL    Glucose 250 (A) NEG mg/dL    Ketone NEGATIVE  NEG mg/dL    Bilirubin NEGATIVE  NEG      Blood TRACE (A) NEG      Urobilinogen 0.2 0.2 - 1.0 EU/dL    Nitrites NEGATIVE  NEG      Leukocyte Esterase MODERATE (A) NEG     URINE MICROSCOPIC ONLY    Collection Time: 09/05/17 11:40 AM   Result Value Ref Range    WBC 0 to 3 0 - 4 /hpf    RBC 0 to 3 0 - 5 /hpf    Epithelial cells 1+ 0 - 5 /lpf    Bacteria 2+ (A) NEG /hpf    Hyaline cast 0 to 3 0 - 2 /lpf       EKG interpretation by ED Physician:      X-Ray, CT or other radiology findings or impressions:  CT ABD PELV W CONT   Final Result   IMPRESSION:     1. Mild peripancreatic edema and infiltration as above, with a small amount of  fluid present within the pancreaticoduodenal groove. Overall findings are most  in keeping with underlying pancreatitis. No discrete or organized peripancreatic  fluid collection. Normal pancreatic enhancement. 2. Postop changes of gastric bypass surgical procedure. No evidence of bowel  obstruction. 3. Hepatic steatosis. 4. Cholecystectomy. XR CHEST PORT   Final Result   Impression:  No acute radiographic cardiopulmonary abnormality. Progress notes, Consult notes or additional Procedure notes:   11:47 AM I reviewed note from Dr. Mckeon from 8/31/17. He was concerned about EtOH use and intake causing dumping syndrome. I will discuss care w/ Dr. Gabriela Og, surgeon on call for team.     12:20 PM Dr. Gabriela Og, surgeon, evaluated the patient and agrees w/ plan for CT and reevaluation. 4:48 PM CT scan resulted and consistent w/ pancreatitis, labs also consistent w/ pancreatitis. Case discussed w/ Dr. Xiomara Gutierrez, hospitalist, in ED. He accepts the patient for further management of pain and pancreatitis. Reevaluation of patient:       Disposition:  Diagnosis:   1. Abdominal pain, generalized    2. Alcohol-induced acute pancreatitis, unspecified complication status    3. Elevated LFTs        Disposition: Admit    Follow-up Information     None           Patient's Medications   Start Taking    No medications on file   Continue Taking    ASCORBIC ACID (VITAMIN C) 500 MG TABLET    Take 1,000 mg by mouth daily. BIOTIN 10,000 MCG CAP    Take 1 Cap by mouth. CALCIUM CITRATE-VITAMIN D3 (CITRACAL+D) 315-200 MG-UNIT TAB    Take 1 Tab by mouth two (2) times daily (with meals). CLONIDINE HCL (CATAPRES) 0.1 MG TABLET    Take  by mouth two (2) times a day. COLESEVELAM (WELCHOL) 625 MG TABLET    Take 2 Tabs by mouth two (2) times daily (with meals). CYANOCOBALAMIN (VITAMIN B-12) 1,000 MCG SUBL    1,000 mcg by SubLINGual route daily. DICYCLOMINE (BENTYL) 20 MG TABLET    Take 20 mg by mouth three (3) times daily. ERGOCALCIFEROL (ERGOCALCIFEROL) 50,000 UNIT CAPSULE    Take 50,000 Units by mouth every seven (7) days. ERGOCALCIFEROL (VITAMIN D2) 50,000 UNIT CAPSULE    Take 50,000 Units by mouth. ESCITALOPRAM OXALATE (LEXAPRO) 10 MG TABLET    Take 1 Tab by mouth daily. FERROUS SULFATE (FEOSOL) 325 MG (65 MG IRON) TABLET    Take 325 mg by mouth daily. GABAPENTIN (NEURONTIN) 600 MG TABLET    Take 600 mg by mouth three (3) times daily. Indications: NEUROPATHIC PAIN    HYDROXYZINE PAMOATE (VISTARIL) 50 MG CAPSULE    Take 50 mg by mouth two (2) times a day.     LEVOTHYROXINE (SYNTHROID) 25 MCG TABLET    Take 1 Tab by mouth Daily (before breakfast). Indications: HYPOTHYROIDISM    OLANZAPINE (ZYPREXA ZYDIS) 10 MG DISINTEGRATING TABLET    Take 1 Tab by mouth nightly. PANTOPRAZOLE (PROTONIX) 20 MG TABLET    Take 40 mg by mouth daily. These Medications have changed    No medications on file   Stop Taking    No medications on file         Scribe [id number] Steve Odonnell [facility] acting as a scribe for and in the presence of Kristan Coley MD      September 05, 2017 at 11:33 AM       Provider Attestation:      I personally performed the services described in the documentation, reviewed the documentation, as recorded by the scribe in my presence, and it accurately and completely records my words and actions.  September 05, 2017 at 6:31 PM - Kristan Coley MD

## 2017-09-06 ENCOUNTER — TELEPHONE (OUTPATIENT)
Dept: SURGERY | Age: 57
End: 2017-09-06

## 2017-09-06 LAB
ALBUMIN SERPL-MCNC: 3.2 G/DL (ref 3.4–5)
ALBUMIN/GLOB SERPL: 1.2 {RATIO} (ref 0.8–1.7)
ALP SERPL-CCNC: 304 U/L (ref 45–117)
ALT SERPL-CCNC: 311 U/L (ref 13–56)
ANION GAP SERPL CALC-SCNC: 8 MMOL/L (ref 3–18)
AST SERPL-CCNC: 764 U/L (ref 15–37)
BASOPHILS # BLD: 0 K/UL (ref 0–0.06)
BASOPHILS NFR BLD: 1 % (ref 0–2)
BILIRUB SERPL-MCNC: 2.3 MG/DL (ref 0.2–1)
BUN SERPL-MCNC: 9 MG/DL (ref 7–18)
BUN/CREAT SERPL: 15 (ref 12–20)
CALCIUM SERPL-MCNC: 7.6 MG/DL (ref 8.5–10.1)
CHLORIDE SERPL-SCNC: 105 MMOL/L (ref 100–108)
CO2 SERPL-SCNC: 24 MMOL/L (ref 21–32)
CREAT SERPL-MCNC: 0.6 MG/DL (ref 0.6–1.3)
DIFFERENTIAL METHOD BLD: ABNORMAL
EOSINOPHIL # BLD: 0.1 K/UL (ref 0–0.4)
EOSINOPHIL NFR BLD: 2 % (ref 0–5)
ERYTHROCYTE [DISTWIDTH] IN BLOOD BY AUTOMATED COUNT: 15.3 % (ref 11.6–14.5)
GLOBULIN SER CALC-MCNC: 2.6 G/DL (ref 2–4)
GLUCOSE BLD STRIP.AUTO-MCNC: 100 MG/DL (ref 70–110)
GLUCOSE BLD STRIP.AUTO-MCNC: 114 MG/DL (ref 70–110)
GLUCOSE BLD STRIP.AUTO-MCNC: 157 MG/DL (ref 70–110)
GLUCOSE BLD STRIP.AUTO-MCNC: 89 MG/DL (ref 70–110)
GLUCOSE SERPL-MCNC: 126 MG/DL (ref 74–99)
HCT VFR BLD AUTO: 28 % (ref 35–45)
HGB BLD-MCNC: 9.3 G/DL (ref 12–16)
LYMPHOCYTES # BLD: 0.8 K/UL (ref 0.9–3.6)
LYMPHOCYTES NFR BLD: 24 % (ref 21–52)
MCH RBC QN AUTO: 29.1 PG (ref 24–34)
MCHC RBC AUTO-ENTMCNC: 33.2 G/DL (ref 31–37)
MCV RBC AUTO: 87.5 FL (ref 74–97)
MONOCYTES # BLD: 0.5 K/UL (ref 0.05–1.2)
MONOCYTES NFR BLD: 16 % (ref 3–10)
NEUTS SEG # BLD: 1.9 K/UL (ref 1.8–8)
NEUTS SEG NFR BLD: 57 % (ref 40–73)
PLATELET # BLD AUTO: 167 K/UL (ref 135–420)
PMV BLD AUTO: 9 FL (ref 9.2–11.8)
POTASSIUM SERPL-SCNC: 3.8 MMOL/L (ref 3.5–5.5)
PROT SERPL-MCNC: 5.8 G/DL (ref 6.4–8.2)
RBC # BLD AUTO: 3.2 M/UL (ref 4.2–5.3)
SODIUM SERPL-SCNC: 137 MMOL/L (ref 136–145)
WBC # BLD AUTO: 3.3 K/UL (ref 4.6–13.2)

## 2017-09-06 PROCEDURE — 80053 COMPREHEN METABOLIC PANEL: CPT | Performed by: INTERNAL MEDICINE

## 2017-09-06 PROCEDURE — 77030020263 HC SOL INJ SOD CL0.9% LFCR 1000ML

## 2017-09-06 PROCEDURE — 74011000250 HC RX REV CODE- 250: Performed by: INTERNAL MEDICINE

## 2017-09-06 PROCEDURE — 36415 COLL VENOUS BLD VENIPUNCTURE: CPT | Performed by: INTERNAL MEDICINE

## 2017-09-06 PROCEDURE — 82962 GLUCOSE BLOOD TEST: CPT

## 2017-09-06 PROCEDURE — 74011636637 HC RX REV CODE- 636/637: Performed by: INTERNAL MEDICINE

## 2017-09-06 PROCEDURE — 85025 COMPLETE CBC W/AUTO DIFF WBC: CPT | Performed by: INTERNAL MEDICINE

## 2017-09-06 PROCEDURE — 74011250636 HC RX REV CODE- 250/636: Performed by: INTERNAL MEDICINE

## 2017-09-06 PROCEDURE — 65660000000 HC RM CCU STEPDOWN

## 2017-09-06 RX ADMIN — HEPARIN SODIUM 5000 UNITS: 5000 INJECTION, SOLUTION INTRAVENOUS; SUBCUTANEOUS at 05:08

## 2017-09-06 RX ADMIN — HYDROMORPHONE HYDROCHLORIDE 1 MG: 1 INJECTION, SOLUTION INTRAMUSCULAR; INTRAVENOUS; SUBCUTANEOUS at 12:59

## 2017-09-06 RX ADMIN — Medication 10 ML: at 22:00

## 2017-09-06 RX ADMIN — LORAZEPAM 2 MG: 2 INJECTION INTRAMUSCULAR; INTRAVENOUS at 12:59

## 2017-09-06 RX ADMIN — HYDROMORPHONE HYDROCHLORIDE 1 MG: 1 INJECTION, SOLUTION INTRAMUSCULAR; INTRAVENOUS; SUBCUTANEOUS at 16:51

## 2017-09-06 RX ADMIN — FAMOTIDINE 20 MG: 10 INJECTION, SOLUTION INTRAVENOUS at 08:38

## 2017-09-06 RX ADMIN — Medication 10 ML: at 05:09

## 2017-09-06 RX ADMIN — FAMOTIDINE 20 MG: 10 INJECTION, SOLUTION INTRAVENOUS at 20:50

## 2017-09-06 RX ADMIN — SODIUM CHLORIDE 100 ML/HR: 900 INJECTION, SOLUTION INTRAVENOUS at 13:06

## 2017-09-06 RX ADMIN — HYDROMORPHONE HYDROCHLORIDE 1 MG: 1 INJECTION, SOLUTION INTRAMUSCULAR; INTRAVENOUS; SUBCUTANEOUS at 08:50

## 2017-09-06 RX ADMIN — ONDANSETRON 4 MG: 2 INJECTION INTRAMUSCULAR; INTRAVENOUS at 17:21

## 2017-09-06 RX ADMIN — HYDROMORPHONE HYDROCHLORIDE 1 MG: 1 INJECTION, SOLUTION INTRAMUSCULAR; INTRAVENOUS; SUBCUTANEOUS at 20:50

## 2017-09-06 RX ADMIN — HEPARIN SODIUM 5000 UNITS: 5000 INJECTION, SOLUTION INTRAVENOUS; SUBCUTANEOUS at 12:58

## 2017-09-06 RX ADMIN — LORAZEPAM 2 MG: 2 INJECTION INTRAMUSCULAR; INTRAVENOUS at 10:53

## 2017-09-06 RX ADMIN — INSULIN LISPRO 2 UNITS: 100 INJECTION, SOLUTION INTRAVENOUS; SUBCUTANEOUS at 08:38

## 2017-09-06 RX ADMIN — HYDROMORPHONE HYDROCHLORIDE 1 MG: 1 INJECTION, SOLUTION INTRAMUSCULAR; INTRAVENOUS; SUBCUTANEOUS at 05:09

## 2017-09-06 RX ADMIN — SODIUM CHLORIDE 100 ML/HR: 900 INJECTION, SOLUTION INTRAVENOUS at 02:49

## 2017-09-06 RX ADMIN — HEPARIN SODIUM 5000 UNITS: 5000 INJECTION, SOLUTION INTRAVENOUS; SUBCUTANEOUS at 20:49

## 2017-09-06 NOTE — PROGRESS NOTES
Nutrition initial assessment/Plan of care      RECOMMENDATIONS:     1. Advance diet when medically indicated  2. Monitor weight, labs and PO intake  3. RD to follow     GOALS:     1. PO intake meets >75% of protein/calorie needs by 9/11  2. Weight Maintenance (+/- 1-2 lb by 9/11)      ASSESSMENT:     Weight status is classified as overweight per BMI of 29.1. Currently not meeting nutrition needs due to NPO diet order. Labs noted. Elevated LFT's. Nutrition recommendations listed. RD to follow. Nutrition Diagnoses:   Abnormal nutrition related lab values related to pancreatitis as evidenced by lipase level of 1165. Nutrition Risk:  [] High  [x] Moderate []  Low    SUBJECTIVE/OBJECTIVE:      Patient admitted with acute pancreatitis. Patient with nausea/vomiting and unable to tolerate PO intake for past 3 days. Patient has history of gastric bypass in 2012, liver disease and chronic pancreatitis. Patient with history of alcohol abuse. Allergic to tomatoes. Currently with NPO diet order. Will monitor diet advancement. Information Obtained from:    [x] Chart Review   [x] Patient   [] Family/Caregiver   [] Nurse/Physician   [] Interdisciplinary Meeting/Rounds    Diet:NPO  Medications: [x] Reviewed    Allergies: [x] Reviewed   Encounter Diagnoses     ICD-10-CM ICD-9-CM   1. Abdominal pain, generalized R10.84 789.07   2. Alcohol-induced acute pancreatitis, unspecified complication status J48.69 577.0   3.  Elevated LFTs R94.5 790.6     Past Medical History:   Diagnosis Date    Alcohol abuse     Alcoholic hepatitis     Alcoholic pancreatitis     Anemia     Bipolar disorder (HCC)     Dr. Maribel Negro [facility])    Chronic abdominal pain     Chronic pancreatitis ([facility] [address]     Compression fracture of lumbar vertebra (HCC)     L4, L5     Depression     Dr. Maribel Negro [facility])    Elevated LFTs     ETOH abuse     Fatty liver     GERD (gastroesophageal reflux disease)     Hyperlipidemia     Hypertension     Hypothyroidism     Lower GI bleeding     Morbid obesity ([facility] [address]     Obstructive sleep apnea     Substance induced mood disorder (HCC)     Vitamin D deficiency       Labs:    Lab Results   Component Value Date/Time    Sodium 137 09/06/2017 05:11 AM    Potassium 3.8 09/06/2017 05:11 AM    Chloride 105 09/06/2017 05:11 AM    CO2 24 09/06/2017 05:11 AM    Anion gap 8 09/06/2017 05:11 AM    Glucose 126 09/06/2017 05:11 AM    BUN 9 09/06/2017 05:11 AM    Creatinine 0.60 09/06/2017 05:11 AM    Calcium 7.6 09/06/2017 05:11 AM    Magnesium 2.2 09/05/2017 11:35 AM    Phosphorus 2.6 02/07/2016 01:17 AM    Albumin 3.2 09/06/2017 05:11 AM     Anthropometrics: BMI (calculated): 29.1  Last 3 Recorded Weights in this Encounter    09/05/17 1055   Weight: 81.6 kg (180 lb)      Ht Readings from Last 1 Encounters:   09/05/17 5' 6\" (1.676 m)     No data found. IBW: 130 lb %IBW: 138%    Estimated Nutrition Needs: [x] MSJ  [] Other:  Calories: 4251-1761 Kcal Based on:   [x] Actual BW    Protein:   82 g Based on:   [x] Actual BW    Fluid:       2100 ml Based on:   [x] Actual BW      [x] No Cultural, Yarsanism or ethnic dietary need identified.     [] Cultural, Yarsanism and ethnic food preferences identified and addressed     Wt Status:  [] Normal (18.6 - 24.9) [] Underweight (<18.5) [x] Overweight (25 - 29.9) [] Mild Obesity (30 - 34.9)  [] Moderate Obesity (35 - 39.9) []     Nutrition Problems Identified:   [x] Suboptimal PO intake   [] Food Allergies  [] Difficulty chewing/swallowing/poor dentition  [] Constipation/Diarrhea   [x] Nausea/Vomiting   [] None  [] Other:     Plan:   [] Therapeutic Diet  []  Obtained/adjusted food preferences/tolerances and/or snacks options   []  Supplements added   [] Occupational therapy following for feeding techniques  []  HS snack added   []  Modify diet texture   []  Modify diet for food allergies   []  Educate patient   []  Assist with menu selection   [] Monitor PO intake on meal rounds   [x]  Continue inpatient monitoring and intervention   []  Participated in discharge planning/Interdisciplinary rounds/Team meetings   []  Other:     Education Needs:   [x] Not appropriate for teaching at this time due to: NPO   [] Identified and addressed    Nutrition Monitoring and Evaluation:  [x] Continue ongoing monitoring and intervention  [] Other    Beatriz Denver

## 2017-09-06 NOTE — PROGRESS NOTES
Problem: Falls - Risk of  Goal: *Absence of Falls  Document Alexander Fall Risk and appropriate interventions in the flowsheet.    Outcome: Progressing Towards Goal  Fall Risk Interventions:        Mentation Interventions: Door open when patient unattended, Bed/chair exit alarm, Reorient patient     Medication Interventions: Patient to call before getting OOB     Elimination Interventions: Call light in reach, Patient to call for help with toileting needs     History of Falls Interventions: Door open when patient unattended

## 2017-09-06 NOTE — ACP (ADVANCE CARE PLANNING)
Patient has designated __her [id number]______________________ to participate in his/her discharge plan and to receive any needed information.      Name:  Riya Estrada  Address:  Phone number:  [phone]

## 2017-09-06 NOTE — PROGRESS NOTES
Bedside and Verbal shift change report given to Yissel Alfaro (oncoming nurse) by Bigg Hart (offgoing nurse). Report included the following information SBAR, Kardex and MAR.

## 2017-09-06 NOTE — PROGRESS NOTES
Problem: Falls - Risk of  Goal: *Absence of Falls  Document Alexander Fall Risk and appropriate interventions in the flowsheet.    Outcome: Progressing Towards Goal  Fall Risk Interventions:              Medication Interventions: Evaluate medications/consider consulting pharmacy, Patient to call before getting OOB     Elimination Interventions: Call light in reach, Patient to call for help with toileting needs

## 2017-09-06 NOTE — PROGRESS NOTES
conducted an initial consultation and Spiritual Assessment for Nuzhat Kovacs, who is a 65 y.o.,female. Patients Primary Language is: [place]. According to the patients EMR Mu-ism Affiliation is: Episcopal. The reason the Patient came to the hospital is:   Patient Active Problem List    Diagnosis Date Noted    Alcohol induced acute pancreatitis 09/05/2017    Fever of unknown origin 07/01/2017    Altered mental state 07/01/2017    Hypotension 07/01/2017    Bleeding disorder ([address] 01/18/2017    Thrombocytopenia (HCC) 01/12/2017    Sepsis ([address] 01/09/2017    Non-intractable vomiting 01/09/2017    Elevated LFTs     Fatty liver     Bipolar disorder (HCC)     Depression     Anemia     Chronic pain     Chronic abdominal pain     Substance induced mood disorder (HCC)     GI bleed     Lower GI bleeding     Compression fracture of lumbar vertebra (HCC)     Alcoholic hepatitis     Alcoholic pancreatitis     Hypothyroidism     Vitamin D deficiency     Hyponatremia 05/22/2016    Alcohol withdrawal ([address] 03/26/2016    UTI (urinary tract infection) 03/25/2016    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    Alcohol abuse 03/24/2016    Acute hyponatremia 02/03/2016    Hemorrhoids with complication 40/65/1911    Gallstone pancreatitis 11/26/2014    Acute pancreatitis 10/20/2014    Pancreatitis 08/28/2014    Bipolar affective disorder, depressed, severe ([address] 05/09/2014    Altered mental status 05/06/2014    Overdose 05/06/2014    Alcohol abuse with intoxication delirium ([address] 05/06/2014    H/O gastric bypass 05/06/2014    HERMELINDA (obstructive sleep apnea) 05/06/2014    Essential hypertension, benign 05/06/2014    Malabsorption     History of Lap Christian-en-Y gastric bypass- 8/15/12 w/BMI 39     Morbid obesity ([address]     Hyperlipidemia     Obstructive sleep apnea         The  provided the following Interventions:  Initiated a relationship of care and support.    Explored issues of juana, spirituality and/or Roman Catholic needs while hospitalized. Listened empathically. Provided chaplaincy education. Provided information about Spiritual Care Services. Offered prayer and assurance of continued prayers on patient's behalf. Chart reviewed. The following outcomes were achieved:  Patient shared some information about their medical narrative and spiritual journey/beliefs. Patient processed feeling about current hospitalization. Patient expressed gratitude for the 's visit. Assessment:  Patient did not indicate any spiritual or Roman Catholic issues which require Spiritual Care Services interventions at this time. Patient does not have any Roman Catholic/cultural needs that will affect patients preferences in health care. Plan:  Chaplains will continue to follow and will provide pastoral care on an as needed or requested basis.  recommends bedside caregivers page  on duty if patient shows signs of acute spiritual or emotional distress.     [address] [facility]   Staff [address] [facility]   [phone]

## 2017-09-06 NOTE — PROGRESS NOTES
Bedside and Verbal shift change report given to Andree (oncoming nurse) by Patricia Yee (offgoing nurse). Report included the following information SBAR and MAR. Pt resting in bed and reports no further needs at this time.

## 2017-09-06 NOTE — PROGRESS NOTES
09/05/2017 2030 Received pt via stretcher from ER to 3014. AAO x 4. MARTIN x 4. VSS. Lungs clear diminished in bases. Abd soft, obese. Denies nausea and vomiting at present. Pt c/o abd pain and swelling in abd at times from chronic pancreatitis. Pt had last drink of beer this AM with meds. Pt on CIWA protocol. Call light with in reach and pt oriented to room. Voiding as needed. 2133 Ativan 1 mg IV given as per CIWA protocol. Accucheck result 107. No lispro insulin given as per sliding scale insulin protocol. 0030 Pt on CIWA protocol q2h as is stable and sleeping without complaints. 0400 VSS Pt denies complaints and is sleeping. 0509 Dilaudid 1 mg IV given for c/o abd pain. 0600 Resting with both eyes closed and no complaints after earlier pain meds.

## 2017-09-06 NOTE — PROGRESS NOTES
Hospitalist Progress Note  Corina Sanchez MD  Internal medicine/ Hospitalist    Daily Progress Note: 9/6/2017 12:53 PM      Interval history / Subjective: Laurie Mayer is a 65 y.o.  female with h/o alcohol abuse,pancreatitis,came to the emergency room because of abdominal pain,nausea,vomitng. She was diagnosed with acute pancreatitis,alcoholic hepatitis,alcohol abuse,depression. Today she still reports abdominal pain which has not improved much although the nausea is well controlled. Current Facility-Administered Medications   Medication Dose Route Frequency    sodium chloride (NS) flush 5-10 mL  5-10 mL IntraVENous PRN    sodium chloride (NS) flush 5-10 mL  5-10 mL IntraVENous Q8H    sodium chloride (NS) flush 5-10 mL  5-10 mL IntraVENous PRN    HYDROmorphone (PF) (DILAUDID) injection 1 mg  1 mg IntraVENous Q4H PRN    heparin (porcine) injection 5,000 Units  5,000 Units SubCUTAneous Q8H    0.9% sodium chloride infusion  100 mL/hr IntraVENous CONTINUOUS    famotidine (PF) (PEPCID) 20 mg injection  20 mg IntraVENous Q12H    sodium chloride (NS) flush 5-10 mL  5-10 mL IntraVENous Q8H    sodium chloride (NS) flush 5-10 mL  5-10 mL IntraVENous PRN    LORazepam (ATIVAN) tablet 1 mg  1 mg Oral Q1H PRN    Or    LORazepam (ATIVAN) injection 1 mg  1 mg IntraVENous Q1H PRN    LORazepam (ATIVAN) tablet 2 mg  2 mg Oral Q1H PRN    Or    LORazepam (ATIVAN) injection 2 mg  2 mg IntraVENous Q1H PRN    LORazepam (ATIVAN) injection 3 mg  3 mg IntraVENous Q15MIN PRN    ondansetron (ZOFRAN) injection 4 mg  4 mg IntraVENous Q6H PRN    insulin lispro (HUMALOG) injection   SubCUTAneous AC&HS    glucose chewable tablet 16 g  4 Tab Oral PRN    glucagon (GLUCAGEN) injection 1 mg  1 mg IntraMUSCular PRN    dextrose (D50W) injection syrg 12.5-25 g  25-50 mL IntraVENous PRN        Review of Systems  Still abdominal pain. No nausea.     Objective:     Visit Vitals    /62 (BP 1 Location: Right arm, BP Patient Position: At rest)    Pulse 80    Temp 99.1 °F (37.3 °C)    Resp 20    Ht 5' 6\" (1.676 m)    Wt 81.6 kg (180 lb)    SpO2 95%    Breastfeeding No    BMI 29.05 kg/m2      O2 Device: Room air    Temp (24hrs), Av.3 °F (36.8 °C), Min:97.6 °F (36.4 °C), Max:99.1 °F (37.3 °C)          1901 -  0700  In: 1343.3 [I.V.:1343.3]  Out: -   P/E  NAD,sitting in chair,somewhat anxious. Heent:perrla,at/nc,mouth dry. Lungs ctab  Heart s1s2 nl,no m/g  Abdm:tender diffusely to palpation;guarding,no rebound. Extr:no edema,good pulses. Neuro:awake,alert,orx3,tremors of upper extremities noted. Data Review    Recent Results (from the past 12 hour(s))   METABOLIC PANEL, COMPREHENSIVE    Collection Time: 17  5:11 AM   Result Value Ref Range    Sodium 137 136 - 145 mmol/L    Potassium 3.8 3.5 - 5.5 mmol/L    Chloride 105 100 - 108 mmol/L    CO2 24 21 - 32 mmol/L    Anion gap 8 3.0 - 18 mmol/L    Glucose 126 (H) 74 - 99 mg/dL    BUN 9 7.0 - 18 MG/DL    Creatinine 0.60 0.6 - 1.3 MG/DL    BUN/Creatinine ratio 15 12 - 20      GFR est AA >60 >60 ml/min/1.73m2    GFR est non-AA >60 >60 ml/min/1.73m2    Calcium 7.6 (L) 8.5 - 10.1 MG/DL    Bilirubin, total 2.3 (H) 0.2 - 1.0 MG/DL    ALT (SGPT) 311 (H) 13 - 56 U/L    AST (SGOT) 764 (H) 15 - 37 U/L    Alk.  phosphatase 304 (H) 45 - 117 U/L    Protein, total 5.8 (L) 6.4 - 8.2 g/dL    Albumin 3.2 (L) 3.4 - 5.0 g/dL    Globulin 2.6 2.0 - 4.0 g/dL    A-G Ratio 1.2 0.8 - 1.7     CBC WITH AUTOMATED DIFF    Collection Time: 17  5:11 AM   Result Value Ref Range    WBC 3.3 (L) 4.6 - 13.2 K/uL    RBC 3.20 (L) 4.20 - 5.30 M/uL    HGB 9.3 (L) 12.0 - 16.0 g/dL    HCT 28.0 (L) 35.0 - 45.0 %    MCV 87.5 74.0 - 97.0 FL    MCH 29.1 24.0 - 34.0 PG    MCHC 33.2 31.0 - 37.0 g/dL    RDW 15.3 (H) 11.6 - 14.5 %    PLATELET 090 559 - 121 K/uL    MPV 9.0 (L) 9.2 - 11.8 FL    NEUTROPHILS 57 40 - 73 %    LYMPHOCYTES 24 21 - 52 %    MONOCYTES 16 (H) 3 - 10 %    EOSINOPHILS 2 0 - 5 %    BASOPHILS 1 0 - 2 %    ABS. NEUTROPHILS 1.9 1.8 - 8.0 K/UL    ABS. LYMPHOCYTES 0.8 (L) 0.9 - 3.6 K/UL    ABS. MONOCYTES 0.5 0.05 - 1.2 K/UL    ABS. EOSINOPHILS 0.1 0.0 - 0.4 K/UL    ABS. BASOPHILS 0.0 0.0 - 0.06 K/UL    DF AUTOMATED     GLUCOSE, POC    Collection Time: 09/06/17  7:45 AM   Result Value Ref Range    Glucose (POC) 157 (H) 70 - 110 mg/dL   GLUCOSE, POC    Collection Time: 09/06/17 11:51 AM   Result Value Ref Range    Glucose (POC) 89 70 - 110 mg/dL         Assessment/Plan:     Principal Problem:    Alcohol induced acute pancreatitis (9/5/2017)    Active Problems:    Essential hypertension, benign (5/6/2014)      Bipolar affective disorder, depressed, severe ([address] (5/9/2014)      Acute pancreatitis (10/20/2014)      Acute hyponatremia (2/3/2016)      Alcohol abuse (3/24/2016)      Elevated LFTs ()      Fatty liver ()      Depression ()      Alcoholic hepatitis ()      Care Plan   1.NPO.IV fluid. Allowed ice chips  2. Dilaudid iv for pain and zofran for nausea. 3.CIWA protocol  4. Educated about quitting alcohol  5. Liver enzymes improving.   6.Repeat lab in am  DVT prophylaxis:sc heparin  Full code  Disposition:tbd

## 2017-09-06 NOTE — PROGRESS NOTES
[facility]   Discharge Planning/ Assessment    Reasons for Intervention: Chart reviewed. Met with pt., verified all demographics. States has [employer] M[employer] ins, no secondary. [facility] Dr. Denis Mosquera is her PCP. NOK:  Irene Guzman, spouse, with whom she lives with. She designates her mother-n-law, Don Banuelos, to participate in her discharge process. Has back brace & c-pap, no other DME. Independent with ADL's prior to admit. PLAN: home when medically stable. Will cont to follow for any needs. Padma MoyerRN,ext. [id number].      High Risk Criteria  [x] Yes  []No   Physician Referral  [] Yes  [x]No        Date    Nursing Referral  [] Yes  [x]No        Date    Patient/Family Request  [] Yes  [x]No        Date       Resources:    Medicare  [x] Yes  []No   Medicaid  [] Yes  [x]No   No Resources  [] Yes  [x]No   Private Insurance  [] Yes  [x]No    Name/Phone Number    Other  [] Yes  [x]No        (i.e. Workman's Comp)         Prior Services:    Prior Services  [] Yes  [x]No   Home Health  [] Yes  [x]No   [address] [facility]  [] Yes  [x]No        Number of [address] Program  [] Yes  [x]No       Meals on Wheels  [] Yes  [x]No   Office on Aging  [] Yes  [x]No   Transportation Services  [] Yes  [x]No   Nursing Home  [] Yes  [x]No        Nursing Home Name    [address]  [] Yes  [x]No        [address] Name    Other       Information Source:      Information obtained from  [x] Patient  [] Parent   [] [address]   [] Child  [] Spouse   [] Significant Other/Partner   [] Friend      [] EMS    [] Nursing Home Chart          [] Other:   Chart Review  [x] Yes  []No     Family/Support System:    Patient lives with  [] Alone    [x] Spouse   [] Significant Other  [] Children  [] Caretaker   [] Parent  [] Sibling     [] Other       Other Support System:    Is the patient responsible for care of others  [x] Yes  []No   Information of person caring for patient on  discharge    Managers financial affairs independently  [x] Yes  []No   If no, explain:      Status Prior to Admission:    Mental Status  [x] Awake  [x] Alert  [x] Oriented  [x] Quiet/Calm [] Lethargic/Sedated   [] Disoriented  [] Restless/Anxious  [] Combative   Personal Care  [] Dependent  [x] [address]  [] Requires Assistance   Meal Preparation Ability  [x] Independent   [] Standby Assistance   [] Minimal Assistance   [] Moderate Assistance  [] Maximum Assistance     [] Total Assistance   Chores  [x] Independent with Chores   [] N/A Nursing Home Resident   [] Requires Assistance   Bowel/Bladder  [x] Continent  [] Catheter  [] Incontinent  [] Ostomy Self-Care    [] Urine Diversion Self-Care  [] Maximum Assistance     [] Total Assistance   Number of Persons needed for assistance    DME at home  [] [address], Andie Heading  [] [address] [facility], Straight   [] Commode    [] Bathroom/Grab Bars  [] Hospital Bed  [] Nebulizer  [] Oxygen           [] Raised Toilet Seat  [] Shower Chair  [] Side Rails for Bed   [] Tub Transfer Bench   [] Lincoln Diones  [] Walker, Standard      [x] Other: back brace, c-pap   Vendor      Treatment Presently Receiving:    Current Treatments  [] Chemotherapy  [] Dialysis  [] Insulin  [] IVAB [x] IVF   [] O2  [] PCA   [] PT   [] RT   [] Tube Feedings   [] Wound Care     Psychosocial Evaluation:    Verbalized Knowledge of Disease Process  [] Patient  []Family   Coping with Disease Process  [] Patient  []Family   Requires Further Counseling Coping with Disease Process  [] Patient  []Family     Identified Projected Needs:    Home Health Aid  [] Yes  [x]No   Transportation  [] Yes  [x]No   Education  [] Yes  [x]No        Specific Education     Financial Counseling  [] Yes  [x]No   Inability to Care for Self/Will Require 24 hour care  [] Yes  [x]No   Pain Management  [] Yes  [x]No   Home Infusion Therapy  [] Yes  [x]No   Oxygen Therapy  [] Yes  [x]No   DME  [] Yes  [x]No   Long Term Care Placement  [] Yes  [x]No   Rehab  [] Yes  [x]No   Physical Therapy  [] Yes  [x]No   Needs Anticipated At This Time  [] Yes  [x]No     Intra-Hospital Referral:    [address] [facility]  [] Yes  [x]No     [] Yes  [x]No   Patient Representative  [] Yes  [x]No   Staff for Teaching Needs  [] Yes  [x]No   Specialty Teaching Needs     Diabetic Educator  [] Yes  [x]No   Referral for Diabetic Educator Needed  [] Yes  [x]No  If Yes, place order for Nutritionist or Diabetic Consult     Tentative Discharge Plan:    Home with No Services  [x] Yes  []No   Home with [address]  [] Yes  [x]No        If Yes, specify type    Home Care Program  [] Yes  [x]No        If Yes, specify type    Meals on Wheels  [] Yes  [x]No   Office of Aging  [] Yes  [x]No   NHP  [] Yes  [x]No   Return to the Nursing Home  [] Yes  [x]No   Rehab Therapy  [] Yes  [x]No   Acute Rehab  [] Yes  [x]No   Subacute Rehab  [] Yes  [x]No   Private Care  [] Yes  [x]No   Substance Abuse Referral  [] Yes  [x]No   Transportation  [] Yes  [x]No   Chore Service  [] Yes  [x]No   Inpatient Hospice  [] Yes  [x]No   OP RT  [] Yes  [x] No   OP Hemo  [] Yes  [x] No   OP PT  [] Yes  [x]No   Support Group  [] Yes  [x]No   Reach to Recovery  [] Yes  [x]No   OP Oncology Clinic  [] Yes  [x]No   Clinic Appointment  [] Yes  [x]No   DME  [] Yes  [x]No   Comments    Name of D/C Planner or  Given to Patient or Family Davina Blood   Phone Number Pager: [phone]        [address]  5347. Date 09-   Time    If you are discharged home, whom do you designate to participate in your discharge plan and receive any information needed?      Enter name of [address] [facility]        Phone # of catarina [phone]        Address of designee         Updated         Patient refused to designate any           individual

## 2017-09-07 LAB
ALBUMIN SERPL-MCNC: 2.9 G/DL (ref 3.4–5)
ALBUMIN/GLOB SERPL: 1.1 {RATIO} (ref 0.8–1.7)
ALP SERPL-CCNC: 291 U/L (ref 45–117)
ALT SERPL-CCNC: 219 U/L (ref 13–56)
ANION GAP SERPL CALC-SCNC: 10 MMOL/L (ref 3–18)
AST SERPL-CCNC: 433 U/L (ref 15–37)
BASOPHILS # BLD: 0 K/UL (ref 0–0.06)
BASOPHILS NFR BLD: 1 % (ref 0–2)
BILIRUB SERPL-MCNC: 1.2 MG/DL (ref 0.2–1)
BUN SERPL-MCNC: 7 MG/DL (ref 7–18)
BUN/CREAT SERPL: 15 (ref 12–20)
CALCIUM SERPL-MCNC: 7.9 MG/DL (ref 8.5–10.1)
CHLORIDE SERPL-SCNC: 107 MMOL/L (ref 100–108)
CO2 SERPL-SCNC: 26 MMOL/L (ref 21–32)
CREAT SERPL-MCNC: 0.46 MG/DL (ref 0.6–1.3)
DIFFERENTIAL METHOD BLD: ABNORMAL
EOSINOPHIL # BLD: 0.1 K/UL (ref 0–0.4)
EOSINOPHIL NFR BLD: 3 % (ref 0–5)
ERYTHROCYTE [DISTWIDTH] IN BLOOD BY AUTOMATED COUNT: 15.6 % (ref 11.6–14.5)
GLOBULIN SER CALC-MCNC: 2.6 G/DL (ref 2–4)
GLUCOSE BLD STRIP.AUTO-MCNC: 161 MG/DL (ref 70–110)
GLUCOSE BLD STRIP.AUTO-MCNC: 172 MG/DL (ref 70–110)
GLUCOSE BLD STRIP.AUTO-MCNC: 204 MG/DL (ref 70–110)
GLUCOSE BLD STRIP.AUTO-MCNC: 95 MG/DL (ref 70–110)
GLUCOSE SERPL-MCNC: 90 MG/DL (ref 74–99)
HCT VFR BLD AUTO: 26.1 % (ref 35–45)
HGB BLD-MCNC: 8.6 G/DL (ref 12–16)
LIPASE SERPL-CCNC: 322 U/L (ref 73–393)
LYMPHOCYTES # BLD: 0.7 K/UL (ref 0.9–3.6)
LYMPHOCYTES NFR BLD: 34 % (ref 21–52)
MCH RBC QN AUTO: 29.5 PG (ref 24–34)
MCHC RBC AUTO-ENTMCNC: 33 G/DL (ref 31–37)
MCV RBC AUTO: 89.4 FL (ref 74–97)
MONOCYTES # BLD: 0.4 K/UL (ref 0.05–1.2)
MONOCYTES NFR BLD: 17 % (ref 3–10)
NEUTS SEG # BLD: 1 K/UL (ref 1.8–8)
NEUTS SEG NFR BLD: 45 % (ref 40–73)
PLATELET # BLD AUTO: 145 K/UL (ref 135–420)
PMV BLD AUTO: 9.3 FL (ref 9.2–11.8)
POTASSIUM SERPL-SCNC: 3.5 MMOL/L (ref 3.5–5.5)
PROT SERPL-MCNC: 5.5 G/DL (ref 6.4–8.2)
RBC # BLD AUTO: 2.92 M/UL (ref 4.2–5.3)
SODIUM SERPL-SCNC: 143 MMOL/L (ref 136–145)
WBC # BLD AUTO: 2.2 K/UL (ref 4.6–13.2)

## 2017-09-07 PROCEDURE — 83690 ASSAY OF LIPASE: CPT | Performed by: INTERNAL MEDICINE

## 2017-09-07 PROCEDURE — 74011000250 HC RX REV CODE- 250: Performed by: INTERNAL MEDICINE

## 2017-09-07 PROCEDURE — 77030020263 HC SOL INJ SOD CL0.9% LFCR 1000ML

## 2017-09-07 PROCEDURE — 65660000000 HC RM CCU STEPDOWN

## 2017-09-07 PROCEDURE — 74011250636 HC RX REV CODE- 250/636: Performed by: INTERNAL MEDICINE

## 2017-09-07 PROCEDURE — 74011636637 HC RX REV CODE- 636/637: Performed by: INTERNAL MEDICINE

## 2017-09-07 PROCEDURE — 36415 COLL VENOUS BLD VENIPUNCTURE: CPT | Performed by: INTERNAL MEDICINE

## 2017-09-07 PROCEDURE — 82962 GLUCOSE BLOOD TEST: CPT

## 2017-09-07 PROCEDURE — 74011250637 HC RX REV CODE- 250/637: Performed by: INTERNAL MEDICINE

## 2017-09-07 PROCEDURE — 85025 COMPLETE CBC W/AUTO DIFF WBC: CPT | Performed by: INTERNAL MEDICINE

## 2017-09-07 PROCEDURE — 80053 COMPREHEN METABOLIC PANEL: CPT | Performed by: INTERNAL MEDICINE

## 2017-09-07 RX ORDER — ESCITALOPRAM OXALATE 10 MG/1
10 TABLET ORAL DAILY
Status: DISCONTINUED | OUTPATIENT
Start: 2017-09-07 | End: 2017-09-08 | Stop reason: HOSPADM

## 2017-09-07 RX ADMIN — Medication 10 ML: at 05:20

## 2017-09-07 RX ADMIN — FAMOTIDINE 20 MG: 10 INJECTION, SOLUTION INTRAVENOUS at 21:56

## 2017-09-07 RX ADMIN — INSULIN LISPRO 2 UNITS: 100 INJECTION, SOLUTION INTRAVENOUS; SUBCUTANEOUS at 16:54

## 2017-09-07 RX ADMIN — ESCITALOPRAM OXALATE 10 MG: 10 TABLET ORAL at 14:03

## 2017-09-07 RX ADMIN — HEPARIN SODIUM 5000 UNITS: 5000 INJECTION, SOLUTION INTRAVENOUS; SUBCUTANEOUS at 05:19

## 2017-09-07 RX ADMIN — INSULIN LISPRO 4 UNITS: 100 INJECTION, SOLUTION INTRAVENOUS; SUBCUTANEOUS at 12:41

## 2017-09-07 RX ADMIN — HYDROMORPHONE HYDROCHLORIDE 1 MG: 1 INJECTION, SOLUTION INTRAMUSCULAR; INTRAVENOUS; SUBCUTANEOUS at 21:56

## 2017-09-07 RX ADMIN — Medication 10 ML: at 21:57

## 2017-09-07 RX ADMIN — FAMOTIDINE 20 MG: 10 INJECTION, SOLUTION INTRAVENOUS at 08:22

## 2017-09-07 RX ADMIN — LORAZEPAM 1 MG: 2 INJECTION INTRAMUSCULAR; INTRAVENOUS at 12:42

## 2017-09-07 RX ADMIN — HEPARIN SODIUM 5000 UNITS: 5000 INJECTION, SOLUTION INTRAVENOUS; SUBCUTANEOUS at 21:56

## 2017-09-07 RX ADMIN — HYDROMORPHONE HYDROCHLORIDE 1 MG: 1 INJECTION, SOLUTION INTRAMUSCULAR; INTRAVENOUS; SUBCUTANEOUS at 10:00

## 2017-09-07 RX ADMIN — HEPARIN SODIUM 5000 UNITS: 5000 INJECTION, SOLUTION INTRAVENOUS; SUBCUTANEOUS at 12:41

## 2017-09-07 RX ADMIN — HYDROMORPHONE HYDROCHLORIDE 1 MG: 1 INJECTION, SOLUTION INTRAMUSCULAR; INTRAVENOUS; SUBCUTANEOUS at 05:44

## 2017-09-07 RX ADMIN — LORAZEPAM 1 MG: 1 TABLET ORAL at 23:33

## 2017-09-07 RX ADMIN — LORAZEPAM 1 MG: 1 TABLET ORAL at 16:13

## 2017-09-07 RX ADMIN — HYDROMORPHONE HYDROCHLORIDE 1 MG: 1 INJECTION, SOLUTION INTRAMUSCULAR; INTRAVENOUS; SUBCUTANEOUS at 14:04

## 2017-09-07 RX ADMIN — SODIUM CHLORIDE 100 ML/HR: 900 INJECTION, SOLUTION INTRAVENOUS at 22:00

## 2017-09-07 RX ADMIN — Medication 10 ML: at 05:44

## 2017-09-07 RX ADMIN — LORAZEPAM 1 MG: 2 INJECTION INTRAMUSCULAR; INTRAVENOUS at 09:45

## 2017-09-07 RX ADMIN — SODIUM CHLORIDE 100 ML/HR: 900 INJECTION, SOLUTION INTRAVENOUS at 00:00

## 2017-09-07 RX ADMIN — SODIUM CHLORIDE 100 ML/HR: 900 INJECTION, SOLUTION INTRAVENOUS at 09:46

## 2017-09-07 RX ADMIN — HYDROMORPHONE HYDROCHLORIDE 1 MG: 1 INJECTION, SOLUTION INTRAMUSCULAR; INTRAVENOUS; SUBCUTANEOUS at 17:58

## 2017-09-07 RX ADMIN — ONDANSETRON 4 MG: 2 INJECTION INTRAMUSCULAR; INTRAVENOUS at 17:02

## 2017-09-07 RX ADMIN — Medication 10 ML: at 05:19

## 2017-09-07 RX ADMIN — INSULIN LISPRO 2 UNITS: 100 INJECTION, SOLUTION INTRAVENOUS; SUBCUTANEOUS at 22:00

## 2017-09-07 NOTE — PROGRESS NOTES
Pt was discovered eating pizza with her . The pt was educated about the importance of advancing her diet slowly given her current condition. She verbalized understanding and was instructed to notify the nurse should she have any pain/nausea.

## 2017-09-07 NOTE — PROGRESS NOTES
Bedside shift report received from Seven Walton RN    2000: AOX2, on room air, ersting in bed, IVf infusing well; watching tv; shift assessment done; on NPO status; needs within reach    2050: due meds given as scheduled; Dilaudid 1 mg given IV as requested for abdominal pain- see flowsheet    2130: ambulating around hallways, says \"I thought I was gonna cook in the [place]"; transferred to room 3025 closer to nurses' station for fall and wandering precautions    2200: ; no coverage given per sliding scale protocol    0100: quietly sleeping; no apparent distress; IVF infusing well    0300: sleeping; NSR on tele    0544: Dilaudid 1 mg given IV as requested for pain; see flowsheet    0630: quietly resting; no apparent distress noted    0710: Bedside and Verbal shift change report given to Sara Bowman RN and Star Brennan RN (oncoming nurse) by Golden Roberts RN (offgoing nurse). Report included the following information SBAR, Kardex, Intake/Output, Med Rec Status and Cardiac Rhythm NSR.

## 2017-09-07 NOTE — PROGRESS NOTES
Pt ate 100% of her clear liquid lunch without any issues, pain, or complaints of nausea. Recommend continuance of diet advancement.

## 2017-09-07 NOTE — PROGRESS NOTES
Problem: Falls - Risk of  Goal: *Absence of Falls  Document Alexander Fall Risk and appropriate interventions in the flowsheet.    Fall Risk Interventions:  Mobility Interventions: Communicate number of staff needed for ambulation/transfer     Mentation Interventions: Bed/chair exit alarm, Door open when patient unattended     Medication Interventions: Patient to call before getting OOB     Elimination Interventions: Call light in reach     History of Falls Interventions: Bed/chair exit alarm

## 2017-09-07 NOTE — MED STUDENT NOTES
History and Physical    Patient: Hayes Chilel MRN: [id number]  SSN: [id number]    YOB: 1960  Age: 65 y.o. Sex: female      Subjective: Hayes Chilel is a 65 y.o. female with a past medical history of chronic pancreatitis, alcohol abuse, and alcoholic hepatitis, presented with nausea, vomiting, and abdominal pain for three days. Patient could not keep any food down. Pain described as dull constant ache in epigastric region and right upper quadrant. Laying on left side and Motrin 800 mg helped relieve the pain. No blood noted in vomit. Patient also complained of cramping in her hands, neck, and back. Patient admitted to alcohol use as a stress relief in the home. Past Medical History:   Diagnosis Date    Alcohol abuse     Alcoholic hepatitis     Alcoholic pancreatitis     Anemia     Bipolar disorder (HCC)     Dr. Velásquez [facility])    Chronic abdominal pain     Chronic pancreatitis ([facility]     Compression fracture of lumbar vertebra (HCC)     L4, L5     Depression     Dr. Velásquez [facility])    Elevated LFTs     ETOH abuse     Fatty liver     GERD (gastroesophageal reflux disease)     Hyperlipidemia     Hypertension     Hypothyroidism     Lower GI bleeding     Morbid obesity ([address]     Obstructive sleep apnea     Substance induced mood disorder ([facility]     Vitamin D deficiency      Past Surgical History:   Procedure Laterality Date    BIOPSY LIVER  08/15/2012    Lap Left hepatic liver wedge by Dr. Tunde Hardy; BX Revealed: Hepatic Steatosis, AVM w/ associated Subcapsular Fibrosis.  COLONOSCOPY N/A 1/17/2017    COLONOSCOPY performed by Aby Guzman MD at [address] DISKECTOMY, ANTERIOR, WITH D  8/1995, 11/1997    HX ABDOMINAL LAPAROSCOPY  12/02/2014    Laparoscopic Gastrostomy w/ Partial Gastrectomy for assistance in placement of Endoscopic Retrograde Cholangiopancreatography & Diagnostic Lap.  HX CARPAL TUNNEL RELEASE      HX CHOLECYSTECTOMY  09/16/2014    Dr. Ermelinda French HX COLONOSCOPY  05/12/2012    Colonoscopy by Dr. Yvonne Cunningham. Inge Gordillo: Bx Revealed Colon Polyps (Tubular Adenoma), Hemorrhoids.  HX GASTRIC BYPASS  08/15/2012    Lap Christian-en-y    HX HEMORRHOIDECTOMY  04/30/2015    Dr. Henry Segovia. Jason    HX HYSTERECTOMY  2006    HX TONSILLECTOMY  1992    REPAIR NONUNION SCAPHOID CARPAL BONE Right 2010    Wrist      Family History   Problem Relation Age of Onset    Cancer Father     Cancer Sister     Obesity Brother      Social History   Substance Use Topics    Smoking status: Never Smoker    Smokeless tobacco: Never Used    Alcohol use 0.0 oz/week     0 Standard drinks or equivalent per week      Comment: 6 pack of beer a week- 1/2/17 last use      Prior to Admission medications    Medication Sig Start Date End Date Taking? Authorizing Provider   ergocalciferol (VITAMIN D2) 50,000 unit capsule Take 50,000 Units by mouth. Historical Provider   hydrOXYzine pamoate (VISTARIL) 50 mg capsule Take 50 mg by mouth two (2) times a day. Historical Provider   gabapentin (NEURONTIN) 600 mg tablet Take 600 mg by mouth three (3) times daily. Indications: NEUROPATHIC PAIN    Phys MD Laurie   ferrous sulfate (FEOSOL) 325 mg (65 mg iron) tablet Take 325 mg by mouth daily. Sandro Sullivan MD   biotin 10,000 mcg cap Take 1 Cap by mouth. Sandro Sullivan MD   calcium citrate-vitamin d3 (CITRACAL+D) 315-200 mg-unit tab Take 1 Tab by mouth two (2) times daily (with meals). Historical Provider   pantoprazole (PROTONIX) 20 mg tablet Take 40 mg by mouth daily. Historical Provider   cloNIDine HCl (CATAPRES) 0.1 mg tablet Take  by mouth two (2) times a day. Historical Provider   ergocalciferol (ERGOCALCIFEROL) 50,000 unit capsule Take 50,000 Units by mouth every seven (7) days. Historical Provider   [facility]) 625 mg tablet Take 2 Tabs by mouth two (2) times daily (with meals).  2/8/16 Meagan Arzate, DO   escitalopram oxalate (LEXAPRO) 10 mg tablet Take 1 Tab by mouth daily. 2/8/16   Dorjose angel Mcclainnel, DO   levothyroxine (SYNTHROID) 25 mcg tablet Take 1 Tab by mouth Daily (before breakfast). Indications: HYPOTHYROIDISM  Patient taking differently: Take 50 mcg by mouth Daily (before breakfast). Indications: hypothyroidism 2/8/16   Meagan Mcclainnel, DO   OLANZapine (ZYPREXA ZYDIS) 10 mg disintegrating tablet Take 1 Tab by mouth nightly. 2/8/16   Dorjose angel Mcclainnel, DO   ascorbic acid (VITAMIN C) 500 mg tablet Take 1,000 mg by mouth daily. Historical Provider   dicyclomine (BENTYL) 20 mg tablet Take 20 mg by mouth three (3) times daily. Sandro Sullivan, MD   cyanocobalamin (VITAMIN B-12) 1,000 mcg Subl 1,000 mcg by SubLINGual route daily. Historical Provider        Allergies   Allergen Reactions    Codeine Rash    Lamictal [Lamotrigine] Anaphylaxis    Morphine Anaphylaxis     Per patient, has tolerated dilaudid in the past.    Pcn [Penicillins] Anaphylaxis    Vancomycin Rash    Doxycycline Rash    Percocet [Oxycodone-Acetaminophen] Hives and Itching    Tetracycline Rash    Tomato Hives       Review of Systems:  Pertinent items are noted in the History of Present Illness. Objective:     Vitals:    09/06/17 2150 09/07/17 0520 09/07/17 0545 09/07/17 1027   BP: 100/62 108/76  124/87   Pulse: 70 77  73   Resp: 20 20  20   Temp: 98.5 °F (36.9 °C) 98.7 °F (37.1 °C)  98.4 °F (36.9 °C)   SpO2: 97% 94%  99%   Weight:   83.5 kg (184 lb)    Height:            Physical Exam:  GENERAL: alert, cooperative, no distress, appears stated age  LUNG: clear to auscultation bilaterally  HEART: regular rate and rhythm, S1, S2 normal, no murmur, click, rub or gallop  ABDOMEN: normal bowel sounds. Tenderness to palpation in RUQ and epigastric region. No guarding or rebound.    EXTREMITIES:  extremities normal, atraumatic, no cyanosis or edema    Assessment:     Hospital Problems  Date Reviewed: 9/5/2017 Codes Class Noted POA    * (Principal)Alcohol induced acute pancreatitis ICD-10-CM: K85.20  ICD-9-CM: 849.6  9/5/2017 Yes        Elevated LFTs ICD-10-CM: R94.5  ICD-9-CM: 790.6  Unknown Yes        Fatty liver ICD-10-CM: K76.0  ICD-9-CM: 571.8  Unknown Yes        Depression ICD-10-CM: F32.9  ICD-9-CM: 982  Unknown Yes        Alcoholic hepatitis CHG-43-EK: K70.10  ICD-9-CM: 571.1  Unknown Yes        Alcohol abuse ICD-10-CM: F10.10  ICD-9-CM: 305.00  3/24/2016 Yes        Acute hyponatremia ICD-10-CM: E87.1  ICD-9-CM: 276.1  2/3/2016 Yes        Acute pancreatitis (Chronic) ICD-10-CM: K85.90  ICD-9-CM: 149.8  10/20/2014 Unknown        Bipolar affective disorder, depressed, severe ([facility] ICD-10-CM: F31.4  ICD-9-CM: 296.53  5/9/2014 Yes        Essential hypertension, benign ICD-10-CM: I10  ICD-9-CM: 401.1  5/6/2014 Yes              Plan:     Alcohol-induced acute pancreatitis   - IV fluids  - Pain management w/ hydromorphone PRN  - Continue monitoring lipase, liver enzymes, fluid output    Alcohol abuse   - Education  - Plan for quitting again, success strategies   - Education and plan for stress reduction     Signed By: Dominga Avila     September 7, 2017      *ATTENTION:  This note has been created by a medical student for educational purposes only. Please do not refer to the content of this note for clinical decision-making, billing, or other purposes. Please see attending physicians note to obtain clinical information on this patient. *

## 2017-09-07 NOTE — CDMP QUERY
Please clarify if this patient is being treated/managed for:    =>Alcohol withdrawal  =>Other Explanation of clinical findings  =>Unable to Determine (no explanation of clinical findings)    The medical record reflects the following:    Risk: Adm with  Alcohol induced acute pancreatitis,   Alcohol abuse    Clinical Indicators: 9/6  IM progress notes- Neuro:awake,alert,orx3,tremors of upper extremities noted. Nurses notes- 9/6 pm- 2130: ambulating around hallways, says \"I thought I was gonna cook in the [place]"; transferred to room 3025 closer to nurses' station for fall and wandering precautions    Treatment: CIWA protocol    Please clarify and document your clinical opinion in the progress notes and discharge summary.     Thank you,    [facility],[address] Floor, [facility]

## 2017-09-07 NOTE — PROGRESS NOTES
Bedside and Verbal shift change report given to ERIC (oncoming nurse) by Ramón Amezquita (offgoing nurse). Report included the following information SBAR and MAR. Pt resting in bed and reports no immediate needs at this time.

## 2017-09-07 NOTE — PROGRESS NOTES
Bedside and Verbal shift change report given to Andree (oncoming nurse) by Raymond Chester (offgoing nurse). Report included the following information SBAR and MAR.

## 2017-09-07 NOTE — PROGRESS NOTES
Problem: Falls - Risk of  Goal: *Absence of Falls  Document Alexander Fall Risk and appropriate interventions in the flowsheet.    Outcome: Progressing Towards Goal  Fall Risk Interventions:  Mobility Interventions: Patient to call before getting OOB     Mentation Interventions: Bed/chair exit alarm, More frequent rounding     Medication Interventions: Patient to call before getting OOB, Teach patient to arise slowly     Elimination Interventions: Call light in reach, Patient to call for help with toileting needs     History of Falls Interventions: Bed/chair exit alarm, Door open when patient unattended, Evaluate medications/consider consulting pharmacy

## 2017-09-07 NOTE — PROGRESS NOTES
Hospitalist Progress Note  Adore Payne MD  Internal medicine/ Hospitalist    Daily Progress Note: 9/7/2017 12:53 PM      Interval history / Subjective: Doanvan Kay is a 65 y.o.  female with h/o alcohol abuse,pancreatitis,came to the emergency room because of abdominal pain,nausea,vomitng. She was diagnosed with acute pancreatitis,alcoholic hepatitis,alcohol abuse,depression. Today she reports improvement of abdm pain,nausea.     Current Facility-Administered Medications   Medication Dose Route Frequency    escitalopram oxalate (LEXAPRO) tablet 10 mg  10 mg Oral DAILY    sodium chloride (NS) flush 5-10 mL  5-10 mL IntraVENous PRN    sodium chloride (NS) flush 5-10 mL  5-10 mL IntraVENous Q8H    sodium chloride (NS) flush 5-10 mL  5-10 mL IntraVENous PRN    HYDROmorphone (PF) (DILAUDID) injection 1 mg  1 mg IntraVENous Q4H PRN    heparin (porcine) injection 5,000 Units  5,000 Units SubCUTAneous Q8H    0.9% sodium chloride infusion  100 mL/hr IntraVENous CONTINUOUS    famotidine (PF) (PEPCID) 20 mg injection  20 mg IntraVENous Q12H    sodium chloride (NS) flush 5-10 mL  5-10 mL IntraVENous Q8H    sodium chloride (NS) flush 5-10 mL  5-10 mL IntraVENous PRN    LORazepam (ATIVAN) tablet 1 mg  1 mg Oral Q1H PRN    Or    LORazepam (ATIVAN) injection 1 mg  1 mg IntraVENous Q1H PRN    LORazepam (ATIVAN) tablet 2 mg  2 mg Oral Q1H PRN    Or    LORazepam (ATIVAN) injection 2 mg  2 mg IntraVENous Q1H PRN    LORazepam (ATIVAN) injection 3 mg  3 mg IntraVENous Q15MIN PRN    ondansetron (ZOFRAN) injection 4 mg  4 mg IntraVENous Q6H PRN    insulin lispro (HUMALOG) injection   SubCUTAneous AC&HS    glucose chewable tablet 16 g  4 Tab Oral PRN    glucagon (GLUCAGEN) injection 1 mg  1 mg IntraMUSCular PRN    dextrose (D50W) injection syrg 12.5-25 g  25-50 mL IntraVENous PRN        Review of Systems  Improved abdominal pain    Objective:     Visit Vitals    /87 (BP 1 Location: Right arm)    Pulse 73    Temp 98.4 °F (36.9 °C)    Resp 20    Ht 5' 6\" (1.676 m)    Wt 83.5 kg (184 lb)    SpO2 99%    Breastfeeding No    BMI 29.7 kg/m2      O2 Device: Room air    Temp (24hrs), Av.5 °F (36.9 °C), Min:98.1 °F (36.7 °C), Max:98.9 °F (37.2 °C)          1901 -  0700  In: 1343.3 [I.V.:1343.3]  Out: -   P/E  NAD,sitting in chair,somewhat anxious. Heent:perrla,at/nc,mouth dry. Lungs ctab  Heart s1s2 nl,no m/g  Abdm:mild epigastric tenderness,bs present,soft. Extr:no edema,good pulses. Neuro:awake,alert,orx3,tremors of upper extremities noted. Data Review    Recent Results (from the past 12 hour(s))   CBC WITH AUTOMATED DIFF    Collection Time: 17  4:10 AM   Result Value Ref Range    WBC 2.2 (L) 4.6 - 13.2 K/uL    RBC 2.92 (L) 4.20 - 5.30 M/uL    HGB 8.6 (L) 12.0 - 16.0 g/dL    HCT 26.1 (L) 35.0 - 45.0 %    MCV 89.4 74.0 - 97.0 FL    MCH 29.5 24.0 - 34.0 PG    MCHC 33.0 31.0 - 37.0 g/dL    RDW 15.6 (H) 11.6 - 14.5 %    PLATELET 361 457 - 443 K/uL    MPV 9.3 9.2 - 11.8 FL    NEUTROPHILS 45 40 - 73 %    LYMPHOCYTES 34 21 - 52 %    MONOCYTES 17 (H) 3 - 10 %    EOSINOPHILS 3 0 - 5 %    BASOPHILS 1 0 - 2 %    ABS. NEUTROPHILS 1.0 (L) 1.8 - 8.0 K/UL    ABS. LYMPHOCYTES 0.7 (L) 0.9 - 3.6 K/UL    ABS. MONOCYTES 0.4 0.05 - 1.2 K/UL    ABS. EOSINOPHILS 0.1 0.0 - 0.4 K/UL    ABS.  BASOPHILS 0.0 0.0 - 0.06 K/UL    DF AUTOMATED     METABOLIC PANEL, COMPREHENSIVE    Collection Time: 17  4:10 AM   Result Value Ref Range    Sodium 143 136 - 145 mmol/L    Potassium 3.5 3.5 - 5.5 mmol/L    Chloride 107 100 - 108 mmol/L    CO2 26 21 - 32 mmol/L    Anion gap 10 3.0 - 18 mmol/L    Glucose 90 74 - 99 mg/dL    BUN 7 7.0 - 18 MG/DL    Creatinine 0.46 (L) 0.6 - 1.3 MG/DL    BUN/Creatinine ratio 15 12 - 20      GFR est AA >60 >60 ml/min/1.73m2    GFR est non-AA >60 >60 ml/min/1.73m2    Calcium 7.9 (L) 8.5 - 10.1 MG/DL    Bilirubin, total 1.2 (H) 0.2 - 1.0 MG/DL    ALT (SGPT) 219 (H) 13 - 56 U/L    AST (SGOT) 433 (H) 15 - 37 U/L    Alk. phosphatase 291 (H) 45 - 117 U/L    Protein, total 5.5 (L) 6.4 - 8.2 g/dL    Albumin 2.9 (L) 3.4 - 5.0 g/dL    Globulin 2.6 2.0 - 4.0 g/dL    A-G Ratio 1.1 0.8 - 1.7     LIPASE    Collection Time: 09/07/17  4:10 AM   Result Value Ref Range    Lipase 322 73 - 393 U/L   GLUCOSE, POC    Collection Time: 09/07/17  8:04 AM   Result Value Ref Range    Glucose (POC) 95 70 - 110 mg/dL   GLUCOSE, POC    Collection Time: 09/07/17 11:40 AM   Result Value Ref Range    Glucose (POC) 204 (H) 70 - 110 mg/dL         Assessment/Plan:     Principal Problem:    Alcohol induced acute pancreatitis (9/5/2017)    Active Problems:    Essential hypertension, benign (5/6/2014)      Bipolar affective disorder, depressed, severe ([facility] [address] (5/9/2014)      Acute pancreatitis (10/20/2014)      Acute hyponatremia (2/3/2016)      Alcohol abuse (3/24/2016)      Elevated LFTs ()      Fatty liver ()      Depression ()      Alcoholic hepatitis ()      Care Plan   1. Start clears,then advance  2. Dilaudid iv for pain and zofran for nausea. 3.CIWA protocol  4. Educated about quitting alcohol  5. Liver enzymes improving.   6.Repeat lab in am  DVT prophylaxis:sc heparin  Full code  Disposition:tbd

## 2017-09-07 NOTE — ROUTINE PROCESS
0840 - Bedside, Verbal and Written shift change report given to Uriel Ryan RN/Rajiv Van, RN (oncoming nurse) by Ethel Spatz, RN/Denisha Gorman RN (offgoing nurse). Report included the following information SBAR, Kardex, Intake/Output, MAR, Recent Results, Med Rec Status, Procedure Summary and Cardiac Rhythm NSR.     0900 - Shift assessment completed. Pt alert and oriented x2 to self and place, with some confusion. No respiratory distress noted. No c/o pain reported. Call bell within reach, bed in low position. Will continue to monitor.      1930 - Bedside, Verbal and Written shift change report given to Mignon Irving RN (oncoming nurse) by Uriel Ryan RN/Rajiv Van RN (offgoing nurse). Report included the following information SBAR, Kardex, Intake/Output, MAR, Recent Results, Med Rec Status, Procedure Summary and Cardiac Rhythm NSR.

## 2017-09-08 VITALS
HEART RATE: 77 BPM | HEIGHT: 66 IN | BODY MASS INDEX: 29.57 KG/M2 | SYSTOLIC BLOOD PRESSURE: 126 MMHG | WEIGHT: 184 LBS | DIASTOLIC BLOOD PRESSURE: 84 MMHG | OXYGEN SATURATION: 96 % | TEMPERATURE: 98.8 F | RESPIRATION RATE: 20 BRPM

## 2017-09-08 LAB — GLUCOSE BLD STRIP.AUTO-MCNC: 140 MG/DL (ref 70–110)

## 2017-09-08 PROCEDURE — 77030020263 HC SOL INJ SOD CL0.9% LFCR 1000ML

## 2017-09-08 PROCEDURE — 74011250637 HC RX REV CODE- 250/637: Performed by: INTERNAL MEDICINE

## 2017-09-08 PROCEDURE — 82962 GLUCOSE BLOOD TEST: CPT

## 2017-09-08 PROCEDURE — 74011250636 HC RX REV CODE- 250/636: Performed by: INTERNAL MEDICINE

## 2017-09-08 PROCEDURE — 74011000250 HC RX REV CODE- 250: Performed by: INTERNAL MEDICINE

## 2017-09-08 RX ORDER — ONDANSETRON 4 MG/1
4 TABLET, FILM COATED ORAL
Qty: 10 TAB | Refills: 0 | Status: SHIPPED | OUTPATIENT
Start: 2017-09-08 | End: 2017-10-06

## 2017-09-08 RX ORDER — FAMOTIDINE 20 MG/1
20 TABLET, FILM COATED ORAL 2 TIMES DAILY
Status: DISCONTINUED | OUTPATIENT
Start: 2017-09-08 | End: 2017-09-08 | Stop reason: HOSPADM

## 2017-09-08 RX ORDER — OXYCODONE HYDROCHLORIDE 5 MG/1
10 TABLET ORAL
Qty: 12 TAB | Refills: 0 | Status: SHIPPED | OUTPATIENT
Start: 2017-09-08 | End: 2017-10-06

## 2017-09-08 RX ADMIN — HEPARIN SODIUM 5000 UNITS: 5000 INJECTION, SOLUTION INTRAVENOUS; SUBCUTANEOUS at 06:11

## 2017-09-08 RX ADMIN — HYDROMORPHONE HYDROCHLORIDE 1 MG: 1 INJECTION, SOLUTION INTRAMUSCULAR; INTRAVENOUS; SUBCUTANEOUS at 09:41

## 2017-09-08 RX ADMIN — SODIUM CHLORIDE 100 ML/HR: 900 INJECTION, SOLUTION INTRAVENOUS at 06:12

## 2017-09-08 RX ADMIN — Medication 10 ML: at 06:11

## 2017-09-08 RX ADMIN — HYDROMORPHONE HYDROCHLORIDE 1 MG: 1 INJECTION, SOLUTION INTRAMUSCULAR; INTRAVENOUS; SUBCUTANEOUS at 06:11

## 2017-09-08 RX ADMIN — HYDROMORPHONE HYDROCHLORIDE 1 MG: 1 INJECTION, SOLUTION INTRAMUSCULAR; INTRAVENOUS; SUBCUTANEOUS at 01:54

## 2017-09-08 RX ADMIN — ESCITALOPRAM OXALATE 10 MG: 10 TABLET ORAL at 09:30

## 2017-09-08 RX ADMIN — FAMOTIDINE 20 MG: 10 INJECTION, SOLUTION INTRAVENOUS at 09:31

## 2017-09-08 NOTE — PROGRESS NOTES
Problem: Falls - Risk of  Goal: *Absence of Falls  Document Alexander Fall Risk and appropriate interventions in the flowsheet. Outcome: Progressing Towards Goal  Fall Risk Interventions:  Mobility Interventions: Patient to call before getting OOB     Mentation Interventions:  Toileting rounds, Update white board, Room close to nurse's station     Medication Interventions: Patient to call before getting OOB, Teach patient to arise slowly, Evaluate medications/consider consulting pharmacy     Elimination Interventions: Call light in reach, Patient to call for help with toileting needs, Toilet paper/wipes in reach, Toileting schedule/hourly rounds     History of Falls Interventions: Evaluate medications/consider consulting pharmacy, Room close to nurse's station

## 2017-09-08 NOTE — MANAGEMENT PLAN
Discharge [address] when medically stable and follow up with PCP      Jonathan Carrasco RN BSN  Outcomes Manager  Pager # [phone]

## 2017-09-08 NOTE — DISCHARGE INSTRUCTIONS
Acute Alcohol Intoxication: Care Instructions  Your Care Instructions  You have had treatment to help your body rid itself of alcohol. Too much alcohol upsets the body's fluid balance. Your doctor may have given you fluids and vitamins. For some people, drinking too much alcohol is a one-time event. For others, it is an ongoing problem. In either case, it is serious. It can be life-threatening. Follow-up care is a key part of your treatment and safety. Be sure to make and go to all appointments, and call your doctor if you are having problems. It's also a good idea to know your test results and keep a list of the medicines you take. How can you care for yourself at home? · Be safe with medicines. Take your medicines exactly as prescribed. Call your doctor if you think you are having a problem with your medicine. · Your doctor may have prescribed disulfiram (Antabuse). Do not drink any alcohol while you are taking this medicine. You may have severe or even life-threatening side effects from even small amounts of alcohol. · If you were given medicine to prevent nausea, be sure to take it exactly as prescribed. · Before you take any medicine, tell your doctor if:  ¨ You have had a bad reaction to any medicines in the past.  ¨ You are taking other medicines, including over-the-counter ones, or have other health problems. ¨ You are or could be pregnant. · Be prepared to have some symptoms of withdrawal in the next few days. · Drink plenty of liquids in the next few days. · Seek help if you need it to stop drinking. Getting counseling and joining a support group can help you stay sober. Try a support group such as Alcoholics Anonymous. · Avoid alcohol when you take medicines. It can react with many medicines and cause serious problems. When should you call for help? Call 911 anytime you think you may need emergency care.  For example, call if:  · You feel confused and are seeing things that are not there.  · You are thinking about killing yourself or hurting others. · You have a seizure. · You vomit blood or what looks like coffee grounds. Call your doctor now or seek immediate medical care if:  · You have trembling, restlessness, sweating, and other withdrawal symptoms that are new or that get worse. · Your withdrawal symptoms come back after not bothering you for days or weeks. · You can't stop vomiting. Watch closely for changes in your health, and be sure to contact your doctor if:  · You need help to stop drinking. Where can you learn more? Go to http://salvatore-izabela.info/. Enter T102 in the search box to learn more about \"Acute Alcohol Intoxication: Care Instructions. \"  Current as of: March 20, 2017  Content Version: 11.3  © 4659-6901 [employer]. Care instructions adapted under license by [employer] (which disclaims liability or warranty for this information). If you have questions about a medical condition or this instruction, always ask your healthcare professional. [place] [address] any warranty or liability for your use of this information. Learning About High Blood Pressure  What is high blood pressure? Blood pressure is a measure of how hard the blood pushes against the walls of your arteries. It's normal for blood pressure to go up and down throughout the day, but if it stays up, you have high blood pressure. Another name for high blood pressure is hypertension. Two numbers tell you your blood pressure. The first number is the systolic pressure. It shows how hard the blood pushes when your heart is pumping. The second number is the diastolic pressure. It shows how hard the blood pushes between heartbeats, when your heart is relaxed and filling with blood. A blood pressure of less than 120/80 (say \"120 over 80\") is ideal for an adult. High blood pressure is 140/90 or higher.  You have high blood pressure if your top number is 140 or higher or your bottom number is 90 or higher, or both. Many people fall into the category in between, called prehypertension. People with prehypertension need to make lifestyle changes to bring their blood pressure down and help prevent or delay high blood pressure. What happens when you have high blood pressure? · Blood flows through your arteries with too much force. Over time, this damages the walls of your arteries. But you can't feel it. High blood pressure usually doesn't cause symptoms. · Fat and calcium start to build up in your arteries. This buildup is called plaque. Plaque makes your arteries narrower and stiffer. Blood can't flow through them as easily. · This lack of good blood flow starts to damage some of the organs in your body. This can lead to problems such as coronary artery disease and heart attack, heart failure, stroke, kidney failure, and eye damage. How can you prevent high blood pressure? · Stay at a healthy weight. · Try to limit how much sodium you eat to less than 2,300 milligrams (mg) a day. If you limit your sodium to 1,500 mg a day, you can lower your blood pressure even more. ¨ Buy foods that are labeled \"unsalted,\" \"sodium-free,\" or \"low-sodium. \" Foods labeled \"reduced-sodium\" and \"light sodium\" may still have too much sodium. ¨ Flavor your food with garlic, lemon juice, onion, vinegar, herbs, and spices instead of salt. Do not use soy sauce, steak sauce, onion salt, garlic salt, mustard, or ketchup on your food. ¨ Use less salt (or none) when recipes call for it. You can often use half the salt a recipe calls for without losing flavor. · Be physically active. Get at least 30 minutes of exercise on most days of the week. Walking is a good choice. You also may want to do other activities, such as running, swimming, cycling, or playing tennis or team sports. · Limit alcohol to 2 drinks a day for men and 1 drink a day for women.   · Eat plenty of fruits, vegetables, and low-fat dairy products. Eat less saturated and total fats. How is high blood pressure treated? · Your doctor will suggest making lifestyle changes. For example, your doctor may ask you to eat healthy foods, quit smoking, lose extra weight, and be more active. · If lifestyle changes don't help enough or your blood pressure is very high, you will have to take medicine every day. Follow-up care is a key part of your treatment and safety. Be sure to make and go to all appointments, and call your doctor if you are having problems. It's also a good idea to know your test results and keep a list of the medicines you take. Where can you learn more? Go to http://salvatore-izabela.info/. Enter P501 in the search box to learn more about \"Learning About High Blood Pressure. \"  Current as of: March 23, 2016  Content Version: 11.3  © 4681-8879 [employer]. Care instructions adapted under license by [employer] (which disclaims liability or warranty for this information). If you have questions about a medical condition or this instruction, always ask your healthcare professional. [address] any warranty or liability for your use of this information. Pancreatitis: Care Instructions  Your Care Instructions    The pancreas is an organ behind the stomach. It makes hormones and enzymes to help your body digest food. But if these enzymes attack the pancreas, it can get inflamed. This is called pancreatitis. Most cases are caused by gallstones or by heavy alcohol use. If you take care of yourself at home, it will help you get better. It will also help you avoid more problems with your pancreas. Follow-up care is a key part of your treatment and safety. Be sure to make and go to all appointments, and call your doctor if you are having problems. It's also a good idea to know your test results and keep a list of the medicines you take.   How can you care for yourself at home? · Drink clear liquids and eat bland foods until you feel better. [place] foods include rice, dry toast, and crackers. They also include bananas and applesauce. · Eat a low-fat diet until your doctor says your pancreas is healed. · Do not drink alcohol. Tell your doctor if you need help to quit. Counseling, support groups, and sometimes medicines can help you stay sober. · Be safe with medicines. Read and follow all instructions on the label. ¨ If the doctor gave you a prescription medicine for pain, take it as prescribed. ¨ If you are not taking a prescription pain medicine, ask your doctor if you can take an over-the-counter medicine. · If your doctor prescribed antibiotics, take them as directed. Do not stop taking them just because you feel better. You need to take the full course of antibiotics. · Get extra rest until you feel better. To prevent future problems with your pancreas  · Do not drink alcohol. · Tell your doctors and pharmacist that you've had pancreatitis. They can help you avoid medicines that may cause this problem again. When should you call for help? Call your doctor now or seek immediate medical care if:  · You have new or severe belly pain. · You have a new or higher fever. · You can't keep fluid or medicines down. Watch closely for changes in your health, and be sure to contact your doctor if:  · The symptoms you had when you first started feeling sick come back. · You do not get better as expected. · You need help to stop drinking alcohol. Where can you learn more? Go to http://salvatore-izabela.info/. Enter I719 in the search box to learn more about \"Pancreatitis: Care Instructions. \"  Current as of: August 9, 2016  Content Version: 11.3  © 4639-2262 [employer]. Care instructions adapted under license by [employer] (which disclaims liability or warranty for this information).  If you have questions about a medical condition or this instruction, always ask your healthcare professional. [address] any warranty or liability for your use of this information. Patient armband removed and shredded  DISCHARGE SUMMARY from Nurse    The following personal items are in your possession at time of discharge:       Visual Aid: None                            PATIENT INSTRUCTIONS:    After general anesthesia or intravenous sedation, for 24 hours or while taking prescription Narcotics:  · Limit your activities  · Do not drive and operate hazardous machinery  · Do not make important personal or business decisions  · Do  not drink alcoholic beverages  · If you have not urinated within 8 hours after discharge, please contact your surgeon on call. Report the following to your surgeon:  · Excessive pain, swelling, redness or odor of or around the surgical area  · Temperature over 100.5  · Nausea and vomiting lasting longer than 4 hours or if unable to take medications  · Any signs of decreased circulation or nerve impairment to extremity: change in color, persistent  numbness, tingling, coldness or increase pain  · Any questions        What to do at Home:  Recommended activity: Activity as tolerated,     If you experience any of the following symptoms: worsening symptoms, severe pain, fever greater 100.5 , please follow up with PCP. *  Please give a list of your current medications to your Primary Care Provider. *  Please update this list whenever your medications are discontinued, doses are      changed, or new medications (including over-the-counter products) are added. *  Please carry medication information at all times in case of emergency situations. These are general instructions for a healthy lifestyle:    No smoking/ No tobacco products/ Avoid exposure to second hand smoke    Surgeon General's Warning:  Quitting smoking now greatly reduces serious risk to your health.     Obesity, smoking, and sedentary lifestyle greatly increases your risk for illness    A healthy diet, regular physical exercise & weight monitoring are important for maintaining a healthy lifestyle    You may be retaining fluid if you have a history of heart failure or if you experience any of the following symptoms:  Weight gain of 3 pounds or more overnight or 5 pounds in a week, increased swelling in our hands or feet or shortness of breath while lying flat in bed. Please call your doctor as soon as you notice any of these symptoms; do not wait until your next office visit. Recognize signs and symptoms of STROKE:    F-face looks uneven    A-arms unable to move or move unevenly    S-speech slurred or non-existent    T-time-call 911 as soon as signs and symptoms begin-DO NOT go       Back to bed or wait to see if you get better-TIME IS BRAIN. Warning Signs of HEART ATTACK     Call 911 if you have these symptoms:   Chest discomfort. Most heart attacks involve discomfort in the center of the chest that lasts more than a few minutes, or that goes away and comes back. It can feel like uncomfortable pressure, squeezing, fullness, or pain.  Discomfort in other areas of the upper body. Symptoms can include pain or discomfort in one or both arms, the back, neck, jaw, or stomach.  Shortness of breath with or without chest discomfort.  Other signs may include breaking out in a cold sweat, nausea, or lightheadedness. Don't wait more than five minutes to call 911 - MINUTES MATTER! Fast action can save your life. Calling 911 is almost always the fastest way to get lifesaving treatment. Emergency Medical Services staff can begin treatment when they arrive -- up to an hour sooner than if someone gets to the hospital by car. The discharge information has been reviewed with the patient. The patient verbalized understanding.     Discharge medications reviewed with the patient and appropriate educational materials and side effects teaching were provided.

## 2017-09-08 NOTE — PROGRESS NOTES
[facility]: This patient meets P & T approved criteria for conversion from IV to oral therapy for the following medication:     Famotidine 20 mg IV q12h was discontinued and Famotidine 20 mg PO BID was started. If the patient no longer meets all criteria for oral therapy, the pharmacist will switch back to IV therapy. Thanks.

## 2017-09-08 NOTE — PROGRESS NOTES
Bedside shift report received from [address] [place] [address] and Via Aziza Green RN    1910: [place], on room air, resting in bed, IVF infusing well; shift assessment done; NSR on tele    1930: up walking in the hallway knocking on the kitchen door; Advised to call for needs and not to unhook IV by herself; verbalized understanding of instructions given; maintained on fall precautions    2156: due meds given as scheduled; Dialudid 1 mg given IV as requested for c/o abdominal pain- see flowsheet; denies any nausea or vomiting    2230: resting in bed, no apparent distress noted; had a cup of ginger ale; tolerated well    2333: Ativan 1 mg po given for CIWA protocol; pt agitated, scratching everywhere; no shortness of breath    0030: quietly sleeping; no apparent distress noted    0154: called for pain med; Dilaudid 1 mg given IV - see flowsheet    0230: quietly sleeping    0430: quietly sleeping; IVF infusing well    0611: Dilaudid 1 mg given IV for c/o abdominal pain- see flowsheet    9478: quietly resting, no behavioral indicators for pain    Bedside and Verbal shift change report given to Nery Smith (oncoming nurse) by Casper Nieves RN (offgoing nurse). Report included the following information SBAR, Kardex, Intake/Output, Recent Results and Cardiac Rhythm NSR.

## 2017-09-08 NOTE — DISCHARGE SUMMARY
Discharge Summary    Patient: Nuzhat Kovacs               Sex: female          DOA: 9/5/2017         YOB: 1960      Age:  65 y.o.        LOS:  LOS: 3 days                Admit Date: 9/5/2017    Discharge Date: 9/8/2017    Admission Diagnoses: Acute pancreatitis  Alcohol induced acute pancreatitis    Discharge Diagnoses:      1-Alcohol induced acute pancreatitis  2-Alcoholic hepatitis  3-Alcohol abuse  4-Hyponatremia  5-Bipolar  6-Elevated liver enzymes. Problem List as of 9/8/2017  Date Reviewed: 9/5/2017          Codes Class Noted - Resolved    * (Principal)Alcohol induced acute pancreatitis ICD-10-CM: K85.20  ICD-9-CM: 577.0  9/5/2017 - Present        Fever of unknown origin ICD-10-CM: R50.9  ICD-9-CM: 780.60  7/1/2017 - Present        Altered mental state ICD-10-CM: R41.82  ICD-9-CM: 780.97  7/1/2017 - Present        Hypotension ICD-10-CM: I95.9  ICD-9-CM: 458.9  7/1/2017 - Present        Bleeding disorder ([facility] ICD-10-CM: D68.9  ICD-9-CM: 287.9  1/18/2017 - Present        Thrombocytopenia ([facility] ICD-10-CM: D69.6  ICD-9-CM: 287.5  1/12/2017 - Present        Sepsis ([facility] ICD-10-CM: A41.9  ICD-9-CM: 038.9, 995.91  1/9/2017 - Present        Non-intractable vomiting ICD-10-CM: R11.10  ICD-9-CM: 787.03  1/9/2017 - Present        Elevated LFTs ICD-10-CM: R94.5  ICD-9-CM: 790.6  Unknown - Present        Fatty liver ICD-10-CM: K76.0  ICD-9-CM: 571.8  Unknown - Present        Bipolar disorder ([facility] ICD-10-CM: F31.9  ICD-9-CM: 296.80  Unknown - Present        Depression ICD-10-CM: F32.9  ICD-9-CM: 311  Unknown - Present        Anemia ICD-10-CM: D64.9  ICD-9-CM: 285. 9  Unknown - Present        Chronic pain ICD-10-CM: G89.29  ICD-9-CM: 338.29  Unknown - Present    Overview Signed 8/19/2016  1:31 PM by David Michel     abd pain             Chronic abdominal pain ICD-10-CM: R10.9, G89.29  ICD-9-CM: 789.00, 338.29  Unknown - Present        Substance induced mood disorder ([facility] ICD-10-CM: F19.94  ICD-9-CM: 292.84  Unknown - Present        GI bleed ICD-10-CM: K92.2  ICD-9-CM: 578.9  Unknown - Present        Lower GI bleeding ICD-10-CM: K92.2  ICD-9-CM: 578.9  Unknown - Present        Compression fracture of lumbar vertebra (HCC) ICD-10-CM: S32.000A  ICD-9-CM: 805.4  Unknown - Present    Overview Signed 8/19/2016  1:39 PM by Lindsay Oviedo     L4, L5              Alcoholic hepatitis [id number]: K70.10  ICD-9-CM: 571.1  Unknown - Present        Alcoholic pancreatitis RZH-69-QK: K85.20  ICD-9-CM: 670.7  Unknown - Present        Hypothyroidism ICD-10-CM: E03.9  ICD-9-CM: 244.9  Unknown - Present        Vitamin D deficiency ICD-10-CM: E55.9  ICD-9-CM: 268.9  Unknown - Present        Hyponatremia ICD-10-CM: E87.1  ICD-9-CM: 276.1  5/22/2016 - Present        Alcohol withdrawal ([facility] ICD-10-CM: I89.585  ICD-9-CM: 291.81  3/26/2016 - Present        UTI (urinary tract infection) ICD-10-CM: N39.0  ICD-9-CM: 599.0  3/25/2016 - Present        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  ICD-9-CM: 577.8  3/24/2016 - Present        Alcohol abuse ICD-10-CM: F10.10  ICD-9-CM: 305.00  3/24/2016 - Present        Acute hyponatremia ICD-10-CM: E87.1  ICD-9-CM: 276.1  2/3/2016 - Present        Hemorrhoids with complication UVE-25-EQ: Z49.3  ICD-9-CM: 455.8  4/21/2015 - Present        Gallstone pancreatitis ICD-10-CM: K85.10  ICD-9-CM: 577.0, 574.20  11/26/2014 - Present        Acute pancreatitis (Chronic) ICD-10-CM: K85.90  ICD-9-CM: 577.0  10/20/2014 - Present        Pancreatitis ICD-10-CM: K85.90  ICD-9-CM: 577.0  8/28/2014 - Present        Bipolar affective disorder, depressed, severe ([facility] ICD-10-CM: F31.4  ICD-9-CM: 296.53  5/9/2014 - Present        Altered mental status ICD-10-CM: R41.82  ICD-9-CM: 780.97  5/6/2014 - Present        Overdose ICD-10-CM: T50.901A  ICD-9-CM: 977.9, E980.5  5/6/2014 - Present        Alcohol abuse with intoxication delirium ([facility] ICD-10-CM: F10.121  ICD-9-CM: 291.0, 305.00  5/6/2014 - Present H/O gastric bypass ICD-10-CM: Z98.890  ICD-9-CM: V45.86  5/6/2014 - Present        HERMELINDA (obstructive sleep apnea) ICD-10-CM: E52.47  ICD-9-CM: 327.23  5/6/2014 - Present        Essential hypertension, benign ICD-10-CM: I10  ICD-9-CM: 401.1  5/6/2014 - Present        Malabsorption ICD-10-CM: K90.9  ICD-9-CM: 579.9  Unknown - Present        History of Lap Christian-en-Y gastric bypass- 8/15/12 w/BMI 39 ICD-10-CM: Z98.84  ICD-9-CM: V45.86  Unknown - Present        Morbid obesity ([address] ICD-10-CM: E66.01  ICD-9-CM: 278.01  Unknown - Present        Hyperlipidemia ICD-10-CM: E78.5  ICD-9-CM: 272.4  Unknown - Present        Obstructive sleep apnea ICD-10-CM: G47.33  ICD-9-CM: 327.23  Unknown - Present              Discharge Medications:     Current Discharge Medication List      START taking these medications    Details   oxyCODONE IR (ROXICODONE) 5 mg immediate release tablet Take 2 Tabs by mouth every four (4) hours as needed for Pain. Max Daily Amount: 60 mg.  Qty: 12 Tab, Refills: 0      ondansetron hcl (ZOFRAN, AS HYDROCHLORIDE,) 4 mg tablet Take 1 Tab by mouth every eight (8) hours as needed for Nausea. Qty: 10 Tab, Refills: 0         CONTINUE these medications which have NOT CHANGED    Details   !! ergocalciferol (VITAMIN D2) 50,000 unit capsule Take 50,000 Units by mouth. hydrOXYzine pamoate (VISTARIL) 50 mg capsule Take 50 mg by mouth two (2) times a day.      gabapentin (NEURONTIN) 600 mg tablet Take 600 mg by mouth three (3) times daily. Indications: NEUROPATHIC PAIN      ferrous sulfate (FEOSOL) 325 mg (65 mg iron) tablet Take 325 mg by mouth daily. biotin 10,000 mcg cap Take 1 Cap by mouth.      calcium citrate-vitamin d3 (CITRACAL+D) 315-200 mg-unit tab Take 1 Tab by mouth two (2) times daily (with meals). pantoprazole (PROTONIX) 20 mg tablet Take 40 mg by mouth daily. cloNIDine HCl (CATAPRES) 0.1 mg tablet Take  by mouth two (2) times a day.       !! ergocalciferol (ERGOCALCIFEROL) 50,000 unit capsule Take 50,000 Units by mouth every seven (7) days. colesevelam (WELCHOL) 625 mg tablet Take 2 Tabs by mouth two (2) times daily (with meals). Qty: 120 Tab, Refills: 0      escitalopram oxalate (LEXAPRO) 10 mg tablet Take 1 Tab by mouth daily. Qty: 10 Tab, Refills: 0      levothyroxine (SYNTHROID) 25 mcg tablet Take 1 Tab by mouth Daily (before breakfast). Indications: HYPOTHYROIDISM  Qty: 30 Tab, Refills: 0      OLANZapine (ZYPREXA ZYDIS) 10 mg disintegrating tablet Take 1 Tab by mouth nightly. Qty: 30 Tab, Refills: 0      ascorbic acid (VITAMIN C) 500 mg tablet Take 1,000 mg by mouth daily. dicyclomine (BENTYL) 20 mg tablet Take 20 mg by mouth three (3) times daily. cyanocobalamin (VITAMIN B-12) 1,000 mcg Subl 1,000 mcg by SubLINGual route daily. !! - Potential duplicate medications found. Please discuss with provider. Follow-up:pcp    Discharge Condition:good    Activity:as tolerated    Diet:heart healthy diet    Labs:  Labs: Results:       Chemistry Recent Labs      09/07/17 0410 09/06/17   0511 09/05/17   1135   GLU  90  126*  173*   NA  143  137  128*   K  3.5  3.8  3.5   CL  107  105  94*   CO2  26  24  24   BUN  7  9  16   CREA  0.46*  0.60  0.80   CA  7.9*  7.6*  8.6   AGAP  10  8  10   BUCR  15  15  20   AP  291*  304*  347*   TP  5.5*  5.8*  7.0   ALB  2.9*  3.2*  3.6   GLOB  2.6  2.6  3.4   AGRAT  1.1  1.2  1.1      CBC w/Diff Recent Labs      09/07/17   0410  09/06/17   0511 09/05/17   1135   WBC  2.2*  3.3*  3.4*   RBC  2.92*  3.20*  3.59*   HGB  8.6*  9.3*  10.5*   HCT  26.1*  28.0*  30.8*   PLT  145  167  208   GRANS  45  57  59   LYMPH  34  24  19*   EOS  3  2  1      Cardiac Enzymes No results for input(s): CPK, CKND1, MÓNICA in the last 72 hours. No lab exists for component: CKRMB, TROIP   Coagulation No results for input(s): PTP, INR, APTT in the last 72 hours.     No lab exists for component: INREXT    Lipid Panel Lab Results   Component Value Date/Time    Cholesterol, total 122 01/10/2017 04:50 AM    HDL Cholesterol 28 01/10/2017 04:50 AM    LDL, calculated 43 01/10/2017 04:50 AM    VLDL, calculated 51 01/10/2017 04:50 AM    Triglyceride 255 01/10/2017 04:50 AM    CHOL/HDL Ratio 4.4 01/10/2017 04:50 AM      BNP No results for input(s): BNPP in the last 72 hours. Liver Enzymes Recent Labs      09/07/17   0410   TP  5.5*   ALB  2.9*   AP  291*   SGOT  433*      Thyroid Studies Lab Results   Component Value Date/Time    TSH 1.61 07/02/2017 04:40 AM          Imaging:  CT abdm/pelvis on 9/5:  1. Mild peripancreatic edema and infiltration as above, with a small amount of  fluid present within the pancreaticoduodenal groove. Overall findings are most  in keeping with underlying pancreatitis. No discrete or organized peripancreatic  fluid collection. Normal pancreatic enhancement. 2. Postop changes of gastric bypass surgical procedure. No evidence of bowel  obstruction. 3. Hepatic steatosis. 4. Cholecystectomy    Consults:   none    Treatment Team: Treatment Team: Attending Provider: Edgar Joseph MD; Consulting Provider: Edgar Joseph MD; Care Manager: Abel Tovar; Utilization Review: Claudean Dace, RN    Significant Diagnostic Studies:see recent lab results    HISTORY OF PRESENT ILLNESS:     Riana Campbell is a 65 y.o.  female with h/o alcohol abuse,pancreatitis,came to the emergency room because of abdominal pain,nausea,vomitng. Patient says that she can not keep anything she eats. The pain is sharp and located in the mid-section of the abdomen,radiating to the back. Although she vomited several times,there was not blood noted in the vomitus. Patient has denied any recent alcohol use but after she was told that lab work was positive for [employer] stated that she just took her medications with a glass of beer instead of water. CT abdomen/pelvis in ER c/w acute pancreatitis. Lipase 1165,AST 1298 and . Patient was then admitted for management of acute pancreatitis,alcoholic hepatitis.     Hospital Course: Abraham is a 65 y.o.  female with h/o alcohol abuse,pancreatitis,came to the emergency room because of abdominal pain,nausea,vomitng. She was diagnosed with acute pancreatitis,alcoholic hepatitis,alcohol abuse,depression. Patient was made npo. She was started on pain medications,anti-nausea,iv fluid and was put on CIWA protocol. Patient has responded well to treatment. Her pain has improved and her liver enzymes,pancreatic enzymes and electrolytes have shown improvement. She was started on clear liquid on 9/7 to advance as tolerated and has done well. She ate her breakfast this morning without any difficulty. Patient is clinically stable for discharge. Her main problem remains alcohol abuse. She has been advised to avoid alcohol and join outpatient therapy. Patient has stated that she will go to an alcohol rehab program tomorrow and does not need any recommendation since she is well known there.       Time for discharge,including education:40 minutes    Franklyn Camacho MD  September 8, 2017

## 2017-09-19 NOTE — ANCILLARY DISCHARGE INSTRUCTIONS
[facility]  Discharge Phone Call       After-Care Discharge Phone Call Questions: no answer     Were you able to get your prescriptions filled? Comment:      [] Yes  []No    Comment if answer is \"No\"   Are you taking your medication(s) as your doctor ordered? Do you understand the purpose of your medications? Comment:    [] Yes  []No    Comment if answer is \"No\"   Are you taking any other medications that are not on the list?  Comment:      [] Yes  []No    Comment if answer is \"Yes\"   Do you have any questions about your medications? Are you aware of potential side effects? Comment:    [] Yes  []No    Comment if answer is \"Yes\"   Did you make your follow-up appointments (if the hospital did not do this before  discharge)? Comment:    [] Yes  []No    Comment if answer is \"No\"   Is there any reason you might not be able to keep your follow-up appointments? Comment:     [] Yes  []No    Comment if answer is \"Yes\"   Do you have any questions about your care plan? Are you aware of what health problems to be alert for? Comment:    [] Yes  []No    Comment if answer is \"Yes\"   Do you have a good understanding of how you should manage your health? Comment:    [] Yes  []No    Comment if answer is \"Yes\"   Do you know which symptoms to watch for that would mean you would need to call your doctor right away? Comment:      [] Yes  []No    Comment if answer is \"No\"   Do you have any questions about the follow up process or any instructions that we have provided? Comment:    [] Yes  []No    Comment if answer is \"Yes\"   Did staff take your preferences into account?         [] Yes  []No    Comment if answer is \"Yes\"

## 2017-10-06 ENCOUNTER — HOSPITAL ENCOUNTER (EMERGENCY)
Age: 57
Discharge: HOME | End: 2017-10-06
Attending: EMERGENCY MEDICINE
Payer: MEDICARE

## 2017-10-06 ENCOUNTER — APPOINTMENT (OUTPATIENT)
Dept: CT IMAGING | Age: 57
End: 2017-10-06
Attending: NURSE PRACTITIONER
Payer: MEDICARE

## 2017-10-06 VITALS
HEIGHT: 66 IN | WEIGHT: 181 LBS | BODY MASS INDEX: 29.09 KG/M2 | HEART RATE: 81 BPM | TEMPERATURE: 98 F | RESPIRATION RATE: 19 BRPM | SYSTOLIC BLOOD PRESSURE: 102 MMHG | OXYGEN SATURATION: 94 % | DIASTOLIC BLOOD PRESSURE: 73 MMHG

## 2017-10-06 DIAGNOSIS — R74.8 ELEVATED ALKALINE PHOSPHATASE LEVEL: ICD-10-CM

## 2017-10-06 DIAGNOSIS — N30.00 ACUTE CYSTITIS WITHOUT HEMATURIA: ICD-10-CM

## 2017-10-06 DIAGNOSIS — R79.89 ELEVATED LFTS: ICD-10-CM

## 2017-10-06 DIAGNOSIS — K86.0 ALCOHOL-INDUCED CHRONIC PANCREATITIS (HCC): Primary | ICD-10-CM

## 2017-10-06 LAB
ALBUMIN SERPL-MCNC: 4.1 G/DL (ref 3.4–5)
ALBUMIN/GLOB SERPL: 1 {RATIO} (ref 0.8–1.7)
ALP SERPL-CCNC: 436 U/L (ref 45–117)
ALT SERPL-CCNC: 347 U/L (ref 13–56)
ANION GAP SERPL CALC-SCNC: 11 MMOL/L (ref 3–18)
APPEARANCE UR: CLEAR
AST SERPL-CCNC: 641 U/L (ref 15–37)
BACTERIA URNS QL MICRO: ABNORMAL /HPF
BASOPHILS # BLD: 0.1 K/UL (ref 0–0.06)
BASOPHILS NFR BLD: 1 % (ref 0–2)
BILIRUB SERPL-MCNC: 0.8 MG/DL (ref 0.2–1)
BILIRUB UR QL: NEGATIVE
BUN SERPL-MCNC: 16 MG/DL (ref 7–18)
BUN/CREAT SERPL: 23 (ref 12–20)
CALCIUM SERPL-MCNC: 8.9 MG/DL (ref 8.5–10.1)
CHLORIDE SERPL-SCNC: 95 MMOL/L (ref 100–108)
CO2 SERPL-SCNC: 23 MMOL/L (ref 21–32)
COLOR UR: YELLOW
CREAT SERPL-MCNC: 0.69 MG/DL (ref 0.6–1.3)
DIFFERENTIAL METHOD BLD: ABNORMAL
EOSINOPHIL # BLD: 0 K/UL (ref 0–0.4)
EOSINOPHIL NFR BLD: 0 % (ref 0–5)
EPITH CASTS URNS QL MICRO: ABNORMAL /LPF (ref 0–5)
ERYTHROCYTE [DISTWIDTH] IN BLOOD BY AUTOMATED COUNT: 15.1 % (ref 11.6–14.5)
ETHANOL SERPL-MCNC: 225 MG/DL (ref 0–3)
GLOBULIN SER CALC-MCNC: 4.3 G/DL (ref 2–4)
GLUCOSE SERPL-MCNC: 158 MG/DL (ref 74–99)
GLUCOSE UR STRIP.AUTO-MCNC: NEGATIVE MG/DL
HCT VFR BLD AUTO: 35.6 % (ref 35–45)
HGB BLD-MCNC: 12.3 G/DL (ref 12–16)
HGB UR QL STRIP: NEGATIVE
KETONES UR QL STRIP.AUTO: NEGATIVE MG/DL
LACTATE BLD-SCNC: 2.1 MMOL/L (ref 0.4–2)
LEUKOCYTE ESTERASE UR QL STRIP.AUTO: ABNORMAL
LIPASE SERPL-CCNC: 180 U/L (ref 73–393)
LYMPHOCYTES # BLD: 0.8 K/UL (ref 0.9–3.6)
LYMPHOCYTES NFR BLD: 20 % (ref 21–52)
MCH RBC QN AUTO: 30.5 PG (ref 24–34)
MCHC RBC AUTO-ENTMCNC: 34.6 G/DL (ref 31–37)
MCV RBC AUTO: 88.3 FL (ref 74–97)
MONOCYTES # BLD: 0.1 K/UL (ref 0.05–1.2)
MONOCYTES NFR BLD: 3 % (ref 3–10)
NEUTS SEG # BLD: 3.1 K/UL (ref 1.8–8)
NEUTS SEG NFR BLD: 76 % (ref 40–73)
NITRITE UR QL STRIP.AUTO: NEGATIVE
PH UR STRIP: 6.5 [PH] (ref 5–8)
PLATELET # BLD AUTO: 251 K/UL (ref 135–420)
PMV BLD AUTO: 9.1 FL (ref 9.2–11.8)
POTASSIUM SERPL-SCNC: 4 MMOL/L (ref 3.5–5.5)
PROT SERPL-MCNC: 8.4 G/DL (ref 6.4–8.2)
PROT UR STRIP-MCNC: ABNORMAL MG/DL
RBC # BLD AUTO: 4.03 M/UL (ref 4.2–5.3)
RBC #/AREA URNS HPF: ABNORMAL /HPF (ref 0–5)
SODIUM SERPL-SCNC: 129 MMOL/L (ref 136–145)
SP GR UR REFRACTOMETRY: 1.02 (ref 1–1.03)
UROBILINOGEN UR QL STRIP.AUTO: 1 EU/DL (ref 0.2–1)
WBC # BLD AUTO: 4.1 K/UL (ref 4.6–13.2)
WBC URNS QL MICRO: ABNORMAL /HPF (ref 0–4)

## 2017-10-06 PROCEDURE — 74011250636 HC RX REV CODE- 250/636: Performed by: NURSE PRACTITIONER

## 2017-10-06 PROCEDURE — 80053 COMPREHEN METABOLIC PANEL: CPT

## 2017-10-06 PROCEDURE — 83605 ASSAY OF LACTIC ACID: CPT

## 2017-10-06 PROCEDURE — 74011636320 HC RX REV CODE- 636/320: Performed by: EMERGENCY MEDICINE

## 2017-10-06 PROCEDURE — 74177 CT ABD & PELVIS W/CONTRAST: CPT

## 2017-10-06 PROCEDURE — 96361 HYDRATE IV INFUSION ADD-ON: CPT

## 2017-10-06 PROCEDURE — 96360 HYDRATION IV INFUSION INIT: CPT

## 2017-10-06 PROCEDURE — 96374 THER/PROPH/DIAG INJ IV PUSH: CPT

## 2017-10-06 PROCEDURE — 96375 TX/PRO/DX INJ NEW DRUG ADDON: CPT

## 2017-10-06 PROCEDURE — 80307 DRUG TEST PRSMV CHEM ANLYZR: CPT | Performed by: NURSE PRACTITIONER

## 2017-10-06 PROCEDURE — 85025 COMPLETE CBC W/AUTO DIFF WBC: CPT

## 2017-10-06 PROCEDURE — 96376 TX/PRO/DX INJ SAME DRUG ADON: CPT

## 2017-10-06 PROCEDURE — 87086 URINE CULTURE/COLONY COUNT: CPT | Performed by: NURSE PRACTITIONER

## 2017-10-06 PROCEDURE — 99285 EMERGENCY DEPT VISIT HI MDM: CPT

## 2017-10-06 PROCEDURE — 83690 ASSAY OF LIPASE: CPT

## 2017-10-06 PROCEDURE — 74011250637 HC RX REV CODE- 250/637: Performed by: NURSE PRACTITIONER

## 2017-10-06 PROCEDURE — 81001 URINALYSIS AUTO W/SCOPE: CPT

## 2017-10-06 RX ORDER — NITROFURANTOIN 25; 75 MG/1; MG/1
100 CAPSULE ORAL 2 TIMES DAILY
Qty: 10 CAP | Refills: 0 | Status: SHIPPED | OUTPATIENT
Start: 2017-10-06 | End: 2017-10-11

## 2017-10-06 RX ORDER — ONDANSETRON 2 MG/ML
4 INJECTION INTRAMUSCULAR; INTRAVENOUS
Status: COMPLETED | OUTPATIENT
Start: 2017-10-06 | End: 2017-10-06

## 2017-10-06 RX ORDER — HYDROMORPHONE HCL IN 0.9% NACL 50 MG/50ML
1 PLASTIC BAG, INJECTION (ML) INJECTION ONCE
Status: COMPLETED | OUTPATIENT
Start: 2017-10-06 | End: 2017-10-06

## 2017-10-06 RX ORDER — NITROFURANTOIN 25; 75 MG/1; MG/1
100 CAPSULE ORAL
Status: COMPLETED | OUTPATIENT
Start: 2017-10-06 | End: 2017-10-06

## 2017-10-06 RX ORDER — HYDROMORPHONE HYDROCHLORIDE 2 MG/ML
1 INJECTION, SOLUTION INTRAMUSCULAR; INTRAVENOUS; SUBCUTANEOUS ONCE
Status: COMPLETED | OUTPATIENT
Start: 2017-10-06 | End: 2017-10-06

## 2017-10-06 RX ORDER — ONDANSETRON 4 MG/1
4 TABLET, FILM COATED ORAL
Qty: 10 TAB | Refills: 0 | Status: SHIPPED | OUTPATIENT
Start: 2017-10-06 | End: 2018-06-22

## 2017-10-06 RX ORDER — OXYCODONE HYDROCHLORIDE 5 MG/1
10 TABLET ORAL
Qty: 12 TAB | Refills: 0 | Status: SHIPPED | OUTPATIENT
Start: 2017-10-06 | End: 2018-04-06

## 2017-10-06 RX ADMIN — IOPAMIDOL 100 ML: 612 INJECTION, SOLUTION INTRAVENOUS at 20:07

## 2017-10-06 RX ADMIN — Medication 1 MG: at 18:36

## 2017-10-06 RX ADMIN — SODIUM CHLORIDE 500 ML: 900 INJECTION, SOLUTION INTRAVENOUS at 21:07

## 2017-10-06 RX ADMIN — HYDROMORPHONE HYDROCHLORIDE 1 MG: 2 INJECTION INTRAMUSCULAR; INTRAVENOUS; SUBCUTANEOUS at 20:14

## 2017-10-06 RX ADMIN — SODIUM CHLORIDE 1000 ML: 900 INJECTION, SOLUTION INTRAVENOUS at 18:34

## 2017-10-06 RX ADMIN — NITROFURANTOIN (MONOHYDRATE/MACROCRYSTALS) 100 MG: 75; 25 CAPSULE ORAL at 23:21

## 2017-10-06 RX ADMIN — ONDANSETRON 4 MG: 2 INJECTION INTRAMUSCULAR; INTRAVENOUS at 18:36

## 2017-10-06 NOTE — ED PROVIDER NOTES
HPI Comments: 6:13 PM   65 y.o. female presents to ED C/O abdominal pain, N/V/D. Patient has a HX of anemia, elevated LFTs, chronic abdominal pain, HTN, lower GI bleed, gastric bypass, alcohol abuse, chronic pancreatitis, hysterectomy. Patient reports severe upper abdominal pain x 2 days, with associated N/V/D. Patient reports the pain is NOT consistent with when she has had pancreatitis, the pain is worse, her stomach is bloated, consistent with when she was diagnosed with intussusception in the past.  Patient reports she has also not urinated since last night, however reports no oral intake x 24 hours or more. Patient denies fever, dysuria, SOB, CP, confusion. Patient has a HX of alochol abuse, reports she has had not ETOH in 4 days. Patient is a nonsmoker. Patient denies any other symptoms or complaints. The history is provided by the patient. History limited by: No language barrier. Past Medical History:   Diagnosis Date    Alcohol abuse     Alcoholic hepatitis     Alcoholic pancreatitis     Alcoholic pancreatitis     Anemia     Bipolar disorder (HCC)     Dr. [facility])    Chronic abdominal pain     Chronic pancreatitis ([facility] [address]     Compression fracture of lumbar vertebra (HCC)     L4, L5     Depression     Dr. [facility])    Elevated LFTs     ETOH abuse     Fatty liver     GERD (gastroesophageal reflux disease)     Hyperlipidemia     Hypertension     Hypothyroidism     Lower GI bleeding     Morbid obesity ([facility] [address]     Obstructive sleep apnea     Substance induced mood disorder ([facility] [address]     Vitamin D deficiency        Past Surgical History:   Procedure Laterality Date    BIOPSY LIVER  08/15/2012    Lap Left hepatic liver wedge by Dr. Mari Pardo; BX Revealed: Hepatic Steatosis, AVM w/ associated Subcapsular Fibrosis.      COLONOSCOPY N/A 1/17/2017    COLONOSCOPY performed by Layne Finch MD at [address] [place] [address] DISKECTOMY, ANTERIOR, WITH D  8/1995, 11/1997    HX ABDOMINAL LAPAROSCOPY  12/02/2014    Laparoscopic Gastrostomy w/ Partial Gastrectomy for assistance in placement of Endoscopic Retrograde Cholangiopancreatography & Diagnostic Lap.  HX CARPAL TUNNEL RELEASE      HX CHOLECYSTECTOMY  09/16/2014    Dr. Kim Hogan HX COLONOSCOPY  05/12/2012    Colonoscopy by Dr. Gray Brandt Eva Menjivar: Bx Revealed Colon Polyps (Tubular Adenoma), Hemorrhoids.  HX GASTRIC BYPASS  08/15/2012    Lap Christian-en-y    HX HEMORRHOIDECTOMY  04/30/2015    Dr. Sebastian Gomez    HX HYSTERECTOMY  2006    HX TONSILLECTOMY  1992    REPAIR NONUNION SCAPHOID CARPAL BONE Right 2010    Wrist         Family History:   Problem Relation Age of Onset    Cancer Father     Cancer Sister     Obesity Brother        Social History     Social History    Marital status:      Spouse name: N/A    Number of children: N/A    Years of education: N/A     Occupational History    Not on file. Social History Main Topics    Smoking status: Never Smoker    Smokeless tobacco: Never Used    Alcohol use 0.0 oz/week     0 Standard drinks or equivalent per week      Comment: 6 pack of beer a week- 1/2/17 last use    Drug use: No    Sexual activity: Yes     Partners: Male     Birth control/ protection: Condom     Other Topics Concern    Not on file     Social History Narrative         ALLERGIES: Codeine; Lamictal [lamotrigine]; Morphine; Pcn [penicillins]; Vancomycin; Doxycycline; Percocet [oxycodone-acetaminophen]; Tetracycline; and Tomato    Review of Systems   Constitutional: Positive for appetite change. Negative for fever. HENT: Negative for congestion, rhinorrhea and sore throat. Respiratory: Negative for cough, shortness of breath and wheezing. Cardiovascular: Negative for chest pain and leg swelling. Gastrointestinal: Positive for abdominal pain, diarrhea, nausea and vomiting. Negative for constipation.    Genitourinary: Positive for difficulty urinating. Negative for dysuria. Musculoskeletal: Negative for arthralgias and back pain. Neurological: Negative for dizziness, syncope and headaches. Vitals:    10/06/17 1915 10/06/17 2145 10/06/17 2215 10/06/17 2245   BP: 119/72 114/75 122/74 102/73   Pulse: 96 84 86 81   Resp: 22 14 16 19   Temp:       SpO2: 96% 94%     Weight:       Height:                Physical Exam   Constitutional: She is oriented to person, place, and time. She appears well-developed and well-nourished. Patient appears very uncomfortable, and is dry heaving in room   Cardiovascular: Regular rhythm. Tachycardia present. Pulmonary/Chest: Effort normal and breath sounds normal. No respiratory distress. She has no wheezes. She has no rales. Abdominal: Bowel sounds are decreased. There is tenderness in the right upper quadrant, epigastric area and left upper quadrant. There is rigidity and guarding. There is no rebound and no CVA tenderness. Musculoskeletal: Normal range of motion. Neurological: She is alert and oriented to person, place, and time. She exhibits normal muscle tone. Coordination normal.   Skin: Skin is warm and dry. No rash noted. She is not diaphoretic. No erythema. No pallor. Nursing note and vitals reviewed.        MDM  Number of Diagnoses or Management Options  Acute cystitis without hematuria:   Alcohol-induced chronic pancreatitis [facility]):   Elevated alkaline phosphatase level:   Elevated LFTs:   Diagnosis management comments: Differential Diagnosis: intussusception, biliary disease, hepatitis, pancreatitis, PUD, gastritis, gastroenteritis, hiatal hernia, constipation, SBO, LBO, mesenteric ischemia/infarction,  ruptured AAA, renal colic, IBS, IBD, Celiac disease, PNA, AMI, PE, splenic abscess/infarction, malignancy, PID([facility])      Plan:  CBC, CMP, lipase, UA, POC lactic, UA, CT abd pelvis  6:44 PM ordered lactic acid to evaluate for possible bowel ischemia, not with concern for sepsis due to no fever, or hypotension, mildly elevated lactic acid, not concerned for sepsis at this time, will continue to monitor  7:15 PM Patient appears much  More comfortable, no longer tachycardia   -Patient denies ETOH use in 4 days however ETOH level is 225  7:48 Patient reports pain is coming back, additional pain medication ordered   10:24 PM Moderate leuks noted on UA, no elevated WBC, H&H is WNL, patient has a HX of elevated ALT and AST however today is greater than previous, ALT is 347 and AST is 641  -CT abd pelvis - Findings:  - mild stranding around the pancreatic head/uncinate, appears less prominent than the preceding scan. This likely reflects patient's chronic pancreatitis. - soft tissue density in the subcutaneous tissues of the right lateral buttock/hip, largely stable  -hepatomegaly. - postsurgical changes of gastric bypass. - post-cholecystectomy with prominence of the common bile duct, likely related to post-cholecystectomy reservoir effect. - hysterectomy.  -degenerative changes of the thoracolumbar spine. 10:29 PM discussed case with Dr Adrienne Luque, he agrees with plan to consult Dr Albert Keller due to abdominal pain with HX of gastric bypass  10:45 PM Patient is no longer an active patient of Dr Albert Keller due to noncompliance. 11:10 PM Patient denies pain at this time, she is tolerating water, she has had no vomiting since being medicated. Chronic pancreatitis noted on CT, due to well controlled pain no indication for admission at this time, will refer to GI, patient however educated to return to the ED for worsened pain or inability to tolerate PO. Educated patient about the importance of not drinking ETOH. Patient will be treated for UTI, first dose of antibiotics in the department. Patient denies questions. Patient given a copy of her CT report and labs.    11:18 PM vitals stable and WNL prior to discharge       Amount and/or Complexity of Data Reviewed  Clinical lab tests: ordered and reviewed  Tests in the radiology section of CPT®: ordered and reviewed      ED Course       Procedures        RESULTS:    CT ABD PELV W CONT    (Results Pending)       Labs Reviewed   CBC WITH AUTOMATED DIFF - Abnormal; Notable for the following:        Result Value    WBC 4.1 (*)     RBC 4.03 (*)     RDW 15.1 (*)     MPV 9.1 (*)     NEUTROPHILS 76 (*)     LYMPHOCYTES 20 (*)     ABS. LYMPHOCYTES 0.8 (*)     ABS. BASOPHILS 0.1 (*)     All other components within normal limits   METABOLIC PANEL, COMPREHENSIVE - Abnormal; Notable for the following:     Sodium 129 (*)     Chloride 95 (*)     Glucose 158 (*)     BUN/Creatinine ratio 23 (*)     ALT (SGPT) 347 (*)     AST (SGOT) 641 (*)     Alk.  phosphatase 436 (*)     Protein, total 8.4 (*)     Globulin 4.3 (*)     All other components within normal limits   URINALYSIS W/ RFLX MICROSCOPIC - Abnormal; Notable for the following:     Protein TRACE (*)     Leukocyte Esterase MODERATE (*)     All other components within normal limits   ETHYL ALCOHOL - Abnormal; Notable for the following:     ALCOHOL(ETHYL),SERUM 225 (*)     All other components within normal limits   URINE MICROSCOPIC ONLY - Abnormal; Notable for the following:     Bacteria 3+ (*)     All other components within normal limits   POC LACTIC ACID - Abnormal; Notable for the following:     Lactic Acid (POC) 2.1 (*)     All other components within normal limits   CULTURE, URINE   LIPASE       Recent Results (from the past 12 hour(s))   CBC WITH AUTOMATED DIFF    Collection Time: 10/06/17  5:50 PM   Result Value Ref Range    WBC 4.1 (L) 4.6 - 13.2 K/uL    RBC 4.03 (L) 4.20 - 5.30 M/uL    HGB 12.3 12.0 - 16.0 g/dL    HCT 35.6 35.0 - 45.0 %    MCV 88.3 74.0 - 97.0 FL    MCH 30.5 24.0 - 34.0 PG    MCHC 34.6 31.0 - 37.0 g/dL    RDW 15.1 (H) 11.6 - 14.5 %    PLATELET 333 843 - 552 K/uL    MPV 9.1 (L) 9.2 - 11.8 FL    NEUTROPHILS 76 (H) 40 - 73 %    LYMPHOCYTES 20 (L) 21 - 52 %    MONOCYTES 3 3 - 10 %    EOSINOPHILS 0 0 - 5 %    BASOPHILS 1 0 - 2 %    ABS. NEUTROPHILS 3.1 1.8 - 8.0 K/UL    ABS. LYMPHOCYTES 0.8 (L) 0.9 - 3.6 K/UL    ABS. MONOCYTES 0.1 0.05 - 1.2 K/UL    ABS. EOSINOPHILS 0.0 0.0 - 0.4 K/UL    ABS. BASOPHILS 0.1 (H) 0.0 - 0.06 K/UL    DF AUTOMATED     METABOLIC PANEL, COMPREHENSIVE    Collection Time: 10/06/17  5:50 PM   Result Value Ref Range    Sodium 129 (L) 136 - 145 mmol/L    Potassium 4.0 3.5 - 5.5 mmol/L    Chloride 95 (L) 100 - 108 mmol/L    CO2 23 21 - 32 mmol/L    Anion gap 11 3.0 - 18 mmol/L    Glucose 158 (H) 74 - 99 mg/dL    BUN 16 7.0 - 18 MG/DL    Creatinine 0.69 0.6 - 1.3 MG/DL    BUN/Creatinine ratio 23 (H) 12 - 20      GFR est AA >60 >60 ml/min/1.73m2    GFR est non-AA >60 >60 ml/min/1.73m2    Calcium 8.9 8.5 - 10.1 MG/DL    Bilirubin, total 0.8 0.2 - 1.0 MG/DL    ALT (SGPT) 347 (H) 13 - 56 U/L    AST (SGOT) 641 (H) 15 - 37 U/L    Alk.  phosphatase 436 (H) 45 - 117 U/L    Protein, total 8.4 (H) 6.4 - 8.2 g/dL    Albumin 4.1 3.4 - 5.0 g/dL    Globulin 4.3 (H) 2.0 - 4.0 g/dL    A-G Ratio 1.0 0.8 - 1.7     LIPASE    Collection Time: 10/06/17  5:50 PM   Result Value Ref Range    Lipase 180 73 - 393 U/L   ETHYL ALCOHOL    Collection Time: 10/06/17  5:50 PM   Result Value Ref Range    ALCOHOL(ETHYL),SERUM 225 (H) 0 - 3 MG/DL   POC LACTIC ACID    Collection Time: 10/06/17  6:44 PM   Result Value Ref Range    Lactic Acid (POC) 2.1 (HH) 0.4 - 2.0 mmol/L   URINALYSIS W/ RFLX MICROSCOPIC    Collection Time: 10/06/17  7:28 PM   Result Value Ref Range    Color YELLOW      Appearance CLEAR      Specific gravity 1.025 1.005 - 1.030      pH (UA) 6.5 5.0 - 8.0      Protein TRACE (A) NEG mg/dL    Glucose NEGATIVE  NEG mg/dL    Ketone NEGATIVE  NEG mg/dL    Bilirubin NEGATIVE  NEG      Blood NEGATIVE  NEG      Urobilinogen 1.0 0.2 - 1.0 EU/dL    Nitrites NEGATIVE  NEG      Leukocyte Esterase MODERATE (A) NEG     URINE MICROSCOPIC ONLY    Collection Time: 10/06/17  7:28 PM   Result Value Ref Range    WBC 21 to 35 0 - 4 /hpf    RBC 0 to 3 0 - 5 /hpf    Epithelial cells FEW 0 - 5 /lpf    Bacteria 3+ (A) NEG /hpf       PROGRESS NOTE:   6:13 PM   Initial assessment completed. Written by John ELKINS    DISCHARGE NOTE:  11:18 PM   Rafaela Carson  results have been reviewed with her. She has been counseled regarding her diagnosis, treatment, and plan. She verbally conveys understanding and agreement of the signs, symptoms, diagnosis, treatment and prognosis and additionally agrees to follow up as discussed. She also agrees with the care-plan and conveys that all of her questions have been answered. I have also provided discharge instructions for her that include: educational information regarding their diagnosis and treatment, and list of reasons why they would want to return to the ED prior to their follow-up appointment, should her condition change. CLINICAL IMPRESSION:    1. Alcohol-induced chronic pancreatitis (HCC)    2. Elevated LFTs    3. Elevated alkaline phosphatase level    4. Acute cystitis without hematuria        AFTER VISIT PLAN:    Current Discharge Medication List      START taking these medications    Details   nitrofurantoin, macrocrystal-monohydrate, (MACROBID) 100 mg capsule Take 1 Cap by mouth two (2) times a day for 5 days. Qty: 10 Cap, Refills: 0         CONTINUE these medications which have CHANGED    Details   oxyCODONE IR (ROXICODONE) 5 mg immediate release tablet Take 2 Tabs by mouth every four (4) hours as needed for Pain. Max Daily Amount: 60 mg.  Qty: 12 Tab, Refills: 0      ondansetron hcl (ZOFRAN, AS HYDROCHLORIDE,) 4 mg tablet Take 1 Tab by mouth every eight (8) hours as needed for Nausea.   Qty: 10 Tab, Refills: 0              Follow-up Information     Follow up With Details Comments Contact Info     Schedule an appointment as soon as possible for a visit in 3 days Further evaluation YOUR PCP    Brittaney Pierson MD Schedule an appointment as soon as possible for a visit in 3 days Further evaluation [id number] [facility], [place]  [phone]             Written by John NICHOLSC

## 2017-10-06 NOTE — ED TRIAGE NOTES
\"I've had a lot of nausea and vomiting. I'm having severe abd pain as well. I was diagnosed with introsusception at [facility]. \"

## 2017-10-07 NOTE — DISCHARGE INSTRUCTIONS
Pancreatitis: Care Instructions  Your Care Instructions    The pancreas is an organ behind the stomach. It makes hormones and enzymes to help your body digest food. But if these enzymes attack the pancreas, it can get inflamed. This is called pancreatitis. Most cases are caused by gallstones or by heavy alcohol use. If you take care of yourself at home, it will help you get better. It will also help you avoid more problems with your pancreas. Follow-up care is a key part of your treatment and safety. Be sure to make and go to all appointments, and call your doctor if you are having problems. It's also a good idea to know your test results and keep a list of the medicines you take. How can you care for yourself at home? · Drink clear liquids and eat bland foods until you feel better. Page foods include rice, dry toast, and crackers. They also include bananas and applesauce. · Eat a low-fat diet until your doctor says your pancreas is healed. · Do not drink alcohol. Tell your doctor if you need help to quit. Counseling, support groups, and sometimes medicines can help you stay sober. · Be safe with medicines. Read and follow all instructions on the label. ¨ If the doctor gave you a prescription medicine for pain, take it as prescribed. ¨ If you are not taking a prescription pain medicine, ask your doctor if you can take an over-the-counter medicine. · If your doctor prescribed antibiotics, take them as directed. Do not stop taking them just because you feel better. You need to take the full course of antibiotics. · Get extra rest until you feel better. To prevent future problems with your pancreas  · Do not drink alcohol. · Tell your doctors and pharmacist that you've had pancreatitis. They can help you avoid medicines that may cause this problem again. When should you call for help? Call your doctor now or seek immediate medical care if:  · You have new or severe belly pain.   · You have a new or higher fever. · You can't keep fluid or medicines down. Watch closely for changes in your health, and be sure to contact your doctor if:  · The symptoms you had when you first started feeling sick come back. · You do not get better as expected. · You need help to stop drinking alcohol. Where can you learn more? Go to http://salvatore-izabela.info/. Enter I837 in the search box to learn more about \"Pancreatitis: Care Instructions. \"  Current as of: August 9, 2016  Content Version: 11.3  © 4135-3570 [employer]. Care instructions adapted under license by [employer] (which disclaims liability or warranty for this information). If you have questions about a medical condition or this instruction, always ask your healthcare professional. [address] any warranty or liability for your use of this information. Urinary Tract Infection in Women: Care Instructions  Your Care Instructions    A urinary tract infection, or UTI, is a general term for an infection anywhere between the kidneys and the urethra (where urine comes out). Most UTIs are bladder infections. They often cause pain or burning when you urinate. UTIs are caused by bacteria and can be cured with antibiotics. Be sure to complete your treatment so that the infection goes away. Follow-up care is a key part of your treatment and safety. Be sure to make and go to all appointments, and call your doctor if you are having problems. It's also a good idea to know your test results and keep a list of the medicines you take. How can you care for yourself at home? · Take your antibiotics as directed. Do not stop taking them just because you feel better. You need to take the full course of antibiotics. · Drink extra water and other fluids for the next day or two. This may help wash out the bacteria that are causing the infection.  (If you have kidney, heart, or liver disease and have to limit fluids, talk with your doctor before you increase your fluid intake.)  · Avoid drinks that are carbonated or have caffeine. They can irritate the bladder. · Urinate often. Try to empty your bladder each time. · To relieve pain, take a hot bath or lay a heating pad set on low over your lower belly or genital area. Never go to sleep with a heating pad in place. To prevent UTIs  · Drink plenty of water each day. This helps you urinate often, which clears bacteria from your system. (If you have kidney, heart, or liver disease and have to limit fluids, talk with your doctor before you increase your fluid intake.)  · Urinate when you need to. · Urinate right after you have sex. · Change sanitary pads often. · Avoid douches, bubble baths, feminine hygiene sprays, and other feminine hygiene products that have deodorants. · After going to the bathroom, wipe from front to back. When should you call for help? Call your doctor now or seek immediate medical care if:  · Symptoms such as fever, chills, nausea, or vomiting get worse or appear for the first time. · You have new pain in your back just below your rib cage. This is called flank pain. · There is new blood or pus in your urine. · You have any problems with your antibiotic medicine. Watch closely for changes in your health, and be sure to contact your doctor if:  · You are not getting better after taking an antibiotic for 2 days. · Your symptoms go away but then come back. Where can you learn more? Go to http://salvatore-izabela.info/. Enter W032 in the search box to learn more about \"Urinary Tract Infection in Women: Care Instructions. \"  Current as of: November 28, 2016  Content Version: 11.3  © 7695-1962 [employer]. Care instructions adapted under license by [employer] (which disclaims liability or warranty for this information).  If you have questions about a medical condition or this instruction, always ask your healthcare professional. [place] [address] any warranty or liability for your use of this information. Diet for Chronic Pancreatitis: Care Instructions  Your Care Instructions    The pancreas is an organ behind the stomach that makes hormones and enzymes to help your body digest food. Sometimes the enzymes attack another part of the pancreas, which can cause pain and swelling. This is called pancreatitis. Chronic pancreatitis may cause you to be in pain much of the time. You may be able to help the pain by avoiding alcohol and eating a low-fat diet. Your doctor and dietitian can help you make an eating plan that does not irritate your digestive system. Always talk with your doctor or dietitian before you make changes in your diet. Follow-up care is a key part of your treatment and safety. Be sure to make and go to all appointments, and call your doctor if you are having problems. It's also a good idea to know your test results and keep a list of the medicines you take. How can you care for yourself at home? · Do not drink alcohol. It may make your pain worse and cause other problems. Tell your doctor if you need help to quit. Counseling, support groups, and sometimes medicines can help you stay sober. · Ask your doctor if you need to take pancreatic enzyme pills to help your body digest fat and protein. · Drink plenty of fluids, enough so that your urine is light yellow or clear like water. If you have kidney, heart, or liver disease and have to limit fluids, talk with your doctor before you increase the amount of fluids you drink. Eat a low-fat diet  · Eat many small meals and snacks each day instead of three large meals. · Choose lean meats. ¨ Eat no more than 5 to 6½ ounces of meat a day. ¨ Cut off all fat you can see. ¨ Eat chicken and turkey without the skin. ¨ Many types of fish, such as salmon, lake trout, tuna, and herring, provide healthy omega-3 fat.  But avoid fish canned in oil, such as sardines in olive oil. ¨ Bake, broil, or grill meats, poultry, or fish instead of frying them in butter or fat. · Drink or eat nonfat or low-fat milk, yogurt, cheese, or other milk products each day. ¨ Read the labels on cheeses, and choose those with less than 5 grams of fat an ounce. ¨ Try fat-free sour cream, cream cheese, or yogurt. ¨ Avoid cream soups and cream sauces on pasta. ¨ Eat low-fat ice cream, frozen yogurt, or sorbet. Avoid regular ice cream.  · Eat whole-grain cereals, breads, crackers, rice, or pasta. Avoid high-fat foods such as croissants, scones, biscuits, waffles, doughnuts, muffins, granola, and high-fat breads. · Flavor your foods with herbs and spices (such as basil, tarragon, or mint), fat-free sauces, or lemon juice instead of butter. You can also use butter substitutes, fat-free mayonnaise, or fat-free dressing. · Try applesauce, prune puree, or mashed bananas to replace some or all of the fat when you bake. · Limit fats and oils, such as butter, margarine, mayonnaise, and salad dressing, to no more than 1 tablespoon a meal.  · Avoid high-fat foods, such as:  ¨ Chocolate, whole milk, ice cream, processed cheese, and egg yolks. ¨ Fried or buttered foods. ¨ Sausage, salami, and canales. ¨ Cinnamon rolls, cakes, pies, cookies, and other pastries. ¨ Prepared snack foods, such as potato chips, nut and granola bars, and mixed nuts. ¨ Coconut and avocado. · Learn how to read food labels for serving sizes and ingredients. Fast-food and convenience-food meals often have lots of fat. Where can you learn more? Go to http://salvatore-izabela.info/. Enter K797 in the search box to learn more about \"Diet for Chronic Pancreatitis: Care Instructions. \"  Current as of: July 26, 2016  Content Version: 11.3  © 0222-9457 [employer].  Care instructions adapted under license by [employer] (which disclaims liability or warranty for this information). If you have questions about a medical condition or this instruction, always ask your healthcare professional. [address] any warranty or liability for your use of this information. [employer] Activation    Thank you for requesting access to [employer]. Please follow the instructions below to securely access and download your online medical record. [employer] allows you to send messages to your doctor, view your test results, renew your prescriptions, schedule appointments, and more. How Do I Sign Up? 1. In your internet browser, go to [email or web address]  2. Click on the First Time User? Click Here link in the Sign In box. You will be redirect to the New Member Sign Up page. 3. Enter your [employer] Access Code exactly as it appears below. You will not need to use this code after youve completed the sign-up process. If you do not sign up before the expiration date, you must request a new code. [employer] Access Code: Activation code not generated  Current [facility] Status: Patient Declined (This is the date your [employer] access code will )    4. Enter the last four digits of your Social Security Number (xxxx) and Date of Birth (mm/dd/yyyy) as indicated and click Submit. You will be taken to the next sign-up page. 5. Create a [facility] ID. This will be your [employer] login ID and cannot be changed, so think of one that is secure and easy to remember. 6. Create a [employer] password. You can change your password at any time. 7. Enter your Password Reset Question and Answer. This can be used at a later time if you forget your password. 8. Enter your e-mail address. You will receive e-mail notification when new information is available in [address]. 9. Click Sign Up. You can now view and download portions of your medical record. 10. Click the Download Summary menu link to download a portable copy of your medical information.     Additional Information    If you have questions, please visit the Frequently Asked Questions section of the [employer] website at [email or web address] Remember, [employer] is NOT to be used for urgent needs. For medical emergencies, dial 911.

## 2017-10-07 NOTE — ED NOTES
I have reviewed discharge instructions with the patient. The patient verbalized understanding.       Patient armband removed and shredded

## 2017-10-08 LAB
BACTERIA SPEC CULT: NORMAL
SERVICE CMNT-IMP: NORMAL

## 2018-02-24 ENCOUNTER — HOSPITAL ENCOUNTER (EMERGENCY)
Age: 58
Discharge: HOME | End: 2018-02-24
Attending: EMERGENCY MEDICINE
Payer: MEDICARE

## 2018-02-24 ENCOUNTER — APPOINTMENT (OUTPATIENT)
Dept: CT IMAGING | Age: 58
End: 2018-02-24
Attending: EMERGENCY MEDICINE
Payer: MEDICARE

## 2018-02-24 VITALS
BODY MASS INDEX: 32.14 KG/M2 | OXYGEN SATURATION: 91 % | DIASTOLIC BLOOD PRESSURE: 53 MMHG | HEIGHT: 66 IN | TEMPERATURE: 98.6 F | HEART RATE: 90 BPM | SYSTOLIC BLOOD PRESSURE: 110 MMHG | RESPIRATION RATE: 18 BRPM | WEIGHT: 200 LBS

## 2018-02-24 DIAGNOSIS — K85.20 ALCOHOL-INDUCED ACUTE PANCREATITIS WITHOUT INFECTION OR NECROSIS: Primary | ICD-10-CM

## 2018-02-24 LAB
ALBUMIN SERPL-MCNC: 3.4 G/DL (ref 3.4–5)
ALBUMIN/GLOB SERPL: 0.9 {RATIO} (ref 0.8–1.7)
ALP SERPL-CCNC: 471 U/L (ref 45–117)
ALT SERPL-CCNC: 162 U/L (ref 13–56)
ANION GAP SERPL CALC-SCNC: 11 MMOL/L (ref 3–18)
APPEARANCE UR: CLEAR
AST SERPL-CCNC: 411 U/L (ref 15–37)
BACTERIA URNS QL MICRO: NEGATIVE /HPF
BASOPHILS # BLD: 0 K/UL (ref 0–0.06)
BASOPHILS NFR BLD: 0 % (ref 0–2)
BILIRUB SERPL-MCNC: 4.2 MG/DL (ref 0.2–1)
BILIRUB UR QL: ABNORMAL
BUN SERPL-MCNC: 10 MG/DL (ref 7–18)
BUN/CREAT SERPL: 10 (ref 12–20)
CALCIUM SERPL-MCNC: 8.5 MG/DL (ref 8.5–10.1)
CHLORIDE SERPL-SCNC: 88 MMOL/L (ref 100–108)
CO2 SERPL-SCNC: 28 MMOL/L (ref 21–32)
COLOR UR: ABNORMAL
CREAT SERPL-MCNC: 0.97 MG/DL (ref 0.6–1.3)
DIFFERENTIAL METHOD BLD: ABNORMAL
EOSINOPHIL # BLD: 0 K/UL (ref 0–0.4)
EOSINOPHIL NFR BLD: 0 % (ref 0–5)
EPITH CASTS URNS QL MICRO: NORMAL /LPF (ref 0–5)
ERYTHROCYTE [DISTWIDTH] IN BLOOD BY AUTOMATED COUNT: 14 % (ref 11.6–14.5)
GLOBULIN SER CALC-MCNC: 3.8 G/DL (ref 2–4)
GLUCOSE SERPL-MCNC: 229 MG/DL (ref 74–99)
GLUCOSE UR STRIP.AUTO-MCNC: NEGATIVE MG/DL
HCT VFR BLD AUTO: 27.9 % (ref 35–45)
HGB BLD-MCNC: 10.1 G/DL (ref 12–16)
HGB UR QL STRIP: NEGATIVE
KETONES UR QL STRIP.AUTO: NEGATIVE MG/DL
LEUKOCYTE ESTERASE UR QL STRIP.AUTO: ABNORMAL
LIPASE SERPL-CCNC: 52 U/L (ref 73–393)
LYMPHOCYTES # BLD: 0.8 K/UL (ref 0.9–3.6)
LYMPHOCYTES NFR BLD: 13 % (ref 21–52)
MCH RBC QN AUTO: 29.6 PG (ref 24–34)
MCHC RBC AUTO-ENTMCNC: 36.2 G/DL (ref 31–37)
MCV RBC AUTO: 81.8 FL (ref 74–97)
MONOCYTES # BLD: 0.4 K/UL (ref 0.05–1.2)
MONOCYTES NFR BLD: 7 % (ref 3–10)
NEUTS SEG # BLD: 4.6 K/UL (ref 1.8–8)
NEUTS SEG NFR BLD: 80 % (ref 40–73)
NITRITE UR QL STRIP.AUTO: NEGATIVE
PH UR STRIP: 5.5 [PH] (ref 5–8)
PLATELET # BLD AUTO: 133 K/UL (ref 135–420)
PMV BLD AUTO: 10.1 FL (ref 9.2–11.8)
POTASSIUM SERPL-SCNC: 4.4 MMOL/L (ref 3.5–5.5)
PROT SERPL-MCNC: 7.2 G/DL (ref 6.4–8.2)
PROT UR STRIP-MCNC: NEGATIVE MG/DL
RBC # BLD AUTO: 3.41 M/UL (ref 4.2–5.3)
RBC #/AREA URNS HPF: 0 /HPF (ref 0–5)
SODIUM SERPL-SCNC: 127 MMOL/L (ref 136–145)
SP GR UR REFRACTOMETRY: 1.02 (ref 1–1.03)
UROBILINOGEN UR QL STRIP.AUTO: 1 EU/DL (ref 0.2–1)
WBC # BLD AUTO: 5.8 K/UL (ref 4.6–13.2)
WBC URNS QL MICRO: NORMAL /HPF (ref 0–4)

## 2018-02-24 PROCEDURE — 96375 TX/PRO/DX INJ NEW DRUG ADDON: CPT

## 2018-02-24 PROCEDURE — 83690 ASSAY OF LIPASE: CPT | Performed by: EMERGENCY MEDICINE

## 2018-02-24 PROCEDURE — 74011636320 HC RX REV CODE- 636/320: Performed by: EMERGENCY MEDICINE

## 2018-02-24 PROCEDURE — 74177 CT ABD & PELVIS W/CONTRAST: CPT

## 2018-02-24 PROCEDURE — 99284 EMERGENCY DEPT VISIT MOD MDM: CPT

## 2018-02-24 PROCEDURE — 74011250636 HC RX REV CODE- 250/636: Performed by: EMERGENCY MEDICINE

## 2018-02-24 PROCEDURE — 96361 HYDRATE IV INFUSION ADD-ON: CPT

## 2018-02-24 PROCEDURE — 85025 COMPLETE CBC W/AUTO DIFF WBC: CPT | Performed by: EMERGENCY MEDICINE

## 2018-02-24 PROCEDURE — 96374 THER/PROPH/DIAG INJ IV PUSH: CPT

## 2018-02-24 PROCEDURE — 81001 URINALYSIS AUTO W/SCOPE: CPT | Performed by: EMERGENCY MEDICINE

## 2018-02-24 PROCEDURE — 80053 COMPREHEN METABOLIC PANEL: CPT | Performed by: EMERGENCY MEDICINE

## 2018-02-24 RX ORDER — IBUPROFEN 400 MG/1
800 TABLET ORAL
Status: DISCONTINUED | OUTPATIENT
Start: 2018-02-24 | End: 2018-02-24

## 2018-02-24 RX ORDER — SODIUM CHLORIDE 9 MG/ML
1000 INJECTION, SOLUTION INTRAVENOUS ONCE
Status: COMPLETED | OUTPATIENT
Start: 2018-02-24 | End: 2018-02-24

## 2018-02-24 RX ORDER — ONDANSETRON 2 MG/ML
4 INJECTION INTRAMUSCULAR; INTRAVENOUS
Status: COMPLETED | OUTPATIENT
Start: 2018-02-24 | End: 2018-02-24

## 2018-02-24 RX ORDER — HYDROMORPHONE HYDROCHLORIDE 1 MG/ML
1 INJECTION, SOLUTION INTRAMUSCULAR; INTRAVENOUS; SUBCUTANEOUS ONCE
Status: COMPLETED | OUTPATIENT
Start: 2018-02-24 | End: 2018-02-24

## 2018-02-24 RX ADMIN — IOPAMIDOL 100 ML: 612 INJECTION, SOLUTION INTRAVENOUS at 18:41

## 2018-02-24 RX ADMIN — SODIUM CHLORIDE 1000 ML: 900 INJECTION, SOLUTION INTRAVENOUS at 17:08

## 2018-02-24 RX ADMIN — HYDROMORPHONE HYDROCHLORIDE 1 MG: 1 INJECTION, SOLUTION INTRAMUSCULAR; INTRAVENOUS; SUBCUTANEOUS at 17:08

## 2018-02-24 RX ADMIN — ONDANSETRON 4 MG: 2 INJECTION INTRAMUSCULAR; INTRAVENOUS at 17:08

## 2018-02-24 NOTE — ED PROVIDER NOTES
EMERGENCY DEPARTMENT HISTORY AND PHYSICAL EXAM    5:08 PM      Date: 2/24/2018  Patient Name: Elisabet Julian    History of Presenting Illness     Chief Complaint   Patient presents with    Abdominal Pain    Pancreatitis         History Provided By: Patient    Chief Complaint: Abdominal pain   Onset:  today  Timing:  Constant  Location: Epigastric and RUQ  Quality: \"radiating pain\"  Severity: 8 out of 10  Modifying Factors: Pain alleviates with lying on L side   Associated Symptoms: vomiting and dark urine      Additional History (Context): Elisabet Julian is a 63 y.o. female with a pertinent history of chronic pancreatitis, alcohol abuse, gastric bypass, ERCP, bipolar disorder who presents to the ED for medical evaluation constant, radiating, epigastric/RUQ abd pain today. Pain alleviates with lying on L side. Also reports vomiting and dark urine. Explains she had not been endorsing EtOH use but she \"fell off the wagon\" 8 days ago. Pt denies dysuria, fever, SOB, CP. No recreational drug or smoking tobacco use. No other acute symptoms or complaints were noted. PCP: Sandro Sullivan MD    Current Outpatient Prescriptions   Medication Sig Dispense Refill    oxyCODONE IR (ROXICODONE) 5 mg immediate release tablet Take 2 Tabs by mouth every four (4) hours as needed for Pain. Max Daily Amount: 60 mg. 12 Tab 0    ondansetron hcl (ZOFRAN, AS HYDROCHLORIDE,) 4 mg tablet Take 1 Tab by mouth every eight (8) hours as needed for Nausea. 10 Tab 0    ergocalciferol (VITAMIN D2) 50,000 unit capsule Take 50,000 Units by mouth.  hydrOXYzine pamoate (VISTARIL) 50 mg capsule Take 50 mg by mouth two (2) times a day.  gabapentin (NEURONTIN) 600 mg tablet Take 600 mg by mouth three (3) times daily. Indications: NEUROPATHIC PAIN      ferrous sulfate (FEOSOL) 325 mg (65 mg iron) tablet Take 325 mg by mouth daily.  biotin 10,000 mcg cap Take 1 Cap by mouth.       calcium citrate-vitamin d3 (CITRACAL+D) 315-200 mg-unit tab Take 1 Tab by mouth two (2) times daily (with meals).  pantoprazole (PROTONIX) 20 mg tablet Take 40 mg by mouth daily.  cloNIDine HCl (CATAPRES) 0.1 mg tablet Take  by mouth two (2) times a day.  ergocalciferol (ERGOCALCIFEROL) 50,000 unit capsule Take 50,000 Units by mouth every seven (7) days.  colesevelam (WELCHOL) 625 mg tablet Take 2 Tabs by mouth two (2) times daily (with meals). 120 Tab 0    escitalopram oxalate (LEXAPRO) 10 mg tablet Take 1 Tab by mouth daily. 10 Tab 0    levothyroxine (SYNTHROID) 25 mcg tablet Take 1 Tab by mouth Daily (before breakfast). Indications: HYPOTHYROIDISM (Patient taking differently: Take 50 mcg by mouth Daily (before breakfast). Indications: hypothyroidism) 30 Tab 0    OLANZapine (ZYPREXA ZYDIS) 10 mg disintegrating tablet Take 1 Tab by mouth nightly. 30 Tab 0    ascorbic acid (VITAMIN C) 500 mg tablet Take 1,000 mg by mouth daily.  dicyclomine (BENTYL) 20 mg tablet Take 20 mg by mouth three (3) times daily.  cyanocobalamin (VITAMIN B-12) 1,000 mcg Subl 1,000 mcg by SubLINGual route daily.          Past History     Past Medical History:  Past Medical History:   Diagnosis Date    Alcohol abuse     Alcoholic hepatitis     Alcoholic pancreatitis     Alcoholic pancreatitis     Anemia     Bipolar disorder (HCC)     Dr[facility])    Chronic abdominal pain     Chronic pancreatitis ([facility] [address]     Compression fracture of lumbar vertebra (HCC)     L4, L5     Depression     Dr. [facility])    Elevated LFTs     ETOH abuse     Fatty liver     GERD (gastroesophageal reflux disease)     Hyperlipidemia     Hypertension     Hypothyroidism     Lower GI bleeding     Morbid obesity ([address]     Obstructive sleep apnea     Substance induced mood disorder ([facility] [address]     Vitamin D deficiency        Past Surgical History:  Past Surgical History:   Procedure Laterality Date    BIOPSY LIVER  08/15/2012    Lap Left hepatic liver wedge by Dr. Glendy Jose; BX Revealed: Hepatic Steatosis, AVM w/ associated Subcapsular Fibrosis.  COLONOSCOPY N/A 1/17/2017    COLONOSCOPY performed by Lamar Salinas MD at [address] [facility] DISKECTOMY, ANTERIOR, WITH D  8/1995, 11/1997    HX ABDOMINAL LAPAROSCOPY  12/02/2014    Laparoscopic Gastrostomy w/ Partial Gastrectomy for assistance in placement of Endoscopic Retrograde Cholangiopancreatography & Diagnostic Lap.  HX CARPAL TUNNEL RELEASE      HX CHOLECYSTECTOMY  09/16/2014    Dr. John Mirza HX COLONOSCOPY  05/12/2012    Colonoscopy by Dr. Maddie Remy. [facility]: Bx Revealed Colon Polyps (Tubular Adenoma), Hemorrhoids.  HX GASTRIC BYPASS  08/15/2012    Lap Christian-en-y    HX HEMORRHOIDECTOMY  04/30/2015    Dr. Milton Croft. [place]    HX HYSTERECTOMY  2006    HX TONSILLECTOMY  1992    REPAIR NONUNION SCAPHOID CARPAL BONE Right 2010    Wrist       Family History:  Family History   Problem Relation Age of Onset    Cancer Father     Cancer Sister     Obesity Brother        Social History:  Social History   Substance Use Topics    Smoking status: Never Smoker    Smokeless tobacco: Never Used    Alcohol use 0.0 oz/week     0 Standard drinks or equivalent per week      Comment: 6 pack of beer a week- 1/2/17 last use       Allergies: Allergies   Allergen Reactions    Codeine Rash    Lamictal [Lamotrigine] Anaphylaxis    Morphine Anaphylaxis     Per patient, has tolerated dilaudid in the past.    Pcn [Penicillins] Anaphylaxis    Vancomycin Rash    Doxycycline Rash    Percocet [Oxycodone-Acetaminophen] Hives and Itching    Tetracycline Rash    Tomato Hives         Review of Systems     Review of Systems   Constitutional: Negative for fever. Respiratory: Negative for shortness of breath. Cardiovascular: Negative for chest pain. Gastrointestinal: Positive for abdominal pain and vomiting. Genitourinary: Negative for dysuria. Dark urine     All other systems reviewed and are negative. Physical Exam     Visit Vitals    /53    Pulse 90    Temp 98.6 °F (37 °C)    Resp 18    Ht 5' 6\" (1.676 m)    Wt 90.7 kg (200 lb)    SpO2 91%    BMI 32.28 kg/m2       Physical Exam   Constitutional: She is oriented to person, place, and time. She appears well-developed and well-nourished. HENT:   Head: Normocephalic and atraumatic. Neck: Neck supple. No JVD present. Cardiovascular: Normal rate and regular rhythm. Pulmonary/Chest: Effort normal and breath sounds normal. No respiratory distress. Abdominal: Soft. She exhibits no distension. There is tenderness (mild) in the right upper quadrant and epigastric area. There is no rebound and no guarding. Musculoskeletal: She exhibits no edema. Neurological: She is alert and oriented to person, place, and time. Skin: Skin is warm and dry. No erythema.    Psychiatric: Judgment normal.         Diagnostic Study Results     Labs -  Recent Results (from the past 12 hour(s))   URINALYSIS W/ RFLX MICROSCOPIC    Collection Time: 02/24/18  4:25 PM   Result Value Ref Range    Color DARK YELLOW      Appearance CLEAR      Specific gravity 1.017 1.005 - 1.030      pH (UA) 5.5 5.0 - 8.0      Protein NEGATIVE  NEG mg/dL    Glucose NEGATIVE  NEG mg/dL    Ketone NEGATIVE  NEG mg/dL    Bilirubin SMALL (A) NEG      Blood NEGATIVE  NEG      Urobilinogen 1.0 0.2 - 1.0 EU/dL    Nitrites NEGATIVE  NEG      Leukocyte Esterase SMALL (A) NEG     URINE MICROSCOPIC ONLY    Collection Time: 02/24/18  4:25 PM   Result Value Ref Range    WBC 3 to 5 0 - 4 /hpf    RBC 0 0 - 5 /hpf    Epithelial cells FEW 0 - 5 /lpf    Bacteria NEGATIVE  NEG /hpf   CBC WITH AUTOMATED DIFF    Collection Time: 02/24/18  4:40 PM   Result Value Ref Range    WBC 5.8 4.6 - 13.2 K/uL    RBC 3.41 (L) 4.20 - 5.30 M/uL    HGB 10.1 (L) 12.0 - 16.0 g/dL    HCT 27.9 (L) 35.0 - 45.0 %    MCV 81.8 74.0 - 97.0 FL    MCH 29.6 24.0 - 34.0 PG MCHC 36.2 31.0 - 37.0 g/dL    RDW 14.0 11.6 - 14.5 %    PLATELET 336 (L) 377 - 420 K/uL    MPV 10.1 9.2 - 11.8 FL    NEUTROPHILS 80 (H) 40 - 73 %    LYMPHOCYTES 13 (L) 21 - 52 %    MONOCYTES 7 3 - 10 %    EOSINOPHILS 0 0 - 5 %    BASOPHILS 0 0 - 2 %    ABS. NEUTROPHILS 4.6 1.8 - 8.0 K/UL    ABS. LYMPHOCYTES 0.8 (L) 0.9 - 3.6 K/UL    ABS. MONOCYTES 0.4 0.05 - 1.2 K/UL    ABS. EOSINOPHILS 0.0 0.0 - 0.4 K/UL    ABS. BASOPHILS 0.0 0.0 - 0.06 K/UL    DF AUTOMATED     METABOLIC PANEL, COMPREHENSIVE    Collection Time: 02/24/18  4:40 PM   Result Value Ref Range    Sodium 127 (L) 136 - 145 mmol/L    Potassium 4.4 3.5 - 5.5 mmol/L    Chloride 88 (L) 100 - 108 mmol/L    CO2 28 21 - 32 mmol/L    Anion gap 11 3.0 - 18 mmol/L    Glucose 229 (H) 74 - 99 mg/dL    BUN 10 7.0 - 18 MG/DL    Creatinine 0.97 0.6 - 1.3 MG/DL    BUN/Creatinine ratio 10 (L) 12 - 20      GFR est AA >60 >60 ml/min/1.73m2    GFR est non-AA 59 (L) >60 ml/min/1.73m2    Calcium 8.5 8.5 - 10.1 MG/DL    Bilirubin, total 4.2 (H) 0.2 - 1.0 MG/DL    ALT (SGPT) 162 (H) 13 - 56 U/L    AST (SGOT) 411 (H) 15 - 37 U/L    Alk. phosphatase 471 (H) 45 - 117 U/L    Protein, total 7.2 6.4 - 8.2 g/dL    Albumin 3.4 3.4 - 5.0 g/dL    Globulin 3.8 2.0 - 4.0 g/dL    A-G Ratio 0.9 0.8 - 1.7     LIPASE    Collection Time: 02/24/18  4:40 PM   Result Value Ref Range    Lipase 52 (L) 73 - 393 U/L       Radiologic Studies -   CT ABD PELV W CONT    (Results Pending)     CT A/P prelim read: Findings: - Stable pancreatic atrophy and hypodense appearance of the pancreatic head. No CT evidence of acute pancreatitis such as pseudocyst, or significant adjacent inflammatory change. - Right lateral buttock fluid collection is partially imaged but appears largely unchanged compared to CT 5/2017 - most likely a hematoma or seroma. - Hepatomegaly and hepatic steatosis. - Postoperative changes of gastric bypass. - Prior cholecystectomy and hysterectomy.        Medical Decision Making   I am the first provider for this patient. I reviewed the vital signs, available nursing notes, past medical history, past surgical history, family history and social history. Vital Signs-Reviewed the patient's vital signs. Pulse Oximetry Analysis -  100 on room air (Interpretation)nl     Records Reviewed: Nursing Notes and Old Medical Records (Time of Review: 5:08 PM)    ED Course: Progress Notes, Reevaluation, and Consults:    Provider Notes (Medical Decision Making): 62 y/o female presents with abd and back pain. Check basic labs. Hx of pancreatiits, will consider imaging if labs unremarkable as pt with hx of gastric bypass. Labs showing elevated lfts, will ct to eval. Hx of prior ERCP, s/p robinson. 7:13 PM  Discussed results with pt. She is feeling better. Advised clear liquid diet. Stable for dc home. Has GI follow up appt scheduled. Discussed return precautions. Diagnosis     Clinical Impression:   1. Alcohol-induced acute pancreatitis without infection or necrosis        Disposition:     Follow-up Information     Follow up With Details Comments Contact Info    Reji Jackson MD Schedule an appointment as soon as possible for a visit  [facility] [address] [place] [id number]  [phone]      [facility] EMERGENCY DEPT  As needed, If symptoms worsen [address] [facility] [address] [phone]           Patient's Medications   Start Taking    No medications on file   Continue Taking    ASCORBIC ACID (VITAMIN C) 500 MG TABLET    Take 1,000 mg by mouth daily. BIOTIN 10,000 MCG CAP    Take 1 Cap by mouth. CALCIUM CITRATE-VITAMIN D3 (CITRACAL+D) 315-200 MG-UNIT TAB    Take 1 Tab by mouth two (2) times daily (with meals). CLONIDINE HCL (CATAPRES) 0.1 MG TABLET    Take  by mouth two (2) times a day. COLESEVELAM (WELCHOL) 625 MG TABLET    Take 2 Tabs by mouth two (2) times daily (with meals). CYANOCOBALAMIN (VITAMIN B-12) 1,000 MCG SUBL    1,000 mcg by SubLINGual route daily. DICYCLOMINE (BENTYL) 20 MG TABLET    Take 20 mg by mouth three (3) times daily. ERGOCALCIFEROL (ERGOCALCIFEROL) 50,000 UNIT CAPSULE    Take 50,000 Units by mouth every seven (7) days. ERGOCALCIFEROL (VITAMIN D2) 50,000 UNIT CAPSULE    Take 50,000 Units by mouth. ESCITALOPRAM OXALATE (LEXAPRO) 10 MG TABLET    Take 1 Tab by mouth daily. FERROUS SULFATE (FEOSOL) 325 MG (65 MG IRON) TABLET    Take 325 mg by mouth daily. GABAPENTIN (NEURONTIN) 600 MG TABLET    Take 600 mg by mouth three (3) times daily. Indications: NEUROPATHIC PAIN    HYDROXYZINE PAMOATE (VISTARIL) 50 MG CAPSULE    Take 50 mg by mouth two (2) times a day. LEVOTHYROXINE (SYNTHROID) 25 MCG TABLET    Take 1 Tab by mouth Daily (before breakfast). Indications: HYPOTHYROIDISM    OLANZAPINE (ZYPREXA ZYDIS) 10 MG DISINTEGRATING TABLET    Take 1 Tab by mouth nightly. ONDANSETRON HCL (ZOFRAN, AS HYDROCHLORIDE,) 4 MG TABLET    Take 1 Tab by mouth every eight (8) hours as needed for Nausea. OXYCODONE IR (ROXICODONE) 5 MG IMMEDIATE RELEASE TABLET    Take 2 Tabs by mouth every four (4) hours as needed for Pain. Max Daily Amount: 60 mg. PANTOPRAZOLE (PROTONIX) 20 MG TABLET    Take 40 mg by mouth daily. These Medications have changed    No medications on file   Stop Taking    No medications on file     _______________________________    Attestations:  Scribe Attestation     Nani Florence acting as a scribe for and in the presence of Solange Tirado DO     February 24, 2018 at 5:08 PM       Provider Attestation:      I personally performed the services described in the documentation, reviewed the documentation, as recorded by the scribe in my presence, and it accurately and completely records my words and actions.  February 24, 2018 at 5:08 PM - Solange Tirado DO    _______________________________

## 2018-02-25 NOTE — DISCHARGE INSTRUCTIONS
Pancreatitis: Care Instructions  Your Care Instructions    The pancreas is an organ behind the stomach. It makes hormones and enzymes to help your body digest food. But if these enzymes attack the pancreas, it can get inflamed. This is called pancreatitis. Most cases are caused by gallstones or by heavy alcohol use. If you take care of yourself at home, it will help you get better. It will also help you avoid more problems with your pancreas. Follow-up care is a key part of your treatment and safety. Be sure to make and go to all appointments, and call your doctor if you are having problems. It's also a good idea to know your test results and keep a list of the medicines you take. How can you care for yourself at home? · Drink clear liquids and eat bland foods until you feel better. Traverse foods include rice, dry toast, and crackers. They also include bananas and applesauce. · Eat a low-fat diet until your doctor says your pancreas is healed. · Do not drink alcohol. Tell your doctor if you need help to quit. Counseling, support groups, and sometimes medicines can help you stay sober. · Be safe with medicines. Read and follow all instructions on the label. ¨ If the doctor gave you a prescription medicine for pain, take it as prescribed. ¨ If you are not taking a prescription pain medicine, ask your doctor if you can take an over-the-counter medicine. · If your doctor prescribed antibiotics, take them as directed. Do not stop taking them just because you feel better. You need to take the full course of antibiotics. · Get extra rest until you feel better. To prevent future problems with your pancreas  · Do not drink alcohol. · Tell your doctors and pharmacist that you've had pancreatitis. They can help you avoid medicines that may cause this problem again. When should you call for help? Call 911 anytime you think you may need emergency care.  For example, call if:  ? · You vomit blood or what looks like coffee grounds. ? · Your stools are maroon or very bloody. ?Call your doctor now or seek immediate medical care if:  ? · You have new or worse belly pain. ? · Your stools are black and look like tar, or they have streaks of blood. ? · You are vomiting. ? Watch closely for changes in your health, and be sure to contact your doctor if:  ? · You do not get better as expected. Where can you learn more? Go to http://salvatore-izabela.info/. Enter V629 in the search box to learn more about \"Pancreatitis: Care Instructions. \"  Current as of: May 12, 2017  Content Version: 11.4  © 7437-0789 "[employer]. Care instructions adapted under license by [employer] (which disclaims liability or warranty for this information). If you have questions about a medical condition or this instruction, always ask your healthcare professional. [address] any warranty or liability for your use of this information.

## 2018-02-25 NOTE — ED NOTES
7:30 PM  02/24/18     Discharge instructions given to patient (name) with verbalization of understanding. Patient accompanied by family member. Patient discharged with the following prescriptions none. Patient discharged to home (destination).       Chris Garza RN

## 2018-04-06 ENCOUNTER — HOSPITAL ENCOUNTER (EMERGENCY)
Age: 58
Discharge: HOME | End: 2018-04-06
Attending: EMERGENCY MEDICINE | Admitting: EMERGENCY MEDICINE
Payer: MEDICARE

## 2018-04-06 ENCOUNTER — APPOINTMENT (OUTPATIENT)
Dept: GENERAL RADIOLOGY | Age: 58
End: 2018-04-06
Attending: EMERGENCY MEDICINE
Payer: MEDICARE

## 2018-04-06 VITALS
RESPIRATION RATE: 18 BRPM | TEMPERATURE: 98 F | HEART RATE: 95 BPM | SYSTOLIC BLOOD PRESSURE: 141 MMHG | OXYGEN SATURATION: 94 % | DIASTOLIC BLOOD PRESSURE: 99 MMHG

## 2018-04-06 DIAGNOSIS — S82.392A OTHER CLOSED FRACTURE OF DISTAL END OF LEFT TIBIA, INITIAL ENCOUNTER: ICD-10-CM

## 2018-04-06 DIAGNOSIS — S82.832A CLOSED FRACTURE OF DISTAL END OF LEFT FIBULA, UNSPECIFIED FRACTURE MORPHOLOGY, INITIAL ENCOUNTER: Primary | ICD-10-CM

## 2018-04-06 PROCEDURE — 73610 X-RAY EXAM OF ANKLE: CPT

## 2018-04-06 PROCEDURE — 99283 EMERGENCY DEPT VISIT LOW MDM: CPT

## 2018-04-06 RX ORDER — HYDROMORPHONE HYDROCHLORIDE 2 MG/1
2 TABLET ORAL
Qty: 16 TAB | Refills: 0 | Status: SHIPPED | OUTPATIENT
Start: 2018-04-06 | End: 2018-06-22

## 2018-04-06 RX ORDER — HYDROMORPHONE HYDROCHLORIDE 2 MG/1
2 TABLET ORAL
Status: DISCONTINUED | OUTPATIENT
Start: 2018-04-06 | End: 2018-04-06 | Stop reason: HOSPADM

## 2018-04-06 NOTE — ED PROVIDER NOTES
HPI Comments: Gordy Recinos is a 63 y.o. Female who states she injured her left ankle tonight while attempting to lift her  up. Felt a crack and was unable to bear weight after. Denies other injury. Pain is severe constant worse with any movement, palpation. Nothing taken prior to arrival via ems and was not given anything in route    The history is provided by the patient. Past Medical History:   Diagnosis Date    Alcohol abuse     Alcoholic hepatitis     Alcoholic pancreatitis     Alcoholic pancreatitis     Anemia     Bipolar disorder (HCC)     Dr. Guerrero [facility])    Chronic abdominal pain     Chronic pancreatitis ([facility]     Compression fracture of lumbar vertebra (HCC)     L4, L5     Depression     Dr. Guerrero [facility])    Elevated LFTs     ETOH abuse     Fatty liver     GERD (gastroesophageal reflux disease)     Hyperlipidemia     Hypertension     Hypothyroidism     Lower GI bleeding     Morbid obesity ([facility]     Obstructive sleep apnea     Sub[facility] disorder [facility]     Vitamin D deficiency        Past Surgical History:   Procedure Laterality Date    BIOPSY LIVER  08/15/2012    Lap Left hepatic liver wedge by Dr. Geramnia Gorman; BX Revealed: Hepatic Steatosis, AVM w/ associated Subcapsular Fibrosis.  COLONOSCOPY N/A 1/17/2017    COLONOSCOPY performed by Kj Villasenor MD at [address] [place] [address], [facility], WITH D  8/1995, 11/1997    HX ABDOMINAL LAPAROSCOPY  12/02/2014    Laparoscopic Gastrostomy w/ Partial Gastrectomy for assistance in placement of Endoscopic Retrograde Cholangiopancreatography & Diagnostic Lap.  HX CARPAL TUNNEL RELEASE      HX CHOLECYSTECTOMY  09/16/2014    Dr. Chelly Long HX COLONOSCOPY  05/12/2012    Colonoscopy by Dr. Laney Rockwell. Adithya Montana: Bx Revealed Colon Polyps (Tubular Adenoma), Hemorrhoids.      HX GASTRIC BYPASS  08/15/2012    Lap Christian-en-y    HX HEMORRHOIDECTOMY 04/30/2015    Dr. Nereida Gonzalez. [place]    HX HYSTERECTOMY  2006    HX TONSILLECTOMY  1992    REPAIR NONUNION SCAPHOID CARPAL BONE Right 2010    Wrist         Family History:   Problem Relation Age of Onset    Cancer Father     Cancer Sister     Obesity Brother        Social History     Social History    Marital status:      Spouse name: N/A    Number of children: N/A    Years of education: N/A     Occupational History    Not on file. Social History Main Topics    Smoking status: Never Smoker    Smokeless tobacco: Never Used    Alcohol use 0.0 oz/week     0 Standard drinks or equivalent per week      Comment: 6 pack of beer a week- 1/2/17 last use    Drug use: No    Sexual activity: Yes     Partners: Male     Birth control/ protection: Condom     Other Topics Concern    Not on file     Social History Narrative         ALLERGIES: Codeine; Lamictal [lamotrigine]; Morphine; Pcn [penicillins]; Vancomycin; Doxycycline; Percocet [oxycodone-acetaminophen]; Tetracycline; and Tomato    Review of Systems   Constitutional: Negative for fever. HENT: Negative for sore throat. Eyes: Negative for visual disturbance. Respiratory: Negative for shortness of breath. Cardiovascular: Negative for chest pain. Gastrointestinal: Negative for abdominal pain. Genitourinary: Negative for difficulty urinating. Musculoskeletal: Positive for arthralgias, gait problem and joint swelling. Skin: Negative for wound. Allergic/Immunologic: Negative for immunocompromised state. Neurological: Negative for syncope. Psychiatric/Behavioral: Positive for sleep disturbance. Vitals:    04/06/18 0536   BP: (!) 141/99   Pulse: 95   Resp: 18   Temp: 98 °F (36.7 °C)   SpO2: 94%            Physical Exam   Constitutional: She is oriented to person, place, and time. She appears well-developed and well-nourished. She appears distressed. HENT:   Head: Normocephalic and atraumatic.    Right Ear: External ear normal. Left Ear: External ear normal.   Nose: Nose normal.   Mouth/Throat: Uvula is midline, oropharynx is clear and moist and mucous membranes are normal.   Eyes: Conjunctivae are normal. No scleral icterus. Neck: Neck supple. Cardiovascular: Normal rate, regular rhythm, normal heart sounds and intact distal pulses. Pulmonary/Chest: Effort normal and breath sounds normal.   Abdominal: Soft. There is no tenderness. Musculoskeletal:        Left ankle: She exhibits decreased range of motion and swelling. She exhibits no ecchymosis, no deformity, no laceration and normal pulse. Tenderness. Lateral malleolus, AITFL, CF ligament and posterior TFL tenderness found. No head of 5th metatarsal and no proximal fibula tenderness found. Achilles tendon normal.   Neurological: She is alert and oriented to person, place, and time. Gait normal.   Skin: Skin is warm and dry. She is not diaphoretic. Psychiatric: Her behavior is normal.   Nursing note and vitals reviewed. [facility]      ED Course       Procedures    Vitals:  Patient Vitals for the past 12 hrs:   Temp Pulse Resp BP SpO2   04/06/18 0536 98 °F (36.7 °C) 95 18 (!) 141/99 94 %         Medications ordered:   Medications   HYDROmorphone (DILAUDID) tablet 2 mg (not administered)         Lab findings:  No results found for this or any previous visit (from the past 12 hour(s)). EKG interpretation by ED Physician:      X-Ray, CT or other radiology findings or impressions:  XR ANKLE LT MIN 3 V    (Results Pending)   distal tibia and fibular fx with no sig displacement    Progress notes, Consult notes or additional Procedure notes:   D/w pt regarding fx, need for splint, ortho f/u.  Doubt need for emergent consult at this time  Pt was placed in sugar tong and post splint by tech with md sup, nv intact  I have discussed with patient and/or family/sig other the results, interpretation of any imaging if performed, suspected diagnosis and treatment plan to include instructions regarding the diagnoses listed to which understanding was expressed with all questions answered  Given medication allergies, dilaudid was chosen as she has tolerated in past    Reevaluation of patient:   stable    Disposition:  Diagnosis:   1. Closed fracture of distal end of left fibula, unspecified fracture morphology, initial encounter    2. Other closed fracture of distal end of left tibia, initial encounter        Disposition: home    Follow-up Information     Follow up With Details Comments [address], [place] Schedule an appointment as soon as possible for a visit call office to be seen within next week for recheck of your ankle fracture [address]  [phone]              Patient's Medications   Start Taking    HYDROMORPHONE (DILAUDID) 2 MG TABLET    Take 1 Tab by mouth every six (6) hours as needed for Pain. Max Daily Amount: 8 mg. Continue Taking    ASCORBIC ACID (VITAMIN C) 500 MG TABLET    Take 1,000 mg by mouth daily. BIOTIN 10,000 MCG CAP    Take 1 Cap by mouth. CALCIUM CITRATE-VITAMIN D3 (CITRACAL+D) 315-200 MG-UNIT TAB    Take 1 Tab by mouth two (2) times daily (with meals). CLONIDINE HCL (CATAPRES) 0.1 MG TABLET    Take  by mouth two (2) times a day. COLESEVELAM (WELCHOL) 625 MG TABLET    Take 2 Tabs by mouth two (2) times daily (with meals). CYANOCOBALAMIN (VITAMIN B-12) 1,000 MCG SUBL    1,000 mcg by SubLINGual route daily. DICYCLOMINE (BENTYL) 20 MG TABLET    Take 20 mg by mouth three (3) times daily. ERGOCALCIFEROL (ERGOCALCIFEROL) 50,000 UNIT CAPSULE    Take 50,000 Units by mouth every seven (7) days. ERGOCALCIFEROL (VITAMIN D2) 50,000 UNIT CAPSULE    Take 50,000 Units by mouth. ESCITALOPRAM OXALATE (LEXAPRO) 10 MG TABLET    Take 1 Tab by mouth daily. FERROUS SULFATE (FEOSOL) 325 MG (65 MG IRON) TABLET    Take 325 mg by mouth daily.     GABAPENTIN (NEURONTIN) 600 MG TABLET    Take 600 mg by mouth three (3) times daily. Indications: NEUROPATHIC PAIN    HYDROXYZINE PAMOATE (VISTARIL) 50 MG CAPSULE    Take 50 mg by mouth two (2) times a day. LEVOTHYROXINE (SYNTHROID) 25 MCG TABLET    Take 1 Tab by mouth Daily (before breakfast). Indications: HYPOTHYROIDISM    OLANZAPINE (ZYPREXA ZYDIS) 10 MG DISINTEGRATING TABLET    Take 1 Tab by mouth nightly. ONDANSETRON HCL (ZOFRAN, AS HYDROCHLORIDE,) 4 MG TABLET    Take 1 Tab by mouth every eight (8) hours as needed for Nausea. PANTOPRAZOLE (PROTONIX) 20 MG TABLET    Take 40 mg by mouth daily. These Medications have changed    No medications on file   Stop Taking    OXYCODONE IR (ROXICODONE) 5 MG IMMEDIATE RELEASE TABLET    Take 2 Tabs by mouth every four (4) hours as needed for Pain. Max Daily Amount: 60 mg.

## 2018-06-22 ENCOUNTER — APPOINTMENT (OUTPATIENT)
Dept: GENERAL RADIOLOGY | Age: 58
End: 2018-06-22
Attending: PHYSICIAN ASSISTANT
Payer: MEDICARE

## 2018-06-22 ENCOUNTER — APPOINTMENT (OUTPATIENT)
Dept: VASCULAR SURGERY | Age: 58
End: 2018-06-22
Attending: PHYSICIAN ASSISTANT
Payer: MEDICARE

## 2018-06-22 ENCOUNTER — HOSPITAL ENCOUNTER (EMERGENCY)
Age: 58
Discharge: HOME | End: 2018-06-22
Attending: EMERGENCY MEDICINE
Payer: MEDICARE

## 2018-06-22 VITALS
TEMPERATURE: 97.9 F | DIASTOLIC BLOOD PRESSURE: 90 MMHG | SYSTOLIC BLOOD PRESSURE: 150 MMHG | HEIGHT: 67 IN | RESPIRATION RATE: 20 BRPM | BODY MASS INDEX: 30.13 KG/M2 | WEIGHT: 192 LBS | HEART RATE: 74 BPM

## 2018-06-22 DIAGNOSIS — D64.9 ANEMIA, UNSPECIFIED TYPE: ICD-10-CM

## 2018-06-22 DIAGNOSIS — I10 ESSENTIAL HYPERTENSION: ICD-10-CM

## 2018-06-22 DIAGNOSIS — L03.116 CELLULITIS OF LEFT LOWER EXTREMITY: Primary | ICD-10-CM

## 2018-06-22 LAB
ANION GAP SERPL CALC-SCNC: 10 MMOL/L (ref 3–18)
BASOPHILS # BLD: 0 K/UL (ref 0–0.06)
BASOPHILS NFR BLD: 1 % (ref 0–2)
BUN SERPL-MCNC: 6 MG/DL (ref 7–18)
BUN/CREAT SERPL: 10 (ref 12–20)
CALCIUM SERPL-MCNC: 9 MG/DL (ref 8.5–10.1)
CHLORIDE SERPL-SCNC: 99 MMOL/L (ref 100–108)
CO2 SERPL-SCNC: 26 MMOL/L (ref 21–32)
CREAT SERPL-MCNC: 0.62 MG/DL (ref 0.6–1.3)
DIFFERENTIAL METHOD BLD: ABNORMAL
EOSINOPHIL # BLD: 0.2 K/UL (ref 0–0.4)
EOSINOPHIL NFR BLD: 4 % (ref 0–5)
ERYTHROCYTE [DISTWIDTH] IN BLOOD BY AUTOMATED COUNT: 12.6 % (ref 11.6–14.5)
GLUCOSE SERPL-MCNC: 93 MG/DL (ref 74–99)
HCT VFR BLD AUTO: 34 % (ref 35–45)
HGB BLD-MCNC: 11.5 G/DL (ref 12–16)
LYMPHOCYTES # BLD: 1.9 K/UL (ref 0.9–3.6)
LYMPHOCYTES NFR BLD: 40 % (ref 21–52)
MCH RBC QN AUTO: 29.4 PG (ref 24–34)
MCHC RBC AUTO-ENTMCNC: 33.8 G/DL (ref 31–37)
MCV RBC AUTO: 87 FL (ref 74–97)
MONOCYTES # BLD: 0.5 K/UL (ref 0.05–1.2)
MONOCYTES NFR BLD: 11 % (ref 3–10)
NEUTS SEG # BLD: 2.1 K/UL (ref 1.8–8)
NEUTS SEG NFR BLD: 44 % (ref 40–73)
PLATELET # BLD AUTO: 203 K/UL (ref 135–420)
PMV BLD AUTO: 9 FL (ref 9.2–11.8)
POTASSIUM SERPL-SCNC: 4.2 MMOL/L (ref 3.5–5.5)
RBC # BLD AUTO: 3.91 M/UL (ref 4.2–5.3)
SODIUM SERPL-SCNC: 135 MMOL/L (ref 136–145)
WBC # BLD AUTO: 4.8 K/UL (ref 4.6–13.2)

## 2018-06-22 PROCEDURE — 99284 EMERGENCY DEPT VISIT MOD MDM: CPT

## 2018-06-22 PROCEDURE — 73610 X-RAY EXAM OF ANKLE: CPT

## 2018-06-22 PROCEDURE — 93971 EXTREMITY STUDY: CPT

## 2018-06-22 PROCEDURE — 85025 COMPLETE CBC W/AUTO DIFF WBC: CPT | Performed by: PHYSICIAN ASSISTANT

## 2018-06-22 PROCEDURE — 80048 BASIC METABOLIC PNL TOTAL CA: CPT | Performed by: PHYSICIAN ASSISTANT

## 2018-06-22 PROCEDURE — 74011250637 HC RX REV CODE- 250/637: Performed by: PHYSICIAN ASSISTANT

## 2018-06-22 RX ORDER — CLINDAMYCIN HYDROCHLORIDE 300 MG/1
300 CAPSULE ORAL 4 TIMES DAILY
Qty: 40 CAP | Refills: 0 | Status: SHIPPED | OUTPATIENT
Start: 2018-06-22 | End: 2018-07-02

## 2018-06-22 RX ORDER — HYDROCODONE BITARTRATE AND ACETAMINOPHEN 5; 325 MG/1; MG/1
1 TABLET ORAL
Qty: 12 TAB | Refills: 0 | Status: SHIPPED | OUTPATIENT
Start: 2018-06-22 | End: 2019-01-01

## 2018-06-22 RX ORDER — CLINDAMYCIN HYDROCHLORIDE 150 MG/1
300 CAPSULE ORAL
Status: COMPLETED | OUTPATIENT
Start: 2018-06-22 | End: 2018-06-22

## 2018-06-22 RX ORDER — HYDROCODONE BITARTRATE AND ACETAMINOPHEN 5; 325 MG/1; MG/1
1 TABLET ORAL
Status: COMPLETED | OUTPATIENT
Start: 2018-06-22 | End: 2018-06-22

## 2018-06-22 RX ADMIN — CLINDAMYCIN HYDROCHLORIDE 300 MG: 150 CAPSULE ORAL at 20:24

## 2018-06-22 RX ADMIN — HYDROCODONE BITARTRATE AND ACETAMINOPHEN 1 TABLET: 5; 325 TABLET ORAL at 20:24

## 2018-06-22 NOTE — Clinical Note
Take medication as prescribed. Elevate the extremity. Follow-up with your primary care physician in 2 days for reassessment. Bring the results from this visit with your for their review. Return to the ED for any new, worsening, or persistent symptoms, in cluding fever, increased swelling/redness, or any other medical concerns.

## 2018-06-22 NOTE — ED TRIAGE NOTES
Patient c/o left leg swelling and numbness that began 3 days ago. States that she had surgery on her ankle April 24th, and noticed 3 days ago that her left leg began swelling. Also reports numbness in her 3rd-5th toes in that leg. States the swelling has increased over the past 3 days. Left leg is swollen, red in color, and patient reports numbness. Dorsalis pedis pulse noted. Patient able to bear weight on foot but states it increases pain and needs assistance.

## 2018-06-23 NOTE — ED NOTES
I have reviewed discharge instructions with the patient. The patient verbalized understanding. Discharge medications reviewed with patient and appropriate educational materials and side effects teaching were provided. Patient in stable condition at discharge. Patient armband removed and shredded. Patient signed paper discharge instructions.

## 2018-06-23 NOTE — DISCHARGE INSTRUCTIONS
Anemia: Care Instructions  Your Care Instructions    Anemia is a low level of red blood cells, which carry oxygen throughout your body. Many things can cause anemia. Lack of iron is one of the most common causes. Your body needs iron to make hemoglobin, a substance in red blood cells that carries oxygen from the lungs to your body's cells. Without enough iron, the body produces fewer and smaller red blood cells. As a result, your body's cells do not get enough oxygen, and you feel tired and weak. And you may have trouble concentrating. Bleeding is the most common cause of a lack of iron. You may have heavy menstrual bleeding or bleeding caused by conditions such as ulcers, hemorrhoids, or cancer. Regular use of aspirin or other anti-inflammatory medicines (such as ibuprofen) also can cause bleeding in some people. A lack of iron in your diet also can cause anemia, especially at times when the body needs more iron, such as during pregnancy, infancy, and the teen years. Your doctor may have prescribed iron pills. It may take several months of treatment for your iron levels to return to normal. Your doctor also may suggest that you eat foods that are rich in iron, such as meat and beans. There are many other causes of anemia. It is not always due to a lack of iron. Finding the specific cause of your anemia will help your doctor find the right treatment for you. Follow-up care is a key part of your treatment and safety. Be sure to make and go to all appointments, and call your doctor if you are having problems. It's also a good idea to know your test results and keep a list of the medicines you take. How can you care for yourself at home? · Take your medicines exactly as prescribed. Call your doctor if you think you are having a problem with your medicine. · If your doctor recommends iron pills, take them as directed:  ¨ Try to take the pills on an empty stomach about 1 hour before or 2 hours after meals. But you may need to take iron with food to avoid an upset stomach. ¨ Do not take antacids or drink milk or caffeine drinks (such as coffee, tea, or cola) at the same time or within 2 hours of the time that you take your iron. They can make it hard for your body to absorb the iron. ¨ Vitamin C (from food or supplements) helps your body absorb iron. Try taking iron pills with a glass of orange juice or some other food that is high in vitamin C, such as citrus fruits. ¨ Iron pills may cause stomach problems, such as heartburn, nausea, diarrhea, constipation, and cramps. Be sure to drink plenty of fluids, and include fruits, vegetables, and fiber in your diet each day. Iron pills often make your bowel movements dark or green. ¨ If you forget to take an iron pill, do not take a double dose of iron the next time you take a pill. ¨ Keep iron pills out of the reach of small children. An overdose of iron can be very dangerous. · Follow your doctor's advice about eating iron-rich foods. These include red meat, shellfish, poultry, eggs, beans, raisins, whole-grain bread, and leafy green vegetables. · Steam vegetables to help them keep their iron content. When should you call for help? Call 911 anytime you think you may need emergency care. For example, call if:  ? · You have symptoms of a heart attack. These may include:  ¨ Chest pain or pressure, or a strange feeling in the chest.  ¨ Sweating. ¨ Shortness of breath. ¨ Nausea or vomiting. ¨ Pain, pressure, or a strange feeling in the back, neck, jaw, or upper belly or in one or both shoulders or arms. ¨ Lightheadedness or sudden weakness. ¨ A fast or irregular heartbeat. After you call 911, the  may tell you to chew 1 adult-strength or 2 to 4 low-dose aspirin. Wait for an ambulance. Do not try to drive yourself. ? · You passed out (lost consciousness).    ?Call your doctor now or seek immediate medical care if:  ? · You have new or increased shortness of breath. ? · You are dizzy or lightheaded, or you feel like you may faint. ? · Your fatigue and weakness continue or get worse. ? · You have any abnormal bleeding, such as:  ¨ Nosebleeds. ¨ Vaginal bleeding that is different (heavier, more frequent, at a different time of the month) than what you are used to. ¨ Bloody or black stools, or rectal bleeding. ¨ Bloody or pink urine. ? Watch closely for changes in your health, and be sure to contact your doctor if:  ? · You do not get better as expected. Where can you learn more? Go to http://salvatore-izabela.info/. Enter R301 in the search box to learn more about \"Anemia: Care Instructions. \"  Current as of: October 13, 2016  Content Version: 11.4  © 8554-9214 [employer]. Care instructions adapted under license by [employer] (which disclaims liability or warranty for this information). If you have questions about a medical condition or this instruction, always ask your healthcare professional. [place] [address] any warranty or liability for your use of this information. Cellulitis: Care Instructions  Your Care Instructions    Cellulitis is a skin infection. It often occurs after a break in the skin from a scrape, cut, bite, or puncture, or after a rash. The doctor has checked you carefully, but problems can develop later. If you notice any problems or new symptoms, get medical treatment right away. Follow-up care is a key part of your treatment and safety. Be sure to make and go to all appointments, and call your doctor if you are having problems. It's also a good idea to know your test results and keep a list of the medicines you take. How can you care for yourself at home? · Take your antibiotics as directed. Do not stop taking them just because you feel better. You need to take the full course of antibiotics. · Prop up the infected area on pillows to reduce pain and swelling.  Try to keep the area above the level of your heart as often as you can. · If your doctor told you how to care for your wound, follow your doctor's instructions. If you did not get instructions, follow this general advice:  ¨ Wash the wound with clean water 2 times a day. Don't use hydrogen peroxide or alcohol, which can slow healing. ¨ You may cover the wound with a thin layer of petroleum jelly, such as Vaseline, and a nonstick bandage. ¨ Apply more petroleum jelly and replace the bandage as needed. · Be safe with medicines. Take pain medicines exactly as directed. ¨ If the doctor gave you a prescription medicine for pain, take it as prescribed. ¨ If you are not taking a prescription pain medicine, ask your doctor if you can take an over-the-counter medicine. To prevent cellulitis in the future  · Try to prevent cuts, scrapes, or other injuries to your skin. Cellulitis most often occurs where there is a break in the skin. · If you get a scrape, cut, mild burn, or bite, wash the wound with clean water as soon as you can to help avoid infection. Don't use hydrogen peroxide or alcohol, which can slow healing. · If you have swelling in your legs (edema), support stockings and good skin care may help prevent leg sores and cellulitis. · Take care of your feet, especially if you have diabetes or other conditions that increase the risk of infection. Wear shoes and socks. Do not go barefoot. If you have athlete's foot or other skin problems on your feet, talk to your doctor about how to treat them. When should you call for help? Call your doctor now or seek immediate medical care if:  ? · You have signs that your infection is getting worse, such as:  ¨ Increased pain, swelling, warmth, or redness. ¨ Red streaks leading from the area. ¨ Pus draining from the area. ¨ A fever. ? · You get a rash. ? Watch closely for changes in your health, and be sure to contact your doctor if:  ? · You are not getting better after 1 day (24 hours). ? · You do not get better as expected. Where can you learn more? Go to http://salvatore-izabela.info/. Merline Foil in the search box to learn more about \"Cellulitis: Care Instructions. \"  Current as of: October 13, 2016  Content Version: 11.4  © 9532-6667 [employer]. Care instructions adapted under license by [employer] (which disclaims liability or warranty for this information). If you have questions about a medical condition or this instruction, always ask your healthcare professional. [place] [address] any warranty or liability for your use of this information. High Blood Pressure: Care Instructions  Your Care Instructions    If your blood pressure is usually above 140/90, you have high blood pressure, or hypertension. That means the top number is 140 or higher or the bottom number is 90 or higher, or both. Despite what a lot of people think, high blood pressure usually doesn't cause headaches or make you feel dizzy or lightheaded. It usually has no symptoms. But it does increase your risk for heart attack, stroke, and kidney or eye damage. The higher your blood pressure, the more your risk increases. Your doctor will give you a goal for your blood pressure. Your goal will be based on your health and your age. An example of a goal is to keep your blood pressure below 140/90. Lifestyle changes, such as eating healthy and being active, are always important to help lower blood pressure. You might also take medicine to reach your blood pressure goal.  Follow-up care is a key part of your treatment and safety. Be sure to make and go to all appointments, and call your doctor if you are having problems. It's also a good idea to know your test results and keep a list of the medicines you take. How can you care for yourself at home? Medical treatment  · If you stop taking your medicine, your blood pressure will go back up.  You may take one or more types of medicine to lower your blood pressure. Be safe with medicines. Take your medicine exactly as prescribed. Call your doctor if you think you are having a problem with your medicine. · Talk to your doctor before you start taking aspirin every day. Aspirin can help certain people lower their risk of a heart attack or stroke. But taking aspirin isn't right for everyone, because it can cause serious bleeding. · See your doctor regularly. You may need to see the doctor more often at first or until your blood pressure comes down. · If you are taking blood pressure medicine, talk to your doctor before you take decongestants or anti-inflammatory medicine, such as ibuprofen. Some of these medicines can raise blood pressure. · Learn how to check your blood pressure at home. Lifestyle changes  · Stay at a healthy weight. This is especially important if you put on weight around the waist. Losing even 10 pounds can help you lower your blood pressure. · If your doctor recommends it, get more exercise. Walking is a good choice. Bit by bit, increase the amount you walk every day. Try for at least 30 minutes on most days of the week. You also may want to swim, bike, or do other activities. · Avoid or limit alcohol. Talk to your doctor about whether you can drink any alcohol. · Try to limit how much sodium you eat to less than 2,300 milligrams (mg) a day. Your doctor may ask you to try to eat less than 1,500 mg a day. · Eat plenty of fruits (such as bananas and oranges), vegetables, legumes, whole grains, and low-fat dairy products. · Lower the amount of saturated fat in your diet. Saturated fat is found in animal products such as milk, cheese, and meat. Limiting these foods may help you lose weight and also lower your risk for heart disease. · Do not smoke. Smoking increases your risk for heart attack and stroke.  If you need help quitting, talk to your doctor about stop-smoking programs and medicines. These can increase your chances of quitting for good. When should you call for help? Call 911 anytime you think you may need emergency care. This may mean having symptoms that suggest that your blood pressure is causing a serious heart or blood vessel problem. Your blood pressure may be over 180/110. ? For example, call 911 if:  ? · You have symptoms of a heart attack. These may include:  ¨ Chest pain or pressure, or a strange feeling in the chest.  ¨ Sweating. ¨ Shortness of breath. ¨ Nausea or vomiting. ¨ Pain, pressure, or a strange feeling in the back, neck, jaw, or upper belly or in one or both shoulders or arms. ¨ Lightheadedness or sudden weakness. ¨ A fast or irregular heartbeat. ? · You have symptoms of a stroke. These may include:  ¨ Sudden numbness, tingling, weakness, or loss of movement in your face, arm, or leg, especially on only one side of your body. ¨ Sudden vision changes. ¨ Sudden trouble speaking. ¨ Sudden confusion or trouble understanding simple statements. ¨ Sudden problems with walking or balance. ¨ A sudden, severe headache that is different from past headaches. ? · You have severe back or belly pain. ?Do not wait until your blood pressure comes down on its own. Get help right away. ?Call your doctor now or seek immediate care if:  ? · Your blood pressure is much higher than normal (such as 180/110 or higher), but you don't have symptoms. ? · You think high blood pressure is causing symptoms, such as:  ¨ Severe headache. ¨ Blurry vision. ? Watch closely for changes in your health, and be sure to contact your doctor if:  ? · Your blood pressure measures 140/90 or higher at least 2 times. That means the top number is 140 or higher or the bottom number is 90 or higher, or both. ? · You think you may be having side effects from your blood pressure medicine. ? · Your blood pressure is usually normal, but it goes above normal at least 2 times.    Where can you learn more? Go to http://salvatore-izabela.info/. Enter I201 in the search box to learn more about \"High Blood Pressure: Care Instructions. \"  Current as of: 2016  Content Version: 11.4  © 4466-5725 [employer]. Care instructions adapted under license by [employer] (which disclaims liability or warranty for this information). If you have questions about a medical condition or this instruction, always ask your healthcare professional. [place] [address] any warranty or liability for your use of this information. [employer] Activation    Thank you for requesting access to [employer]. Please follow the instructions below to securely access and download your online medical record. [employer] allows you to send messages to your doctor, view your test results, renew your prescriptions, schedule appointments, and more. How Do I Sign Up? 1. In your internet browser, go to [email or web address]  2. Click on the First Time User? Click Here link in the Sign In box. You will be redirect to the New Member Sign Up page. 3. Enter your [employer] Access Code exactly as it appears below. You will not need to use this code after youve completed the sign-up process. If you do not sign up before the expiration date, you must request a new code. [employer]ree Access Code: [email or web address]  Expires: 2018  7:41 PM (This is the date your [employer] access code will )    4. Enter the last four digits of your Social Security Number (xxxx) and Date of Birth (mm/dd/yyyy) as indicated and click Submit. You will be taken to the next sign-up page. 5. Create a Cree ID. This will be your [facility]ree login ID and cannot be changed, so think of one that is secure and easy to remember. 6. Create a Cree password. You can change your password at any time. 7. Enter your Password Reset Question and Answer. This can be used at a later time if you forget your password.    8. Enter your e-mail address. You will receive e-mail notification when new information is available in [address] 9. Click Sign Up. You can now view and download portions of your medical record. 10. Click the Download Summary menu link to download a portable copy of your medical information. Additional Information    If you have questions, please visit the Frequently Asked Questions section of the [employer] website at [email or web address] Remember, [employer] is NOT to be used for urgent needs. For medical emergencies, dial 911.

## 2018-06-23 NOTE — ED PROVIDER NOTES
EMERGENCY DEPARTMENT HISTORY AND PHYSICAL EXAM    8:18 PM      Date: 6/22/2018  Patient Name: Jimmy Lyons    History of Presenting Illness     Chief Complaint   Patient presents with    Leg Swelling     left         History Provided By: Patient    Chief Complaint: LLE edema, erythema, and pain   Duration:  Days  Timing:  Progressive  Location: L ankle  Quality: Pressure  Severity: Moderate  Modifying Factors: worse with weight-bearing  Associated Symptoms: denies any other associated signs or symptoms      Additional History (Context): Jimmy Lyons is a 63 y.o. female with hx of alcohol abuse, pancreatitis, GERD, HTN, DVT  who presents with c/o L ankle pain, swelling, and redness x 3 days. Pt had a L ORIF of bimalleolar fracture 4/24/18. Completed physical therapy 1 month ago. Denies fever/chills, new injury/trauma, chest pain, dyspnea. Notes she has been taking Tylenol for pain without relief. Pt notes DVT 2 years ago, unknown cause, was on blood thinners \"for a few weeks\". PCP: Sandro Sullivan MD    Current Outpatient Prescriptions   Medication Sig Dispense Refill    clindamycin (CLEOCIN) 300 mg capsule Take 1 Cap by mouth four (4) times daily for 10 days. 40 Cap 0    HYDROcodone-acetaminophen (NORCO) 5-325 mg per tablet Take 1 Tab by mouth every six (6) hours as needed for Pain. Max Daily Amount: 4 Tabs. 12 Tab 0    ergocalciferol (VITAMIN D2) 50,000 unit capsule Take 50,000 Units by mouth.  hydrOXYzine pamoate (VISTARIL) 50 mg capsule Take 50 mg by mouth two (2) times a day.  gabapentin (NEURONTIN) 600 mg tablet Take 600 mg by mouth three (3) times daily. Indications: NEUROPATHIC PAIN      ferrous sulfate (FEOSOL) 325 mg (65 mg iron) tablet Take 325 mg by mouth daily.  biotin 10,000 mcg cap Take 1 Cap by mouth.  calcium citrate-vitamin d3 (CITRACAL+D) 315-200 mg-unit tab Take 1 Tab by mouth two (2) times daily (with meals).       pantoprazole (PROTONIX) 20 mg tablet Take 40 mg by mouth daily.  cloNIDine HCl (CATAPRES) 0.1 mg tablet Take  by mouth two (2) times a day.  ergocalciferol (ERGOCALCIFEROL) 50,000 unit capsule Take 50,000 Units by mouth every seven (7) days.  colesevelam (WELCHOL) 625 mg tablet Take 2 Tabs by mouth two (2) times daily (with meals). 120 Tab 0    escitalopram oxalate (LEXAPRO) 10 mg tablet Take 1 Tab by mouth daily. 10 Tab 0    levothyroxine (SYNTHROID) 25 mcg tablet Take 1 Tab by mouth Daily (before breakfast). Indications: HYPOTHYROIDISM (Patient taking differently: Take 50 mcg by mouth Daily (before breakfast). Indications: hypothyroidism) 30 Tab 0    OLANZapine (ZYPREXA ZYDIS) 10 mg disintegrating tablet Take 1 Tab by mouth nightly. 30 Tab 0    ascorbic acid (VITAMIN C) 500 mg tablet Take 1,000 mg by mouth daily.  dicyclomine (BENTYL) 20 mg tablet Take 20 mg by mouth three (3) times daily.  cyanocobalamin (VITAMIN B-12) 1,000 mcg Subl 1,000 mcg by SubLINGual route daily.          Past History     Past Medical History:  Past Medical History:   Diagnosis Date    Alcohol abuse     Alcoholic hepatitis     Alcoholic pancreatitis     Alcoholic pancreatitis     Anemia     Bipolar disorder (HCC)     Dr. [facility])    Chronic abdominal pain     Chronic pancreatitis ([facility] [address]     Compression fracture of lumbar vertebra (HCC)     L4, L5     Depression     Dr. So[facility])    Elevated LFTs     ETOH abuse     Fatty liver     GERD (gastroesophageal reflux disease)     Hyperlipidemia     Hypertension     Hypothyroidism     Lower GI bleeding     Morbid obesity ([address]     Obstructive sleep apnea     Substance induced mood disorder ([facility] [address]     Vitamin D deficiency        Past Surgical History:  Past Surgical History:   Procedure Laterality Date    BIOPSY LIVER  08/15/2012    Lap Left hepatic liver wedge by Dr. Filipe Meade; BX Revealed: Hepatic Steatosis, AVM w/ associated Subcapsular Fibrosis.  COLONOSCOPY N/A 1/17/2017    COLONOSCOPY performed by Princess Zepeda MD at [address] [facility] DISKECTOMY, ANTERIOR, WITH D  8/1995, 11/1997    HX ABDOMINAL LAPAROSCOPY  12/02/2014    Laparoscopic Gastrostomy w/ Partial Gastrectomy for assistance in placement of Endoscopic Retrograde Cholangiopancreatography & Diagnostic Lap.  HX CARPAL TUNNEL RELEASE      HX CHOLECYSTECTOMY  09/16/2014    Dr. Akira Soares HX COLONOSCOPY  05/12/2012    Colonoscopy by Dr. Jf Vargas Comings: Bx Revealed Colon Polyps (Tubular Adenoma), Hemorrhoids.  HX GASTRIC BYPASS  08/15/2012    Lap Christian-en-y    HX HEMORRHOIDECTOMY  04/30/2015    Dr. Kar Ku. Jason    HX HYSTERECTOMY  2006    HX TONSILLECTOMY  1992    REPAIR NONUNION SCAPHOID CARPAL BONE Right 2010    Wrist       Family History:  Family History   Problem Relation Age of Onset    Cancer Father     Cancer Sister     Obesity Brother        Social History:  Social History   Substance Use Topics    Smoking status: Never Smoker    Smokeless tobacco: Never Used    Alcohol use 0.0 oz/week     0 Standard drinks or equivalent per week      Comment: 6 pack of beer a week- 1/2/17 last use       Allergies: Allergies   Allergen Reactions    Codeine Rash    Lamictal [Lamotrigine] Anaphylaxis    Morphine Anaphylaxis     Per patient, has tolerated dilaudid in the past.    Pcn [Penicillins] Anaphylaxis    Vancomycin Rash    Doxycycline Rash    Percocet [Oxycodone-Acetaminophen] Hives and Itching    Tetracycline Rash    Tomato Hives         Review of Systems       Review of Systems   Constitutional: Negative for chills and fever. Respiratory: Negative for shortness of breath. Cardiovascular: Negative for chest pain. Gastrointestinal: Negative for abdominal pain, nausea and vomiting. Musculoskeletal: Positive for joint swelling and myalgias. Skin: Positive for color change. Negative for rash.    Neurological: Negative for weakness. All other systems reviewed and are negative. Physical Exam     Visit Vitals    /90    Pulse 74    Temp 97.9 °F (36.6 °C)    Resp 20    Ht 5' 7\" (1.702 m)    Wt 87.1 kg (192 lb)    BMI 30.07 kg/m2         Physical Exam   Constitutional: She appears well-developed and well-nourished. No distress. HENT:   Head: Normocephalic and atraumatic. Neck: Normal range of motion. Neck supple. Cardiovascular: Normal rate, regular rhythm and normal heart sounds. Exam reveals no gallop and no friction rub. No murmur heard. Pulmonary/Chest: Effort normal and breath sounds normal. No respiratory distress. She has no wheezes. She has no rales. Musculoskeletal:        Left ankle: She exhibits swelling (diffuse swelling at ankle joint with some extension into lower calf, no erythema of calf'). She exhibits normal range of motion, no ecchymosis, no deformity and normal pulse. Tenderness. Medial malleolus (moderately TTP) tenderness found. No lateral malleolus tenderness found. Achilles tendon normal.        Left foot: There is normal range of motion, no tenderness, no bony tenderness, normal capillary refill, no crepitus and no deformity. Full ROM of ankle and toes, no pain with PROM, mild erythema of ankle joint without streaking into extremity, no palpable fluctuance, dorsalis pedis 2+, sensation intact throughout digits   Neurological: She is alert. Skin: Skin is warm. No rash noted. She is not diaphoretic. Nursing note and vitals reviewed.         Diagnostic Study Results     Labs -  Recent Results (from the past 12 hour(s))   CBC WITH AUTOMATED DIFF    Collection Time: 06/22/18  8:19 PM   Result Value Ref Range    WBC 4.8 4.6 - 13.2 K/uL    RBC 3.91 (L) 4.20 - 5.30 M/uL    HGB 11.5 (L) 12.0 - 16.0 g/dL    HCT 34.0 (L) 35.0 - 45.0 %    MCV 87.0 74.0 - 97.0 FL    MCH 29.4 24.0 - 34.0 PG    MCHC 33.8 31.0 - 37.0 g/dL    RDW 12.6 11.6 - 14.5 %    PLATELET 602 659 - 385 K/uL    MPV 9.0 (L) 9.2 - 11.8 FL    NEUTROPHILS 44 40 - 73 %    LYMPHOCYTES 40 21 - 52 %    MONOCYTES 11 (H) 3 - 10 %    EOSINOPHILS 4 0 - 5 %    BASOPHILS 1 0 - 2 %    ABS. NEUTROPHILS 2.1 1.8 - 8.0 K/UL    ABS. LYMPHOCYTES 1.9 0.9 - 3.6 K/UL    ABS. MONOCYTES 0.5 0.05 - 1.2 K/UL    ABS. EOSINOPHILS 0.2 0.0 - 0.4 K/UL    ABS. BASOPHILS 0.0 0.0 - 0.06 K/UL    DF AUTOMATED     METABOLIC PANEL, BASIC    Collection Time: 06/22/18  8:19 PM   Result Value Ref Range    Sodium 135 (L) 136 - 145 mmol/L    Potassium 4.2 3.5 - 5.5 mmol/L    Chloride 99 (L) 100 - 108 mmol/L    CO2 26 21 - 32 mmol/L    Anion gap 10 3.0 - 18 mmol/L    Glucose 93 74 - 99 mg/dL    BUN 6 (L) 7.0 - 18 MG/DL    Creatinine 0.62 0.6 - 1.3 MG/DL    BUN/Creatinine ratio 10 (L) 12 - 20      GFR est AA >60 >60 ml/min/1.73m2    GFR est non-AA >60 >60 ml/min/1.73m2    Calcium 9.0 8.5 - 10.1 MG/DL       Radiologic Studies -   XR ANKLE LT MIN 3 V   Final Result      IMPRESSION:    Subacute distal fibular and medial malleolar fractures. Near complete healing of  fibular fracture. Medial malleolar fracture line remains visible. Fixation  hardware intact. Calcaneal enthesopathy. Diffuse prominent soft tissue swelling  lower leg and ankle. Medical Decision Making   I am the first provider for this patient. I reviewed the vital signs, available nursing notes, past medical history, past surgical history, family history and social history. Vital Signs-Reviewed the patient's vital signs. Records Reviewed: Nursing Notes and Old Medical Records (Time of Review: 8:18 PM)    ED Course: Progress Notes, Reevaluation, and Consults:  9:20 PM: Pt to Doppler  9:53 PM: Doppler negative. 9:53 PM Reviewed results with patient. Resting comfortably. Discussed need for close outpatient follow-up with orthopedic physician for further assessment for fracturing healing. Printed x-ray report for her records.  Discussed strict return precautions, including fever, increased swelling/redness, or any other medical concerns. Pt verbalized understanding, will call orthopedic Monday. Has crutches at home, will be non-weightbearing until follow-up. Provider Notes (Medical Decision Making): 64 yo F who presents due to L ankle edema, erythema and pain. Afebrile, VS stable, looks well. No leukocytosis. Extremity neurovascularly intact with good ROM. Doppler negative for DVT. No signs of septic joint, surgical intervention ~2 months ago, patient with good ROM of joint without pain. Will prescribe Clindamycin for cellulitis as patient has multiple antibiotic allergies. Discussed need for strict follow-up with PMD and orthopedic physician. Ace wrap applied, has crutches at home. Stable for d/c with outpatient follow-up. Diagnosis     Clinical Impression:   1. Cellulitis of left lower extremity    2. Anemia, unspecified type    3. Essential hypertension        Disposition: home     Follow-up Information     Follow up With Details Comments [facility] EMERGENCY DEPT  If symptoms worsen [address] [facility] [address]. [phone]    Sandro Sullivan MD In 2 days  Patient can only remember the practice name and not the physician             Patient's Medications   Start Taking    CLINDAMYCIN (CLEOCIN) 300 MG CAPSULE    Take 1 Cap by mouth four (4) times daily for 10 days. HYDROCODONE-ACETAMINOPHEN (NORCO) 5-325 MG PER TABLET    Take 1 Tab by mouth every six (6) hours as needed for Pain. Max Daily Amount: 4 Tabs. Continue Taking    ASCORBIC ACID (VITAMIN C) 500 MG TABLET    Take 1,000 mg by mouth daily. BIOTIN 10,000 MCG CAP    Take 1 Cap by mouth. CALCIUM CITRATE-VITAMIN D3 (CITRACAL+D) 315-200 MG-UNIT TAB    Take 1 Tab by mouth two (2) times daily (with meals). CLONIDINE HCL (CATAPRES) 0.1 MG TABLET    Take  by mouth two (2) times a day. COLESEVELAM (WELCHOL) 625 MG TABLET    Take 2 Tabs by mouth two (2) times daily (with meals).     CYANOCOBALAMIN (VITAMIN B-12) 1,000 MCG SUBL    1,000 mcg by SubLINGual route daily. DICYCLOMINE (BENTYL) 20 MG TABLET    Take 20 mg by mouth three (3) times daily. ERGOCALCIFEROL (ERGOCALCIFEROL) 50,000 UNIT CAPSULE    Take 50,000 Units by mouth every seven (7) days. ERGOCALCIFEROL (VITAMIN D2) 50,000 UNIT CAPSULE    Take 50,000 Units by mouth. ESCITALOPRAM OXALATE (LEXAPRO) 10 MG TABLET    Take 1 Tab by mouth daily. FERROUS SULFATE (FEOSOL) 325 MG (65 MG IRON) TABLET    Take 325 mg by mouth daily. GABAPENTIN (NEURONTIN) 600 MG TABLET    Take 600 mg by mouth three (3) times daily. Indications: NEUROPATHIC PAIN    HYDROXYZINE PAMOATE (VISTARIL) 50 MG CAPSULE    Take 50 mg by mouth two (2) times a day. LEVOTHYROXINE (SYNTHROID) 25 MCG TABLET    Take 1 Tab by mouth Daily (before breakfast). Indications: HYPOTHYROIDISM    OLANZAPINE (ZYPREXA ZYDIS) 10 MG DISINTEGRATING TABLET    Take 1 Tab by mouth nightly. PANTOPRAZOLE (PROTONIX) 20 MG TABLET    Take 40 mg by mouth daily. These Medications have changed    No medications on file   Stop Taking    HYDROMORPHONE (DILAUDID) 2 MG TABLET    Take 1 Tab by mouth every six (6) hours as needed for Pain. Max Daily Amount: 8 mg. ONDANSETRON HCL (ZOFRAN, AS HYDROCHLORIDE,) 4 MG TABLET    Take 1 Tab by mouth every eight (8) hours as needed for Nausea.

## 2018-07-18 ENCOUNTER — HOSPITAL ENCOUNTER (EMERGENCY)
Age: 58
Discharge: HOME | End: 2018-07-19
Attending: EMERGENCY MEDICINE
Payer: MEDICARE

## 2018-07-18 DIAGNOSIS — R11.2 NON-INTRACTABLE VOMITING WITH NAUSEA, UNSPECIFIED VOMITING TYPE: ICD-10-CM

## 2018-07-18 DIAGNOSIS — E87.1 HYPONATREMIA: ICD-10-CM

## 2018-07-18 DIAGNOSIS — R10.13 ABDOMINAL PAIN, EPIGASTRIC: Primary | ICD-10-CM

## 2018-07-18 LAB
ALBUMIN SERPL-MCNC: 4.3 G/DL (ref 3.4–5)
ALBUMIN/GLOB SERPL: 1 {RATIO} (ref 0.8–1.7)
ALP SERPL-CCNC: 142 U/L (ref 45–117)
ALT SERPL-CCNC: 51 U/L (ref 13–56)
ANION GAP SERPL CALC-SCNC: 12 MMOL/L (ref 3–18)
APPEARANCE UR: CLEAR
AST SERPL-CCNC: 92 U/L (ref 15–37)
BASOPHILS # BLD: 0 K/UL (ref 0–0.1)
BASOPHILS NFR BLD: 0 % (ref 0–2)
BILIRUB SERPL-MCNC: 1.1 MG/DL (ref 0.2–1)
BILIRUB UR QL: NEGATIVE
BUN SERPL-MCNC: 11 MG/DL (ref 7–18)
BUN/CREAT SERPL: 15 (ref 12–20)
CALCIUM SERPL-MCNC: 8.7 MG/DL (ref 8.5–10.1)
CHLORIDE SERPL-SCNC: 86 MMOL/L (ref 100–108)
CO2 SERPL-SCNC: 24 MMOL/L (ref 21–32)
COLOR UR: YELLOW
CREAT SERPL-MCNC: 0.73 MG/DL (ref 0.6–1.3)
DIFFERENTIAL METHOD BLD: ABNORMAL
EOSINOPHIL # BLD: 0 K/UL (ref 0–0.4)
EOSINOPHIL NFR BLD: 0 % (ref 0–5)
ERYTHROCYTE [DISTWIDTH] IN BLOOD BY AUTOMATED COUNT: 13.4 % (ref 11.6–14.5)
ETHANOL SERPL-MCNC: <3 MG/DL (ref 0–3)
GLOBULIN SER CALC-MCNC: 4.2 G/DL (ref 2–4)
GLUCOSE SERPL-MCNC: 141 MG/DL (ref 74–99)
GLUCOSE UR STRIP.AUTO-MCNC: NEGATIVE MG/DL
HCT VFR BLD AUTO: 36.3 % (ref 35–45)
HGB BLD-MCNC: 12.9 G/DL (ref 12–16)
HGB UR QL STRIP: NEGATIVE
KETONES UR QL STRIP.AUTO: 15 MG/DL
LEUKOCYTE ESTERASE UR QL STRIP.AUTO: NEGATIVE
LIPASE SERPL-CCNC: 66 U/L (ref 73–393)
LYMPHOCYTES # BLD: 0.8 K/UL (ref 0.9–3.6)
LYMPHOCYTES NFR BLD: 11 % (ref 21–52)
MCH RBC QN AUTO: 29.1 PG (ref 24–34)
MCHC RBC AUTO-ENTMCNC: 35.5 G/DL (ref 31–37)
MCV RBC AUTO: 81.8 FL (ref 74–97)
MONOCYTES # BLD: 0.6 K/UL (ref 0.05–1.2)
MONOCYTES NFR BLD: 8 % (ref 3–10)
NEUTS SEG # BLD: 6 K/UL (ref 1.8–8)
NEUTS SEG NFR BLD: 81 % (ref 40–73)
NITRITE UR QL STRIP.AUTO: NEGATIVE
PH UR STRIP: 5.5 [PH] (ref 5–8)
PLATELET # BLD AUTO: 223 K/UL (ref 135–420)
PMV BLD AUTO: 9.1 FL (ref 9.2–11.8)
POTASSIUM SERPL-SCNC: 4.5 MMOL/L (ref 3.5–5.5)
PROT SERPL-MCNC: 8.5 G/DL (ref 6.4–8.2)
PROT UR STRIP-MCNC: NEGATIVE MG/DL
RBC # BLD AUTO: 4.44 M/UL (ref 4.2–5.3)
SODIUM SERPL-SCNC: 122 MMOL/L (ref 136–145)
SP GR UR REFRACTOMETRY: 1.01 (ref 1–1.03)
UROBILINOGEN UR QL STRIP.AUTO: 0.2 EU/DL (ref 0.2–1)
WBC # BLD AUTO: 7.4 K/UL (ref 4.6–13.2)

## 2018-07-18 PROCEDURE — 80307 DRUG TEST PRSMV CHEM ANLYZR: CPT | Performed by: EMERGENCY MEDICINE

## 2018-07-18 PROCEDURE — 74011250636 HC RX REV CODE- 250/636: Performed by: EMERGENCY MEDICINE

## 2018-07-18 PROCEDURE — 74011000250 HC RX REV CODE- 250: Performed by: EMERGENCY MEDICINE

## 2018-07-18 PROCEDURE — 81003 URINALYSIS AUTO W/O SCOPE: CPT | Performed by: EMERGENCY MEDICINE

## 2018-07-18 PROCEDURE — 80053 COMPREHEN METABOLIC PANEL: CPT | Performed by: EMERGENCY MEDICINE

## 2018-07-18 PROCEDURE — 96375 TX/PRO/DX INJ NEW DRUG ADDON: CPT

## 2018-07-18 PROCEDURE — 85025 COMPLETE CBC W/AUTO DIFF WBC: CPT | Performed by: EMERGENCY MEDICINE

## 2018-07-18 PROCEDURE — 99284 EMERGENCY DEPT VISIT MOD MDM: CPT

## 2018-07-18 PROCEDURE — 96361 HYDRATE IV INFUSION ADD-ON: CPT

## 2018-07-18 PROCEDURE — 83690 ASSAY OF LIPASE: CPT | Performed by: EMERGENCY MEDICINE

## 2018-07-18 PROCEDURE — 96374 THER/PROPH/DIAG INJ IV PUSH: CPT

## 2018-07-18 RX ORDER — ONDANSETRON 2 MG/ML
4 INJECTION INTRAMUSCULAR; INTRAVENOUS
Status: COMPLETED | OUTPATIENT
Start: 2018-07-18 | End: 2018-07-18

## 2018-07-18 RX ORDER — FAMOTIDINE 10 MG/ML
20 INJECTION INTRAVENOUS
Status: COMPLETED | OUTPATIENT
Start: 2018-07-18 | End: 2018-07-18

## 2018-07-18 RX ORDER — FENTANYL CITRATE 50 UG/ML
100 INJECTION, SOLUTION INTRAMUSCULAR; INTRAVENOUS
Status: COMPLETED | OUTPATIENT
Start: 2018-07-18 | End: 2018-07-18

## 2018-07-18 RX ADMIN — SODIUM CHLORIDE 1000 ML: 900 INJECTION, SOLUTION INTRAVENOUS at 21:57

## 2018-07-18 RX ADMIN — SODIUM CHLORIDE 1000 ML: 900 INJECTION, SOLUTION INTRAVENOUS at 23:39

## 2018-07-18 RX ADMIN — FENTANYL CITRATE 100 MCG: 50 INJECTION, SOLUTION INTRAMUSCULAR; INTRAVENOUS at 21:57

## 2018-07-18 RX ADMIN — ONDANSETRON 4 MG: 2 INJECTION, SOLUTION INTRAMUSCULAR; INTRAVENOUS at 21:57

## 2018-07-18 RX ADMIN — FAMOTIDINE 20 MG: 10 INJECTION INTRAVENOUS at 21:57

## 2018-07-19 ENCOUNTER — APPOINTMENT (OUTPATIENT)
Dept: GENERAL RADIOLOGY | Age: 58
End: 2018-07-19
Attending: EMERGENCY MEDICINE
Payer: MEDICARE

## 2018-07-19 VITALS
OXYGEN SATURATION: 97 % | TEMPERATURE: 97.9 F | BODY MASS INDEX: 30.13 KG/M2 | HEART RATE: 81 BPM | RESPIRATION RATE: 18 BRPM | SYSTOLIC BLOOD PRESSURE: 154 MMHG | HEIGHT: 67 IN | DIASTOLIC BLOOD PRESSURE: 95 MMHG | WEIGHT: 192 LBS

## 2018-07-19 LAB
ANION GAP SERPL CALC-SCNC: 8 MMOL/L (ref 3–18)
BUN SERPL-MCNC: 11 MG/DL (ref 7–18)
BUN/CREAT SERPL: 17 (ref 12–20)
CALCIUM SERPL-MCNC: 7.2 MG/DL (ref 8.5–10.1)
CHLORIDE SERPL-SCNC: 95 MMOL/L (ref 100–108)
CO2 SERPL-SCNC: 30 MMOL/L (ref 21–32)
CREAT SERPL-MCNC: 0.64 MG/DL (ref 0.6–1.3)
GLUCOSE SERPL-MCNC: 116 MG/DL (ref 74–99)
POTASSIUM SERPL-SCNC: 4.1 MMOL/L (ref 3.5–5.5)
SODIUM SERPL-SCNC: 133 MMOL/L (ref 136–145)

## 2018-07-19 PROCEDURE — 96375 TX/PRO/DX INJ NEW DRUG ADDON: CPT

## 2018-07-19 PROCEDURE — 71045 X-RAY EXAM CHEST 1 VIEW: CPT

## 2018-07-19 PROCEDURE — 74011250636 HC RX REV CODE- 250/636: Performed by: EMERGENCY MEDICINE

## 2018-07-19 PROCEDURE — 80048 BASIC METABOLIC PNL TOTAL CA: CPT | Performed by: EMERGENCY MEDICINE

## 2018-07-19 PROCEDURE — 96361 HYDRATE IV INFUSION ADD-ON: CPT

## 2018-07-19 RX ORDER — ONDANSETRON HYDROCHLORIDE 8 MG/1
8 TABLET, FILM COATED ORAL
Qty: 12 TAB | Refills: 0 | Status: SHIPPED | OUTPATIENT
Start: 2018-07-19 | End: 2019-01-01

## 2018-07-19 RX ORDER — KETOROLAC TROMETHAMINE 30 MG/ML
INJECTION, SOLUTION INTRAMUSCULAR; INTRAVENOUS
Status: DISCONTINUED
Start: 2018-07-19 | End: 2018-07-19 | Stop reason: HOSPADM

## 2018-07-19 RX ORDER — PANTOPRAZOLE SODIUM 20 MG/1
40 TABLET, DELAYED RELEASE ORAL DAILY
Qty: 20 TAB | Refills: 0 | Status: SHIPPED | OUTPATIENT
Start: 2018-07-19 | End: 2018-07-29

## 2018-07-19 RX ORDER — KETOROLAC TROMETHAMINE 30 MG/ML
30 INJECTION, SOLUTION INTRAMUSCULAR; INTRAVENOUS
Status: COMPLETED | OUTPATIENT
Start: 2018-07-19 | End: 2018-07-19

## 2018-07-19 RX ADMIN — KETOROLAC TROMETHAMINE 30 MG: 30 INJECTION, SOLUTION INTRAMUSCULAR at 01:18

## 2018-07-19 NOTE — DISCHARGE INSTRUCTIONS
Abdominal Pain: Care Instructions  Your Care Instructions    Abdominal pain has many possible causes. Some aren't serious and get better on their own in a few days. Others need more testing and treatment. If your pain continues or gets worse, you need to be rechecked and may need more tests to find out what is wrong. You may need surgery to correct the problem. Don't ignore new symptoms, such as fever, nausea and vomiting, urination problems, pain that gets worse, and dizziness. These may be signs of a more serious problem. Your doctor may have recommended a follow-up visit in the next 8 to 12 hours. If you are not getting better, you may need more tests or treatment. The doctor has checked you carefully, but problems can develop later. If you notice any problems or new symptoms, get medical treatment right away. Follow-up care is a key part of your treatment and safety. Be sure to make and go to all appointments, and call your doctor if you are having problems. It's also a good idea to know your test results and keep a list of the medicines you take. How can you care for yourself at home? · Rest until you feel better. · To prevent dehydration, drink plenty of fluids, enough so that your urine is light yellow or clear like water. Choose water and other caffeine-free clear liquids until you feel better. If you have kidney, heart, or liver disease and have to limit fluids, talk with your doctor before you increase the amount of fluids you drink. · If your stomach is upset, eat mild foods, such as rice, dry toast or crackers, bananas, and applesauce. Try eating several small meals instead of two or three large ones. · Wait until 48 hours after all symptoms have gone away before you have spicy foods, alcohol, and drinks that contain caffeine. · Do not eat foods that are high in fat. · Avoid anti-inflammatory medicines such as aspirin, ibuprofen (Advil, Motrin), and naproxen (Aleve).  These can cause stomach upset. Talk to your doctor if you take daily aspirin for another health problem. When should you call for help? Call 911 anytime you think you may need emergency care. For example, call if:    · You passed out (lost consciousness).     · You pass maroon or very bloody stools.     · You vomit blood or what looks like coffee grounds.     · You have new, severe belly pain.    Call your doctor now or seek immediate medical care if:    · Your pain gets worse, especially if it becomes focused in one area of your belly.     · You have a new or higher fever.     · Your stools are black and look like tar, or they have streaks of blood.     · You have unexpected vaginal bleeding.     · You have symptoms of a urinary tract infection. These may include:  ¨ Pain when you urinate. ¨ Urinating more often than usual.  ¨ Blood in your urine.     · You are dizzy or lightheaded, or you feel like you may faint.    Watch closely for changes in your health, and be sure to contact your doctor if:    · You are not getting better after 1 day (24 hours). Where can you learn more? Go to http://salvatoreAnokion SAizabela.info/. Enter Z167 in the search box to learn more about \"Abdominal Pain: Care Instructions. \"  Current as of: November 20, 2017  Content Version: 11.7  © 8832-4382 [employer]. Care instructions adapted under license by [email or web address] (which disclaims liability or warranty for this information). If you have questions about a medical condition or this instruction, always ask your healthcare professional. [place] [address] any warranty or liability for your use of this information. Indigestion (Dyspepsia or Heartburn): Care Instructions  Your Care Instructions  Sometimes it can be hard to pinpoint the cause of indigestion.  (It is also called dyspepsia or heartburn.) Most cases of an upset stomach with bloating, burning, burping, and nausea are minor and go away within several hours. Home treatment and over-the-counter medicine often are able to control symptoms. But if you take medicine to relieve your indigestion without making diet and lifestyle changes, your symptoms are likely to return again and again. If you get indigestion often, it may be a sign of a more serious medical problem. Be sure to follow up with your doctor, who may want to do tests to be sure of the cause of your indigestion. Follow-up care is a key part of your treatment and safety. Be sure to make and go to all appointments, and call your doctor if you are having problems. It's also a good idea to know your test results and keep a list of the medicines you take. How can you care for yourself at home? · Your doctor may recommend over-the-counter medicine. For mild or occasional indigestion, antacids such as Gaviscon, Mylanta, Maalox, or Tums, may help. Be safe with medicines. Be careful when you take over-the-counter antacid medicines. Many of these medicines have aspirin in them. Read the label to make sure that you are not taking more than the recommended dose. Too much aspirin can be harmful. · Your doctor also may recommend over-the-counter acid reducers, such as Pepcid AC, Tagamet HB, Zantac 75, or Prilosec. Read and follow all instructions on the label. If you use these medicines often, talk with your doctor. · Change your eating habits. ¨ It's best to eat several small meals instead of two or three large meals. ¨ After you eat, wait 2 to 3 hours before you lie down. ¨ Chocolate, mint, and alcohol can make GERD worse. ¨ Spicy foods, foods that have a lot of acid (like tomatoes and oranges), and coffee can make GERD symptoms worse in some people. If your symptoms are worse after you eat a certain food, you may want to stop eating that food to see if your symptoms get better. · Do not smoke or chew tobacco. Smoking can make GERD worse.  If you need help quitting, talk to your doctor about stop-smoking programs and medicines. These can increase your chances of quitting for good. · If you have GERD symptoms at night, raise the head of your bed 6 to 8 inches. You can do this by putting the frame on blocks or placing a foam wedge under the head of your mattress. (Adding extra pillows does not work.)  · Do not wear tight clothing around your middle. · Lose weight if you need to. Losing just 5 to 10 pounds can help. · Do not take anti-inflammatory medicines, such as aspirin, ibuprofen (Advil, Motrin), or naproxen (Aleve). These can irritate the stomach. If you need a pain medicine, try acetaminophen (Tylenol), which does not cause stomach upset. When should you call for help? Call your doctor now or seek immediate medical care if:    · You have new or worse belly pain.     · You are vomiting.    Watch closely for changes in your health, and be sure to contact your doctor if:    · You have new or worse symptoms of indigestion.     · You have trouble or pain swallowing.     · You are losing weight.     · You do not get better as expected. Where can you learn more? Go to http://salvatore-izabela.info/. Enter S612 in the search box to learn more about \"Indigestion (Dyspepsia or Heartburn): Care Instructions. \"  Current as of: May 12, 2017  Content Version: 11.7  © 2706-3477 [employer]. Care instructions adapted under license by [email or web address] (which disclaims liability or warranty for this information). If you have questions about a medical condition or this instruction, always ask your healthcare professional. [address] any warranty or liability for your use of this information. Nausea and Vomiting: Care Instructions  Your Care Instructions    When you are nauseated, you may feel weak and sweaty and notice a lot of saliva in your mouth. Nausea often leads to vomiting.  Most of the time you do not need to worry about nausea and vomiting, but they can be signs of other illnesses. Two common causes of nausea and vomiting are stomach flu and food poisoning. Nausea and vomiting from viral stomach flu will usually start to improve within 24 hours. Nausea and vomiting from food poisoning may last from 12 to 48 hours. The doctor has checked you carefully, but problems can develop later. If you notice any problems or new symptoms, get medical treatment right away. Follow-up care is a key part of your treatment and safety. Be sure to make and go to all appointments, and call your doctor if you are having problems. It's also a good idea to know your test results and keep a list of the medicines you take. How can you care for yourself at home? · To prevent dehydration, drink plenty of fluids, enough so that your urine is light yellow or clear like water. Choose water and other caffeine-free clear liquids until you feel better. If you have kidney, heart, or liver disease and have to limit fluids, talk with your doctor before you increase the amount of fluids you drink. · Rest in bed until you feel better. · When you are able to eat, try clear soups, mild foods, and liquids until all symptoms are gone for 12 to 48 hours. Other good choices include dry toast, crackers, cooked cereal, and gelatin dessert, such as Jell-O. When should you call for help? Call 911 anytime you think you may need emergency care. For example, call if:    · You passed out (lost consciousness).    Call your doctor now or seek immediate medical care if:    · You have symptoms of dehydration, such as:  ¨ Dry eyes and a dry mouth. ¨ Passing only a little dark urine.   ¨ Feeling thirstier than usual.     · You have new or worsening belly pain.     · You have a new or higher fever.     · You vomit blood or what looks like coffee grounds.    Watch closely for changes in your health, and be sure to contact your doctor if:    · You have ongoing nausea and vomiting.     · Your vomiting is getting worse.     · Your vomiting lasts longer than 2 days.     · You are not getting better as expected. Where can you learn more? Go to http://salvatore-izabela.info/. Enter 25 [id number] in the search box to learn more about \"Nausea and Vomiting: Care Instructions. \"  Current as of: November 20, 2017  Content Version: 11.7  © 8368-0548 [employer]. Care instructions adapted under license by [employer] (which disclaims liability or warranty for this information). If you have questions about a medical condition or this instruction, always ask your healthcare professional. [place] [address] any warranty or liability for your use of this information. Hyponatremia: Care Instructions  Your Care Instructions    Hyponatremia (say \"ly-mt-dul-TREE-harry-uh\") means that you don't have enough sodium in your blood. It can cause nausea, vomiting, and headaches. Or you may not feel hungry. In serious cases, it can cause seizures, a coma, or even death. Hyponatremia is not a disease. It is a problem caused by something else, such as medicines or exercising for a long time in hot weather. You can get hyponatremia if you lose a lot of fluids and then you drink a lot of water or other liquids that don't have much sodium. You can also get it if you have kidney, liver, heart, or other health problems. Treatment is focused on getting your sodium levels back to normal.  Follow-up care is a key part of your treatment and safety. Be sure to make and go to all appointments, and call your doctor if you are having problems. It's also a good idea to know your test results and keep a list of the medicines you take. How can you care for yourself at home? · If your doctor recommends it, drink fluids that have sodium. Sports drinks are a good choice. Or you can eat salty foods. · If your doctor recommends it, limit the amount of water you drink.  And limit fluids that are mostly water. These include tea, coffee, and juice. · Take your medicines exactly as prescribed. Call your doctor if you have any problems with your medicine. · Get your sodium levels tested when your doctor tells you to. When should you call for help? Call 911 anytime you think you may need emergency care. For example, call if:    · You have a seizure.     · You passed out (lost consciousness).    Call your doctor now or seek immediate medical care if:    · You are confused or it is hard to focus.     · You have little or no appetite.     · You feel sick to your stomach or you vomit.     · You have a headache.     · You have mood changes.     · You feel more tired than usual.    Watch closely for changes in your health, and be sure to contact your doctor if:    · You do not get better as expected. Where can you learn more? Go to http://salvatore-izabela.info/. Enter U894 in the search box to learn more about \"Hyponatremia: Care Instructions. \"  Current as of: October 9, 2017  Content Version: 11.7  © 0984-5403 [employer]. Care instructions adapted under license by [employer] (which disclaims liability or warranty for this information). If you have questions about a medical condition or this instruction, always ask your healthcare professional. [address] any warranty or liability for your use of this information.

## 2018-07-19 NOTE — ED PROVIDER NOTES
EMERGENCY DEPARTMENT HISTORY AND PHYSICAL EXAM    Date: 7/18/2018  Patient Name: Clarisa Proctor    History of Presenting Illness     Chief Complaint   Patient presents with    Abdominal Pain         History Provided By: Patient    Chief Complaint: abd pain  Duration: 3 Days  Timing:  Acute  Location: epigastric  Quality: Aching, Dull and Sharp  Severity: Moderate  Modifying Factors: h/o ETOH induced pancreatitis; sober x 45d  Associated Symptoms: n/v/d      Additional History (Context): Clarisa Proctor is a 63 y.o. female with hypertension, hyperlipidemia, obesity and chronic abd pain; gastric bypass by Stacy Almanza in 2012; prior cholecystectomy; ETOH induced pancreatitis; bipolar; depression; GERD; hypothyroidism  who presents with epigastric abd pain x 3d. +n/v/d. No sick contacts at home ill. Denies recent ETOH -- states no ETOH x 45d. Denies hematemesis, melena, hematochezia, dysuria, fever. PCP: Sandro Sullivan MD    Current Facility-Administered Medications   Medication Dose Route Frequency Provider Last Rate Last Dose    ketorolac (TORADOL) 30 mg/mL (1 mL) injection              Current Outpatient Prescriptions   Medication Sig Dispense Refill    ondansetron hcl (ZOFRAN) 8 mg tablet Take 1 Tab by mouth every eight (8) hours as needed for Nausea. 12 Tab 0    pantoprazole (PROTONIX) 20 mg tablet Take 2 Tabs by mouth daily for 10 days. 20 Tab 0    HYDROcodone-acetaminophen (NORCO) 5-325 mg per tablet Take 1 Tab by mouth every six (6) hours as needed for Pain. Max Daily Amount: 4 Tabs. 12 Tab 0    ergocalciferol (VITAMIN D2) 50,000 unit capsule Take 50,000 Units by mouth.  hydrOXYzine pamoate (VISTARIL) 50 mg capsule Take 50 mg by mouth two (2) times a day.  gabapentin (NEURONTIN) 600 mg tablet Take 600 mg by mouth three (3) times daily. Indications: NEUROPATHIC PAIN      ferrous sulfate (FEOSOL) 325 mg (65 mg iron) tablet Take 325 mg by mouth daily.       biotin 10,000 mcg cap Take 1 Cap by mouth.  calcium citrate-vitamin d3 (CITRACAL+D) 315-200 mg-unit tab Take 1 Tab by mouth two (2) times daily (with meals).  cloNIDine HCl (CATAPRES) 0.1 mg tablet Take  by mouth two (2) times a day.  ergocalciferol (ERGOCALCIFEROL) 50,000 unit capsule Take 50,000 Units by mouth every seven (7) days.  colesevelam (WELCHOL) 625 mg tablet Take 2 Tabs by mouth two (2) times daily (with meals). 120 Tab 0    escitalopram oxalate (LEXAPRO) 10 mg tablet Take 1 Tab by mouth daily. 10 Tab 0    levothyroxine (SYNTHROID) 25 mcg tablet Take 1 Tab by mouth Daily (before breakfast). Indications: HYPOTHYROIDISM (Patient taking differently: Take 50 mcg by mouth Daily (before breakfast). Indications: hypothyroidism) 30 Tab 0    OLANZapine (ZYPREXA ZYDIS) 10 mg disintegrating tablet Take 1 Tab by mouth nightly. 30 Tab 0    ascorbic acid (VITAMIN C) 500 mg tablet Take 1,000 mg by mouth daily.  dicyclomine (BENTYL) 20 mg tablet Take 20 mg by mouth three (3) times daily.  cyanocobalamin (VITAMIN B-12) 1,000 mcg Subl 1,000 mcg by SubLINGual route daily.          Past History     Past Medical History:  Past Medical History:   Diagnosis Date    Alcohol abuse     Alcoholic hepatitis     Alcoholic pancreatitis     Alcoholic pancreatitis     Anemia     Bipolar disorder (HCC)     Dr. Sage Paez [facility])    Chronic abdominal pain     [facility] pancreatitis ([facility]     Compression fracture of lumbar vertebra (HCC)     L4, L5     Depression     Dr. Sage Paez [facility])    Elevated LFTs     ETOH abuse     Fatty liver     GERD (gastroesophageal reflux disease)     Hyperlipidemia     Hypertension     Hypothyroidism     Lower GI bleeding     Morbid obesity ([address]     Obstructive sleep apnea     Substance induced mood disorder ([facility]     Vitamin D deficiency        Past Surgical History:  Past Surgical History:   Procedure Laterality Date    BIOPSY LIVER 08/15/2012    Lap Left hepatic liver wedge by Dr. Echo Goetz; BX Revealed: Hepatic Steatosis, AVM w/ associated Subcapsular Fibrosis.  COLONOSCOPY N/A 1/17/2017    COLONOSCOPY performed by Sameera Wynne MD at [address] DISKECTOMY, ANTERIOR, WITH D  8/1995, 11/1997    HX ABDOMINAL LAPAROSCOPY  12/02/2014    Laparoscopic Gastrostomy w/ Partial Gastrectomy for assistance in placement of Endoscopic Retrograde Cholangiopancreatography & Diagnostic Lap.  HX CARPAL TUNNEL RELEASE      HX CHOLECYSTECTOMY  09/16/2014    Dr. Kim Hogan HX COLONOSCOPY  05/12/2012    Colonoscopy by Dr. Gray Hill. Eva Menjivar: Bx Revealed Colon Polyps (Tubular Adenoma), Hemorrhoids.  HX GASTRIC BYPASS  08/15/2012    Lap Christian-en-y    HX HEMORRHOIDECTOMY  04/30/2015    Dr. Sebastian Gomez    HX HYSTERECTOMY  2006    HX TONSILLECTOMY  1992    REPAIR NONUNION SCAPHOID CARPAL BONE Right 2010    Wrist       Family History:  Family History   Problem Relation Age of Onset    Cancer Father     Cancer Sister     Obesity Brother        Social History:  Social History   Substance Use Topics    Smoking status: Never Smoker    Smokeless tobacco: Never Used    Alcohol use 0.0 oz/week     0 Standard drinks or equivalent per week      Comment: 6 pack of beer a week- 1/2/17 last use       Allergies: Allergies   Allergen Reactions    Codeine Rash    Lamictal [Lamotrigine] Anaphylaxis    Morphine Anaphylaxis     Per patient, has tolerated dilaudid in the past.    Pcn [Penicillins] Anaphylaxis    Vancomycin Rash    Doxycycline Rash    Percocet [Oxycodone-Acetaminophen] Hives and Itching    Tetracycline Rash    Tomato Hives         Review of Systems   Review of Systems   Constitutional: Positive for appetite change. Negative for fever. Gastrointestinal: Positive for abdominal pain, diarrhea, nausea and vomiting. Negative for blood in stool. All other systems reviewed and are negative.     All Other Systems Negative  Physical Exam     Vitals:    07/19/18 0020 07/19/18 0030 07/19/18 0040 07/19/18 0050   BP: 138/86 121/62 126/63 132/87   Pulse:       Resp:       Temp:       SpO2: 97% 96% 97% 95%   Weight:       Height:         Physical Exam   Constitutional: She is oriented to person, place, and time. She appears well-developed. She appears distressed. HENT:   Head: Normocephalic and atraumatic. Eyes: Pupils are equal, round, and reactive to light. Neck: No JVD present. No tracheal deviation present. No thyromegaly present. Cardiovascular: Normal rate, regular rhythm and normal heart sounds. Exam reveals no gallop and no friction rub. No murmur heard. Pulmonary/Chest: Effort normal and breath sounds normal. No stridor. No respiratory distress. She has no wheezes. She has no rales. She exhibits no tenderness. Abdominal: Soft. She exhibits no distension and no mass. There is tenderness. There is no rebound and no guarding. +epigastric TTP   Musculoskeletal: She exhibits no edema or tenderness. Lymphadenopathy:     She has no cervical adenopathy. Neurological: She is alert and oriented to person, place, and time. Skin: Skin is warm and dry. No rash noted. No erythema. No pallor. Psychiatric: She has a normal mood and affect. Her behavior is normal. Thought content normal.   Nursing note and vitals reviewed.              Diagnostic Study Results     Labs -     Recent Results (from the past 12 hour(s))   CBC WITH AUTOMATED DIFF    Collection Time: 07/18/18  9:29 PM   Result Value Ref Range    WBC 7.4 4.6 - 13.2 K/uL    RBC 4.44 4.20 - 5.30 M/uL    HGB 12.9 12.0 - 16.0 g/dL    HCT 36.3 35.0 - 45.0 %    MCV 81.8 74.0 - 97.0 FL    MCH 29.1 24.0 - 34.0 PG    MCHC 35.5 31.0 - 37.0 g/dL    RDW 13.4 11.6 - 14.5 %    PLATELET 790 262 - 926 K/uL    MPV 9.1 (L) 9.2 - 11.8 FL    NEUTROPHILS 81 (H) 40 - 73 %    LYMPHOCYTES 11 (L) 21 - 52 %    MONOCYTES 8 3 - 10 %    EOSINOPHILS 0 0 - 5 %    BASOPHILS 0 0 - 2 % ABS. NEUTROPHILS 6.0 1.8 - 8.0 K/UL    ABS. LYMPHOCYTES 0.8 (L) 0.9 - 3.6 K/UL    ABS. MONOCYTES 0.6 0.05 - 1.2 K/UL    ABS. EOSINOPHILS 0.0 0.0 - 0.4 K/UL    ABS. BASOPHILS 0.0 0.0 - 0.1 K/UL    DF AUTOMATED     METABOLIC PANEL, COMPREHENSIVE    Collection Time: 07/18/18  9:29 PM   Result Value Ref Range    Sodium 122 (L) 136 - 145 mmol/L    Potassium 4.5 3.5 - 5.5 mmol/L    Chloride 86 (L) 100 - 108 mmol/L    CO2 24 21 - 32 mmol/L    Anion gap 12 3.0 - 18 mmol/L    Glucose 141 (H) 74 - 99 mg/dL    BUN 11 7.0 - 18 MG/DL    Creatinine 0.73 0.6 - 1.3 MG/DL    BUN/Creatinine ratio 15 12 - 20      GFR est AA >60 >60 ml/min/1.73m2    GFR est non-AA >60 >60 ml/min/1.73m2    Calcium 8.7 8.5 - 10.1 MG/DL    Bilirubin, total 1.1 (H) 0.2 - 1.0 MG/DL    ALT (SGPT) 51 13 - 56 U/L    AST (SGOT) 92 (H) 15 - 37 U/L    Alk.  phosphatase 142 (H) 45 - 117 U/L    Protein, total 8.5 (H) 6.4 - 8.2 g/dL    Albumin 4.3 3.4 - 5.0 g/dL    Globulin 4.2 (H) 2.0 - 4.0 g/dL    A-G Ratio 1.0 0.8 - 1.7     LIPASE    Collection Time: 07/18/18  9:29 PM   Result Value Ref Range    Lipase 66 (L) 73 - 393 U/L   ETHYL ALCOHOL    Collection Time: 07/18/18  9:29 PM   Result Value Ref Range    ALCOHOL(ETHYL),SERUM <3 0 - 3 MG/DL   URINALYSIS W/ RFLX MICROSCOPIC    Collection Time: 07/18/18 11:05 PM   Result Value Ref Range    Color YELLOW      Appearance CLEAR      Specific gravity 1.008 1.005 - 1.030      pH (UA) 5.5 5.0 - 8.0      Protein NEGATIVE  NEG mg/dL    Glucose NEGATIVE  NEG mg/dL    Ketone 15 (A) NEG mg/dL    Bilirubin NEGATIVE  NEG      Blood NEGATIVE  NEG      Urobilinogen 0.2 0.2 - 1.0 EU/dL    Nitrites NEGATIVE  NEG      Leukocyte Esterase NEGATIVE  NEG     METABOLIC PANEL, BASIC    Collection Time: 07/19/18  1:10 AM   Result Value Ref Range    Sodium 133 (L) 136 - 145 mmol/L    Potassium 4.1 3.5 - 5.5 mmol/L    Chloride 95 (L) 100 - 108 mmol/L    CO2 30 21 - 32 mmol/L    Anion gap 8 3.0 - 18 mmol/L    Glucose 116 (H) 74 - 99 mg/dL    BUN 11 7.0 - 18 MG/DL    Creatinine 0.64 0.6 - 1.3 MG/DL    BUN/Creatinine ratio 17 12 - 20      GFR est AA >60 >60 ml/min/1.73m2    GFR est non-AA >60 >60 ml/min/1.73m2    Calcium 7.2 (L) 8.5 - 10.1 MG/DL       Radiologic Studies -   XR CHEST PORT    (Results Pending)     CT Results  (Last 48 hours)    None        CXR Results  (Last 48 hours)    None            Medical Decision Making   I am the first provider for this patient. I reviewed the vital signs, available nursing notes, past medical history, past surgical history, family history and social history. Vital Signs-Reviewed the patient's vital signs. Records Reviewed: Nursing Notes    Procedures:  Procedures    Provider Notes (Medical Decision Making): pancreatitis; gastritis; dehydration; electrolyte abnormaliteis; drug seeking behavior; chronic pain; UTI; PUD; GERD; angina    Sodium corrected. Not vomited here. PPI, zofran for home. D/c. MED RECONCILIATION:  Current Facility-Administered Medications   Medication Dose Route Frequency    ketorolac (TORADOL) 30 mg/mL (1 mL) injection         Current Outpatient Prescriptions   Medication Sig    ondansetron hcl (ZOFRAN) 8 mg tablet Take 1 Tab by mouth every eight (8) hours as needed for Nausea.  pantoprazole (PROTONIX) 20 mg tablet Take 2 Tabs by mouth daily for 10 days.  HYDROcodone-acetaminophen (NORCO) 5-325 mg per tablet Take 1 Tab by mouth every six (6) hours as needed for Pain. Max Daily Amount: 4 Tabs.  ergocalciferol (VITAMIN D2) 50,000 unit capsule Take 50,000 Units by mouth.  hydrOXYzine pamoate (VISTARIL) 50 mg capsule Take 50 mg by mouth two (2) times a day.  gabapentin (NEURONTIN) 600 mg tablet Take 600 mg by mouth three (3) times daily. Indications: NEUROPATHIC PAIN    ferrous sulfate (FEOSOL) 325 mg (65 mg iron) tablet Take 325 mg by mouth daily.  biotin 10,000 mcg cap Take 1 Cap by mouth.     calcium citrate-vitamin d3 (CITRACAL+D) 315-200 mg-unit tab Take 1 Tab by mouth two (2) times daily (with meals).  cloNIDine HCl (CATAPRES) 0.1 mg tablet Take  by mouth two (2) times a day.  ergocalciferol (ERGOCALCIFEROL) 50,000 unit capsule Take 50,000 Units by mouth every seven (7) days.  colesevelam (WELCHOL) 625 mg tablet Take 2 Tabs by mouth two (2) times daily (with meals).  escitalopram oxalate (LEXAPRO) 10 mg tablet Take 1 Tab by mouth daily.  levothyroxine (SYNTHROID) 25 mcg tablet Take 1 Tab by mouth Daily (before breakfast). Indications: HYPOTHYROIDISM (Patient taking differently: Take 50 mcg by mouth Daily (before breakfast). Indications: hypothyroidism)    OLANZapine (ZYPREXA ZYDIS) 10 mg disintegrating tablet Take 1 Tab by mouth nightly.  ascorbic acid (VITAMIN C) 500 mg tablet Take 1,000 mg by mouth daily.  dicyclomine (BENTYL) 20 mg tablet Take 20 mg by mouth three (3) times daily.  cyanocobalamin (VITAMIN B-12) 1,000 mcg Subl 1,000 mcg by SubLINGual route daily. Disposition:  home    DISCHARGE NOTE:   1:59 AM    Pt has been reexamined. Patient has no new complaints, changes, or physical findings. Care plan outlined and precautions discussed. Results of labs were reviewed with the patient. All medications were reviewed with the patient; will d/c home with zofran, protonix. All of pt's questions and concerns were addressed. Patient was instructed and agrees to follow up with PCP, as well as to return to the ED upon further deterioration. Patient is ready to go home.     Follow-up Information     Follow up With Details Comments [address] MD Schedule an appointment as soon as possible for a visit today  [id number] [address] EMERGENCY DEPT  If symptoms worsen return immediately [address]  [phone]          Current Discharge Medication List      START taking these medications    Details   ondansetron hcl (ZOFRAN) 8 mg tablet Take 1 Tab by mouth every eight (8) hours as needed for Nausea. Qty: 12 Tab, Refills: 0         CONTINUE these medications which have CHANGED    Details   pantoprazole (PROTONIX) 20 mg tablet Take 2 Tabs by mouth daily for 10 days. Qty: 20 Tab, Refills: 0               Diagnosis     Clinical Impression:   1. Abdominal pain, epigastric    2. Non-intractable vomiting with nausea, unspecified vomiting type    3.  Hyponatremia

## 2018-07-19 NOTE — ED NOTES
I performed a brief evaluation, including history and physical, of the patient here in triage and I have determined that pt will need further treatment and evaluation from the main side ER physician. I have placed initial orders to help in expediting patients care.      July 18, 2018 at 8:50 PM - Krys Childs PA-C        Visit Vitals    BP (!) 151/94 (BP 1 Location: Right arm, BP Patient Position: At rest)    Pulse 81    Temp 97.9 °F (36.6 °C)    Resp 18    Ht 5' 7\" (1.702 m)    Wt 87.1 kg (192 lb)    SpO2 97%    BMI 30.07 kg/m2

## 2018-07-19 NOTE — ED NOTES
2:12 AM  07/19/18     Discharge instructions given to patient (name) with verbalization of understanding. Patient accompanied by self. Patient discharged with the following prescriptions Zofran, Protonix. Patient discharged to home (destination).       Shahla Morel RN

## 2018-07-31 ENCOUNTER — DOCUMENTATION ONLY (OUTPATIENT)
Dept: SURGERY | Age: 58
End: 2018-07-31

## 2018-07-31 NOTE — PROGRESS NOTES
Per [facility] requirements;  E-mail and letter sent for follow up appointment. Manuel Pena Loss Lyn Muniz 89      Dear Patient,    Your health is our main concern. It is important for your health to have follow-up lab work and to see you surgeon at 3 months, 6 months and annually after your weight loss surgery. Additionally, the Department of bariatric Surgery at our hospital is a member of the [address] [place] [address] Surgical Quality Improvement Program ([facility] NSQIP). As a participant in this program, we gather information on the outcomes of our patients after surgery. Please call the office for a follow up appointment at [phone] with SYDNEY Brower. If you have moved out of the area or have changed surgeons please call us and let us know the name of your doctor. Your health and feedback are important to us. We greatly appreciate your response.        Thank you,  Manuel Pena Loss [address] [facility]

## 2018-07-31 NOTE — LETTER
[address] [place] [address] [address] [facility] 
[phone] [facility] Dear Patient, Your health is our main concern. It is important for your health to have follow-up lab work and to see you surgeon at 3 months, 6 months and annually after your weight loss surgery. Additionally, the Department of bariatric Surgery at our hospital is a member of the [employer] of [address] [place] [address] Surgical Quality Improvement Program ([facility] NSQIP). As a participant in this program, we gather information on the outcomes of our patients after surgery. Please call the office for a follow up appointment at [phone] with SYDNEY Thomason. If you have moved out of the area or have changed surgeons please call us and let us know the name of your doctor. Your health and feedback are important to us. We greatly appreciate your response. Thank you, [facility]

## 2019-01-01 ENCOUNTER — ANESTHESIA (OUTPATIENT)
Dept: ENDOSCOPY | Age: 59
DRG: 377 | End: 2019-01-01
Payer: MEDICARE

## 2019-01-01 ENCOUNTER — HOSPITAL ENCOUNTER (OUTPATIENT)
Dept: INFUSION THERAPY | Age: 59
Discharge: HOME | End: 2019-10-07
Payer: MEDICARE

## 2019-01-01 ENCOUNTER — HOSPITAL ENCOUNTER (OUTPATIENT)
Dept: INFUSION THERAPY | Age: 59
Discharge: HOME | End: 2019-10-21
Payer: MEDICARE

## 2019-01-01 ENCOUNTER — APPOINTMENT (OUTPATIENT)
Dept: CT IMAGING | Age: 59
End: 2019-01-01
Attending: PHYSICIAN ASSISTANT
Payer: MEDICARE

## 2019-01-01 ENCOUNTER — HOSPITAL ENCOUNTER (EMERGENCY)
Age: 59
Discharge: HOME | End: 2019-11-27
Attending: EMERGENCY MEDICINE | Admitting: EMERGENCY MEDICINE
Payer: MEDICARE

## 2019-01-01 ENCOUNTER — APPOINTMENT (OUTPATIENT)
Dept: CT IMAGING | Age: 59
DRG: 377 | End: 2019-01-01
Attending: EMERGENCY MEDICINE
Payer: MEDICARE

## 2019-01-01 ENCOUNTER — HOSPITAL ENCOUNTER (OUTPATIENT)
Dept: INFUSION THERAPY | Age: 59
Discharge: HOME | End: 2019-09-23
Payer: MEDICARE

## 2019-01-01 ENCOUNTER — APPOINTMENT (OUTPATIENT)
Dept: GENERAL RADIOLOGY | Age: 59
DRG: 377 | End: 2019-01-01
Attending: EMERGENCY MEDICINE
Payer: MEDICARE

## 2019-01-01 ENCOUNTER — APPOINTMENT (OUTPATIENT)
Dept: GENERAL RADIOLOGY | Age: 59
End: 2019-01-01
Attending: PHYSICIAN ASSISTANT
Payer: MEDICARE

## 2019-01-01 ENCOUNTER — ANESTHESIA EVENT (OUTPATIENT)
Dept: ENDOSCOPY | Age: 59
DRG: 377 | End: 2019-01-01
Payer: MEDICARE

## 2019-01-01 ENCOUNTER — HOSPITAL ENCOUNTER (INPATIENT)
Age: 59
LOS: 3 days | Discharge: HOME | DRG: 377 | End: 2019-08-27
Attending: EMERGENCY MEDICINE | Admitting: HOSPITALIST
Payer: MEDICARE

## 2019-01-01 VITALS
TEMPERATURE: 98.4 F | DIASTOLIC BLOOD PRESSURE: 79 MMHG | OXYGEN SATURATION: 99 % | SYSTOLIC BLOOD PRESSURE: 116 MMHG | RESPIRATION RATE: 17 BRPM | HEART RATE: 91 BPM

## 2019-01-01 VITALS
DIASTOLIC BLOOD PRESSURE: 70 MMHG | SYSTOLIC BLOOD PRESSURE: 114 MMHG | RESPIRATION RATE: 18 BRPM | HEART RATE: 97 BPM | TEMPERATURE: 97 F | OXYGEN SATURATION: 97 %

## 2019-01-01 VITALS
TEMPERATURE: 98.3 F | HEART RATE: 82 BPM | WEIGHT: 210 LBS | DIASTOLIC BLOOD PRESSURE: 76 MMHG | BODY MASS INDEX: 33.75 KG/M2 | SYSTOLIC BLOOD PRESSURE: 133 MMHG | OXYGEN SATURATION: 99 % | HEIGHT: 66 IN | RESPIRATION RATE: 20 BRPM

## 2019-01-01 VITALS
RESPIRATION RATE: 18 BRPM | OXYGEN SATURATION: 98 % | HEART RATE: 90 BPM | TEMPERATURE: 98.3 F | HEIGHT: 67 IN | WEIGHT: 214.95 LBS | DIASTOLIC BLOOD PRESSURE: 107 MMHG | BODY MASS INDEX: 33.74 KG/M2 | SYSTOLIC BLOOD PRESSURE: 153 MMHG

## 2019-01-01 VITALS
OXYGEN SATURATION: 97 % | TEMPERATURE: 98.4 F | SYSTOLIC BLOOD PRESSURE: 125 MMHG | HEART RATE: 58 BPM | RESPIRATION RATE: 18 BRPM | DIASTOLIC BLOOD PRESSURE: 84 MMHG

## 2019-01-01 DIAGNOSIS — K85.90 ACUTE PANCREATITIS, UNSPECIFIED COMPLICATION STATUS, UNSPECIFIED PANCREATITIS TYPE: ICD-10-CM

## 2019-01-01 DIAGNOSIS — S89.90XA KNEE INJURY, INITIAL ENCOUNTER: ICD-10-CM

## 2019-01-01 DIAGNOSIS — L03.90 CELLULITIS, UNSPECIFIED CELLULITIS SITE: Primary | ICD-10-CM

## 2019-01-01 DIAGNOSIS — D62 ANEMIA DUE TO ACUTE BLOOD LOSS: ICD-10-CM

## 2019-01-01 DIAGNOSIS — T14.8XXA ABRASION: ICD-10-CM
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
## 2019-01-01 DIAGNOSIS — T14.8XXA HEMATOMA: ICD-10-CM

## 2019-01-01 DIAGNOSIS — E87.1 HYPONATREMIA: ICD-10-CM

## 2019-01-01 DIAGNOSIS — K92.2 ACUTE GI BLEEDING: Primary | ICD-10-CM

## 2019-01-01 LAB
ABO + RH BLD: NORMAL
ALBUMIN SERPL-MCNC: 2.6 G/DL (ref 3.4–5)
ALBUMIN/GLOB SERPL: 0.7 {RATIO} (ref 0.8–1.7)
ALP SERPL-CCNC: 288 U/L (ref 45–117)
ALT SERPL-CCNC: 36 U/L (ref 13–56)
ANION GAP SERPL CALC-SCNC: 5 MMOL/L (ref 3–18)
ANION GAP SERPL CALC-SCNC: 7 MMOL/L (ref 3–18)
ANION GAP SERPL CALC-SCNC: 7 MMOL/L (ref 3–18)
APTT PPP: 36.6 SEC (ref 23–36.4)
AST SERPL-CCNC: 75 U/L (ref 10–38)
BASOPHILS # BLD: 0 K/UL (ref 0–0.1)
BASOPHILS # BLD: 0 K/UL (ref 0–0.1)
BASOPHILS NFR BLD: 0 % (ref 0–2)
BASOPHILS NFR BLD: 0 % (ref 0–2)
BILIRUB SERPL-MCNC: 0.4 MG/DL (ref 0.2–1)
BLD PROD TYP BPU: NORMAL
BLOOD GROUP ANTIBODIES SERPL: NORMAL
BPU ID: NORMAL
BUN SERPL-MCNC: 8 MG/DL (ref 7–18)
BUN SERPL-MCNC: 8 MG/DL (ref 7–18)
BUN SERPL-MCNC: 9 MG/DL (ref 7–18)
BUN/CREAT SERPL: 11 (ref 12–20)
BUN/CREAT SERPL: 11 (ref 12–20)
BUN/CREAT SERPL: 15 (ref 12–20)
CALCIUM SERPL-MCNC: 7 MG/DL (ref 8.5–10.1)
CALCIUM SERPL-MCNC: 7.4 MG/DL (ref 8.5–10.1)
CALCIUM SERPL-MCNC: 7.5 MG/DL (ref 8.5–10.1)
CALLED TO:,BCALL1: NORMAL
CHLORIDE SERPL-SCNC: 80 MMOL/L (ref 100–111)
CHLORIDE SERPL-SCNC: 81 MMOL/L (ref 100–111)
CHLORIDE SERPL-SCNC: 88 MMOL/L (ref 100–111)
CO2 SERPL-SCNC: 25 MMOL/L (ref 21–32)
CO2 SERPL-SCNC: 26 MMOL/L (ref 21–32)
CO2 SERPL-SCNC: 27 MMOL/L (ref 21–32)
CREAT SERPL-MCNC: 0.52 MG/DL (ref 0.6–1.3)
CREAT SERPL-MCNC: 0.74 MG/DL (ref 0.6–1.3)
CREAT SERPL-MCNC: 0.83 MG/DL (ref 0.6–1.3)
CROSSMATCH RESULT,%XM: NORMAL
DIFFERENTIAL METHOD BLD: ABNORMAL
DIFFERENTIAL METHOD BLD: ABNORMAL
EOSINOPHIL # BLD: 0 K/UL (ref 0–0.4)
EOSINOPHIL # BLD: 0 K/UL (ref 0–0.4)
EOSINOPHIL NFR BLD: 0 % (ref 0–5)
EOSINOPHIL NFR BLD: 0 % (ref 0–5)
ERYTHROCYTE [DISTWIDTH] IN BLOOD BY AUTOMATED COUNT: 15.5 % (ref 11.6–14.5)
ERYTHROCYTE [DISTWIDTH] IN BLOOD BY AUTOMATED COUNT: 16.8 % (ref 11.6–14.5)
FERRITIN SERPL-MCNC: 24 NG/ML (ref 8–388)
FERRITIN SERPL-MCNC: 26 NG/ML (ref 8–388)
GLOBULIN SER CALC-MCNC: 3.7 G/DL (ref 2–4)
GLUCOSE BLD STRIP.AUTO-MCNC: 140 MG/DL (ref 70–110)
GLUCOSE SERPL-MCNC: 190 MG/DL (ref 74–99)
GLUCOSE SERPL-MCNC: 79 MG/DL (ref 74–99)
GLUCOSE SERPL-MCNC: 89 MG/DL (ref 74–99)
HBV CORE AB SERPL QL IA: NEGATIVE
HBV SURFACE AB SER QL IA: NEGATIVE
HBV SURFACE AB SERPL IA-ACNC: <3.1 MIU/ML
HBV SURFACE AG SER QL: <0.1 INDEX
HBV SURFACE AG SER QL: NEGATIVE
HCT VFR BLD AUTO: 20.4 % (ref 35–45)
HCT VFR BLD AUTO: 20.6 % (ref 35–45)
HCT VFR BLD AUTO: 21.2 % (ref 35–45)
HCT VFR BLD AUTO: 21.3 % (ref 35–45)
HCT VFR BLD AUTO: 21.3 % (ref 35–45)
HCT VFR BLD AUTO: 21.8 % (ref 35–45)
HCT VFR BLD AUTO: 21.9 % (ref 35–45)
HCT VFR BLD AUTO: 23.2 % (ref 35–45)
HCT VFR BLD AUTO: 24.2 % (ref 35–45)
HCT VFR BLD AUTO: 25.7 % (ref 35–45)
HCV AB SER IA-ACNC: 0.07 INDEX
HCV AB SERPL QL IA: NEGATIVE
HCV COMMENT,HCGAC: NORMAL
HEMOCCULT STL QL: POSITIVE
HEP BS AB COMMENT,HBSAC: ABNORMAL
HGB BLD-MCNC: 6.6 G/DL (ref 12–16)
HGB BLD-MCNC: 6.8 G/DL (ref 12–16)
HGB BLD-MCNC: 6.9 G/DL (ref 12–16)
HGB BLD-MCNC: 7 G/DL (ref 12–16)
HGB BLD-MCNC: 7 G/DL (ref 12–16)
HGB BLD-MCNC: 7.1 G/DL (ref 12–16)
HGB BLD-MCNC: 7.1 G/DL (ref 12–16)
HGB BLD-MCNC: 7.2 G/DL (ref 12–16)
HGB BLD-MCNC: 7.2 G/DL (ref 12–16)
HGB BLD-MCNC: 7.5 G/DL (ref 12–16)
HGB BLD-MCNC: 7.7 G/DL (ref 12–16)
HGB BLD-MCNC: 8.5 G/DL (ref 12–16)
INR PPP: 1.3 (ref 0.8–1.2)
INR PPP: 1.4 (ref 0.8–1.2)
IRON SATN MFR SERPL: 5 %
IRON SATN MFR SERPL: 7 %
IRON SERPL-MCNC: 19 UG/DL (ref 50–175)
IRON SERPL-MCNC: 24 UG/DL (ref 50–175)
LACTATE BLD-SCNC: 1.96 MMOL/L (ref 0.4–2)
LIPASE SERPL-CCNC: 176 U/L (ref 73–393)
LYMPHOCYTES # BLD: 0.6 K/UL (ref 0.9–3.6)
LYMPHOCYTES # BLD: 1 K/UL (ref 0.9–3.6)
LYMPHOCYTES NFR BLD: 15 % (ref 21–52)
LYMPHOCYTES NFR BLD: 7 % (ref 21–52)
MAGNESIUM SERPL-MCNC: 1.8 MG/DL (ref 1.6–2.6)
MAGNESIUM SERPL-MCNC: 2.1 MG/DL (ref 1.6–2.6)
MCH RBC QN AUTO: 25.3 PG (ref 24–34)
MCH RBC QN AUTO: 28.5 PG (ref 24–34)
MCHC RBC AUTO-ENTMCNC: 32 G/DL (ref 31–37)
MCHC RBC AUTO-ENTMCNC: 33.1 G/DL (ref 31–37)
MCV RBC AUTO: 78.9 FL (ref 74–97)
MCV RBC AUTO: 86.2 FL (ref 74–97)
MONOCYTES # BLD: 0.7 K/UL (ref 0.05–1.2)
MONOCYTES # BLD: 1 K/UL (ref 0.05–1.2)
MONOCYTES NFR BLD: 10 % (ref 3–10)
MONOCYTES NFR BLD: 12 % (ref 3–10)
NEUTS SEG # BLD: 5.2 K/UL (ref 1.8–8)
NEUTS SEG # BLD: 6.9 K/UL (ref 1.8–8)
NEUTS SEG NFR BLD: 75 % (ref 40–73)
NEUTS SEG NFR BLD: 81 % (ref 40–73)
OSMOLALITY SERPL: 247 MOSM/KG H2O (ref 275–295)
OSMOLALITY UR: 71 MOSM/KG H2O (ref 50–1400)
PLATELET # BLD AUTO: 192 K/UL (ref 135–420)
PLATELET # BLD AUTO: 245 K/UL (ref 135–420)
PMV BLD AUTO: 7.8 FL (ref 9.2–11.8)
PMV BLD AUTO: 9 FL (ref 9.2–11.8)
POTASSIUM SERPL-SCNC: 3.6 MMOL/L (ref 3.5–5.5)
POTASSIUM SERPL-SCNC: 3.9 MMOL/L (ref 3.5–5.5)
POTASSIUM SERPL-SCNC: 4.2 MMOL/L (ref 3.5–5.5)
PROT SERPL-MCNC: 6.3 G/DL (ref 6.4–8.2)
PROTHROMBIN TIME: 16 SEC (ref 11.5–15.2)
PROTHROMBIN TIME: 16.8 SEC (ref 11.5–15.2)
RBC # BLD AUTO: 2.61 M/UL (ref 4.2–5.3)
RBC # BLD AUTO: 2.98 M/UL (ref 4.2–5.3)
SODIUM SERPL-SCNC: 112 MMOL/L (ref 136–145)
SODIUM SERPL-SCNC: 113 MMOL/L (ref 136–145)
SODIUM SERPL-SCNC: 115 MMOL/L (ref 136–145)
SODIUM SERPL-SCNC: 115 MMOL/L (ref 136–145)
SODIUM SERPL-SCNC: 116 MMOL/L (ref 136–145)
SODIUM SERPL-SCNC: 121 MMOL/L (ref 136–145)
SODIUM SERPL-SCNC: 121 MMOL/L (ref 136–145)
SODIUM SERPL-SCNC: 122 MMOL/L (ref 136–145)
SODIUM SERPL-SCNC: 125 MMOL/L (ref 136–145)
SODIUM SERPL-SCNC: 125 MMOL/L (ref 136–145)
SODIUM SERPL-SCNC: 127 MMOL/L (ref 136–145)
SODIUM SERPL-SCNC: 128 MMOL/L (ref 136–145)
SODIUM SERPL-SCNC: 128 MMOL/L (ref 136–145)
SODIUM SERPL-SCNC: 130 MMOL/L (ref 136–145)
SODIUM UR-SCNC: 13 MMOL/L (ref 20–110)
SPECIMEN EXP DATE BLD: NORMAL
STATUS OF UNIT,%ST: NORMAL
TIBC SERPL-MCNC: 352 UG/DL (ref 250–450)
TIBC SERPL-MCNC: 358 UG/DL (ref 250–450)
TROPONIN I SERPL-MCNC: <0.02 NG/ML (ref 0–0.04)
UNIT DIVISION, %UDIV: 0
WBC # BLD AUTO: 6.9 K/UL (ref 4.6–13.2)
WBC # BLD AUTO: 8.5 K/UL (ref 4.6–13.2)

## 2019-01-01 PROCEDURE — 74011000258 HC RX REV CODE- 258: Performed by: PHYSICIAN ASSISTANT

## 2019-01-01 PROCEDURE — 74011250636 HC RX REV CODE- 250/636: Performed by: HOSPITALIST

## 2019-01-01 PROCEDURE — 82272 OCCULT BLD FECES 1-3 TESTS: CPT

## 2019-01-01 PROCEDURE — 74176 CT ABD & PELVIS W/O CONTRAST: CPT

## 2019-01-01 PROCEDURE — 30233N1 TRANSFUSION OF NONAUTOLOGOUS RED BLOOD CELLS INTO PERIPHERAL VEIN, PERCUTANEOUS APPROACH: ICD-10-PCS | Performed by: INTERNAL MEDICINE

## 2019-01-01 PROCEDURE — 87340 HEPATITIS B SURFACE AG IA: CPT

## 2019-01-01 PROCEDURE — 74011000258 HC RX REV CODE- 258: Performed by: INTERNAL MEDICINE

## 2019-01-01 PROCEDURE — 80053 COMPREHEN METABOLIC PANEL: CPT

## 2019-01-01 PROCEDURE — 76040000019: Performed by: INTERNAL MEDICINE

## 2019-01-01 PROCEDURE — 73130 X-RAY EXAM OF HAND: CPT

## 2019-01-01 PROCEDURE — 86803 HEPATITIS C AB TEST: CPT

## 2019-01-01 PROCEDURE — P9016 RBC LEUKOCYTES REDUCED: HCPCS

## 2019-01-01 PROCEDURE — 83935 ASSAY OF URINE OSMOLALITY: CPT

## 2019-01-01 PROCEDURE — 74011250636 HC RX REV CODE- 250/636: Performed by: INTERNAL MEDICINE

## 2019-01-01 PROCEDURE — 36415 COLL VENOUS BLD VENIPUNCTURE: CPT

## 2019-01-01 PROCEDURE — 84484 ASSAY OF TROPONIN QUANT: CPT

## 2019-01-01 PROCEDURE — 83690 ASSAY OF LIPASE: CPT

## 2019-01-01 PROCEDURE — 70486 CT MAXILLOFACIAL W/O DYE: CPT

## 2019-01-01 PROCEDURE — 96374 THER/PROPH/DIAG INJ IV PUSH: CPT

## 2019-01-01 PROCEDURE — 99283 EMERGENCY DEPT VISIT LOW MDM: CPT

## 2019-01-01 PROCEDURE — 88305 TISSUE EXAM BY PATHOLOGIST: CPT

## 2019-01-01 PROCEDURE — 0DB68ZX EXCISION OF STOMACH, VIA NATURAL OR ARTIFICIAL OPENING ENDOSCOPIC, DIAGNOSTIC: ICD-10-PCS | Performed by: INTERNAL MEDICINE

## 2019-01-01 PROCEDURE — 84295 ASSAY OF SERUM SODIUM: CPT

## 2019-01-01 PROCEDURE — 71045 X-RAY EXAM CHEST 1 VIEW: CPT

## 2019-01-01 PROCEDURE — 80048 BASIC METABOLIC PNL TOTAL CA: CPT

## 2019-01-01 PROCEDURE — 83605 ASSAY OF LACTIC ACID: CPT

## 2019-01-01 PROCEDURE — 85610 PROTHROMBIN TIME: CPT

## 2019-01-01 PROCEDURE — 83540 ASSAY OF IRON: CPT

## 2019-01-01 PROCEDURE — 85025 COMPLETE CBC W/AUTO DIFF WBC: CPT

## 2019-01-01 PROCEDURE — 65660000000 HC RM CCU STEPDOWN

## 2019-01-01 PROCEDURE — 84300 ASSAY OF URINE SODIUM: CPT

## 2019-01-01 PROCEDURE — 74011250636 HC RX REV CODE- 250/636

## 2019-01-01 PROCEDURE — 74011250637 HC RX REV CODE- 250/637: Performed by: PHYSICIAN ASSISTANT

## 2019-01-01 PROCEDURE — 85018 HEMOGLOBIN: CPT

## 2019-01-01 PROCEDURE — 77030021352 HC CBL LD SYS DISP COVD -B

## 2019-01-01 PROCEDURE — 99284 EMERGENCY DEPT VISIT MOD MDM: CPT

## 2019-01-01 PROCEDURE — 74011250636 HC RX REV CODE- 250/636: Performed by: EMERGENCY MEDICINE

## 2019-01-01 PROCEDURE — 74011250636 HC RX REV CODE- 250/636: Performed by: PHYSICIAN ASSISTANT

## 2019-01-01 PROCEDURE — 86706 HEP B SURFACE ANTIBODY: CPT

## 2019-01-01 PROCEDURE — 36430 TRANSFUSION BLD/BLD COMPNT: CPT

## 2019-01-01 PROCEDURE — 86900 BLOOD TYPING SEROLOGIC ABO: CPT

## 2019-01-01 PROCEDURE — 96365 THER/PROPH/DIAG IV INF INIT: CPT

## 2019-01-01 PROCEDURE — 77030021593 HC FCPS BIOP ENDOSC BSC -A: Performed by: INTERNAL MEDICINE

## 2019-01-01 PROCEDURE — 82962 GLUCOSE BLOOD TEST: CPT

## 2019-01-01 PROCEDURE — 83735 ASSAY OF MAGNESIUM: CPT

## 2019-01-01 PROCEDURE — 86923 COMPATIBILITY TEST ELECTRIC: CPT

## 2019-01-01 PROCEDURE — 82728 ASSAY OF FERRITIN: CPT

## 2019-01-01 PROCEDURE — 83930 ASSAY OF BLOOD OSMOLALITY: CPT

## 2019-01-01 PROCEDURE — 96375 TX/PRO/DX INJ NEW DRUG ADDON: CPT

## 2019-01-01 PROCEDURE — 85730 THROMBOPLASTIN TIME PARTIAL: CPT

## 2019-01-01 PROCEDURE — 73564 X-RAY EXAM KNEE 4 OR MORE: CPT

## 2019-01-01 PROCEDURE — C9113 INJ PANTOPRAZOLE SODIUM, VIA: HCPCS | Performed by: EMERGENCY MEDICINE

## 2019-01-01 PROCEDURE — 86704 HEP B CORE ANTIBODY TOTAL: CPT

## 2019-01-01 PROCEDURE — 70450 CT HEAD/BRAIN W/O DYE: CPT

## 2019-01-01 PROCEDURE — 76060000031 HC ANESTHESIA FIRST 0.5 HR: Performed by: INTERNAL MEDICINE

## 2019-01-01 RX ORDER — LEVETIRACETAM 500 MG/1
500 TABLET ORAL ONCE
Status: ON HOLD | COMMUNITY
End: 2020-01-01 | Stop reason: SDUPTHER

## 2019-01-01 RX ORDER — SODIUM CHLORIDE 9 MG/ML
75 INJECTION, SOLUTION INTRAVENOUS CONTINUOUS
Status: DISCONTINUED | OUTPATIENT
Start: 2019-01-01 | End: 2019-01-01

## 2019-01-01 RX ORDER — PANTOPRAZOLE SODIUM 40 MG/1
40 TABLET, DELAYED RELEASE ORAL
Status: DISCONTINUED | OUTPATIENT
Start: 2019-01-01 | End: 2019-01-01 | Stop reason: HOSPADM

## 2019-01-01 RX ORDER — DEXTROSE MONOHYDRATE 50 MG/ML
1000 INJECTION, SOLUTION INTRAVENOUS ONCE
Status: COMPLETED | OUTPATIENT
Start: 2019-01-01 | End: 2019-01-01

## 2019-01-01 RX ORDER — SULFAMETHOXAZOLE AND TRIMETHOPRIM 800; 160 MG/1; MG/1
1 TABLET ORAL 2 TIMES DAILY
Qty: 14 TAB | Refills: 0 | Status: SHIPPED | OUTPATIENT
Start: 2019-01-01 | End: 2019-01-01

## 2019-01-01 RX ORDER — SODIUM CHLORIDE 9 MG/ML
50 INJECTION, SOLUTION INTRAVENOUS CONTINUOUS
Status: DISCONTINUED | OUTPATIENT
Start: 2019-01-01 | End: 2019-01-01

## 2019-01-01 RX ORDER — ACETAMINOPHEN 500 MG
1000 TABLET ORAL
Status: COMPLETED | OUTPATIENT
Start: 2019-01-01 | End: 2019-01-01

## 2019-01-01 RX ORDER — EPINEPHRINE 0.1 MG/ML
1 INJECTION INTRACARDIAC; INTRAVENOUS
Status: CANCELLED | OUTPATIENT
Start: 2019-01-01 | End: 2019-01-01

## 2019-01-01 RX ORDER — DEXTROSE MONOHYDRATE 50 MG/ML
999 INJECTION, SOLUTION INTRAVENOUS CONTINUOUS
Status: DISPENSED | OUTPATIENT
Start: 2019-01-01 | End: 2019-01-01

## 2019-01-01 RX ORDER — ONDANSETRON 2 MG/ML
4 INJECTION INTRAMUSCULAR; INTRAVENOUS
Status: COMPLETED | OUTPATIENT
Start: 2019-01-01 | End: 2019-01-01

## 2019-01-01 RX ORDER — DEXTROSE MONOHYDRATE 50 MG/ML
150 INJECTION, SOLUTION INTRAVENOUS CONTINUOUS
Status: DISCONTINUED | OUTPATIENT
Start: 2019-01-01 | End: 2019-01-01

## 2019-01-01 RX ORDER — SODIUM CHLORIDE 0.9 % (FLUSH) 0.9 %
5-40 SYRINGE (ML) INJECTION EVERY 8 HOURS
Status: DISCONTINUED | OUTPATIENT
Start: 2019-01-01 | End: 2019-01-01 | Stop reason: HOSPADM

## 2019-01-01 RX ORDER — HYDROMORPHONE HYDROCHLORIDE 1 MG/ML
0.5 INJECTION, SOLUTION INTRAMUSCULAR; INTRAVENOUS; SUBCUTANEOUS
Status: DISCONTINUED | OUTPATIENT
Start: 2019-01-01 | End: 2019-01-01 | Stop reason: HOSPADM

## 2019-01-01 RX ORDER — ATROPINE SULFATE 0.1 MG/ML
0.5 INJECTION INTRAVENOUS
Status: CANCELLED | OUTPATIENT
Start: 2019-01-01 | End: 2019-01-01

## 2019-01-01 RX ORDER — SODIUM CHLORIDE 0.9 % (FLUSH) 0.9 %
5-40 SYRINGE (ML) INJECTION AS NEEDED
Status: DISCONTINUED | OUTPATIENT
Start: 2019-01-01 | End: 2019-01-01 | Stop reason: HOSPADM

## 2019-01-01 RX ORDER — FLUMAZENIL 0.1 MG/ML
0.2 INJECTION INTRAVENOUS
Status: CANCELLED | OUTPATIENT
Start: 2019-01-01 | End: 2019-01-01

## 2019-01-01 RX ORDER — SODIUM CHLORIDE 9 MG/ML
250 INJECTION, SOLUTION INTRAVENOUS AS NEEDED
Status: DISCONTINUED | OUTPATIENT
Start: 2019-01-01 | End: 2019-01-01 | Stop reason: HOSPADM

## 2019-01-01 RX ORDER — HYDROMORPHONE HYDROCHLORIDE 1 MG/ML
0.5 INJECTION, SOLUTION INTRAMUSCULAR; INTRAVENOUS; SUBCUTANEOUS
Status: COMPLETED | OUTPATIENT
Start: 2019-01-01 | End: 2019-01-01

## 2019-01-01 RX ORDER — LEVETIRACETAM 500 MG/1
500 TABLET ORAL DAILY
Status: DISCONTINUED | OUTPATIENT
Start: 2019-01-01 | End: 2019-01-01 | Stop reason: HOSPADM

## 2019-01-01 RX ORDER — ACETAMINOPHEN 325 MG/1
650 TABLET ORAL
Qty: 20 TAB | Refills: 0 | Status: SHIPPED | OUTPATIENT
Start: 2019-01-01

## 2019-01-01 RX ORDER — NALOXONE HYDROCHLORIDE 0.4 MG/ML
0.4 INJECTION, SOLUTION INTRAMUSCULAR; INTRAVENOUS; SUBCUTANEOUS
Status: CANCELLED | OUTPATIENT
Start: 2019-01-01 | End: 2019-01-01

## 2019-01-01 RX ORDER — ACETAMINOPHEN 325 MG/1
650 TABLET ORAL
Status: DISCONTINUED | OUTPATIENT
Start: 2019-01-01 | End: 2019-01-01 | Stop reason: HOSPADM

## 2019-01-01 RX ORDER — ONDANSETRON 2 MG/ML
4 INJECTION INTRAMUSCULAR; INTRAVENOUS
Status: DISCONTINUED | OUTPATIENT
Start: 2019-01-01 | End: 2019-01-01 | Stop reason: HOSPADM

## 2019-01-01 RX ORDER — DEXTROMETHORPHAN/PSEUDOEPHED 2.5-7.5/.8
1.2 DROPS ORAL
Status: CANCELLED | OUTPATIENT
Start: 2019-01-01

## 2019-01-01 RX ORDER — PANTOPRAZOLE SODIUM 40 MG/10ML
40 INJECTION, POWDER, LYOPHILIZED, FOR SOLUTION INTRAVENOUS
Status: COMPLETED | OUTPATIENT
Start: 2019-01-01 | End: 2019-01-01

## 2019-01-01 RX ORDER — SODIUM CHLORIDE 0.9 % (FLUSH) 0.9 %
5-40 SYRINGE (ML) INJECTION EVERY 8 HOURS
Status: CANCELLED | OUTPATIENT
Start: 2019-01-01

## 2019-01-01 RX ORDER — PROPOFOL 10 MG/ML
INJECTION, EMULSION INTRAVENOUS AS NEEDED
Status: DISCONTINUED | OUTPATIENT
Start: 2019-01-01 | End: 2019-01-01 | Stop reason: HOSPADM

## 2019-01-01 RX ORDER — SODIUM CHLORIDE 0.9 % (FLUSH) 0.9 %
5-40 SYRINGE (ML) INJECTION AS NEEDED
Status: CANCELLED | OUTPATIENT
Start: 2019-01-01

## 2019-01-01 RX ORDER — PANTOPRAZOLE SODIUM 40 MG/1
40 TABLET, DELAYED RELEASE ORAL
Qty: 60 TAB | Refills: 0 | Status: SHIPPED | OUTPATIENT
Start: 2019-01-01

## 2019-01-01 RX ADMIN — HYDROMORPHONE HYDROCHLORIDE 0.5 MG: 1 INJECTION, SOLUTION INTRAMUSCULAR; INTRAVENOUS; SUBCUTANEOUS at 09:00

## 2019-01-01 RX ADMIN — IRON SUCROSE 100 MG: 20 INJECTION, SOLUTION INTRAVENOUS at 14:29

## 2019-01-01 RX ADMIN — PANTOPRAZOLE SODIUM 40 MG: 40 INJECTION, POWDER, LYOPHILIZED, FOR SOLUTION INTRAVENOUS at 15:10

## 2019-01-01 RX ADMIN — DEXTROSE MONOHYDRATE 150 ML/HR: 5 INJECTION, SOLUTION INTRAVENOUS at 21:13

## 2019-01-01 RX ADMIN — Medication 10 ML: at 14:11

## 2019-01-01 RX ADMIN — IRON SUCROSE 100 MG: 20 INJECTION, SOLUTION INTRAVENOUS at 14:21

## 2019-01-01 RX ADMIN — Medication 10 ML: at 22:00

## 2019-01-01 RX ADMIN — DEXTROSE MONOHYDRATE 1000 ML: 5 INJECTION, SOLUTION INTRAVENOUS at 01:34

## 2019-01-01 RX ADMIN — PROPOFOL 40 MG: 10 INJECTION, EMULSION INTRAVENOUS at 11:24

## 2019-01-01 RX ADMIN — HYDROMORPHONE HYDROCHLORIDE 0.5 MG: 1 INJECTION, SOLUTION INTRAMUSCULAR; INTRAVENOUS; SUBCUTANEOUS at 13:38

## 2019-01-01 RX ADMIN — HYDROMORPHONE HYDROCHLORIDE 0.5 MG: 1 INJECTION, SOLUTION INTRAMUSCULAR; INTRAVENOUS; SUBCUTANEOUS at 09:54

## 2019-01-01 RX ADMIN — HYDROMORPHONE HYDROCHLORIDE 0.5 MG: 1 INJECTION, SOLUTION INTRAMUSCULAR; INTRAVENOUS; SUBCUTANEOUS at 22:47

## 2019-01-01 RX ADMIN — HYDROMORPHONE HYDROCHLORIDE 0.5 MG: 1 INJECTION, SOLUTION INTRAMUSCULAR; INTRAVENOUS; SUBCUTANEOUS at 18:46

## 2019-01-01 RX ADMIN — SODIUM CHLORIDE 75 ML/HR: 900 INJECTION, SOLUTION INTRAVENOUS at 10:03

## 2019-01-01 RX ADMIN — HYDROMORPHONE HYDROCHLORIDE 0.5 MG: 1 INJECTION, SOLUTION INTRAMUSCULAR; INTRAVENOUS; SUBCUTANEOUS at 00:12

## 2019-01-01 RX ADMIN — DEXTROSE MONOHYDRATE 150 ML/HR: 5 INJECTION, SOLUTION INTRAVENOUS at 12:33

## 2019-01-01 RX ADMIN — SODIUM CHLORIDE 50 ML/HR: 900 INJECTION, SOLUTION INTRAVENOUS at 16:33

## 2019-01-01 RX ADMIN — PROPOFOL 100 MG: 10 INJECTION, EMULSION INTRAVENOUS at 11:21

## 2019-01-01 RX ADMIN — PROPOFOL 40 MG: 10 INJECTION, EMULSION INTRAVENOUS at 11:25

## 2019-01-01 RX ADMIN — HYDROMORPHONE HYDROCHLORIDE 0.5 MG: 1 INJECTION, SOLUTION INTRAMUSCULAR; INTRAVENOUS; SUBCUTANEOUS at 04:24

## 2019-01-01 RX ADMIN — HYDROMORPHONE HYDROCHLORIDE 0.5 MG: 1 INJECTION, SOLUTION INTRAMUSCULAR; INTRAVENOUS; SUBCUTANEOUS at 03:57

## 2019-01-01 RX ADMIN — SODIUM CHLORIDE 1000 ML: 900 INJECTION, SOLUTION INTRAVENOUS at 12:18

## 2019-01-01 RX ADMIN — HYDROMORPHONE HYDROCHLORIDE 0.5 MG: 1 INJECTION, SOLUTION INTRAMUSCULAR; INTRAVENOUS; SUBCUTANEOUS at 13:28

## 2019-01-01 RX ADMIN — PANTOPRAZOLE SODIUM 40 MG: 40 TABLET, DELAYED RELEASE ORAL at 09:54

## 2019-01-01 RX ADMIN — HYDROMORPHONE HYDROCHLORIDE 0.5 MG: 1 INJECTION, SOLUTION INTRAMUSCULAR; INTRAVENOUS; SUBCUTANEOUS at 12:45

## 2019-01-01 RX ADMIN — DEXTROSE MONOHYDRATE 150 ML/HR: 5 INJECTION, SOLUTION INTRAVENOUS at 03:52

## 2019-01-01 RX ADMIN — PANTOPRAZOLE SODIUM 40 MG: 40 TABLET, DELAYED RELEASE ORAL at 09:00

## 2019-01-01 RX ADMIN — DEXTROSE MONOHYDRATE 999 ML/HR: 5 INJECTION, SOLUTION INTRAVENOUS at 09:58

## 2019-01-01 RX ADMIN — ONDANSETRON 4 MG: 2 INJECTION INTRAMUSCULAR; INTRAVENOUS at 13:24

## 2019-01-01 RX ADMIN — LEVETIRACETAM 500 MG: 500 TABLET ORAL at 09:00

## 2019-01-01 RX ADMIN — PROPOFOL 40 MG: 10 INJECTION, EMULSION INTRAVENOUS at 11:22

## 2019-01-01 RX ADMIN — LEVETIRACETAM 500 MG: 100 INJECTION, SOLUTION INTRAVENOUS at 09:53

## 2019-01-01 RX ADMIN — HYDROMORPHONE HYDROCHLORIDE 0.5 MG: 1 INJECTION, SOLUTION INTRAMUSCULAR; INTRAVENOUS; SUBCUTANEOUS at 22:00

## 2019-01-01 RX ADMIN — Medication 10 ML: at 13:20

## 2019-01-01 RX ADMIN — Medication 10 ML: at 17:00

## 2019-01-01 RX ADMIN — PANTOPRAZOLE SODIUM 40 MG: 40 TABLET, DELAYED RELEASE ORAL at 16:51

## 2019-01-01 RX ADMIN — Medication 10 ML: at 21:19

## 2019-01-01 RX ADMIN — DESMOPRESSIN ACETATE 2 MCG: 4 INJECTION INTRAVENOUS at 05:33

## 2019-01-01 RX ADMIN — LEVETIRACETAM 500 MG: 100 INJECTION, SOLUTION INTRAVENOUS at 09:14

## 2019-01-01 RX ADMIN — IRON SUCROSE 100 MG: 20 INJECTION, SOLUTION INTRAVENOUS at 14:52

## 2019-01-01 RX ADMIN — ACETAMINOPHEN 1000 MG: 500 TABLET, FILM COATED ORAL at 18:07

## 2019-01-01 RX ADMIN — HYDROMORPHONE HYDROCHLORIDE 0.5 MG: 1 INJECTION, SOLUTION INTRAMUSCULAR; INTRAVENOUS; SUBCUTANEOUS at 19:26

## 2019-01-01 RX ADMIN — PROPOFOL 40 MG: 10 INJECTION, EMULSION INTRAVENOUS at 11:26

## 2019-01-01 RX ADMIN — DESMOPRESSIN ACETATE 2 MCG: 4 INJECTION INTRAVENOUS at 13:19

## 2019-01-01 RX ADMIN — Medication 10 ML: at 21:47

## 2019-01-01 RX ADMIN — PANTOPRAZOLE SODIUM 40 MG: 40 TABLET, DELAYED RELEASE ORAL at 18:51

## 2019-01-01 RX ADMIN — HYDROMORPHONE HYDROCHLORIDE 0.5 MG: 1 INJECTION, SOLUTION INTRAMUSCULAR; INTRAVENOUS; SUBCUTANEOUS at 03:51

## 2019-01-01 RX ADMIN — HYDROMORPHONE HYDROCHLORIDE 0.5 MG: 1 INJECTION, SOLUTION INTRAMUSCULAR; INTRAVENOUS; SUBCUTANEOUS at 17:36

## 2019-01-01 RX ADMIN — DESMOPRESSIN ACETATE 2 MCG: 4 INJECTION INTRAVENOUS at 21:13

## 2019-07-03 NOTE — ED TRIAGE NOTES
Patient states she has been fighting a cold lately
Patient states she slipped on the ice yesterday and fell on R hip, no LOC
Vitals:    01/09/17 1702   BP: 128/82   Pulse: (!) 130   Resp: 14   Temp: 100.3 °F (37.9 °C)   SpO2: 98%
normal sinus rhythm, Normal axis, Normal MS interval and QRS complex. There are no acute ischemic ST or T-wave changes.

## 2019-08-24 PROBLEM — K86.1 CHRONIC PANCREATITIS (HCC): Status: ACTIVE | Noted: 2019-01-01

## 2019-08-24 PROBLEM — K92.2 ACUTE GI BLEEDING: Status: ACTIVE | Noted: 2019-01-01

## 2019-08-24 PROBLEM — D62 ACUTE BLOOD LOSS ANEMIA: Status: ACTIVE | Noted: 2019-01-01

## 2019-08-24 NOTE — ED NOTES
TRANSFER - ED to INPATIENT REPORT:    SBAR report made available to receiving floor on this patient being transferred to [address] [place] [address] ([facility])  for routine progression of care       Admitting diagnosis Acute GI bleeding [K92.2]    Information from the following report(s) SBAR, ED Summary, MAR and Recent Results was made available to receiving floor. Lines:   Peripheral IV 08/24/19 Left Hand (Active)   Site Assessment Clean, dry, & intact 8/24/2019  3:11 PM   Phlebitis Assessment 0 8/24/2019  3:11 PM   Infiltration Assessment 0 8/24/2019  3:11 PM   Dressing Status Clean, dry, & intact 8/24/2019  3:11 PM   Dressing Type Transparent 8/24/2019  3:11 PM   Hub Color/Line Status Flushed 8/24/2019  3:11 PM        Medication list confirmed with patient    Opportunity for questions and clarification was provided.       Patient is oriented to time, place, person and situation Blood transfusing  Patient is  continent and ambulatory without assist     Valuables transported with patient     Patient transported with:   Monitor  Registered Nurse    MAP (Monitor): 79 =Monitored (most recent)  Vitals w/ MEWS Score (last day)     Date/Time MEWS Score Pulse Resp Temp BP Level of Consciousness SpO2    08/24/19 1600  2  82  19  98.4 °F (36.9 °C)  98/75  Alert  98 %    08/24/19 1545  1  81  17  98.3 °F (36.8 °C)  118/79  Alert  98 %    08/24/19 1530    86  15    111/78  Alert  99 %    08/24/19 1526    88  20    102/61    100 %    08/24/19 1315    90  20    125/76  Alert  100 %    08/24/19 1244    84  20        100 %    08/24/19 1243          110/73    100 %    08/24/19 1029  1  81  15  98.8 °F (37.1 °C)  117/69  Alert  100 %              Septic Patients:     Lactic Acid  Lab Results   Component Value Date    LACPOC 1.96 08/24/2019    LACPOC 2.1 ([facility]) 10/06/2017

## 2019-08-24 NOTE — H&P
Internal Medicine History and Physical          Subjective     HPI: Luis Dawn is a 63 y.o. female with seizures (Took Keppra 500mg today), depression, bipolar, KRISTOFER, chronic pancreatitis, previous ETOH abuse (been sober 6 months) of who presented to the ED with abnormal labs. Sodium 112, hgb 6.6. Patient c/o of melena for the past 7 days as well as developing n/v/epigastric abd pain that has progressively worsened over the past 3-4 days. She went to [employer] yesterday and was called today to report to the ED for a sodium then of 118 and having a low hgb and a low ferritin. She states poor PO intake and feels dehydrated. She has been on iron supplementation for the past year, hx of low hgb that required blood transfusion with unknown source of bleed per patient. No hx of EGD, last colonoscopy in 2012 that showed polpys per patient. GI was consulted from the ED, and her CT abd is worrisome for acute mild pancreatitis.  Denies Seizure like activity, confusion     Will admit for further eval       PMHx:  Past Medical History:   Diagnosis Date    Alcohol abuse     Alcoholic hepatitis     Alcoholic pancreatitis     Alcoholic pancreatitis     Anemia     Bipolar disorder (HCC)     Dr. Thuy Pruitt [facility])    Chronic abdominal pain     Chronic pancreatitis ([facility] [address]     Compression fracture of lumbar vertebra (HCC)     L4, L5     Depression     Dr. D[facility])    Elevated LFTs     ETOH abuse     Fatty liver     GERD (gastroesophageal reflux disease)     Hyperlipidemia     Hypertension     Hypothyroidism     Lower GI bleeding     Morbid obesity ([address]     Obstructive sleep apnea     Substance induced mood disorder ([facility] [address]     Vitamin D deficiency        PSurgHx:  Past Surgical History:   Procedure Laterality Date    BIOPSY LIVER  08/15/2012    Lap Left hepatic liver wedge by Dr. Trish Walker; BX Revealed: Hepatic Steatosis, AVM w/ associated Subcapsular Fibrosis.  COLONOSCOPY N/A 1/17/2017    COLONOSCOPY performed by Domi Hobson MD at [address] DISKECTOMY, ANTERIOR, WITH D  8/1995, 11/1997    HX ABDOMINAL LAPAROSCOPY  12/02/2014    Laparoscopic Gastrostomy w/ Partial Gastrectomy for assistance in placement of Endoscopic Retrograde Cholangiopancreatography & Diagnostic Lap.  HX CARPAL TUNNEL RELEASE      HX CHOLECYSTECTOMY  09/16/2014    Dr. Michael Wolfe HX COLONOSCOPY  05/12/2012    Colonoscopy by Dr. Phillip Dubon. Sandrine Esau: Bx Revealed Colon Polyps (Tubular Adenoma), Hemorrhoids.  HX GASTRIC BYPASS  08/15/2012    Lap Christian-en-y    HX HEMORRHOIDECTOMY  04/30/2015    Dr. Santa Quiros. Jason    HX HYSTERECTOMY  2006    HX TONSILLECTOMY  1992    REPAIR NONUNION SCAPHOID CARPAL BONE Right 2010    Wrist       SocialHx:  Social History     Socioeconomic History    Marital status:      Spouse name: Not on file    Number of children: Not on file    Years of education: Not on file    Highest education level: Not on file   Occupational History    Not on file   Social Needs    Financial resource strain: Not on file    Food insecurity:     Worry: Not on file     Inability: Not on file    Transportation needs:     Medical: Not on file     Non-medical: Not on file   Tobacco Use    Smoking status: Never Smoker    Smokeless tobacco: Never Used   Substance and Sexual Activity    Alcohol use:  Yes     Alcohol/week: 0.0 standard drinks     Comment: 6 pack of beer a week- 1/2/17 last use    Drug use: No    Sexual activity: Yes     Partners: Male     Birth control/protection: Condom   Lifestyle    Physical activity:     Days per week: Not on file     Minutes per session: Not on file    Stress: Not on file   Relationships    Social connections:     Talks on phone: Not on file     Gets together: Not on file     Attends [employer] service: Not on file     Active member of club or organization: Not on file     Attends meetings of clubs or organizations: Not on file     Relationship status: Not on file    Intimate partner violence:     Fear of current or ex partner: Not on file     Emotionally abused: Not on file     Physically abused: Not on file     Forced sexual activity: Not on file   Other Topics Concern    Not on file   Social History Narrative    Not on file       Allergies: Allergies   Allergen Reactions    Codeine Rash    Lamictal [Lamotrigine] Anaphylaxis    Morphine Anaphylaxis     Per patient, has tolerated dilaudid in the past.    Pcn [Penicillins] Anaphylaxis    Vancomycin Rash    Doxycycline Rash    Percocet [Oxycodone-Acetaminophen] Hives and Itching    Tetracycline Rash    Tomato Hives       FamilyHx:  Family History   Problem Relation Age of Onset    Cancer Father     Cancer Sister     Obesity Brother        Prior to Admission Medications   Prescriptions Last Dose Informant Patient Reported? Taking? HYDROcodone-acetaminophen (NORCO) 5-325 mg per tablet   No No   Sig: Take 1 Tab by mouth every six (6) hours as needed for Pain. Max Daily Amount: 4 Tabs. OLANZapine (ZYPREXA ZYDIS) 10 mg disintegrating tablet  Self No No   Sig: Take 1 Tab by mouth nightly. ascorbic acid (VITAMIN C) 500 mg tablet  Self Yes No   Sig: Take 1,000 mg by mouth daily. biotin 10,000 mcg cap   Yes No   Sig: Take 1 Cap by mouth.   calcium citrate-vitamin d3 (CITRACAL+D) 315-200 mg-unit tab   Yes No   Sig: Take 1 Tab by mouth two (2) times daily (with meals). cloNIDine HCl (CATAPRES) 0.1 mg tablet   Yes No   Sig: Take  by mouth two (2) times a day. colesevelam (WELCHOL) 625 mg tablet  Self No No   Sig: Take 2 Tabs by mouth two (2) times daily (with meals). cyanocobalamin (VITAMIN B-12) 1,000 mcg Subl  Self Yes No   Si,000 mcg by SubLINGual route daily. dicyclomine (BENTYL) 20 mg tablet  Self Yes No   Sig: Take 20 mg by mouth three (3) times daily.    ergocalciferol (ERGOCALCIFEROL) 50,000 unit capsule  Self Yes No   Sig: Take 50,000 Units by mouth every seven (7) days. ergocalciferol (VITAMIN D2) 50,000 unit capsule   Yes No   Sig: Take 50,000 Units by mouth.   escitalopram oxalate (LEXAPRO) 10 mg tablet  Self No No   Sig: Take 1 Tab by mouth daily. ferrous sulfate (FEOSOL) 325 mg (65 mg iron) tablet   Yes No   Sig: Take 325 mg by mouth daily. gabapentin (NEURONTIN) 600 mg tablet   Yes No   Sig: Take 600 mg by mouth three (3) times daily. Indications: NEUROPATHIC PAIN   hydrOXYzine pamoate (VISTARIL) 50 mg capsule  Self Yes No   Sig: Take 50 mg by mouth two (2) times a day. levothyroxine (SYNTHROID) 25 mcg tablet  Self No No   Sig: Take 1 Tab by mouth Daily (before breakfast). Indications: HYPOTHYROIDISM   Patient taking differently: Take 50 mcg by mouth Daily (before breakfast). Indications: hypothyroidism   ondansetron hcl (ZOFRAN) 8 mg tablet   No No   Sig: Take 1 Tab by mouth every eight (8) hours as needed for Nausea.       Facility-Administered Medications: None       Review of Systems:  CONST: Fever, weight loss, fatigue or chills  HEENT: Recent changes in vision, vertigo, epistaxis, dysphagia and hoarseness  CV: Chest pain, palpitations, edema and varicosities  RESP: Cough, shortness of breath, wheezing, hemoptysis, snoring and reactive airway disease  GI: Nausea, vomiting, abdominal pain, change in bowel habits, hematochezia, melena, and GERD   : Hematuria, dysuria, frequency, urgency, nocturia and stress urinary incontinence   MS: Weakness, joint pain and arthritis  ENDO: Polyuria, polydipsia, polyphagia, poor wound healing, heat intolerance, cold intolerance  LYMPH/HEME: Anemia, bruising and history of blood transfusions  INTEG: Dermatitis, abnormal moles  NEURO: Dizziness, headache, fainting, seizures and stroke  PSYCH: Anxiety and depression    A comprehensive review of systems was negative except for that written in the History of Present Illness.     Objective      Visit Vitals  /76   Pulse 90   Temp 98.8 °F (37.1 °C)   Resp 20   Ht 5' 7\" (1.702 m)   Wt 95.7 kg (211 lb)   SpO2 100%   BMI 33.05 kg/m²       Physical Exam:  General Appearance: NAD, conversant  HENT: normocephalic/atraumatic, moist mucus membranes  Lungs: CTA with normal respiratory effort  Cardiovascular: RRR, no m/r/g, capillary refill < 2 sec, B/L DP/PT pulses +3/4  Abdomen: soft, non distended, tender epigastric, active bowel sounds, no guarding or rigidity   Extremities: no cyanosis, no peripheral edema, good perfusion bilaterally   Neuro: moves all extremities, no focal deficits, nml tone  Psych: appropriate affect, alert and oriented to person, place and time    Laboratory Studies: All lab results for the last 24 hours reviewed. Imaging Reviewed:  Ct Abd Pelv Wo Cont    Result Date: 8/24/2019  EXAM: CT of the abdomen and pelvis INDICATION: Abdominal pain. Prior history of gastric bypass. COMPARISON: None. TECHNIQUE: Axial CT imaging of the abdomen and pelvis was performed without intravenous contrast. Multiplanar reformats were generated. One or more dose reduction techniques were used on this CT: automated exposure control, adjustment of the mAs and/or kVp according to patient size, and iterative reconstruction techniques. The specific techniques used on this CT exam have been documented in the patient's electronic medical record. Digital Imaging and Communications in Medicine (DICOM) format image data are available to nonaffiliated external healthcare facilities or entities on a secured, media free, reciprocally searchable basis with patient authorization for at least a 12-month period after this study. _______________ FINDINGS: LOWER CHEST: Unremarkable. LIVER, BILIARY: Prominent hepatic steatosis is present. The unenhanced liver is otherwise unremarkable. No biliary dilation. The gallbladder has been removed. PANCREAS: Mild inflammatory changes are present surrounding the pancreas, suggestive of acute pancreatitis.  Scattered coarse calcifications are present throughout the pancreas, related to prior pancreatitis. No focal fluid collection. SPLEEN: Normal. ADRENALS: Normal. KIDNEYS/URETERS/BLADDER: Normal. LYMPH NODES: Mildly enlarged lymph nodes in the upper abdomen are likely reactive from acute pancreatitis. 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 GASTROINTESTINAL TRACT: Status post gastric bypass. No intraluminal gas or fluid to suggest a leak. The appendix is normal. No acute bowel inflammatory changes. PELVIC ORGANS: No lymphadenopathy, mass, free fluid, focal fluid collection. VASCULATURE: Unremarkable. BONES: No acute or aggressive osseous abnormalities identified. Degenerative changes are present in the lumbar spine. Healed left rib fractures are present. Chronic appearing mild compression fracture in the T12 vertebral body is unchanged since prior examination. OTHER: Chronic appearing irregular shaped fluid collection in the right lateral buttock is unchanged. _______________     IMPRESSION: 1. Mild stranding is present surrounding the pancreas, concerning for mild acute pancreatitis. There is no focal fluid collection. Scattered calcifications within the pancreas or related to prior pancreatitis. 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 2. Status post gastric bypass. No extraluminal gas or fluid to suggest a perforation. 3. Prominent hepatic steatosis is present. Xr Chest Port    Result Date: 8/24/2019  EXAM: XR CHEST PORT CLINICAL INDICATION/HISTORY: eval for free air under diaphragm -Additional: Abdominal pain with vomiting. COMPARISON: None TECHNIQUE: Portable frontal view of the chest _______________ FINDINGS: SUPPORT DEVICES: None. HEART AND MEDIASTINUM: Unremarkable.  LUNGS AND PLEURAL SPACES: Nipple shadow overlies the right lower lung. No focal consolidation. BONY THORAX AND SOFT TISSUES: Unremarkable. _______________     IMPRESSION: No acute findings in the chest.      EKG:   Sinus tachycardia   Possible Left atrial enlargement   Nonspecific T wave abnormality   Abnormal ECG   When compared with ECG of 28-JUL-2017 16:33,   Nonspecific T wave abnormality now evident in Lateral leads   Confirmed by Kristyn Veliz ([id number]) on 8/14/2017 8:26:10 AM     Assessment/Plan     Active Hospital Problems    Diagnosis Date Noted    Acute GI bleeding 08/24/2019    Acute blood loss anemia 08/24/2019    Chronic pancreatitis ([facility] [address] 08/24/2019    Bipolar disorder ([facility] [address] [facility]     Hypothyroidism     Hyponatremia 05/22/2016    HERMELINDA (obstructive sleep apnea) 05/06/2014    Essential hypertension, benign 05/06/2014    Morbid obesity ([facility] [address] [facility]     Hyperlipidemia        Acute GI bleed  -GI consulted   -Transfused In ED  -Monitor Hgb, transfuse less than 7  -Protonix  -NPI  -colonoscopy in 2012   -hx of Chronic ETOH abuse - suspect gastropathy    Pancreatitis  CT results suspicious for acute mild on chronic pancreatitis  NPO, advance diet as tolerated  PRN pain control  IV fluids  Monitor for worsening symptoms/labs    Hyponatremia  -suspect volume depletion  -urine studies/serum osmo  -IV fluids  -no more than 8meq in 24 hour period correction, or 16meq in 48 hrs    Bipolar  Zyprexa IM    Seizures  -Keppra  until patient is no longer NPO then transition to oral    -KRISTOFER  325 iron   - Cont acceptable home medications for chronic conditions   - DVT protocol    I have personally reviewed all pertinent labs, films and EKGs that have officially resulted. I reviewed available electronic documentation outlining the initial presentation as well as the emergency room physician's encounter.     Santana Montoya PA-C  Internal Medicine, Hospitalist  Pager: [phone] [facility]

## 2019-08-24 NOTE — PROGRESS NOTES
Consulted by Dr Rocio Brown for [id number]. Case discussed with Dr Kassie Vidal, patient was sent to ER for abnormal labs with Sodium 118 yesterday with [facility] and low Hb. in ER today sodium is 112,Hb 6.6. As per ER physician patient is alert,oriented and no symptoms of hyponatremia on evaluation, has received about 200 cc NS and is hemodynamically stable and on exam looks little hypovolemic. Poor po intake over last month. Past h/o alcohol abuse. Seen by GI for GI bleed. Hyponatremia, presume to be chronic, likely from poor solute intake and hypovolemia. Risk for over-correction with NS IVF. -hold IVF, recheck BMP.  Will use IVFs of different tonicity as needed based on sodium levels  -Okay to transfuse if needed   - Okay for IVF boluses if hemodynamically unstable  - check serum osmolality, urine osmolality and urine electrolytes  -sodium level every 4 hours, please call results to nephrology  -full consult in morning  - please call with questions

## 2019-08-24 NOTE — PROGRESS NOTES
Problem: Pain  Goal: *Control of Pain  Outcome: Progressing Towards Goal  Goal: *PALLIATIVE CARE:  Alleviation of Pain  Outcome: Progressing Towards Goal     Problem: Patient Education: Go to Patient Education Activity  Goal: Patient/Family Education  Outcome: Progressing Towards Goal     Problem: Falls - Risk of  Goal: *Absence of Falls  Description  Document Rk Berman Fall Risk and appropriate interventions in the flowsheet.   Outcome: Progressing Towards Goal  Note:   Fall Risk Interventions:                                Problem: Patient Education: Go to Patient Education Activity  Goal: Patient/Family Education  Outcome: Progressing Towards Goal     Problem: Fluid Volume - Risk of, Imbalanced  Goal: *Balanced intake and output  Outcome: Progressing Towards Goal     Problem: Patient Education: Go to Patient Education Activity  Goal: Patient/Family Education  Outcome: Progressing Towards Goal

## 2019-08-24 NOTE — ED TRIAGE NOTES
Had blood work done at [facility].. Called today that blood work was abnormal . Had 3 days of abdominal pain and vomiting.  Feels like her pancreatitis flare

## 2019-08-24 NOTE — ED PROVIDER NOTES
EMERGENCY DEPARTMENT HISTORY AND PHYSICAL EXAM      Date: 8/24/2019  Patient Name: Inocencia Cabrera    History of Presenting Illness     Chief Complaint   Patient presents with    Abnormal Lab Results    Abdominal Pain    Vomiting       History Provided By: Patient      HPI/Chief Complaint: (Context): 61-year-old female presenting for a 1 day history of epigastric/right upper quadrant abdominal pain. Pain is described as sharp constant and radiating to her back. Seen in urgent care yesterday was called today informed that she had abnormal labs to include a low ferritin and a low sodium. Patient also reports her pain started yesterday after her visit to [facility] nausea no vomiting normal bowel movement this morning no dysuria or urinary symptoms no fever or chills. Has had similar pain in the past with an episode of pancreatitis 6 months ago. Former history of alcohol abuse, has been clean and sober for 5 months per her report. Patient also status post gastric bypass in 2012. No longer has her gallbladder, still has her appendix. PCP: Tiki Martínez MD    Current Facility-Administered Medications   Medication Dose Route Frequency Provider Last Rate Last Dose    0.9% sodium chloride infusion 250 mL  250 mL IntraVENous PRN Esmer Epperson MD        pantoprazole (PROTONIX) 40 mg in 0.9% sodium chloride (MBP/ADV) 50 mL MBP  8 mg/hr IntraVENous CONTINUOUS Jc SHOOK,         0.9% sodium chloride infusion  50 mL/hr IntraVENous CONTINUOUS Latanya Isaac,          Current Outpatient Medications   Medication Sig Dispense Refill    ondansetron hcl (ZOFRAN) 8 mg tablet Take 1 Tab by mouth every eight (8) hours as needed for Nausea. 12 Tab 0    HYDROcodone-acetaminophen (NORCO) 5-325 mg per tablet Take 1 Tab by mouth every six (6) hours as needed for Pain. Max Daily Amount: 4 Tabs.  12 Tab 0    ergocalciferol (VITAMIN D2) 50,000 unit capsule Take 50,000 Units by mouth.      hydrOXYzine pamoate (VISTARIL) 50 mg capsule Take 50 mg by mouth two (2) times a day.  gabapentin (NEURONTIN) 600 mg tablet Take 600 mg by mouth three (3) times daily. Indications: NEUROPATHIC PAIN      ferrous sulfate (FEOSOL) 325 mg (65 mg iron) tablet Take 325 mg by mouth daily.  biotin 10,000 mcg cap Take 1 Cap by mouth.  calcium citrate-vitamin d3 (CITRACAL+D) 315-200 mg-unit tab Take 1 Tab by mouth two (2) times daily (with meals).  cloNIDine HCl (CATAPRES) 0.1 mg tablet Take  by mouth two (2) times a day.  ergocalciferol (ERGOCALCIFEROL) 50,000 unit capsule Take 50,000 Units by mouth every seven (7) days.  colesevelam (WELCHOL) 625 mg tablet Take 2 Tabs by mouth two (2) times daily (with meals). 120 Tab 0    escitalopram oxalate (LEXAPRO) 10 mg tablet Take 1 Tab by mouth daily. 10 Tab 0    levothyroxine (SYNTHROID) 25 mcg tablet Take 1 Tab by mouth Daily (before breakfast). Indications: HYPOTHYROIDISM (Patient taking differently: Take 50 mcg by mouth Daily (before breakfast). Indications: hypothyroidism) 30 Tab 0    OLANZapine (ZYPREXA ZYDIS) 10 mg disintegrating tablet Take 1 Tab by mouth nightly. 30 Tab 0    ascorbic acid (VITAMIN C) 500 mg tablet Take 1,000 mg by mouth daily.  dicyclomine (BENTYL) 20 mg tablet Take 20 mg by mouth three (3) times daily.  cyanocobalamin (VITAMIN B-12) 1,000 mcg Subl 1,000 mcg by SubLINGual route daily.          Past History     Past Medical History:  Past Medical History:   Diagnosis Date    Alcohol abuse     Alcoholic hepatitis     Alcoholic pancreatitis     Alcoholic pancreatitis     Anemia     Bipolar disorder (HCC)     Dr. Reina Vinson [facility])    Chronic abdominal pain     Chronic pancreatitis ([address]     Compression fracture of lumbar vertebra (HCC)     L4, L5     Depression     Dr. Huang [facility] [place])    Elevated LFTs     ETOH abuse     Fatty liver     GERD (gastroesophageal reflux disease)     Hyperlipidemia     Hypertension     Hypothyroidism     Lower GI bleeding     Morbid obesity ([address]     Obstructive sleep apnea     Substance induced mood disorder (HCC)     Vitamin D deficiency        Past Surgical History:  Past Surgical History:   Procedure Laterality Date    BIOPSY LIVER  08/15/2012    Lap Left hepatic liver wedge by Dr. Anand Lipscomb; BX Revealed: Hepatic Steatosis, AVM w/ associated Subcapsular Fibrosis.  COLONOSCOPY N/A 1/17/2017    COLONOSCOPY performed by Brooke Schwartz MD at [address] DISKECTOMY, ANTERIOR, WITH D  8/1995, 11/1997    HX ABDOMINAL LAPAROSCOPY  12/02/2014    Laparoscopic Gastrostomy w/ Partial Gastrectomy for assistance in placement of Endoscopic Retrograde Cholangiopancreatography & Diagnostic Lap.  HX CARPAL TUNNEL RELEASE      HX CHOLECYSTECTOMY  09/16/2014    Dr. Joceline Marcum HX COLONOSCOPY  05/12/2012    Colonoscopy by Dr. Jj Hernandez. Doroteo Licona: Bx Revealed Colon Polyps (Tubular Adenoma), Hemorrhoids.  HX GASTRIC BYPASS  08/15/2012    Lap Christian-en-y    HX HEMORRHOIDECTOMY  04/30/2015    Dr. Gary Shen. Jason    HX HYSTERECTOMY  2006    HX TONSILLECTOMY  1992    REPAIR NONUNION SCAPHOID CARPAL BONE Right 2010    Wrist       Family History:  Family History   Problem Relation Age of Onset    Cancer Father     Cancer Sister     Obesity Brother        Social History:  Social History     Tobacco Use    Smoking status: Never Smoker    Smokeless tobacco: Never Used   Substance Use Topics    Alcohol use: Yes     Alcohol/week: 0.0 standard drinks     Comment: 6 pack of beer a week- 1/2/17 last use    Drug use: No       Allergies:   Allergies   Allergen Reactions    Codeine Rash    Lamictal [Lamotrigine] Anaphylaxis    Morphine Anaphylaxis     Per patient, has tolerated dilaudid in the past.    Pcn [Penicillins] Anaphylaxis    Vancomycin Rash    Doxycycline Rash    Percocet [Oxycodone-Acetaminophen] Hives and Itching    Tetracycline Rash    Tomato Hives         Review of Systems   Review of Systems   Constitutional: Positive for appetite change. Negative for chills, fatigue and fever. HENT: Negative for congestion and sore throat. Eyes: Negative for pain and redness. Respiratory: Negative for cough, chest tightness and shortness of breath. Cardiovascular: Negative for chest pain and palpitations. Gastrointestinal: Positive for abdominal distention, abdominal pain and nausea. Negative for anal bleeding, blood in stool, constipation, diarrhea, rectal pain and vomiting. Genitourinary: Negative for difficulty urinating, dysuria, frequency and urgency. Musculoskeletal: Negative for arthralgias, joint swelling and myalgias. Neurological: Negative for weakness, light-headedness, numbness and headaches. Hematological: Does not bruise/bleed easily. Psychiatric/Behavioral: Negative for confusion and decreased concentration. All other systems reviewed and are negative. Physical Exam     Physical Exam   Constitutional: She is oriented to person, place, and time. She appears well-developed and well-nourished. No distress. HENT:   Head: Normocephalic and atraumatic. Eyes: Pupils are equal, round, and reactive to light. Conjunctivae are normal.   Neck: Normal range of motion. Neck supple. Cardiovascular: Normal rate, regular rhythm, normal heart sounds and intact distal pulses. Exam reveals no gallop and no friction rub. No murmur heard. Pulmonary/Chest: Breath sounds normal. She is in respiratory distress. She has no wheezes. She has no rales. Abdominal: Soft. She exhibits no distension and no mass. There is tenderness. There is no rebound and no guarding. Hyperactive bowel sounds   Musculoskeletal: Normal range of motion. She exhibits no edema. Neurological: She is alert and oriented to person, place, and time. Skin: Skin is warm and dry.    Psychiatric: She has a normal mood and affect. Nursing note and vitals reviewed. Medical Decision Making   I am the first provider for this patient. I reviewed the vital signs, available nursing notes, past medical history, past surgical history, family history and social history. Provider Notes (Medical Decision Making): 23-year-old female with past medical history to include gastric bypass to pancreatitis and alcohol abuse presenting for abdominal pain x24 hours as well as reported hyponatremia on labs taken yesterday. Work-up today showing hyponatremia of 112 however asymptomatic with no seizures or altered mental status. Hemoglobin 6.6 with grossly melanotic stool on rectal exam concerning for GI bleed likely in the setting of chronic NSAID use as patient reports taking Aleve 3 times daily for the past 3 months. CT abdomen pelvis notable for portal vein thrombosis however no free air concerning for perforation. Possible acute pancreatitis although lipase is normal.  Discussed with Dr. Boo Brown hospitalist for admission. Consulted nephrology with recommendations for further follow on labs in 1200 cc fluid restriction. Type and cross for 1 unit PRBCs for transfusion however patient lost multiple IVs in the course of her evaluation pending third IV placement. Care turned over to JOHN Ledesma at 1500 hrs. pending admission with no further labs or imaging outstanding    Vital Signs-Reviewed the patient's vital signs. Pulse Oximetry Analysis -98% on room air    Cardiac Monitor:  Rate/Rhythm sinus rhythm rate 88           Vitals:    08/24/19 1029 08/24/19 1243 08/24/19 1244 08/24/19 1315   BP: 117/69 110/73  125/76   Pulse: 81  84 90   Resp: 15  20 20   Temp: 98.8 °F (37.1 °C)      SpO2: 100% 100% 100% 100%   Weight: 95.7 kg (211 lb)      Height: 5' 7\" (1.702 m)          Records Reviewed: Nursing Notes and Old Medical Records    ED Course: 1:15 PM.  Patient's IV line was found to have infiltrated.   Replaced line with ultrasound guided 20-gauge in the left upper arm. Labs so far notable for hemoglobin is 6.6 with dark brown/black stool suggestive of GI bleed. Discussion with patient at this time indicates she is been taking naproxen 3 times daily for 3 months despite her previous history of bariatric surgery. Transfusion orders placed for 1 unit. Patient still pending CT to evaluate for abnormality. 1:51 PM.  Concern about status of IV line evaluated do not feel is infiltrate at this point. However due to necessity for fluids and transfusion and concerned about possible complications of CT IV contrast.  Talk to CT and with radiology on-call who feel like a Noncon study would be appropriate and looking for signs of potential leak or potential free air within the peritoneal cavity. Will send for CT and follow-up results      1520 hrs. spoke with hospitalist regarding admission, he requested discussing with nephrology regarding her hyponatremia. Spoke with nephrology on-call whose recommendations were input including 1200 cc fluid restriction as well as urine electrolytes and serum osmole. For Hospitalized Patients:    1. Hospitalization Decision Time:1515 hrs  The decision to hospitalize the patient was made by Dr. Bhumika Harper      Procedures:  Procedures    Core Measures:    Critical Care Time:    Diagnostic Study Results     Labs -     Recent Results (from the past 12 hour(s))   CBC WITH AUTOMATED DIFF    Collection Time: 08/24/19 11:38 AM   Result Value Ref Range    WBC 8.5 4.6 - 13.2 K/uL    RBC 2.61 (L) 4.20 - 5.30 M/uL    HGB 6.6 (L) 12.0 - 16.0 g/dL    HCT 20.6 (L) 35.0 - 45.0 %    MCV 78.9 74.0 - 97.0 FL    MCH 25.3 24.0 - 34.0 PG    MCHC 32.0 31.0 - 37.0 g/dL    RDW 15.5 (H) 11.6 - 14.5 %    PLATELET 852 306 - 055 K/uL    MPV 7.8 (L) 9.2 - 11.8 FL    NEUTROPHILS 81 (H) 40 - 73 %    LYMPHOCYTES 7 (L) 21 - 52 %    MONOCYTES 12 (H) 3 - 10 %    EOSINOPHILS 0 0 - 5 %    BASOPHILS 0 0 - 2 %    ABS.  NEUTROPHILS 6.9 1.8 - 8.0 K/UL ABS. LYMPHOCYTES 0.6 (L) 0.9 - 3.6 K/UL    ABS. MONOCYTES 1.0 0.05 - 1.2 K/UL    ABS. EOSINOPHILS 0.0 0.0 - 0.4 K/UL    ABS. BASOPHILS 0.0 0.0 - 0.1 K/UL    DF AUTOMATED     METABOLIC PANEL, COMPREHENSIVE    Collection Time: 08/24/19 11:38 AM   Result Value Ref Range    Sodium 112 (LL) 136 - 145 mmol/L    Potassium 4.2 3.5 - 5.5 mmol/L    Chloride 80 (L) 100 - 111 mmol/L    CO2 25 21 - 32 mmol/L    Anion gap 7 3.0 - 18 mmol/L    Glucose 190 (H) 74 - 99 mg/dL    BUN 8 7.0 - 18 MG/DL    Creatinine 0.74 0.6 - 1.3 MG/DL    BUN/Creatinine ratio 11 (L) 12 - 20      GFR est AA >60 >60 ml/min/1.73m2    GFR est non-AA >60 >60 ml/min/1.73m2    Calcium 7.4 (L) 8.5 - 10.1 MG/DL    Bilirubin, total 0.4 0.2 - 1.0 MG/DL    ALT (SGPT) 36 13 - 56 U/L    AST (SGOT) 75 (H) 10 - 38 U/L    Alk. phosphatase 288 (H) 45 - 117 U/L    Protein, total 6.3 (L) 6.4 - 8.2 g/dL    Albumin 2.6 (L) 3.4 - 5.0 g/dL    Globulin 3.7 2.0 - 4.0 g/dL    A-G Ratio 0.7 (L) 0.8 - 1.7     LIPASE    Collection Time: 08/24/19 11:38 AM   Result Value Ref Range    Lipase 176 73 - 393 U/L   TROPONIN I    Collection Time: 08/24/19 11:38 AM   Result Value Ref Range    Troponin-I, QT <0.02 0.0 - 0.045 NG/ML   POC LACTIC ACID    Collection Time: 08/24/19 11:53 AM   Result Value Ref Range    Lactic Acid (POC) 1.96 0.40 - 2.00 mmol/L   OCCULT BLOOD, STOOL    Collection Time: 08/24/19 12:30 PM   Result Value Ref Range    Occult blood, stool POSITIVE (A) NEG     TYPE + CROSSMATCH    Collection Time: 08/24/19  1:12 PM   Result Value Ref Range    Crossmatch Expiration 08/27/2019     ABO/Rh(D) A POSITIVE     Antibody screen NEG     CALLED TO:  DENILSON Godoy, AT 1402 ON 08/24/2019  [facility]     Unit number [id number]     Blood component type RC LR     Unit division 00     Status of unit ISSUED     Crossmatch result Compatible        Radiologic Studies -   CT ABD PELV WO CONT   Final Result   IMPRESSION:      1.  Mild stranding is present surrounding the pancreas, concerning for mild acute   pancreatitis. There is no focal fluid collection. Scattered calcifications   within the pancreas or related to prior pancreatitis. 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 2. Status post gastric bypass. No extraluminal gas or fluid to suggest a   perforation. 3. Prominent hepatic steatosis is present. XR CHEST PORT   Final Result   IMPRESSION:      No acute findings in the chest.        CT Results  (Last 48 hours)               08/24/19 1420  CT ABD PELV WO CONT Final result    Impression:  IMPRESSION:       1. Mild stranding is present surrounding the pancreas, concerning for mild acute   pancreatitis. There is no focal fluid collection. Scattered calcifications   within the pancreas or related to prior pancreatitis. 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 2. Status post gastric bypass. No extraluminal gas or fluid to suggest a   perforation. 3. Prominent hepatic steatosis is present. Narrative:  EXAM: CT of the abdomen and pelvis       INDICATION: Abdominal pain. Prior history of gastric bypass. COMPARISON: None. TECHNIQUE: Axial CT imaging of the abdomen and pelvis was performed without   intravenous contrast. Multiplanar reformats were generated. One or more dose   reduction techniques were used on this CT: automated exposure control,   adjustment of the mAs and/or kVp according to patient size, and iterative   reconstruction techniques. The specific techniques used on this CT exam have   been documented in the patient's electronic medical record.   Digital Imaging and   Communications in Medicine (DICOM) format image data are available to   nonaffiliated external healthcare facilities or entities on a secured, media   free, reciprocally searchable basis with patient authorization for at least a   12-month period after this study. _______________       FINDINGS:       LOWER CHEST: Unremarkable. LIVER, BILIARY: Prominent hepatic steatosis is present. The unenhanced liver is   otherwise unremarkable. No biliary dilation. The gallbladder has been removed. PANCREAS: Mild inflammatory changes are present surrounding the pancreas,   suggestive of acute pancreatitis. Scattered coarse calcifications are present   throughout the pancreas, related to prior pancreatitis. No focal fluid   collection. SPLEEN: Normal.       ADRENALS: Normal.       KIDNEYS/URETERS/BLADDER: Normal.       LYMPH NODES: Mildly enlarged lymph nodes in the upper abdomen are likely   reactive from acute pancreatitis. Collateral vessels in the upper abdomen are   suggestive of chronic thrombosis of the splenic vein related to prior episodes   of pancreatitis. GASTROINTESTINAL TRACT: Status post gastric bypass. No intraluminal gas or fluid   to suggest a leak. The appendix is normal. No acute bowel inflammatory changes. PELVIC ORGANS: No lymphadenopathy, mass, free fluid, focal fluid collection. VASCULATURE: Unremarkable. BONES: No acute or aggressive osseous abnormalities identified. Degenerative   changes are present in the lumbar spine. Healed left rib fractures are present. Chronic appearing mild compression fracture in the T12 vertebral body is   unchanged since prior examination. OTHER: Chronic appearing irregular shaped fluid collection in the right lateral   buttock is unchanged.        _______________               CXR Results  (Last 48 hours)               08/24/19 1327  XR CHEST PORT Final result    Impression:  IMPRESSION:       No acute findings in the chest.       Narrative:  EXAM: XR CHEST PORT       CLINICAL INDICATION/HISTORY: eval for free air under diaphragm   -Additional: Abdominal pain with vomiting. COMPARISON: None       TECHNIQUE: Portable frontal view of the chest       _______________       FINDINGS:       SUPPORT DEVICES: None. HEART AND MEDIASTINUM: Unremarkable. LUNGS AND PLEURAL SPACES: Nipple shadow overlies the right lower lung. No focal   consolidation. BONY THORAX AND SOFT TISSUES: Unremarkable.       _______________                     Discharge     Clinical Impression:   1. Acute GI bleeding    2. Hyponatremia    3. Acute pancreatitis, unspecified complication status, unspecified pancreatitis type    4. Anemia due to acute blood loss        Disposition:  Admit  It should be noted that I will be the provider of record for this patient  Sepideh Armstrong M.D.     Follow-up Information    None         Current Discharge Medication List

## 2019-08-24 NOTE — PROGRESS NOTES
TRANSFER - IN REPORT:    Verbal report received from Sheila Ugalde RN(name) on Krupa Gibson  being received from ER(unit) for routine progression of care      Report consisted of patients Situation, Background, Assessment and   Recommendations(SBAR). Information from the following report(s) SBAR, Kardex, Intake/Output, MAR and Recent Results was reviewed with the receiving nurse. Opportunity for questions and clarification was provided. Assessment completed upon patients arrival to unit and care assumed. 1915-  Bedside and Verbal shift change report given to NEWTON Kemp (oncoming nurse) by Chavo Nolen RN (offgoing nurse). Report included the following information SBAR, Kardex, Intake/Output, MAR and Recent Results.

## 2019-08-24 NOTE — PROGRESS NOTES
Sodium has overcorrected to 125 due to the initial administered NS. Will give D5 bolus 1 L f/b 150 ml/h and give DDAVP 2 mcg IV stat and every 8 hrs to reverse correction. Urine studies are still \"in process\".

## 2019-08-24 NOTE — ED NOTES
Multiple attempts have been made to replace patient's IV without success.  Blood transfusion on hold at this time until PICC team personnel able to come and place an IV

## 2019-08-25 NOTE — PROGRESS NOTES
Internal Medicine Progress Note    Patient's Name: Vandana Ash  Admit Date: 8/24/2019  Length of Stay: 1      Assessment/Plan     Principal Problem:    Acute GI bleeding (8/24/2019)    Active Problems:    Acute blood loss anemia (8/24/2019)      Hyponatremia (5/22/2016)      Chronic pancreatitis ([address] (8/24/2019)      Morbid obesity (HCC) ()      Hyperlipidemia ()      HERMELINDA (obstructive sleep apnea) (5/6/2014)      Essential hypertension, benign (5/6/2014)      Bipolar disorder (HCC) ()      Hypothyroidism ()      Acute GI bleed with anemia  - GI consulted - appreciate  - Transfused In ED  - Monitor Hgb, transfuse if less than 7  - Protonix 40mg BID  - EGD 8/25 with anastomotic ulcer without bleeding  - clears     Hyponatremia  - suspect volume depletion  - management per nephro - appreciate assistance  - no more than 8meq in 24 hour period correction, goal 6meq in 24 hours    Pancreatitis  - CT results suspicious for acute mild on chronic pancreatitis  - PRN pain control  - normal lipase on admission - per GI, might be inflammatory changes from adjacent GI tract as opposed to true pancreatitis     Bipolar  - Zyprexa IM     Seizures  - Keppra  until patient is no longer NPO then transition to oral       - Cont acceptable home medications for chronic conditions   - DVT protocol    I have personally reviewed all pertinent labs and films that have officially resulted over the last 24 hours. I have personally checked for all pending labs that are awaiting final results. HPI     Per H&P, Vandana Ash is a 63 y.o. female with seizures (Took Keppra 500mg today), depression, bipolar, KRISTOFER, chronic pancreatitis, previous ETOH abuse (been sober 6 months) of who presented to the ED with abnormal labs. Sodium 112, hgb 6.6. Patient c/o of melena for the past 7 days as well as developing n/v/epigastric abd pain that has progressively worsened over the past 3-4 days.   She went to [facility] yesterday and was called today to report to the ED for a sodium then of 118 and having a low hgb and a low ferritin. She states poor PO intake and feels dehydrated. She has been on iron supplementation for the past year, hx of low hgb that required blood transfusion with unknown source of bleed per patient. No hx of EGD, last colonoscopy in 2012 that showed polpys per patient. GI was consulted from the ED, and her CT abd is worrisome for acute mild pancreatitis. Denies Seizure like activity, confusion      Will admit for further eval \"    Interval History     1 U PRBCs given. GI consulted - EGD 8/25 with anastomotic ulcer without bleeding. Recommended BID PPI, and advance diet as tolerated. Nephrology consulted for management of hyponatremia. Subjective     Pt s/e @ bedside. No major events overnight. Pt offers no complaints this AM. Denies CP or SOB. Denies abd pain, nvd.     Objective     Visit Vitals  /80 (BP 1 Location: Right arm, BP Patient Position: At rest)   Pulse 83   Temp 98.7 °F (37.1 °C)   Resp 16   Ht 5' 7\" (1.702 m)   Wt 97.5 kg (214 lb 15.2 oz)   SpO2 98%   BMI 33.67 kg/m²       Physical Exam:  General Appearance: NAD, conversant  HENT: normocephalic/atraumatic, moist mucus membranes  Neck: No JVD, supple  Lungs: CTA with normal respiratory effort  CV: RRR, no m/r/g  Abdomen: soft, non-tender, normal bowel sounds  Extremities: no cyanosis, no peripheral edema  Neuro: No focal deficits, motor/sensory intact  Skin: Normal color, intact    Intake and Output:  Current Shift:  08/25 0701 - 08/25 1900  In: 100 [I.V.:100]  Out: -   Last three shifts:  08/23 1901 - 08/25 0700  In: 330   Out: -     Lab/Data Reviewed:  BMP:   Lab Results   Component Value Date/Time     (LL) 08/25/2019 01:30 PM    K 3.9 08/25/2019 03:00 AM    CL 88 (L) 08/25/2019 03:00 AM    CO2 26 08/25/2019 03:00 AM    AGAP 7 08/25/2019 03:00 AM    GLU 79 08/25/2019 03:00 AM    BUN 9 08/25/2019 03:00 AM    CREA 0.83 08/25/2019 03:00 AM    GFRAA >60 08/25/2019 03:00 AM    GFRNA >60 08/25/2019 03:00 AM     CBC:   Lab Results   Component Value Date/Time    HGB 6.9 (L) 08/25/2019 04:25 PM    HCT 21.3 (L) 08/25/2019 04:25 PM       Imaging Reviewed:  No results found.     Medications Reviewed:  Current Facility-Administered Medications   Medication Dose Route Frequency    benzocaine (HURRICAINE) 20 % spray        acetaminophen (TYLENOL) tablet 650 mg  650 mg Oral Q4H PRN    pantoprazole (PROTONIX) tablet 40 mg  40 mg Oral ACB&D    0.9% sodium chloride infusion 250 mL  250 mL IntraVENous PRN    sodium chloride (NS) flush 5-40 mL  5-40 mL IntraVENous Q8H    sodium chloride (NS) flush 5-40 mL  5-40 mL IntraVENous PRN    ondansetron (ZOFRAN) injection 4 mg  4 mg IntraVENous Q6H PRN    levETIRAcetam (KEPPRA) 500 mg in 0.9% sodium chloride (MBP/ADV) 100 mL MBP  500 mg IntraVENous DAILY    HYDROmorphone (DILAUDID) syringe 0.5 mg  0.5 mg IntraVENous Q4H PRN         Miguelito Rodriguez PA-C  [address] [place] [address]  Hospitalist Division  Office:  [phone]  Pager: [phone]

## 2019-08-25 NOTE — ANESTHESIA POSTPROCEDURE EVALUATION
Post-Anesthesia Evaluation & Assessment    Visit Vitals  /71   Pulse 81   Temp 36.9 °C (98.4 °F)   Resp 15   Ht 5' 7\" (1.702 m)   Wt 97.5 kg (214 lb 15.2 oz)   SpO2 97%   BMI 33.67 kg/m²       Nausea/Vomiting: no nausea    Pain score (VAS): 0    Post-operative hydration adequate. Mental status & Level of consciousness: orientation per pre-anesthetic level    Neurological status: moves all extremities, sensation grossly intact    Pulmonary status: airway patent, no supplemental oxygen required    Complications related to anesthesia: none    Additional comments:        Bianca Way CRNA  August 25, 2019  Procedure(s):  ESOPHAGOGASTRODUODENOSCOPY (EGD). MAC    <BSHSIANPOST>    No vitals data found for the desired time range.

## 2019-08-25 NOTE — PROGRESS NOTES
Problem: Pain  Goal: *Control of Pain  Outcome: Progressing Towards Goal  Goal: *PALLIATIVE CARE:  Alleviation of Pain  Outcome: Progressing Towards Goal     Problem: Patient Education: Go to Patient Education Activity  Goal: Patient/Family Education  Outcome: Progressing Towards Goal     Problem: Falls - Risk of  Goal: *Absence of Falls  Description  Document Margrett Bernheim Fall Risk and appropriate interventions in the flowsheet.   Outcome: Progressing Towards Goal  Note:   Fall Risk Interventions:            Medication Interventions: Teach patient to arise slowly, Evaluate medications/consider consulting pharmacy, Patient to call before getting OOB                   Problem: Patient Education: Go to Patient Education Activity  Goal: Patient/Family Education  Outcome: Progressing Towards Goal     Problem: Fluid Volume - Risk of, Imbalanced  Goal: *Balanced intake and output  Outcome: Progressing Towards Goal     Problem: Patient Education: Go to Patient Education Activity  Goal: Patient/Family Education  Outcome: Progressing Towards Goal

## 2019-08-25 NOTE — PROCEDURES
Endoscopy Procedure Note    Patient: Prachi Whittington MRN: [id number]  SSN: [id number]    YOB: 1960  Age: 63 y.o. Sex: female      Date/Time:  8/25/2019 11:31 AM    Esophagogastroduodenoscopy (EGD) Procedure Note    Procedure: Esophagogastroduodenoscopy with biopsy    IMPRESSION:   1. 15mm clean based anastomotic ulcer without bleeding  2. Post-surgical changes consistent with prior gastric bypass  3. Small hiatal hernia  4. Gastric biopsies taken for Hpylori    RECOMMENDATIONS:  1. Advance diet as tolerated   2. Protonix 40mg twice daily, can change to oral once tolerating a regular diet  3. Send iron studies on pre-transfusion labwork to guide iron replacement  4. Monitor for recurrent bleeding and transfuse to goal Hb 7-8  5. NO NSAIDS    **[email or web address] [email or web address]**  Call from lab that there is not enough pre-transfusion serum to run iron studies and I will order these tests for tomorrow Am  -Francia Duran MD    Indication: Melena, anemia  :  Vinay Wood MD  Assistants: Endoscopy Technician-1: Aggie Julian  Endoscopy RN-1: Janice Ty RN    Referring Provider:   Eward Duane, MD  History: The history and physical exam were reviewed and updated. Endoscope: Olympus GIF-190 diagnostic endoscope  Extent of Exam: proximal jejunum  ASA: Per Anesthesia  Anethesia/Sedation:  MAC anesthesia    Description of the procedure: The procedure was discussed with the patient including risks, benefits, alternatives including risks of iv sedation, bleeding, perforation and aspiration. A safety timeout was performed. The patient was placed in the left lateral decubitus position. A bite block was placed. The patient was given incremental doses of intravenous sedation until moderate sedation was achieved. The patients vital signs were monitored at all times including heart rate/rhythm, blood pressure and oxygen saturation.   The endoscope was then passed under direct visualization to the proximal jejunum. The endoscope was then slowly withdrawn while visualizing the mucosa. In the stomach a retroflexion was performed and gastric fundus and cardia visualized. The endoscope was then slowly withdrawn. The patient was then transferred to recovery in stable condition. Findings:    Esophagus: There was a small hiatal hernia (1cm). Otherwise, the esophageal mucosa was normal with no ulceration, mass or stricture. There was no evidence of Jean's esophagus or reflux esophagitis. No esophageal varices were seen. There was mild self-limited oozing at the GE junction after retching during the procedure   Stomach: The gastric mucosa was normal with no ulceration, mass, stricture. Post-surgical changes were noted consistent with RYGB. Biopsies were taken from the pouch for Hpylori   Jejunum: A 15mm clean based cratered ulcer without bleeding or visible vessel was noted on the jejunal side of the gastro-jejunal anastomosis at 2-3 oclock. Otherwise, the jejunal mucosa was normal; both the short candy cane limb and the enteral limb to approximately 70cm from the incisors    Therapies:  None    Specimens:   ID Type Source Tests Collected by Time Destination   1 : bx r/o H.pylori  Preservative Gastric  Manan Beach MD 8/25/2019 8710 Pathology               Complications:   None; patient tolerated the procedure well.     EBL:Minimal    Discharge disposition:  Return to the floor    Aletha Melissa MD  August 25, 2019  11:31 AM

## 2019-08-25 NOTE — ROUTINE PROCESS
TRANSFER - OUT REPORT:    Verbal report given to NEWTON Jerry (name) on Carmen Cuba  being transferred to [facility]  (unit) for routine post - op       Report consisted of patients Situation, Background, Assessment and   Recommendations(SBAR). Information from the following report(s) SBAR was reviewed with the receiving nurse. Lines:   Peripheral IV 08/24/19 Left Hand (Active)   Site Assessment Clean, dry, & intact 8/25/2019 11:00 AM   Phlebitis Assessment 0 8/25/2019 11:00 AM   Infiltration Assessment 0 8/25/2019 11:00 AM   Dressing Status Clean, dry, & intact 8/25/2019 11:00 AM   Dressing Type Tape;Transparent 8/25/2019 11:00 AM   Hub Color/Line Status Infusing 8/25/2019 11:00 AM   Action Taken Open ports on tubing capped 8/25/2019 11:00 AM   Alcohol Cap Used Yes 8/25/2019 11:00 AM        Opportunity for questions and clarification was provided.       Patient transported with:   [employer]

## 2019-08-25 NOTE — CONSULTS
Consult Note  Consult requested by: Dr Krista Lindquist, Dr Nikolai Brown is a 63 y.o. female [address] who is being seen on consult for hyponatremia. Chief Complaint   Patient presents with    Abnormal Lab Results    Abdominal Pain    Vomiting     Admission diagnosis: Acute GI bleeding       Impression and Plan:     1. Hyponatremia, severe, probably hypo-osmolar and chronic, likely from poor solute intake and hypovolemia.   - Risk for rapid correction so held NS immediately on admission yesterday, on D5W and DDAVP to keep sodium in range of 118-120 today and then slowly up to 124 by tomorrow (6 meq/24 h). Do not exceed correction of more than 8 meq in 24 hrs  - additional D5W 1 L bolus ordered, continue D5 W at 150 cc/h and DDAVP 2 mcg every 8 hrs for now  - serum and urine osmolality still \"in process\"- follow. - We will closely monitor sodium levels every 6 hrs now, please call on call [facility] on [place] with results, Dr Nunu Mix on call tonight (# [phone]) and I will sign out to him  - Monitor BMP, magnesium  - Discussed with RN in detail    2. GI bleed, pancreatitis: GI on board, management per GI and primary  3. Alcohol abuse: currently abstaining      HPI:  64 yo female with PMHx of alcohol abuse, acute on chronic pancreatitis, bipolar disorder was sent to ER for abnormal labs with Sodium 118 on 8/23/19 (Conrado Franklin) and low Hb. In ER on 8/24/19 sodium was 112,Hb 6.6. As per ER physician patient is alert,oriented and no symptoms of hyponatremia on evaluation, has received about 200 cc NS and is hemodynamically stable and on exam looks little hypovolemic. Poor po intake over last month. Past h/o alcohol abuse. Seen by GI for GI bleed. Patient confirms history of nausea, vomiting, poor po for last 1 month. Reports drinks water but not excessively.   Despite holding NS after I was consulted, sodium had overcorrected, I gave her D5 boluses overnight along with D5W 150 ml/h and DDAVP 2 mcg IV every 8 hrs to reverse correction. Sodium is 121-122 range this morning, serum and urine osmolality still \"in process\". Patient has been tired which could be related to low sodium and anemia. Past Medical History:   Diagnosis Date    Alcohol abuse     Alcoholic hepatitis     Alcoholic pancreatitis     Alcoholic pancreatitis     Anemia     Bipolar disorder (HCC)     Dr. Remi James [facility])    Chronic abdominal pain     Chronic pancreatitis ([address]     Compression fracture of lumbar vertebra (HCC)     L4, L5     Depression     Dr. Remi James [facility])    Elevated LFTs     ETOH abuse     Fatty liver     GERD (gastroesophageal reflux disease)     Hyperlipidemia     Hypertension     Hypothyroidism     Lower GI bleeding     Morbid obesity ([address]     Obstructive sleep apnea     Substance induced mood disorder ([address]     Vitamin D deficiency       Past Surgical History:   Procedure Laterality Date    BIOPSY LIVER  08/15/2012    Lap Left hepatic liver wedge by Dr. Max Argueta; BX Revealed: Hepatic Steatosis, AVM w/ associated Subcapsular Fibrosis.  COLONOSCOPY N/A 1/17/2017    COLONOSCOPY performed by Hakan Alcazar MD at [address] DISKECTOMY, ANTERIOR, WITH D  8/1995, 11/1997    HX ABDOMINAL LAPAROSCOPY  12/02/2014    Laparoscopic Gastrostomy w/ Partial Gastrectomy for assistance in placement of Endoscopic Retrograde Cholangiopancreatography & Diagnostic Lap.  HX CARPAL TUNNEL RELEASE      HX CHOLECYSTECTOMY  09/16/2014    Dr. Khushbu Acuña HX COLONOSCOPY  05/12/2012    Colonoscopy by Dr. Ja Grande. Epifanio Amanda: Bx Revealed Colon Polyps (Tubular Adenoma), Hemorrhoids.  HX GASTRIC BYPASS  08/15/2012    Lap Christian-en-y    HX HEMORRHOIDECTOMY  04/30/2015    Dr. Vianney Lester.  Jason    HX HYSTERECTOMY  2006    HX TONSILLECTOMY  1992    REPAIR NONUNION SCAPHOID CARPAL BONE Right 2010    Wrist       Social History Socioeconomic History    Marital status:      Spouse name: Not on file    Number of children: Not on file    Years of education: Not on file    Highest education level: Not on file   Occupational History    Not on file   Social Needs    Financial resource strain: Not on file    Food insecurity:     Worry: Not on file     Inability: Not on file    Transportation needs:     Medical: Not on file     Non-medical: Not on file   Tobacco Use    Smoking status: Never Smoker    Smokeless tobacco: Never Used   Substance and Sexual Activity    Alcohol use:  Yes     Alcohol/week: 0.0 standard drinks     Comment: 6 pack of beer a week- 1/2/17 last use    Drug use: No    Sexual activity: Yes     Partners: Male     Birth control/protection: Condom   Lifestyle    Physical activity:     Days per week: Not on file     Minutes per session: Not on file    Stress: Not on file   Relationships    Social connections:     Talks on phone: Not on file     Gets together: Not on file     Attends [employer]ism service: Not on file     Active member of club or organization: Not on file     Attends meetings of clubs or organizations: Not on file     Relationship status: Not on file    Intimate partner violence:     Fear of current or ex partner: Not on file     Emotionally abused: Not on file     Physically abused: Not on file     Forced sexual activity: Not on file   Other Topics Concern    Not on file   Social History Narrative    Not on file       Family History   Problem Relation Age of Onset    Cancer Father     Cancer Sister     Obesity Brother      Allergies   Allergen Reactions    Codeine Rash    Lamictal [Lamotrigine] Anaphylaxis    Morphine Anaphylaxis     Per patient, has tolerated dilaudid in the past.    Pcn [Penicillins] Anaphylaxis    Vancomycin Rash    Doxycycline Rash    Percocet [Oxycodone-Acetaminophen] Hives and Itching    Tetracycline Rash    Tomato Hives        Home Medications: Current Facility-Administered Medications   Medication Dose Route Frequency    dextrose 5% infusion  999 mL/hr IntraVENous CONTINUOUS    0.9% sodium chloride infusion 250 mL  250 mL IntraVENous PRN    sodium chloride (NS) flush 5-40 mL  5-40 mL IntraVENous Q8H    sodium chloride (NS) flush 5-40 mL  5-40 mL IntraVENous PRN    ondansetron (ZOFRAN) injection 4 mg  4 mg IntraVENous Q6H PRN    levETIRAcetam (KEPPRA) 500 mg in 0.9% sodium chloride (MBP/ADV) 100 mL MBP  500 mg IntraVENous DAILY    HYDROmorphone (DILAUDID) syringe 0.5 mg  0.5 mg IntraVENous Q4H PRN    dextrose 5% infusion  150 mL/hr IntraVENous CONTINUOUS    desmopressin (DDAVP) 2 mcg in 0.9% sodium chloride 50 mL IVPB  2 mcg IntraVENous Q8H       Review of Systems:     Constitutional: No fever, weight loss, has decreased oral intake, feels tired. HEENT: No runny nose, epistaxis, sore throat. CVS: No dyspnea, orthopnea, chest pain, syncope. Respiratory: No cough, sputum, hemoptysis. GI: has nausea, vomiting, abdominal pain. : No dysuria, hematuria. JOBY: No new joint swelling or pain. Skin: No rash, pruritus. Neuro: No seizure, headache. No focal weakness or numbness in any part of the body. Endocrine: No heat or cold intolerance. Psych: Denies feeling sad or depressed. Rest of the review of systems as per HPI. Physical Assessment:     Visit Vitals  /63 (BP 1 Location: Right arm, BP Patient Position: At rest)   Pulse 74   Temp 98.4 °F (36.9 °C)   Resp 16   Ht 5' 7\" (1.702 m)   Wt 97.5 kg (214 lb 15.2 oz)   SpO2 98%   BMI 33.67 kg/m²       Intake/Output Summary (Last 24 hours) at 8/25/2019 0850  Last data filed at 8/24/2019 1953  Gross per 24 hour   Intake 330 ml   Output    Net 330 ml       General: Awake alert and oriented. In no acute distress.    HEENT: Normocephalic atraumatic and oral mucosae are moist   Neck supple with no jugular venous distention and no thyromegaly   Lungs: Clear to auscultation, with no wheezing no crepitation or rhonchi   Heart: no rub, no gallop   Abdomen: soft, non-tender; bowel sounds normal; no masses, no organomegaly   Extremities: extremities normal, warm and well-perfused. No LE edema. Skin: Skin turgor normal.  Neurologic:  alert and oriented x 3, no motor deficit on gross exam in UE or LE   Psych: Normal mood and affect and cooperative with exam    Data Review:    Labs: Results:       Chemistry Recent Labs     08/25/19  0700 08/25/19  0300 08/24/19  2300 08/24/19  1138   GLU  --  79  --   --  190*   * 121* 125*   < > 112*   K  --  3.9  --   --  4.2   CL  --  88*  --   --  80*   CO2  --  26  --   --  25   BUN  --  9  --   --  8   CREA  --  0.83  --   --  0.74   CA  --  7.5*  --   --  7.4*   AGAP  --  7  --   --  7   BUCR  --  11*  --   --  11*   AP  --   --   --   --  288*   TP  --   --   --   --  6.3*   ALB  --   --   --   --  2.6*   GLOB  --   --   --   --  3.7   AGRAT  --   --   --   --  0.7*   MG  --  2.1  --   --   --     < > = values in this interval not displayed. CBC w/Diff Recent Labs     08/25/19  0700 08/25/19  0300 08/24/19 2300 08/24/19  1138   WBC  --   --   --   --  8.5   RBC  --   --   --   --  2.61*   HGB 6.8* 7.0* 7.1*   < > 6.6*   HCT 20.4* 21.8* 21.8*   < > 20.6*   PLT  --   --   --   --  245   GRANS  --   --   --   --  81*   LYMPH  --   --   --   --  7*   EOS  --   --   --   --  0    < > = values in this interval not displayed. Coagulation Recent Labs     08/24/19  1138   PTP 16.0*   INR 1.3*       Iron/Ferritin No results for input(s): IRON in the last 72 hours. No lab exists for component: TIBCCALC   BNP No results for input(s): BNPP in the last 72 hours. Cardiac Enzymes No results for input(s): CPK, CKND1, MÓNICA in the last 72 hours.     No lab exists for component: CKRMB, TROIP   Liver Enzymes Recent Labs     08/24/19  1138   TP 6.3*   ALB 2.6*   *   SGOT 75*             Lab Results   Component Value Date/Time    Color YELLOW 07/18/2018 11:05 PM    Appearance CLEAR 07/18/2018 11:05 PM    Specific gravity 1.008 07/18/2018 11:05 PM    pH (UA) 5.5 07/18/2018 11:05 PM    Protein NEGATIVE  07/18/2018 11:05 PM    Glucose NEGATIVE  07/18/2018 11:05 PM    Ketone 15 (A) 07/18/2018 11:05 PM    Bilirubin NEGATIVE  07/18/2018 11:05 PM    Urobilinogen 0.2 07/18/2018 11:05 PM    Nitrites NEGATIVE  07/18/2018 11:05 PM    Leukocyte Esterase NEGATIVE  07/18/2018 11:05 PM    Epithelial cells FEW 02/24/2018 04:25 PM    Bacteria NEGATIVE  02/24/2018 04:25 PM    WBC 3 to 5 02/24/2018 04:25 PM    RBC 0 02/24/2018 04:25 PM       IMAGING:   CT ABD PELV WO CONT   Final Result   IMPRESSION:      1. Mild stranding is present surrounding the pancreas, concerning for mild acute   pancreatitis. There is no focal fluid collection. Scattered calcifications   within the pancreas or related to prior pancreatitis. 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 2. Status post gastric bypass. No extraluminal gas or fluid to suggest a   perforation. 3. Prominent hepatic steatosis is present.                      XR CHEST PORT   Final Result   IMPRESSION:      No acute findings in the chest.              Ankur Lopes MD  August 25, 2019

## 2019-08-25 NOTE — PROGRESS NOTES
Problem: Discharge Planning  Goal: *Discharge to safe environment  Outcome: Resolved/Met   home    Reason for Admission:   donnie                  RRAT Score:     14             Do you (patient/family) have any concerns for transition/discharge?     none              Plan for utilizing home health:   none    Current Advanced Directive/Advance Care Plan:  None does not want one            Transition of Care Plan:  Spoke with pt, lives with spouse. Spouse has cerebral palsy and does not speak. She designates her mother in law for dcp and transport home. She states she is independent with adls and amb. Only dme is cpap. Has used hh in past, not currently. pcp dr Espinoza Mondragon, saw last Friday. Plan home, cm to follow for hh needs. Patient has designated ______mother in law__________________ to participate in his/her discharge plan and to receive any needed information. Name: Kashif Seaydict  Address:  Phone number: [phone]    Care Management Interventions  PCP Verified by CM: Yes  Palliative Care Criteria Met (RRAT>21 & CHF Dx)?: No  Mode of Transport at Discharge:  Other (see comment)  Transition of Care Consult (CM Consult): Discharge Planning  Discharge Durable Medical Equipment: No  Physical Therapy Consult: No  Occupational Therapy Consult: No  Speech Therapy Consult: No  Current Support Network: Lives with Spouse  Confirm Follow Up Transport: Family  Plan discussed with Pt/Family/Caregiver: Yes  Discharge Location  Discharge Placement: Home

## 2019-08-25 NOTE — ANESTHESIA PREPROCEDURE EVALUATION
Relevant Problems   No relevant active problems       Anesthetic History   No history of anesthetic complications            Review of Systems / Medical History  Patient summary reviewed and pertinent labs reviewed    Pulmonary  Within defined limits                 Neuro/Psych     seizures: well controlled         Cardiovascular  Within defined limits                Exercise tolerance: >4 METS     GI/Hepatic/Renal           Liver disease     Endo/Other        Obesity and anemia     Other Findings   Comments: Chronic pancreatitis  nyponatremia                 Physical Exam    Airway  Mallampati: III  TM Distance: 4 - 6 cm  Neck ROM: normal range of motion   Mouth opening: Normal     Cardiovascular  Regular rate and rhythm,  S1 and S2 normal,  no murmur, click, rub, or gallop  Rhythm: regular  Rate: normal         Dental    Dentition: Poor dentition  Comments: The patient has very poor dentition. Loose, broken, and or missing teeth. I explained the risk of dental damage and or loss. The patient understands and accepts these risks.      Pulmonary  Breath sounds clear to auscultation               Abdominal  GI exam deferred       Other Findings            Anesthetic Plan    ASA: 3  Anesthesia type: MAC          Induction: Intravenous  Anesthetic plan and risks discussed with: Patient

## 2019-08-25 NOTE — PROGRESS NOTES
Problem: Pain  Goal: *Control of Pain  Outcome: Progressing Towards Goal  Goal: *PALLIATIVE CARE:  Alleviation of Pain  Outcome: Progressing Towards Goal     Problem: Patient Education: Go to Patient Education Activity  Goal: Patient/Family Education  Outcome: Progressing Towards Goal     Problem: Falls - Risk of  Goal: *Absence of Falls  Description  Document Bridget Dewey Fall Risk and appropriate interventions in the flowsheet.   Outcome: Progressing Towards Goal  Note:   Fall Risk Interventions:            Medication Interventions: Patient to call before getting OOB                   Problem: Patient Education: Go to Patient Education Activity  Goal: Patient/Family Education  Outcome: Progressing Towards Goal     Problem: Fluid Volume - Risk of, Imbalanced  Goal: *Balanced intake and output  Outcome: Progressing Towards Goal     Problem: Patient Education: Go to Patient Education Activity  Goal: Patient/Family Education  Outcome: Progressing Towards Goal

## 2019-08-25 NOTE — PROGRESS NOTES
Assumed care; Pt resting in bed; no distress noted; Pt denies pain; bed in low position; Side rails up x 3; Call light and personal belonging within reach. Will continue to monitor. 2100: Confirmed from [address]. Medication safe to give on unit per [facility] policy. 2127:Return call from Dr Berta Jenkins- [phone]. Call if NA result is more than 120 or less than 115.   2200: Pt c/o pain. See Mar. Pt noted itching. Refuses offer of benadryl at this time. 2230: Itching resolved. 3832: Dr Berta Jenkins notified. Sodium 125. Give 1L now + continue continous infusion. 0150: Bolus completed. 0350: Pt c/o pain. See Mar  0715:Dr Berta Jenkins request oncoming nurse to call him with results. DR Berta Jenkins or DR Valiant Gowers to see patient today.

## 2019-08-25 NOTE — H&P
Date of Surgery Update: Marko Guillermo was seen and examined. History and physical has been reviewed. The patient has been examined. There have been no significant clinical changes since the completion of the originally dated History and Physical other than 1 unit PRBC transfusion.  Hb 6.8 this AM. Plan for EGD today    Signed By: Giovanny Rodriguez MD     August 25, 2019 10:50 AM

## 2019-08-25 NOTE — ROUTINE PROCESS
TRANSFER - IN REPORT:    Verbal report received from NEWTON Jerry (name) on Sarah Cruz  being received from 3000(unit) for ordered procedure      Report consisted of patients Situation, Background, Assessment and   Recommendations(SBAR). Information from the following report(s) SBAR was reviewed with the receiving nurse. Opportunity for questions and clarification was provided. Assessment completed upon patients arrival to unit and care assumed.

## 2019-08-25 NOTE — PROGRESS NOTES
met with Patient completed the initial Spiritual Assessment of the patient, and offered Pastoral Care, see flow sheets for interventions. Patient does not have any Bahai/cultural needs that will affect patients preferences in health care. Charted reviewed. Chaplains will continue to follow and will provide pastoral care on an as needed/requested basis.       [address]

## 2019-08-25 NOTE — PROGRESS NOTES
9536: Bedside shift change report given to Rosalino Toledo  (oncoming nurse) by Bessie Marrero  (offgoing nurse). Report included the following information SBAR, Kardex, Procedure Summary, Intake/Output, MAR and Cardiac Rhythm SR. Spoke with Endo about patient coming down with D5 Bolus, for Na to be maintain in 115-120.    1530: Paged Dr. Shanti Adame about the patient NA level being 116, within the goal. D/C DVVAT and D5.     1915  : Bedside shift change report given to [address]  (oncoming nurse) by Ariane Lockwood RN  (offgoing nurse).  Report included the following information SBAR, Kardex, Procedure Summary, Intake/Output, MAR and Cardiac Rhythm SR. Informed of US results and lab results.  Instructed to RTC in one week.

## 2019-08-26 NOTE — PROGRESS NOTES
1930 -- Bedside, Verbal and Written shift change report given to 2309 Myriam Mason (oncoming nurse) by [employer] nurse). Allergy band placed on pt's wrist. Report included the following information SBAR, Kardex, Intake/Output, MAR and Recent Results. 2030 -- Critical lab result, 115 Na+, see flowsheet.      2147 -- PM medications administered, pt tolerated with ease, will continue to monitor.     0000 -- Shift reassessment, pt condition unchanged, will continue to monitor. 0015 -- PRN pain medications administered, pt tolerated with ease, will continue to monitor.      0445 --  Shift reassessment, pt condition unchanged, will continue to monitor.         0700 -- Bedside, Verbal and Written shift change report given to JANIYA  (oncoming nurse) by HERBERT (offgoing nurse). Report included the following information SBAR, Kardex, Intake/Output, MAR and Recent Results. Skin assessment completed.

## 2019-08-26 NOTE — PROGRESS NOTES
Gastrointestinal Progress Note    Patient Name: Alejandro Helms    VFWLQ'S Date: 8/26/2019    Admit Date: 8/24/2019      Assessment:   1. Resolved acute UGI bleed due to G-J anastomotic ulcer. Hx of RNYGB surgery. 2.   Acute blood loss anemia due to GI bleeding. 3.   Chronic Fe deficiency anemia due to malabsorption from gastric bypass. Recommendation:   1. Advance diet as tolerated. Complete PPI IV therapy then switch to PO. 2.   Recommend outpatient Fe infusion therapy c/p [facility] PCP. 3.   Recommend elective outpatient EGD in 12 weeks to document ulcer healing. 4.   Instructed patient to AVOID all NSAIDs and not to smoke cigarettes. Subjective:   Patient is feeling well. No abdominal pain, hematemesis or melena. Tolerating full liquids. Hgb stable at 7.5. Ferritin 24 and transferrin sat 5%. Current Facility-Administered Medications   Medication Dose Route Frequency    [START ON 8/27/2019] levETIRAcetam (KEPPRA) tablet 500 mg  500 mg Oral DAILY    acetaminophen (TYLENOL) tablet 650 mg  650 mg Oral Q4H PRN    pantoprazole (PROTONIX) tablet 40 mg  40 mg Oral ACB&D    0.9% sodium chloride infusion 250 mL  250 mL IntraVENous PRN    sodium chloride (NS) flush 5-40 mL  5-40 mL IntraVENous Q8H    sodium chloride (NS) flush 5-40 mL  5-40 mL IntraVENous PRN    ondansetron (ZOFRAN) injection 4 mg  4 mg IntraVENous Q6H PRN    HYDROmorphone (DILAUDID) syringe 0.5 mg  0.5 mg IntraVENous Q4H PRN          Objective:     Visit Vitals  /62 (BP 1 Location: Left arm, BP Patient Position: At rest)   Pulse 83   Temp 98.3 °F (36.8 °C)   Resp 18   Ht 5' 7\" (1.702 m)   Wt 97.5 kg (214 lb 15.2 oz)   SpO2 95%   BMI 33.67 kg/m²           Intake/Output Summary (Last 24 hours) at 8/26/2019 1449  Last data filed at 8/25/2019 2147  Gross per 24 hour   Intake 150 ml   Output 100 ml   Net 50 ml       Examination:  Awake, pale looking, oriented.   Abdomen soft, nontender, no mass or organomegaly. Warm extremities,  2+ pulses, no cyanosis. Data Review:    Labs: Results:   Chemistry Recent Labs     08/26/19  1221 08/26/19  0830 08/26/19  0130  08/25/19  0300  08/24/19  1138   GLU  --   --  89  --  79  --  190*   * 115* 113*   < > 121*   < > 112*   K  --   --  3.6  --  3.9  --  4.2   CL  --   --  81*  --  88*  --  80*   CO2  --   --  27  --  26  --  25   BUN  --   --  8  --  9  --  8   CREA  --   --  0.52*  --  0.83  --  0.74   CA  --   --  7.0*  --  7.5*  --  7.4*   AGAP  --   --  5  --  7  --  7   BUCR  --   --  15  --  11*  --  11*   AP  --   --   --   --   --   --  288*   TP  --   --   --   --   --   --  6.3*   ALB  --   --   --   --   --   --  2.6*   GLOB  --   --   --   --   --   --  3.7   AGRAT  --   --   --   --   --   --  0.7*    < > = values in this interval not displayed. Estimated Creatinine Clearance: 141.5 mL/min (A) (based on SCr of 0.52 mg/dL (L)). CBC w/Diff Recent Labs     08/26/19  1221 08/26/19  0130 08/25/19  1945  08/24/19  1138   WBC  --   --   --   --  8.5   RBC  --   --   --   --  2.61*   HGB 7.5* 7.2* 7.1*   < > 6.6*   HCT 23.2* 21.8* 21.3*   < > 20.6*   PLT  --   --   --   --  245   GRANS  --   --   --   --  81*   LYMPH  --   --   --   --  7*   EOS  --   --   --   --  0    < > = values in this interval not displayed. Coagulation Recent Labs     08/24/19  1138   PTP 16.0*   INR 1.3*       Hepatitis Panel Lab Results   Component Value Date/Time    Hepatitis A, IgM NEGATIVE  10/01/2016 06:01 PM    Hepatitis B surface Ag <0.10 08/24/2019 11:38 AM    Hepatitis B surface Ab <3.10 (L) 08/24/2019 11:38 AM    Hepatitis B core, IgM NEGATIVE  10/01/2016 06:01 PM    Hepatitis C virus Ab 0.07 08/24/2019 11:38 AM      Amylase Lipase . Liver Enzymes Recent Labs     08/24/19  1138   TP 6.3*   ALB 2.6*   *   SGOT 75*   ALT 36      Thyroid Studies No results for input(s): T4, T3U, TSH, TSHEXT in the last 72 hours.     No lab exists for component: T3RU Pathology pathology       Osman Durand.  Starla Banks MD, [place]  Pager: [phone]  August 26, 2019

## 2019-08-26 NOTE — PROGRESS NOTES
Problem: Pain  Goal: *Control of Pain  Outcome: Progressing Towards Goal  Goal: *PALLIATIVE CARE:  Alleviation of Pain  Outcome: Progressing Towards Goal     Problem: Patient Education: Go to Patient Education Activity  Goal: Patient/Family Education  Outcome: Progressing Towards Goal     Problem: Falls - Risk of  Goal: *Absence of Falls  Description  Document [facility] Fall Risk and appropriate interventions in the flowsheet.   Outcome: Progressing Towards Goal  Note:   Fall Risk Interventions:            Medication Interventions: Evaluate medications/consider consulting pharmacy                   Problem: Patient Education: Go to Patient Education Activity  Goal: Patient/Family Education  Outcome: Progressing Towards Goal     Problem: Fluid Volume - Risk of, Imbalanced  Goal: *Balanced intake and output  Outcome: Progressing Towards Goal     Problem: Patient Education: Go to Patient Education Activity  Goal: Patient/Family Education  Outcome: Progressing Towards Goal     Problem: Nutrition Deficit  Goal: *Optimize nutritional status  Outcome: Progressing Towards Goal

## 2019-08-26 NOTE — PROGRESS NOTES
Internal Medicine Progress Note    Patient's Name: Pedro Luis Fernandes  Admit Date: 8/24/2019  Length of Stay: 2      Assessment/Plan     Principal Problem:    Acute GI bleeding (8/24/2019)    Active Problems:    Acute blood loss anemia (8/24/2019)      Hyponatremia (5/22/2016)      Chronic pancreatitis ([address] (8/24/2019)      Morbid obesity (HCC) ()      Hyperlipidemia ()      HERMELINDA (obstructive sleep apnea) (5/6/2014)      Essential hypertension, benign (5/6/2014)      Bipolar disorder (HCC) ()      Hypothyroidism ()      Acute GI bleed with anemia  - GI consulted - appreciate  - Transfused In ED  - Monitor Hgb, transfuse if less than 7  - Protonix 40mg BID  - EGD 8/25 with anastomotic ulcer without bleeding  - advance diet as tolerated     Hyponatremia  - suspect volume depletion  - management per nephro - appreciate assistance  - no more than 8meq in 24 hour period correction, goal 6meq in 24 hours    Pancreatitis  - CT results suspicious for acute mild on chronic pancreatitis  - PRN pain control  - normal lipase on admission - per GI, might be inflammatory changes from adjacent GI tract as opposed to true pancreatitis     Bipolar  - Zyprexa IM     Seizures  - PO keppra       - Cont acceptable home medications for chronic conditions   - DVT protocol    I have personally reviewed all pertinent labs and films that have officially resulted over the last 24 hours. I have personally checked for all pending labs that are awaiting final results. HPI     Per H&P, Pedro Luis Fernandes is a 63 y.o. female with seizures (Took Keppra 500mg today), depression, bipolar, KRISTOFER, chronic pancreatitis, previous ETOH abuse (been sober 6 months) of who presented to the ED with abnormal labs. Sodium 112, hgb 6.6. Patient c/o of melena for the past 7 days as well as developing n/v/epigastric abd pain that has progressively worsened over the past 3-4 days.   She went to [facility] yesterday and was called today to report to the ED for a sodium then of 118 and having a low hgb and a low ferritin. She states poor PO intake and feels dehydrated. She has been on iron supplementation for the past year, hx of low hgb that required blood transfusion with unknown source of bleed per patient. No hx of EGD, last colonoscopy in 2012 that showed polpys per patient. GI was consulted from the ED, and her CT abd is worrisome for acute mild pancreatitis. Denies Seizure like activity, confusion      Will admit for further eval \"    Interval History     1 U PRBCs given. GI consulted - EGD 8/25 with anastomotic ulcer without bleeding. Recommended BID PPI, and advance diet as tolerated. Nephrology consulted for management of hyponatremia. Subjective     Pt s/e @ bedside. No major events overnight. Pt offers no complaints this AM. Denies CP or SOB. Denies abd pain, nvd. Objective     Visit Vitals  /62 (BP 1 Location: Left arm, BP Patient Position: At rest)   Pulse 83   Temp 98.3 °F (36.8 °C)   Resp 18   Ht 5' 7\" (1.702 m)   Wt 97.5 kg (214 lb 15.2 oz)   SpO2 95%   BMI 33.67 kg/m²       Physical Exam:  General Appearance: NAD, conversant  HENT: normocephalic/atraumatic, moist mucus membranes  Neck: No JVD, supple  Lungs: CTA with normal respiratory effort  CV: RRR, no m/r/g  Abdomen: soft, non-tender, normal bowel sounds  Extremities: no cyanosis, no peripheral edema  Neuro: No focal deficits, motor/sensory intact  Skin: Normal color, intact    Intake and Output:  Current Shift:  No intake/output data recorded.   Last three shifts:  08/24 1901 - 08/26 0700  In: 580 [P.O.:50; I.V.:200]  Out: 100 [Urine:100]    Lab/Data Reviewed:  BMP:   Lab Results   Component Value Date/Time     (L) 08/26/2019 12:21 PM    K 3.6 08/26/2019 01:30 AM    CL 81 (L) 08/26/2019 01:30 AM    CO2 27 08/26/2019 01:30 AM    AGAP 5 08/26/2019 01:30 AM    GLU 89 08/26/2019 01:30 AM    BUN 8 08/26/2019 01:30 AM    CREA 0.52 (L) 08/26/2019 01:30 AM    GFRAA >60 08/26/2019 01:30 AM    GFRNA >60 08/26/2019 01:30 AM     CBC:   Lab Results   Component Value Date/Time    HGB 7.5 (L) 08/26/2019 12:21 PM    HCT 23.2 (L) 08/26/2019 12:21 PM       Imaging Reviewed:  No results found.     Medications Reviewed:  Current Facility-Administered Medications   Medication Dose Route Frequency    [START ON 8/27/2019] levETIRAcetam (KEPPRA) tablet 500 mg  500 mg Oral DAILY    acetaminophen (TYLENOL) tablet 650 mg  650 mg Oral Q4H PRN    pantoprazole (PROTONIX) tablet 40 mg  40 mg Oral ACB&D    0.9% sodium chloride infusion 250 mL  250 mL IntraVENous PRN    sodium chloride (NS) flush 5-40 mL  5-40 mL IntraVENous Q8H    sodium chloride (NS) flush 5-40 mL  5-40 mL IntraVENous PRN    ondansetron (ZOFRAN) injection 4 mg  4 mg IntraVENous Q6H PRN    HYDROmorphone (DILAUDID) syringe 0.5 mg  0.5 mg IntraVENous Q4H PRN         Wero Cardenas PA-C  [address] [place] [address]  Hospitalist Division  Office:  [phone]  Pager: [phone]

## 2019-08-26 NOTE — PROGRESS NOTES
Bedside shift change report given to Aixa RN (oncoming nurse) by Kamala Cristina RN (offgoing nurse). Report included the following information SBAR, Kardex, Intake/Output, MAR and Recent Results. A/O x4, no pain visualized, SR 80's per tele-monitor, no IV present at this time, call bell and personal belongings in reach.      0954 -- AM medications given, well tolerated, will continue to monitor. Pain level 7, Dilaudid given, well tolerated. 1025 -- Pain reassessed, patient is resting.      1111 -- Reassessment completed, no change in patient condition, will continue to monitor.      1514 -- Reassessment completed, no change in patient condition, will continue to monitor.      Bedside shift change report given to Maria Del Carmen Matute, [address]) by Javan Naidu RN (offgoing nurse). Report included the following information SBAR, Kardex, Intake/Output, MAR and Recent Results.

## 2019-08-26 NOTE — PROGRESS NOTES
Problem: Pain  Goal: *Control of Pain  Outcome: Progressing Towards Goal  Goal: *PALLIATIVE CARE:  Alleviation of Pain  Outcome: Progressing Towards Goal     Problem: Patient Education: Go to Patient Education Activity  Goal: Patient/Family Education  Outcome: Progressing Towards Goal     Problem: Falls - Risk of  Goal: *Absence of Falls  Description  Document Jake Anaya Fall Risk and appropriate interventions in the flowsheet.   Outcome: Progressing Towards Goal  Note:   Fall Risk Interventions:            Medication Interventions: Evaluate medications/consider consulting pharmacy                   Problem: Patient Education: Go to Patient Education Activity  Goal: Patient/Family Education  Outcome: Progressing Towards Goal     Problem: Fluid Volume - Risk of, Imbalanced  Goal: *Balanced intake and output  Outcome: Progressing Towards Goal     Problem: Patient Education: Go to Patient Education Activity  Goal: Patient/Family Education  Outcome: Progressing Towards Goal

## 2019-08-26 NOTE — PROGRESS NOTES
Chart reviewed. Plan remains home with out-pt follow up when medically stable. Will cont to follow for any needs. Padma Moyer RN,ext [phone].

## 2019-08-26 NOTE — PROGRESS NOTES
In Patient Progress note      Admit Date: 8/24/2019    Impression:     1.ac on chronic  Hyponatremia, severe, likely from poor solute intake and hypovolemia.    2. GI bleed, pancreatitis: GI on board, management per GI and primary  3. H/o Alcohol abuse: currently abstaining    Plan:  1) saline @ 75 cc/hrs   2) sodium every 6 hrs , avoid rapid correction of > 6meq /day  Goal through -130   3) improve solute intake     Please call with questions    Kisha Hennessy MD [facility]  Cell [phone]  Pager: [phone]    Subjective:     Denies SOb/CP    Current Facility-Administered Medications:     [START ON 8/27/2019] levETIRAcetam (KEPPRA) tablet 500 mg, 500 mg, Oral, DAILY, Padma Yung PA    acetaminophen (TYLENOL) tablet 650 mg, 650 mg, Oral, Q4H PRN, Padma Yung PA    pantoprazole (PROTONIX) tablet 40 mg, 40 mg, Oral, ACB&D, Fawn Yung PA, 40 mg at 08/26/19 1651    0.9% sodium chloride infusion 250 mL, 250 mL, IntraVENous, PRN, Moreno Villagomez, DO    sodium chloride (NS) flush 5-40 mL, 5-40 mL, IntraVENous, Q8H, Jose De La Rosa H, DO, 10 mL at 08/26/19 1411    sodium chloride (NS) flush 5-40 mL, 5-40 mL, IntraVENous, PRN, Catalina Butt H, DO    ondansetron [facility]) injection 4 mg, 4 mg, IntraVENous, Q6H PRN, Moreno Villagomez, DO    HYDROmorphone (DILAUDID) syringe 0.5 mg, 0.5 mg, IntraVENous, Q4H PRN, Catalina Butt H, DO, 0.5 mg at 08/26/19 1338        Objective:     Visit Vitals  /90 (BP 1 Location: Left arm, BP Patient Position: At rest)   Pulse 73   Temp 98.5 °F (36.9 °C)   Resp 18   Ht 5' 7\" (1.702 m)   Wt 97.5 kg (214 lb 15.2 oz)   SpO2 98%   BMI 33.67 kg/m²         Intake/Output Summary (Last 24 hours) at 8/26/2019 1754  Last data filed at 8/26/2019 1501  Gross per 24 hour   Intake 422.5 ml   Output 100 ml   Net 322.5 ml       Physical Exam:     General: Awake alert and oriented. In no acute distress.    HEENT: Normocephalic atraumatic and oral mucosae are moist   Neck supple with no jugular venous distention and no thyromegaly   Lungs: Clear to auscultation  Heart: no rub, no gallop   Abdomen: soft, non-tender; bowel sounds normal; no masses, no organomegaly   Extremities: no edema     Data Review:    Recent Labs     08/26/19  1221  08/24/19  1138   WBC  --   --  8.5   RBC  --   --  2.61*   HCT 23.2*   < > 20.6*   MCV  --   --  78.9   MCH  --   --  25.3   MCHC  --   --  32.0   RDW  --   --  15.5*    < > = values in this interval not displayed.      Recent Labs     08/26/19  1221 08/26/19  0830 08/26/19  0130 08/25/19  1945 08/25/19  1330 08/25/19  0700 08/25/19  0300 08/24/19  2300 08/24/19  1900 08/24/19  1138   BUN  --   --  8  --   --   --  9  --   --  8   CREA  --   --  0.52*  --   --   --  0.83  --   --  0.74   CA  --   --  7.0*  --   --   --  7.5*  --   --  7.4*   ALB  --   --   --   --   --   --   --   --   --  2.6*   K  --   --  3.6  --   --   --  3.9  --   --  4.2   * 115* 113* 115* 116* 122* 121* 125* 125* 112*   CL  --   --  81*  --   --   --  88*  --   --  80*   CO2  --   --  27  --   --   --  26  --   --  25   GLU  --   --  89  --   --   --  79  --   --  190*       Latisha Reynaga MD

## 2019-08-26 NOTE — PROGRESS NOTES
Nutrition initial assessment/Plan of care      RECOMMENDATIONS:   1. FL: advance diet when medically indicated and per Pt tolerance  2. Monitor labs, weight and PO intake  3. RD to follow     GOALS:   1. PO intake meets >75% of protein/calorie needs by 8/31  2. Weight Maintenance/gradual loss (1-2 lb/week) by 9/2      ASSESSMENT:   Wt status is classified as obese per Body mass index is 33.67 kg/m². Currently on a FL diet and tolerating Labs noted. Pt w/ hyponatremia, hypocalcemia, H/H (7.2/21. 8) and elevated LFTs on admission. Nutrition recommendations listed. RD to follow. Nutrition Diagnoses:   Obesity related to excessive energy intake PTA as evidenced by a BMI of 33.7 and 159% IBW. Nutrition Risk:  [] High  [x] Moderate []  Low    SUBJECTIVE/OBJECTIVE:    Pt admitted for GI bleed. MST received for recent 14-23 lb weight loss. Per documented weight records Pt was 192 lb in July 2018 equating to a 22 lb or 11.5% gain x 1 year ago. Noted Pt c/o ABD pain and vomiting x 3 days PTA. GI following: s/p EGD on (8/25) showing anastomotic ulcer without bleeding. Plan to advance diet as tolerated. Pt seen in room after lunch. Denies having any problems chewing/swallowing. Reports having a good appetite normally consuming 2 meals per day at home. However, noted a decrease in appetite and weight loss over the past 2 week. Reports  lb last taken in May 2019 equating to a 20 lb or 8.5% loss x 4 months, but unable to verify in documented weight records. Stated she was able to tolerate her FL tray this afternoon and no c/o N/V. Provided a menu to implement preferences. Will continue to [facility]. Information Obtained from:    [x] Chart Review   [x] Patient   [] Family/Caregiver   [] Nurse/Physician   [] Interdisciplinary Meeting/Rounds      Diet: Full Liquid  Medications: [x] Reviewed   IVF: NS @75 mL/hr   Allergies: [x] Reviewed   Encounter Diagnoses     ICD-10-CM ICD-9-CM   1.  Acute GI bleeding K92.2 578.9 2. Hyponatremia E87.1 276.1   3. Acute pancreatitis, unspecified complication status, unspecified pancreatitis type K85.90 577.0   4. Anemia due to acute blood loss D62 285.1     Past Medical History:   Diagnosis Date    Alcohol abuse     Alcoholic hepatitis     Alcoholic pancreatitis     Alcoholic pancreatitis     Anemia     Bipolar disorder (HCC)     Dr. Jenifer Garber [facility])    Chronic abdominal pain     Chronic pancreatitis ([facility]     Compression fracture of lumbar vertebra (HCC)     L4, L5     Depression     Dr. Jenifer Garber [facility])    Elevated LFTs     ETOH abuse     Fatty liver     GERD (gastroesophageal reflux disease)     Hyperlipidemia     Hypertension     Hypothyroidism     Lower GI bleeding     Morbid obesity ([facility]     Obstructive sleep apnea     Substance induced mood disorder ([facility])     Vitamin D deficiency       Labs:    Lab Results   Component Value Date/Time    Sodium 115 (LL) 08/26/2019 08:30 AM    Potassium 3.6 08/26/2019 01:30 AM    Chloride 81 (L) 08/26/2019 01:30 AM    CO2 27 08/26/2019 01:30 AM    Anion gap 5 08/26/2019 01:30 AM    Glucose 89 08/26/2019 01:30 AM    BUN 8 08/26/2019 01:30 AM    Creatinine 0.52 (L) 08/26/2019 01:30 AM    Calcium 7.0 (L) 08/26/2019 01:30 AM    Magnesium 1.8 08/26/2019 01:30 AM    Phosphorus 2.6 02/07/2016 01:17 AM    Albumin 2.6 (L) 08/24/2019 11:38 AM     Anthropometrics: BMI (calculated): 33.7  Last 3 Recorded Weights in this Encounter    08/24/19 1029 08/25/19 0507   Weight: 95.7 kg (211 lb) 97.5 kg (214 lb 15.2 oz)      Ht Readings from Last 1 Encounters:   08/24/19 5' 7\" (1.702 m)     Weight Metrics 8/25/2019 7/18/2018 6/22/2018 2/24/2018 10/6/2017 9/7/2017 8/31/2017   Weight 214 lb 15.2 oz 192 lb 192 lb 200 lb 181 lb 184 lb 183 lb   BMI 33.67 kg/m2 30.07 kg/m2 30.07 kg/m2 32.28 kg/m2 29.21 kg/m2 29.7 kg/m2 29.54 kg/m2   Some encounter information is confidential and restricted.  Go to Review Flowsheets activity to see all data. No data found. IBW: 135 lb %IBW: 159% UBW: 234 lb in May 2019  [x] Weight Loss? [] Weight Gain [] Weight Stable    Estimated Nutrition Needs: [x] MSJ x 1.2-1.3   Calories: 3006-1926 kcal Based on:   [x] Actual BW    Protein:   78-97 g Based on:   [x] Actual BW    Fluid:       8278-5805 ml Based on:   [x] Actual BW      [x] No Cultural, Anabaptism or ethnic dietary need identified.     [] Cultural, Anabaptism and ethnic food preferences identified and addressed     Wt Status:  [] Normal (18.6 - 24.9) [] Underweight (<18.5) [] Overweight (25 - 29.9) [x] Mild Obesity (30 - 34.9)  [] Moderate Obesity (35 - 39.9) [] Morbid Obesity (40+)       Nutrition Problems Identified:   [] Suboptimal PO intake   [x] Food Allergies (tomato)  [] Difficulty chewing/swallowing/poor dentition  [] Constipation/Diarrhea   [x] Nausea/Vomiting/ABD pain PTA (resolving)  [] None  [] Other:     Plan:   [x] Therapeutic Diet  [x]  Obtained/adjusted food preferences/tolerances and/or snacks options   []  Supplements added   [] Occupational therapy following for feeding techniques  []  HS snack added   []  Modify diet texture   [x]  Modify diet for food allergies   []  Educate patient   []  Assist with menu selection   [x]  Monitor PO intake on meal rounds   [x]  Continue inpatient monitoring and intervention   [x]  Participated in discharge planning/Interdisciplinary rounds/Team meetings   []  Other:     Education Needs:   [x] Not appropriate for teaching at this time due to: [facility]   [] Identified and addressed    Nutrition Monitoring and Evaluation:  [x] Continue ongoing monitoring and intervention  [] Other    Jacqui Real

## 2019-08-26 NOTE — PROGRESS NOTES
conducted an initial consultation and Spiritual Assessment for Vandana Ash, who is a 63 y.o.,female. Patient's Primary Language is: [place]. According to the patient's EMR Denominational Affiliation is: Druze.      The reason the Patient came to the hospital is:   Patient Active Problem List    Diagnosis Date Noted    Acute GI bleeding 08/24/2019    Acute blood loss anemia 08/24/2019    Chronic pancreatitis ([address] 08/24/2019    Alcohol induced acute pancreatitis 09/05/2017    Fever of unknown origin 07/01/2017    Altered mental state 07/01/2017    Hypotension 07/01/2017    Bleeding disorder ([address] 01/18/2017    Thrombocytopenia ([address] 01/12/2017    Sepsis ([address] 01/09/2017    Non-intractable vomiting 01/09/2017    Elevated LFTs     Fatty liver     Bipolar disorder (HCC)     Depression     Anemia     Chronic pain     Chronic abdominal pain     Substance induced mood disorder (HCC)     GI bleed     Lower GI bleeding     Compression fracture of lumbar vertebra (HCC)     Alcoholic hepatitis     Alcoholic pancreatitis     Hypothyroidism     Vitamin D deficiency     Hyponatremia 05/22/2016    Alcohol withdrawal ([address] 03/26/2016    UTI (urinary tract infection) 03/25/2016    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    Alcohol abuse 03/24/2016    Acute hyponatremia 02/03/2016    Hemorrhoids with complication 59/43/3513    Gallstone pancreatitis 11/26/2014    Acute pancreatitis 10/20/2014    Pancreatitis 08/28/2014    Bipolar affective disorder, depressed, severe ([address] 05/09/2014    Altered mental status 05/06/2014    Overdose 05/06/2014    Alcohol abuse with intoxication delirium ([address] 05/06/2014    H/O gastric bypass 05/06/2014    HERMELINDA (obstructive sleep apnea) 05/06/2014    Essential hypertension, benign 05/06/2014    Malabsorption     History of Lap Christian-en-Y gastric bypass- 8/15/12 w/BMI 39     Morbid obesity ([address]     Hyperlipidemia     Obstructive sleep apnea The  provided the following Interventions:  Initiated a relationship of care and support. Explored issues of juana, belief, spirituality and Mosque/ritual needs while hospitalized. Listened empathically. Given [employer]. Offered prayer and assurance of continued prayers on patient's behalf. The following outcomes where achieved:  Patient shared limited information about both their medical narrative and spiritual journey/beliefs.  confirmed Patient's Congregational Affiliation. Patient processed feeling about current hospitalization. Patient expressed gratitude for 's visit. Assessment:  Patient does not have any Mosque/cultural needs that will affect patient's preferences in health care. There are no spiritual or Mosque issues which require intervention at this time. Plan:  Chaplains will continue to follow and will provide pastoral care on an as needed/requested basis.  recommends bedside caregivers page  on duty if patient shows signs of acute spiritual or emotional distress.  Fr.  [address] [place] [address]   [phone]

## 2019-08-27 NOTE — PROGRESS NOTES
Assumed care of pt. Pt resting in bed. AxOx4. No c/o pain at this time. NAD. Call light within reach. Will continue to monitor. 2247- pt c/o pain. PRN pain med administered     0357- pt c/o pain rated 7/10. PRN pain med given.     1010- Report given to Latrell Lazar RN

## 2019-08-27 NOTE — PROGRESS NOTES
Problem: Pain  Goal: *Control of Pain  Outcome: Progressing Towards Goal  Goal: *PALLIATIVE CARE:  Alleviation of Pain  Outcome: Progressing Towards Goal     Problem: Patient Education: Go to Patient Education Activity  Goal: Patient/Family Education  Outcome: Progressing Towards Goal     Problem: Falls - Risk of  Goal: *Absence of Falls  Description  Document Rk Cullen Fall Risk and appropriate interventions in the flowsheet.   Outcome: Progressing Towards Goal  Note:   Fall Risk Interventions:            Medication Interventions: Teach patient to arise slowly         History of Falls Interventions: Door open when patient unattended         Problem: Patient Education: Go to Patient Education Activity  Goal: Patient/Family Education  Outcome: Progressing Towards Goal     Problem: Fluid Volume - Risk of, Imbalanced  Goal: *Balanced intake and output  Outcome: Progressing Towards Goal

## 2019-08-27 NOTE — DISCHARGE SUMMARY
2 [facility][1]  Hospitalist Division    Discharge Summary    Patient: Gisela Luciano MRN: [id number]  CSN: [id number]    YOB: 1960  Age: 63 y.o. Sex: female    DOA: 8/24/2019 LOS:  LOS: 3 days   Discharge Date:      Admission Diagnoses: Acute GI bleeding [K92.2]    Discharge Diagnoses:    Problem List as of 8/27/2019 Date Reviewed: 9/5/2017          Codes Class Noted - Resolved    * (Principal) Acute GI bleeding ICD-10-CM: K92.2  ICD-9-CM: 578.9  8/24/2019 - Present        Acute blood loss anemia ICD-10-CM: D62  ICD-9-CM: 285.1  8/24/2019 - Present        Hyponatremia ICD-10-CM: E87.1  ICD-9-CM: 276.1  5/22/2016 - Present        Chronic pancreatitis ([facility] ICD-10-CM: K86.1  ICD-9-CM: 577.1  8/24/2019 - Present        Alcohol induced acute pancreatitis ICD-10-CM: K85.20  ICD-9-CM: 092.1  9/5/2017 - Present        Fever of unknown origin ICD-10-CM: R50.9  ICD-9-CM: 780.60  7/1/2017 - Present        Altered mental state ICD-10-CM: R41.82  ICD-9-CM: 780.97  7/1/2017 - Present        Hypotension ICD-10-CM: I95.9  ICD-9-CM: 458.9  7/1/2017 - Present        Bleeding disorder ([facility] ICD-10-CM: D69.9  ICD-9-CM: 287.9  1/18/2017 - Present        Thrombocytopenia ([facility] ICD-10-CM: D69.6  ICD-9-CM: 287.5  1/12/2017 - Present        Sepsis ([facility] ICD-10-CM: A41.9  ICD-9-CM: 038.9, 995.91  1/9/2017 - Present        Non-intractable vomiting ICD-10-CM: R11.10  ICD-9-CM: 787.03  1/9/2017 - Present        Elevated LFTs ICD-10-CM: R94.5  ICD-9-CM: 790.6  Unknown - Present        Fatty liver ICD-10-CM: K76.0  ICD-9-CM: 571.8  Unknown - Present        Bipolar disorder ([facility] ICD-10-CM: F31.9  ICD-9-CM: 296.80  Unknown - Present        Depression ICD-10-CM: F32.9  ICD-9-CM: 311  Unknown - Present        Anemia ICD-10-CM: D64.9  ICD-9-CM: 285. 9  Unknown - Present        Chronic pain ICD-10-CM: G89.29  ICD-9-CM: 338.29  Unknown - Present    Overview Signed 8/19/2016  1:31 PM by Andie Estrada, Jv Goad     abd pain             Chronic abdominal pain ICD-10-CM: R10.9, G89.29  ICD-9-CM: 789.00, 338.29  Unknown - Present        Substance induced mood disorder ([facility] ICD-10-CM: F19.94  ICD-9-CM: 292.84  Unknown - Present        GI bleed ICD-10-CM: K92.2  ICD-9-CM: 578.9  Unknown - Present        Lower GI bleeding ICD-10-CM: K92.2  ICD-9-CM: 578.9  Unknown - Present        Compression fracture of lumbar vertebra (HCC) ICD-10-CM: S32.000A  ICD-9-CM: 805.4  Unknown - Present    Overview Signed 8/19/2016  1:39 PM by Bobie Lesch     L4, L5              Alcoholic hepatitis KCS-48-HH: K70.10  ICD-9-CM: 571.1  Unknown - Present        Alcoholic pancreatitis FOQ-60-YT: K85.20  ICD-9-CM: 549.2  Unknown - Present        Hypothyroidism ICD-10-CM: E03.9  ICD-9-CM: 244.9  Unknown - Present        Vitamin D deficiency ICD-10-CM: E55.9  ICD-9-CM: 268.9  Unknown - Present        Alcohol withdrawal ([facility] ICD-10-CM: S53.134  ICD-9-CM: 291.81  3/26/2016 - Present        UTI (urinary tract infection) ICD-10-CM: N39.0  ICD-9-CM: 599.0  3/25/2016 - Present        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  ICD-9-CM: 577.8  3/24/2016 - Present        Alcohol abuse ICD-10-CM: F10.10  ICD-9-CM: 305.00  3/24/2016 - Present        Acute hyponatremia ICD-10-CM: E87.1  ICD-9-CM: 276.1  2/3/2016 - Present        Hemorrhoids with complication ZRQ-46-HT: F55.2  ICD-9-CM: 455.8  4/21/2015 - Present        Gallstone pancreatitis ICD-10-CM: K85.10  ICD-9-CM: 577.0, 574.20  11/26/2014 - Present        Acute pancreatitis (Chronic) ICD-10-CM: K85.90  ICD-9-CM: 462.8  10/20/2014 - Present        Pancreatitis ICD-10-CM: K85.90  ICD-9-CM: 577.0  8/28/2014 - Present        Bipolar affective disorder, depressed, severe ([facility] ICD-10-CM: F31.4  ICD-9-CM: 296.53  5/9/2014 - Present        Altered mental status ICD-10-CM: R41.82  ICD-9-CM: 780.97  5/6/2014 - Present        Overdose ICD-10-CM: T50.901A  ICD-9-CM: 977.9, E980.5  5/6/2014 - Present        Alcohol abuse with intoxication delirium ([facility] ICD-10-CM: F10.121  ICD-9-CM: 291.0, 305.00  5/6/2014 - Present        H/O gastric bypass ICD-10-CM: Z98.84  ICD-9-CM: V45.86  5/6/2014 - Present        HERMELINDA (obstructive sleep apnea) ICD-10-CM: G36.64  ICD-9-CM: 327.23  5/6/2014 - Present        Essential hypertension, benign ICD-10-CM: I10  ICD-9-CM: 401.1  5/6/2014 - Present        Malabsorption ICD-10-CM: K90.9  ICD-9-CM: 579.9  Unknown - Present        History of Lap Christian-en-Y gastric bypass- 8/15/12 w/BMI 39 ICD-10-CM: Z98.84  ICD-9-CM: V45.86  Unknown - Present        Morbid obesity ([facility] ICD-10-CM: E66.01  ICD-9-CM: 278.01  Unknown - Present        Hyperlipidemia ICD-10-CM: E78.5  ICD-9-CM: 272.4  Unknown - Present        Obstructive sleep apnea ICD-10-CM: G47.33  ICD-9-CM: 327.23  Unknown - Present              Discharge Condition: Stable    Discharge To: Home    Consults: Gastroenterology and Nephrology    HPI: Per H&P, \"Abraham Mcmillan Stacks a 63 y. o. female with seizures (Took Keppra 500mg today), depression, bipolar, KRISTOFER, chronic pancreatitis, previous ETOH abuse (been sober 6 months) of who presented to the ED with abnormal labs.  Sodium 112, hgb 6.6. Patient c/o of melena for the past 7 days as well as developing n/v/epigastric abd pain that has progressively worsened over the past 3-4 days. Kenneth San went to [place] yesterday and was called today to report to the ED for a sodium then of 118 and having a low hgb and a low ferritin.  She states poor PO intake and feels dehydrated.  She has been on iron supplementation for the past year, hx of low hgb that required blood transfusion with unknown source of bleed per patient.  No hx of EGD, last colonoscopy in 2012 that showed polpys per patient. Hunter Michaud was consulted from the ED, and her CT abd is worrisome for acute mild pancreatitis.  Denies Seizure like activity, confusion      Will admit for further eval \"    Hospital Course: Nephrology consulted for management of hyponatremia - suspect hypovolemia and poor solute intake as cause. 1 U PRBCs given. GI consulted - EGD 8/25 with anastomotic ulcer without bleeding. Recommended BID PPI, and advance diet as tolerated. Lipase was WNL on admission - per GI, might be inflammatory changes from adjacent GI tract as opposed to true pancreatitis. Recommend OP Fe infusion therapy, repeat EGD in 12 weeks to document ulcer healing, and avoid all NSAIDs and d/c smoking. VS and labs stable for discharge. Physical Exam:  General appearance: alert, cooperative, no distress, appears stated age  Head: Normocephalic, without obvious abnormality, atraumatic  Lungs: clear to auscultation bilaterally  Heart: regular rate and rhythm, S1, S2 normal, no murmur, click, rub or gallop  Abdomen: soft, non-tender. Bowel sounds normal. No masses,  no organomegaly  Extremities: no cyanosis or edema  Skin: Skin color, texture normal. No rashes or lesions  Neurologic: no focal deficits, motor/sensory intact  PSY: Mood and affect normal, appropriately behaved    Significant Diagnostic Studies:     BMP:   Lab Results   Component Value Date/Time     (L) 08/27/2019 01:04 PM       Ct Abd Pelv Wo Cont    Result Date: 8/24/2019  EXAM: CT of the abdomen and pelvis INDICATION: Abdominal pain. Prior history of gastric bypass. COMPARISON: None. TECHNIQUE: Axial CT imaging of the abdomen and pelvis was performed without intravenous contrast. Multiplanar reformats were generated. One or more dose reduction techniques were used on this CT: automated exposure control, adjustment of the mAs and/or kVp according to patient size, and iterative reconstruction techniques. The specific techniques used on this CT exam have been documented in the patient's electronic medical record.   Digital Imaging and Communications in Medicine (DICOM) format image data are available to nonaffiliated external healthcare facilities or entities on a secured, media free, reciprocally searchable basis with patient authorization for at least a 12-month period after this study. _______________ FINDINGS: LOWER CHEST: Unremarkable. LIVER, BILIARY: Prominent hepatic steatosis is present. The unenhanced liver is otherwise unremarkable. No biliary dilation. The gallbladder has been removed. PANCREAS: Mild inflammatory changes are present surrounding the pancreas, suggestive of acute pancreatitis. Scattered coarse calcifications are present throughout the pancreas, related to prior pancreatitis. No focal fluid collection. SPLEEN: Normal. ADRENALS: Normal. KIDNEYS/URETERS/BLADDER: Normal. LYMPH NODES: Mildly enlarged lymph nodes in the upper abdomen are likely reactive from acute pancreatitis. 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 GASTROINTESTINAL TRACT: Status post gastric bypass. No intraluminal gas or fluid to suggest a leak. The appendix is normal. No acute bowel inflammatory changes. PELVIC ORGANS: No lymphadenopathy, mass, free fluid, focal fluid collection. VASCULATURE: Unremarkable. BONES: No acute or aggressive osseous abnormalities identified. Degenerative changes are present in the lumbar spine. Healed left rib fractures are present. Chronic appearing mild compression fracture in the T12 vertebral body is unchanged since prior examination. OTHER: Chronic appearing irregular shaped fluid collection in the right lateral buttock is unchanged. _______________     IMPRESSION: 1. Mild stranding is present surrounding the pancreas, concerning for mild acute pancreatitis. There is no focal fluid collection. Scattered calcifications within the pancreas or related to prior pancreatitis. 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 2. Status post gastric bypass.  No extraluminal gas or fluid to suggest a perforation. 3. Prominent hepatic steatosis is present. Xr Chest Port    Result Date: 8/24/2019  EXAM: XR CHEST PORT CLINICAL INDICATION/HISTORY: eval for free air under diaphragm -Additional: Abdominal pain with vomiting. COMPARISON: None TECHNIQUE: Portable frontal view of the chest _______________ FINDINGS: SUPPORT DEVICES: None. HEART AND MEDIASTINUM: Unremarkable. LUNGS AND PLEURAL SPACES: Nipple shadow overlies the right lower lung. No focal consolidation. BONY THORAX AND SOFT TISSUES: Unremarkable. _______________     IMPRESSION: No acute findings in the chest.        Discharge Medications:     Current Discharge Medication List      START taking these medications    Details   pantoprazole (PROTONIX) 40 mg tablet Take 1 Tab by mouth Before breakfast and dinner. Qty: 60 Tab, Refills: 0         CONTINUE these medications which have NOT CHANGED    Details   levETIRAcetam (KEPPRA) 500 mg tablet Take 500 mg by mouth once. !! ergocalciferol (VITAMIN D2) 50,000 unit capsule Take 50,000 Units by mouth. hydrOXYzine pamoate (VISTARIL) 50 mg capsule Take 50 mg by mouth two (2) times a day. ferrous sulfate (FEOSOL) 325 mg (65 mg iron) tablet Take 325 mg by mouth daily. biotin 10,000 mcg cap Take 1 Cap by mouth.      calcium citrate-vitamin d3 (CITRACAL+D) 315-200 mg-unit tab Take 1 Tab by mouth two (2) times daily (with meals). !! ergocalciferol (ERGOCALCIFEROL) 50,000 unit capsule Take 50,000 Units by mouth every seven (7) days. colesevelam (WELCHOL) 625 mg tablet Take 2 Tabs by mouth two (2) times daily (with meals). Qty: 120 Tab, Refills: 0      escitalopram oxalate (LEXAPRO) 10 mg tablet Take 1 Tab by mouth daily. Qty: 10 Tab, Refills: 0      levothyroxine (SYNTHROID) 25 mcg tablet Take 1 Tab by mouth Daily (before breakfast). Indications: HYPOTHYROIDISM  Qty: 30 Tab, Refills: 0      ascorbic acid (VITAMIN C) 500 mg tablet Take 1,000 mg by mouth daily. cyanocobalamin (VITAMIN B-12) 1,000 mcg Subl 1,000 mcg by SubLINGual route daily. !! - Potential duplicate medications found. Please discuss with provider.       STOP taking these medications       ondansetron hcl (ZOFRAN) 8 mg tablet Comments:   Reason for Stopping:         HYDROcodone-acetaminophen (NORCO) 5-325 mg per tablet Comments:   Reason for Stopping:         gabapentin (NEURONTIN) 600 mg tablet Comments:   Reason for Stopping:         cloNIDine HCl (CATAPRES) 0.1 mg tablet Comments:   Reason for Stopping:         OLANZapine (ZYPREXA ZYDIS) 10 mg disintegrating tablet Comments:   Reason for Stopping:         dicyclomine (BENTYL) 20 mg tablet Comments:   Reason for Stopping:               Activity: Activity as tolerated    Diet: Cardiac Diet    Wound Care: None needed    Follow-up: 1 week with PCP, Recommend OP Fe infusion therapy, repeat EGD in 12 weeks to document ulcer healing    Discharge time: >35 minutes    BORIS Tavera[address]  Office:  [phone]  Pager: [phone]      8/27/2019, 12:57 PM

## 2019-08-27 NOTE — DISCHARGE INSTRUCTIONS
Patient armband removed and shredded    DISCHARGE SUMMARY from Nurse    PATIENT INSTRUCTIONS:    After general anesthesia or intravenous sedation, for 24 hours or while taking prescription Narcotics:  · Limit your activities  · Do not drive and operate hazardous machinery  · Do not make important personal or business decisions  · Do  not drink alcoholic beverages  · If you have not urinated within 8 hours after discharge, please contact your surgeon on call. Report the following to your surgeon:  · Excessive pain, swelling, redness or odor of or around the surgical area  · Temperature over 100.5  · Nausea and vomiting lasting longer than 4 hours or if unable to take medications  · Any signs of decreased circulation or nerve impairment to extremity: change in color, persistent  numbness, tingling, coldness or increase pain  · Any questions    What to do at Home:  Recommended activity: Activity as tolerated. If you experience any of the following symptoms black or tarry stool. bright red blood in vomit. cramps in the abdomen. dark or bright red blood mixed with stool. dizziness or faintness. feeling tired. paleness. shortness of breath, please follow up with primary care provider/ED. *  Please give a list of your current medications to your Primary Care Provider. *  Please update this list whenever your medications are discontinued, doses are      changed, or new medications (including over-the-counter products) are added. *  Please carry medication information at all times in case of emergency situations. These are general instructions for a healthy lifestyle:    No smoking/ No tobacco products/ Avoid exposure to second hand smoke  Surgeon General's Warning:  Quitting smoking now greatly reduces serious risk to your health.     Obesity, smoking, and sedentary lifestyle greatly increases your risk for illness    A healthy diet, regular physical exercise & weight monitoring are important for maintaining a healthy lifestyle    You may be retaining fluid if you have a history of heart failure or if you experience any of the following symptoms:  Weight gain of 3 pounds or more overnight or 5 pounds in a week, increased swelling in our hands or feet or shortness of breath while lying flat in bed. Please call your doctor as soon as you notice any of these symptoms; do not wait until your next office visit. The discharge information has been reviewed with the patient. The patient verbalized understanding. Discharge medications reviewed with the patient and appropriate educational materials and side effects teaching were provided. ___________________________________________________________________________________________________________________________________  MyChart Activation    Thank you for enrolling in [facility]. Please follow the instructions below to securely access your online medical record. [employer] allows you to send messages to your doctor, view your test results, renew your prescriptions, schedule appointments, and more. How Do I Sign Up? 1. In your internet browser, go to [email or web address]. 2. Click on the First Time User? Click Here link in the Sign In box. You will see the New Member Sign Up page. 3. Enter your [employer] Access Code exactly as it appears below. You will not need to use this code after youve completed the sign-up process. If you do not sign up before the expiration date, you must request a new code. [employer] Access Code: [email or web address]  Expires: 10/8/2019 10:30 AM     4. Enter the last four digits of your Social Security Number (xxxx) and Date of Birth (mm/dd/yyyy) as indicated and click Submit. You will be taken to the next sign-up page. 5. Create a [employer] ID. This will be your MyChart login ID and cannot be changed, so think of one that is secure and easy to remember. 6. Create a [employer] password.  You can change your password at any time. 7. Enter your Password Reset Question and Answer. This can be used at a later time if you forget your password. 8. Enter your e-mail address. You will receive e-mail notification when new information is available in [address] 9. Click Sign Up. You can now view your medical record. Additional Information    Remember, MyChart is NOT to be used for urgent needs. For medical emergencies, dial 911. Now available from your iPhone and Android! Patient Education        Anemia From Heavy Bleeding: Care Instructions  Your Care Instructions    Anemia means that your body does not have enough red blood cells. Red blood cells carry oxygen around the body. When you have anemia, you may feel dizzy, tired, and weak. You may also feel your heart pounding. For some people, it's hard to focus and think clearly. One common cause of anemia is bleeding. Bleeding from ulcers, hemorrhoids, cancer, or other problems can cause anemia. It may also be caused by heavy menstrual periods. Your treatment may include iron pills. Iron helps your body make hemoglobin. Hemoglobin is the part of the red blood cell that carries oxygen. If you have severe anemia, you may need a blood transfusion to give you red blood cells as quickly as possible. Sometimes it takes several months to get iron levels back to normal.  Follow-up care is a key part of your treatment and safety. Be sure to make and go to all appointments, and call your doctor if you are having problems. It's also a good idea to know your test results and keep a list of the medicines you take. How can you care for yourself at home? · Be safe with medicines. Take your medicines exactly as prescribed. Call your doctor if you think you are having a problem with your medicine. · Follow your doctor's advice about eating foods that have a lot of iron in them. These include red meat, shellfish, poultry, and eggs.  They also include beans, raisins, whole-grain bread, and leafy green vegetables. · Steam your vegetables. This is the best way to prepare them if you want to get as much iron as possible. · Iron pills can cause constipation. If you take them, there are things you can do to avoid constipation. Drink plenty of fluids, eat foods with a lot of fiber, and exercise every day. When should you call for help? Call 911 anytime you think you may need emergency care. For example, call if:    · You passed out (lost consciousness).     · Your stools are maroon or very bloody.    Call your doctor now or seek immediate medical care if:    · You are short of breath.     · You have new or worse bleeding.     · You are dizzy or light-headed, or you feel like you may faint.    Watch closely for changes in your health, and be sure to contact your doctor if:    · You feel weaker or more tired than usual.     · You do not get better as expected. Where can you learn more? Go to http://salvatore-izabela.info/. Enter E511 in the search box to learn more about \"Anemia From Heavy Bleeding: Care Instructions. \"  Current as of: March 28, 2019  Content Version: 12.1  © 5362-3394 [employer]. Care instructions adapted under license by [employer] (which disclaims liability or warranty for this information). If you have questions about a medical condition or this instruction, always ask your healthcare professional. [address] any warranty or liability for your use of this information. Patient Education        Body Mass Index: Care Instructions  Your Care Instructions    Body mass index (BMI) can help you see if your weight is raising your risk for health problems. It uses a formula to compare how much you weigh with how tall you are. · A BMI lower than 18.5 is considered underweight. · A BMI between 18.5 and 24.9 is considered healthy. · A BMI between 25 and 29.9 is considered overweight.  A BMI of 30 or higher is considered obese.  If your BMI is in the normal range, it means that you have a lower risk for weight-related health problems. If your BMI is in the overweight or obese range, you may be at increased risk for weight-related health problems, such as high blood pressure, heart disease, stroke, arthritis or joint pain, and diabetes. If your BMI is in the underweight range, you may be at increased risk for health problems such as fatigue, lower protection (immunity) against illness, muscle loss, bone loss, hair loss, and hormone problems. BMI is just one measure of your risk for weight-related health problems. You may be at higher risk for health problems if you are not active, you eat an unhealthy diet, or you drink too much alcohol or use tobacco products. Follow-up care is a key part of your treatment and safety. Be sure to make and go to all appointments, and call your doctor if you are having problems. It's also a good idea to know your test results and keep a list of the medicines you take. How can you care for yourself at home? · Practice healthy eating habits. This includes eating plenty of fruits, vegetables, whole grains, lean protein, and low-fat dairy. · If your doctor recommends it, get more exercise. Walking is a good choice. Bit by bit, increase the amount you walk every day. Try for at least 30 minutes on most days of the week. · Do not smoke. Smoking can increase your risk for health problems. If you need help quitting, talk to your doctor about stop-smoking programs and medicines. These can increase your chances of quitting for good. · Limit alcohol to 2 drinks a day for men and 1 drink a day for women. Too much alcohol can cause health problems. If you have a BMI higher than 25  · Your doctor may do other tests to check your risk for weight-related health problems.  This may include measuring the distance around your waist. A waist measurement of more than 40 inches in men or 35 inches in women can increase the risk of weight-related health problems. · Talk with your doctor about steps you can take to stay healthy or improve your health. You may need to make lifestyle changes to lose weight and stay healthy, such as changing your diet and getting regular exercise. If you have a BMI lower than 18.5  · Your doctor may do other tests to check your risk for health problems. · Talk with your doctor about steps you can take to stay healthy or improve your health. You may need to make lifestyle changes to gain or maintain weight and stay healthy, such as getting more healthy foods in your diet and doing exercises to build muscle. Where can you learn more? Go to http://salvatore-izabela.info/. Enter S176 in the search box to learn more about \"Body Mass Index: Care Instructions. \"  Current as of: October 13, 2016  Content Version: 11.4  © 7345-0998 [employer]. Care instructions adapted under license by [employer] (which disclaims liability or warranty for this information). If you have questions about a medical condition or this instruction, always ask your healthcare professional. [address] any warranty or liability for your use of this information. Patient Education        DASH Diet: Care Instructions  Your Care Instructions    The DASH diet is an eating plan that can help lower your blood pressure. DASH stands for Dietary Approaches to Stop Hypertension. Hypertension is high blood pressure. The DASH diet focuses on eating foods that are high in calcium, potassium, and magnesium. These nutrients can lower blood pressure. The foods that are highest in these nutrients are fruits, vegetables, low-fat dairy products, nuts, seeds, and legumes. But taking calcium, potassium, and magnesium supplements instead of eating foods that are high in those nutrients does not have the same effect.  The DASH diet also includes whole grains, fish, and poultry. The DASH diet is one of several lifestyle changes your doctor may recommend to lower your high blood pressure. Your doctor may also want you to decrease the amount of sodium in your diet. Lowering sodium while following the DASH diet can lower blood pressure even further than just the DASH diet alone. Follow-up care is a key part of your treatment and safety. Be sure to make and go to all appointments, and call your doctor if you are having problems. It's also a good idea to know your test results and keep a list of the medicines you take. How can you care for yourself at home? Following the DASH diet  · Eat 4 to 5 servings of fruit each day. A serving is 1 medium-sized piece of fruit, ½ cup chopped or canned fruit, 1/4 cup dried fruit, or 4 ounces (½ cup) of fruit juice. Choose fruit more often than fruit juice. · Eat 4 to 5 servings of vegetables each day. A serving is 1 cup of lettuce or raw leafy vegetables, ½ cup of chopped or cooked vegetables, or 4 ounces (½ cup) of vegetable juice. Choose vegetables more often than vegetable juice. · Get 2 to 3 servings of low-fat and fat-free dairy each day. A serving is 8 ounces of milk, 1 cup of yogurt, or 1 ½ ounces of cheese. · Eat 6 to 8 servings of grains each day. A serving is 1 slice of bread, 1 ounce of dry cereal, or ½ cup of cooked rice, pasta, or cooked cereal. Try to choose whole-grain products as much as possible. · Limit lean meat, poultry, and fish to 2 servings each day. A serving is 3 ounces, about the size of a deck of cards. · Eat 4 to 5 servings of nuts, seeds, and legumes (cooked dried beans, lentils, and split peas) each week. A serving is 1/3 cup of nuts, 2 tablespoons of seeds, or ½ cup of cooked beans or peas. · Limit fats and oils to 2 to 3 servings each day. A serving is 1 teaspoon of vegetable oil or 2 tablespoons of salad dressing. · Limit sweets and added sugars to 5 servings or less a week.  A serving is 1 tablespoon jelly or jam, ½ cup sorbet, or 1 cup of lemonade. · Eat less than 2,300 milligrams (mg) of sodium a day. If you limit your sodium to 1,500 mg a day, you can lower your blood pressure even more. Tips for success  · Start small. Do not try to make dramatic changes to your diet all at once. You might feel that you are missing out on your favorite foods and then be more likely to not follow the plan. Make small changes, and stick with them. Once those changes become habit, add a few more changes. · Try some of the following:  ? Make it a goal to eat a fruit or vegetable at every meal and at snacks. This will make it easy to get the recommended amount of fruits and vegetables each day. ? Try yogurt topped with fruit and nuts for a snack or healthy dessert. ? Add lettuce, tomato, cucumber, and onion to sandwiches. ? Combine a ready-made pizza crust with low-fat mozzarella cheese and lots of vegetable toppings. Try using tomatoes, squash, spinach, broccoli, carrots, cauliflower, and onions. ? Have a variety of cut-up vegetables with a low-fat dip as an appetizer instead of chips and dip. ? Sprinkle sunflower seeds or chopped almonds over salads. Or try adding chopped walnuts or almonds to cooked vegetables. ? Try some vegetarian meals using beans and peas. Add garbanzo or kidney beans to salads. Make burritos and tacos with mashed walters beans or black beans. Where can you learn more? Go to http://salvatore-izabela.info/. Enter N117 in the search box to learn more about \"DASH Diet: Care Instructions. \"  Current as of: July 22, 2018  Content Version: 12.1  © 4133-8228 [employer]. Care instructions adapted under license by [employer] (which disclaims liability or warranty for this information).  If you have questions about a medical condition or this instruction, always ask your healthcare professional. [facility] [address] any warranty or liability for your use of this information. Patient Education        Upper GI Endoscopy: What to Expect at Home  Your Recovery  After you have an endoscopy, you will stay at the hospital or clinic for 1 to 2 hours. This will allow the medicine to wear off. You will be able to go home after your doctor or nurse checks to make sure you are not having any problems. You may have to stay overnight if you had treatment during the test. You may have a sore throat for a day or two after the test.  This care sheet gives you a general idea about what to expect after the test.  How can you care for yourself at home? Activity  · Rest as much as you need to after you go home. · You should be able to go back to your usual activities the day after the test.  Diet  · Follow your doctor's directions for eating after the test.  · Drink plenty of fluids (unless your doctor has told you not to). Medications  · If you have a sore throat the day after the test, use an over-the-counter spray to numb your throat. Follow-up care is a key part of your treatment and safety. Be sure to make and go to all appointments, and call your doctor if you are having problems. It's also a good idea to know your test results and keep a list of the medicines you take. When should you call for help? Call 911 anytime you think you may need emergency care.  For example, call if:    · You passed out (loses consciousness).     · You have trouble breathing.     · You pass maroon or bloody stools.    Call your doctor now or seek immediate medical care if:    · You have pain that does not get better after your take pain medicine.     · You have new or worse belly pain.     · You have blood in your stools.     · You are sick to your stomach and cannot keep fluids down.     · You have a fever.     · You cannot pass stools or gas.    Watch closely for changes in your health, and be sure to contact your doctor if:    · Your throat still hurts after a day or two.     · You do not get better as expected. Where can you learn more? Go to http://salvatore-izabela.info/. Enter [phone] in the search box to learn more about \"Upper GI Endoscopy: What to Expect at Home. \"  Current as of: November 7, 2018  Content Version: 12.1  © 9993-6239 [employer]. Care instructions adapted under license by [employer] (which disclaims liability or warranty for this information). If you have questions about a medical condition or this instruction, always ask your healthcare professional. [place] [address] any warranty or liability for your use of this information. Patient Education        Upper GI Endoscopy: Before Your Procedure  What is an upper GI endoscopy? An upper gastrointestinal (or GI) endoscopy is a test that allows your doctor to look at the inside of your esophagus, stomach, and the first part of your small intestine, called the duodenum. The esophagus is the tube that carries food to your stomach. The doctor uses a thin, lighted tube that bends. It is called an endoscope, or scope. The doctor puts the tip of the scope in your mouth and gently moves it down your throat. The scope is a flexible video camera. The doctor looks at a monitor (like a TV set or a computer screen) as he or she moves the scope. A doctor may do this test, which is also called a procedure, to look for ulcers, tumors, infection, or bleeding. It also can be used to look for signs of acid backing up into your esophagus. This is called gastroesophageal reflux disease, or GERD. The doctor can use the scope to take a sample of tissue for study (a biopsy). The doctor also can use the scope to take out growths or stop bleeding. Follow-up care is a key part of your treatment and safety. Be sure to make and go to all appointments, and call your doctor if you are having problems. It's also a good idea to know your test results and keep a list of the medicines you take.   What happens before the procedure?   Preparing for the procedure    · Understand exactly what procedure is planned, along with the risks, benefits, and other options. · Tell your doctors ALL the medicines, vitamins, supplements, and herbal remedies you take. Some of these can increase the risk of bleeding or interact with anesthesia.     · If you take blood thinners, such as warfarin (Coumadin), clopidogrel (Plavix), or aspirin, be sure to talk to your doctor. He or she will tell you if you should stop taking these medicines before your procedure. Make sure that you understand exactly what your doctor wants you to do.     · Your doctor will tell you which medicines to take or stop before your procedure. You may need to stop taking certain medicines a week or more before the procedure. So talk to your doctor as soon as you can.     · If you have an advance directive, let your doctor know. It may include a living will and a durable power of  for health care. Bring a copy to the hospital. If you don't have one, you may want to prepare one. It lets your doctor and loved ones know your health care wishes. Doctors advise that everyone prepare these papers before any type of surgery or procedure. Procedures can be stressful. This information will help you understand what you can expect. And it will help you safely prepare for your procedure. What happens on the day of the procedure? · Follow the instructions exactly about when to stop eating and drinking. If you don't, your procedure may be canceled. If your doctor told you to take your medicines on the day of the procedure, take them with only a sip of water.     · Take a bath or shower before you come in for your procedure. Do not apply lotions, perfumes, deodorants, or nail polish.     · Take off all jewelry and piercings.  And take out contact lenses, if you wear them.    At the hospital or surgery center   · Bring a picture ID.     · The test may take 15 to 30 minutes.     · The doctor may spray medicine on the back of your throat to numb it. You also will get medicine to prevent pain and to relax you.     · You will lie on your left side. The doctor will put the scope in your mouth and toward the back of your throat. The doctor will tell you when to swallow. This helps the scope move down your throat. You will be able to breathe normally. The doctor will move the scope down your esophagus into your stomach. The doctor also may look at the duodenum.     · If your doctor wants to take a sample of tissue for a biopsy, he or she may use small surgical tools, which are put into the scope, to cut off some tissue. You will not feel a biopsy, if one is taken. The doctor also can use the tools to stop bleeding or to do other treatments, if needed.     · You will stay at the hospital or surgery center for 1 to 2 hours until the medicine you were given wears off. Going home   · Be sure you have someone to drive you home. Anesthesia and pain medicine make it unsafe for you to drive.     · You will be given more specific instructions about recovering from your procedure. They will cover things like diet, wound care, follow-up care, driving, and getting back to your normal routine. When should you call your doctor? · You have questions or concerns.     · You don't understand how to prepare for your procedure.     · You become ill before the procedure (such as fever, flu, or a cold).     · You need to reschedule or have changed your mind about having the procedure. Where can you learn more? Go to http://salvatore-izabela.info/. Enter P790 in the search box to learn more about \"Upper GI Endoscopy: Before Your Procedure. \"  Current as of: November 7, 2018  Content Version: 12.1  © 3180-2562 Healthwise, [employer]. Care instructions adapted under license by [employer] (which disclaims liability or warranty for this information).  If you have questions about a medical condition or this instruction, always ask your healthcare professional. [place] [address] any warranty or liability for your use of this information.

## 2019-08-27 NOTE — PROGRESS NOTES
RENAL PROGRESS NOTE        Lluvia Codding         Assessment/Plan:        1. Acute on Chronic  HypoNa: improved. Likely from poor solute intake and hypovolemia. Improve solute intake.      2. GI bleed, pancreatitis: GI on board, management per GI and primary  3. H/o Alcohol abuse: currently abstaining                                                                                                                                     Subjective:  Patient complaints of: No complaints. No SOB/CP/N/V.       Patient Active Problem List   Diagnosis Code    [facility] E66.01    Hyperlipidemia E78.5    Obstructive sleep apnea G47.33    Malabsorption K90.9    History of Lap Christian-en-Y gastric bypass- 8/15/12 w/BMI 39 Z98.84    Altered mental status R41.82    Overdose T50.901A    Alcohol abuse with intoxication delirium ([facility] F10.121    H/O gastric bypass Z98.84    HERMELINDA (obstructive sleep apnea) G47.33    Essential hypertension, benign I10    Bipolar affective disorder, depressed, severe ([facility] F31.4    Pancreatitis K85.90    Acute pancreatitis K85.90    Gallstone pancreatitis K85.10    Hemorrhoids with complication E40.1    Acute hyponatremia E87.1    Pancreatic necrosis K86.89    Alcohol abuse F10.10    UTI (urinary tract infection) N39.0    Alcohol withdrawal (HCC) F10.239    Hyponatremia E87.1    Elevated LFTs R94.5    Fatty liver K76.0    Bipolar disorder (HCC) F31.9    Depression F32.9    Anemia D64.9    Chronic pain G89.29    Chronic abdominal pain R10.9, G89.29    Substance induced mood disorder (HCC) F19.94    GI bleed K92.2    Lower GI bleeding K92.2    Compression fracture of lumbar vertebra (HCC) U34.772T    Alcoholic hepatitis A67.68    Alcoholic pancreatitis K22.62    Hypothyroidism E03.9    Vitamin D deficiency E55.9    Sepsis (HCC) A41.9    Non-intractable vomiting R11.10    Thrombocytopenia (HCC) D69.6    Bleeding disorder (HCC) D69.9    Fever of unknown origin R50.9    Altered mental state R41.82    Hypotension I95.9    Alcohol induced acute pancreatitis K85.20    Acute GI bleeding K92.2    Acute blood loss anemia D62    Chronic pancreatitis (HCC) K86.1       Current Facility-Administered Medications   Medication Dose Route Frequency Provider Last Rate Last Dose    levETIRAcetam (KEPPRA) tablet 500 mg  500 mg Oral DAILY ReprogleMathieu, PA   500 mg at 08/27/19 0900    acetaminophen (TYLENOL) tablet 650 mg  650 mg Oral Q4H PRN ReprogleMathieur, PA        pantoprazole (PROTONIX) tablet 40 mg  40 mg Oral ACB&D Reprogle, Mathieu Varelar, PA   40 mg at 08/27/19 0900    0.9% sodium chloride infusion 250 mL  250 mL IntraVENous PRN Demaris Saltness, DO        sodium chloride (NS) flush 5-40 mL  5-40 mL IntraVENous Q8H Sidonie Fly H, DO   10 mL at 08/26/19 2200    sodium chloride (NS) flush 5-40 mL  5-40 mL IntraVENous PRN Demaris Saltness, DO        ondansetron [facility]) injection 4 mg  4 mg IntraVENous Q6H PRN Demaris Saltness, DO        HYDROmorphone (DILAUDID) syringe 0.5 mg  0.5 mg IntraVENous Q4H PRN Sidonie Fly H, DO   0.5 mg at 08/27/19 0900       Objective  Vitals:    08/26/19 2033 08/27/19 0017 08/27/19 0530 08/27/19 0901   BP: 92/52 110/65 123/68 113/71   Pulse: 69 72 61 60   Resp: 18 18 18 18   Temp: 98.1 °F (36.7 °C) 98.2 °F (36.8 °C) 98.3 °F (36.8 °C) 98 °F (36.7 °C)   SpO2: 95% 98% 98% 100%   Weight:       Height:             Intake/Output Summary (Last 24 hours) at 8/27/2019 1030  Last data filed at 8/27/2019 0531  Gross per 24 hour   Intake 2132.5 ml   Output 5000 ml   Net -2867.5 ml           Admission weight: Weight: 95.7 kg (211 lb) (08/24/19 1029)  Last Weight Metrics:  Weight Loss Metrics 8/25/2019 7/18/2018 6/22/2018 2/24/2018 10/6/2017 9/7/2017 8/31/2017   Pre op / Initial Wt - - - - - - -   Today's Wt 214 lb 15.2 oz 192 lb 192 lb 200 lb 181 lb 184 lb 183 lb   BMI 33.67 kg/m2 30.07 kg/m2 30.07 kg/m2 32.28 kg/m2 29.21 kg/m2 29.7 kg/m2 29.54 kg/m2   Ideal Body Wt - - - - - - -   Excess Body Wt - - - - - - -   Goal Wt - - - - - - -   Wt loss to date - - - - - - -   % Wt Loss - - - - - - -   80% EBW - - - - - - -             Physical Assessment:     General: NAD, alert and oriented. Neck: No jvd. LUNGS: Clear to Auscultation, No rales, rhonchi or wheezes. CVS EXM: S1, S2  RRR, no murmurs/gallops/rubs. Abdomen: soft, non tender. Lower Extremities:  no edema. Lab    CBC w/Diff Recent Labs     08/27/19  0600 08/26/19  1221 08/26/19  0130  08/24/19  1138   WBC  --   --   --   --  8.5   RBC  --   --   --   --  2.61*   HGB 7.7* 7.5* 7.2*   < > 6.6*   HCT 24.2* 23.2* 21.8*   < > 20.6*   PLT  --   --   --   --  245   GRANS  --   --   --   --  81*   LYMPH  --   --   --   --  7*   EOS  --   --   --   --  0    < > = values in this interval not displayed. Chemistry Recent Labs     08/27/19  0600 08/27/19  0000 08/26/19  1635  08/26/19  0130  08/25/19  0300  08/24/19  1138   GLU  --   --   --   --  89  --  79  --  190*   * 130* 127*   < > 113*   < > 121*   < > 112*   K  --   --   --   --  3.6  --  3.9  --  4.2   CL  --   --   --   --  81*  --  88*  --  80*   CO2  --   --   --   --  27  --  26  --  25   BUN  --   --   --   --  8  --  9  --  8   CREA  --   --   --   --  0.52*  --  0.83  --  0.74   CA  --   --   --   --  7.0*  --  7.5*  --  7.4*   AGAP  --   --   --   --  5  --  7  --  7   BUCR  --   --   --   --  15  --  11*  --  11*   AP  --   --   --   --   --   --   --   --  288*   TP  --   --   --   --   --   --   --   --  6.3*   ALB  --   --   --   --   --   --   --   --  2.6*   GLOB  --   --   --   --   --   --   --   --  3.7   AGRAT  --   --   --   --   --   --   --   --  0.7*    < > = values in this interval not displayed.          Lab Results   Component Value Date/Time    Iron 19 (L) 08/26/2019 01:30 AM    TIBC 358 08/26/2019 01:30 AM    Iron % saturation 5 08/26/2019 01:30 AM    Ferritin 24 08/26/2019 01:30 AM      Lab Results   Component Value Date/Time    Calcium 7.0 (L) 08/26/2019 01:30 AM    Phosphorus 2.6 02/07/2016 01:17 AM        Rj Steinberg MD  [facility]  Pager: [phone]

## 2019-08-27 NOTE — PROGRESS NOTES
Bedside shift change report given to NEWTON Kruse (oncoming nurse) by Chasity Link RN (offgoing nurse). Report included the following information SBAR, Kardex, Intake/Output, MAR and Recent Results. A/O x4, no pain visualized, SR per tele-monitor, IV flushed and capped, call bell and personal belongings in reach.      0900 -- AM medications given, well tolerated, will continue to monitor. Pain level 8, 0.5 mg Dilaudid given, well tolerated. 0930 -- Pain reassessed, patient is resting quietly, will continue to monitor.      1100 -- Reassessment completed, no change in patient condition, will continue to monitor. 1236 -- Called for a regular diet tray, will continue to monitor. 1245 -- Patient ate half the tray, no nausea or vomiting. 1330 --Called ride to pick her up.     1345  -- Patient discharged.  Nicolette Labor

## 2019-09-23 NOTE — PROGRESS NOTES
[facility] Progress Note Date: 2019 Name: Isha Nascimento MRN: [id number] : 1960 Venofer Infusion 1 of 3 Ms. Inna Black to [facility], ambulatory, at [phone]. Pt was assessed and education was provided. Ms. Marivel Kim vitals were reviewed and patient was observed for 5 minutes prior to treatment. Visit Vitals /84 (BP 1 Location: Left arm, BP Patient Position: Sitting) Pulse (!) 58 Temp 98.4 °F (36.9 °C) Resp 18 SpO2 97% 24 g PIV placed in LAC x 1 attempt. PIV flushed easily and had brisk blood return. Venofer 100 mg was initiated @ 200 ml/hr over 15 minutes. VS stable and pt denied complaints of itching, lip/tongue/facial swelling, SOB, CP or other complaints. Ms. Inna Black tolerated the infusion, and had no complaints. VS remained stable. Patient observed 30 minutes post infusion. PIV flushed with NS 10 ml and removed. No bleeding or hematoma noted at site. Yanira and coban applied. Reviewed discharge instructions with patient, including expected side effects (abdominal cramping, nausea, changes in color of urine or feces) and signs of allergic reaction requiring medical attention (itching/hives/rashes, SOB, chest pain, lip/tongue/facial swelling). Patient given printed copy to take home. Patient verbalized understanding of discharge instructions. Patient armband removed and shredded. Ms. Inna Black was discharged from [place] [address] in stable condition at 1540. She is to return 10/07/2019 at 1400 for next appointment. Janee Johnson RN 2019

## 2019-10-07 NOTE — PROGRESS NOTES
[facility] Progress Note Date: 2019 Name: Perfecto Zamarripa MRN: [id number] : 1960 Venofer 2 of 3. Ms. Eric Martinez was assessed and education was provided. Ms. Nikia Omalley vitals were reviewed and patient was observed for 5 minutes prior to treatment. Visit Vitals /70 (BP 1 Location: Left arm, BP Patient Position: Sitting) Pulse 97 Temp 97 °F (36.1 °C) Resp 18 SpO2 97% Breastfeeding? No  
 
24 gauge PIV inserted to RFA x 3 attempts by Minnie Cannon RN. Flushes easily and noted with brisk blood return. Venofer 100 mg was administered IV over 15 minutes as ordered. Ms. Eric Martinez tolerated well, and had no complaints. Patient armband removed and shredded. Ms. Eric Martinez was discharged from [place] [address] in stable condition at 1450. She is to return on 10/21/19 at 1400 for her next appointment. Khang Queen 2019 
3:25 PM

## 2019-11-27 NOTE — ED TRIAGE NOTES
Pt arrived from home after tripping on brick stairs with eyeglasses on and hit forehead. Nose, right wrist and left knee. Denies LOC. Pt states had a nosebleed. States bleeding disorder Factor 5 9 and 10.

## 2019-11-27 NOTE — ED NOTES
Patient returned form Radiology. Patient lying on stretcher - denies any acute distress. Patient has forehead , left knee abassion- no active bleeding noted. Patient right hand swollen - patient able to touch index to thumb - + radial pulse

## 2019-11-27 NOTE — ED NOTES
I performed a brief history of the patient here in triage and I have determined that pt will need further treatment and evaluation from the main side ER physician or MLP. I have placed initial orders based on the history to help in expediting patients care. Visit Vitals /76 (BP 1 Location: Right arm, BP Patient Position: Sitting) Pulse 82 Temp 98.3 °F (36.8 °C) Resp 20 Ht 5' 6.25\" (1.683 m) Wt 95.3 kg (210 lb) SpO2 99% BMI 33.64 kg/m² JOHN Michelle 5:32 PM

## 2019-11-27 NOTE — ED PROVIDER NOTES
EMERGENCY DEPARTMENT HISTORY AND PHYSICAL EXAM 
 
Date: 11/27/2019 Patient Name: Genaro Zepeda History of Presenting Illness Chief Complaint Patient presents with  Fall History Provided By: Patient Chief Complaint: Fall, closed head injury, skin abrasions, right wrist pain and left knee pain Duration: Fall occurred at 1:00 today, right wrist pain is been going on for a few days Timing: Acute and worsening Location: Right wrist, left knee, forehead and nose Quality: Tender Severity: Moderate Modifying Factors: Pain is worse after falling Associated Symptoms: none Additional History (Context): Genaro Zepeda is a 63 y.o. female with a history of pancreatitis, hepatitis, bipolar disorder, coagulopathies, hypertension, GERD who presents today for history and symptoms as listed above. Patient states that at 1:00 today she was walking up some brick steps when she fell forward after a mechanical fall. Patient states that after she fell she went to her follow-up appointment with her primary care doctor for her right wrist pain, warmth and swelling. Patient states her primary care is worried about skin infection versus gout. Patient was not put on any medications by her primary care because she was instructed to come to the ER given she had fallen prior to her PCP appointment. Patient states she did hit her head, but did not pass out. Denies any loss of bowel or bladder. Denies any neck pain or back pain. Patient is reporting left knee pain. PCP: Kina Saha MD 
 
Current Outpatient Medications Medication Sig Dispense Refill  acetaminophen (TYLENOL) 325 mg tablet Take 2 Tabs by mouth every four (4) hours as needed for Pain. 20 Tab 0  
 trimethoprim-sulfamethoxazole (BACTRIM DS) 160-800 mg per tablet Take 1 Tab by mouth two (2) times a day for 7 days.  14 Tab 0  
 pantoprazole (PROTONIX) 40 mg tablet Take 1 Tab by mouth Before breakfast and dinner. 60 Tab 0  
 levETIRAcetam (KEPPRA) 500 mg tablet Take 500 mg by mouth once.  ergocalciferol (VITAMIN D2) 50,000 unit capsule Take 50,000 Units by mouth.  hydrOXYzine pamoate (VISTARIL) 50 mg capsule Take 50 mg by mouth two (2) times a day.  ferrous sulfate (FEOSOL) 325 mg (65 mg iron) tablet Take 325 mg by mouth daily.  biotin 10,000 mcg cap Take 1 Cap by mouth.  calcium citrate-vitamin d3 (CITRACAL+D) 315-200 mg-unit tab Take 1 Tab by mouth two (2) times daily (with meals).  ergocalciferol (ERGOCALCIFEROL) 50,000 unit capsule Take 50,000 Units by mouth every seven (7) days.  colesevelam (WELCHOL) 625 mg tablet Take 2 Tabs by mouth two (2) times daily (with meals). 120 Tab 0  
 escitalopram oxalate (LEXAPRO) 10 mg tablet Take 1 Tab by mouth daily. 10 Tab 0  
 levothyroxine (SYNTHROID) 25 mcg tablet Take 1 Tab by mouth Daily (before breakfast). Indications: HYPOTHYROIDISM (Patient taking differently: Take 50 mcg by mouth Daily (before breakfast). Indications: hypothyroidism) 30 Tab 0  
 ascorbic acid (VITAMIN C) 500 mg tablet Take 1,000 mg by mouth daily.  cyanocobalamin (VITAMIN B-12) 1,000 mcg Subl 1,000 mcg by SubLINGual route daily. Past History Past Medical History: 
Past Medical History:  
Diagnosis Date  Alcohol abuse  Alcoholic hepatitis  Alcoholic pancreatitis  Alcoholic pancreatitis  Anemia  Bipolar disorder ([facility] Dr. Rozina [address] [facility])  Chronic abdominal pain  Chronic pancreatitis ([facility]  Compression fracture of lumbar vertebra (HCC) L4, L5   
 [facility]  Elevated LFTs  ETOH abuse  Fatty liver  GERD (gastroesophageal reflux disease)  Hyperlipidemia  Hypertension  Hypothyroidism  Lower GI bleeding  Morbid obesity ([facility]  Obstructive sleep apnea  Seizures ([facility]  Substance induced mood disorder ([facility]  Vitamin D deficiency Past Surgical History: 
Past Surgical History:  
Procedure Laterality Date  BIOPSY LIVER  08/15/2012 Lap Left hepatic liver wedge by Dr. Tamy Martinez; BX Revealed: Hepatic Steatosis, AVM w/ associated Subcapsular Fibrosis.  COLONOSCOPY N/A 1/17/2017 COLONOSCOPY performed by Ashley Phoenix MD at [facility] ENDOSCOPY  DISKECTOMY, ANTERIOR, WITH D  8/1995, 11/1997  HX ABDOMINAL LAPAROSCOPY  12/02/2014 Laparoscopic Gastrostomy w/ Partial Gastrectomy for assistance in placement of Endoscopic Retrograde Cholangiopancreatography & Diagnostic Lap.  HX CARPAL TUNNEL RELEASE  HX CHOLECYSTECTOMY  09/16/2014 Dr. Tamy Martinez  HX COLONOSCOPY  05/12/2012 Colonoscopy by Dr. Hailee Bowlse. Susan Ceci: Bx Revealed Colon Polyps (Tubular Adenoma), Hemorrhoids.  HX GASTRIC BYPASS  08/15/2012 Lap Christian-en-y  HX HEMORRHOIDECTOMY  04/30/2015 Dr. Karen Bernstein. Donavon Lacy  HX HYSTERECTOMY  [address] BONE Right 2010 Wrist  
 
 
Family History: 
Family History Problem Relation Age of Onset  Cancer Father  Cancer Sister  Obesity Brother Social History: 
Social History Tobacco Use  Smoking status: Never Smoker  Smokeless tobacco: Never Used Substance Use Topics  Alcohol use: Not Currently Alcohol/week: 0.0 standard drinks Comment: 6 pack of beer a week- 1/2/17 last use, Sober 5/11/19  Drug use: No  
 
 
Allergies: Allergies Allergen Reactions  Codeine Rash  Lamictal [Lamotrigine] Anaphylaxis  Morphine Anaphylaxis Per patient, has tolerated dilaudid in the past.  
 Pcn [Penicillins] Anaphylaxis  Vancomycin Rash  Doxycycline Rash  Percocet [Oxycodone-Acetaminophen] Hives and Itching  Tetracycline Rash  Tomato Hives Review of Systems Review of Systems Constitutional: Negative for chills and fever. HENT: Negative for congestion, rhinorrhea and sore throat. Respiratory: Negative for cough and shortness of breath. Cardiovascular: Negative for chest pain. Gastrointestinal: Negative for abdominal pain, blood in stool, constipation, diarrhea, nausea and vomiting. Genitourinary: Negative for dysuria, frequency and hematuria. Musculoskeletal: Positive for arthralgias. Negative for back pain and myalgias. Skin: Positive for wound. Negative for rash. Neurological: Negative for dizziness and headaches. All other systems reviewed and are negative. All Other Systems Negative Physical Exam  
 
Vitals:  
 11/27/19 1725 BP: 133/76 Pulse: 82 Resp: 20 Temp: 98.3 °F (36.8 °C) SpO2: 99% Weight: 95.3 kg (210 lb) Height: 5' 6.25\" (1.683 m) Physical Exam 
Vitals signs and nursing note reviewed. Constitutional:   
   General: She is not in acute distress. Appearance: She is well-developed. She is not diaphoretic. HENT:  
   Head: Normocephalic. Abrasion present. No raccoon eyes or Dorantes's sign. Eyes:  
   Conjunctiva/sclera: Conjunctivae normal.  
Neck: Musculoskeletal: Full passive range of motion without pain, normal range of motion and neck supple. Normal range of motion. No pain with movement, spinous process tenderness or muscular tenderness. Cardiovascular:  
   Rate and Rhythm: Normal rate and regular rhythm. Heart sounds: Normal heart sounds. Pulmonary:  
   Effort: Pulmonary effort is normal. No respiratory distress. Breath sounds: Normal breath sounds. Chest:  
   Chest wall: No tenderness. Abdominal:  
   General: Bowel sounds are normal. There is no distension. Palpations: Abdomen is soft. Tenderness: There is no tenderness. There is no guarding or rebound. Musculoskeletal:     
   General: No deformity. Right wrist: She exhibits tenderness and swelling.  She exhibits normal range of motion. Left knee: She exhibits no swelling and no effusion. Tenderness (diffuse knee ) found. Comments: Right dorsal aspect of hand swelling, overlying erythema and mild warmth, skin is intact. FULL ROM of wrist and all hand joint spaces without pain. Bruising noted to left knee, however skin is intact Skin: 
   General: Skin is warm and dry. Findings: Abrasion present. Comments: Abrasion noted to center of forehead and bridge of nose, no lacerations. Neurological:  
   Mental Status: She is alert and oriented to person, place, and time. GCS: GCS eye subscore is 4. GCS verbal subscore is 5. GCS motor subscore is 6. Cranial Nerves: Cranial nerves are intact. Sensory: Sensation is intact. Motor: Motor function is intact. Coordination: Coordination normal.  
   Gait: Gait is intact. Deep Tendon Reflexes: Reflexes are normal and symmetric. Diagnostic Study Results Labs - Recent Results (from the past 12 hour(s)) CBC WITH AUTOMATED DIFF Collection Time: 11/27/19  7:09 PM  
Result Value Ref Range WBC 6.9 4.6 - 13.2 K/uL  
 RBC 2.98 (L) 4.20 - 5.30 M/uL HGB 8.5 (L) 12.0 - 16.0 g/dL HCT 25.7 (L) 35.0 - 45.0 % MCV 86.2 74.0 - 97.0 FL  
 MCH 28.5 24.0 - 34.0 PG  
 MCHC 33.1 31.0 - 37.0 g/dL  
 RDW 16.8 (H) 11.6 - 14.5 % PLATELET 981 287 - 164 K/uL MPV 9.0 (L) 9.2 - 11.8 FL  
 NEUTROPHILS 75 (H) 40 - 73 % LYMPHOCYTES 15 (L) 21 - 52 % MONOCYTES 10 3 - 10 % EOSINOPHILS 0 0 - 5 % BASOPHILS 0 0 - 2 %  
 ABS. NEUTROPHILS 5.2 1.8 - 8.0 K/UL  
 ABS. LYMPHOCYTES 1.0 0.9 - 3.6 K/UL  
 ABS. MONOCYTES 0.7 0.05 - 1.2 K/UL  
 ABS. EOSINOPHILS 0.0 0.0 - 0.4 K/UL  
 ABS. BASOPHILS 0.0 0.0 - 0.1 K/UL  
 DF AUTOMATED    
PTT Collection Time: 11/27/19  7:09 PM  
Result Value Ref Range aPTT 36.6 (H) 23.0 - 36.4 SEC PROTHROMBIN TIME + INR Collection Time: 11/27/19  7:09 PM  
Result Value Ref Range Prothrombin time 16.8 (H) 11.5 - 15.2 sec INR 1.4 (H) 0.8 - 1.2 Radiologic Studies -  
CT MAXILLOFACIAL WO CONT Final Result IMPRESSION:  
  
No acute fractures. Frontal scalp contusion. Polyps and/or retention cysts in both maxillary sinuses. Periapical lucencies suggesting dental disease. CT HEAD WO CONT Final Result IMPRESSION:  
  
No acute intracranial abnormalities. Frontal scalp contusion XR HAND RT MIN 3 V    (Results Pending) XR KNEE LT MIN 4 V    (Results Pending) CT Results  (Last 48 hours)  
          
 11/27/19 1746  CT MAXILLOFACIAL WO CONT Final result Impression:  IMPRESSION:  
   
No acute fractures. Frontal scalp contusion. Polyps and/or retention cysts in both maxillary sinuses. Periapical lucencies suggesting dental disease. Narrative:  EXAM: CT of the maxillofacial  
   
INDICATION: Fall frontal scalp contusion COMPARISON: None. TECHNIQUE: Axial CT imaging of the maxillofacial was performed without  
intravenous contrast. Multiplanar reformats were generated. One or more dose  
reduction techniques were used on this CT: automated exposure control,  
adjustment of the mAs and/or kVp according to patient size, and iterative  
reconstruction techniques. The specific techniques used on this CT exam have  
been documented in the patient's electronic medical record. Digital Imaging and  
Communications in Medicine (DICOM) format image data are available to  
nonaffiliated external healthcare facilities or entities on a secure, media  
free, reciprocally searchable basis with patient authorization for at least a  
12-month period after this study. _______________ FINDINGS:  
   
There are no acute fractures. There are periapical lucencies both in the maxilla  
and mandible, suggesting dental disease. There are polyps and/or retention cysts in both maxillary sinuses along the inferior aspect largest seen on the right  
measuring 2.5 x 1 cm. Mastoid sinuses are clear. No air-fluid levels are seen in  
the sinuses. Multilevel degenerative disc disease present. Visualized brain demonstrates mild to moderate atrophy. Orbits are intact. There  
is frontal scalp contusion. _______________  
   
  
 11/27/19 1746  CT HEAD WO CONT Final result Impression:  IMPRESSION:  
   
No acute intracranial abnormalities. Frontal scalp contusion Narrative:  EXAM: CT head INDICATION: Fall head injury. COMPARISON: June 30, 2017 TECHNIQUE: Axial CT imaging of the head was performed without intravenous  
contrast. One or more dose reduction techniques were used on this CT: automated  
exposure control, adjustment of the mAs and/or kVp according to patient size,  
and iterative reconstruction techniques. The specific techniques used on this CT exam have been documented in the patient's electronic medical record. Digital  
Imaging and Communications in Medicine (DICOM) format image data are available  
to nonaffiliated external healthcare facilities or entities on a secure, media  
free, reciprocally searchable basis with patient authorization for at least a  
12-month period after this study. _______________ FINDINGS:  
   
BRAIN AND POSTERIOR FOSSA: There is mild to moderate parenchymal volume loss. There is no intracranial hemorrhage, mass effect, or midline shift. There are no  
areas of abnormal parenchymal attenuation. EXTRA-AXIAL SPACES AND MENINGES: There are no abnormal extra-axial fluid  
collections. CALVARIUM: Intact. SINUSES: Clear. OTHER: There is frontal scalp contusion. .  
   
_______________ CXR Results  (Last 48 hours) None Medical Decision Making I am the first provider for this patient. I reviewed the vital signs, available nursing notes, past medical history, past surgical history, family history and social history. Vital Signs-Reviewed the patient's vital signs. Records Reviewed: Nursing Notes and Old Medical Records Procedures: None Procedures Provider Notes (Medical Decision Making):  
 
 
Differential: fracture, dislocation, abrasion, sprain, contusion, laceration, closed head injury, intracranial bleed, septic joint, RA, OA, cellulitis Plan: Will order CT of head, face, and will order x-rays of wrist and knee. 7:25 PM 
Patient states she is feeling better at this time. Have shared reassuring work-up thus far. Pending coags at this time. 8:11 PM 
Patient has been seen and evaluated by Dr. Gwendolyn Good. Have shared reassuring x-ray of right hand with patient. Patient has no decreased range of motion, no pain with range of motion, swelling and erythema is mainly isolated to the dorsal aspect of the hand with very little if any involvement of the right wrist.  Have discussed concerns for superficial cellulitis versus gout versus septic arthritis. Have discussed with patient the only true way to rule out septic arthritis is to do a joint aspiration which patient denies. Patient is afebrile, no white blood cell count and has full range of motion of her wrist, discussed this is most likely superficial cellulitis. Will discharge home with Tylenol and antibiotics. Have stressed the importance of close PCP follow-up to assure the condition is improving and has resolved. Have also discussed with patient if she should change her mind about recent aspiration she is welcome to return anytime. Patient agrees with the plan and management and states all questions have been thoroughly answered and there are no more remaining questions. MED RECONCILIATION: 
No current facility-administered medications for this encounter. Current Outpatient Medications Medication Sig  
  acetaminophen (TYLENOL) 325 mg tablet Take 2 Tabs by mouth every four (4) hours as needed for Pain.  trimethoprim-sulfamethoxazole (BACTRIM DS) 160-800 mg per tablet Take 1 Tab by mouth two (2) times a day for 7 days.  pantoprazole (PROTONIX) 40 mg tablet Take 1 Tab by mouth Before breakfast and dinner.  levETIRAcetam (KEPPRA) 500 mg tablet Take 500 mg by mouth once.  ergocalciferol (VITAMIN D2) 50,000 unit capsule Take 50,000 Units by mouth.  hydrOXYzine pamoate (VISTARIL) 50 mg capsule Take 50 mg by mouth two (2) times a day.  ferrous sulfate (FEOSOL) 325 mg (65 mg iron) tablet Take 325 mg by mouth daily.  biotin 10,000 mcg cap Take 1 Cap by mouth.  calcium citrate-vitamin d3 (CITRACAL+D) 315-200 mg-unit tab Take 1 Tab by mouth two (2) times daily (with meals).  ergocalciferol (ERGOCALCIFEROL) 50,000 unit capsule Take 50,000 Units by mouth every seven (7) days.  colesevelam (WELCHOL) 625 mg tablet Take 2 Tabs by mouth two (2) times daily (with meals).  escitalopram oxalate (LEXAPRO) 10 mg tablet Take 1 Tab by mouth daily.  levothyroxine (SYNTHROID) 25 mcg tablet Take 1 Tab by mouth Daily (before breakfast). Indications: HYPOTHYROIDISM (Patient taking differently: Take 50 mcg by mouth Daily (before breakfast). Indications: hypothyroidism)  ascorbic acid (VITAMIN C) 500 mg tablet Take 1,000 mg by mouth daily.  cyanocobalamin (VITAMIN B-12) 1,000 mcg Subl 1,000 mcg by SubLINGual route daily. Disposition: 
Home DISCHARGE NOTE:  
Pt has been reexamined. Patient has no new complaints, changes, or physical findings. Care plan outlined and precautions discussed. Results of workup were reviewed with the patient. All medications were reviewed with the patient. All of pt's questions and concerns were addressed. Patient was instructed and agrees to follow up with PCP as well as to return to the ED upon further deterioration. Patient is ready to go home. Follow-up Information Follow up With Specialties Details Why Contact [facility] EMERGENCY DEPT Emergency Medicine  As needed [address] 
[phone] Farhan Novoa MD Internal Medicine Schedule an appointment as soon as possible for a visit  [address] [phone] Current Discharge Medication List  
  
START taking these medications Details  
acetaminophen (TYLENOL) 325 mg tablet Take 2 Tabs by mouth every four (4) hours as needed for Pain. Qty: 20 Tab, Refills: 0  
  
trimethoprim-sulfamethoxazole (BACTRIM DS) 160-800 mg per tablet Take 1 Tab by mouth two (2) times a day for 7 days. Qty: 14 Tab, Refills: 0 Diagnosis Clinical Impression: 1. Cellulitis, unspecified cellulitis site 2. Fall, initial encounter 3. Abrasion 4. Hematoma 5. Knee injury, initial encounter

## 2019-11-28 NOTE — DISCHARGE INSTRUCTIONS
Patient Education        Cellulitis: Care Instructions  Your Care Instructions    Cellulitis is a skin infection caused by bacteria, most often strep or staph. It often occurs after a break in the skin from a scrape, cut, bite, or puncture, or after a rash. Cellulitis may be treated without doing tests to find out what caused it. But your doctor may do tests, if needed, to look for a specific bacteria, like methicillin-resistant Staphylococcus aureus (MRSA). The doctor has checked you carefully, but problems can develop later. If you notice any problems or new symptoms, get medical treatment right away. Follow-up care is a key part of your treatment and safety. Be sure to make and go to all appointments, and call your doctor if you are having problems. It's also a good idea to know your test results and keep a list of the medicines you take. How can you care for yourself at home? · Take your antibiotics as directed. Do not stop taking them just because you feel better. You need to take the full course of antibiotics. · Prop up the infected area on pillows to reduce pain and swelling. Try to keep the area above the level of your heart as often as you can. · If your doctor told you how to care for your wound, follow your doctor's instructions. If you did not get instructions, follow this general advice:  ? Wash the wound with clean water 2 times a day. Don't use hydrogen peroxide or alcohol, which can slow healing. ? You may cover the wound with a thin layer of petroleum jelly, such as Vaseline, and a nonstick bandage. ? Apply more petroleum jelly and replace the bandage as needed. · Be safe with medicines. Take pain medicines exactly as directed. ? If the doctor gave you a prescription medicine for pain, take it as prescribed. ? If you are not taking a prescription pain medicine, ask your doctor if you can take an over-the-counter medicine.   To prevent cellulitis in the future  · Try to prevent cuts, scrapes, or other injuries to your skin. Cellulitis most often occurs where there is a break in the skin. · If you get a scrape, cut, mild burn, or bite, wash the wound with clean water as soon as you can to help avoid infection. Don't use hydrogen peroxide or alcohol, which can slow healing. · If you have swelling in your legs (edema), support stockings and good skin care may help prevent leg sores and cellulitis. · Take care of your feet, especially if you have diabetes or other conditions that increase the risk of infection. Wear shoes and socks. Do not go barefoot. If you have athlete's foot or other skin problems on your feet, talk to your doctor about how to treat them. When should you call for help? Call your doctor now or seek immediate medical care if:    · You have signs that your infection is getting worse, such as:  ? Increased pain, swelling, warmth, or redness. ? Red streaks leading from the area. ? Pus draining from the area. ? A fever.     · You get a rash.    Watch closely for changes in your health, and be sure to contact your doctor if:    · You do not get better as expected. Where can you learn more? Go to http://salvatore-izabela.info/. Cathie Blair in the search box to learn more about \"Cellulitis: Care Instructions. \"  Current as of: April 1, 2019  Content Version: 12.2  © 1919-3270 [employer]. Care instructions adapted under license by [employer] (which disclaims liability or warranty for this information). If you have questions about a medical condition or this instruction, always ask your healthcare professional. [place] [address] any warranty or liability for your use of this information. Patient Education        Preventing Falls: Care Instructions  Your Care Instructions    Getting around your home safely can be a challenge if you have injuries or health problems that make it easy for you to fall.  Loose rugs and furniture in walkways are among the dangers for many older people who have problems walking or who have poor eyesight. People who have conditions such as arthritis, osteoporosis, or dementia also have to be careful not to fall. You can make your home safer with a few simple measures. Follow-up care is a key part of your treatment and safety. Be sure to make and go to all appointments, and call your doctor if you are having problems. It's also a good idea to know your test results and keep a list of the medicines you take. How can you care for yourself at home? Taking care of yourself  · You may get dizzy if you do not drink enough water. To prevent dehydration, drink plenty of fluids, enough so that your urine is light yellow or clear like water. Choose water and other caffeine-free clear liquids. If you have kidney, heart, or liver disease and have to limit fluids, talk with your doctor before you increase the amount of fluids you drink. · Exercise regularly to improve your strength, muscle tone, and balance. Walk if you can. Swimming may be a good choice if you cannot walk easily. · Have your vision and hearing checked each year or any time you notice a change. If you have trouble seeing and hearing, you might not be able to avoid objects and could lose your balance. · Know the side effects of the medicines you take. Ask your doctor or pharmacist whether the medicines you take can affect your balance. Sleeping pills or sedatives can affect your balance. · Limit the amount of alcohol you drink. Alcohol can impair your balance and other senses. · Ask your doctor whether calluses or corns on your feet need to be removed. If you wear loose-fitting shoes because of calluses or corns, you can lose your balance and fall. · Talk to your doctor if you have numbness in your feet. Preventing falls at home  · Remove raised doorway thresholds, throw rugs, and clutter.  Repair loose carpet or raised areas in the floor.  · Move furniture and electrical cords to keep them out of walking paths. · Use nonskid floor wax, and wipe up spills right away, especially on ceramic tile floors. · If you use a walker or cane, put rubber tips on it. If you use crutches, clean the bottoms of them regularly with an abrasive pad, such as steel wool. · Keep your house well lit, especially [place]it [place]fts, and outside walkways. Use night-lights in areas such as hallways and bathrooms. Add extra light switches or use remote switches (such as switches that go on or off when you clap your hands) to make it easier to turn lights on if you have to get up during the night. · Install sturdy handrails on stairways. · Move items in your cabinets so that the things you use a lot are on the lower shelves (about waist level). · Keep a cordless phone and a flashlight with new batteries by your bed. If possible, put a phone in each of the main rooms of your house, or carry a cell phone in case you fall and cannot reach a phone. Or, you can wear a device around your neck or wrist. You push a button that sends a signal for help. · Wear low-heeled shoes that fit well and give your feet good support. Use footwear with nonskid soles. Check the heels and soles of your shoes for wear. Repair or replace worn heels or soles. · Do not wear socks without shoes on wood floors. · Walk on the grass when the sidewalks are slippery. If you live in an area that gets snow and ice in the winter, sprinkle salt on slippery steps and sidewalks. Preventing falls in the bath  · Install grab bars and nonskid mats inside and outside your shower or tub and near the toilet and sinks. · Use shower chairs and bath benches. · Use a hand-held shower head that will allow you to sit while showering.   · Get into a tub or shower by putting the weaker leg in first. Get out of a tub or shower with your strong side first.  · Repair loose toilet seats and consider installing a raised toilet seat to make getting on and off the toilet easier. · Keep your bathroom door unlocked while you are in the shower. Where can you learn more? Go to http://salvatore-izabela.info/. Enter [phone] in the search box to learn more about \"Preventing Falls: Care Instructions. \"  Current as of: November 7, 2018  Content Version: 12.2  © 5310-0479 [employer]. Care instructions adapted under license by [employer] (which disclaims liability or warranty for this information). If you have questions about a medical condition or this instruction, always ask your healthcare professional. [address] any warranty or liability for your use of this information.

## 2019-11-28 NOTE — ED NOTES
I have reviewed discharge instructions with the patient. The patient verbalized understanding. Patient redressed self and ambulated independently out of care area

## 2020-01-01 ENCOUNTER — APPOINTMENT (OUTPATIENT)
Dept: GENERAL RADIOLOGY | Age: 60
DRG: 296 | End: 2020-01-01
Attending: EMERGENCY MEDICINE
Payer: MEDICARE

## 2020-01-01 ENCOUNTER — APPOINTMENT (OUTPATIENT)
Dept: CT IMAGING | Age: 60
End: 2020-01-01
Attending: EMERGENCY MEDICINE
Payer: MEDICARE

## 2020-01-01 ENCOUNTER — APPOINTMENT (OUTPATIENT)
Dept: CT IMAGING | Age: 60
DRG: 296 | End: 2020-01-01
Attending: EMERGENCY MEDICINE
Payer: MEDICARE

## 2020-01-01 ENCOUNTER — APPOINTMENT (OUTPATIENT)
Dept: GENERAL RADIOLOGY | Age: 60
End: 2020-01-01
Attending: EMERGENCY MEDICINE
Payer: MEDICARE

## 2020-01-01 ENCOUNTER — ANESTHESIA (OUTPATIENT)
Dept: SURGERY | Age: 60
DRG: 481 | End: 2020-01-01
Payer: MEDICARE

## 2020-01-01 ENCOUNTER — APPOINTMENT (OUTPATIENT)
Dept: GENERAL RADIOLOGY | Age: 60
DRG: 296 | End: 2020-01-01
Attending: HOSPITALIST
Payer: MEDICARE

## 2020-01-01 ENCOUNTER — APPOINTMENT (OUTPATIENT)
Dept: GENERAL RADIOLOGY | Age: 60
End: 2020-01-01
Attending: PHYSICIAN ASSISTANT
Payer: MEDICARE

## 2020-01-01 ENCOUNTER — HOSPITAL ENCOUNTER (EMERGENCY)
Age: 60
Discharge: HOME | End: 2020-04-15
Attending: EMERGENCY MEDICINE
Payer: MEDICARE

## 2020-01-01 ENCOUNTER — HOSPITAL ENCOUNTER (INPATIENT)
Dept: GENERAL RADIOLOGY | Age: 60
Discharge: HOME | DRG: 481 | End: 2020-03-29
Attending: ORTHOPAEDIC SURGERY
Payer: MEDICARE

## 2020-01-01 ENCOUNTER — HOSPITAL ENCOUNTER (INPATIENT)
Age: 60
LOS: 3 days | Discharge: HOME HEALTH | DRG: 481 | End: 2020-04-01
Attending: EMERGENCY MEDICINE | Admitting: HOSPITALIST
Payer: MEDICARE

## 2020-01-01 ENCOUNTER — HOSPITAL ENCOUNTER (EMERGENCY)
Age: 60
Discharge: HOME | DRG: 296 | End: 2020-08-18
Attending: EMERGENCY MEDICINE
Payer: MEDICARE

## 2020-01-01 ENCOUNTER — APPOINTMENT (OUTPATIENT)
Dept: GENERAL RADIOLOGY | Age: 60
DRG: 481 | End: 2020-01-01
Attending: EMERGENCY MEDICINE
Payer: MEDICARE

## 2020-01-01 ENCOUNTER — HOSPITAL ENCOUNTER (OUTPATIENT)
Age: 60
Setting detail: OBSERVATION
Discharge: HOME HEALTH | End: 2020-01-10
Attending: EMERGENCY MEDICINE | Admitting: INTERNAL MEDICINE
Payer: MEDICARE

## 2020-01-01 ENCOUNTER — ANESTHESIA EVENT (OUTPATIENT)
Dept: SURGERY | Age: 60
DRG: 481 | End: 2020-01-01
Payer: MEDICARE

## 2020-01-01 ENCOUNTER — HOSPITAL ENCOUNTER (INPATIENT)
Age: 60
LOS: 3 days | DRG: 296 | End: 2020-08-22
Attending: EMERGENCY MEDICINE | Admitting: HOSPITALIST
Payer: MEDICARE

## 2020-01-01 VITALS
WEIGHT: 172 LBS | OXYGEN SATURATION: 99 % | HEART RATE: 97 BPM | SYSTOLIC BLOOD PRESSURE: 118 MMHG | TEMPERATURE: 98.1 F | RESPIRATION RATE: 18 BRPM | BODY MASS INDEX: 27.64 KG/M2 | HEIGHT: 66 IN | DIASTOLIC BLOOD PRESSURE: 81 MMHG

## 2020-01-01 VITALS
HEART RATE: 107 BPM | HEIGHT: 66 IN | RESPIRATION RATE: 22 BRPM | BODY MASS INDEX: 27.64 KG/M2 | WEIGHT: 172 LBS | SYSTOLIC BLOOD PRESSURE: 103 MMHG | TEMPERATURE: 98.9 F | OXYGEN SATURATION: 100 % | DIASTOLIC BLOOD PRESSURE: 72 MMHG

## 2020-01-01 VITALS
HEART RATE: 121 BPM | OXYGEN SATURATION: 87 % | TEMPERATURE: 100.8 F | WEIGHT: 161.16 LBS | RESPIRATION RATE: 20 BRPM | BODY MASS INDEX: 26.01 KG/M2 | SYSTOLIC BLOOD PRESSURE: 87 MMHG | DIASTOLIC BLOOD PRESSURE: 47 MMHG

## 2020-01-01 VITALS
HEIGHT: 67 IN | BODY MASS INDEX: 31.08 KG/M2 | HEART RATE: 81 BPM | RESPIRATION RATE: 17 BRPM | SYSTOLIC BLOOD PRESSURE: 109 MMHG | WEIGHT: 198 LBS | OXYGEN SATURATION: 100 % | DIASTOLIC BLOOD PRESSURE: 77 MMHG | TEMPERATURE: 98.4 F

## 2020-01-01 VITALS
HEART RATE: 100 BPM | RESPIRATION RATE: 18 BRPM | SYSTOLIC BLOOD PRESSURE: 100 MMHG | DIASTOLIC BLOOD PRESSURE: 70 MMHG | TEMPERATURE: 99 F | OXYGEN SATURATION: 98 % | WEIGHT: 172.84 LBS | HEIGHT: 66 IN | BODY MASS INDEX: 27.78 KG/M2

## 2020-01-01 DIAGNOSIS — R94.31 QT PROLONGATION: ICD-10-CM

## 2020-01-01 DIAGNOSIS — R07.9 ACUTE CHEST PAIN: Primary | ICD-10-CM

## 2020-01-01 DIAGNOSIS — E87.6 HYPOKALEMIA: ICD-10-CM

## 2020-01-01 DIAGNOSIS — S32.592A CLOSED FRACTURE OF MULTIPLE PUBIC RAMI, LEFT, INITIAL ENCOUNTER (HCC): ICD-10-CM

## 2020-01-01 DIAGNOSIS — R55 SYNCOPE AND COLLAPSE: ICD-10-CM

## 2020-01-01 DIAGNOSIS — M54.32 LEFT SIDED SCIATICA: Primary | ICD-10-CM

## 2020-01-01 DIAGNOSIS — S72.001A CLOSED FRACTURE OF RIGHT HIP, INITIAL ENCOUNTER (HCC): Primary | ICD-10-CM
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
## 2020-01-01 DIAGNOSIS — S32.591A CLOSED FRACTURE OF MULTIPLE PUBIC RAMI, RIGHT, INITIAL ENCOUNTER (HCC): ICD-10-CM

## 2020-01-01 DIAGNOSIS — Z87.81 HX OF FRACTURE OF HIP: ICD-10-CM

## 2020-01-01 DIAGNOSIS — S82.841A CLOSED BIMALLEOLAR FRACTURE OF RIGHT ANKLE, INITIAL ENCOUNTER: Primary | ICD-10-CM

## 2020-01-01 DIAGNOSIS — R07.9 CHEST PAIN, UNSPECIFIED TYPE: ICD-10-CM

## 2020-01-01 DIAGNOSIS — D64.9 ANEMIA, UNSPECIFIED TYPE: ICD-10-CM

## 2020-01-01 DIAGNOSIS — R77.8 ELEVATED TROPONIN: ICD-10-CM

## 2020-01-01 DIAGNOSIS — I46.9 CARDIAC ARREST WITH SUCCESSFUL RESUSCITATION (HCC): Primary | ICD-10-CM

## 2020-01-01 LAB
ABO + RH BLD: NORMAL
ABO + RH BLD: NORMAL
ALBUMIN SERPL-MCNC: 1.5 G/DL (ref 3.4–5)
ALBUMIN SERPL-MCNC: 1.9 G/DL (ref 3.4–5)
ALBUMIN SERPL-MCNC: 2 G/DL (ref 3.4–5)
ALBUMIN SERPL-MCNC: 2.2 G/DL (ref 3.4–5)
ALBUMIN/GLOB SERPL: 0.4 {RATIO} (ref 0.8–1.7)
ALBUMIN/GLOB SERPL: 0.5 {RATIO} (ref 0.8–1.7)
ALBUMIN/GLOB SERPL: 0.6 {RATIO} (ref 0.8–1.7)
ALBUMIN/GLOB SERPL: 0.6 {RATIO} (ref 0.8–1.7)
ALP SERPL-CCNC: 369 U/L (ref 45–117)
ALP SERPL-CCNC: 387 U/L (ref 45–117)
ALP SERPL-CCNC: 391 U/L (ref 45–117)
ALP SERPL-CCNC: 461 U/L (ref 45–117)
ALT SERPL-CCNC: 20 U/L (ref 13–56)
ALT SERPL-CCNC: 30 U/L (ref 13–56)
ALT SERPL-CCNC: 34 U/L (ref 13–56)
ALT SERPL-CCNC: 38 U/L (ref 13–56)
ANION GAP SERPL CALC-SCNC: 10 MMOL/L (ref 3–18)
ANION GAP SERPL CALC-SCNC: 12 MMOL/L (ref 3–18)
ANION GAP SERPL CALC-SCNC: 12 MMOL/L (ref 3–18)
ANION GAP SERPL CALC-SCNC: 13 MMOL/L (ref 3–18)
ANION GAP SERPL CALC-SCNC: 14 MMOL/L (ref 3–18)
ANION GAP SERPL CALC-SCNC: 14 MMOL/L (ref 3–18)
ANION GAP SERPL CALC-SCNC: 9 MMOL/L (ref 3–18)
APPEARANCE UR: CLEAR
APPEARANCE UR: CLEAR
APTT PPP: 47.6 SEC (ref 23–36.4)
ARTERIAL PATENCY WRIST A: ABNORMAL
ARTERIAL PATENCY WRIST A: YES
AST SERPL-CCNC: 34 U/L (ref 10–38)
AST SERPL-CCNC: 63 U/L (ref 10–38)
AST SERPL-CCNC: 80 U/L (ref 10–38)
AST SERPL-CCNC: 88 U/L (ref 10–38)
ATRIAL RATE: 101 BPM
ATRIAL RATE: 111 BPM
ATRIAL RATE: 254 BPM
ATRIAL RATE: 79 BPM
ATRIAL RATE: 90 BPM
ATRIAL RATE: 98 BPM
BACTERIA SPEC CULT: NORMAL
BACTERIA URNS QL MICRO: NEGATIVE /HPF
BASE DEFICIT BLD-SCNC: 4 MMOL/L
BASE DEFICIT BLDV-SCNC: 3 MMOL/L
BASOPHILS # BLD: 0 K/UL (ref 0–0.1)
BASOPHILS NFR BLD: 0 % (ref 0–2)
BDY SITE: ABNORMAL
BDY SITE: ABNORMAL
BILIRUB SERPL-MCNC: 0.8 MG/DL (ref 0.2–1)
BILIRUB SERPL-MCNC: 1 MG/DL (ref 0.2–1)
BILIRUB SERPL-MCNC: 1.1 MG/DL (ref 0.2–1)
BILIRUB SERPL-MCNC: 1.1 MG/DL (ref 0.2–1)
BILIRUB UR QL: NEGATIVE
BILIRUB UR QL: NEGATIVE
BLOOD GROUP ANTIBODIES SERPL: NORMAL
BNP SERPL-MCNC: 654 PG/ML (ref 0–900)
BODY TEMPERATURE: 90.1
BUN SERPL-MCNC: 4 MG/DL (ref 7–18)
BUN SERPL-MCNC: 4 MG/DL (ref 7–18)
BUN SERPL-MCNC: 5 MG/DL (ref 7–18)
BUN SERPL-MCNC: 5 MG/DL (ref 7–18)
BUN SERPL-MCNC: 6 MG/DL (ref 7–18)
BUN SERPL-MCNC: 7 MG/DL (ref 7–18)
BUN/CREAT SERPL: 6 (ref 12–20)
BUN/CREAT SERPL: 7 (ref 12–20)
BUN/CREAT SERPL: 7 (ref 12–20)
BUN/CREAT SERPL: 9 (ref 12–20)
CA-I SERPL-SCNC: 1.09 MMOL/L (ref 1.12–1.32)
CALCIUM SERPL-MCNC: 7.2 MG/DL (ref 8.5–10.1)
CALCIUM SERPL-MCNC: 7.5 MG/DL (ref 8.5–10.1)
CALCIUM SERPL-MCNC: 7.5 MG/DL (ref 8.5–10.1)
CALCIUM SERPL-MCNC: 7.7 MG/DL (ref 8.5–10.1)
CALCIUM SERPL-MCNC: 7.9 MG/DL (ref 8.5–10.1)
CALCIUM SERPL-MCNC: 8 MG/DL (ref 8.5–10.1)
CALCIUM SERPL-MCNC: 8 MG/DL (ref 8.5–10.1)
CALCULATED P AXIS, ECG09: 25 DEGREES
CALCULATED P AXIS, ECG09: 37 DEGREES
CALCULATED P AXIS, ECG09: 52 DEGREES
CALCULATED P AXIS, ECG09: 57 DEGREES
CALCULATED R AXIS, ECG10: -12 DEGREES
CALCULATED R AXIS, ECG10: -2 DEGREES
CALCULATED R AXIS, ECG10: -2 DEGREES
CALCULATED R AXIS, ECG10: 33 DEGREES
CALCULATED R AXIS, ECG10: 37 DEGREES
CALCULATED T AXIS, ECG11: 115 DEGREES
CALCULATED T AXIS, ECG11: 29 DEGREES
CALCULATED T AXIS, ECG11: 44 DEGREES
CALCULATED T AXIS, ECG11: 48 DEGREES
CALCULATED T AXIS, ECG11: 49 DEGREES
CALCULATED T AXIS, ECG11: 73 DEGREES
CHLORIDE SERPL-SCNC: 100 MMOL/L (ref 100–111)
CHLORIDE SERPL-SCNC: 102 MMOL/L (ref 100–111)
CHLORIDE SERPL-SCNC: 105 MMOL/L (ref 100–111)
CHLORIDE SERPL-SCNC: 106 MMOL/L (ref 100–111)
CHLORIDE SERPL-SCNC: 95 MMOL/L (ref 100–111)
CK MB CFR SERPL CALC: 1 % (ref 0–4)
CK MB CFR SERPL CALC: 2 % (ref 0–4)
CK MB CFR SERPL CALC: 2.2 % (ref 0–4)
CK MB CFR SERPL CALC: 2.6 % (ref 0–4)
CK MB CFR SERPL CALC: NORMAL % (ref 0–4)
CK MB SERPL-MCNC: 1 NG/ML (ref 5–25)
CK MB SERPL-MCNC: 2 NG/ML (ref 5–25)
CK MB SERPL-MCNC: 6.1 NG/ML (ref 5–25)
CK MB SERPL-MCNC: 9 NG/ML (ref 5–25)
CK MB SERPL-MCNC: <1 NG/ML (ref 5–25)
CK SERPL-CCNC: 282 U/L (ref 26–192)
CK SERPL-CCNC: 450 U/L (ref 26–192)
CK SERPL-CCNC: 53 U/L (ref 26–192)
CK SERPL-CCNC: 77 U/L (ref 26–192)
CK SERPL-CCNC: 99 U/L (ref 26–192)
CO2 SERPL-SCNC: 20 MMOL/L (ref 21–32)
CO2 SERPL-SCNC: 20 MMOL/L (ref 21–32)
CO2 SERPL-SCNC: 22 MMOL/L (ref 21–32)
CO2 SERPL-SCNC: 22 MMOL/L (ref 21–32)
CO2 SERPL-SCNC: 23 MMOL/L (ref 21–32)
CO2 SERPL-SCNC: 23 MMOL/L (ref 21–32)
CO2 SERPL-SCNC: 26 MMOL/L (ref 21–32)
COLOR UR: YELLOW
COLOR UR: YELLOW
COVID-19 RAPID TEST, COVR: NOT DETECTED
CREAT SERPL-MCNC: 0.63 MG/DL (ref 0.6–1.3)
CREAT SERPL-MCNC: 0.64 MG/DL (ref 0.6–1.3)
CREAT SERPL-MCNC: 0.69 MG/DL (ref 0.6–1.3)
CREAT SERPL-MCNC: 0.74 MG/DL (ref 0.6–1.3)
CREAT SERPL-MCNC: 0.78 MG/DL (ref 0.6–1.3)
CREAT SERPL-MCNC: 0.84 MG/DL (ref 0.6–1.3)
CREAT SERPL-MCNC: 0.94 MG/DL (ref 0.6–1.3)
D DIMER PPP FEU-MCNC: 2.77 UG/ML(FEU)
DIAGNOSIS, 93000: NORMAL
DIFFERENTIAL METHOD BLD: ABNORMAL
EOSINOPHIL # BLD: 0 K/UL (ref 0–0.4)
EOSINOPHIL # BLD: 0.1 K/UL (ref 0–0.4)
EOSINOPHIL NFR BLD: 0 % (ref 0–5)
EOSINOPHIL NFR BLD: 1 % (ref 0–5)
EPITH CASTS URNS QL MICRO: NORMAL /LPF (ref 0–5)
ERYTHROCYTE [DISTWIDTH] IN BLOOD BY AUTOMATED COUNT: 14.3 % (ref 11.6–14.5)
ERYTHROCYTE [DISTWIDTH] IN BLOOD BY AUTOMATED COUNT: 14.4 % (ref 11.6–14.5)
ERYTHROCYTE [DISTWIDTH] IN BLOOD BY AUTOMATED COUNT: 14.4 % (ref 11.6–14.5)
ERYTHROCYTE [DISTWIDTH] IN BLOOD BY AUTOMATED COUNT: 15.8 % (ref 11.6–14.5)
ERYTHROCYTE [DISTWIDTH] IN BLOOD BY AUTOMATED COUNT: 15.9 % (ref 11.6–14.5)
ERYTHROCYTE [DISTWIDTH] IN BLOOD BY AUTOMATED COUNT: 16 % (ref 11.6–14.5)
ERYTHROCYTE [DISTWIDTH] IN BLOOD BY AUTOMATED COUNT: 16.6 % (ref 11.6–14.5)
ERYTHROCYTE [DISTWIDTH] IN BLOOD BY AUTOMATED COUNT: 16.9 % (ref 11.6–14.5)
EST. AVERAGE GLUCOSE BLD GHB EST-MCNC: 100 MG/DL
ETHANOL SERPL-MCNC: 21 MG/DL (ref 0–3)
GAS FLOW.O2 O2 DELIVERY SYS: ABNORMAL L/MIN
GAS FLOW.O2 O2 DELIVERY SYS: ABNORMAL L/MIN
GAS FLOW.O2 SETTING OXYMISER: 14 BPM
GAS FLOW.O2 SETTING OXYMISER: 14 BPM
GLOBULIN SER CALC-MCNC: 3.6 G/DL (ref 2–4)
GLOBULIN SER CALC-MCNC: 3.7 G/DL (ref 2–4)
GLOBULIN SER CALC-MCNC: 3.8 G/DL (ref 2–4)
GLOBULIN SER CALC-MCNC: 3.9 G/DL (ref 2–4)
GLUCOSE BLD STRIP.AUTO-MCNC: 117 MG/DL (ref 70–110)
GLUCOSE BLD STRIP.AUTO-MCNC: 140 MG/DL (ref 70–110)
GLUCOSE BLD STRIP.AUTO-MCNC: 141 MG/DL (ref 70–110)
GLUCOSE BLD STRIP.AUTO-MCNC: 153 MG/DL (ref 70–110)
GLUCOSE BLD STRIP.AUTO-MCNC: 156 MG/DL (ref 70–110)
GLUCOSE BLD STRIP.AUTO-MCNC: 166 MG/DL (ref 70–110)
GLUCOSE BLD STRIP.AUTO-MCNC: 170 MG/DL (ref 70–110)
GLUCOSE BLD STRIP.AUTO-MCNC: 184 MG/DL (ref 70–110)
GLUCOSE BLD STRIP.AUTO-MCNC: 194 MG/DL (ref 70–110)
GLUCOSE BLD STRIP.AUTO-MCNC: 340 MG/DL (ref 70–110)
GLUCOSE SERPL-MCNC: 126 MG/DL (ref 74–99)
GLUCOSE SERPL-MCNC: 185 MG/DL (ref 74–99)
GLUCOSE SERPL-MCNC: 205 MG/DL (ref 74–99)
GLUCOSE SERPL-MCNC: 216 MG/DL (ref 74–99)
GLUCOSE SERPL-MCNC: 219 MG/DL (ref 74–99)
GLUCOSE SERPL-MCNC: 309 MG/DL (ref 74–99)
GLUCOSE SERPL-MCNC: 358 MG/DL (ref 74–99)
GLUCOSE UR STRIP.AUTO-MCNC: NEGATIVE MG/DL
GLUCOSE UR STRIP.AUTO-MCNC: NEGATIVE MG/DL
HBA1C MFR BLD: 5.1 % (ref 4.2–5.6)
HCO3 BLD-SCNC: 21.8 MMOL/L (ref 22–26)
HCO3 BLDV-SCNC: 22.3 MMOL/L (ref 23–28)
HCT VFR BLD AUTO: 24 % (ref 35–45)
HCT VFR BLD AUTO: 25 % (ref 35–45)
HCT VFR BLD AUTO: 25.9 % (ref 35–45)
HCT VFR BLD AUTO: 26.8 % (ref 35–45)
HCT VFR BLD AUTO: 30.7 % (ref 35–45)
HCT VFR BLD AUTO: 31.1 % (ref 35–45)
HCT VFR BLD AUTO: 31.1 % (ref 35–45)
HCT VFR BLD AUTO: 34.1 % (ref 35–45)
HGB BLD-MCNC: 10 G/DL (ref 12–16)
HGB BLD-MCNC: 10.2 G/DL (ref 12–16)
HGB BLD-MCNC: 10.3 G/DL (ref 12–16)
HGB BLD-MCNC: 10.7 G/DL (ref 12–16)
HGB BLD-MCNC: 7.9 G/DL (ref 12–16)
HGB BLD-MCNC: 8.2 G/DL (ref 12–16)
HGB BLD-MCNC: 8.7 G/DL (ref 12–16)
HGB BLD-MCNC: 8.8 G/DL (ref 12–16)
HGB UR QL STRIP: ABNORMAL
HGB UR QL STRIP: NEGATIVE
INR PPP: 1.6 (ref 0.8–1.2)
INR PPP: 1.6 (ref 0.8–1.2)
INR PPP: 1.8 (ref 0.8–1.2)
INR PPP: 1.8 (ref 0.8–1.2)
INR PPP: 2 (ref 0.8–1.2)
INSPIRATION.DURATION SETTING TIME VENT: 0.9 SEC
INSPIRATION.DURATION SETTING TIME VENT: 0.9 SEC
KETONES UR QL STRIP.AUTO: NEGATIVE MG/DL
KETONES UR QL STRIP.AUTO: NEGATIVE MG/DL
LACTATE BLD-SCNC: 6.51 MMOL/L (ref 0.4–2)
LACTATE SERPL-SCNC: 2.1 MMOL/L (ref 0.4–2)
LACTATE SERPL-SCNC: 6.2 MMOL/L (ref 0.4–2)
LACTATE SERPL-SCNC: 6.6 MMOL/L (ref 0.4–2)
LEUKOCYTE ESTERASE UR QL STRIP.AUTO: NEGATIVE
LEUKOCYTE ESTERASE UR QL STRIP.AUTO: NEGATIVE
LIPASE SERPL-CCNC: 13 U/L (ref 73–393)
LYMPHOCYTES # BLD: 0.6 K/UL (ref 0.9–3.6)
LYMPHOCYTES # BLD: 0.7 K/UL (ref 0.9–3.6)
LYMPHOCYTES # BLD: 0.8 K/UL (ref 0.9–3.6)
LYMPHOCYTES # BLD: 0.9 K/UL (ref 0.9–3.6)
LYMPHOCYTES # BLD: 1.4 K/UL (ref 0.9–3.6)
LYMPHOCYTES # BLD: 1.5 K/UL (ref 0.9–3.6)
LYMPHOCYTES # BLD: 2.3 K/UL (ref 0.9–3.6)
LYMPHOCYTES NFR BLD: 10 % (ref 21–52)
LYMPHOCYTES NFR BLD: 10 % (ref 21–52)
LYMPHOCYTES NFR BLD: 20 % (ref 21–52)
LYMPHOCYTES NFR BLD: 3 % (ref 21–52)
LYMPHOCYTES NFR BLD: 38 % (ref 21–52)
LYMPHOCYTES NFR BLD: 5 % (ref 21–52)
LYMPHOCYTES NFR BLD: 9 % (ref 21–52)
MAGNESIUM SERPL-MCNC: 2.2 MG/DL (ref 1.6–2.6)
MAGNESIUM SERPL-MCNC: 2.3 MG/DL (ref 1.6–2.6)
MCH RBC QN AUTO: 30.6 PG (ref 24–34)
MCH RBC QN AUTO: 30.7 PG (ref 24–34)
MCH RBC QN AUTO: 30.9 PG (ref 24–34)
MCH RBC QN AUTO: 31 PG (ref 24–34)
MCH RBC QN AUTO: 31.6 PG (ref 24–34)
MCH RBC QN AUTO: 31.6 PG (ref 24–34)
MCH RBC QN AUTO: 31.8 PG (ref 24–34)
MCH RBC QN AUTO: 31.9 PG (ref 24–34)
MCHC RBC AUTO-ENTMCNC: 31.4 G/DL (ref 31–37)
MCHC RBC AUTO-ENTMCNC: 32.6 G/DL (ref 31–37)
MCHC RBC AUTO-ENTMCNC: 32.8 G/DL (ref 31–37)
MCHC RBC AUTO-ENTMCNC: 32.9 G/DL (ref 31–37)
MCHC RBC AUTO-ENTMCNC: 33.1 G/DL (ref 31–37)
MCHC RBC AUTO-ENTMCNC: 33.6 G/DL (ref 31–37)
MCV RBC AUTO: 91.8 FL (ref 74–97)
MCV RBC AUTO: 93.4 FL (ref 74–97)
MCV RBC AUTO: 94.5 FL (ref 74–97)
MCV RBC AUTO: 95.4 FL (ref 74–97)
MCV RBC AUTO: 96.8 FL (ref 74–97)
MCV RBC AUTO: 97.2 FL (ref 74–97)
MCV RBC AUTO: 97.3 FL (ref 74–97)
MCV RBC AUTO: 97.4 FL (ref 74–97)
MONOCYTES # BLD: 0.1 K/UL (ref 0.05–1.2)
MONOCYTES # BLD: 0.3 K/UL (ref 0.05–1.2)
MONOCYTES # BLD: 0.4 K/UL (ref 0.05–1.2)
MONOCYTES # BLD: 0.6 K/UL (ref 0.05–1.2)
MONOCYTES # BLD: 0.6 K/UL (ref 0.05–1.2)
MONOCYTES # BLD: 0.7 K/UL (ref 0.05–1.2)
MONOCYTES # BLD: 0.8 K/UL (ref 0.05–1.2)
MONOCYTES NFR BLD: 1 % (ref 3–10)
MONOCYTES NFR BLD: 4 % (ref 3–10)
MONOCYTES NFR BLD: 5 % (ref 3–10)
MONOCYTES NFR BLD: 5 % (ref 3–10)
MONOCYTES NFR BLD: 7 % (ref 3–10)
MONOCYTES NFR BLD: 8 % (ref 3–10)
MONOCYTES NFR BLD: 8 % (ref 3–10)
NEUTS SEG # BLD: 12 K/UL (ref 1.8–8)
NEUTS SEG # BLD: 16.4 K/UL (ref 1.8–8)
NEUTS SEG # BLD: 16.9 K/UL (ref 1.8–8)
NEUTS SEG # BLD: 3.2 K/UL (ref 1.8–8)
NEUTS SEG # BLD: 5.5 K/UL (ref 1.8–8)
NEUTS SEG # BLD: 5.5 K/UL (ref 1.8–8)
NEUTS SEG # BLD: 8.3 K/UL (ref 1.8–8)
NEUTS SEG NFR BLD: 54 % (ref 40–73)
NEUTS SEG NFR BLD: 72 % (ref 40–73)
NEUTS SEG NFR BLD: 83 % (ref 40–73)
NEUTS SEG NFR BLD: 85 % (ref 40–73)
NEUTS SEG NFR BLD: 85 % (ref 40–73)
NEUTS SEG NFR BLD: 93 % (ref 40–73)
NEUTS SEG NFR BLD: 94 % (ref 40–73)
NITRIC:PPM ISTAT,INITR: 0 PPM
NITRITE UR QL STRIP.AUTO: NEGATIVE
NITRITE UR QL STRIP.AUTO: NEGATIVE
O2/TOTAL GAS SETTING VFR VENT: 100 %
O2/TOTAL GAS SETTING VFR VENT: 40 %
P-R INTERVAL, ECG05: 100 MS
P-R INTERVAL, ECG05: 114 MS
P-R INTERVAL, ECG05: 124 MS
P-R INTERVAL, ECG05: 146 MS
P-R INTERVAL, ECG05: 160 MS
PCO2 BLD: 39.5 MMHG (ref 35–45)
PCO2 BLDV: 32.5 MMHG (ref 41–51)
PEEP RESPIRATORY: 7 CMH2O
PEEP RESPIRATORY: 7 CMH2O
PH BLD: 7.35 [PH] (ref 7.35–7.45)
PH BLDV: 7.42 [PH] (ref 7.32–7.42)
PH UR STRIP: 5 [PH] (ref 5–8)
PH UR STRIP: 6.5 [PH] (ref 5–8)
PHOSPHATE SERPL-MCNC: 2.9 MG/DL (ref 2.5–4.9)
PHOSPHATE SERPL-MCNC: 3.7 MG/DL (ref 2.5–4.9)
PIP ISTAT,IPIP: 17
PIP ISTAT,IPIP: 17
PLATELET # BLD AUTO: 201 K/UL (ref 135–420)
PLATELET # BLD AUTO: 240 K/UL (ref 135–420)
PLATELET # BLD AUTO: 326 K/UL (ref 135–420)
PLATELET # BLD AUTO: 329 K/UL (ref 135–420)
PLATELET # BLD AUTO: 386 K/UL (ref 135–420)
PLATELET # BLD AUTO: 394 K/UL (ref 135–420)
PLATELET # BLD AUTO: 428 K/UL (ref 135–420)
PLATELET # BLD AUTO: 688 K/UL (ref 135–420)
PMV BLD AUTO: 8.9 FL (ref 9.2–11.8)
PMV BLD AUTO: 9.1 FL (ref 9.2–11.8)
PMV BLD AUTO: 9.2 FL (ref 9.2–11.8)
PMV BLD AUTO: 9.4 FL (ref 9.2–11.8)
PMV BLD AUTO: 9.6 FL (ref 9.2–11.8)
PMV BLD AUTO: 9.6 FL (ref 9.2–11.8)
PO2 BLD: 553 MMHG (ref 80–100)
PO2 BLDV: 21 MMHG (ref 25–40)
POTASSIUM SERPL-SCNC: 3 MMOL/L (ref 3.5–5.5)
POTASSIUM SERPL-SCNC: 3.1 MMOL/L (ref 3.5–5.5)
POTASSIUM SERPL-SCNC: 3.6 MMOL/L (ref 3.5–5.5)
POTASSIUM SERPL-SCNC: 3.9 MMOL/L (ref 3.5–5.5)
POTASSIUM SERPL-SCNC: 4 MMOL/L (ref 3.5–5.5)
POTASSIUM SERPL-SCNC: 4.3 MMOL/L (ref 3.5–5.5)
POTASSIUM SERPL-SCNC: 4.5 MMOL/L (ref 3.5–5.5)
PROT SERPL-MCNC: 5.3 G/DL (ref 6.4–8.2)
PROT SERPL-MCNC: 5.6 G/DL (ref 6.4–8.2)
PROT SERPL-MCNC: 5.8 G/DL (ref 6.4–8.2)
PROT SERPL-MCNC: 5.9 G/DL (ref 6.4–8.2)
PROT UR STRIP-MCNC: 100 MG/DL
PROT UR STRIP-MCNC: NEGATIVE MG/DL
PROTHROMBIN TIME: 18.8 SEC (ref 11.5–15.2)
PROTHROMBIN TIME: 18.9 SEC (ref 11.5–15.2)
PROTHROMBIN TIME: 20.1 SEC (ref 11.5–15.2)
PROTHROMBIN TIME: 20.5 SEC (ref 11.5–15.2)
PROTHROMBIN TIME: 22.2 SEC (ref 11.5–15.2)
Q-T INTERVAL, ECG07: 336 MS
Q-T INTERVAL, ECG07: 366 MS
Q-T INTERVAL, ECG07: 382 MS
Q-T INTERVAL, ECG07: 428 MS
Q-T INTERVAL, ECG07: 434 MS
Q-T INTERVAL, ECG07: 478 MS
QRS DURATION, ECG06: 70 MS
QRS DURATION, ECG06: 74 MS
QRS DURATION, ECG06: 76 MS
QRS DURATION, ECG06: 78 MS
QRS DURATION, ECG06: 80 MS
QRS DURATION, ECG06: 86 MS
QTC CALCULATION (BEZET), ECG08: 487 MS
QTC CALCULATION (BEZET), ECG08: 490 MS
QTC CALCULATION (BEZET), ECG08: 497 MS
QTC CALCULATION (BEZET), ECG08: 501 MS
QTC CALCULATION (BEZET), ECG08: 530 MS
QTC CALCULATION (BEZET), ECG08: 619 MS
RBC # BLD AUTO: 2.57 M/UL (ref 4.2–5.3)
RBC # BLD AUTO: 2.57 M/UL (ref 4.2–5.3)
RBC # BLD AUTO: 2.77 M/UL (ref 4.2–5.3)
RBC # BLD AUTO: 2.82 M/UL (ref 4.2–5.3)
RBC # BLD AUTO: 3.16 M/UL (ref 4.2–5.3)
RBC # BLD AUTO: 3.26 M/UL (ref 4.2–5.3)
RBC # BLD AUTO: 3.29 M/UL (ref 4.2–5.3)
RBC # BLD AUTO: 3.5 M/UL (ref 4.2–5.3)
RBC #/AREA URNS HPF: NORMAL /HPF (ref 0–5)
SAO2 % BLD: 100 % (ref 92–97)
SAO2 % BLDV: 53 % (ref 65–88)
SERVICE CMNT-IMP: ABNORMAL
SERVICE CMNT-IMP: ABNORMAL
SERVICE CMNT-IMP: NORMAL
SODIUM SERPL-SCNC: 129 MMOL/L (ref 136–145)
SODIUM SERPL-SCNC: 134 MMOL/L (ref 136–145)
SODIUM SERPL-SCNC: 135 MMOL/L (ref 136–145)
SODIUM SERPL-SCNC: 135 MMOL/L (ref 136–145)
SODIUM SERPL-SCNC: 137 MMOL/L (ref 136–145)
SODIUM SERPL-SCNC: 137 MMOL/L (ref 136–145)
SODIUM SERPL-SCNC: 141 MMOL/L (ref 136–145)
SOURCE, COVRS: NORMAL
SP GR UR REFRACTOMETRY: 1.01 (ref 1–1.03)
SP GR UR REFRACTOMETRY: 1.02 (ref 1–1.03)
SPECIMEN EXP DATE BLD: NORMAL
SPECIMEN TYPE, XMCV1T: NORMAL
SPECIMEN TYPE: ABNORMAL
SPECIMEN TYPE: ABNORMAL
TOTAL RESP. RATE, ITRR: 15
TOTAL RESP. RATE, ITRR: 28
TROPONIN I BLD-MCNC: 0.3 NG/ML (ref 0–0.08)
TROPONIN I SERPL-MCNC: 0.36 NG/ML (ref 0–0.04)
TROPONIN I SERPL-MCNC: 0.4 NG/ML (ref 0–0.04)
TROPONIN I SERPL-MCNC: 0.58 NG/ML (ref 0–0.04)
TROPONIN I SERPL-MCNC: <0.02 NG/ML (ref 0–0.04)
UROBILINOGEN UR QL STRIP.AUTO: 0.2 EU/DL (ref 0.2–1)
UROBILINOGEN UR QL STRIP.AUTO: 0.2 EU/DL (ref 0.2–1)
VENTILATION MODE VENT: ABNORMAL
VENTILATION MODE VENT: ABNORMAL
VENTRICULAR RATE, ECG03: 101 BPM
VENTRICULAR RATE, ECG03: 111 BPM
VENTRICULAR RATE, ECG03: 134 BPM
VENTRICULAR RATE, ECG03: 79 BPM
VENTRICULAR RATE, ECG03: 90 BPM
VENTRICULAR RATE, ECG03: 98 BPM
VOLUME CONTROL PLUS IVLCP: YES
VOLUME CONTROL PLUS IVLCP: YES
VT SETTING VENT: 450 ML
VT SETTING VENT: 450 ML
WBC # BLD AUTO: 14.1 K/UL (ref 4.6–13.2)
WBC # BLD AUTO: 17.3 K/UL (ref 4.6–13.2)
WBC # BLD AUTO: 18.1 K/UL (ref 4.6–13.2)
WBC # BLD AUTO: 6 K/UL (ref 4.6–13.2)
WBC # BLD AUTO: 6.5 K/UL (ref 4.6–13.2)
WBC # BLD AUTO: 6.8 K/UL (ref 4.6–13.2)
WBC # BLD AUTO: 7.7 K/UL (ref 4.6–13.2)
WBC # BLD AUTO: 9.9 K/UL (ref 4.6–13.2)
WBC URNS QL MICRO: NORMAL /HPF (ref 0–4)

## 2020-01-01 PROCEDURE — C1751 CATH, INF, PER/CENT/MIDLINE: HCPCS

## 2020-01-01 PROCEDURE — 75810000301 HC ER LEVEL 1 CLOSED TREATMNT FRACTURE/DISLOCATION

## 2020-01-01 PROCEDURE — 76210000006 HC OR PH I REC 0.5 TO 1 HR: Performed by: ORTHOPAEDIC SURGERY

## 2020-01-01 PROCEDURE — 85025 COMPLETE CBC W/AUTO DIFF WBC: CPT

## 2020-01-01 PROCEDURE — 74011250637 HC RX REV CODE- 250/637: Performed by: ORTHOPAEDIC SURGERY

## 2020-01-01 PROCEDURE — 74011000258 HC RX REV CODE- 258: Performed by: HOSPITALIST

## 2020-01-01 PROCEDURE — 80053 COMPREHEN METABOLIC PANEL: CPT

## 2020-01-01 PROCEDURE — 36415 COLL VENOUS BLD VENIPUNCTURE: CPT

## 2020-01-01 PROCEDURE — 74011250636 HC RX REV CODE- 250/636: Performed by: EMERGENCY MEDICINE

## 2020-01-01 PROCEDURE — 83880 ASSAY OF NATRIURETIC PEPTIDE: CPT

## 2020-01-01 PROCEDURE — 74011250636 HC RX REV CODE- 250/636: Performed by: HOSPITALIST

## 2020-01-01 PROCEDURE — 74011250637 HC RX REV CODE- 250/637: Performed by: HOSPITALIST

## 2020-01-01 PROCEDURE — 83690 ASSAY OF LIPASE: CPT

## 2020-01-01 PROCEDURE — 65270000029 HC RM PRIVATE

## 2020-01-01 PROCEDURE — 81001 URINALYSIS AUTO W/SCOPE: CPT

## 2020-01-01 PROCEDURE — 71045 X-RAY EXAM CHEST 1 VIEW: CPT

## 2020-01-01 PROCEDURE — 82962 GLUCOSE BLOOD TEST: CPT

## 2020-01-01 PROCEDURE — 97162 PT EVAL MOD COMPLEX 30 MIN: CPT

## 2020-01-01 PROCEDURE — 99285 EMERGENCY DEPT VISIT HI MDM: CPT

## 2020-01-01 PROCEDURE — 76010000153 HC OR TIME 1.5 TO 2 HR: Performed by: ORTHOPAEDIC SURGERY

## 2020-01-01 PROCEDURE — 74011250636 HC RX REV CODE- 250/636

## 2020-01-01 PROCEDURE — 73502 X-RAY EXAM HIP UNI 2-3 VIEWS: CPT

## 2020-01-01 PROCEDURE — 51702 INSERT TEMP BLADDER CATH: CPT

## 2020-01-01 PROCEDURE — 92950 HEART/LUNG RESUSCITATION CPR: CPT

## 2020-01-01 PROCEDURE — 77010033678 HC OXYGEN DAILY

## 2020-01-01 PROCEDURE — 82550 ASSAY OF CK (CPK): CPT

## 2020-01-01 PROCEDURE — 80048 BASIC METABOLIC PNL TOTAL CA: CPT

## 2020-01-01 PROCEDURE — 71275 CT ANGIOGRAPHY CHEST: CPT

## 2020-01-01 PROCEDURE — 96365 THER/PROPH/DIAG IV INF INIT: CPT

## 2020-01-01 PROCEDURE — 96361 HYDRATE IV INFUSION ADD-ON: CPT

## 2020-01-01 PROCEDURE — 77030038269 HC DRN EXT URIN PURWCK BARD -A

## 2020-01-01 PROCEDURE — 5A1935Z RESPIRATORY VENTILATION, LESS THAN 24 CONSECUTIVE HOURS: ICD-10-PCS | Performed by: INTERNAL MEDICINE

## 2020-01-01 PROCEDURE — 96375 TX/PRO/DX INJ NEW DRUG ADDON: CPT

## 2020-01-01 PROCEDURE — 94003 VENT MGMT INPAT SUBQ DAY: CPT

## 2020-01-01 PROCEDURE — 93005 ELECTROCARDIOGRAM TRACING: CPT

## 2020-01-01 PROCEDURE — 74011250637 HC RX REV CODE- 250/637: Performed by: EMERGENCY MEDICINE

## 2020-01-01 PROCEDURE — 77030008771 HC TU NG SALEM SUMP -A

## 2020-01-01 PROCEDURE — 77030005513 HC CATH URETH FOL11 MDII -B

## 2020-01-01 PROCEDURE — 36600 WITHDRAWAL OF ARTERIAL BLOOD: CPT

## 2020-01-01 PROCEDURE — 74011636637 HC RX REV CODE- 636/637: Performed by: HOSPITALIST

## 2020-01-01 PROCEDURE — 74011250636 HC RX REV CODE- 250/636: Performed by: NURSE ANESTHETIST, CERTIFIED REGISTERED

## 2020-01-01 PROCEDURE — 74011000250 HC RX REV CODE- 250: Performed by: EMERGENCY MEDICINE

## 2020-01-01 PROCEDURE — 82803 BLOOD GASES ANY COMBINATION: CPT

## 2020-01-01 PROCEDURE — 83605 ASSAY OF LACTIC ACID: CPT

## 2020-01-01 PROCEDURE — 74011250636 HC RX REV CODE- 250/636: Performed by: ORTHOPAEDIC SURGERY

## 2020-01-01 PROCEDURE — 74018 RADEX ABDOMEN 1 VIEW: CPT

## 2020-01-01 PROCEDURE — 74011250637 HC RX REV CODE- 250/637: Performed by: INTERNAL MEDICINE

## 2020-01-01 PROCEDURE — 85610 PROTHROMBIN TIME: CPT

## 2020-01-01 PROCEDURE — 96374 THER/PROPH/DIAG INJ IV PUSH: CPT

## 2020-01-01 PROCEDURE — 74011250636 HC RX REV CODE- 250/636: Performed by: PHYSICIAN ASSISTANT

## 2020-01-01 PROCEDURE — 83735 ASSAY OF MAGNESIUM: CPT

## 2020-01-01 PROCEDURE — 77030008683 HC TU ET CUF COVD -A: Performed by: NURSE ANESTHETIST, CERTIFIED REGISTERED

## 2020-01-01 PROCEDURE — 74011000258 HC RX REV CODE- 258: Performed by: EMERGENCY MEDICINE

## 2020-01-01 PROCEDURE — 74011000250 HC RX REV CODE- 250: Performed by: NURSE ANESTHETIST, CERTIFIED REGISTERED

## 2020-01-01 PROCEDURE — 80307 DRUG TEST PRSMV CHEM ANLYZR: CPT

## 2020-01-01 PROCEDURE — C1713 ANCHOR/SCREW BN/BN,TIS/BN: HCPCS | Performed by: ORTHOPAEDIC SURGERY

## 2020-01-01 PROCEDURE — 74011636320 HC RX REV CODE- 636/320: Performed by: EMERGENCY MEDICINE

## 2020-01-01 PROCEDURE — 97530 THERAPEUTIC ACTIVITIES: CPT

## 2020-01-01 PROCEDURE — 77030018846 HC SOL IRR STRL H20 ICUM -A: Performed by: ORTHOPAEDIC SURGERY

## 2020-01-01 PROCEDURE — 97166 OT EVAL MOD COMPLEX 45 MIN: CPT

## 2020-01-01 PROCEDURE — 84520 ASSAY OF UREA NITROGEN: CPT

## 2020-01-01 PROCEDURE — 90471 IMMUNIZATION ADMIN: CPT

## 2020-01-01 PROCEDURE — 82330 ASSAY OF CALCIUM: CPT

## 2020-01-01 PROCEDURE — 84484 ASSAY OF TROPONIN QUANT: CPT

## 2020-01-01 PROCEDURE — 94002 VENT MGMT INPAT INIT DAY: CPT

## 2020-01-01 PROCEDURE — 90686 IIV4 VACC NO PRSV 0.5 ML IM: CPT | Performed by: HOSPITALIST

## 2020-01-01 PROCEDURE — 77030018836 HC SOL IRR NACL ICUM -A: Performed by: ORTHOPAEDIC SURGERY

## 2020-01-01 PROCEDURE — 81003 URINALYSIS AUTO W/O SCOPE: CPT

## 2020-01-01 PROCEDURE — 74011250636 HC RX REV CODE- 250/636: Performed by: INTERNAL MEDICINE

## 2020-01-01 PROCEDURE — 87635 SARS-COV-2 COVID-19 AMP PRB: CPT

## 2020-01-01 PROCEDURE — 77030020454 HC TU ET CUF HI/LO COVD -B

## 2020-01-01 PROCEDURE — 96376 TX/PRO/DX INJ SAME DRUG ADON: CPT

## 2020-01-01 PROCEDURE — 85379 FIBRIN DEGRADATION QUANT: CPT

## 2020-01-01 PROCEDURE — 73600 X-RAY EXAM OF ANKLE: CPT

## 2020-01-01 PROCEDURE — 74011000258 HC RX REV CODE- 258: Performed by: INTERNAL MEDICINE

## 2020-01-01 PROCEDURE — 99152 MOD SED SAME PHYS/QHP 5/>YRS: CPT

## 2020-01-01 PROCEDURE — 77030021678 HC GLIDESCP STAT DISP VERT -B: Performed by: NURSE ANESTHETIST, CERTIFIED REGISTERED

## 2020-01-01 PROCEDURE — 77030040361 HC SLV COMPR DVT MDII -B

## 2020-01-01 PROCEDURE — 97116 GAIT TRAINING THERAPY: CPT

## 2020-01-01 PROCEDURE — 87086 URINE CULTURE/COLONY COUNT: CPT

## 2020-01-01 PROCEDURE — 70450 CT HEAD/BRAIN W/O DYE: CPT

## 2020-01-01 PROCEDURE — 86900 BLOOD TYPING SEROLOGIC ABO: CPT

## 2020-01-01 PROCEDURE — 96368 THER/DIAG CONCURRENT INF: CPT

## 2020-01-01 PROCEDURE — 73590 X-RAY EXAM OF LOWER LEG: CPT

## 2020-01-01 PROCEDURE — 72110 X-RAY EXAM L-2 SPINE 4/>VWS: CPT

## 2020-01-01 PROCEDURE — 99283 EMERGENCY DEPT VISIT LOW MDM: CPT

## 2020-01-01 PROCEDURE — 77030008477 HC STYL SATN SLP COVD -A: Performed by: NURSE ANESTHETIST, CERTIFIED REGISTERED

## 2020-01-01 PROCEDURE — 85027 COMPLETE CBC AUTOMATED: CPT

## 2020-01-01 PROCEDURE — 97535 SELF CARE MNGMENT TRAINING: CPT

## 2020-01-01 PROCEDURE — 76060000034 HC ANESTHESIA 1.5 TO 2 HR: Performed by: ORTHOPAEDIC SURGERY

## 2020-01-01 PROCEDURE — 74011000250 HC RX REV CODE- 250: Performed by: HOSPITALIST

## 2020-01-01 PROCEDURE — 65610000006 HC RM INTENSIVE CARE

## 2020-01-01 PROCEDURE — 87040 BLOOD CULTURE FOR BACTERIA: CPT

## 2020-01-01 PROCEDURE — 77030016451 HC BIT DRL AO3 STRY -C: Performed by: ORTHOPAEDIC SURGERY

## 2020-01-01 PROCEDURE — 94762 N-INVAS EAR/PLS OXIMTRY CONT: CPT

## 2020-01-01 PROCEDURE — 85730 THROMBOPLASTIN TIME PARTIAL: CPT

## 2020-01-01 PROCEDURE — 75810000455 HC PLCMT CENT VENOUS CATH LVL 2 5182

## 2020-01-01 PROCEDURE — 84100 ASSAY OF PHOSPHORUS: CPT

## 2020-01-01 PROCEDURE — 83036 HEMOGLOBIN GLYCOSYLATED A1C: CPT

## 2020-01-01 PROCEDURE — 77030022643 HC PAD DEFIB AD HRTSTRT PHL -B

## 2020-01-01 PROCEDURE — 99291 CRITICAL CARE FIRST HOUR: CPT

## 2020-01-01 PROCEDURE — 74177 CT ABD & PELVIS W/CONTRAST: CPT

## 2020-01-01 PROCEDURE — 0QS636Z REPOSITION RIGHT UPPER FEMUR WITH INTRAMEDULLARY INTERNAL FIXATION DEVICE, PERCUTANEOUS APPROACH: ICD-10-PCS | Performed by: ORTHOPAEDIC SURGERY

## 2020-01-01 DEVICE — LAG SCREW, TI
Type: IMPLANTABLE DEVICE | Site: FEMUR | Status: FUNCTIONAL
Brand: GAMMA

## 2020-01-01 DEVICE — LOCKING SCREW, FULLY THREADED: Type: IMPLANTABLE DEVICE | Site: FEMUR | Status: FUNCTIONAL

## 2020-01-01 DEVICE — TROCHANTERIC NAIL KIT, TI
Type: IMPLANTABLE DEVICE | Site: FEMUR | Status: FUNCTIONAL
Brand: GAMMA

## 2020-01-01 RX ORDER — NALOXONE HYDROCHLORIDE 0.4 MG/ML
0.2 INJECTION, SOLUTION INTRAMUSCULAR; INTRAVENOUS; SUBCUTANEOUS AS NEEDED
Status: DISCONTINUED | OUTPATIENT
Start: 2020-01-01 | End: 2020-01-01 | Stop reason: HOSPADM

## 2020-01-01 RX ORDER — PROPOFOL 10 MG/ML
INJECTION, EMULSION INTRAVENOUS AS NEEDED
Status: DISCONTINUED | OUTPATIENT
Start: 2020-01-01 | End: 2020-01-01 | Stop reason: HOSPADM

## 2020-01-01 RX ORDER — SCOLOPAMINE TRANSDERMAL SYSTEM 1 MG/1
1 PATCH, EXTENDED RELEASE TRANSDERMAL
Status: DISCONTINUED | OUTPATIENT
Start: 2020-01-01 | End: 2020-08-23 | Stop reason: HOSPADM

## 2020-01-01 RX ORDER — SODIUM CHLORIDE 0.9 % (FLUSH) 0.9 %
5-10 SYRINGE (ML) INJECTION AS NEEDED
Status: DISCONTINUED | OUTPATIENT
Start: 2020-01-01 | End: 2020-01-01

## 2020-01-01 RX ORDER — HYDROMORPHONE HYDROCHLORIDE 2 MG/1
2 TABLET ORAL
Status: DISCONTINUED | OUTPATIENT
Start: 2020-01-01 | End: 2020-01-01 | Stop reason: HOSPADM

## 2020-01-01 RX ORDER — ONDANSETRON 2 MG/ML
INJECTION INTRAMUSCULAR; INTRAVENOUS AS NEEDED
Status: DISCONTINUED | OUTPATIENT
Start: 2020-01-01 | End: 2020-01-01 | Stop reason: HOSPADM

## 2020-01-01 RX ORDER — MAGNESIUM SULFATE 100 %
16 CRYSTALS MISCELLANEOUS AS NEEDED
Status: DISCONTINUED | OUTPATIENT
Start: 2020-01-01 | End: 2020-01-01

## 2020-01-01 RX ORDER — LIDOCAINE HYDROCHLORIDE 20 MG/ML
INJECTION, SOLUTION EPIDURAL; INFILTRATION; INTRACAUDAL; PERINEURAL AS NEEDED
Status: DISCONTINUED | OUTPATIENT
Start: 2020-01-01 | End: 2020-01-01 | Stop reason: HOSPADM

## 2020-01-01 RX ORDER — SODIUM CHLORIDE, SODIUM LACTATE, POTASSIUM CHLORIDE, CALCIUM CHLORIDE 600; 310; 30; 20 MG/100ML; MG/100ML; MG/100ML; MG/100ML
100 INJECTION, SOLUTION INTRAVENOUS CONTINUOUS
Status: DISCONTINUED | OUTPATIENT
Start: 2020-01-01 | End: 2020-01-01 | Stop reason: HOSPADM

## 2020-01-01 RX ORDER — OLANZAPINE 10 MG/1
15 TABLET ORAL
COMMUNITY

## 2020-01-01 RX ORDER — HYDROCODONE BITARTRATE AND ACETAMINOPHEN 5; 325 MG/1; MG/1
1 TABLET ORAL ONCE
Status: COMPLETED | OUTPATIENT
Start: 2020-01-01 | End: 2020-01-01

## 2020-01-01 RX ORDER — ENOXAPARIN SODIUM 100 MG/ML
40 INJECTION SUBCUTANEOUS EVERY 24 HOURS
Status: DISCONTINUED | OUTPATIENT
Start: 2020-01-01 | End: 2020-01-01

## 2020-01-01 RX ORDER — HYDROMORPHONE HYDROCHLORIDE 1 MG/ML
1 INJECTION, SOLUTION INTRAMUSCULAR; INTRAVENOUS; SUBCUTANEOUS ONCE
Status: COMPLETED | OUTPATIENT
Start: 2020-01-01 | End: 2020-01-01

## 2020-01-01 RX ORDER — HYDROMORPHONE HYDROCHLORIDE 1 MG/ML
INJECTION, SOLUTION INTRAMUSCULAR; INTRAVENOUS; SUBCUTANEOUS AS NEEDED
Status: DISCONTINUED | OUTPATIENT
Start: 2020-01-01 | End: 2020-01-01 | Stop reason: HOSPADM

## 2020-01-01 RX ORDER — HYDROMORPHONE HYDROCHLORIDE 1 MG/ML
0.5 INJECTION, SOLUTION INTRAMUSCULAR; INTRAVENOUS; SUBCUTANEOUS ONCE
Status: COMPLETED | OUTPATIENT
Start: 2020-01-01 | End: 2020-01-01

## 2020-01-01 RX ORDER — POTASSIUM CHLORIDE 29.8 MG/ML
20 INJECTION INTRAVENOUS
Status: DISCONTINUED | OUTPATIENT
Start: 2020-01-01 | End: 2020-01-01

## 2020-01-01 RX ORDER — SODIUM CHLORIDE 0.9 % (FLUSH) 0.9 %
5-40 SYRINGE (ML) INJECTION AS NEEDED
Status: DISCONTINUED | OUTPATIENT
Start: 2020-01-01 | End: 2020-01-01 | Stop reason: HOSPADM

## 2020-01-01 RX ORDER — SODIUM CHLORIDE 0.9 % (FLUSH) 0.9 %
5-40 SYRINGE (ML) INJECTION EVERY 8 HOURS
Status: DISCONTINUED | OUTPATIENT
Start: 2020-01-01 | End: 2020-01-01 | Stop reason: HOSPADM

## 2020-01-01 RX ORDER — AMIODARONE HYDROCHLORIDE 150 MG/3ML
300 INJECTION, SOLUTION INTRAVENOUS
Status: COMPLETED | OUTPATIENT
Start: 2020-01-01 | End: 2020-01-01

## 2020-01-01 RX ORDER — ASPIRIN 300 MG/1
300 SUPPOSITORY RECTAL
Status: COMPLETED | OUTPATIENT
Start: 2020-01-01 | End: 2020-01-01

## 2020-01-01 RX ORDER — HYDRALAZINE HYDROCHLORIDE 20 MG/ML
10 INJECTION INTRAMUSCULAR; INTRAVENOUS
Status: DISCONTINUED | OUTPATIENT
Start: 2020-01-01 | End: 2020-01-01 | Stop reason: HOSPADM

## 2020-01-01 RX ORDER — LORAZEPAM 2 MG/ML
3 INJECTION INTRAMUSCULAR
Status: DISCONTINUED | OUTPATIENT
Start: 2020-01-01 | End: 2020-01-01 | Stop reason: HOSPADM

## 2020-01-01 RX ORDER — HYDROMORPHONE HYDROCHLORIDE 1 MG/ML
0.5 INJECTION, SOLUTION INTRAMUSCULAR; INTRAVENOUS; SUBCUTANEOUS
Status: DISCONTINUED | OUTPATIENT
Start: 2020-01-01 | End: 2020-08-23 | Stop reason: HOSPADM

## 2020-01-01 RX ORDER — ONDANSETRON 2 MG/ML
4 INJECTION INTRAMUSCULAR; INTRAVENOUS
Status: COMPLETED | OUTPATIENT
Start: 2020-01-01 | End: 2020-01-01

## 2020-01-01 RX ORDER — LANOLIN ALCOHOL/MO/W.PET/CERES
325 CREAM (GRAM) TOPICAL DAILY
Status: DISCONTINUED | OUTPATIENT
Start: 2020-01-01 | End: 2020-01-01 | Stop reason: HOSPADM

## 2020-01-01 RX ORDER — DEXAMETHASONE SODIUM PHOSPHATE 4 MG/ML
INJECTION, SOLUTION INTRA-ARTICULAR; INTRALESIONAL; INTRAMUSCULAR; INTRAVENOUS; SOFT TISSUE AS NEEDED
Status: DISCONTINUED | OUTPATIENT
Start: 2020-01-01 | End: 2020-01-01 | Stop reason: HOSPADM

## 2020-01-01 RX ORDER — HYDROMORPHONE HYDROCHLORIDE 1 MG/ML
2 INJECTION, SOLUTION INTRAMUSCULAR; INTRAVENOUS; SUBCUTANEOUS
Status: DISCONTINUED | OUTPATIENT
Start: 2020-01-01 | End: 2020-01-01

## 2020-01-01 RX ORDER — EPINEPHRINE 1 MG/ML
1 INJECTION INTRAMUSCULAR; INTRAVENOUS; SUBCUTANEOUS
Status: COMPLETED | OUTPATIENT
Start: 2020-01-01 | End: 2020-01-01

## 2020-01-01 RX ORDER — ONDANSETRON 4 MG/1
4 TABLET, ORALLY DISINTEGRATING ORAL
COMMUNITY
Start: 2020-01-01

## 2020-01-01 RX ORDER — ALENDRONATE SODIUM 70 MG/1
70 TABLET ORAL
COMMUNITY

## 2020-01-01 RX ORDER — PROPOFOL 10 MG/ML
INJECTION, EMULSION INTRAVENOUS
Status: COMPLETED
Start: 2020-01-01 | End: 2020-01-01

## 2020-01-01 RX ORDER — ENOXAPARIN SODIUM 100 MG/ML
40 INJECTION SUBCUTANEOUS DAILY
Qty: 5.6 ML | Refills: 0 | Status: SHIPPED | OUTPATIENT
Start: 2020-01-01 | End: 2020-01-01

## 2020-01-01 RX ORDER — ACETAMINOPHEN 650 MG/1
650 SUPPOSITORY RECTAL
Status: DISCONTINUED | OUTPATIENT
Start: 2020-01-01 | End: 2020-01-01

## 2020-01-01 RX ORDER — ETOMIDATE 2 MG/ML
15 INJECTION INTRAVENOUS ONCE
Status: DISCONTINUED | OUTPATIENT
Start: 2020-01-01 | End: 2020-01-01

## 2020-01-01 RX ORDER — ETOMIDATE 2 MG/ML
9 INJECTION INTRAVENOUS ONCE
Status: COMPLETED | OUTPATIENT
Start: 2020-01-01 | End: 2020-01-01

## 2020-01-01 RX ORDER — DEXTROSE MONOHYDRATE 100 MG/ML
125-250 INJECTION, SOLUTION INTRAVENOUS AS NEEDED
Status: DISCONTINUED | OUTPATIENT
Start: 2020-01-01 | End: 2020-01-01

## 2020-01-01 RX ORDER — DEXTROSE MONOHYDRATE 100 MG/ML
125-250 INJECTION, SOLUTION INTRAVENOUS AS NEEDED
Status: DISCONTINUED | OUTPATIENT
Start: 2020-01-01 | End: 2020-01-01 | Stop reason: HOSPADM

## 2020-01-01 RX ORDER — INSULIN LISPRO 100 [IU]/ML
INJECTION, SOLUTION INTRAVENOUS; SUBCUTANEOUS EVERY 6 HOURS
Status: DISCONTINUED | OUTPATIENT
Start: 2020-01-01 | End: 2020-01-01

## 2020-01-01 RX ORDER — SUCCINYLCHOLINE CHLORIDE 100 MG/5ML
SYRINGE (ML) INTRAVENOUS AS NEEDED
Status: DISCONTINUED | OUTPATIENT
Start: 2020-01-01 | End: 2020-01-01 | Stop reason: HOSPADM

## 2020-01-01 RX ORDER — MAGNESIUM SULFATE HEPTAHYDRATE 40 MG/ML
2 INJECTION, SOLUTION INTRAVENOUS
Status: COMPLETED | OUTPATIENT
Start: 2020-01-01 | End: 2020-01-01

## 2020-01-01 RX ORDER — LEVOTHYROXINE SODIUM 50 UG/1
50 TABLET ORAL DAILY
COMMUNITY

## 2020-01-01 RX ORDER — HYDROXYZINE PAMOATE 50 MG/1
50 CAPSULE ORAL 2 TIMES DAILY
Status: DISCONTINUED | OUTPATIENT
Start: 2020-01-01 | End: 2020-01-01 | Stop reason: HOSPADM

## 2020-01-01 RX ORDER — FENTANYL CITRATE 50 UG/ML
100 INJECTION, SOLUTION INTRAMUSCULAR; INTRAVENOUS
Status: COMPLETED | OUTPATIENT
Start: 2020-01-01 | End: 2020-01-01

## 2020-01-01 RX ORDER — LEVOTHYROXINE SODIUM 25 UG/1
50 TABLET ORAL
Status: DISCONTINUED | OUTPATIENT
Start: 2020-01-01 | End: 2020-01-01 | Stop reason: HOSPADM

## 2020-01-01 RX ORDER — DEXTROSE MONOHYDRATE AND SODIUM CHLORIDE 5; .45 G/100ML; G/100ML
50 INJECTION, SOLUTION INTRAVENOUS CONTINUOUS
Status: DISCONTINUED | OUTPATIENT
Start: 2020-01-01 | End: 2020-01-01

## 2020-01-01 RX ORDER — PANTOPRAZOLE SODIUM 40 MG/1
40 TABLET, DELAYED RELEASE ORAL
Status: DISCONTINUED | OUTPATIENT
Start: 2020-01-01 | End: 2020-01-01 | Stop reason: HOSPADM

## 2020-01-01 RX ORDER — NALOXONE HYDROCHLORIDE 0.4 MG/ML
0.4 INJECTION, SOLUTION INTRAMUSCULAR; INTRAVENOUS; SUBCUTANEOUS AS NEEDED
Status: DISCONTINUED | OUTPATIENT
Start: 2020-01-01 | End: 2020-01-01

## 2020-01-01 RX ORDER — MELATONIN
1000 DAILY
COMMUNITY

## 2020-01-01 RX ORDER — FENTANYL CITRATE 50 UG/ML
50 INJECTION, SOLUTION INTRAMUSCULAR; INTRAVENOUS
Status: COMPLETED | OUTPATIENT
Start: 2020-01-01 | End: 2020-01-01

## 2020-01-01 RX ORDER — FOLIC ACID 1 MG/1
1 TABLET ORAL DAILY
Status: DISCONTINUED | OUTPATIENT
Start: 2020-01-01 | End: 2020-01-01 | Stop reason: HOSPADM

## 2020-01-01 RX ORDER — FENTANYL CITRATE 50 UG/ML
50 INJECTION, SOLUTION INTRAMUSCULAR; INTRAVENOUS
Status: DISCONTINUED | OUTPATIENT
Start: 2020-01-01 | End: 2020-01-01 | Stop reason: HOSPADM

## 2020-01-01 RX ORDER — LORAZEPAM 1 MG/1
2 TABLET ORAL
Status: DISCONTINUED | OUTPATIENT
Start: 2020-01-01 | End: 2020-01-01 | Stop reason: HOSPADM

## 2020-01-01 RX ORDER — SODIUM CHLORIDE 9 MG/ML
100 INJECTION, SOLUTION INTRAVENOUS ONCE
Status: COMPLETED | OUTPATIENT
Start: 2020-01-01 | End: 2020-01-01

## 2020-01-01 RX ORDER — LANOLIN ALCOHOL/MO/W.PET/CERES
100 CREAM (GRAM) TOPICAL DAILY
COMMUNITY
Start: 2020-01-01

## 2020-01-01 RX ORDER — ASPIRIN 325 MG/1
100 TABLET, FILM COATED ORAL DAILY
Status: DISCONTINUED | OUTPATIENT
Start: 2020-01-01 | End: 2020-01-01 | Stop reason: HOSPADM

## 2020-01-01 RX ORDER — ESCITALOPRAM OXALATE 10 MG/1
10 TABLET ORAL DAILY
Status: DISCONTINUED | OUTPATIENT
Start: 2020-01-01 | End: 2020-01-01

## 2020-01-01 RX ORDER — HYDROMORPHONE HYDROCHLORIDE 2 MG/1
2 TABLET ORAL
Qty: 18 TAB | Refills: 0 | Status: SHIPPED | OUTPATIENT
Start: 2020-01-01 | End: 2020-01-01

## 2020-01-01 RX ORDER — HYDROMORPHONE HYDROCHLORIDE 2 MG/ML
1 INJECTION, SOLUTION INTRAMUSCULAR; INTRAVENOUS; SUBCUTANEOUS
Status: DISCONTINUED | OUTPATIENT
Start: 2020-01-01 | End: 2020-01-01

## 2020-01-01 RX ORDER — PROMETHAZINE HYDROCHLORIDE 25 MG/1
12.5 TABLET ORAL
Status: DISCONTINUED | OUTPATIENT
Start: 2020-01-01 | End: 2020-01-01

## 2020-01-01 RX ORDER — DIAPER,BRIEF,INFANT-TODD,DISP
10000 EACH MISCELLANEOUS DAILY
Status: DISCONTINUED | OUTPATIENT
Start: 2020-01-01 | End: 2020-01-01 | Stop reason: HOSPADM

## 2020-01-01 RX ORDER — HYDROMORPHONE HYDROCHLORIDE 1 MG/ML
0.5 INJECTION, SOLUTION INTRAMUSCULAR; INTRAVENOUS; SUBCUTANEOUS
Status: DISCONTINUED | OUTPATIENT
Start: 2020-01-01 | End: 2020-01-01 | Stop reason: HOSPADM

## 2020-01-01 RX ORDER — ACETAMINOPHEN 325 MG/1
650 TABLET ORAL
Status: DISCONTINUED | OUTPATIENT
Start: 2020-01-01 | End: 2020-01-01 | Stop reason: HOSPADM

## 2020-01-01 RX ORDER — PROPOFOL 10 MG/ML
INJECTION, EMULSION INTRAVENOUS
Status: DISCONTINUED
Start: 2020-01-01 | End: 2020-01-01

## 2020-01-01 RX ORDER — NALOXONE HYDROCHLORIDE 0.4 MG/ML
0.4 INJECTION, SOLUTION INTRAMUSCULAR; INTRAVENOUS; SUBCUTANEOUS AS NEEDED
Status: DISCONTINUED | OUTPATIENT
Start: 2020-01-01 | End: 2020-01-01 | Stop reason: HOSPADM

## 2020-01-01 RX ORDER — UREA 10 %
0.8 LOTION (ML) TOPICAL DAILY
COMMUNITY

## 2020-01-01 RX ORDER — LORAZEPAM 2 MG/ML
1 INJECTION INTRAMUSCULAR
Status: DISCONTINUED | OUTPATIENT
Start: 2020-01-01 | End: 2020-01-01 | Stop reason: HOSPADM

## 2020-01-01 RX ORDER — TRAMADOL HYDROCHLORIDE 50 MG/1
50 TABLET ORAL
Qty: 12 TAB | Refills: 0 | Status: SHIPPED | OUTPATIENT
Start: 2020-01-01 | End: 2020-01-01

## 2020-01-01 RX ORDER — SODIUM CHLORIDE 0.9 % (FLUSH) 0.9 %
5-40 SYRINGE (ML) INJECTION EVERY 8 HOURS
Status: DISCONTINUED | OUTPATIENT
Start: 2020-01-01 | End: 2020-01-01

## 2020-01-01 RX ORDER — ETOMIDATE 2 MG/ML
6 INJECTION INTRAVENOUS ONCE
Status: COMPLETED | OUTPATIENT
Start: 2020-01-01 | End: 2020-01-01

## 2020-01-01 RX ORDER — SODIUM CHLORIDE AND POTASSIUM CHLORIDE .9; .15 G/100ML; G/100ML
SOLUTION INTRAVENOUS CONTINUOUS
Status: DISCONTINUED | OUTPATIENT
Start: 2020-01-01 | End: 2020-01-01 | Stop reason: HOSPADM

## 2020-01-01 RX ORDER — HYDROXYZINE PAMOATE 25 MG/1
50 CAPSULE ORAL 2 TIMES DAILY
Status: DISCONTINUED | OUTPATIENT
Start: 2020-01-01 | End: 2020-01-01 | Stop reason: HOSPADM

## 2020-01-01 RX ORDER — INSULIN GLARGINE 100 [IU]/ML
6 INJECTION, SOLUTION SUBCUTANEOUS DAILY
Status: DISCONTINUED | OUTPATIENT
Start: 2020-01-01 | End: 2020-01-01

## 2020-01-01 RX ORDER — LORAZEPAM 2 MG/ML
1 CONCENTRATE ORAL
Status: DISCONTINUED | OUTPATIENT
Start: 2020-01-01 | End: 2020-01-01

## 2020-01-01 RX ORDER — IBUPROFEN 800 MG/1
800 TABLET ORAL EVERY 8 HOURS
Qty: 15 TAB | Refills: 0 | Status: SHIPPED | OUTPATIENT
Start: 2020-01-01 | End: 2020-08-23

## 2020-01-01 RX ORDER — LORAZEPAM 2 MG/ML
1 INJECTION INTRAMUSCULAR
Status: DISCONTINUED | OUTPATIENT
Start: 2020-01-01 | End: 2020-08-23 | Stop reason: HOSPADM

## 2020-01-01 RX ORDER — HYDROMORPHONE HYDROCHLORIDE 1 MG/ML
INJECTION, SOLUTION INTRAMUSCULAR; INTRAVENOUS; SUBCUTANEOUS
Status: DISPENSED
Start: 2020-01-01 | End: 2020-01-01

## 2020-01-01 RX ORDER — ENOXAPARIN SODIUM 100 MG/ML
40 INJECTION SUBCUTANEOUS DAILY
Status: DISCONTINUED | OUTPATIENT
Start: 2020-01-01 | End: 2020-01-01 | Stop reason: HOSPADM

## 2020-01-01 RX ORDER — MORPHINE SULFATE 2 MG/ML
2 INJECTION, SOLUTION INTRAMUSCULAR; INTRAVENOUS
Status: DISCONTINUED | OUTPATIENT
Start: 2020-01-01 | End: 2020-01-01

## 2020-01-01 RX ORDER — ONDANSETRON 2 MG/ML
4 INJECTION INTRAMUSCULAR; INTRAVENOUS
Status: DISCONTINUED | OUTPATIENT
Start: 2020-01-01 | End: 2020-01-01 | Stop reason: HOSPADM

## 2020-01-01 RX ORDER — POLYETHYLENE GLYCOL 3350 17 G/17G
17 POWDER, FOR SOLUTION ORAL DAILY PRN
Status: DISCONTINUED | OUTPATIENT
Start: 2020-01-01 | End: 2020-01-01

## 2020-01-01 RX ORDER — LEVETIRACETAM 500 MG/5ML
INJECTION, SOLUTION, CONCENTRATE INTRAVENOUS
Status: DISCONTINUED
Start: 2020-01-01 | End: 2020-01-01

## 2020-01-01 RX ORDER — PANTOPRAZOLE SODIUM 20 MG/1
20 TABLET, DELAYED RELEASE ORAL 2 TIMES DAILY
COMMUNITY
End: 2020-01-01

## 2020-01-01 RX ORDER — LEVETIRACETAM 750 MG/1
1 TABLET, EXTENDED RELEASE ORAL
COMMUNITY
Start: 2019-08-20

## 2020-01-01 RX ORDER — TRAMADOL HYDROCHLORIDE 50 MG/1
50 TABLET ORAL
Status: DISCONTINUED | OUTPATIENT
Start: 2020-01-01 | End: 2020-01-01 | Stop reason: HOSPADM

## 2020-01-01 RX ORDER — ERGOCALCIFEROL 1.25 MG/1
50000 CAPSULE ORAL
COMMUNITY
End: 2020-01-01

## 2020-01-01 RX ORDER — ENOXAPARIN SODIUM 100 MG/ML
40 INJECTION SUBCUTANEOUS DAILY
Status: DISCONTINUED | OUTPATIENT
Start: 2020-01-01 | End: 2020-01-01

## 2020-01-01 RX ORDER — COLESEVELAM 180 1/1
1250 TABLET ORAL 2 TIMES DAILY WITH MEALS
Status: DISCONTINUED | OUTPATIENT
Start: 2020-01-01 | End: 2020-01-01

## 2020-01-01 RX ORDER — FENTANYL CITRATE 50 UG/ML
50 INJECTION, SOLUTION INTRAMUSCULAR; INTRAVENOUS
Status: DISCONTINUED | OUTPATIENT
Start: 2020-01-01 | End: 2020-01-01 | Stop reason: SDUPTHER

## 2020-01-01 RX ORDER — PROPOFOL 10 MG/ML
0-50 VIAL (ML) INTRAVENOUS
Status: DISCONTINUED | OUTPATIENT
Start: 2020-01-01 | End: 2020-01-01

## 2020-01-01 RX ORDER — ESCITALOPRAM OXALATE 10 MG/1
10 TABLET ORAL DAILY
Status: DISCONTINUED | OUTPATIENT
Start: 2020-01-01 | End: 2020-01-01 | Stop reason: HOSPADM

## 2020-01-01 RX ORDER — ONDANSETRON 2 MG/ML
4 INJECTION INTRAMUSCULAR; INTRAVENOUS
Status: DISCONTINUED | OUTPATIENT
Start: 2020-01-01 | End: 2020-01-01

## 2020-01-01 RX ORDER — MAGNESIUM SULFATE 100 %
4 CRYSTALS MISCELLANEOUS AS NEEDED
Status: DISCONTINUED | OUTPATIENT
Start: 2020-01-01 | End: 2020-01-01 | Stop reason: HOSPADM

## 2020-01-01 RX ORDER — FLUOXETINE HYDROCHLORIDE 40 MG/1
40 CAPSULE ORAL DAILY
COMMUNITY

## 2020-01-01 RX ORDER — POTASSIUM CHLORIDE 7.45 MG/ML
10 INJECTION INTRAVENOUS
Status: DISPENSED | OUTPATIENT
Start: 2020-01-01 | End: 2020-01-01

## 2020-01-01 RX ORDER — ACETAMINOPHEN 325 MG/1
650 TABLET ORAL
Status: DISCONTINUED | OUTPATIENT
Start: 2020-01-01 | End: 2020-01-01

## 2020-01-01 RX ORDER — CLINDAMYCIN PHOSPHATE 900 MG/50ML
INJECTION INTRAVENOUS AS NEEDED
Status: DISCONTINUED | OUTPATIENT
Start: 2020-01-01 | End: 2020-01-01 | Stop reason: HOSPADM

## 2020-01-01 RX ORDER — LORAZEPAM 2 MG/ML
2 INJECTION INTRAMUSCULAR
Status: DISCONTINUED | OUTPATIENT
Start: 2020-01-01 | End: 2020-01-01

## 2020-01-01 RX ORDER — SULFAMETHOXAZOLE AND TRIMETHOPRIM 800; 160 MG/1; MG/1
1 TABLET ORAL 2 TIMES DAILY
COMMUNITY
Start: 2020-01-01 | End: 2020-01-01

## 2020-01-01 RX ORDER — FENTANYL CITRATE 50 UG/ML
INJECTION, SOLUTION INTRAMUSCULAR; INTRAVENOUS
Status: COMPLETED
Start: 2020-01-01 | End: 2020-01-01

## 2020-01-01 RX ORDER — PREDNISONE 20 MG/1
60 TABLET ORAL DAILY
Qty: 15 TAB | Refills: 0 | Status: SHIPPED | OUTPATIENT
Start: 2020-01-01 | End: 2020-08-23

## 2020-01-01 RX ORDER — LEVETIRACETAM 250 MG/1
375 TABLET ORAL 2 TIMES DAILY
Status: DISCONTINUED | OUTPATIENT
Start: 2020-01-01 | End: 2020-01-01 | Stop reason: HOSPADM

## 2020-01-01 RX ORDER — SODIUM CHLORIDE 0.9 % (FLUSH) 0.9 %
5-40 SYRINGE (ML) INJECTION AS NEEDED
Status: DISCONTINUED | OUTPATIENT
Start: 2020-01-01 | End: 2020-01-01

## 2020-01-01 RX ORDER — FENTANYL CITRATE 50 UG/ML
INJECTION, SOLUTION INTRAMUSCULAR; INTRAVENOUS AS NEEDED
Status: DISCONTINUED | OUTPATIENT
Start: 2020-01-01 | End: 2020-01-01 | Stop reason: HOSPADM

## 2020-01-01 RX ORDER — LEVETIRACETAM 500 MG/1
500 TABLET ORAL ONCE
Status: COMPLETED | OUTPATIENT
Start: 2020-01-01 | End: 2020-01-01

## 2020-01-01 RX ORDER — NOREPINEPHRINE BIT/0.9 % NACL 8 MG/250ML
.5-3 INFUSION BOTTLE (ML) INTRAVENOUS
Status: DISCONTINUED | OUTPATIENT
Start: 2020-01-01 | End: 2020-01-01

## 2020-01-01 RX ORDER — ACETAMINOPHEN 650 MG/1
650 SUPPOSITORY RECTAL
Status: DISCONTINUED | OUTPATIENT
Start: 2020-01-01 | End: 2020-08-23 | Stop reason: HOSPADM

## 2020-01-01 RX ORDER — LORAZEPAM 1 MG/1
1 TABLET ORAL
Status: DISCONTINUED | OUTPATIENT
Start: 2020-01-01 | End: 2020-01-01 | Stop reason: HOSPADM

## 2020-01-01 RX ORDER — NOREPINEPHRINE BIT/0.9 % NACL 8 MG/250ML
INFUSION BOTTLE (ML) INTRAVENOUS
Status: DISCONTINUED
Start: 2020-01-01 | End: 2020-01-01

## 2020-01-01 RX ORDER — CYCLOBENZAPRINE HCL 5 MG
5 TABLET ORAL 3 TIMES DAILY
Qty: 9 TAB | Refills: 0 | Status: SHIPPED | OUTPATIENT
Start: 2020-01-01

## 2020-01-01 RX ORDER — INSULIN LISPRO 100 [IU]/ML
INJECTION, SOLUTION INTRAVENOUS; SUBCUTANEOUS
Status: DISCONTINUED | OUTPATIENT
Start: 2020-01-01 | End: 2020-01-01 | Stop reason: HOSPADM

## 2020-01-01 RX ORDER — LORAZEPAM 2 MG/ML
2 INJECTION INTRAMUSCULAR
Status: DISCONTINUED | OUTPATIENT
Start: 2020-01-01 | End: 2020-01-01 | Stop reason: HOSPADM

## 2020-01-01 RX ORDER — FENTANYL CITRATE 50 UG/ML
25 INJECTION, SOLUTION INTRAMUSCULAR; INTRAVENOUS
Status: DISCONTINUED | OUTPATIENT
Start: 2020-01-01 | End: 2020-01-01 | Stop reason: HOSPADM

## 2020-01-01 RX ORDER — HYDROMORPHONE HYDROCHLORIDE 1 MG/ML
1 INJECTION, SOLUTION INTRAMUSCULAR; INTRAVENOUS; SUBCUTANEOUS
Status: DISCONTINUED | OUTPATIENT
Start: 2020-01-01 | End: 2020-01-01 | Stop reason: HOSPADM

## 2020-01-01 RX ORDER — SODIUM CHLORIDE, SODIUM LACTATE, POTASSIUM CHLORIDE, CALCIUM CHLORIDE 600; 310; 30; 20 MG/100ML; MG/100ML; MG/100ML; MG/100ML
INJECTION, SOLUTION INTRAVENOUS
Status: DISCONTINUED | OUTPATIENT
Start: 2020-01-01 | End: 2020-01-01 | Stop reason: HOSPADM

## 2020-01-01 RX ORDER — LEVOFLOXACIN 5 MG/ML
750 INJECTION, SOLUTION INTRAVENOUS
Status: DISCONTINUED | OUTPATIENT
Start: 2020-01-01 | End: 2020-01-01

## 2020-01-01 RX ORDER — MIDAZOLAM HYDROCHLORIDE 1 MG/ML
INJECTION, SOLUTION INTRAMUSCULAR; INTRAVENOUS AS NEEDED
Status: DISCONTINUED | OUTPATIENT
Start: 2020-01-01 | End: 2020-01-01 | Stop reason: HOSPADM

## 2020-01-01 RX ORDER — LEVOTHYROXINE SODIUM 50 UG/1
50 TABLET ORAL
Status: DISCONTINUED | OUTPATIENT
Start: 2020-01-01 | End: 2020-01-01 | Stop reason: HOSPADM

## 2020-01-01 RX ADMIN — PANTOPRAZOLE SODIUM 40 MG: 40 TABLET, DELAYED RELEASE ORAL at 17:40

## 2020-01-01 RX ADMIN — ENOXAPARIN SODIUM 40 MG: 40 INJECTION SUBCUTANEOUS at 08:23

## 2020-01-01 RX ADMIN — HYDROMORPHONE HYDROCHLORIDE 0.5 MG: 1 INJECTION, SOLUTION INTRAMUSCULAR; INTRAVENOUS; SUBCUTANEOUS at 12:31

## 2020-01-01 RX ADMIN — HYDROMORPHONE HYDROCHLORIDE 2 MG: 2 TABLET ORAL at 12:44

## 2020-01-01 RX ADMIN — MORPHINE SULFATE 2 MG: 2 INJECTION, SOLUTION INTRAMUSCULAR; INTRAVENOUS at 12:35

## 2020-01-01 RX ADMIN — PROPOFOL 50 MCG/KG/MIN: 10 INJECTION, EMULSION INTRAVENOUS at 09:44

## 2020-01-01 RX ADMIN — HYDROMORPHONE HYDROCHLORIDE 0.5 MG: 1 INJECTION, SOLUTION INTRAMUSCULAR; INTRAVENOUS; SUBCUTANEOUS at 12:24

## 2020-01-01 RX ADMIN — HYDROMORPHONE HYDROCHLORIDE 0.5 MG: 1 INJECTION, SOLUTION INTRAMUSCULAR; INTRAVENOUS; SUBCUTANEOUS at 14:05

## 2020-01-01 RX ADMIN — FENTANYL CITRATE 50 MCG: 50 INJECTION, SOLUTION INTRAMUSCULAR; INTRAVENOUS at 23:41

## 2020-01-01 RX ADMIN — Medication 100 MG: at 08:58

## 2020-01-01 RX ADMIN — HYDROMORPHONE HYDROCHLORIDE 0.5 MG: 1 INJECTION, SOLUTION INTRAMUSCULAR; INTRAVENOUS; SUBCUTANEOUS at 06:28

## 2020-01-01 RX ADMIN — POTASSIUM CHLORIDE 20 MEQ: 400 INJECTION, SOLUTION INTRAVENOUS at 09:46

## 2020-01-01 RX ADMIN — FENTANYL CITRATE 100 MCG: 50 INJECTION, SOLUTION INTRAMUSCULAR; INTRAVENOUS at 04:44

## 2020-01-01 RX ADMIN — HYDROMORPHONE HYDROCHLORIDE 0.5 MG: 1 INJECTION, SOLUTION INTRAMUSCULAR; INTRAVENOUS; SUBCUTANEOUS at 04:36

## 2020-01-01 RX ADMIN — FOLIC ACID 1 MG: 1 TABLET ORAL at 08:58

## 2020-01-01 RX ADMIN — HYDROMORPHONE HYDROCHLORIDE 0.5 MG: 1 INJECTION, SOLUTION INTRAMUSCULAR; INTRAVENOUS; SUBCUTANEOUS at 20:26

## 2020-01-01 RX ADMIN — LIDOCAINE HYDROCHLORIDE 40 MG: 20 INJECTION, SOLUTION INTRAVENOUS at 11:47

## 2020-01-01 RX ADMIN — DEXAMETHASONE SODIUM PHOSPHATE 4 MG: 4 INJECTION, SOLUTION INTRA-ARTICULAR; INTRALESIONAL; INTRAMUSCULAR; INTRAVENOUS; SOFT TISSUE at 11:58

## 2020-01-01 RX ADMIN — FENTANYL CITRATE 50 MCG: 50 INJECTION, SOLUTION INTRAMUSCULAR; INTRAVENOUS at 00:24

## 2020-01-01 RX ADMIN — HYDROMORPHONE HYDROCHLORIDE 0.5 MG: 1 INJECTION, SOLUTION INTRAMUSCULAR; INTRAVENOUS; SUBCUTANEOUS at 12:41

## 2020-01-01 RX ADMIN — Medication 10 ML: at 08:18

## 2020-01-01 RX ADMIN — LEVOTHYROXINE SODIUM 50 MCG: 50 TABLET ORAL at 06:28

## 2020-01-01 RX ADMIN — AMIODARONE HYDROCHLORIDE 1 MG/MIN: 1.8 INJECTION, SOLUTION INTRAVENOUS at 14:17

## 2020-01-01 RX ADMIN — LEVOTHYROXINE SODIUM 50 MCG: 50 TABLET ORAL at 05:24

## 2020-01-01 RX ADMIN — HYDROMORPHONE HYDROCHLORIDE 0.5 MG: 1 INJECTION, SOLUTION INTRAMUSCULAR; INTRAVENOUS; SUBCUTANEOUS at 10:09

## 2020-01-01 RX ADMIN — TRAMADOL HYDROCHLORIDE 50 MG: 50 TABLET, FILM COATED ORAL at 12:48

## 2020-01-01 RX ADMIN — LEVETIRACETAM 750 MG: 100 INJECTION INTRAVENOUS at 22:42

## 2020-01-01 RX ADMIN — POTASSIUM CHLORIDE 10 MEQ: 10 INJECTION, SOLUTION INTRAVENOUS at 16:49

## 2020-01-01 RX ADMIN — LEVETIRACETAM 375 MG: 250 TABLET ORAL at 08:58

## 2020-01-01 RX ADMIN — DEXTROSE MONOHYDRATE AND SODIUM CHLORIDE 50 ML/HR: 5; .45 INJECTION, SOLUTION INTRAVENOUS at 17:53

## 2020-01-01 RX ADMIN — PANTOPRAZOLE SODIUM 40 MG: 40 TABLET, DELAYED RELEASE ORAL at 16:21

## 2020-01-01 RX ADMIN — SODIUM CHLORIDE AND POTASSIUM CHLORIDE: 9; 1.49 INJECTION, SOLUTION INTRAVENOUS at 12:47

## 2020-01-01 RX ADMIN — HYDROMORPHONE HYDROCHLORIDE 1 MG: 1 INJECTION, SOLUTION INTRAMUSCULAR; INTRAVENOUS; SUBCUTANEOUS at 01:09

## 2020-01-01 RX ADMIN — ENOXAPARIN SODIUM 40 MG: 40 INJECTION SUBCUTANEOUS at 02:40

## 2020-01-01 RX ADMIN — LORAZEPAM 2 MG: 2 INJECTION, SOLUTION INTRAMUSCULAR; INTRAVENOUS at 18:29

## 2020-01-01 RX ADMIN — PROPOFOL 50 MCG/KG/MIN: 10 INJECTION, EMULSION INTRAVENOUS at 04:37

## 2020-01-01 RX ADMIN — HYDROMORPHONE HYDROCHLORIDE 0.5 MG: 1 INJECTION, SOLUTION INTRAMUSCULAR; INTRAVENOUS; SUBCUTANEOUS at 00:23

## 2020-01-01 RX ADMIN — ETOMIDATE 6 MG: 2 INJECTION, SOLUTION INTRAVENOUS at 00:20

## 2020-01-01 RX ADMIN — Medication 100 MG: at 09:46

## 2020-01-01 RX ADMIN — ETOMIDATE 6 MG: 2 INJECTION, SOLUTION INTRAVENOUS at 00:24

## 2020-01-01 RX ADMIN — ACETAMINOPHEN 650 MG: 650 SUPPOSITORY RECTAL at 14:41

## 2020-01-01 RX ADMIN — MORPHINE SULFATE 2 MG: 2 INJECTION, SOLUTION INTRAMUSCULAR; INTRAVENOUS at 15:01

## 2020-01-01 RX ADMIN — LORAZEPAM 2 MG: 2 INJECTION, SOLUTION INTRAMUSCULAR; INTRAVENOUS at 16:16

## 2020-01-01 RX ADMIN — LORAZEPAM 2 MG: 2 INJECTION, SOLUTION INTRAMUSCULAR; INTRAVENOUS at 06:00

## 2020-01-01 RX ADMIN — ASPIRIN 300 MG: 300 SUPPOSITORY RECTAL at 17:18

## 2020-01-01 RX ADMIN — FENTANYL CITRATE 50 MCG: 50 INJECTION, SOLUTION INTRAMUSCULAR; INTRAVENOUS at 11:47

## 2020-01-01 RX ADMIN — FERROUS SULFATE TAB 325 MG (65 MG ELEMENTAL FE) 325 MG: 325 (65 FE) TAB at 08:00

## 2020-01-01 RX ADMIN — LEVETIRACETAM 750 MG: 100 INJECTION INTRAVENOUS at 09:27

## 2020-01-01 RX ADMIN — Medication 10 ML: at 22:42

## 2020-01-01 RX ADMIN — HYDROXYZINE PAMOATE 50 MG: 25 CAPSULE ORAL at 09:45

## 2020-01-01 RX ADMIN — LEVETIRACETAM 375 MG: 250 TABLET ORAL at 02:41

## 2020-01-01 RX ADMIN — LEVOTHYROXINE SODIUM 50 MCG: 25 TABLET ORAL at 09:50

## 2020-01-01 RX ADMIN — FERROUS SULFATE TAB 325 MG (65 MG ELEMENTAL FE) 325 MG: 325 (65 FE) TAB at 08:58

## 2020-01-01 RX ADMIN — ONDANSETRON 4 MG: 2 INJECTION, SOLUTION INTRAMUSCULAR; INTRAVENOUS at 13:30

## 2020-01-01 RX ADMIN — PANTOPRAZOLE SODIUM 40 MG: 40 TABLET, DELAYED RELEASE ORAL at 08:58

## 2020-01-01 RX ADMIN — HYDROMORPHONE HYDROCHLORIDE 0.5 MG: 1 INJECTION, SOLUTION INTRAMUSCULAR; INTRAVENOUS; SUBCUTANEOUS at 02:11

## 2020-01-01 RX ADMIN — HYDROMORPHONE HYDROCHLORIDE 0.5 MG: 1 INJECTION, SOLUTION INTRAMUSCULAR; INTRAVENOUS; SUBCUTANEOUS at 13:30

## 2020-01-01 RX ADMIN — HYDROMORPHONE HYDROCHLORIDE 0.5 MG: 1 INJECTION, SOLUTION INTRAMUSCULAR; INTRAVENOUS; SUBCUTANEOUS at 09:43

## 2020-01-01 RX ADMIN — LEVETIRACETAM 375 MG: 250 TABLET ORAL at 07:46

## 2020-01-01 RX ADMIN — MORPHINE SULFATE 2 MG: 2 INJECTION, SOLUTION INTRAMUSCULAR; INTRAVENOUS at 12:21

## 2020-01-01 RX ADMIN — LEVETIRACETAM 375 MG: 250 TABLET ORAL at 07:20

## 2020-01-01 RX ADMIN — Medication 10 ML: at 09:00

## 2020-01-01 RX ADMIN — INSULIN LISPRO 2 UNITS: 100 INJECTION, SOLUTION INTRAVENOUS; SUBCUTANEOUS at 12:32

## 2020-01-01 RX ADMIN — PANTOPRAZOLE SODIUM 40 MG: 40 TABLET, DELAYED RELEASE ORAL at 07:20

## 2020-01-01 RX ADMIN — FERROUS SULFATE TAB 325 MG (65 MG ELEMENTAL FE) 325 MG: 325 (65 FE) TAB at 09:07

## 2020-01-01 RX ADMIN — DEXTROSE MONOHYDRATE AND SODIUM CHLORIDE 50 ML/HR: 5; .45 INJECTION, SOLUTION INTRAVENOUS at 22:16

## 2020-01-01 RX ADMIN — LORAZEPAM 1 MG: 2 SOLUTION, CONCENTRATE ORAL at 00:41

## 2020-01-01 RX ADMIN — ENOXAPARIN SODIUM 40 MG: 40 INJECTION SUBCUTANEOUS at 01:55

## 2020-01-01 RX ADMIN — AMIODARONE HYDROCHLORIDE 300 MG: 50 INJECTION, SOLUTION INTRAVENOUS at 16:03

## 2020-01-01 RX ADMIN — HYDROMORPHONE HYDROCHLORIDE 1 MG: 1 INJECTION, SOLUTION INTRAMUSCULAR; INTRAVENOUS; SUBCUTANEOUS at 23:30

## 2020-01-01 RX ADMIN — SODIUM CHLORIDE, SODIUM LACTATE, POTASSIUM CHLORIDE, AND CALCIUM CHLORIDE: 600; 310; 30; 20 INJECTION, SOLUTION INTRAVENOUS at 11:42

## 2020-01-01 RX ADMIN — INSULIN LISPRO 2 UNITS: 100 INJECTION, SOLUTION INTRAVENOUS; SUBCUTANEOUS at 12:31

## 2020-01-01 RX ADMIN — ESCITALOPRAM OXALATE 10 MG: 10 TABLET ORAL at 09:07

## 2020-01-01 RX ADMIN — PROPOFOL 50 MCG/KG/MIN: 10 INJECTION, EMULSION INTRAVENOUS at 01:50

## 2020-01-01 RX ADMIN — INSULIN LISPRO 2 UNITS: 100 INJECTION, SOLUTION INTRAVENOUS; SUBCUTANEOUS at 07:00

## 2020-01-01 RX ADMIN — ESCITALOPRAM OXALATE 10 MG: 10 TABLET ORAL at 08:00

## 2020-01-01 RX ADMIN — LEVETIRACETAM 375 MG: 250 TABLET ORAL at 09:08

## 2020-01-01 RX ADMIN — INSULIN LISPRO 2 UNITS: 100 INJECTION, SOLUTION INTRAVENOUS; SUBCUTANEOUS at 09:59

## 2020-01-01 RX ADMIN — Medication 100 MG: at 09:07

## 2020-01-01 RX ADMIN — FENTANYL CITRATE 25 MCG: 50 INJECTION, SOLUTION INTRAMUSCULAR; INTRAVENOUS at 12:11

## 2020-01-01 RX ADMIN — LORAZEPAM 2 MG: 2 INJECTION, SOLUTION INTRAMUSCULAR; INTRAVENOUS at 13:24

## 2020-01-01 RX ADMIN — ENOXAPARIN SODIUM 40 MG: 40 INJECTION SUBCUTANEOUS at 02:28

## 2020-01-01 RX ADMIN — CALCIUM GLUCONATE 2 G: 94 INJECTION, SOLUTION INTRAVENOUS at 09:59

## 2020-01-01 RX ADMIN — HYDROMORPHONE HYDROCHLORIDE 0.5 MG: 1 INJECTION, SOLUTION INTRAMUSCULAR; INTRAVENOUS; SUBCUTANEOUS at 22:07

## 2020-01-01 RX ADMIN — CEFTRIAXONE 1 G: 1 INJECTION, POWDER, FOR SOLUTION INTRAMUSCULAR; INTRAVENOUS at 05:05

## 2020-01-01 RX ADMIN — HYDROXYZINE PAMOATE 50 MG: 25 CAPSULE ORAL at 18:03

## 2020-01-01 RX ADMIN — AMIODARONE HYDROCHLORIDE 1 MG/MIN: 1.8 INJECTION, SOLUTION INTRAVENOUS at 19:53

## 2020-01-01 RX ADMIN — IOPAMIDOL 97 ML: 755 INJECTION, SOLUTION INTRAVENOUS at 15:37

## 2020-01-01 RX ADMIN — LORAZEPAM 1 MG: 2 SOLUTION, CONCENTRATE ORAL at 04:40

## 2020-01-01 RX ADMIN — SODIUM CHLORIDE 2340 ML: 900 INJECTION, SOLUTION INTRAVENOUS at 14:22

## 2020-01-01 RX ADMIN — POTASSIUM CHLORIDE 10 MEQ: 10 INJECTION, SOLUTION INTRAVENOUS at 19:45

## 2020-01-01 RX ADMIN — ACETAMINOPHEN 650 MG: 160 SOLUTION ORAL at 04:40

## 2020-01-01 RX ADMIN — HYDROMORPHONE HYDROCHLORIDE 0.5 MG: 1 INJECTION, SOLUTION INTRAMUSCULAR; INTRAVENOUS; SUBCUTANEOUS at 15:08

## 2020-01-01 RX ADMIN — HYDROCODONE BITARTRATE AND ACETAMINOPHEN 1 TABLET: 5; 325 TABLET ORAL at 12:32

## 2020-01-01 RX ADMIN — MORPHINE SULFATE 2 MG: 2 INJECTION, SOLUTION INTRAMUSCULAR; INTRAVENOUS at 14:15

## 2020-01-01 RX ADMIN — Medication 100 MG: at 11:47

## 2020-01-01 RX ADMIN — INSULIN LISPRO 2 UNITS: 100 INJECTION, SOLUTION INTRAVENOUS; SUBCUTANEOUS at 18:03

## 2020-01-01 RX ADMIN — SODIUM CHLORIDE AND POTASSIUM CHLORIDE: 9; 1.49 INJECTION, SOLUTION INTRAVENOUS at 06:40

## 2020-01-01 RX ADMIN — FOLIC ACID 1 MG: 1 TABLET ORAL at 09:07

## 2020-01-01 RX ADMIN — MORPHINE SULFATE 2 MG: 2 INJECTION, SOLUTION INTRAMUSCULAR; INTRAVENOUS at 13:28

## 2020-01-01 RX ADMIN — HYDROMORPHONE HYDROCHLORIDE 0.5 MG: 1 INJECTION, SOLUTION INTRAMUSCULAR; INTRAVENOUS; SUBCUTANEOUS at 00:44

## 2020-01-01 RX ADMIN — ESCITALOPRAM OXALATE 10 MG: 10 TABLET ORAL at 09:45

## 2020-01-01 RX ADMIN — FERROUS SULFATE TAB 325 MG (65 MG ELEMENTAL FE) 325 MG: 325 (65 FE) TAB at 09:45

## 2020-01-01 RX ADMIN — ESCITALOPRAM OXALATE 10 MG: 10 TABLET ORAL at 08:58

## 2020-01-01 RX ADMIN — MORPHINE SULFATE 2 MG: 2 INJECTION, SOLUTION INTRAMUSCULAR; INTRAVENOUS at 15:51

## 2020-01-01 RX ADMIN — PROPOFOL 100 MG: 10 INJECTION, EMULSION INTRAVENOUS at 11:47

## 2020-01-01 RX ADMIN — ACETAMINOPHEN 650 MG: 160 SOLUTION ORAL at 00:55

## 2020-01-01 RX ADMIN — HYDROXYZINE PAMOATE 50 MG: 25 CAPSULE ORAL at 08:58

## 2020-01-01 RX ADMIN — LEVOTHYROXINE SODIUM 50 MCG: 50 TABLET ORAL at 06:37

## 2020-01-01 RX ADMIN — HYDROMORPHONE HYDROCHLORIDE 2 MG: 1 INJECTION, SOLUTION INTRAMUSCULAR; INTRAVENOUS; SUBCUTANEOUS at 11:03

## 2020-01-01 RX ADMIN — LEVETIRACETAM 500 MG: 500 TABLET ORAL at 09:15

## 2020-01-01 RX ADMIN — HYDROMORPHONE HYDROCHLORIDE 0.5 MG: 1 INJECTION, SOLUTION INTRAMUSCULAR; INTRAVENOUS; SUBCUTANEOUS at 20:53

## 2020-01-01 RX ADMIN — LEVOTHYROXINE SODIUM 50 MCG: 50 TABLET ORAL at 07:02

## 2020-01-01 RX ADMIN — FOLIC ACID 1 MG: 1 TABLET ORAL at 08:00

## 2020-01-01 RX ADMIN — LEVETIRACETAM 375 MG: 250 TABLET ORAL at 22:07

## 2020-01-01 RX ADMIN — IOPAMIDOL 80 ML: 755 INJECTION, SOLUTION INTRAVENOUS at 16:20

## 2020-01-01 RX ADMIN — HYDROXYZINE PAMOATE 50 MG: 50 CAPSULE ORAL at 09:50

## 2020-01-01 RX ADMIN — HYDROXYZINE PAMOATE 50 MG: 25 CAPSULE ORAL at 17:54

## 2020-01-01 RX ADMIN — NOREPINEPHRINE BITARTRATE 0.5 MCG/MIN: 1 INJECTION INTRAVENOUS at 01:33

## 2020-01-01 RX ADMIN — PANTOPRAZOLE SODIUM 40 MG: 40 TABLET, DELAYED RELEASE ORAL at 07:46

## 2020-01-01 RX ADMIN — INSULIN LISPRO 2 UNITS: 100 INJECTION, SOLUTION INTRAVENOUS; SUBCUTANEOUS at 17:54

## 2020-01-01 RX ADMIN — Medication 10 ML: at 14:00

## 2020-01-01 RX ADMIN — LORAZEPAM 2 MG: 2 INJECTION, SOLUTION INTRAMUSCULAR; INTRAVENOUS at 17:23

## 2020-01-01 RX ADMIN — INSULIN LISPRO 2 UNITS: 100 INJECTION, SOLUTION INTRAVENOUS; SUBCUTANEOUS at 08:32

## 2020-01-01 RX ADMIN — HYDROMORPHONE HYDROCHLORIDE 0.5 MG: 1 INJECTION, SOLUTION INTRAMUSCULAR; INTRAVENOUS; SUBCUTANEOUS at 17:50

## 2020-01-01 RX ADMIN — ENOXAPARIN SODIUM 40 MG: 40 INJECTION SUBCUTANEOUS at 02:11

## 2020-01-01 RX ADMIN — ONDANSETRON 4 MG: 2 SOLUTION INTRAMUSCULAR; INTRAVENOUS at 11:58

## 2020-01-01 RX ADMIN — EPINEPHRINE 1 MG: 1 INJECTION INTRAMUSCULAR; INTRAVENOUS; SUBCUTANEOUS at 16:03

## 2020-01-01 RX ADMIN — PANTOPRAZOLE SODIUM 40 MG: 40 TABLET, DELAYED RELEASE ORAL at 09:50

## 2020-01-01 RX ADMIN — HYDROMORPHONE HYDROCHLORIDE 2 MG: 1 INJECTION, SOLUTION INTRAMUSCULAR; INTRAVENOUS; SUBCUTANEOUS at 18:29

## 2020-01-01 RX ADMIN — HYDROMORPHONE HYDROCHLORIDE 2 MG: 1 INJECTION, SOLUTION INTRAMUSCULAR; INTRAVENOUS; SUBCUTANEOUS at 22:18

## 2020-01-01 RX ADMIN — INSULIN LISPRO 8 UNITS: 100 INJECTION, SOLUTION INTRAVENOUS; SUBCUTANEOUS at 01:15

## 2020-01-01 RX ADMIN — INFLUENZA VIRUS VACCINE 0.5 ML: 15; 15; 15; 15 SUSPENSION INTRAMUSCULAR at 22:07

## 2020-01-01 RX ADMIN — ETOMIDATE 9 MG: 2 INJECTION, SOLUTION INTRAVENOUS at 00:28

## 2020-01-01 RX ADMIN — SODIUM CHLORIDE, SODIUM LACTATE, POTASSIUM CHLORIDE, AND CALCIUM CHLORIDE 100 ML/HR: 600; 310; 30; 20 INJECTION, SOLUTION INTRAVENOUS at 13:00

## 2020-01-01 RX ADMIN — LEVETIRACETAM 750 MG: 100 INJECTION INTRAVENOUS at 03:15

## 2020-01-01 RX ADMIN — HYDROMORPHONE HYDROCHLORIDE 2 MG: 1 INJECTION, SOLUTION INTRAMUSCULAR; INTRAVENOUS; SUBCUTANEOUS at 18:36

## 2020-01-01 RX ADMIN — Medication 100 MG: at 08:00

## 2020-01-01 RX ADMIN — LORAZEPAM 2 MG: 2 INJECTION, SOLUTION INTRAMUSCULAR; INTRAVENOUS at 22:22

## 2020-01-01 RX ADMIN — MIDAZOLAM 2 MG: 1 INJECTION INTRAMUSCULAR; INTRAVENOUS at 11:47

## 2020-01-01 RX ADMIN — HYDROMORPHONE HYDROCHLORIDE 0.5 MG: 1 INJECTION, SOLUTION INTRAMUSCULAR; INTRAVENOUS; SUBCUTANEOUS at 08:59

## 2020-01-01 RX ADMIN — HYDROMORPHONE HYDROCHLORIDE 2 MG: 1 INJECTION, SOLUTION INTRAMUSCULAR; INTRAVENOUS; SUBCUTANEOUS at 17:23

## 2020-01-01 RX ADMIN — HYDROMORPHONE HYDROCHLORIDE 0.5 MG: 1 INJECTION, SOLUTION INTRAMUSCULAR; INTRAVENOUS; SUBCUTANEOUS at 02:41

## 2020-01-01 RX ADMIN — HYDROMORPHONE HYDROCHLORIDE 0.5 MG: 1 INJECTION, SOLUTION INTRAMUSCULAR; INTRAVENOUS; SUBCUTANEOUS at 05:24

## 2020-01-01 RX ADMIN — ACETAMINOPHEN 650 MG: 325 TABLET, FILM COATED ORAL at 16:20

## 2020-01-01 RX ADMIN — DEXTROSE MONOHYDRATE AND SODIUM CHLORIDE 50 ML/HR: 5; .45 INJECTION, SOLUTION INTRAVENOUS at 02:29

## 2020-01-01 RX ADMIN — PROPOFOL 10 MCG/KG/MIN: 10 INJECTION, EMULSION INTRAVENOUS at 00:07

## 2020-01-01 RX ADMIN — HYDROXYZINE PAMOATE 50 MG: 25 CAPSULE ORAL at 08:00

## 2020-01-01 RX ADMIN — INSULIN GLARGINE 6 UNITS: 100 INJECTION, SOLUTION SUBCUTANEOUS at 10:03

## 2020-01-01 RX ADMIN — ACETAMINOPHEN 650 MG: 325 TABLET, FILM COATED ORAL at 09:45

## 2020-01-01 RX ADMIN — LEVETIRACETAM 375 MG: 250 TABLET ORAL at 20:26

## 2020-01-01 RX ADMIN — POTASSIUM CHLORIDE 10 MEQ: 10 INJECTION, SOLUTION INTRAVENOUS at 18:55

## 2020-01-01 RX ADMIN — MAGNESIUM SULFATE HEPTAHYDRATE 2 G: 40 INJECTION, SOLUTION INTRAVENOUS at 16:44

## 2020-01-01 RX ADMIN — HYDROXYZINE PAMOATE 50 MG: 25 CAPSULE ORAL at 09:07

## 2020-01-01 RX ADMIN — HYDROMORPHONE HYDROCHLORIDE 2 MG: 1 INJECTION, SOLUTION INTRAMUSCULAR; INTRAVENOUS; SUBCUTANEOUS at 16:22

## 2020-01-01 RX ADMIN — LEVETIRACETAM 375 MG: 250 TABLET ORAL at 20:53

## 2020-01-01 RX ADMIN — PANTOPRAZOLE SODIUM 40 MG: 40 TABLET, DELAYED RELEASE ORAL at 18:02

## 2020-01-01 RX ADMIN — PANTOPRAZOLE SODIUM 40 MG: 40 TABLET, DELAYED RELEASE ORAL at 06:36

## 2020-01-01 RX ADMIN — LORAZEPAM 1 MG: 2 INJECTION, SOLUTION INTRAMUSCULAR; INTRAVENOUS at 14:40

## 2020-01-01 RX ADMIN — PROPOFOL 40 MG: 10 INJECTION, EMULSION INTRAVENOUS at 12:49

## 2020-01-01 RX ADMIN — CLINDAMYCIN PHOSPHATE 900 MG: 900 INJECTION INTRAVENOUS at 11:59

## 2020-01-01 RX ADMIN — HYDROMORPHONE HYDROCHLORIDE 0.5 MG: 1 INJECTION, SOLUTION INTRAMUSCULAR; INTRAVENOUS; SUBCUTANEOUS at 06:37

## 2020-01-01 RX ADMIN — FENTANYL CITRATE 25 MCG: 50 INJECTION, SOLUTION INTRAMUSCULAR; INTRAVENOUS at 12:24

## 2020-01-01 RX ADMIN — SODIUM CHLORIDE 95 ML: 900 INJECTION, SOLUTION INTRAVENOUS at 16:18

## 2020-01-01 RX ADMIN — FOLIC ACID 1 MG: 1 TABLET ORAL at 09:45

## 2020-01-10 PROBLEM — S82.891A CLOSED RIGHT ANKLE FRACTURE: Status: ACTIVE | Noted: 2020-01-01

## 2020-01-10 PROBLEM — D64.9 NORMOCYTIC ANEMIA: Status: ACTIVE | Noted: 2020-01-01

## 2020-01-10 NOTE — DISCHARGE INSTRUCTIONS
DISCHARGE SUMMARY from Nurse    PATIENT INSTRUCTIONS:    After general anesthesia or intravenous sedation, for 24 hours or while taking prescription Narcotics:  · Limit your activities  · Do not drive and operate hazardous machinery  · Do not make important personal or business decisions  · Do  not drink alcoholic beverages  · If you have not urinated within 8 hours after discharge, please contact your surgeon on call. Report the following to your surgeon:  · Excessive pain, swelling, redness or odor of or around the surgical area  · Temperature over 100.5  · Nausea and vomiting lasting longer than 4 hours or if unable to take medications  · Any signs of decreased circulation or nerve impairment to extremity: change in color, persistent  numbness, tingling, coldness or increase pain  · Any questions    What to do at Home:  Recommended activity: Activity as tolerated,       *  Please give a list of your current medications to your Primary Care Provider. *  Please update this list whenever your medications are discontinued, doses are      changed, or new medications (including over-the-counter products) are added. *  Please carry medication information at all times in case of emergency situations. These are general instructions for a healthy lifestyle:    No smoking/ No tobacco products/ Avoid exposure to second hand smoke  Surgeon General's Warning:  Quitting smoking now greatly reduces serious risk to your health. Obesity, smoking, and sedentary lifestyle greatly increases your risk for illness    A healthy diet, regular physical exercise & weight monitoring are important for maintaining a healthy lifestyle    You may be retaining fluid if you have a history of heart failure or if you experience any of the following symptoms:  Weight gain of 3 pounds or more overnight or 5 pounds in a week, increased swelling in our hands or feet or shortness of breath while lying flat in bed.   Please call your doctor as soon as you notice any of these symptoms; do not wait until your next office visit. The discharge information has been reviewed with the {PATIENT PARENT GUARDIAN:[id number]}. The {PATIENT PARENT GUARDIAN:88392} verbalized understanding. Discharge medications reviewed with the {Dishcarge meds reviewed [facility]:99002} and appropriate educational materials and side effects teaching were provided.   ___________________________________________________________________________________________________________________________________

## 2020-01-10 NOTE — CONSULTS
Consult    Patient: Arminda Marie MRN: [id number]  SSN: [id number]    YOB: 1960  Age: 63 y.o. Sex: female      Subjective: Arminda Marie is a 63 y.o. female who is being seen for right closed ankle fracture. [place] [facility] at home. Brought to ER. Admitted for optimization. No other c/o. Past Medical History:   Diagnosis Date    Alcohol abuse     Alcoholic hepatitis     Alcoholic pancreatitis     Alcoholic pancreatitis     Anemia     Bipolar disorder (HCC)     Dr. Radha Nazario [facility] [place])    Chronic abdominal pain     [facility]     Compression fracture of lumbar vertebra (HCC)     L4, L5     Depression     Dr. Radha Nazario [facility] [place])    Elevated LFTs     ETOH abuse     Fatty liver     GERD (gastroesophageal reflux disease)     Hyperlipidemia     Hypertension     Hypothyroidism     Lower GI bleeding     Morbid obesity ([address]     Obstructive sleep apnea     Seizures ([address]     Substance induced mood disorder ([facility] [address] [facility]     Vitamin D deficiency      Past Surgical History:   Procedure Laterality Date    BIOPSY LIVER  08/15/2012    Lap Left hepatic liver wedge by Dr. Jennifer De Los Santos; BX Revealed: Hepatic Steatosis, AVM w/ associated Subcapsular Fibrosis.  COLONOSCOPY N/A 1/17/2017    COLONOSCOPY performed by Félix Gomez MD at [address] [facility], ANTERIOR, WITH D  8/1995, 11/1997    HX ABDOMINAL LAPAROSCOPY  12/02/2014    Laparoscopic Gastrostomy w/ Partial Gastrectomy for assistance in placement of Endoscopic Retrograde Cholangiopancreatography & Diagnostic Lap.  HX CARPAL TUNNEL RELEASE      HX CHOLECYSTECTOMY  09/16/2014    Dr. Ramu Castillo HX COLONOSCOPY  05/12/2012    Colonoscopy by Dr. Dallas Loyola. Krupa Jerome: Bx Revealed Colon Polyps (Tubular Adenoma), Hemorrhoids.  HX GASTRIC BYPASS  08/15/2012    Lap Christian-en-y    HX HEMORRHOIDECTOMY  04/30/2015    Dr. Alem Doan.  Mg Melton HX HYSTERECTOMY  2006    HX TONSILLECTOMY  1992    REPAIR NONUNION SCAPHOID CARPAL BONE Right 2010    Wrist      Family History   Problem Relation Age of Onset    No Known Problems Mother     Cancer Father         Thoat Cancer    Cancer Sister         Breast Cancer    Obesity Brother     Cancer Brother         Throat Cancer     Social History     Tobacco Use    Smoking status: Never Smoker    Smokeless tobacco: Never Used   Substance Use Topics    Alcohol use: Not Currently     Alcohol/week: 0.0 standard drinks     Comment: 6 pack of beer a week- 1/2/17 last use, Sober 5/11/19      Current Facility-Administered Medications   Medication Dose Route Frequency Provider Last Rate Last Dose    hydrOXYzine pamoate (VISTARIL) capsule 50 mg  50 mg Oral BID Momo ROBIN, DO   50 mg at 01/10/20 0950    levothyroxine (SYNTHROID) tablet 50 mcg  50 mcg Oral ACB Bala Ellington, DO   50 mcg at 01/10/20 0950    pantoprazole (PROTONIX) tablet 40 mg  40 mg Oral ACB&D Momo Escamilla E, DO   40 mg at 01/10/20 0950    0.9% sodium chloride with KCl 20 mEq/L infusion   IntraVENous CONTINUOUS Keyur Funk,  mL/hr at 01/10/20 0640      ondansetron (ZOFRAN) injection 4 mg  4 mg IntraVENous Q4H PRN Keyur Funk DO        hydrALAZINE (APRESOLINE) 20 mg/mL injection 10 mg  10 mg IntraVENous Q6H PRN Bala Ellington, DO        cefTRIAXone (ROCEPHIN) 1 g in 0.9% sodium chloride (MBP/ADV) 50 mL MBP  1 g IntraVENous Q24H Bala Ellington,  mL/hr at 01/10/20 0505 1 g at 01/10/20 0505    sodium chloride (NS) flush 5-40 mL  5-40 mL IntraVENous Q8H Bala Ellington,         sodium chloride (NS) flush 5-40 mL  5-40 mL IntraVENous PRN Bala Ellington,         LORazepam (ATIVAN) tablet 1 mg  1 mg Oral Q1H PRN Bala Ellington,         Or    LORazepam (ATIVAN) injection 1 mg  1 mg IntraVENous Q1H PRN Bala Ellington,         LORazepam (ATIVAN) tablet 2 mg  2 mg Oral Q1H PRN Keyur Funk, DO        Or   [employer] LORazepam (ATIVAN) injection 2 mg  2 mg IntraVENous Q1H PRN Bala Ellington DO        LORazepam (ATIVAN) injection 3 mg  3 mg IntraVENous Q15MIN PRN Hawa Patton DO        fentaNYL citrate (PF) injection 50 mcg  50 mcg IntraVENous Q4H PRN Deon Ellington,         naloxone [facility]) injection 0.4 mg  0.4 mg IntraVENous PRN Hawa Patton, DO        biotin cap 10,000 mcg (Patient Supplied)  10,000 mcg Oral DAILY Deon Ellington,         traMADol Emili Ly) tablet 50 mg  50 mg Oral Q6H PRN Carmen Medal H, DO            Allergies   Allergen Reactions    Codeine Rash    Lamictal [Lamotrigine] Anaphylaxis    Morphine Anaphylaxis     Per patient, has tolerated dilaudid in the past.    Pcn [Penicillins] Anaphylaxis    Vancomycin Rash    Doxycycline Rash    Percocet [Oxycodone-Acetaminophen] Hives and Itching    Tetracycline Rash    Tomato Hives       Review of Systems:  A comprehensive review of systems was negative except for that written in the History of Present Illness. Objective:     Vitals:    01/10/20 0400 01/10/20 0430 01/10/20 0559 01/10/20 0819   BP: 116/72 116/69 105/69 105/66   Pulse: 87 95 90 80   Resp: 19 21 20 18   Temp:   98.2 °F (36.8 °C) 98.5 °F (36.9 °C)   SpO2: 99% 97% 95% 95%   Weight:       Height:            Physical Exam:  GENERAL: alert, cooperative, no distress, appears stated age  EXTREMITIES:  Splinted ankle. Wiggles toes. Sensation intact. Good cap refill.     Knee benign  Assessment:     Hospital Problems  Date Reviewed: 1/10/2020          Codes Class Noted POA    Normocytic anemia ICD-10-CM: D64.9  ICD-9-CM: 285.9  1/10/2020 Yes        * (Principal) Closed right ankle fracture ICD-10-CM: S82.891A  ICD-9-CM: 824.8  1/10/2020 Yes        Chronic pancreatitis ([address] ICD-10-CM: K86.1  ICD-9-CM: 794.4  8/24/2019 Yes        Hyponatremia ICD-10-CM: E87.1  ICD-9-CM: 276.1  5/22/2016 Yes        Alcohol withdrawal ([address] ICD-10-CM: Q16.889  ICD-9-CM: 291.81  3/26/2016 Yes Alcohol abuse ICD-10-CM: F10.10  ICD-9-CM: 305.00  3/24/2016 Yes        Essential hypertension, benign ICD-10-CM: I10  ICD-9-CM: 401.1  5/6/2014 Yes        Morbid obesity ([facility] [address] ICD-10-CM: E66.01  ICD-9-CM: 278.01  Unknown Yes              Plan:     1. PT today. Okay to d/c home  2. F/u as outpatient for ankle ORIF. Can see Dr. Julio Dawn w/ [facility] on followup. Call for appointment.     Signed By: Francis Castellanos MD     January 10, 2020

## 2020-01-10 NOTE — PROGRESS NOTES
Problem: General Infection Care Plan (Adult and Pediatric)  Goal: Improvement in signs and symptoms of infection  1/10/2020 1236 by Marge Proctor  Outcome: Progressing Towards Goal  1/10/2020 1049 by Marge Proctor  Outcome: Progressing Towards Goal  Goal: *Optimize nutritional status  1/10/2020 1236 by Gabe Cabot K  Outcome: Progressing Towards Goal  1/10/2020 1049 by Gabe Cabot K  Outcome: Progressing Towards Goal     Problem: Patient Education: Go to Patient Education Activity  Goal: Patient/Family Education  1/10/2020 1236 by Marge Proctor  Outcome: Progressing Towards Goal  1/10/2020 1049 by Marge Proctor  Outcome: Progressing Towards Goal     Problem: Pain  Goal: *Control of Pain  1/10/2020 1236 by Marge Proctor  Outcome: Progressing Towards Goal  1/10/2020 1049 by Gabe Cabot K  Outcome: Progressing Towards Goal     Problem: Patient Education: Go to Patient Education Activity  Goal: Patient/Family Education  1/10/2020 1236 by Marge Proctor  Outcome: Progressing Towards Goal  1/10/2020 1049 by Marge Proctor  Outcome: Progressing Towards Goal

## 2020-01-10 NOTE — PROGRESS NOTES
Problem: Mobility Impaired (Adult and Pediatric)  Goal: *Acute Goals and Plan of Care (Insert Text)  Description  Physical Therapy Goals  Initiated 1/10/2020 and to be accomplished within 7 day(s)  1. Patient will move from supine to sit and sit to supine  in bed with modified independence. 2.  Patient will transfer from bed to chair and chair to bed with modified independence using the least restrictive device. 3.  Patient will perform sit to stand with modified independence. 4.  Patient will ambulate with modified independence for 25 feet with the least restrictive device. Outcome: Progressing Towards Goal   PHYSICAL THERAPY EVALUATION    Patient: Adina iWllis (68 y.o. female)  Date: 1/10/2020  Primary Diagnosis: Closed right ankle fracture [S82.891A]  Hyponatremia [E87.1]  Normocytic anemia [D64.9]  Precautions: Fall  NWB RLE (Assumed no orders in chart)  PLOF: Independent with frequent falls  ASSESSMENT :  RLE splinted s/p fall and bimalleolar fracture. No WB orders in chart; follow conservative NWB RLE. Supervision for supine to sit. Seated EOB with good balance. Mod/Min A for sit to stand from bed. Amb 5ft with ww and min A. Maintains NWB RLE 75% of mobility. Seated in recliner with supervision. BLE elevated and lunch tray in front. RN Margaret Guillen aware. Educated on need for RN assistance with mobility; verbalized understanding. Call bell in reach. Prior to admission, independent. History of falls including fracture of LLE previously. Has access to wheelchair. Lives with  who has cerebral palsy with 24/7 care and handicap accessible house.; however patient report no support to assist her in mobility. She reports difficulty with sit to stand transfers from bed/toilet/etc prior to admission. May benefit from skilled nursing for subacute rehab to increase independence with mobility and decrease fall risk.      Patient will benefit from skilled intervention to address the above impairments. Patient's rehabilitation potential is considered to be Good  Factors which may influence rehabilitation potential include:   []         None noted  []         Mental ability/status  [x]         Medical condition  [x]         Home/family situation and support systems  []         Safety awareness  []         Pain tolerance/management  []         Other:      PLAN :  Recommendations and Planned Interventions:   [x]           Bed Mobility Training             [x]    Neuromuscular Re-Education  [x]           Transfer Training                   []    Orthotic/Prosthetic Training  [x]           Gait Training                          []    Modalities  [x]           Therapeutic Exercises           []    Edema Management/Control  [x]           Therapeutic Activities            [x]    Family Training/Education  [x]           Patient Education  []           Other (comment):    Frequency/Duration: Patient will be followed by physical therapy 3-5 times a week to address goals. Discharge Recommendations: Lonnie Wheat  Further Equipment Recommendations for Discharge: rolling walker     SUBJECTIVE:   Patient stated I'll figure it out.     OBJECTIVE DATA SUMMARY:     Past Medical History:   Diagnosis Date    Alcohol abuse     Alcoholic hepatitis     Alcoholic pancreatitis     Alcoholic pancreatitis     Anemia     Bipolar disorder ([facility]     Dr. Mica Quintero [facility])    Chronic abdominal pain     Chronic pancreatitis ([facility]     Compression fracture of lumbar vertebra (HCC)     L4, L5     Depression     Dr. Nino Shows [facility])    Elevated LFTs     ETOH abuse     Fatty liver     GERD (gastroesophageal reflux disease)     Hyperlipidemia     Hypertension     Hypothyroidism     Lower GI bleeding     Morbid obesity ([facility]     Obstructive sleep apnea     Seizures ([facility]     Substance induced mood disorder ([facility]     Vitamin D deficiency      Past Surgical History:   Procedure Laterality Date    BIOPSY LIVER  08/15/2012    Lap Left hepatic liver wedge by Dr. Irina Guadarrama; BX Revealed: Hepatic Steatosis, AVM w/ associated Subcapsular Fibrosis. COLONOSCOPY N/A 1/17/2017    COLONOSCOPY performed by Bety Muse MD at [facility] ENDOSCOPY    DISKECTOMY, ANTERIOR, WITH D  8/1995, 11/1997    HX ABDOMINAL LAPAROSCOPY  12/02/2014    Laparoscopic Gastrostomy w/ Partial Gastrectomy for assistance in placement of Endoscopic Retrograde Cholangiopancreatography & Diagnostic Lap. HX CARPAL TUNNEL RELEASE      HX CHOLECYSTECTOMY  09/16/2014    Dr. Irina Guadarrama    HX COLONOSCOPY  05/12/2012    Colonoscopy by Dr. Justo Weems. Jordan Coins: Bx Revealed Colon Polyps (Tubular Adenoma), Hemorrhoids. HX GASTRIC BYPASS  08/15/2012    Lap Christian-en-y    HX HEMORRHOIDECTOMY  04/30/2015    Dr. Yesica Joy. Jason    HX HYSTERECTOMY  2006    HX TONSILLECTOMY  1992    REPAIR NONUNION SCAPHOID CARPAL BONE Right 2010    Wrist     Barriers to Learning/Limitations: None  Compensate with: Visual Cues, Verbal Cues, Tactile Cues and Kinesthetic Cues    Home Situation:  Home Situation  Home Environment: Private residence  # Steps to Enter: 0  One/Two Story Residence: One story  Living Alone: No  Support Systems: Spouse/Significant Other/Partner  Patient Expects to be Discharged to[de-identified] Private residence  Current DME Used/Available at Home: Wheelchair    Critical Behavior:  Neurologic State: Alert  Orientation Level: Oriented X4  Cognition: Follows commands  Safety/Judgement: Fall prevention  Psychosocial  Patient Behaviors: Cooperative    Strength:    Manual Muscle Testing (LE)         R     L    Hip Flexion:   3/5  4+/5  Knee EXT:   3/5  4+/5  Knee FLEX:   3/5  4+/5  Ankle DF:   3/5  4+/5  _________________________________________________   Range Of Motion:  LLE AROM WFL  RLE hip/knee AROM WFL  Functional Mobility:  Bed Mobility:  Supine to Sit: Supervision  Transfers:  Sit to Stand: Minimum assistance; Moderate assistance  Stand to Sit: Supervision  Balance:   Sitting: Intact  Standing: Impaired  Standing - Static: Good  Standing - Dynamic : Fair  Ambulation/Gait Training:  Distance (ft): 5 Feet (ft)   Assistive Device: Walker, rolling  Ambulation - Level of Assistance: Minimal assistance    Pain:  Pain level pre-treatment: 0/10   Pain level post-treatment: 0/10     Activity Tolerance:   Fair    After treatment:   [x]         Patient left in no apparent distress sitting up in chair  []         Patient left in no apparent distress in bed  [x]         Call bell left within reach  [x]         Nursing notified  []         Caregiver present  []         Bed alarm activated  []         SCDs applied    COMMUNICATION/EDUCATION:   [x]         Role of physical therapy and plan of care in the acute care setting. [x]         Fall prevention education was provided and the patient/caregiver indicated understanding. [x]         Patient/family have participated as able in goal setting and plan of care. []         Patient/family agree to work toward stated goals and plan of care. []         Patient understands intent and goals of therapy, but is neutral about his/her participation. []         Patient is unable to participate in goal setting/plan of care: ongoing with therapy staff.     Thank you for this referral.  Harry Ochoa, PT   Time Calculation: 16 mins    Eval Complexity: History: HIGH Complexity :3+ comorbidities / personal factors will impact the outcome/ POC Exam:MEDIUM Complexity : 3 Standardized tests and measures addressing body structure, function, activity limitation and / or participation in recreation  Presentation: MEDIUM Complexity : Evolving with changing characteristics  Clinical Decision Making:Medium Complexity    Clinical judgement; ROM, MMT, functional mobility Overall Complexity:MEDIUM

## 2020-01-10 NOTE — DISCHARGE SUMMARY
Internal Medicine Discharge Summary        Patient: Arminda Marie    YOB: 1960    Age:  63 y.o. Admit Date: 1/9/2020    Discharge Date: 1/10/2020    LOS:  LOS: 0 days     Discharge To:  Home    Consults: Orthopedics    Admission Diagnoses: Closed right ankle fracture [S82.891A]  Hyponatremia [E87.1]  Normocytic anemia [D64.9]    Discharge Diagnoses:    Problem List as of 1/10/2020 Date Reviewed: 1/10/2020          Codes Class Noted - Resolved    Normocytic anemia ICD-10-CM: D64.9  ICD-9-CM: 285.9  1/10/2020 - Present        * (Principal) Closed right ankle fracture ICD-10-CM: S82.891A  ICD-9-CM: 824.8  1/10/2020 - Present        Acute GI bleeding ICD-10-CM: K92.2  ICD-9-CM: 578.9  8/24/2019 - Present        Acute blood loss anemia ICD-10-CM: D62  ICD-9-CM: 285.1  8/24/2019 - Present        Chronic pancreatitis ([facility] ICD-10-CM: K86.1  ICD-9-CM: 577.1  8/24/2019 - Present        Alcohol induced acute pancreatitis ICD-10-CM: K85.20  ICD-9-CM: 577.0  9/5/2017 - Present        Fever of unknown origin ICD-10-CM: R50.9  ICD-9-CM: 780.60  7/1/2017 - Present        Altered mental state ICD-10-CM: R41.82  ICD-9-CM: 780.97  7/1/2017 - Present        Hypotension ICD-10-CM: I95.9  ICD-9-CM: 458.9  7/1/2017 - Present        Bleeding disorder ([facility] ICD-10-CM: D69.9  ICD-9-CM: 287.9  1/18/2017 - Present        Thrombocytopenia ([facility] ICD-10-CM: D69.6  ICD-9-CM: 287.5  1/12/2017 - Present        Sepsis ([facility] ICD-10-CM: A41.9  ICD-9-CM: 038.9, 995.91  1/9/2017 - Present        Non-intractable vomiting ICD-10-CM: R11.10  ICD-9-CM: 787.03  1/9/2017 - Present        Elevated LFTs ICD-10-CM: R94.5  ICD-9-CM: 790.6  Unknown - Present        Fatty liver ICD-10-CM: K76.0  ICD-9-CM: 571.8  Unknown - Present        Bipolar disorder ([facility] ICD-10-CM: F31.9  ICD-9-CM: 296.80  Unknown - Present        Depression ICD-10-CM: F32.9  ICD-9-CM: 311  Unknown - Present        Anemia ICD-10-CM: D64.9  ICD-9-CM: 285. 9  Unknown - Present        Chronic pain ICD-10-CM: G89.29  ICD-9-CM: 338.29  Unknown - Present    Overview Signed 8/19/2016  1:31 PM by Erna Soulier     abd pain             Chronic abdominal pain ICD-10-CM: R10.9, G89.29  ICD-9-CM: 789.00, 338.29  Unknown - Present        Substance induced mood disorder ([facility] ICD-10-CM: F19.94  ICD-9-CM: 292.84  Unknown - Present        GI bleed ICD-10-CM: K92.2  ICD-9-CM: 578.9  Unknown - Present        Lower GI bleeding ICD-10-CM: K92.2  ICD-9-CM: 578.9  Unknown - Present        Compression fracture of lumbar vertebra (HCC) ICD-10-CM: S32.000A  ICD-9-CM: 805.4  Unknown - Present    Overview Signed 8/19/2016  1:39 PM by Erna Soulier     L4, L5              Alcoholic hepatitis UOC-07-LX: K70.10  ICD-9-CM: 571.1  Unknown - Present        Alcoholic pancreatitis BLX-94-IQ: K85.20  ICD-9-CM: 727.8  Unknown - Present        Hypothyroidism ICD-10-CM: E03.9  ICD-9-CM: 244.9  Unknown - Present        Vitamin D deficiency ICD-10-CM: E55.9  ICD-9-CM: 268.9  Unknown - Present        Hyponatremia ICD-10-CM: E87.1  ICD-9-CM: 276.1  5/22/2016 - Present        Alcohol withdrawal ([facility] ICD-10-CM: T20.661  ICD-9-CM: 291.81  3/26/2016 - Present        UTI (urinary tract infection) ICD-10-CM: N39.0  ICD-9-CM: 599.0  3/25/2016 - Present        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  ICD-9-CM: 577.8  3/24/2016 - Present        Alcohol abuse ICD-10-CM: F10.10  ICD-9-CM: 305.00  3/24/2016 - Present        Acute hyponatremia ICD-10-CM: E87.1  ICD-9-CM: 276.1  2/3/2016 - Present        Hemorrhoids with complication WQX-40-HW: I57.6  ICD-9-CM: 455.8  4/21/2015 - Present        Gallstone pancreatitis ICD-10-CM: K85.10  ICD-9-CM: 577.0, 574.20  11/26/2014 - Present        Acute pancreatitis (Chronic) ICD-10-CM: K85.90  ICD-9-CM: 737.4  10/20/2014 - Present        Pancreatitis ICD-10-CM: K85.90  ICD-9-CM: 577.0  8/28/2014 - Present        Bipolar affective disorder, depressed, severe [facility]) ICD-10-CM: F31.4  ICD-9-CM: 296.53  5/9/2014 - Present        Altered mental status ICD-10-CM: R41.82  ICD-9-CM: 780.97  5/6/2014 - Present        Overdose ICD-10-CM: T50.901A  ICD-9-CM: 977.9, E980.5  5/6/2014 - Present        Alcohol abuse with intoxication delirium ([facility] ICD-10-CM: F10.121  ICD-9-CM: 291.0, 305.00  5/6/2014 - Present        H/O gastric bypass ICD-10-CM: Z98.84  ICD-9-CM: V45.86  5/6/2014 - Present        HERMELINDA (obstructive sleep apnea) ICD-10-CM: G47.33  ICD-9-CM: 327.23  5/6/2014 - Present        Essential hypertension, benign ICD-10-CM: I10  ICD-9-CM: 401.1  5/6/2014 - Present        Malabsorption ICD-10-CM: K90.9  ICD-9-CM: 579.9  Unknown - Present        History of Lap Christian-en-Y gastric bypass- 8/15/12 w/BMI 39 ICD-10-CM: Z98.84  ICD-9-CM: V45.86  Unknown - Present        Morbid obesity ([facility] ICD-10-CM: E66.01  ICD-9-CM: 278.01  Unknown - Present        Hyperlipidemia ICD-10-CM: E78.5  ICD-9-CM: 272.4  Unknown - Present        Obstructive sleep apnea ICD-10-CM: G47.33  ICD-9-CM: 327.23  Unknown - Present              Discharge Condition:  Improved    Procedures: None         HPI: Dmitriy Jones is a 63 y.o. female who present to [facility] ER with complaint of Fall. Patient states that she did not trip over anything when she fell, but does report that she had not been feeling well recently. Patient states that she falls fairly often and recently broke two toes falling. Patient reports that she had been having low blood pressure yesterday (1/09/2020) prior to falling, had just toileted, had gone to [facility] for Wound Care, and had also been taking care of her  with Cerebral Palsy. Patient states that she had been experiencing a constant \"heaviness\" in her chest that is associated with shortness of breath that typically occurs once or twice a month and is improved with laying down and worse with positionally with sitting up.   Patient also reports that she lost 8 lbs in the last 2 weeks and attributes this to being so busy with the care of her . Patient reports that she was given an IM dose of Ceftriaxone at [facility] and was started on antibiotics for a wound on her right groin. Patient states that she has had weakness in both her legs for approximately one years and attributes this to the cause of her falls, reporting Left foot numbness due to old trauma and otherwise denying BLE numbness or incontinence of bowel or bladder. Patient states that she has been sober since 5/11/2019, but when informed that she had ETOH in her blood per laboratory studies, Patient states that she had started drinking over the holidays due to stress and had 2 beers last night.     IN [facility] ER, Patient was noted to have a Right Bimalleolar Fracture that was reduced by [facility] ER Physician. Patient continues to have RLE pain.     Patient is placed on Observation in Surgical Unit for management and additional evaluation of Right Bimalleolar Fracture. Hospital Course:    Closed right ankle fracture - Orthopedic surgery consulted. No plans for inpatient intervention. Cleared for discharge with follow up outpatient for ankle ORIF. Groin wound - On Bactrim at home 10 day therapy. Continue at discharge. The rest of the patient's chronic conditions were managed appropriately during their admission. They were medically stable at the time of discharge.     Visit Vitals  /70 (BP 1 Location: Right arm, BP Patient Position: At rest)   Pulse 79   Temp 99.2 °F (37.3 °C)   Resp 18   Ht 5' 7\" (1.702 m)   Wt 89.8 kg (198 lb)   SpO2 98%   BMI 31.01 kg/m²       Labs Prior to Discharge:  Labs: Results:       Chemistry Recent Labs     01/10/20  0910 01/09/20  2345   * 205*   * 129*   K 4.5 3.9    95*   CO2 26 20*   BUN 5* 4*   CREA 0.74 0.69   CA 8.0* 7.7*   AGAP 9 14   BUCR 7* 6*   * 387*   TP 5.6* 5.9*   ALB 2.0* 2.2*   GLOB 3.6 3.7   AGRAT 0.6* 0.6*      CBC w/Diff Recent Labs 01/10/20  0910 01/09/20  2345   WBC 7.7 9.9   RBC 2.57* 2.77*   HGB 8.2* 8.8*   HCT 25.0* 26.8*    240   GRANS 72 83*   LYMPH 20* 9*   EOS 0 0      Cardiac Enzymes Recent Labs     01/09/20  2345   CPK 77   CKND1 2.6      Coagulation Recent Labs     01/10/20  0910   PTP 18.8*   INR 1.6*       Lipid Panel Lab Results   Component Value Date/Time    Cholesterol, total 122 01/10/2017 04:50 AM    HDL Cholesterol 28 (L) 01/10/2017 04:50 AM    LDL, calculated 43 01/10/2017 04:50 AM    VLDL, calculated 51 01/10/2017 04:50 AM    Triglyceride 255 (H) 01/10/2017 04:50 AM    CHOL/HDL Ratio 4.4 01/10/2017 04:50 AM      BNP No results for input(s): BNPP in the last 72 hours. Liver Enzymes Recent Labs     01/10/20  0910   TP 5.6*   ALB 2.0*   *   SGOT 80*      Thyroid Studies Lab Results   Component Value Date/Time    TSH 1.61 07/02/2017 04:40 AM            Significant Imaging:  Xr Tib/fib Rt    Result Date: 1/10/2020  EXAM: RIGHT TIBIA-FIBULA AND ANKLE RADIOGRAPHS CLINICAL INDICATION/HISTORY: fall -Additional: None COMPARISON: None TECHNIQUE: Frontal and lateral views of the right tibia-fibula 3 views of the right ankle. _______________ FINDINGS: BONES: Fracture dislocation of the ankle with medial dislocation of the tibiotalar joint, fracture of the distal fibula above the syndesmosis fracture and condylar fracture distal tibia with involvement of the medial malleolus. SOFT TISSUES: No adjacent soft tissue swelling. _______________     IMPRESSION: Acute fracture dislocation of the ankle as described above. Xr Ankle Rt Ap/lat    Result Date: 1/10/2020  EXAM: RIGHT ANKLE RADIOGRAPHS CLINICAL INDICATION/HISTORY: post reduction -Additional: Right ankle fracture dislocation COMPARISON: 1/9/2020 TECHNIQUE: 3 views of the right ankle. _______________ FINDINGS: BONES: Interval application of a fiberglass splint which obscures osseous detail.  Significantly improved tibiotalar alignment in the interval from preceding examination. Minimal displacement of the distal fibular fracture noted with mild persistent displacement medial malleolus fracture. Likely small posterior malleolus fracture fragment not well seen on the lateral projection. Moderate-sized plantar calcaneal spur. Small foci of degenerative spur formation across the dorsal midfoot. SOFT TISSUES: Soft tissue swelling as expected. _______________     IMPRESSION: Significantly improved alignment status post closed reduction of a likely trimalleolar right ankle fracture dislocation. Xr Ankle Rt Ap/lat    Result Date: 1/10/2020  EXAM: RIGHT TIBIA-FIBULA AND ANKLE RADIOGRAPHS CLINICAL INDICATION/HISTORY: fall -Additional: None COMPARISON: None TECHNIQUE: Frontal and lateral views of the right tibia-fibula 3 views of the right ankle. _______________ FINDINGS: BONES: Fracture dislocation of the ankle with medial dislocation of the tibiotalar joint, fracture of the distal fibula above the syndesmosis fracture and condylar fracture distal tibia with involvement of the medial malleolus. SOFT TISSUES: No adjacent soft tissue swelling. _______________     IMPRESSION: Acute fracture dislocation of the ankle as described above. Xr Chest Port    Result Date: 1/10/2020  EXAM: XR CHEST PORT CLINICAL INDICATION/HISTORY: Chest pain -Additional: None COMPARISON: Several prior exams, most recently 8/24/2019 TECHNIQUE: Frontal view of the chest _______________ FINDINGS: HEART AND MEDIASTINUM: The exercise upper limits of normal for AP technique. Mediastinal contours are stable. LUNGS AND PLEURAL SPACES: No focal pneumonic opacity. No evidence of pneumothorax or pleural effusion. BONY THORAX AND SOFT TISSUES: No acute osseous abnormality. Calcified regions of fat necrosis project over the upper posterior right ribs. _______________     IMPRESSION: 1. No acute radiographic cardiopulmonary abnormality.             Discharge Medications:     Current Discharge Medication List      START taking these medications    Details   traMADol (ULTRAM) 50 mg tablet Take 1 Tab by mouth every six (6) hours as needed for Pain for up to 3 days. Max Daily Amount: 200 mg. Qty: 12 Tab, Refills: 0    Associated Diagnoses: Closed bimalleolar fracture of right ankle, initial encounter         CONTINUE these medications which have NOT CHANGED    Details   levETIRAcetam (KEPPRA XR) 750 mg ER tablet Take 1 Tab by mouth nightly. pantoprazole (PROTONIX) 40 mg tablet Take 1 Tab by mouth Before breakfast and dinner. Qty: 60 Tab, Refills: 0      hydrOXYzine pamoate (VISTARIL) 50 mg capsule Take 50 mg by mouth two (2) times a day. biotin 10,000 mcg cap Take 1 Cap by mouth.      levothyroxine (SYNTHROID) 25 mcg tablet Take 1 Tab by mouth Daily (before breakfast). Indications: HYPOTHYROIDISM  Qty: 30 Tab, Refills: 0      trimethoprim-sulfamethoxazole (BACTRIM DS, SEPTRA DS) 160-800 mg per tablet Take 1 Tab by mouth two (2) times a day. acetaminophen (TYLENOL) 325 mg tablet Take 2 Tabs by mouth every four (4) hours as needed for Pain. Qty: 20 Tab, Refills: 0      !! ergocalciferol (VITAMIN D2) 50,000 unit capsule Take 50,000 Units by mouth. ferrous sulfate (FEOSOL) 325 mg (65 mg iron) tablet Take 325 mg by mouth daily. calcium citrate-vitamin d3 (CITRACAL+D) 315-200 mg-unit tab Take 1 Tab by mouth two (2) times daily (with meals). !! ergocalciferol (ERGOCALCIFEROL) 50,000 unit capsule Take 50,000 Units by mouth every seven (7) days. colesevelam (WELCHOL) 625 mg tablet Take 2 Tabs by mouth two (2) times daily (with meals). Qty: 120 Tab, Refills: 0      escitalopram oxalate (LEXAPRO) 10 mg tablet Take 1 Tab by mouth daily. Qty: 10 Tab, Refills: 0      ascorbic acid (VITAMIN C) 500 mg tablet Take 1,000 mg by mouth daily. cyanocobalamin (VITAMIN B-12) 1,000 mcg Subl 1,000 mcg by SubLINGual route daily. !! - Potential duplicate medications found. Please discuss with provider.       STOP taking these medications       levETIRAcetam (KEPPRA) 500 mg tablet Comments:   Reason for Stopping:               Activity: PT/OT per Home Health    Diet: Resume previous diet    Wound Care: As directed wound care center    Follow-up:   Please follow up with your PCP within 7 days to discuss your recent hospitalization. Patient to arrange.   Orthopedic surgery in 1 week         Total time spent including time spent on final examination and discharge discussion, discharge documentation and records reviewed and medication reconciliation: > 30 minutes    Lillian Cortes DO  Internal Medicine, Hospitalist  Pager: [phone]

## 2020-01-10 NOTE — ED NOTES
Patient awake and alert. Cardiac monitor , BP an d pulse ox with etCo2 monitor on with alarms . Consents signed for closed reduction and anesthesia with witness. Patient o2 -3 lpm via nc with 99% saturations. 3954  and Ronel LOPEZ at patient bedside. Patient alert and oriented, speaking  Right AC PIV patent-  0023  administering Etomidate 3 mg IV. via right PIV, Fentanyl 50 mcg IV administered by   2235 Patient remains oriented with 10/10 pain- muscle spasms- Etomidate 10 mg administered by . Patients vital signs remains stable - patient continues to verbalize. 0030 open reduction of right foot ankle performed by [facility] Officer JOHN Cisneros at bedside  0683 Patient oriented x3, vital signs stable.   0040 Patient remains awake- resting quietly pain 4/10 at this time- Patient at baseline- Patient continues to be monitored

## 2020-01-10 NOTE — PROGRESS NOTES
Problem: Discharge Planning  Goal: *Discharge to safe environment  Outcome: Resolved/Met  Plan is home     Reason for Admission:   Fractured ankle                  RRAT Score:   20               Do you (patient/family) have any concerns for transition/discharge? Needs walker and home health              Plan for utilizing home health:   Yes, [facility] for [address]:  no            Transition of Care Plan:    Patient lives in a one story apartment that is wheelchair accessible. Mother in law is to drive her home. Home health FOC obtained and copy to pt, copy to chart- [place] [id number]. Rolling walker orders have been faxed to Vlad Simmons at [facility] and I spoke to her also. Patient Plan is home. Care Management Interventions  PCP Verified by CM: Yes  Palliative Care Criteria Met (RRAT>21 & CHF Dx)?: No  Mode of Transport at Discharge: Other (see comment)  Transition of Care Consult (CM Consult): Discharge Planning, [address]: No  Reason Outside [place]: Managed care specific requirement  MyChart Signup: No  Discharge Durable Medical Equipment: Yes  Physical Therapy Consult: Yes  Occupational Therapy Consult: Yes  Speech Therapy Consult: No  Discharge Location  Discharge Placement: Home with home health    Patient and/or next of kin has been given and has signed the [facility] Outpatient Observation  Notification letter and all questions answered. Copy of this notice given to patient and copy placed on chart. Patient and/or next of kin has been given the Outpatient Observation Information and Notification letter and all questions answered. 3 :40- I rama mccallum spoke with Vlad Simmons at [facility]- she says she has looked over the faxed information, and is sending it to her people for acceptance. Then she is to call the patient to discuss the 20% co pay. Patient aware they will call her.     4:30 I received Delivery Ticket for pt's walker. Tori Monson # [phone]- customer copy given to pt, delivery copy to chart and copy of faxed documents and delivery ticket to Case Management office. Patient had called her ride, and now ride was downstairs to pick her up Kayla Mezatist notified. Lucia Forrester.  MARIUSZ Rodriguez  [facility]  [phone], Pager [phone]  [email or web address]

## 2020-01-10 NOTE — PROGRESS NOTES
TRANSFER - IN REPORT:    [id number] :  Telephone report received from DEANNA Davis(name) on Marci Drummond  being received from ED(unit) for routine progression of care      Report consisted of patients Situation, Background, Assessment and   Recommendations(SBAR). Information from the following report(s) ED Summary, MAR, Med Rec Status and Cardiac Rhythm NSR was reviewed with the receiving nurse. Opportunity for questions and clarification was provided. 0600  Received Pt. Per Stretcher, Transfer to bed without difficulty and Pt. Is AOX4, no signs of distress . Pt. Educated on room and equipment  And safety assess  Pt. Verbalized understanding. Bed low and locked. 0630  Skin assessment done with Kemi . Wale Peacock  Pt has ezcema scattered all over the body ,irritated ezcema/skin  ,redness,moist  on  Lower abdomen/right groin ,dressing intact from home. Call bell within reach . Will continue monitoring.

## 2020-01-10 NOTE — PROGRESS NOTES
Problem: General Infection Care Plan (Adult and Pediatric)  Goal: Improvement in signs and symptoms of infection  Outcome: Progressing Towards Goal  Goal: *Optimize nutritional status  Outcome: Progressing Towards Goal     Problem: Patient Education: Go to Patient Education Activity  Goal: Patient/Family Education  Outcome: Progressing Towards Goal     Problem: Pain  Goal: *Control of Pain  Outcome: Progressing Towards Goal     Problem: Patient Education: Go to Patient Education Activity  Goal: Patient/Family Education  Outcome: Progressing Towards Goal

## 2020-01-10 NOTE — H&P
History and Physical    Patient: Ubaldo Meier MRN: [id number]  SSN: [id number]    YOB: 1960  Age: 63 y.o. Sex: female      Subjective: Ubaldo Meier is a 63 y.o. female who present to [facility] ER with complaint of Fall. Patient states that she did not trip over anything when she fell, but does report that she had not been feeling well recently. Patient states that she falls fairly often and recently broke two toes falling. Patient reports that she had been having low blood pressure yesterday (1/09/2020) prior to falling, had just toileted, had gone to [facility], and had also been taking care of her  with Cerebral Palsy. Patient states that she had been experiencing a constant \"heaviness\" in her chest that is associated with shortness of breath that typically occurs once or twice a month and is improved with laying down and worse with positionally with sitting up. Patient also reports that she lost 8 lbs in the last 2 weeks and attributes this to being so busy with the care of her . Patient reports that she was given an IM dose of Ceftriaxone at [address] [facility] and was started on antibiotics for a wound on her right groin. Patient states that she has had weakness in both her legs for approximately one years and attributes this to the cause of her falls, reporting Left foot numbness due to old trauma and otherwise denying BLE numbness or incontinence of bowel or bladder. Patient states that she has been sober since 5/11/2019, but when informed that she had ETOH in her blood per laboratory studies, Patient states that she had started drinking over the holidays due to stress and had 2 beers last night. IN [facility] ER, Patient was noted to have a Right Bimalleolar Fracture that was reduced by [facility] ER Physician. Patient continues to have RLE pain.     Patient is placed on Observation in Surgical Unit for management and additional evaluation of Right Bimalleolar Fracture. Past Medical History:   Diagnosis Date    Alcohol abuse     Alcoholic hepatitis     Alcoholic pancreatitis     Alcoholic pancreatitis     Anemia     Bipolar disorder (HCC)     Dr. [facility])    Chronic abdominal pain     Chronic pancreatitis ([address]     Compression fracture of lumbar vertebra (HCC)     L4, L5     Depression     Dr. [facility])    Elevated LFTs     ETOH abuse     Fatty liver     GERD (gastroesophageal reflux disease)     Hyperlipidemia     Hypertension     Hypothyroidism     Lower GI bleeding     Morbid obesity ([address]     Obstructive sleep apnea     Seizures ([address]     Substance induced mood disorder ([address]     Vitamin D deficiency      Past Surgical History:   Procedure Laterality Date    BIOPSY LIVER  08/15/2012    Lap Left hepatic liver wedge by Dr. Cristobal Mohan; BX Revealed: Hepatic Steatosis, AVM w/ associated Subcapsular Fibrosis.  COLONOSCOPY N/A 1/17/2017    COLONOSCOPY performed by [place] MD at [address] DISKECTOMY, ANTERIOR, WITH D  8/1995, 11/1997    HX ABDOMINAL LAPAROSCOPY  12/02/2014    Laparoscopic Gastrostomy w/ Partial Gastrectomy for assistance in placement of Endoscopic Retrograde Cholangiopancreatography & Diagnostic Lap.  HX CARPAL TUNNEL RELEASE      HX CHOLECYSTECTOMY  09/16/2014    Dr. Itz Reddy HX COLONOSCOPY  05/12/2012    Colonoscopy by Dr. Aishwarya Vargas. [facility] Force: Bx Revealed Colon Polyps (Tubular Adenoma), Hemorrhoids.  HX GASTRIC BYPASS  08/15/2012    Lap Christian-en-y    HX HEMORRHOIDECTOMY  04/30/2015    Dr. Agapito Lowry.  Jason    HX HYSTERECTOMY  2006    HX TONSILLECTOMY  1992    REPAIR NONUNION SCAPHOID CARPAL BONE Right 2010    Wrist      Family History   Problem Relation Age of Onset    No Known Problems Mother     Cancer Father         Thoat Cancer    Cancer Sister         Breast Cancer    Obesity Brother     Cancer Brother Throat Cancer     Social History     Tobacco Use    Smoking status: Never Smoker    Smokeless tobacco: Never Used   Substance Use Topics    Alcohol use: Not Currently     Alcohol/week: 0.0 standard drinks     Comment: 6 pack of beer a week- 1/2/17 last use, Sober 5/11/19      Patient lives at home with  who has Cerebral Palsy and for whom she is the Primary Caretaker. Patient is up ad sarath at home prior to this hospital visit. Patient is a Never Smoker who denies Vaping. Patient reports \"Drinking too much Beer\" historically and endorses having Quit 5/11/2019; however, Patient reports that she started drinking over the holidays and reports 2 beers last night (1/09/2020). Patient denies Illicit Drug Use. Patient has a Cane at home that she does not use. Prior to Admission medications    Medication Sig Start Date End Date Taking? Authorizing Provider   acetaminophen (TYLENOL) 325 mg tablet Take 2 Tabs by mouth every four (4) hours as needed for Pain. 11/27/19   JOHN Jason   pantoprazole (PROTONIX) 40 mg tablet Take 1 Tab by mouth Before breakfast and dinner. 8/27/19   Lino Yung PA   levETIRAcetam (KEPPRA) 500 mg tablet Take 500 mg by mouth once. Provider, Historical   ergocalciferol (VITAMIN D2) 50,000 unit capsule Take 50,000 Units by mouth. Provider, Historical   hydrOXYzine pamoate (VISTARIL) 50 mg capsule Take 50 mg by mouth two (2) times a day. Provider, Historical   ferrous sulfate (FEOSOL) 325 mg (65 mg iron) tablet Take 325 mg by mouth daily. Sandro Sullivan MD   biotin 10,000 mcg cap Take 1 Cap by mouth. Sandro Sullivan MD   calcium citrate-vitamin d3 (CITRACAL+D) 315-200 mg-unit tab Take 1 Tab by mouth two (2) times daily (with meals). Provider, Historical   ergocalciferol (ERGOCALCIFEROL) 50,000 unit capsule Take 50,000 Units by mouth every seven (7) days.     Provider, Historical   colesevelam (WELCHOL) 625 mg tablet Take 2 Tabs by mouth two (2) times daily (with meals). 2/8/16   Kristie MCLAUGHLIN, DO   escitalopram oxalate (LEXAPRO) 10 mg tablet Take 1 Tab by mouth daily. 2/8/16   Landon Peralta, DO   levothyroxine (SYNTHROID) 25 mcg tablet Take 1 Tab by mouth Daily (before breakfast). Indications: HYPOTHYROIDISM  Patient taking differently: Take 50 mcg by mouth Daily (before breakfast). Indications: hypothyroidism 2/8/16   Kristie Oconnor A, DO   ascorbic acid (VITAMIN C) 500 mg tablet Take 1,000 mg by mouth daily. Provider, Historical   cyanocobalamin (VITAMIN B-12) 1,000 mcg Subl 1,000 mcg by SubLINGual route daily.     Provider, Historical        Allergies   Allergen Reactions    Codeine Rash    Lamictal [Lamotrigine] Anaphylaxis    Morphine Anaphylaxis     Per patient, has tolerated dilaudid in the past.    Pcn [Penicillins] Anaphylaxis    Vancomycin Rash    Doxycycline Rash    Percocet [Oxycodone-Acetaminophen] Hives and Itching    Tetracycline Rash    Tomato Hives       Review of Systems:  (+) Weight Change  (+) Shortness of Breath  (+) Chest Pain  (+) Muscle Weakness (BLE x2 years)  (+) Tremor (Patient reports she is \"cold\")  (+) Loss of Sensation (Left Foot 2°/2 Old Trauma)  (+) Wound (Right Groin)  (+) Anxiety/Stressors  (-) Fevers  (-) Chills  (-) Cough  (-) Increased Sputum Production  (-) Pain with Inspiration  (-) Wheezing  (-) Palpitation (Not since 4 years ago)  (-) BLE Edema  (-) Dyspnea on Exertion  (-) Abdominal Pain  (-) Nausea  (-) Vomiting  (-) Diarrhea  (-) Dysuria  (-) Incontinence (Neither Bowel nor Bladder)  (-) Seizure (History of Withdrawal Seizures)  All other systems have been reviewed and are negative      Objective:     Vitals:    01/10/20 0240 01/10/20 0300 01/10/20 0400 01/10/20 0430   BP: 107/70 101/72 116/72 116/69   Pulse: 85 85 87 95   Resp: 18 19 19 21   Temp:       SpO2: 99% 98% 99% 97%   Weight:       Height:            Physical Exam:  General:  Older Adult female lying in bed in no acute distress  HEENT: Atraumatic, normocephalic; Pupils equally round and reactive to light with accommodation; Extraocular muscles intact; Moist Oropharynx without erythema, edema, or exudates; (+) Sparse, Poor to Fair Dentition  Neck:  No Bruits; No Lymphadenopathy  Chest:  No pectus carinatum; No pectus excavatum  Cardiovascular:  (+) Borderline tachycardic rate, regular rhythm without rubs, gallops, or murmurs  Respiratory:  Clear to Auscultation Bilaterally without wheezes, rales, or rhonchi; normal effort of breathing  Abdominal:  (+) Obese, soft, non-tense, non-tender abdomen; BS present without guarding, rebound, or masses; (+) Dressing over Right suprapubic region  :  Deferred  Extremities:  Pulses 2+ x3 (2+ Right Popliteal Pulse) without edema, clubbing, or cyanosis; (+) Right Foot Wrapped in an ACE Bandage to knee; (+) Capillary refill <4 seconds in BLE toes  Musculoskeletal:  Strength 5/5 and symmetrical in BUE and LLE; (+) Strength Testing of RLE deferred; however, Patient able to move toes without difficulty  Integument:  No rash on face, forearms, or legs  Neurological:  A&O x4/4; No gross deficits of Visual Acuity, Eye Movement, Jaw Opening, Facial Expression, Hearing, Phonation, or Head Movement;  No gross deficits of Tongue Movement or Slurring of Speech; (+) Patient maintains good sensation in Toes of Right Foot; (+) Patient shaking all over  Psychiatric:  Affect is appropriate; Language is present and fluent; Behavior is appropriate      Assessment:     Hospital Problems  Date Reviewed: 1/10/2020          Codes Class Noted POA    * (Principal) Closed right ankle fracture ICD-10-CM: S82.891A  ICD-9-CM: 824.8  1/10/2020 Yes        Alcohol abuse ICD-10-CM: F10.10  ICD-9-CM: 305.00  3/24/2016 Yes        Alcohol withdrawal ([email or web address] ICD-10-CM: E53.981  ICD-9-CM: 291.81  3/26/2016 Yes        Morbid obesity ([email or web address] ICD-10-CM: E66.01  ICD-9-CM: 278.01  Unknown Yes        Normocytic anemia ICD-10-CM: D64.9  ICD-9-CM: 285.9 1/10/2020 Yes        Chronic pancreatitis ([facility] ICD-10-CM: K86.1  ICD-9-CM: 577.1  8/24/2019 Yes        Hyponatremia ICD-10-CM: E87.1  ICD-9-CM: 276.1  5/22/2016 Yes        Essential hypertension, benign ICD-10-CM: I10  ICD-9-CM: 401.1  5/6/2014 Yes              Plan:     IV Ceftriaxone for care of Patient's Right Groin Wound while in hospital with Wound Care to manage. PRN Acetaminophen, PRN Ondansetron, PRN Fentanyl for Pain Control. Orthopedic Surgical services consulted in [facility] ER to see in AM.    CIWA protocols. Continue home medications for Depression, Constipation, Seizure, Hypothyroidism, and GERD. DVT mechanoprophylaxis in case a Surgical intervention is warranted.     Signed By: Marcell Nichole DO     January 10, 2020

## 2020-01-10 NOTE — ED TRIAGE NOTES
Patient stated that she felt chest pressure and SOB  Fell- heard right ankle pop. Patient has deformity, swelling and pain right proximal tib/fib, ankle- + DP pulse, + motor. + sensation, right foot warm.

## 2020-01-10 NOTE — ROUTINE PROCESS
Bedside and Verbal shift change report given to NEWTON Boyer (oncoming nurse) by Cassius Nieves (offgoing nurse). Report included the following information SBAR, Kardex, MAR and Recent Results.

## 2020-01-10 NOTE — ED NOTES
Procedural Sedation  Date/Time: 1/10/2020 12:51 AM  Performed by: Angel Hedrick MD  Authorized by: Angel Hedrick MD     Consent:     Consent obtained:  Written    Consent given by:  Patient    Risks discussed: Allergic reaction, prolonged hypoxia resulting in organ damage, prolonged sedation necessitating reversal and nausea  Indications:     Procedure performed:  Fracture reduction    Procedure necessitating sedation performed by:  Different physician JOHN Chavarria)    Intended level of sedation:  Moderate (conscious sedation)  Pre-sedation assessment:     NPO status caution: urgency dictates proceeding with non-ideal NPO status      ASA classification: class 2 - patient with mild systemic disease      Neck mobility: normal      Mouth openin finger widths    Thyromental distance:  3 finger widths    Mallampati score:  III - soft palate, base of uvula visible    Pre-sedation assessments completed and reviewed: mental status, nausea/vomiting, pain level and respiratory function      History of difficult intubation: no    Immediate pre-procedure details:     Reviewed: vital signs      Verified: intubation equipment available    Procedure details (see MAR for exact dosages):     Preoxygenation:  Nasal cannula    Sedation:  Etomidate    Analgesia:  Fentanyl    Intra-procedure monitoring:  Blood pressure monitoring, continuous capnometry, continuous pulse oximetry, cardiac monitor and frequent LOC assessments    Intra-procedure events: none      Total sedation time (minutes):  22  Post-procedure details:     Attendance: Constant attendance by certified staff until patient recovered      Recovery: Patient returned to pre-procedure baseline      Patient is stable for discharge or admission: yes      Patient tolerance:   Tolerated well, no immediate complications

## 2020-01-10 NOTE — ED PROVIDER NOTES
EMERGENCY DEPARTMENT HISTORY AND PHYSICAL EXAM    Date: 1/9/2020  Patient Name: Elizabeth Nguyen    History of Presenting Illness     Chief Complaint   Patient presents with   [facility] Fall    Leg Pain         History Provided By: Patient    Chief Complaint: chest pain, fall  Duration: 20 Minutes  Timing:  Acute  Location: center of chest, R ankle  Quality: Sharp  Severity: Severe  Modifying Factors: n/a  Associated Symptoms: denies any other associated signs or symptoms      HPI: Elizabeth Nguyen is a 63 y.o. female with a PMH of Hyperlipidemia, anemia, elevated LFTs, bipolar disorder, hypertension, HERMELINDA, depression, GERD, fatty liver disease, alcohol abuse, alcoholic hepatitis, hypothyroidism, chronic pancreatitis, seizures who presents to the ER via EMS from home after having a fall and injuring her right leg. Patient states she was getting ready for bed when she began to not feel well and reports pressure in the center of her chest.  She attempted to walk to the living room to alert her family member when she fell in the hallway injuring her right lower leg. She has been unable to weight-bear as she felt a crunching sensation in her lower leg. Patient denied hitting her head and had no loss of consciousness. She does not take any blood thinners. Patient states she also had mild shortness of breath when she had chest pain. She denies any recent fevers, chills, sick contacts and has no other symptoms or complaints. PCP: José Martínez MD    Current Outpatient Medications   Medication Sig Dispense Refill    acetaminophen (TYLENOL) 325 mg tablet Take 2 Tabs by mouth every four (4) hours as needed for Pain. 20 Tab 0    pantoprazole (PROTONIX) 40 mg tablet Take 1 Tab by mouth Before breakfast and dinner. 60 Tab 0    levETIRAcetam (KEPPRA) 500 mg tablet Take 500 mg by mouth once.  ergocalciferol (VITAMIN D2) 50,000 unit capsule Take 50,000 Units by mouth.       hydrOXYzine pamoate (VISTARIL) 50 mg capsule Take 50 mg by mouth two (2) times a day.  ferrous sulfate (FEOSOL) 325 mg (65 mg iron) tablet Take 325 mg by mouth daily.  biotin 10,000 mcg cap Take 1 Cap by mouth.  calcium citrate-vitamin d3 (CITRACAL+D) 315-200 mg-unit tab Take 1 Tab by mouth two (2) times daily (with meals).  ergocalciferol (ERGOCALCIFEROL) 50,000 unit capsule Take 50,000 Units by mouth every seven (7) days.  colesevelam (WELCHOL) 625 mg tablet Take 2 Tabs by mouth two (2) times daily (with meals). 120 Tab 0    escitalopram oxalate (LEXAPRO) 10 mg tablet Take 1 Tab by mouth daily. 10 Tab 0    levothyroxine (SYNTHROID) 25 mcg tablet Take 1 Tab by mouth Daily (before breakfast). Indications: HYPOTHYROIDISM (Patient taking differently: Take 50 mcg by mouth Daily (before breakfast). Indications: hypothyroidism) 30 Tab 0    ascorbic acid (VITAMIN C) 500 mg tablet Take 1,000 mg by mouth daily.  cyanocobalamin (VITAMIN B-12) 1,000 mcg Subl 1,000 mcg by SubLINGual route daily.          Past History     Past Medical History:  Past Medical History:   Diagnosis Date    Alcohol abuse     Alcoholic hepatitis     Alcoholic pancreatitis     Alcoholic pancreatitis     Anemia     Bipolar disorder (HCC)     Dr. Cao [facility])    Chronic abdominal pain     Chronic pancreatitis ([facility]     Compression fracture of lumbar vertebra (HCC)     L4, L5     Depression     Dr. Cao [facility])    Elevated LFTs     ETOH abuse     Fatty liver     GERD (gastroesophageal reflux disease)     Hyperlipidemia     Hypertension     Hypothyroidism     Lower GI bleeding     Morbid obesity ([address]     Obstructive sleep apnea     Seizures ([facility]     Substance induced mood disorder ([facility]     Vitamin D deficiency        Past Surgical History:  Past Surgical History:   Procedure Laterality Date    BIOPSY LIVER  08/15/2012    Lap Left hepatic liver wedge by  Delayne Hussain; BX Revealed: Hepatic Steatosis, AVM w/ associated Subcapsular Fibrosis.  COLONOSCOPY N/A 1/17/2017    COLONOSCOPY performed by Alysa Antonio MD at [address] DISKECTOMY, ANTERIOR, WITH D  8/1995, 11/1997    HX ABDOMINAL LAPAROSCOPY  12/02/2014    Laparoscopic Gastrostomy w/ Partial Gastrectomy for assistance in placement of Endoscopic Retrograde Cholangiopancreatography & Diagnostic Lap.  HX CARPAL TUNNEL RELEASE      HX CHOLECYSTECTOMY  09/16/2014    Dr. Judith Moffett HX COLONOSCOPY  05/12/2012    Colonoscopy by Dr. Lela Cope. Joel Poole: Bx Revealed Colon Polyps (Tubular Adenoma), Hemorrhoids.  HX GASTRIC BYPASS  08/15/2012    Lap Christian-en-y    HX HEMORRHOIDECTOMY  04/30/2015    Dr. Leda Urena. Jason    HX HYSTERECTOMY  2006    HX TONSILLECTOMY  1992    REPAIR NONUNION SCAPHOID CARPAL BONE Right 2010    Wrist       Family History:  Family History   Problem Relation Age of Onset    Cancer Father     Cancer Sister     Obesity Brother        Social History:  Social History     Tobacco Use    Smoking status: Never Smoker    Smokeless tobacco: Never Used   Substance Use Topics    Alcohol use: Not Currently     Alcohol/week: 0.0 standard drinks     Comment: 6 pack of beer a week- 1/2/17 last use, Sober 5/11/19    Drug use: No       Allergies: Allergies   Allergen Reactions    Codeine Rash    Lamictal [Lamotrigine] Anaphylaxis    Morphine Anaphylaxis     Per patient, has tolerated dilaudid in the past.    Pcn [Penicillins] Anaphylaxis    Vancomycin Rash    Doxycycline Rash    Percocet [Oxycodone-Acetaminophen] Hives and Itching    Tetracycline Rash    Tomato Hives         Review of Systems   Review of Systems   Constitutional: Negative for chills, fatigue and fever. HENT: Negative. Negative for sore throat. Eyes: Negative. Respiratory: Negative for cough and shortness of breath. Cardiovascular: Positive for chest pain. Negative for palpitations. Gastrointestinal: Negative for abdominal pain, nausea and vomiting. Genitourinary: Negative for dysuria. Musculoskeletal: Positive for arthralgias. R leg injury   Skin: Negative. Neurological: Negative for dizziness, weakness, light-headedness and headaches. Psychiatric/Behavioral: Negative. All other systems reviewed and are negative. Physical Exam     Vitals:    01/10/20 0020 01/10/20 0022 01/10/20 0030 01/10/20 0032   BP:  (!) 178/91 101/65 134/70   Pulse: (!) 103 (!) 112 94 (!) 108   Resp: 21 23 23 20   Temp:       SpO2: 100% 100% 98% 100%   Weight:       Height:         Physical Exam  Vitals signs and nursing note reviewed. Constitutional:       General: She is in acute distress. Appearance: She is well-developed. HENT:      Head: Normocephalic and atraumatic. No raccoon eyes or Dorantes's sign. Mouth/Throat:      Mouth: Mucous membranes are moist.   Eyes:      General: No scleral icterus. Conjunctiva/sclera: Conjunctivae normal.   Neck:      Musculoskeletal: Normal range of motion and neck supple. Vascular: No JVD. Trachea: No tracheal deviation. Cardiovascular:      Rate and Rhythm: Normal rate and regular rhythm. Heart sounds: Normal heart sounds. No murmur. Pulmonary:      Effort: Pulmonary effort is normal. No respiratory distress. Breath sounds: Normal breath sounds. No stridor. No wheezing, rhonchi or rales. Chest:      Chest wall: No tenderness. Abdominal:      General: Bowel sounds are normal. There is no distension. Palpations: Abdomen is soft. Tenderness: There is no tenderness. There is no guarding or rebound. Hernia: No hernia is present. Musculoskeletal:         General: Swelling, deformity and signs of injury present. Right ankle: She exhibits decreased range of motion, swelling, ecchymosis and deformity. She exhibits no laceration and normal pulse. Feet:    Skin:     General: Skin is warm and dry. Capillary Refill: Capillary refill takes less than 2 seconds. Neurological:      Mental Status: She is alert and oriented to person, place, and time. GCS: GCS eye subscore is 4. GCS verbal subscore is 5. GCS motor subscore is 6. Diagnostic Study Results     Labs -     Recent Results (from the past 12 hour(s))   EKG, 12 LEAD, INITIAL    Collection Time: 01/09/20 11:20 PM   Result Value Ref Range    Ventricular Rate 98 BPM    Atrial Rate 98 BPM    P-R Interval 124 ms    QRS Duration 80 ms    Q-T Interval 382 ms    QTC Calculation (Bezet) 487 ms    Calculated R Axis -12 degrees    Calculated T Axis 115 degrees    Diagnosis       Normal sinus rhythm  Low voltage QRS  Nonspecific T wave abnormality  Abnormal ECG  When compared with ECG of 12-AUG-2017 20:26,  QRS voltage has decreased  Nonspecific T wave abnormality, worse in Inferior leads  Nonspecific T wave abnormality, worse in Lateral leads     CBC WITH AUTOMATED DIFF    Collection Time: 01/09/20 11:45 PM   Result Value Ref Range    WBC 9.9 4.6 - 13.2 K/uL    RBC 2.77 (L) 4.20 - 5.30 M/uL    HGB 8.8 (L) 12.0 - 16.0 g/dL    HCT 26.8 (L) 35.0 - 45.0 %    MCV 96.8 74.0 - 97.0 FL    MCH 31.8 24.0 - 34.0 PG    MCHC 32.8 31.0 - 37.0 g/dL    RDW 15.8 (H) 11.6 - 14.5 %    PLATELET 041 950 - 986 K/uL    MPV 9.6 9.2 - 11.8 FL    NEUTROPHILS 83 (H) 40 - 73 %    LYMPHOCYTES 9 (L) 21 - 52 %    MONOCYTES 8 3 - 10 %    EOSINOPHILS 0 0 - 5 %    BASOPHILS 0 0 - 2 %    ABS. NEUTROPHILS 8.3 (H) 1.8 - 8.0 K/UL    ABS. LYMPHOCYTES 0.9 0.9 - 3.6 K/UL    ABS. MONOCYTES 0.8 0.05 - 1.2 K/UL    ABS. EOSINOPHILS 0.0 0.0 - 0.4 K/UL    ABS.  BASOPHILS 0.0 0.0 - 0.1 K/UL    DF AUTOMATED     METABOLIC PANEL, COMPREHENSIVE    Collection Time: 01/09/20 11:45 PM   Result Value Ref Range    Sodium 129 (L) 136 - 145 mmol/L    Potassium 3.9 3.5 - 5.5 mmol/L    Chloride 95 (L) 100 - 111 mmol/L    CO2 20 (L) 21 - 32 mmol/L    Anion gap 14 3.0 - 18 mmol/L    Glucose 205 (H) 74 - 99 mg/dL    BUN 4 (L) 7.0 - 18 MG/DL    Creatinine 0.69 0.6 - 1.3 MG/DL    BUN/Creatinine ratio 6 (L) 12 - 20      GFR est AA >60 >60 ml/min/1.73m2    GFR est non-AA >60 >60 ml/min/1.73m2    Calcium 7.7 (L) 8.5 - 10.1 MG/DL    Bilirubin, total 1.1 (H) 0.2 - 1.0 MG/DL    ALT (SGPT) 38 13 - 56 U/L    AST (SGOT) 88 (H) 10 - 38 U/L    Alk. phosphatase 387 (H) 45 - 117 U/L    Protein, total 5.9 (L) 6.4 - 8.2 g/dL    Albumin 2.2 (L) 3.4 - 5.0 g/dL    Globulin 3.7 2.0 - 4.0 g/dL    A-G Ratio 0.6 (L) 0.8 - 1.7     CARDIAC PANEL,(CK, CKMB & TROPONIN)    Collection Time: 01/09/20 11:45 PM   Result Value Ref Range    CK 77 26 - 192 U/L    CK - MB 2.0 <3.6 ng/ml    CK-MB Index 2.6 0.0 - 4.0 %    Troponin-I, QT <0.02 0.0 - 0.045 NG/ML   ETHYL ALCOHOL    Collection Time: 01/09/20 11:45 PM   Result Value Ref Range    ALCOHOL(ETHYL),SERUM 21 (H) 0 - 3 MG/DL       Radiologic Studies -   XR TIB/FIB RT    (Results Pending)   XR CHEST PORT    (Results Pending)   XR ANKLE RT AP/LAT    (Results Pending)   XR ANKLE RT AP/LAT    (Results Pending)     CT Results  (Last 48 hours)    None        CXR Results  (Last 48 hours)    None            Medical Decision Making   I am the first provider for this patient. I reviewed the vital signs, available nursing notes, past medical history, past surgical history, family history and social history. Vital Signs-Reviewed the patient's vital signs. Records Reviewed: Nursing Notes, Old Medical Records, Previous electrocardiograms, Previous Radiology Studies and Previous Laboratory Studies     651 PM  20-year-old female presents the ER via EMS after having a fall. Patient reports feeling chest pain and not well prior to falling in her hallway tonight. Unable to weight-bear and reports feeling a crunching sensation in her right leg. Did not hit her head and did not lose consciousness. Still reports having chest pain at this time.   EKG shows normal sinus rhythm rate 98 with nonspecific T wave abnormality noted. No acute changes when compared to previous EKG imaging in system. X-ray imaging of right lower leg shows obvious fracture dislocation consistent with bimalleolar injury. Discussed patient with Dr. Macarena Contreras, and agrees with moderate sedation and injury reduction. Consents obtained for procedure and moderate sedation and witnessed by RN at bedside prior to pain medications given. We will continue with cardiac work-up and plan Ortho consult and admission. Belkis Mac PA-C     12:50 AM  Patient tolerated reduction without difficulty. Postreduction imaging shows significant reduction in fracture dislocation with good ankle mortise intact. Patient was strong DP and PT pulses bilaterally. Placed in posterior short leg and stirrup splint. Initial cardiac work-up negative. Discussed patient with hospitalist, Dr. Tyrell Selby who agrees with medical admission and management at this time. Also discussed patient with Dr. Michele Peterson, on-call orthopedic surgeon who will be in to evaluate patient tomorrow. Patient made aware of pending admission at this time. Resting at this time with no further complaints. Belkis Mac PA-C      Disposition:  Admitted        Follow-up Information    None         Current Discharge Medication List          Provider Notes (Medical Decision Making):     Procedures:  DISLOCATION LOWER-EXT (ASAP ONLY)  Date/Time: 1/10/2020 12:41 AM  Performed by: Kyra Murphy PA-C  Authorized by:  Kyra Murphy PA-C     Consent:     Consent obtained:  Written    Consent given by:  Patient    Risks discussed:  Fracture, irreducible dislocation, nerve damage, recurrent dislocation, restricted joint movement and vascular damage    Alternatives discussed:  No treatment  Location:     Location:  Ankle    Ankle injury location:  R ankle    Ankle dislocation type: lateral    Pre-procedure details:     Distal perfusion: normal      Range of motion: reduced    Sedation:     Sedation type: Moderate (conscious) sedation  Procedure details:     Manipulation performed: yes      Ankle reduction method:  Traction and counter traction    Reduction successful: yes      Reduction confirmed with imaging: yes      Immobilization:  Splint    Splint type:  Short leg    Supplies used:  Cotton padding, Ortho-Glass and elastic bandage  Post-procedure details:     Neurological function: normal      Distal perfusion: normal      Range of motion: unchanged      Patient tolerance of procedure: Tolerated well, no immediate complications            Diagnosis     Clinical Impression:   1. Closed bimalleolar fracture of right ankle, initial encounter    2. Fall, initial encounter    3.  Chest pain, unspecified type

## 2020-01-10 NOTE — PROGRESS NOTES
Patient care assumed at this time. Alert and oriented x4. Denies complaints. CIWA 0. Iv site is clean dry and intact. Dressing to right lower leg is clean dry and intact. MD paged regarding PO medications, surgeon stated okay to give PO medications and regular diet. EKG done and placed in chart. 1110:MD Asif Pena made aware of patient's request for pain medication. MD aware of allergies. Stated okay to give tramadol 50 mg Q6H. Patient stated she has taken tramadol before with no reaction. Miguel stated okay to override the warning. 1230: PT called regarding seeing the patient today, stated they would try to make it happen. 1239:Pharmacy called and checked the compatibility of pain medication and allergies. Stated okay to give. 1330:Patient in chair with PT. Denies complaints. Will inform  of need to be discharged with walker. 1450:Patient up to bedside commode. D/C orders given, waiting for walker. 1620:Patient in chair waiting for walker. 1700:D/C instructions given to patient along with prescription. All questions answered.

## 2020-03-29 PROBLEM — S72.143A INTERTROCHANTERIC FRACTURE (HCC): Status: ACTIVE | Noted: 2020-01-01

## 2020-03-29 NOTE — PROGRESS NOTES
Bedside and Verbal shift change report given to NEWTON Moon (oncoming nurse) by Cricket Machado (offgoing nurse). Report included the following information SBAR, Kardex, MAR and Recent Results. Patient resting in bed c/o pain 7/10 will give tylenol

## 2020-03-29 NOTE — ED NOTES
TRANSFER - ED to INPATIENT REPORT: 
 
Verbal report given to May (name) on Barnetta Dancer  being transferred to [facility](unit) for routine progression of care Report consisted of patients Situation, Background, Assessment and  
Recommendations(SBAR). SBAR report made available to receiving floor on this patient being transferred to [address] [place] [address] (TELE)  for routine progression of care Admitting diagnosis Intertrochanteric fracture ([address] Nikolas Flash Information from the following report(s) SBAR, Kardex, ED Summary, Intake/Output, MAR and Recent Results was made available to receiving floor. Lines:  
Peripheral IV 03/28/20 Right Forearm (Active) Site Assessment Clean, dry, & intact 3/28/2020 11:50 PM  
Phlebitis Assessment 0 3/28/2020 11:50 PM  
Infiltration Assessment 0 3/28/2020 11:50 PM  
Dressing Status Clean, dry, & intact 3/28/2020 11:50 PM  
Dressing Type Transparent 3/28/2020 11:50 PM  
Hub Color/Line Status Pink 3/28/2020 11:50 PM  
   
Peripheral IV 03/28/20 Right Antecubital (Active) Medication list confirmed with patient Opportunity for questions and clarification was provided. Patient is oriented to time, place, person and situation Patient is  continent and non-ambulatory Valuables transported with patient Patient transported with: 
 Tech 
 
MAP (Monitor): 71 =Monitored (most recent) Vitals w/ MEWS Score (last day) Date/Time MEWS Score Pulse Resp Temp BP Level of Consciousness SpO2  
 03/29/20 0120    89  16    100/63    90 % 03/29/20 0110    91  20    109/69    92 % 03/29/20 0100    89  20    101/65    94 % 03/28/20 2330    89  20        100 % 03/28/20 2325    91  22    104/69    99 % 03/28/20 2315    85  16        100 % 03/28/20 2301  0  89  14  98.3 °F (36.8 °C)  124/79  Alert  100 % 03/28/20 2300    86  15        100 %

## 2020-03-29 NOTE — PROGRESS NOTES
Admitted this a.m. for Right intertrochanteric hip fracture Went to surgery. Vital signs stable Visit Vitals /79 Pulse 96 Temp 97.9 °F (36.6 °C) Resp 18 Ht 5' 6\" (1.676 m) Wt 78.4 kg (172 lb 13.5 oz) SpO2 96% Breastfeeding No  
BMI 27.90 kg/m²

## 2020-03-29 NOTE — BRIEF OP NOTE
Brief Postoperative Note Patient: Leo Vargas YOB: 1960 MRN: [id number] Date of Procedure: 3/29/2020 Pre-Op Diagnosis: right Hip intertroch fracture Post-Op Diagnosis: Same as preoperative diagnosis. Procedure(s): FEMUR INSERTION INTRA MEDULLARY NAIL Surgeon(s): 
Mani Joseph MD 
 
Surgical Assistant: Jane Oleary Anesthesia: General  
 
Estimated Blood Loss (mL): less than 50 Complications: None Specimens: none Implants:  
Implant Name Type Inv. Item Serial No.  Lot No. LRB No. Used Action SCREW LAG GAMMA TI 10.5X90 - [id number]  SCREW LAG GAMMA TI 10.5X90  [employer] [id number] Right 1 Implanted Drains: none Findings: hip fracture [id number] Electronically Signed by Hernan Alvarado MD on 3/29/2020 at 12:38 PM

## 2020-03-29 NOTE — ED TRIAGE NOTES
Arrives by EMS. Patient states she tripped and fell over her right foot which is currently in a cast. States she hit the right side of her head on the entertainment center. Denies LOC.

## 2020-03-29 NOTE — OP NOTES
[facility] 
OPERATIVE REPORT Name:  Rosaria Cooks 
MR#:   [id number] :  1960 ACCOUNT #:  [de-identified] DATE OF SERVICE:  2020 PREOPERATIVE DIAGNOSIS:  Right hip intratrochanteric fracture, 4-part. POSTOPERATIVE DIAGNOSIS:  Right hip intratrochanteric fracture, 4-part. PROCEDURE PERFORMED:  Right femur intramedullary nail with Gamma 3 Arleth nail. SURGEON:  Brendon Nolen MD 
 
ASSISTANT:  Cher Cannon. ANESTHESIA:  General. 
 
COMPLICATIONS:  None. SPECIMENS REMOVED:  
 
IMPLANTS:  Arleth IM nail 170 mm x 10 mm, 125-degree neck angle, 90 mm x 10.5 mm lag screw and distal locking screw 40 x 5. ESTIMATED BLOOD LOSS:  Minimal, less than 50 mL 
 
BLOOD ADMINISTERED:  None. TUBES AND DRAINS:  None. INDICATIONS FOR PROCEDURE:  The patient is a pleasant 52-year-old female with history of right hip introchanteric fractures with prior treatment by Dr. Patrick Rincon for ankle fracture on the right side. She has a cast in place on this side. She has had a fall, sustained hip fracture. She was brought to the operative suite for definitive management. Informed consent was obtained. Risks and benefits were explained to her and family in full including but not limited to bleeding, infection, neurovascular damage, pain, stiffness, swelling, further surgery, revision surgery, incomplete resolution of symptoms. DESCRIPTION OF PROCEDURE:  The patient was brought to the operative suite, placed on table in the supine position. She was intubated with general endotracheal anesthesia. We then proceeded to utilize the cast.  We cut the cast off her right lower extremity in order to be able to place her on [place] table. She essentially was getting the cast off in 2 days anyway and such we were not too concerned. Fracture was found to be healed. With that in mind, I repositioned her securing in [place] table.   All prominent points were padded. Traction was placed over the right leg with left leg in well leg bowden. Adduction and internal rotation were appreciated at the right leg. After this was done, we proceeded to prep and drape her right leg in normal sterile fashion and shower curtain draping. Fluoroscopic imaging was utilized throughout the case. After this was done, we proceeded to make a proximal incision just proximal to greater trochanter measuring 2 cm length dissected down to the skin. Tip of greater trochanter was identified. The T-handle awl was utilized to find the starting point. IM guidewire was placed. This was over reamed to the proximal reamer at 17 mm. Once this was done, the nail was placed in adequate position with the nail in approximately 75 degrees of rotation based on the lateral positioning on fluoroscopic imaging. As such, we proceeded to then adjust the nail handle such that the nail was in adequate position in the femoral head. We proceeded to make a secondary incision distally. The proximal cannula was placed. This was placed down to the lateral aspect of the femur. Guidewire was placed and the central portion of the head with an excellent tip to apex distal on AP and lateral planes. After this was placed, we proceeded to measure 90 mm. 90 mm x 10.5 mm lag screw was placed status post drilling. This was secured in position with compression of plates across this utilizing compression device. A set screw was placed proximally. After this done, we proceeded to move out attention to distal locking screw. Distal cannula was placed after making an incision. This measured to 5 x 40 mm. The 40 mm screw was placed after drilling, secured in position. The nail bowden was removed. This was done with a T-handle wrench. We proceeded to take final fluoroscopic imaging. The nail was found to be in good position and the fracture was well reduced.   We proceeded to copiously irrigate the wounds. Deep tissue was repaired with 0-Vicryl stitch. Subcutaneous tissue was closed with a 3-0 Vicryl stitch, skin was closed with staples. Dry sterile dressing was placed. She was placed in the splint distally after she was placed on the bed. She is to follow-up with Dr. Astrid Dove for this. MD CADE Rinaldi/JORGE [email or web address] 
D:  03/29/2020 12:44 
T:  03/29/2020 16:34 
JOB #:  [id number]

## 2020-03-29 NOTE — ED PROVIDER NOTES
EMERGENCY DEPARTMENT HISTORY AND PHYSICAL EXAM 
 
 
Date: 3/28/2020 Patient Name: Josie Rincon History of Presenting Illness Chief Complaint Patient presents with  Fall  Hip Pain History (Context): Josie Rincon is a 62 y.o. gentleman with alcoholism, severe osteoporosis, and a complicated set of comorbid conditions as noted below, who presents status post fall with acute onset, severe, persistent, localized right hip pain made worse with range of motion, without relieving features or other associated symptoms. On review of systems, the patient denies headache, head pain, neck pain, facial pain, chest pain, back pain, abdominal pain, pelvic pain, other extremity pain, numbness, weakness, tingling. Otherwise, 10 point review of systems negative PCP: Jigna Sheffield MD 
 
Current Facility-Administered Medications Medication Dose Route Frequency Provider Last Rate Last Dose  
 HYDROmorphone (PF) (DILAUDID) injection 1 mg  1 mg IntraVENous ONCE Jose Antonio Hurst MD      
 
Current Outpatient Medications Medication Sig Dispense Refill  levETIRAcetam (KEPPRA XR) 750 mg ER tablet Take 1 Tab by mouth nightly.  acetaminophen (TYLENOL) 325 mg tablet Take 2 Tabs by mouth every four (4) hours as needed for Pain. 20 Tab 0  
 pantoprazole (PROTONIX) 40 mg tablet Take 1 Tab by mouth Before breakfast and dinner. 60 Tab 0  
 ergocalciferol (VITAMIN D2) 50,000 unit capsule Take 50,000 Units by mouth.  hydrOXYzine pamoate (VISTARIL) 50 mg capsule Take 50 mg by mouth two (2) times a day.  ferrous sulfate (FEOSOL) 325 mg (65 mg iron) tablet Take 325 mg by mouth daily.  biotin 10,000 mcg cap Take 1 Cap by mouth.  calcium citrate-vitamin d3 (CITRACAL+D) 315-200 mg-unit tab Take 1 Tab by mouth two (2) times daily (with meals).  ergocalciferol (ERGOCALCIFEROL) 50,000 unit capsule Take 50,000 Units by mouth every seven (7) days.  colesevelam (WELCHOL) 625 mg tablet Take 2 Tabs by mouth two (2) times daily (with meals). 120 Tab 0  
 escitalopram oxalate (LEXAPRO) 10 mg tablet Take 1 Tab by mouth daily. 10 Tab 0  
 levothyroxine (SYNTHROID) 25 mcg tablet Take 1 Tab by mouth Daily (before breakfast). Indications: HYPOTHYROIDISM (Patient taking differently: Take 50 mcg by mouth Daily (before breakfast). Indications: hypothyroidism) 30 Tab 0  
 ascorbic acid (VITAMIN C) 500 mg tablet Take 1,000 mg by mouth daily.  cyanocobalamin (VITAMIN B-12) 1,000 mcg Subl 1,000 mcg by SubLINGual route daily. Past History Past Medical History: 
Past Medical History:  
Diagnosis Date  Alcoholic hepatitis  Alcoholic pancreatitis  Anemia  Bacteremia due to Klebsiella pneumoniae 06/30/2017  Bipolar disorder ([facility] Dr. Bowen [facility])  Chronic pancreatitis ([facility] with Chronic Abdominal Pain  Compression fracture of lumbar vertebra (HCC) L4, L5   
 [facility])  Elevated LFTs  ETOH abuse  Fatty liver  GERD (gastroesophageal reflux disease)  Hyperlipidemia  Hypertension  Hypothyroidism  Lower GI bleeding  Morbid obesity ([facility]  Obstructive sleep apnea No CPAP after Gastric Bypass in 2012  Seizures ([facility] ETOH Withdrawal Seizures  Substance induced mood disorder ([facility]  Vitamin D deficiency Past Surgical History: 
Past Surgical History:  
Procedure Laterality Date  BIOPSY LIVER  08/15/2012 Lap Left hepatic liver wedge by Dr. Gabriela Sanchez; BX Revealed: Hepatic Steatosis, AVM w/ associated Subcapsular Fibrosis.  COLONOSCOPY N/A 1/17/2017 COLONOSCOPY performed by Yolanda Arenas MD at [facility] ENDOSCOPY  DISKECTOMY, ANTERIOR, WITH D  8/1995, 11/1997  HX ABDOMINAL LAPAROSCOPY  12/02/2014  Laparoscopic Gastrostomy w/ Partial Gastrectomy for assistance in placement of Endoscopic Retrograde Cholangiopancreatography & Diagnostic Lap.  HX CARPAL TUNNEL RELEASE  HX CHOLECYSTECTOMY  09/16/2014 Dr. Yordan Shukla  HX COLONOSCOPY  05/12/2012 Colonoscopy by Dr. Nirmala Santana. Nabila Hallman: Bx Revealed Colon Polyps (Tubular Adenoma), Hemorrhoids.  HX GASTRIC BYPASS  08/15/2012 Lap Christian-en-y  HX HEMORRHOIDECTOMY  04/30/2015 Dr. Beatrice Solorzano. [facility]  HX HYSTERECTOMY  2006 [address] [address] [place] BONE Right 2010 Wrist  
 
 
Family History: 
Family History Problem Relation Age of Onset  No Known Problems Mother  Cancer Father Thoat Cancer  Cancer Sister Breast Cancer  Obesity Brother  Cancer Brother Throat Cancer Social History: 
Social History Tobacco Use  Smoking status: Never Smoker  Smokeless tobacco: Never Used Substance Use Topics  Alcohol use: Not Currently Alcohol/week: 0.0 standard drinks Comment: 6 pack of beer a week- 1/2/17 last use, Sober 5/11/19  Drug use: No  
 
 
Allergies: Allergies Allergen Reactions  Codeine Rash  Lamictal [Lamotrigine] Anaphylaxis  Morphine Anaphylaxis Per patient, has tolerated dilaudid in the past.  
 Pcn [Penicillins] Anaphylaxis  Vancomycin Rash  Doxycycline Rash  Percocet [Oxycodone-Acetaminophen] Hives and Itching  Tetracycline Rash  Tomato Hives PMH, PSH, family history, social history, allergies reviewed with the patient with significant items noted above. Review of Systems As per HPI, otherwise reviewed and negative. Physical Exam  
 
Vitals:  
 03/28/20 7216 03/28/20 2315 03/28/20 2325 03/28/20 2330 BP: 124/79  104/69 Pulse: 89 85 91 89 Resp: 14 16 22 20 Temp: 98.3 °F (36.8 °C) SpO2: 100% 100% 99% 100% Weight: 78 kg (172 lb) Height: 5' 6\" (1.676 m) Gen: In clear pain HEENT: Atraumatic, normocephalic, sclera anicteric, no garcia sign, no raccoon eyes, no hemotympanum, trachea is midline. Cardiovascular: Normal rate, regular rhythm, no murmurs, rubs, gallops. Radial pulses 2+, dorsalis pedis pulses 2+ Pulmonary: No bruising or crepitus to the chest.  Bilateral breath sounds equal with equal chest rise. No respiratory distress. No stridor. Clear lungs. ABD: Soft, nontender, nondistended. No seatbelt sign. Neuro: GCS 15. Alert. Normal speech. Normal mentation. Full strength and sensation throughout. Psych: Normal thought content and thought processes. : No CVA tenderness. Pelvis stable EXT: Right lower extremity shortened and externally rotated. The patient also has a cast on her right foot precluding the ability to get dorsalis pedis and posterior tibialis pulses, but distal cap refill is normal and popliteal artery pulses normal.  Moves all extremities well. No cyanosis or clubbing. Skin: Warm and well-perfused. Other: 
 
 
 
Diagnostic Study Results Labs - Recent Results (from the past 12 hour(s)) CBC WITH AUTOMATED DIFF Collection Time: 03/28/20 11:08 PM  
Result Value Ref Range WBC 6.0 4.6 - 13.2 K/uL  
 RBC 2.82 (L) 4.20 - 5.30 M/uL HGB 8.7 (L) 12.0 - 16.0 g/dL HCT 25.9 (L) 35.0 - 45.0 % MCV 91.8 74.0 - 97.0 FL  
 MCH 30.9 24.0 - 34.0 PG  
 MCHC 33.6 31.0 - 37.0 g/dL  
 RDW 16.6 (H) 11.6 - 14.5 % PLATELET 766 426 - 827 K/uL MPV 9.4 9.2 - 11.8 FL  
 NEUTROPHILS 54 40 - 73 % LYMPHOCYTES 38 21 - 52 % MONOCYTES 7 3 - 10 % EOSINOPHILS 1 0 - 5 % BASOPHILS 0 0 - 2 %  
 ABS. NEUTROPHILS 3.2 1.8 - 8.0 K/UL  
 ABS. LYMPHOCYTES 2.3 0.9 - 3.6 K/UL  
 ABS. MONOCYTES 0.4 0.05 - 1.2 K/UL  
 ABS. EOSINOPHILS 0.1 0.0 - 0.4 K/UL  
 ABS. BASOPHILS 0.0 0.0 - 0.1 K/UL  
 DF AUTOMATED PROTHROMBIN TIME + INR Collection Time: 03/28/20 11:08 PM  
Result Value Ref Range Prothrombin time 18.9 (H) 11.5 - 15.2 sec INR 1.6 (H) 0.8 - 1.2 BLOOD TYPE, (ABO+RH) Collection Time: 03/28/20 11:15 PM  
Result Value Ref Range ABO/Rh(D) A POSITIVE   
EKG, 12 LEAD, INITIAL Collection Time: 03/28/20 11:41 PM  
Result Value Ref Range Ventricular Rate 90 BPM  
 Atrial Rate 90 BPM  
 P-R Interval 146 ms  
 QRS Duration 78 ms Q-T Interval 434 ms QTC Calculation (Bezet) 530 ms Calculated P Axis 52 degrees Calculated R Axis -2 degrees Calculated T Axis 49 degrees Diagnosis Normal sinus rhythm Nonspecific T wave abnormality Abnormal ECG When compared with ECG of 10-AMARI-2020 09:36, 
Nonspecific T wave abnormality now evident in Lateral leads Radiologic Studies -  
XR HIP RT W OR WO PELV 2-3 VWS    (Results Pending) XR CHEST PORT    (Results Pending) CT Results  (Last 48 hours) None CXR Results  (Last 48 hours) None Medical Decision Making I am the first provider for this patient. I reviewed the vital signs, available nursing notes, past medical history, past surgical history, family history and social history. Vital Signs-Reviewed the patient's vital signs. EKG: Interpreted by myself. Records Reviewed: Personally, on initial evaluation MDM:  
Patient presents with stigmata of right hip fracture. Exam significant for same. DDX considered likely in this particular patient: Hip fracture, hip dislocation DDX always considered in trauma patient: Traumatic brain injury, skull fracture, facial fractures, pneumothorax, skeletal fractures, dislocations, intrathoracic or intra-abdominal bleeding or injury to organs, active bleeding, pelvic fracture, nonaccidental trauma. Patient condition on initial evaluation: Seriously ill Plan:  
Pain Control Close Observation Meds:  
 
Orders as below: 
Orders Placed This Encounter  XR HIP RT W OR WO PELV 2-3 VWS  XR CHEST PORT  CBC WITH AUTOMATED DIFF  
 PROTHROMBIN TIME + INR  
  URINALYSIS W/ RFLX MICROSCOPIC  EKG, 12 LEAD, INITIAL  
 TYPE, ABO & RH  
 HYDROmorphone (DILAUDID) injection 1 mg  
 HYDROmorphone (PF) (DILAUDID) injection 1 mg  IP CONSULT TO HOSPITALIST  IP CONSULT TO ORTHOPEDIC SURGERY  
  
 
ED Course:  
ED Course as of Mar 29 0048 Kris Blair Mar 29, 2020  
0046 Spoke to orthopedic surgery and the hospitalist.  Patient will be admitted, and the patient will be n.p.o. overnight. The patient may get an operation in the morning. Patient's pain poorly controlled at this time, so I will perform a femoral nerve block [DT] ED Course User Index 
[DT] Chele Vaughan MD  
 
Given the volume of the department, was not able to perform a femoral nerve block. Patient was admitted in stable condition. Patient condition at time of disposition: Stable Disposition: Admit to inpatient Diagnosis Clinical Impression: 1. Closed fracture of right hip, initial encounter ([facility] Signed, Sudheer Huber MD 
Emergency Physician 
[facility] As a voice dictation software was utilized to dictate this note, minor word transpositions can occur. I apologize for confusing wording and typographic errors. Please feel free to contact me for clarification.

## 2020-03-29 NOTE — ANESTHESIA POSTPROCEDURE EVALUATION
Procedure(s): RIGHT SHORT INTRATROCHANTERIC FEMORAL NAIL INSERTION. general 
 
Anesthesia Post Evaluation Multimodal analgesia: multimodal analgesia not used between 6 hours prior to anesthesia start to PACU discharge Patient location during evaluation: bedside Patient participation: complete - patient participated Level of consciousness: awake Pain management: adequate Airway patency: patent Anesthetic complications: no 
Cardiovascular status: stable Respiratory status: acceptable Hydration status: acceptable Post anesthesia nausea and vomiting:  controlled Vitals Value Taken Time /71 3/29/2020  2:03 PM  
Temp 36.7 °C (98 °F) 3/29/2020  1:52 PM  
Pulse 97 3/29/2020  2:04 PM  
Resp 14 3/29/2020  2:04 PM  
SpO2 97 % 3/29/2020  2:04 PM  
Vitals shown include unvalidated device data.

## 2020-03-29 NOTE — ANESTHESIA PREPROCEDURE EVALUATION
Relevant Problems No relevant active problems Anesthetic History PONV Review of Systems / Medical History Patient summary reviewed and pertinent labs reviewed Pulmonary Sleep apnea: CPAP Neuro/Psych  
 
seizures: well controlled Psychiatric history Cardiovascular Hypertension: well controlled Exercise tolerance: >4 METS 
  
GI/Hepatic/Renal 
  
GERD: well controlled Liver disease Endo/Other Hypothyroidism: well controlled Morbid obesity Other Findings Physical Exam 
 
Airway Mallampati: III 
TM Distance: 4 - 6 cm Neck ROM: decreased range of motion Mouth opening: Normal 
 
 Cardiovascular Rhythm: regular Rate: normal 
 
 
 
 Dental 
 
Dentition: Poor dentition Pulmonary Breath sounds clear to auscultation Abdominal 
GI exam deferred Other Findings Anesthetic Plan ASA: 3, emergent Anesthesia type: general 
 
 
 
 
 
Anesthetic plan and risks discussed with: Patient

## 2020-03-29 NOTE — H&P
Medicine History and Physical 
 
Patient: Kenny Blanco   Age:  62 y.o. Assessment Principal Problem: Intertrochanteric fracture ([facility] (3/29/2020) Active Problems: 
  Bipolar affective disorder, depressed, severe ([facility] (5/9/2014) Alcohol abuse (3/24/2016) Fatty liver () Plan Intertrochanteric fracture 
 - ortho consulted 
 - npo but meds 
 - iv pain meds as needed - IVF Alcohol abuse - prn ativan , vitamins Hx of fatty liver. DISPO 
-Pt to be admitted  at this time for reasons addressed above, continued hospitalization for ongoing assessment and treatment indicated Anticipated Date of Discharge:>2 days Anticipated Disposition (home, SNF) : home vs snf Chief Complaint:  
Chief Complaint Patient presents with  Fall  Hip Pain HPI:  
Kenny Blanco is a 62y.o. year old female who presents with  Fall. She is found to have an intertrochanteric fracture on Right. Orthopedics called from ED. She has hx of alcoholism and fatty liver. Patient states she was getting up to see what her  wanted whom was calling her and she got dizzy and fell back. Hit her head on the table , no LOC. She already has a cast on ankle for fracture she is supposed to follow up on with ortho on April 2. Review of Systems - positive responses in bold type Constitutional: Negative for fever, chills, diaphoresis and unexpected weight change. HENT: Negative for ear pain, congestion, sore throat, rhinorrhea, drooling, trouble swallowing, neck pain and tinnitus. Eyes: Negative for photophobia, pain, redness and visual disturbance. Respiratory: negative for shortness of breath, cough, choking, chest tightness, wheezing or stridor. Cardiovascular: Negative for chest pain, palpitations and leg swelling.   
Gastrointestinal: Negative for nausea, vomiting, abdominal pain, diarrhea, constipation, blood in stool, abdominal distention and anal bleeding. Genitourinary: Negative for dysuria, urgency, frequency, hematuria, flank pain and difficulty urinating. Musculoskeletal: Negative for back pain and arthralgias. Skin: Negative for color change, rash and wound. Neurological: Negative for dizziness, seizures, syncope, speech difficulty, light-headedness or headaches. Hematological: Does not bruise/bleed easily. Psychiatric/Behavioral: Negative for suicidal ideas, hallucinations, behavioral problems, self-injury or agitation Past Medical History: 
Past Medical History:  
Diagnosis Date  Alcoholic hepatitis  Alcoholic pancreatitis  Anemia  Bacteremia due to Klebsiella pneumoniae 06/30/2017  Bipolar disorder ([facility] Dr. Vargas [facility] Psychotherapy)  Chronic pancreatitis ([facility] with Chronic Abdominal Pain  Compression fracture of lumbar vertebra (HCC) L4, L5   
 Depression Dr. Vargas [facility] Psych[facility])  Elevated LFTs  ETOH abuse  Fatty liver  GERD (gastroesophageal reflux disease)  Hyperlipidemia  Hypertension  Hypothyroidism  Lower GI bleeding  Morbid obesity ([facility]  Obstructive sleep apnea No CPAP after Gastric Bypass in 2012  Seizures ([facility] ETOH Withdrawal Seizures  Substance induced mood disorder ([facility]  Vitamin D deficiency Past Surgical History: 
Past Surgical History:  
Procedure Laterality Date  BIOPSY LIVER  08/15/2012 Lap Left hepatic liver wedge by Dr. Kalen Smith; BX Revealed: Hepatic Steatosis, AVM w/ associated Subcapsular Fibrosis.  COLONOSCOPY N/A 1/17/2017 COLONOSCOPY performed by Mani Martinez MD at [facility] ENDOSCOPY  DISKECTOMY, ANTERIOR, WITH D  8/1995, 11/1997  HX ABDOMINAL LAPAROSCOPY  12/02/2014  Laparoscopic Gastrostomy w/ Partial Gastrectomy for assistance in placement of Endoscopic Retrograde Cholangiopancreatography & Diagnostic Lap.  HX CARPAL TUNNEL RELEASE  HX CHOLECYSTECTOMY  09/16/2014 Dr. Iliana Reddy  HX COLONOSCOPY  05/12/2012 Colonoscopy by Dr. Shantal Lawson. Jo-Ann Navarro: Bx Revealed Colon Polyps (Tubular Adenoma), Hemorrhoids.  HX GASTRIC BYPASS  08/15/2012 Lap Christian-en-y  HX HEMORRHOIDECTOMY  04/30/2015 Dr. Millie Chadwick. Karie Crocker  HX HYSTERECTOMY  2006 [facility] [address] BONE Right 2010 Wrist  
 
 
Family History: 
Family History Problem Relation Age of Onset  No Known Problems Mother  Cancer Father Thoat Cancer  Cancer Sister Breast Cancer  Obesity Brother  Cancer Brother Throat Cancer Social History: 
Social History Socioeconomic History  Marital status:  Spouse name: Not on file  Number of children: Not on file  Years of education: Not on file  Highest education level: Not on file Tobacco Use  Smoking status: Never Smoker  Smokeless tobacco: Never Used Substance and Sexual Activity  Alcohol use: Not Currently Alcohol/week: 0.0 standard drinks Comment: 6 pack of beer a week- 1/2/17 last use, Sober 5/11/19  Drug use: No  
 Sexual activity: Yes  
  Partners: Male Birth control/protection: Condom Other Topics Concern Home Medications: 
Prior to Admission medications Medication Sig Start Date End Date Taking? Authorizing Provider  
acetaminophen (TYLENOL) 325 mg tablet Take 2 Tabs by mouth every four (4) hours as needed for Pain. 11/27/19  Yes Sunshine Mai PA  
pantoprazole (PROTONIX) 40 mg tablet Take 1 Tab by mouth Before breakfast and dinner. 8/27/19  Yes Sukh Yung PA  
ergocalciferol (VITAMIN D2) 50,000 unit capsule Take 50,000 Units by mouth. Yes Provider, Historical  
biotin 10,000 mcg cap Take 1 Cap by mouth.    Yes Other, MD Sandro  
 calcium citrate-vitamin d3 (CITRACAL+D) 315-200 mg-unit tab Take 1 Tab by mouth two (2) times daily (with meals). Yes Provider, Historical  
ergocalciferol (ERGOCALCIFEROL) 50,000 unit capsule Take 50,000 Units by mouth every seven (7) days. Yes Provider, Historical  
ascorbic acid (VITAMIN C) 500 mg tablet Take 1,000 mg by mouth daily. Yes Provider, Historical  
cyanocobalamin (VITAMIN B-12) 1,000 mcg Subl 1,000 mcg by SubLINGual route daily. Yes Provider, Historical  
levETIRAcetam (KEPPRA XR) 750 mg ER tablet Take 1 Tab by mouth nightly. 8/20/19   Provider, Historical  
hydrOXYzine pamoate (VISTARIL) 50 mg capsule Take 50 mg by mouth two (2) times a day. Provider, Historical  
ferrous sulfate (FEOSOL) 325 mg (65 mg iron) tablet Take 325 mg by mouth daily. Other, MD Sandro  
[facility]) 625 mg tablet Take 2 Tabs by mouth two (2) times daily (with meals). 2/8/16   Chanel isabela , DO  
escitalopram oxalate (LEXAPRO) 10 mg tablet Take 1 Tab by mouth daily. 2/8/16   [facility], DO  
levothyroxine (SYNTHROID) 25 mcg tablet Take 1 Tab by mouth Daily (before breakfast). Indications: HYPOTHYROIDISM Patient taking differently: Take 50 mcg by mouth Daily (before breakfast). Indications: hypothyroidism 2/8/16   [facility], DO Allergies: Allergies Allergen Reactions  Codeine Rash  Lamictal [Lamotrigine] Anaphylaxis  Morphine Anaphylaxis Per patient, has tolerated dilaudid in the past.  
 Pcn [Penicillins] Anaphylaxis  Vancomycin Rash  Doxycycline Rash  Percocet [Oxycodone-Acetaminophen] Hives and Itching  Tetracycline Rash  Tomato Hives Physical Exam:  
 
Visit Vitals /83 (BP 1 Location: Left arm, BP Patient Position: At rest) Pulse 89 Temp 98.3 °F (36.8 °C) Resp 17 Ht 5' 6\" (1.676 m) Wt 78.4 kg (172 lb 13.5 oz) SpO2 100% Breastfeeding No  
BMI 27.90 kg/m² Physical Exam: General appearance: alert, cooperative, no distress, appears stated age Head: Normocephalic, without obvious abnormality, atraumatic Neck: supple, trachea midline Lungs: clear to auscultation bilaterally Heart: regular rate and rhythm, S1, S2 normal, no murmur, click, rub or gallop Abdomen: soft, non-tender. Bowel sounds normal. No masses,  no organomegaly Skin: Skin color, texture, turgor normal. No rashes or lesions Neurologic: Grossly normal 
 
Intake and Output: 
Current Shift:  No intake/output data recorded. Last three shifts:  No intake/output data recorded. Lab/Data Reviewed: 
CMP:  
Lab Results Component Value Date/Time  (L) 03/28/2020 11:08 PM  
 K 4.3 03/28/2020 11:08 PM  
  03/28/2020 11:08 PM  
 CO2 20 (L) 03/28/2020 11:08 PM  
 AGAP 14 03/28/2020 11:08 PM  
  (H) 03/28/2020 11:08 PM  
 BUN 4 (L) 03/28/2020 11:08 PM  
 CREA 0.63 03/28/2020 11:08 PM  
 GFRAA >60 03/28/2020 11:08 PM  
 GFRNA >60 03/28/2020 11:08 PM  
 CA 7.9 (L) 03/28/2020 11:08 PM  
 
CBC:  
Lab Results Component Value Date/Time WBC 6.0 03/28/2020 11:08 PM  
 HGB 8.7 (L) 03/28/2020 11:08 PM  
 HCT 25.9 (L) 03/28/2020 11:08 PM  
  03/28/2020 11:08 PM  
 
All Cardiac Markers in the last 24 hours: No results found for: CPK, CK, CKMMB, CKMB, RCK3, CKMBT, CKNDX, CKND1, MÓNICA, TROPT, TROIQ, RAN, TROPT, TNIPOC, BNP, BNPP Brandy Rodriguez MD 
 
March 29, 2020

## 2020-03-29 NOTE — PROGRESS NOTES
Problem: Pressure Injury - Risk of 
Goal: *Prevention of pressure injury Description: Document Fly Scale and appropriate interventions in the flowsheet. Outcome: Progressing Towards Goal 
Note: Pressure Injury Interventions: 
Sensory Interventions: Pressure redistribution bed/mattress (bed type) Activity Interventions: Pressure redistribution bed/mattress(bed type) Mobility Interventions: Pressure redistribution bed/mattress (bed type) Nutrition Interventions: Document food/fluid/supplement intake Friction and Shear Interventions: Apply protective barrier, creams and emollients, Transferring/repositioning devices Problem: Patient Education: Go to Patient Education Activity Goal: Patient/Family Education Outcome: Progressing Towards Goal 
  
Problem: Pain Goal: *Control of Pain Outcome: Progressing Towards Goal 
  
Problem: Patient Education: Go to Patient Education Activity Goal: Patient/Family Education Outcome: Progressing Towards Goal 
  
Problem: Falls - Risk of 
Goal: *Absence of Falls Description: Document Dalila Flow Fall Risk and appropriate interventions in the flowsheet. Outcome: Progressing Towards Goal 
Note: Fall Risk Interventions: 
Mobility Interventions: Bed/chair exit alarm, Communicate number of staff needed for ambulation/transfer, Patient to call before getting OOB, Utilize walker, cane, or other assistive device Medication Interventions: Bed/chair exit alarm, Patient to call before getting OOB, Teach patient to arise slowly Elimination Interventions: Bed/chair exit alarm, Call light in reach History of Falls Interventions: Bed/chair exit alarm, Door open when patient unattended, Room close to nurse's station Problem: Patient Education: Go to Patient Education Activity Goal: Patient/Family Education Outcome: Progressing Towards Goal 
  
Problem: Activity Intolerance Goal: *Oxygen saturation during activity within specified parameters Outcome: Progressing Towards Goal 
  
Problem: Patient Education: Go to Patient Education Activity Goal: Patient/Family Education Outcome: Progressing Towards Goal 
  
Problem: Discharge Planning Goal: *Discharge to safe environment Outcome: Progressing Towards Goal

## 2020-03-29 NOTE — PROGRESS NOTES
Problem: Discharge Planning Goal: *Discharge to safe environment Outcome: Progressing Towards Goal 
  Home health vs SNF Reason for Admission:  R hip fracture RUR Score:   18 PCP:First and Last name:  Jesu Davis Name of Practice:  
 Are you a current patient: Yes/No:   Yes Approximate date of last visit:   About 3 weeks ago Do you (patient/family) have any concerns for transition/discharge? Not @ this time Plan for utilizing home health:   If doesn't need SNF Current Advanced Directive/Advance Care Plan:  None, does not want to complete one @ this time. Transition of Care Plan:   Home with home health VS SNF depending on progress. Chart reviewed. Met with pt., verified all demographics. [facility]. NOK/: Padmini Arango, mother-n-law. Lives with her disabled spouse, Johnny Norton, whom has 24 hr caregivers. Uses cane, has access to wheel chair if needed. Made her aware that she will likely need rehab after hip repair depending on how she progresses, verbalized understanding. States has been to [id number] in the past & would like to go there again, posted in e-discharge. Will cont to follow for further needs. Padma Moyer RN,ext [phone]. Patient has designated her mother-n-law to participate in his/her discharge plan and to receive any needed information. Name: Padmini Arango Address: 
Phone number:  [phone] Care Management Interventions PCP Verified by CM: Yes(Dr. Mixon, last seen about 3 weeks ago) Palliative Care Criteria Met (RRAT>21 & CHF Dx)?: No 
Mode of Transport at Discharge: BLS Transition of Care Consult (CM Consult): Discharge Planning Discharge Durable Medical Equipment: No 
Physical Therapy Consult: No 
Occupational Therapy Consult: No 
Speech Therapy Consult: No 
Current Support Network: Lives with Spouse Confirm Follow Up Transport: Self Discharge Location Discharge Placement: Home(with HH VS SNF)

## 2020-03-29 NOTE — PERIOP NOTES
TRANSFER - IN REPORT: 
 
Verbal report received from Osman RN(name) on Millie Karrie  being received from Niraj Cates RN(unit) for routine progression of care Report consisted of patients Situation, Background, Assessment and  
Recommendations(SBAR). Information from the following report(s) Procedure Summary, Intake/Output and MAR was reviewed with the receiving nurse. Opportunity for questions and clarification was provided. Assessment completed upon patients arrival to unit and care assumed.

## 2020-03-29 NOTE — ED NOTES
0100: Patient's blood pressure 99/64. Dilaudid held at this time. 0105: MD aware of hypotension. Verbal orders to still give the dilaudid.

## 2020-03-29 NOTE — PROGRESS NOTES
TRANSFER - IN REPORT: 
 
Verbal report received from [address] (name) on Leobardo Merlin  being received from ED (unit) for routine progression of care Report consisted of patients Situation, Background, Assessment and  
Recommendations(SBAR). Information from the following report(s) SBAR, Kardex, Intake/Output, MAR, Accordion and Recent Results was reviewed with the receiving nurse. Opportunity for questions and clarification was provided. Assessment completed upon patients arrival to unit and care assumed.

## 2020-03-29 NOTE — CONSULTS
Consult Note Assessment: 1. Right intertrochanteric hip fracture PLAN: 1. She will require operative intervention. Understanding risks, benefits and alternatives to the procedure she wishes to proceed. Consent signed. Remain npo HPI: Leobardo Merlin is a 62 y.o. female patient presents with right hip pain. She had a recent ORIF of her right ankle in January and is in a cast. She was going to stand last night when she lost her balance causing her to fall. She was brought into the Ed where xrays showed a right hip fracture. She has been admitted for further management. Past Medical History:  
Diagnosis Date  Alcoholic hepatitis  Alcoholic pancreatitis  Anemia  Bacteremia due to Klebsiella pneumoniae 06/30/2017  Bipolar disorder ([facility] Dr. Zee Hashimoto [facility])  Chronic pancreatitis ([facility] with Chronic Abdominal Pain  Compression fracture of lumbar vertebra (HCC) L4, L5   
 Depression Dr. Zee Hashimoto [facility])  Elevated LFTs  ETOH abuse  Fatty liver  GERD (gastroesophageal reflux disease)  Hyperlipidemia  Hypertension  Hypothyroidism  Lower GI bleeding  Morbid obesity ([facility]  Obstructive sleep apnea No CPAP after Gastric Bypass in 2012  Seizures ([facility] ETOH Withdrawal Seizures  Substance induced mood disorder ([facility]  Vitamin D deficiency Past Surgical History:  
Procedure Laterality Date  BIOPSY LIVER  08/15/2012 Lap Left hepatic liver wedge by Dr. Rafal Noland; BX Revealed: Hepatic Steatosis, AVM w/ associated Subcapsular Fibrosis.  COLONOSCOPY N/A 1/17/2017 COLONOSCOPY performed by Aydee Parker MD at [facility] ENDOSCOPY  DISKECTOMY, ANTERIOR, WITH D  8/1995, 11/1997  HX ABDOMINAL LAPAROSCOPY  12/02/2014  Laparoscopic Gastrostomy w/ Partial Gastrectomy for assistance in placement of Endoscopic Retrograde Cholangiopancreatography & Diagnostic Lap.  HX CARPAL TUNNEL RELEASE  HX CHOLECYSTECTOMY  09/16/2014 Dr. Madhu Ignacio  HX COLONOSCOPY  05/12/2012 Colonoscopy by Dr. Yanira Huerta. Berry Risk: Bx Revealed Colon Polyps (Tubular Adenoma), Hemorrhoids.  HX GASTRIC BYPASS  08/15/2012 Lap Christian-en-y  HX HEMORRHOIDECTOMY  04/30/2015 Dr. Grisel Myrick. Tevin Fix  HX HYSTERECTOMY  [address] [place] BONE Right 2010 Wrist  
 
Prior to Admission medications Medication Sig Start Date End Date Taking? Authorizing Provider  
acetaminophen (TYLENOL) 325 mg tablet Take 2 Tabs by mouth every four (4) hours as needed for Pain. 11/27/19  Yes Sunshine Mai PA  
pantoprazole (PROTONIX) 40 mg tablet Take 1 Tab by mouth Before breakfast and dinner. 8/27/19  Yes Costa Yung PA  
ergocalciferol (VITAMIN D2) 50,000 unit capsule Take 50,000 Units by mouth. Yes Provider, Historical  
biotin 10,000 mcg cap Take 1 Cap by mouth. Yes Other, MD Sandro  
calcium citrate-vitamin d3 (CITRACAL+D) 315-200 mg-unit tab Take 1 Tab by mouth two (2) times daily (with meals). Yes Provider, Historical  
ergocalciferol (ERGOCALCIFEROL) 50,000 unit capsule Take 50,000 Units by mouth every seven (7) days. Yes Provider, Historical  
ascorbic acid (VITAMIN C) 500 mg tablet Take 1,000 mg by mouth daily. Yes Provider, Historical  
cyanocobalamin (VITAMIN B-12) 1,000 mcg Subl 1,000 mcg by SubLINGual route daily. Yes Provider, Historical  
levETIRAcetam (KEPPRA XR) 750 mg ER tablet Take 1 Tab by mouth nightly. 8/20/19   Provider, Historical  
hydrOXYzine pamoate (VISTARIL) 50 mg capsule Take 50 mg by mouth two (2) times a day. Provider, Historical  
ferrous sulfate (FEOSOL) 325 mg (65 mg iron) tablet Take 325 mg by mouth daily.     Other, MD Sandro  
[facility]) 625 mg tablet Take 2 Tabs by mouth two (2) times daily (with meals). 2/8/16   Shahid MCLAUGHLIN, DO  
escitalopram oxalate (LEXAPRO) 10 mg tablet Take 1 Tab by mouth daily. 2/8/16   Sandytawny Hendricks, DO  
levothyroxine (SYNTHROID) 25 mcg tablet Take 1 Tab by mouth Daily (before breakfast). Indications: HYPOTHYROIDISM Patient taking differently: Take 50 mcg by mouth Daily (before breakfast). Indications: hypothyroidism 2/8/16   Sandy Asper, DO Allergies Allergen Reactions  Codeine Rash  Lamictal [Lamotrigine] Anaphylaxis  Morphine Anaphylaxis Per patient, has tolerated dilaudid in the past.  
 Pcn [Penicillins] Anaphylaxis  Vancomycin Rash  Doxycycline Rash  Percocet [Oxycodone-Acetaminophen] Hives and Itching  Tetracycline Rash  Tomato Hives Social History Socioeconomic History  Marital status:  Spouse name: Not on file  Number of children: Not on file  Years of education: Not on file  Highest education level: Not on file Occupational History  Not on file Social Needs  Financial resource strain: Not on file  Food insecurity Worry: Not on file Inability: Not on file  Transportation needs Medical: Not on file Non-medical: Not on file Tobacco Use  Smoking status: Never Smoker  Smokeless tobacco: Never Used Substance and Sexual Activity  Alcohol use: Not Currently Alcohol/week: 0.0 standard drinks Comment: 6 pack of beer a week- 1/2/17 last use, Sober 5/11/19  Drug use: No  
 Sexual activity: Yes  
  Partners: Male Birth control/protection: Condom Lifestyle  Physical activity Days per week: Not on file Minutes per session: Not on file  Stress: Not on file Relationships  Social connections Talks on phone: Not on file Gets together: Not on file Attends [employer] service: Not on file Active member of club or organization: Not on file Attends meetings of clubs or organizations: Not on file Relationship status: Not on file  Intimate partner violence Fear of current or ex partner: Not on file Emotionally abused: Not on file Physically abused: Not on file Forced sexual activity: Not on file Other Topics Concern [address] Service Not Asked  Blood Transfusions Not Asked  Caffeine Concern Not Asked  Occupational Exposure Not Asked Deborah Stairs Hazards Not Asked  Sleep Concern Not Asked  Stress Concern Not Asked  Weight Concern Not Asked  Special Diet Not Asked  Back Care Not Asked  Exercise Not Asked  Bike Helmet Not Asked  Seat Belt Not Asked  Self-Exams Not Asked Social History Narrative  Not on file Blood pressure 114/79, pulse 96, temperature 97.9 °F (36.6 °C), resp. rate 18, height 5' 6\" (1.676 m), weight 78.4 kg (172 lb 13.5 oz), SpO2 96 %, not currently breastfeeding. CBC w/Diff Lab Results Component Value Date/Time WBC 6.0 03/28/2020 11:08 PM  
 RBC 2.82 (L) 03/28/2020 11:08 PM  
 HCT 25.9 (L) 03/28/2020 11:08 PM  
  
 
 
Physical Assessment: 
General: in no apparent distress Extremities:  Right leg shortened and externally rotated. Pain with internal and external rotation. Thighs and calves soft. Cast in place right ankle. Will flex and extend the toes. Sensation intact to light touch. DVT Exam:   No exam evidence to suggest DVT. Compartments soft and NT. Radiology:  
Xrays right ankle: There is a displaced comminuted right intertrochanteric hip fracture. The bone 
mineralization is within normal limits. The soft tissues are unremarkable. Freddie Mondragon PA-C 
3/29/2020 Office [phone] Cell [phone]

## 2020-03-29 NOTE — PROGRESS NOTES
Problem: Pressure Injury - Risk of 
Goal: *Prevention of pressure injury Description: Document Fly Scale and appropriate interventions in the flowsheet. Outcome: Progressing Towards Goal 
Note: Pressure Injury Interventions: 
Sensory Interventions: Assess changes in LOC, Avoid rigorous massage over bony prominences, Keep linens dry and wrinkle-free, Minimize linen layers, Pressure redistribution bed/mattress (bed type) Activity Interventions: Pressure redistribution bed/mattress(bed type) Mobility Interventions: Pressure redistribution bed/mattress (bed type), Turn and reposition approx. every two hours(pillow and wedges) Nutrition Interventions: Document food/fluid/supplement intake Friction and Shear Interventions: Apply protective barrier, creams and emollients, Transferring/repositioning devices Problem: Patient Education: Go to Patient Education Activity Goal: Patient/Family Education Outcome: Progressing Towards Goal 
  
Problem: Pain Goal: *Control of Pain Outcome: Progressing Towards Goal 
  
Problem: Patient Education: Go to Patient Education Activity Goal: Patient/Family Education Outcome: Progressing Towards Goal 
  
Problem: Falls - Risk of 
Goal: *Absence of Falls Description: Document Waldo Nolasco Fall Risk and appropriate interventions in the flowsheet. Outcome: Progressing Towards Goal 
Note: Fall Risk Interventions: 
Mobility Interventions: Bed/chair exit alarm, Communicate number of staff needed for ambulation/transfer, Patient to call before getting OOB, Utilize walker, cane, or other assistive device Medication Interventions: Bed/chair exit alarm, Patient to call before getting OOB, Teach patient to arise slowly Elimination Interventions: Bed/chair exit alarm, Call light in reach, Patient to call for help with toileting needs, Stay With Me (per policy), Toilet paper/wipes in reach, Toileting schedule/hourly rounds History of Falls Interventions: Bed/chair exit alarm, Door open when patient unattended, Room close to nurse's station, Utilize gait belt for transfer/ambulation Problem: Patient Education: Go to Patient Education Activity Goal: Patient/Family Education Outcome: Progressing Towards Goal 
  
Problem: Activity Intolerance Goal: *Oxygen saturation during activity within specified parameters Outcome: Progressing Towards Goal 
  
Problem: Patient Education: Go to Patient Education Activity Goal: Patient/Family Education Outcome: Progressing Towards Goal

## 2020-03-29 NOTE — PROGRESS NOTES
Bedside and Verbal shift change report given to Zita Pearson RN (oncoming nurse) by Ubaldo English (offgoing nurse). Report included the following information SBAR, Kardex, Procedure Summary, Intake/Output, MAR, Accordion, Recent Results, Med Rec Status and Quality Measures.

## 2020-03-30 NOTE — PROGRESS NOTES
Problem: Pressure Injury - Risk of 
Goal: *Prevention of pressure injury Description: Document Fly Scale and appropriate interventions in the flowsheet. Outcome: Progressing Towards Goal 
Note: Pressure Injury Interventions: 
Sensory Interventions: Assess changes in LOC, Keep linens dry and wrinkle-free Moisture Interventions: Absorbent underpads Activity Interventions: Pressure redistribution bed/mattress(bed type) Mobility Interventions: Pressure redistribution bed/mattress (bed type), HOB 30 degrees or less Nutrition Interventions: Document food/fluid/supplement intake Friction and Shear Interventions: Foam dressings/transparent film/skin sealants, HOB 30 degrees or less Problem: Patient Education: Go to Patient Education Activity Goal: Patient/Family Education Outcome: Progressing Towards Goal 
  
Problem: Pain Goal: *Control of Pain Outcome: Progressing Towards Goal 
  
Problem: Patient Education: Go to Patient Education Activity Goal: Patient/Family Education Outcome: Progressing Towards Goal 
  
Problem: Falls - Risk of 
Goal: *Absence of Falls Description: Document Uziel Knight Fall Risk and appropriate interventions in the flowsheet. Outcome: Progressing Towards Goal 
Note: Fall Risk Interventions: 
Mobility Interventions: Bed/chair exit alarm, Communicate number of staff needed for ambulation/transfer, Patient to call before getting OOB Medication Interventions: Bed/chair exit alarm Elimination Interventions: Bed/chair exit alarm History of Falls Interventions: Door open when patient unattended, Bed/chair exit alarm Problem: Patient Education: Go to Patient Education Activity Goal: Patient/Family Education Outcome: Progressing Towards Goal 
  
Problem: Activity Intolerance Goal: *Oxygen saturation during activity within specified parameters Outcome: Progressing Towards Goal 
  
Problem: Patient Education: Go to Patient Education Activity Goal: Patient/Family Education Outcome: Progressing Towards Goal 
  
Problem: Discharge Planning Goal: *Discharge to safe environment Outcome: Progressing Towards Goal

## 2020-03-30 NOTE — PROGRESS NOTES
NUTRITION INITIAL ASSESSMENT/PLAN OF CARE   
 
RECOMMENDATIONS:  
1. Regular diet, ensure BID 2. Monitor labs, weight and PO intake 3. RD to follow GOALS:  
1. PO intake meets >75% of protein/calorie needs by 4/4 
2. Weight Maintenance (+/- 1-2 lb) by 4/6 ASSESSMENT:  
Wt status is classified as overweight per Body mass index is 27.9 kg/m². Pt with weight loss since stopped drinking beer. Decreased appetite, supplements added. Diagnosis: intertrochanteric fx post Right femur intramedullary nail, fatty liver, alcohol abuse, bipolar, depressed. Noted R ankle sx 1/2020 Nutrition recommendations listed. RD to follow. Nutrition Diagnoses:  
Inadequate intake related to reported poor intake PTA as evidenced by weight loss/increased needs for healing SUBJECTIVE/OBJECTIVE:  
Pt seen for an initial assessment/fracture. Pt admit post fall. Pt resting in bed during time of assessment, very pleasant. Pt reports appetite has been down for about a month now. Pt states to only eat 1 meal/day at home usually. Pt states some nausea earlier but now resolved. Per I/Os BM 3/29. Pt with poor dentition but denies diet change, states \"I ordered softer items I know I can eat\"/ Food allergy: tomatoes. Pt reports  lb pt states weight loss since she stopped drinking beer. Spoke with pt about protein intake for healing. Pt agrees to ensure BID. Encouraged food first/adequate intake. Will continue to monitor intake, weight, labs, POC. Information Obtained from:  
 [x] Chart Review [x] Patient 
 [] Family/Caregiver  
 [] Nurse/Physician 
 [] Interdisciplinary Meeting/Rounds Diet: Regular Medications: [x] Reviewed  Noted: [email or web address], lovenox, lexapro, ferrous sulfate, folvite, keppra, synthroid, protonix, B1, prn: dilaudid Allergies: [x] Reviewed  tomato Encounter Diagnoses ICD-10-CM ICD-9-CM 1. Closed fracture of right hip, initial encounter ([email or web address] S72.001A 820.8 Past Medical History: Diagnosis Date  Alcoholic hepatitis  Alcoholic pancreatitis  Anemia  Bacteremia due to Klebsiella pneumoniae 06/30/2017  Bipolar disorder ([facility] Dr. Marielos Pedersen [facility] Psychotherapy)  Chronic pancreatitis ([facility] with Chronic Abdominal Pain  Compression fracture of lumbar vertebra (HCC) L4, L5   
 Depression Dr. Marielos Pedersen [facility] Psychotherapy)  Elevated LFTs  ETOH abuse  Fatty liver  GERD (gastroesophageal reflux disease)  Hyperlipidemia  Hypertension  Hypothyroidism  Lower GI bleeding  Morbid obesity ([facility]  Obstructive sleep apnea No CPAP after Gastric Bypass in 2012  Seizures ([facility] ETOH Withdrawal Seizures  Substance induced mood disorder ([facility]  Vitamin D deficiency Labs:   
Lab Results Component Value Date/Time Sodium 134 (L) 03/28/2020 11:08 PM  
 Potassium 4.3 03/28/2020 11:08 PM  
 Chloride 100 03/28/2020 11:08 PM  
 CO2 20 (L) 03/28/2020 11:08 PM  
 Anion gap 14 03/28/2020 11:08 PM  
 Glucose 216 (H) 03/28/2020 11:08 PM  
 BUN 4 (L) 03/28/2020 11:08 PM  
 Creatinine 0.63 03/28/2020 11:08 PM  
 Calcium 7.9 (L) 03/28/2020 11:08 PM  
 Magnesium 1.8 08/26/2019 01:30 AM  
 Phosphorus 2.6 02/07/2016 01:17 AM  
 Albumin 2.0 (L) 01/10/2020 09:10 AM  
 
No results found for: GLU, GLUPOC, GLUCPOC Lab Results Component Value Date/Time Cholesterol, total 122 01/10/2017 04:50 AM  
 HDL Cholesterol 28 (L) 01/10/2017 04:50 AM  
 LDL, calculated 43 01/10/2017 04:50 AM  
 VLDL, calculated 51 01/10/2017 04:50 AM  
 Triglyceride 255 (H) 01/10/2017 04:50 AM  
 CHOL/HDL Ratio 4.4 01/10/2017 04:50 AM  
 
Anthropometrics: BMI (calculated): 27.9 Last 3 Recorded Weights in this Encounter 03/28/20 2301 03/29/20 0201 Weight: 78 kg (172 lb) 78.4 kg (172 lb 13.5 oz) Ht Readings from Last 1 Encounters:  
03/29/20 5' 6\" (1.676 m) Documented Weight History: Weight Metrics 3/29/2020 1/9/2020 11/27/2019 8/25/2019 7/18/2018 6/22/2018 2/24/2018 Weight 172 lb 13.5 oz 198 lb 210 lb 214 lb 15.2 oz 192 lb 192 lb 200 lb BMI 27.9 kg/m2 31.01 kg/m2 33.64 kg/m2 33.67 kg/m2 30.07 kg/m2 30.07 kg/m2 32.28 kg/m2 Some encounter information is confidential and restricted. Go to Review Flowsheets activity to see all data. No data found. IBW: 130 lb %IBW: 133% UBW: 221 lb [x] Weight Loss [] Weight Gain [] Weight Stable Estimated Nutrition Needs: [x] MSJ  [] Other: 
Calories: 4332-0635 kcal (x 1.2- x 1.1)Based on:   [x] Actual BW   
Protein:    g (1.2-1.3 g/kg) Based on:   [x] Actual BW Fluid:       1 mL/kcal 
 
 [x] No Cultural, Congregational or ethnic dietary need identified. [] Cultural, Congregational and ethnic food preferences identified and addressed Wt Status:  [] Normal (18.6 - 24.9) [] Underweight (<18.5) [x] Overweight (25 - 29.9) [] Mild Obesity (30 - 34.9)  [] Moderate Obesity (35 - 39.9) [] Morbid Obesity (40+) Nutrition Problems Identified:  
[x] Suboptimal PO intake PTA [] Food Allergies [x] Difficulty chewing/swallowing/poor dentition 
[] Constipation/Diarrhea  
[] Nausea/Vomiting  
[] None 
[] Other:  
 
Plan:  
[] Therapeutic Diet 
[]  Obtained/adjusted food preferences/tolerances and/or snacks options [x]  Supplements added  
[] Occupational therapy following for feeding techniques []  HS snack added  
[]  Modify diet texture  
[]  Modify diet for food allergies []  Educate patient  
[]  Assist with menu selection  
[x]  Monitor PO intake on meal rounds  
[x]  Continue inpatient monitoring and intervention  
[x]  Participated in discharge planning/Interdisciplinary rounds/Team meetings  
[]  Other:  
 
Education Needs: 
 [] Not appropriate for teaching at this time due to: 
 [x] Identified and addressed encouraged intake/protein intake Nutrition Monitoring and Evaluation: 
[x] Continue ongoing monitoring and intervention 
[] Other Giselle Fernandez

## 2020-03-30 NOTE — PROGRESS NOTES
Problem: Mobility Impaired (Adult and Pediatric) Goal: *Acute Goals and Plan of Care (Insert Text) Description: Physical Therapy Goals Initiated 3/30/2020 and to be accomplished within 7 day(s) 1. Patient will move from supine to sit and sit to supine  in bed with modified independence. 2.  Patient will transfer from bed to chair and chair to bed with modified independence using the least restrictive device. 3.  Patient will perform sit to stand with modified independence. 4.  Patient will ambulate with modified independence for 50 feet with the least restrictive device. Outcome: Progressing Towards Goal 
  
PHYSICAL THERAPY EVALUATION Patient: Zney Noonan (4 y.o. female) Date: 3/30/2020 Primary Diagnosis: Intertrochanteric fracture ([address] [facility] [place] Procedure(s) (LRB): 
RIGHT SHORT INTRATROCHANTERIC FEMORAL NAIL INSERTION (Right) 1 Day Post-Op Precautions: Fall, WBAT(RLE) WBAT RLE 
PLOF: Independent ASSESSMENT : 
WBAT RLE s/p IM nail following femur fracture. Recent right ankle fracture and ORIF 1/31/2020. Reports she is WBAT RLE for ankle as well. Min A for supine to sit. Seated EOB with good balance. Min A for sit to stand with verbal cues for sequencing and additional time. Standing a ww with min A. Able to complete side steps x4 towards HOB with min A and ww. Returned to seated EOB with min A. Min A for sit to stand and stand pivot to recliner chair. Seated in chair with BLE elevated. Educated on need for RN assistance with mobility; verbalized understanding. Call bell in reach. Prior to admission, independent in amb. Has assistance at home from mother-in-law. Has medical equipment from  who has CP (extra wheelchair, ww). Patient preference to return home at discharge. Discussed benefits of subacute rehab v. home health. Verbalized understanding.   At time of evaluation, would recommend skilled nursing for subacute rehab to maximize independence with functional mobility; however may quickly progress to home with home health. Patient will benefit from skilled intervention to address the above impairments. Patient's rehabilitation potential is considered to be Good Factors which may influence rehabilitation potential include:  
[]         None noted 
[]         Mental ability/status [x]         Medical condition 
[]         Home/family situation and support systems 
[]         Safety awareness 
[]         Pain tolerance/management 
[]         Other: PLAN : 
Recommendations and Planned Interventions:  
[x]           Bed Mobility Training             [x]    Neuromuscular Re-Education 
[x]           Transfer Training                   []    Orthotic/Prosthetic Training 
[x]           Gait Training                          []    Modalities [x]           Therapeutic Exercises           []    Edema Management/Control 
[x]           Therapeutic Activities            [x]    Family Training/Education 
[x]           Patient Education 
[]           Other (comment): Frequency/Duration: Patient will be followed by physical therapy 3-5 times a week to address goals. Discharge Recommendations: [facility]; pending progression may quickly progress to home with home health Further Equipment Recommendations for Discharge: rolling walker SUBJECTIVE:  
Patient stated I felt bad for them because I was doing so well.  OBJECTIVE DATA SUMMARY:  
 
Past Medical History:  
Diagnosis Date Alcoholic hepatitis Alcoholic pancreatitis Anemia Bacteremia due to Klebsiella pneumoniae 06/30/2017 Bipolar disorder ([facility] Dr. Marielos Pedersen [facility] Psych[facility]) Chronic pancreatitis ([facility] with Chronic Abdominal Pain Compression fracture of lumbar vertebra (HCC) L4, L5   
 Depression Dr. Marielos Pedersen [facility]) Elevated LFTs ETOH abuse Fatty liver GERD (gastroesophageal reflux disease) Hyperlipidemia Hypertension Hypothyroidism Lower GI bleeding Morbid obesity ([facility] Obstructive sleep apnea No CPAP after Gastric Bypass in 2012 Seizures ([facility] ETOH Withdrawal Seizures Substance induced mood disorder ([facility] Vitamin D deficiency Past Surgical History:  
Procedure Laterality Date BIOPSY LIVER  08/15/2012 Lap Left hepatic liver wedge by Dr. Yordan Shukla; BX Revealed: Hepatic Steatosis, AVM w/ associated Subcapsular Fibrosis. COLONOSCOPY N/A 1/17/2017 COLONOSCOPY performed by Krista Andrwes MD at [facility] ENDOSCOPY DISKECTOMY, ANTERIOR, WITH D  8/1995, 11/1997 HX ABDOMINAL LAPAROSCOPY  12/02/2014 Laparoscopic Gastrostomy w/ Partial Gastrectomy for assistance in placement of Endoscopic Retrograde Cholangiopancreatography & Diagnostic Lap. HX CARPAL TUNNEL RELEASE    
 HX CHOLECYSTECTOMY  09/16/2014 Dr. Yordan Shukla HX COLONOSCOPY  05/12/2012 Colonoscopy by Dr. Nirmala Santana. Nabila Hallman: Bx Revealed Colon Polyps (Tubular Adenoma), Hemorrhoids. HX GASTRIC BYPASS  08/15/2012 Lap Christian-en-y HX HEMORRHOIDECTOMY  04/30/2015 Dr. Beatrice Solorzaon. [facility] HX HYSTERECTOMY  2006 [address] [facility] [address] BONE Right 2010 Wrist  
 
Barriers to Learning/Limitations: None Compensate with: Visual Cues, Verbal Cues, Tactile Cues and Kinesthetic Cues Home Situation: 
Home Situation Home Environment: Private residence # Steps to Enter: 0 One/Two Story Residence: One story Living Alone: No 
Support Systems: None Patient Expects to be Discharged to[de-identified] Private residence Current DME Used/Available at Home: Shower chair, Commode, bedside, CPAP, Crutches, Grab bars, Walker, rolling, Wheelchair Critical Behavior: 
Neurologic State: Alert Orientation Level: Oriented to person Cognition: Follows commands Safety/Judgement: Fall prevention Psychosocial 
 Patient Behaviors: Cooperative Strength:   
Manual Muscle Testing (LE) 
       R     L Hip Flexion:   3/5  4+/5 Knee EXT:   3/5  4+/5 Knee FLEX:   3/5  4+/5 Ankle DF:     4+/5 
_________________________________________________ Range Of Motion: 
RLE Hip: decreased s/p surgery [facility] Ankle: splinted d/t ankle surgery 1/31/2020 LLE: WFL AROM Functional Mobility: 
Bed Mobility: 
Supine to Sit: Minimum assistance Scooting: Supervision; Additional time Transfers: 
Sit to Stand: Minimum assistance Stand to Sit: Minimum assistance Bed to Chair: Minimum assistance Balance:  
Sitting: Impaired Sitting - Static: Good (unsupported) Sitting - Dynamic: Good (unsupported) Standing: Impaired Standing - Static: Fair Standing - Dynamic : Fair Neuro Re-education: 
Seated EOB 10 minutes Standing balance 1 minute Therapeutic Exercises:  
Sit to stand x2 Side steps x4 Pain: 
Pain level pre-treatment: 5/10 Pain level post-treatment: 5/10 Activity Tolerance:  
Good After treatment:  
[x]         Patient left in no apparent distress sitting up in chair 
[]         Patient left in no apparent distress in bed 
[x]         Call bell left within reach [x]         Nursing notified 
[]         Caregiver present 
[]         Bed alarm activated 
[]         SCDs applied COMMUNICATION/EDUCATION:  
[x]         Role of physical therapy and plan of care in the acute care setting. [x]         Fall prevention education was provided and the patient/caregiver indicated understanding. [x]         Patient/family have participated as able in goal setting and plan of care. []         Patient/family agree to work toward stated goals and plan of care. []         Patient understands intent and goals of therapy, but is neutral about his/her participation. []         Patient is unable to participate in goal setting/plan of care: ongoing with therapy staff.  
 
Thank you for this referral. 
Jaqueline Lorenzana, PT 
 Time Calculation: 32 mins Eval Complexity: History: MEDIUM  Complexity : 1-2 comorbidities / personal factors will impact the outcome/ POC Exam:MEDIUM Complexity : 3 Standardized tests and measures addressing body structure, function, activity limitation and / or participation in recreation  Presentation: MEDIUM Complexity : Evolving with changing characteristics  Clinical Decision Making:Medium Complexity    Clinical judgement; ROM, MMT, functional mobility Overall Complexity:MEDIUM

## 2020-03-30 NOTE — PROGRESS NOTES
1920 Bedside, Verbal and Written shift change report given to 1921 Nay Henao (oncoming nurse) by Marly Hernandez RN (offgoing nurse). Report included the following information SBAR, Kardex, Intake/Output, MAR and Recent Results. Pt alert and oriented x4. Denies pain, resting comfortably in bed. Bed alarm active. 2200 PM medications administered, IM Influenza vaccine given, pt tolerated with ease, immunization record given to pt, periwick placed for ease of urination s/p morelos removal, d/t void by 0000, will continue to monitor. 0000 Shift reassessment, pt condition unchanged, will continue to monitor. 0400  Shift reassessment, pt sleeping between care, will continue to monitor. 0715 Bedside, Verbal and Written shift change report given to [facility] (oncoming nurse) by Lucio Smith RN (offgoing nurse). Report included the following information SBAR, Kardex, Intake/Output, MAR and Recent Results. Skin assessment completed.

## 2020-03-30 NOTE — PROGRESS NOTES
2220: Bedside shift change report given to Rosalino GLEZ  (oncoming nurse) by Faraz De Los Santos RN  (offgoing nurse). Report included the following information SBAR, Kardex, Procedure Summary, Intake/Output and MAR. Patient resting in bed, call light in reach, bed locked and in lowest position. 0911: Assessment and Medication completed. Patient resting in bed, call light in reach, bed locked and in lowest position. Needs addressed, patient talking on phone. 1055: Hourly Rounding: Patient resting in bed, call light in reach, bed locked and in lowest position. 1145: Hourly Rounding: Patient resting in bed, call light in reach, bed locked and in lowest position. 1231: Reassessment and Medications given to patient. Spoke with MD, he stated that patient's morelos could be discontinued. Patient resting in bed, call light in reach, bed locked and in lowest position. 1345: Hourly Rounding: Patient sitting up in chair, legs elevated. Patient has belongings near her. Call light within reach. 1425: Hourly Rounding: Patient sitting up in chair, legs elevated. Patient has belongings near her. Call light within reach. 1510: Hourly Rounding: Patient sitting up in chair, legs elevated. Patient has belongings near her. Call light within reach. 1621: Reassessment and Medications given. Patient stated having complaints of pain, rating 9/10. Patient's BP SBP 99. Informed patient due to BP being low, Diuladid can cause it to drop further. Patient agreed to Tylenol right with a recheck of BP. Patient sitting up in chair, legs elevated. Patient has belongings near her. Call light within reach. 1738: Patient had complaints of pain, but SBP was 99. MD notified he stated not to give pain medication due to BP. Asked for patient to have break through medications. Ultram ordered for patient. 1753: Reassessment and medications complete.  Patient refused tramadol, stated that she would patient until vitals are redone at night shift. Jackson taken out. Patient back in bed. Patient resting in bed, call light in reach, bed locked and in lowest position. 1845: Hourly Rounding:  Patient resting in bed, call light in reach, bed locked and in lowest position. Needs addressed 1915: Bedside shift change report given to [address] (oncoming nurse) by Victoria GLEZ (offgoing nurse). Report included the following information SBAR, Kardex, OR Summary, Procedure Summary, Intake/Output and MAR. Patient resting in bed, call light in reach, bed locked and in lowest position. Needs addressed, patient talking on phone

## 2020-03-30 NOTE — PROGRESS NOTES
Spoke with pt, she agrees to snf if needed. Was in [facility] in [place] for about 5 days. but states its too far and does not want to go back there. List given. She made 2 choices. Posted in [employer]. [facility] ([facility]) Provider list has been given to the patient and/or patient representative. Patient and/or patient representative has signed the [place] of Choice selecting _________________________ as their preference facility and a copy given. Both SNF Provider list and Freedom of Choice have been placed on the chart.

## 2020-03-30 NOTE — PROGRESS NOTES
Progress Note Post Operative Day: 1 Assessment: 1. Status post right femur intramedullary nail with Gamma 3 Arleth nail. PLAN:   
1. Mobilize. Continue P.T. 
2.  WBAT 3. Lovenox for DVT prophylaxis per ortho 4. Discharge Planning. HPI: Steffi Cranker is a 62 y.o. female patient without new complaints status post TFN for Intertrochanteric fracture ([facility] 3/28/2020. No new orthopaedic changes. Blood pressure 101/67, pulse 86, temperature 98.2 °F (36.8 °C), resp. rate 18, height 5' 6\" (1.676 m), weight 78.4 kg (172 lb 13.5 oz), SpO2 98 %, not currently breastfeeding. CBC w/Diff Lab Results Component Value Date/Time WBC 6.0 03/28/2020 11:08 PM  
 RBC 2.82 (L) 03/28/2020 11:08 PM  
 HCT 25.9 (L) 03/28/2020 11:08 PM  
  
 
 
Physical Assessment: 
General: in no apparent distress Extremities:  Neurovascular intact Dressing:  Dry DVT Exam:   No exam evidence to suggest DVT. Compartments soft and NT. Radiology:  
Initial images demonstrate a displaced fracture of the intertrochanteric right 
femur. Subsequently, a femoral medullary bettie and dynamic femoral neck screw are 
positioned. There is superficial subcutaneous emphysema. Jordan Ewing PA-C 
3/30/2020 Office [phone] Qbkg [phone]

## 2020-03-30 NOTE — PROGRESS NOTES
conducted an initial consultation and Spiritual Assessment for Clayton Ramirez, who is a 62 y.o.,female. Patient's Primary Language is: [place]. According to the patient's EMR Mosque Affiliation is: Roman Catholic. The reason the Patient came to the hospital is:  
Patient Active Problem List  
 Diagnosis Date Noted  Intertrochanteric fracture ([address] 03/29/2020  Normocytic anemia 01/10/2020  Closed right ankle fracture 01/10/2020  Acute GI bleeding 08/24/2019  Acute blood loss anemia 08/24/2019  Chronic pancreatitis ([address] 08/24/2019  Alcohol induced acute pancreatitis 09/05/2017  Fever of unknown origin 07/01/2017  Altered mental state 07/01/2017  Hypotension 07/01/2017  Bleeding disorder ([address] 01/18/2017  Thrombocytopenia ([address] 01/12/2017  Sepsis ([address] 01/09/2017  Non-intractable vomiting 01/09/2017  Elevated LFTs  Fatty liver  Bipolar disorder ([address]  Depression  Anemia  Chronic pain  Chronic abdominal pain  Substance induced mood disorder ([address]  GI bleed  Lower GI bleeding  Compression fracture of lumbar vertebra (HCC)  Alcoholic hepatitis  Alcoholic pancreatitis  Hypothyroidism  Vitamin D deficiency  Hyponatremia 05/22/2016  Alcohol withdrawal ([address] 03/26/2016  UTI (urinary tract infection) 03/25/2016  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  Alcohol abuse 03/24/2016  Acute hyponatremia 02/03/2016  Hemorrhoids with complication 76/82/3104  Gallstone pancreatitis 11/26/2014  Acute pancreatitis 10/20/2014  Pancreatitis 08/28/2014  Bipolar affective disorder, depressed, severe ([address] 05/09/2014  Altered mental status 05/06/2014  Overdose 05/06/2014  Alcohol abuse with intoxication delirium ([address] 05/06/2014  
 H/O gastric bypass 05/06/2014  HERMELINDA (obstructive sleep apnea) 05/06/2014  Essential hypertension, benign 05/06/2014  Malabsorption  History of Lap Christian-en-Y gastric bypass- 8/15/12 w/BMI 45  Morbid obesity ([facility]  Hyperlipidemia  Obstructive sleep apnea The  provided the following Interventions: 
Initiated a relationship of care and support. Explored issues of juana, belief, spirituality and Catholic/ritual needs while hospitalized. Listened empathically. Offered prayer and assurance of continued prayers on patient's behalf. The following outcomes where achieved: 
Patient shared limited information about both their medical narrative and spiritual journey/beliefs.  confirmed Patient's Evangelical Affiliation. Patient processed feeling about current hospitalization. Patient expressed gratitude for 's visit. Assessment: 
Patient does not have any Catholic/cultural needs that will affect patient's preferences in health care. There are no spiritual or Catholic issues which require intervention at this time. Plan: 
Chaplains will continue to follow and will provide pastoral care on an as needed/requested basis.  recommends bedside caregivers page  on duty if patient shows signs of acute spiritual or emotional distress. Chaplain Dasilva. [facility]  
[phone]

## 2020-03-30 NOTE — PROGRESS NOTES
Internal Medicine Progress Note Patient's Name: Neyda Bean Admit Date: 3/28/2020 Length of Stay: 1 Assessment/Plan Active Hospital Problems Diagnosis Date Noted  [facility] 03/29/2020  Fatty liver  Alcohol abuse 03/24/2016  Bipolar affective disorder, depressed, severe ([facility] 05/09/2014  
 
- Diet and mobilization per ortho 
- Pain control PRN 
- PT/OT 
- Monitor BP 
- Cont acceptable home medications for chronic conditions  
- DVT protocol I have personally reviewed all pertinent labs and films that have officially resulted over the last 24 hours. I have personally checked for all pending labs that are awaiting final results. Subjective Pt s/e @ bedside No major events overnight Feeling better Pain controlled Denies CP or SOB Objective Visit Vitals /67 (BP 1 Location: Left arm, BP Patient Position: At rest) Pulse 86 Temp 98.2 °F (36.8 °C) Resp 18 Ht 5' 6\" (1.676 m) Wt 78.4 kg (172 lb 13.5 oz) SpO2 98% Breastfeeding No  
BMI 27.90 kg/m² Physical Exam: 
General Appearance: NAD, conversant Lungs: CTA with normal respiratory effort CV: RRR, no m/r/g Abdomen: soft, non-tender, normal bowel sounds Extremities: no cyanosis, no peripheral edema Neuro: No focal deficits, motor/sensory intact Lab/Data Reviewed: 
BMP: No results found for: NA, K, CL, CO2, AGAP, GLU, BUN, CREA, GFRAA, GFRNA 
CBC: No results found for: WBC, HGB, HGBEXT, HCT, HCTEXT, PLT, PLTEXT, HGBEXT, HCTEXT, PLTEXT Imaging Reviewed: 
Xr Hip Rt W Or Wo Pelv 2-3 Vws Result Date: 3/29/2020 EXAM: Right hip fluoroscopy INDICATION: Right hip pain secondary to displaced and comminuted intertrochanteric fracture. COMPARISON: Right hip x-rays - 03/20/2020. TECHNIQUE: Intraoperative fluoroscopy including 6 views of the right hip was provided for Dr. Bobbi Srinivasan.  FLUOROSCOPY DETAILS:      Time:  46.5 seconds      Dose (reference air kerma): 6.34 mGy      Images: 6 _______________ FINDINGS: Initial images demonstrate a displaced fracture of the intertrochanteric right femur. Subsequently, a femoral medullary bettie and dynamic femoral neck screw are positioned. There is superficial subcutaneous emphysema. _______________ IMPRESSION: Expected subcutaneous emphysema superficial to the surgical site. Satisfactory positioning of fixation hardware. For detailed report of the procedure, please refer to the operative notes. Medications Reviewed: 
Current Facility-Administered Medications Medication Dose Route Frequency  escitalopram oxalate (LEXAPRO) tablet 10 mg  10 mg Oral DAILY  ferrous sulfate tablet 325 mg  325 mg Oral DAILY  hydrOXYzine pamoate (VISTARIL) capsule 50 mg  50 mg Oral BID  levETIRAcetam (KEPPRA) tablet 375 mg  375 mg Oral BID  levothyroxine (SYNTHROID) tablet 50 mcg  50 mcg Oral 6am  
 pantoprazole (PROTONIX) tablet 40 mg  40 mg Oral ACB&D  
 thiamine mononitrate (B-1) tablet 100 mg  100 mg Oral DAILY  folic acid (FOLVITE) tablet 1 mg  1 mg Oral DAILY  LORazepam (ATIVAN) injection 1 mg  1 mg IntraVENous Q6H PRN  
 HYDROmorphone (DILAUDID) syringe 0.5 mg  0.5 mg IntraVENous Q4H PRN  
 dextrose 5 % - 0.45% NaCl infusion  50 mL/hr IntraVENous CONTINUOUS  
 acetaminophen (TYLENOL) tablet 650 mg  650 mg Oral Q4H PRN  
 ondansetron (ZOFRAN) injection 4 mg  4 mg IntraVENous Q4H PRN  
 enoxaparin (LOVENOX) injection 40 mg  40 mg SubCUTAneous Q24H  
 naloxone (NARCAN) injection 0.4 mg  0.4 mg IntraVENous PRN  
 influenza vaccine 2019-20 (6 mos+)(PF) (FLUARIX/FLULAVAL/FLUZONE QUAD) injection 0.5 mL  0.5 mL IntraMUSCular PRIOR TO DISCHARGE Sarah Cloud DO Internal Medicine, Hospitalist 
Pager: [phone] [facility]

## 2020-03-31 NOTE — PROGRESS NOTES
Problem: Mobility Impaired (Adult and Pediatric) Goal: *Acute Goals and Plan of Care (Insert Text) Description: Physical Therapy Goals Initiated 3/30/2020 and to be accomplished within 7 day(s) 1. Patient will move from supine to sit and sit to supine  in bed with modified independence. 2.  Patient will transfer from bed to chair and chair to bed with modified independence using the least restrictive device. 3.  Patient will perform sit to stand with modified independence. 4.  Patient will ambulate with modified independence for 50 feet with the least restrictive device. Outcome: Progressing Towards Goal 
 PHYSICAL THERAPY TREATMENT Patient: Melissa Da Silva (51 y.o. female) Date: 3/31/2020 Diagnosis: Intertrochanteric fracture ([facility] Alex Praneeth Intertrochanteric fracture ([facility] Procedure(s) (LRB): 
RIGHT SHORT INTRATROCHANTERIC FEMORAL NAIL INSERTION (Right) 2 Days Post-Op Precautions: Fall, WBAT(RLE) PLOF: Independent ASSESSMENT: 
Request OOB to eat breakfast.  Mod A for supine to sit. Seated EOB with good balance; 4 minutes. Min A for sit to stand to verbal cues and additional time. Chun for standing at ww. Min A for bed to chair transfer with ww. Seated in chair with  BLE. Educated on need for RN assistance with mobility; verbalized understanding. Call bell in reach. Progression toward goals:  
[]      Improving appropriately and progressing toward goals [x]      Improving slowly and progressing toward goals 
[]      Not making progress toward goals and plan of care will be adjusted PLAN: 
Patient continues to benefit from skilled intervention to address the above impairments. Continue treatment per established plan of care. Discharge Recommendations:  Lonnie Wheat Further Equipment Recommendations for Discharge:  rolling walker SUBJECTIVE:  
Patient stated I don't want to go to a facility cause of the health issues.  OBJECTIVE DATA SUMMARY:  
 Critical Behavior: 
Neurologic State: Alert Orientation Level: Oriented X4 Cognition: Follows commands Functional Mobility: 
Bed Mobility: 
Supine to Sit: Moderate assistance Transfers: 
Sit to Stand: Minimum assistance Stand to Sit: Minimum assistance Bed to Chair: Minimum assistance Balance:  
Sitting: Impaired Sitting - Static: Good (unsupported) Sitting - Dynamic: Good (unsupported) Standing: Impaired Standing - Static: Fair Standing - Dynamic : Fair Neuro Re-Education: 
Seated EOB 4 minutes Pain: 
Pain level pre-treatment: 0/10 Pain level post-treatment:0/10 Activity Tolerance:  
Fair After treatment:  
[x] Patient left in no apparent distress sitting up in chair 
[] Patient left in no apparent distress in bed 
[x] Call bell left within reach [x] Nursing notified 
[] Caregiver present 
[] Bed alarm activated 
[] SCDs applied COMMUNICATION/EDUCATION:  
[x]         Role of physical therapy in the acute care setting. [x]         Fall prevention education was provided and the patient/caregiver indicated understanding. [x]         Patient/family have participated as able in working toward goals and plan of care. [x]         Patient/family agree to work toward stated goals and plan of care. []         Patient understands intent and goals of therapy, but is neutral about his/her participation. []         Patient is unable to participate in stated goals/plan of care: ongoing with therapy staff. Nic Ojeda, PT Time Calculation: 11 mins

## 2020-03-31 NOTE — PROGRESS NOTES
Chart reviewed. Met with pt to discuss SNF disposition. States was thinking about just going home but after working with therapy today, knows she really needs to go to rehab. Made her aware that [facility], she is agreeable. Will need [employer] authorization. Padma Moyer RN,ext [phone].

## 2020-03-31 NOTE — PROGRESS NOTES
Problem: Pressure Injury - Risk of 
Goal: *Prevention of pressure injury Description: Document Fly Scale and appropriate interventions in the flowsheet. Outcome: Progressing Towards Goal 
Note: Pressure Injury Interventions: 
Sensory Interventions: Pressure redistribution bed/mattress (bed type) Moisture Interventions: Absorbent underpads Activity Interventions: Pressure redistribution bed/mattress(bed type), PT/OT evaluation Mobility Interventions: Pressure redistribution bed/mattress (bed type), PT/OT evaluation Nutrition Interventions: Document food/fluid/supplement intake Friction and Shear Interventions: Foam dressings/transparent film/skin sealants, HOB 30 degrees or less Problem: Patient Education: Go to Patient Education Activity Goal: Patient/Family Education Outcome: Progressing Towards Goal 
  
Problem: Pain Goal: *Control of Pain Outcome: Progressing Towards Goal 
  
Problem: Patient Education: Go to Patient Education Activity Goal: Patient/Family Education Outcome: Progressing Towards Goal 
  
Problem: Falls - Risk of 
Goal: *Absence of Falls Description: Document Laretta Jesus Alberto Fall Risk and appropriate interventions in the flowsheet. Outcome: Progressing Towards Goal 
Note: Fall Risk Interventions: 
Mobility Interventions: Bed/chair exit alarm, Patient to call before getting OOB Medication Interventions: Bed/chair exit alarm, Patient to call before getting OOB Elimination Interventions: Bed/chair exit alarm, Call light in reach, Patient to call for help with toileting needs History of Falls Interventions: Bed/chair exit alarm, Door open when patient unattended, Room close to nurse's station Problem: Patient Education: Go to Patient Education Activity Goal: Patient/Family Education Outcome: Progressing Towards Goal 
  
Problem: Activity Intolerance Goal: *Oxygen saturation during activity within specified parameters Outcome: Progressing Towards Goal 
  
Problem: Patient Education: Go to Patient Education Activity Goal: Patient/Family Education Outcome: Progressing Towards Goal 
  
Problem: Discharge Planning Goal: *Discharge to safe environment Outcome: Progressing Towards Goal 
  
Problem: Nutrition Deficit Goal: *Optimize nutritional status Outcome: Progressing Towards Goal

## 2020-03-31 NOTE — PROGRESS NOTES
Progress Note Post Operative Day: 2 Assessment: 1. Status post right femur intramedullary nail with Gamma 3 Arleth nail. PLAN:   
1. Mobilize. Continue P.T. 
2.  WBAT 3. Lovenox for DVT prophylaxis per ortho 4. Discharge Planning to rehab at discharge HPI: Severo Fisherman is a 62 y.o. female patient without new complaints status post TFN for Intertrochanteric fracture ([address] [place] 3/28/2020. No new orthopaedic changes. Blood pressure 122/85, pulse (!) 58, temperature 98.5 °F (36.9 °C), resp. rate 14, height 5' 6\" (1.676 m), weight 78.4 kg (172 lb 13.5 oz), SpO2 99 %, not currently breastfeeding. CBC w/Diff Lab Results Component Value Date/Time WBC 6.0 03/28/2020 11:08 PM  
 RBC 2.82 (L) 03/28/2020 11:08 PM  
 HCT 25.9 (L) 03/28/2020 11:08 PM  
  
 
 
Physical Assessment: 
General: in no apparent distress Extremities:  Neurovascular intact Dressing:  Dry DVT Exam:   No exam evidence to suggest DVT. Compartments soft and NT. Radiology:  
Initial images demonstrate a displaced fracture of the intertrochanteric right 
femur. Subsequently, a femoral medullary bettie and dynamic femoral neck screw are 
positioned. There is superficial subcutaneous emphysema. Dedrick Kendall PA-C 
3/31/2020 Office [phone] Cell [phone]

## 2020-03-31 NOTE — PROGRESS NOTES
Problem: Mobility Impaired (Adult and Pediatric) Goal: *Acute Goals and Plan of Care (Insert Text) Description: Physical Therapy Goals Initiated 3/30/2020 and to be accomplished within 7 day(s) 1. Patient will move from supine to sit and sit to supine  in bed with modified independence. 2.  Patient will transfer from bed to chair and chair to bed with modified independence using the least restrictive device. 3.  Patient will perform sit to stand with modified independence. 4.  Patient will ambulate with modified independence for 50 feet with the least restrictive device. 3/31/2020 1322 by Matt Monique PT Outcome: Progressing Towards Goal 
 PHYSICAL THERAPY TREATMENT Patient: Sagar Lobato (39 y.o. female) Date: 3/31/2020 Diagnosis: Intertrochanteric fracture ([address] Shon Graces Intertrochanteric fracture ([address] Procedure(s) (LRB): 
RIGHT SHORT INTRATROCHANTERIC FEMORAL NAIL INSERTION (Right) 2 Days Post-Op Precautions: Fall, WBAT(RLE) PLOF: Independent ASSESSMENT: 
Seated in recliner upon entry. Min A for sit to stand. Amb 15ft min A with ww. Decreased gait speed with step to gait and WBAT RLE. Returned to seated in chair. Encouraged amb to restroom with nursing staff; verbalized understanding. Educated on need for RN assistance with mobility; verbalized understanding. Call bell in reach. Would recommend discharge to skilled nursing facility for subacute rehab d/t decreased independence in functional mobility and fall risk. Progression toward goals:  
[x]      Improving appropriately and progressing toward goals 
[]      Improving slowly and progressing toward goals 
[]      Not making progress toward goals and plan of care will be adjusted PLAN: 
Patient continues to benefit from skilled intervention to address the above impairments. Continue treatment per established plan of care. Discharge Recommendations:  [facility] Further Equipment Recommendations for Discharge:  rolling walker SUBJECTIVE:  
Patient stated I don't want to get into bed yet.  OBJECTIVE DATA SUMMARY:  
Critical Behavior: 
Neurologic State: Alert Orientation Level: Oriented X4 Cognition: Follows commands Functional Mobility: 
Transfers: 
Sit to Stand: Minimum assistance Stand to Sit: Supervision Balance:  
Sitting: Impaired Sitting - Static: Good (unsupported) Sitting - Dynamic: Good (unsupported) Standing: Impaired Standing - Static: Good Standing - Dynamic : Fair Ambulation/Gait Training: 
Distance (ft): 15 Feet (ft) Assistive Device: Walker, rolling Ambulation - Level of Assistance: Minimal assistance Pain: 
Pain level pre-treatment: 6/10 Pain level post-treatment: 6/10 Activity Tolerance:  
Fair After treatment:  
[x] Patient left in no apparent distress sitting up in chair 
[] Patient left in no apparent distress in bed 
[x] Call bell left within reach [x] Nursing notified 
[] Caregiver present 
[] Bed alarm activated 
[] SCDs applied COMMUNICATION/EDUCATION:  
[x]         Role of physical therapy in the acute care setting. [x]         Fall prevention education was provided and the patient/caregiver indicated understanding. [x]         Patient/family have participated as able in working toward goals and plan of care. [x]         Patient/family agree to work toward stated goals and plan of care. []         Patient understands intent and goals of therapy, but is neutral about his/her participation. []         Patient is unable to participate in stated goals/plan of care: ongoing with therapy staff. Ashley Martínez, PT Time Calculation: 15 mins

## 2020-03-31 NOTE — PROGRESS NOTES
Problem: Pain Goal: *Control of Pain Outcome: Progressing Towards Goal 
  
Problem: Falls - Risk of 
Goal: *Absence of Falls Description: Document Yudi Smithuro Fall Risk and appropriate interventions in the flowsheet. Outcome: Progressing Towards Goal 
Note: Fall Risk Interventions: 
Mobility Interventions: Bed/chair exit alarm, Patient to call before getting OOB Medication Interventions: Bed/chair exit alarm, Patient to call before getting OOB Elimination Interventions: Bed/chair exit alarm, Call light in reach, Patient to call for help with toileting needs History of Falls Interventions: Bed/chair exit alarm, Door open when patient unattended, Room close to nurse's station

## 2020-03-31 NOTE — PROGRESS NOTES
Internal Medicine Progress Note Patient's Name: Jessy Vasquez Admit Date: 3/28/2020 Length of Stay: 2 Assessment/Plan Active Hospital Problems Diagnosis Date Noted  Intertrochanteric fracture ([facility] 03/29/2020  Fatty liver  Alcohol abuse 03/24/2016  Bipolar affective disorder, depressed, severe ([facility] 05/09/2014  
 
- Diet and mobilization per ortho 
- Pain control PRN 
- PT/OT 
- Monitor BP 
- Cont acceptable home medications for chronic conditions  
- DVT protocol 
- stop IVF, will likely need snf per PT notes, CM to look into this Subjective NAD Patient seen and examined at bedside Objective Visit Vitals /80 Pulse 82 Temp 98 °F (36.7 °C) Resp 16 Ht 5' 6\" (1.676 m) Wt 78.4 kg (172 lb 13.5 oz) SpO2 100% Breastfeeding No  
BMI 27.90 kg/m² Physical Exam: 
General Appearance: NAD, conversant Lungs: CTA with normal respiratory effort CV: RRR, no m/r/g Abdomen: soft, non-tender, normal bowel sounds Extremities: no cyanosis, no peripheral edema Neuro: No focal deficits, motor/sensory intact Lab/Data Reviewed: 
BMP: No results found for: NA, K, CL, CO2, AGAP, GLU, BUN, CREA, GFRAA, GFRNA 
CBC: No results found for: WBC, HGB, HGBEXT, HCT, HCTEXT, PLT, PLTEXT, HGBEXT, HCTEXT, PLTEXT Imaging Reviewed: 
No results found. Medications Reviewed: 
Current Facility-Administered Medications Medication Dose Route Frequency  insulin lispro (HUMALOG) injection   SubCUTAneous QID AFTER MEALS  glucose chewable tablet 16 g  4 Tab Oral PRN  
 glucagon (GLUCAGEN) injection 1 mg  1 mg IntraMUSCular PRN  
 dextrose 10% infusion 125-250 mL  125-250 mL IntraVENous PRN  
 traMADoL (ULTRAM) tablet 50 mg  50 mg Oral Q6H PRN  
 escitalopram oxalate (LEXAPRO) tablet 10 mg  10 mg Oral DAILY  ferrous sulfate tablet 325 mg  325 mg Oral DAILY  hydrOXYzine pamoate (VISTARIL) capsule 50 mg  50 mg Oral BID  
  levETIRAcetam (KEPPRA) tablet 375 mg  375 mg Oral BID  levothyroxine (SYNTHROID) tablet 50 mcg  50 mcg Oral 6am  
 pantoprazole (PROTONIX) tablet 40 mg  40 mg Oral ACB&D  
 thiamine mononitrate (B-1) tablet 100 mg  100 mg Oral DAILY  folic acid (FOLVITE) tablet 1 mg  1 mg Oral DAILY  LORazepam (ATIVAN) injection 1 mg  1 mg IntraVENous Q6H PRN  
 HYDROmorphone (DILAUDID) syringe 0.5 mg  0.5 mg IntraVENous Q4H PRN  
 acetaminophen (TYLENOL) tablet 650 mg  650 mg Oral Q4H PRN  
 ondansetron (ZOFRAN) injection 4 mg  4 mg IntraVENous Q4H PRN  
 enoxaparin (LOVENOX) injection 40 mg  40 mg SubCUTAneous Q24H  
 naloxone (NARCAN) injection 0.4 mg  0.4 mg IntraVENous PRN

## 2020-03-31 NOTE — PROGRESS NOTES
Patient received in bed awake. Patient alert and oriented X4, has pain and discomfort but tolerable for now. Patient resting quietly. Frequent use items within reach. Bed locked in low position. Call bell within reach and patient verbalized understanding of use for assistance and needs. Dual skin assessment conducted with NEWTON Dasilva. Patient has right hip dressing covering surgery site and right lower leg soft cast.  
 
1200:  Patient resting in recliner, in stable condition. 1508:  Patient resting in recliner, in stable condition, getting pain medication for right hip pain.

## 2020-04-01 NOTE — PROGRESS NOTES
Problem: Discharge Planning Goal: *Discharge to safe environment Outcome: resolved/met Home health Spoke with pt, she no longer wants to go to snf. She states family is at home that can assist her. She has good support. Needs a walker and hh. orders placed. She will have to purchase walker, ins had gotten her one 2 yrs ago, she does not know where it is. Called [facility] spoke with salvador, she states she can purchase for 53.00. fax'd order and info to [facility], confirmation obtained. Pt has aura, spoke with andrade from [facility] they will f/u with pt. ALAN from [facility] [employer] called asked me to give pt her number. Will do so. Plan home with francis  Hh.eferral in [place]. [facility] [address] Provider list has been given to the patient and/or patient representative. Patient and/or patient representative has signed the [place] of Choice selecting _____[facility]___as their preference agency and a copy given. Both Home Health Provider list and Freedom of Choice have been placed on the chart. Care Management Interventions PCP Verified by CM: Yes 
Palliative Care Criteria Met (RRAT>21 & CHF Dx)?: No 
Mode of Transport at Discharge: Other (see comment) Transition of Care Consult (CM Consult): Home Health [address] [place] [address] Road: No 
Reason Outside [facility]: Managed care specific requirement Discharge Durable Medical Equipment: Yes Physical Therapy Consult: Yes Occupational Therapy Consult: Yes Speech Therapy Consult: No 
Current Support Network: Lives with Spouse Confirm Follow Up Transport: Family The Plan for Transition of Care is Related to the Following Treatment Goals : sn/pt/ot The Patient and/or Patient Representative was Provided with a Choice of Provider and Agrees with the Discharge Plan?: Yes Freedom of Choice List was Provided with Basic Dialogue that Supports the Patient's Individualized Plan of Care/Goals, Treatment Preferences and Shares the Quality Data Associated with the Providers?: Yes Discharge Location Discharge Placement: Home with home health

## 2020-04-01 NOTE — DISCHARGE INSTRUCTIONS
DISCHARGE SUMMARY from Nurse    PATIENT INSTRUCTIONS:    After general anesthesia or intravenous sedation, for 24 hours or while taking prescription Narcotics:  · Limit your activities  · Do not drive and operate hazardous machinery  · Do not make important personal or business decisions  · Do  not drink alcoholic beverages  · If you have not urinated within 8 hours after discharge, please contact your surgeon on call. Report the following to your surgeon:  · Excessive pain, swelling, redness or odor of or around the surgical area  · Temperature over 100.5  · Nausea and vomiting lasting longer than 4 hours or if unable to take medications  · Any signs of decreased circulation or nerve impairment to extremity: change in color, persistent  numbness, tingling, coldness or increase pain  · Any questions    What to do at Home:  Recommended activity: Activity as tolerated and PT/OT per Home Health    If you experience any of the following symptoms uncontrolled pain, fever, chills, or signs that wound is not healing as expected please follow up with primary care physician/orthopedic surgeon. *  Please give a list of your current medications to your Primary Care Provider. *  Please update this list whenever your medications are discontinued, doses are      changed, or new medications (including over-the-counter products) are added. *  Please carry medication information at all times in case of emergency situations. These are general instructions for a healthy lifestyle:    No smoking/ No tobacco products/ Avoid exposure to second hand smoke  Surgeon General's Warning:  Quitting smoking now greatly reduces serious risk to your health.     Obesity, smoking, and sedentary lifestyle greatly increases your risk for illness    A healthy diet, regular physical exercise & weight monitoring are important for maintaining a healthy lifestyle    You may be retaining fluid if you have a history of heart failure or if you Zena Evans is a 65 y.o. female    No chief complaint on file. 1. Have you been to the ER, urgent care clinic or hospitalized since your last visit? NO.     2. Have you seen or consulted any other health care providers outside of the [address] [place] [address] since your last visit (Include any pap smears or colon screening)?  NO    Learning Assessment 1/29/2018   PRIMARY LEARNER Patient   HIGHEST LEVEL OF EDUCATION - PRIMARY LEARNER  -   BARRIERS PRIMARY LEARNER NONE   CO-LEARNER CAREGIVER No   PRIMARY LANGUAGE ENGLISH   LEARNER PREFERENCE PRIMARY LISTENING   ANSWERED BY patient   RELATIONSHIP SELF experience any of the following symptoms:  Weight gain of 3 pounds or more overnight or 5 pounds in a week, increased swelling in our hands or feet or shortness of breath while lying flat in bed. Please call your doctor as soon as you notice any of these symptoms; do not wait until your next office visit. The discharge information has been reviewed with the patient. The patient verbalized understanding. Discharge medications reviewed with the patient and appropriate educational materials and side effects teaching were provided. Patient armband removed and shredded.

## 2020-04-01 NOTE — PROGRESS NOTES
Problem: Pressure Injury - Risk of 
Goal: *Prevention of pressure injury Description: Document Fly Scale and appropriate interventions in the flowsheet. Outcome: Progressing Towards Goal 
Note: Pressure Injury Interventions: 
Sensory Interventions: Pressure redistribution bed/mattress (bed type) Moisture Interventions: Absorbent underpads, Internal/External fecal devices Activity Interventions: Pressure redistribution bed/mattress(bed type) Mobility Interventions: Pressure redistribution bed/mattress (bed type) Nutrition Interventions: Document food/fluid/supplement intake Friction and Shear Interventions: Lift sheet, Minimize layers Problem: Patient Education: Go to Patient Education Activity Goal: Patient/Family Education Outcome: Progressing Towards Goal 
  
Problem: Pain Goal: *Control of Pain Outcome: Progressing Towards Goal 
  
Problem: Patient Education: Go to Patient Education Activity Goal: Patient/Family Education Outcome: Progressing Towards Goal 
  
Problem: Falls - Risk of 
Goal: *Absence of Falls Description: Document Laretta Jesus Alberto Fall Risk and appropriate interventions in the flowsheet. Outcome: Progressing Towards Goal 
Note: Fall Risk Interventions: 
Mobility Interventions: Communicate number of staff needed for ambulation/transfer, Patient to call before getting OOB Medication Interventions: Patient to call before getting OOB Elimination Interventions: Patient to call for help with toileting needs History of Falls Interventions: Bed/chair exit alarm Problem: Patient Education: Go to Patient Education Activity Goal: Patient/Family Education Outcome: Progressing Towards Goal 
  
Problem: Activity Intolerance Goal: *Oxygen saturation during activity within specified parameters Outcome: Progressing Towards Goal 
  
Problem: Patient Education: Go to Patient Education Activity Goal: Patient/Family Education Outcome: Progressing Towards Goal 
  
Problem: Discharge Planning Goal: *Discharge to safe environment Outcome: Progressing Towards Goal 
  
Problem: Nutrition Deficit Goal: *Optimize nutritional status Outcome: Progressing Towards Goal 
  
Problem: Patient Education: Go to Patient Education Activity Goal: Patient/Family Education Outcome: Progressing Towards Goal

## 2020-04-01 NOTE — DISCHARGE SUMMARY
Internal Medicine Discharge Summary Patient: Sonu Castro YOB: 1960 Age:  62 y.o. Admit Date: 3/28/2020 Discharge Date: 4/1/2020 LOS:  LOS: 3 days Discharge To: Home Consults: Orthopedics Admission Diagnoses: Intertrochanteric fracture ([facility] Poornima Gamboa Discharge Diagnoses:   
Problem List as of 4/1/2020 Date Reviewed: 1/10/2020 Codes Class Noted - Resolved * (Principal) Intertrochanteric fracture ([facility] ICD-10-CM: S50.616P ICD-9-CM: 820.21  3/29/2020 - Present Normocytic anemia ICD-10-CM: D64.9 ICD-9-CM: 285.9  1/10/2020 - Present Closed right ankle fracture ICD-10-CM: Y48.061B ICD-9-CM: 824.8  1/10/2020 - Present Acute GI bleeding ICD-10-CM: K92.2 ICD-9-CM: 578.9  8/24/2019 - Present Acute blood loss anemia ICD-10-CM: D62 
ICD-9-CM: 285.1  8/24/2019 - Present Chronic pancreatitis ([facility] ICD-10-CM: K86.1 ICD-9-CM: 550.7  8/24/2019 - Present Alcohol induced acute pancreatitis ICD-10-CM: K85.20 ICD-9-CM: 971.9  9/5/2017 - Present Fever of unknown origin ICD-10-CM: R50.9 ICD-9-CM: 780.60  7/1/2017 - Present Altered mental state ICD-10-CM: R41.82 
ICD-9-CM: 780.97  7/1/2017 - Present Hypotension ICD-10-CM: I95.9 ICD-9-CM: 458.9  7/1/2017 - Present Bleeding disorder ([facility] ICD-10-CM: D69.9 ICD-9-CM: 287.9  1/18/2017 - Present Thrombocytopenia ([facility] ICD-10-CM: D69.6 ICD-9-CM: 287.5  1/12/2017 - Present Sepsis ([facility] ICD-10-CM: A41.9 ICD-9-CM: 038.9, 995.91  1/9/2017 - Present Non-intractable vomiting ICD-10-CM: R11.10 ICD-9-CM: 787.03  1/9/2017 - Present Elevated LFTs ICD-10-CM: R94.5 ICD-9-CM: 790.6  Unknown - Present Fatty liver ICD-10-CM: K76.0 ICD-9-CM: 571.8  Unknown - Present Bipolar disorder ([facility] ICD-10-CM: F31.9 ICD-9-CM: 296.80  Unknown - Present Depression ICD-10-CM: F32.9 ICD-9-CM: 311  Unknown - Present Anemia ICD-10-CM: D64.9 ICD-9-CM: 795. 9  Unknown - Present Chronic pain ICD-10-CM: G89.29 ICD-9-CM: 338.29  Unknown - Present Overview Signed 8/19/2016  1:31 PM by Gabrielleyue Espinal  
  abd pain Chronic abdominal pain ICD-10-CM: R10.9, G89.29 ICD-9-CM: 789.00, 338.29  Unknown - Present Substance induced mood disorder (HCC) ICD-10-CM: P85.80 ICD-9-CM: 292.84  Unknown - Present GI bleed ICD-10-CM: K92.2 ICD-9-CM: 578.9  Unknown - Present Lower GI bleeding ICD-10-CM: K92.2 ICD-9-CM: 578.9  Unknown - Present Compression fracture of lumbar vertebra (HCC) ICD-10-CM: S32.000A ICD-9-CM: 805.4  Unknown - Present Overview Signed 8/19/2016  1:39 PM by Gabrielle Espinal  
  L4, L5 Alcoholic hepatitis XMR-55-WG: K70.10 ICD-9-CM: 571.1  Unknown - Present Alcoholic pancreatitis OKL-35-QK: K85.20 ICD-9-CM: 577.0  Unknown - Present Hypothyroidism ICD-10-CM: E03.9 ICD-9-CM: 244.9  Unknown - Present Vitamin D deficiency ICD-10-CM: E55.9 ICD-9-CM: 268.9  Unknown - Present Hyponatremia ICD-10-CM: E87.1 ICD-9-CM: 276.1  5/22/2016 - Present Alcohol withdrawal ([facility] ICD-10-CM: J85.255 ICD-9-CM: 291.81  3/26/2016 - Present UTI (urinary tract infection) ICD-10-CM: N39.0 ICD-9-CM: 599.0  3/25/2016 - Present 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 
ICD-9-CM: 577.8  3/24/2016 - Present Alcohol abuse ICD-10-CM: F10.10 ICD-9-CM: 305.00  3/24/2016 - Present Acute hyponatremia ICD-10-CM: E87.1 ICD-9-CM: 276.1  2/3/2016 - Present Hemorrhoids with complication KJN-59-KA: Y17.2 ICD-9-CM: 455.8  4/21/2015 - Present Gallstone pancreatitis ICD-10-CM: K85.10 ICD-9-CM: 231.2, 574.20  11/26/2014 - Present Acute pancreatitis (Chronic) ICD-10-CM: K85.90 ICD-9-CM: 947.1  10/20/2014 - Present Pancreatitis ICD-10-CM: K85.90 ICD-9-CM: 940.8  8/28/2014 - Present Bipolar affective disorder, depressed, severe ([facility] ICD-10-CM: F31.4 ICD-9-CM: 296.53  5/9/2014 - Present Altered mental status ICD-10-CM: R41.82 
ICD-9-CM: 780.97  5/6/2014 - Present Overdose ICD-10-CM: P62.793N ICD-9-CM: 977.9, E980.5  5/6/2014 - Present Alcohol abuse with intoxication delirium ([facility] ICD-10-CM: O90.522 ICD-9-CM: 291.0, 305.00  5/6/2014 - Present H/O gastric bypass ICD-10-CM: Z98.84 ICD-9-CM: V45.86  5/6/2014 - Present HERMELINDA (obstructive sleep apnea) ICD-10-CM: L65.10 
ICD-9-CM: 327.23  5/6/2014 - Present Essential hypertension, benign ICD-10-CM: I10 
ICD-9-CM: 401.1  5/6/2014 - Present Malabsorption ICD-10-CM: K90.9 ICD-9-CM: 579.9  Unknown - Present History of Lap Christian-en-Y gastric bypass- 8/15/12 w/BMI 45 ICD-10-CM: Z98.84 ICD-9-CM: V45.86  Unknown - Present Morbid obesity ([facility] ICD-10-CM: E66.01 
ICD-9-CM: 278.01  Unknown - Present Hyperlipidemia ICD-10-CM: E78.5 ICD-9-CM: 272.4  Unknown - Present Obstructive sleep apnea ICD-10-CM: G47.33 
ICD-9-CM: 327.23  Unknown - Present Discharge Condition:  Improved Procedures: Right femur intramedullary nail with Gamma 3 Muddy nail. HPI: Javon Lindsay is a 62y.o. year old female who presents with  Fall. She is found to have an intertrochanteric fracture on Right. Orthopedics called from ED. She has hx of alcoholism and fatty liver. Patient states she was getting up to see what her  wanted whom was calling her and she got dizzy and fell back. Hit her head on the table , no LOC. She already has a cast on ankle for fracture she is supposed to follow up on with ortho on April 2. Hospital Course: 
 
R Intertrochanteric Hip Fx s/p mechanical fall - Ortho consulted. Completed above procedure. Tolerated.  Treated with lovenox for DVT PPx to continue for 2 weeks. Follow up with ortho in 2 weeks. R ankle fracture - Cont cast for another 2 weeks per ortho The rest of the patient's chronic conditions were managed appropriately during their admission. They were medically stable at the time of discharge. Visit Vitals /70 (BP 1 Location: Left arm, BP Patient Position: At rest) Pulse 100 Temp 99 °F (37.2 °C) Resp 18 Ht 5' 6\" (1.676 m) Wt 78.4 kg (172 lb 13.5 oz) SpO2 98% Breastfeeding No  
BMI 27.90 kg/m² Physical Exam at Discharge: 
General Appearance: NAD, conversant HENT: normocephalic/atraumatic, moist mucus membranes Lungs: CTA with normal respiratory effort CV: RRR, no m/r/g Abdomen: soft, non-tender, normal bowel sounds Neuro: moves all extremities, no focal deficits Psych: appropriate affect, alert and oriented to person, place and time Labs Prior to Discharge: 
Labs: Results:  
   
Chemistry Recent Labs 04/01/20 
6780 BUN 7  
  
CBC w/Diff Recent Labs 04/01/20 
3666 WBC 6.8  
RBC 2.57* HGB 7.9*  
HCT 24.0*  
 Cardiac Enzymes No results for input(s): CPK, CKND1, MÓNICA in the last 72 hours. No lab exists for component: Limmie Knuckles Coagulation No results for input(s): PTP, INR, APTT, INREXT in the last 72 hours. Lipid Panel Lab Results Component Value Date/Time Cholesterol, total 122 01/10/2017 04:50 AM  
 HDL Cholesterol 28 (L) 01/10/2017 04:50 AM  
 LDL, calculated 43 01/10/2017 04:50 AM  
 VLDL, calculated 51 01/10/2017 04:50 AM  
 Triglyceride 255 (H) 01/10/2017 04:50 AM  
 CHOL/HDL Ratio 4.4 01/10/2017 04:50 AM  
  
BNP No results for input(s): BNPP in the last 72 hours. Liver Enzymes No results for input(s): TP, ALB, TBIL, AP, SGOT, GPT in the last 72 hours. No lab exists for component: DBIL Thyroid Studies Lab Results Component Value Date/Time TSH 1.61 07/02/2017 04:40 AM  
    
 
 
Significant Imaging: Xr Hip Rt W Or Wo Pelv 2-3 Vws Result Date: 3/29/2020 EXAM: Right hip fluoroscopy INDICATION: Right hip pain secondary to displaced and comminuted intertrochanteric fracture. COMPARISON: Right hip x-rays - 03/20/2020. TECHNIQUE: Intraoperative fluoroscopy including 6 views of the right hip was provided for Dr. Teddy Aragon. FLUOROSCOPY DETAILS:      Time:  46.5 seconds      Dose (reference air kerma): 6.34 mGy      Images: 6 _______________ FINDINGS: Initial images demonstrate a displaced fracture of the intertrochanteric right femur. Subsequently, a femoral medullary bettie and dynamic femoral neck screw are positioned. There is superficial subcutaneous emphysema. _______________ IMPRESSION: Expected subcutaneous emphysema superficial to the surgical site. Satisfactory positioning of fixation hardware. For detailed report of the procedure, please refer to the operative notes. Xr Hip Rt W Or Wo Pelv 2-3 Vws Result Date: 3/29/2020 
--------------------------------------------------------------------------- <<<<<<<<<           [facility]           >>>>>>>>> --------------------------------------------------------------------------- CLINICAL HISTORY:Trauma. Pain. COMPARISON EXAMINATIONS: None. --- AP pelvic radiograph, and two views of the right hip --- There is a displaced comminuted right intertrochanteric hip fracture. The bone mineralization is within normal limits. The soft tissues are unremarkable. ------------- Impression: ------------- Displaced comminuted right intertrochanteric hip fracture. Xr [facility] Result Date: 3/29/2020 
--------------------------------------------------------------------------- <<<<<<<<<           [facility]           >>>>>>>>> --------------------------------------------------------------------------- CLINICAL HISTORY:  Preop right hip fracture.  COMPARISON EXAMINATIONS:  January 9, 2020. ---  SINGLE FRONTAL VIEW OF THE CHEST  --- The patient is mildly rotated. The cardiomediastinal silhouette is stable. The lungs and pleural spaces are clear. No significant osseous abnormalities are identified.  -------------- Impression: -------------- No active cardiopulmonary disease. Nc Xr Technologist Service Result Date: 3/31/2020 Fluoroscopy was used during surgery for this procedure under the supervision of the attending surgeon. Start Time:     1142 hours End Time:      1235 hours # of Images:  0 IMPRESSION:   Administrative report. CB Discharge Medications:    
Current Discharge Medication List  
  
START taking these medications Details  
enoxaparin (LOVENOX) 40 mg/0.4 mL 0.4 mL by SubCUTAneous route daily for 14 days. Qty: 5.6 mL, Refills: 0 HYDROmorphone (DILAUDID) 2 mg tablet Take 1 Tab by mouth every four (4) hours as needed for Pain for up to 3 days. Max Daily Amount: 12 mg. 
Qty: 18 Tab, Refills: 0 Associated Diagnoses: Closed fracture of right hip, initial encounter ([facility] CONTINUE these medications which have NOT CHANGED Details  
acetaminophen (TYLENOL) 325 mg tablet Take 2 Tabs by mouth every four (4) hours as needed for Pain. Qty: 20 Tab, Refills: 0  
  
pantoprazole (PROTONIX) 40 mg tablet Take 1 Tab by mouth Before breakfast and dinner. Qty: 60 Tab, Refills: 0  
  
biotin 10,000 mcg cap Take 1 Cap by mouth.  
  
calcium citrate-vitamin d3 (CITRACAL+D) 315-200 mg-unit tab Take 1 Tab by mouth two (2) times daily (with meals). ergocalciferol (ERGOCALCIFEROL) 50,000 unit capsule Take 50,000 Units by mouth every seven (7) days. ascorbic acid (VITAMIN C) 500 mg tablet Take 1,000 mg by mouth daily. cyanocobalamin (VITAMIN B-12) 1,000 mcg Subl 1,000 mcg by SubLINGual route daily. levETIRAcetam (KEPPRA XR) 750 mg ER tablet Take 1 Tab by mouth nightly. hydrOXYzine pamoate (VISTARIL) 50 mg capsule Take 50 mg by mouth two (2) times a day. ferrous sulfate (FEOSOL) 325 mg (65 mg iron) tablet Take 325 mg by mouth daily. escitalopram oxalate (LEXAPRO) 10 mg tablet Take 1 Tab by mouth daily. Qty: 10 Tab, Refills: 0  
  
levothyroxine (SYNTHROID) 25 mcg tablet Take 1 Tab by mouth Daily (before breakfast). Indications: HYPOTHYROIDISM Qty: 30 Tab, Refills: 0 STOP taking these medications  
  
 colesevelam (WELCHOL) 625 mg tablet Comments:  
Reason for Stopping:   
   
  
 
 
Activity: [facility] Diet: Resume previous diet Wound Care: None needed Follow-up:  
Please follow up with your PCP within 7 days to discuss your recent hospitalization. Patient to arrange. Ortho in 2 weeks Total time spent including time spent on final examination and discharge discussion, discharge documentation and records reviewed and medication reconciliation: > 30 minutes Dalila Calderon DO Internal Medicine, Hospitalist 
Pager: [phone] [facility]

## 2020-04-01 NOTE — ANCILLARY DISCHARGE INSTRUCTIONS
Patient and/or next of kin has been given the [facility] Important Message From Medicare About Your Rights\" letter and all questions were answered.

## 2020-04-01 NOTE — PROGRESS NOTES
conducted a Follow up consultation and Spiritual Assessment for Shameka Quintana, who is a 62 y.o.,female. The  provided the following Interventions: 
Continued the relationship of care and support. Listened empathically. Offered prayer and assurance of continued prayer on patients behalf. The following outcomes were achieved: 
Patient expressed gratitude for 's visit. Assessment: 
There are no further spiritual or Muslim issues which require Spiritual Care Services interventions at this time. Plan: 
Chaplains will continue to follow and will provide pastoral care on an as needed/requested basis.  recommends bedside caregivers page  on duty if patient shows signs of acute spiritual or emotional distress. Chaplain Fr Alvino Mazariegos Spiritual Care  
[phone]

## 2020-04-01 NOTE — PROGRESS NOTES
1945  Bedside and Verbal shift change report given to [address] [place] [address] (oncoming nurse) by Michael Plasencia (offgoing nurse). Report included the following information SBAR, Kardex and MAR. Noticed leak on right hand IV, verified by outgoing RN. He flushed both IV sites and the one in the [facility] region is flushing good. 2000  Patient was in the recliner when I came in to do assessments. she is off the purewick and watching TV.  
 
2100  Patient was assisted from recliner to bed. Attached purewick and gave patient toby aile and crackers. 0100  Patient in bed sleeping, purewick draing. 0330  Patient called to report that her right foot was rubbing on the cast. Placed a rolled towel under to elevate heel off the bed. Patient stated she felt some relief. 
 
0400  Patient still in pain. Placed a pillow under right leg to offload the heel. And gave patient pain medication. Relief reported. 0730  Bedside and Verbal shift change report given to [facility] RN (oncoming nurse) by [address] [place] [address] (offgoing nurse). Report included the following information SBAR, Kardex and MAR.

## 2020-04-01 NOTE — ROUTINE PROCESS
Bedside and Verbal shift change report given to Angelica Lomeli (oncoming nurse) by Ilda Meyers RN 
 (offgoing nurse). Report included the following information SBAR, Kardex, MAR and Recent Results.

## 2020-04-01 NOTE — PROGRESS NOTES
Problem: Self Care Deficits Care Plan (Adult) Goal: *Acute Goals and Plan of Care (Insert Text) Description: Occupational Therapy Goals Initiated 4/1/2020 within 7 day(s). 1.  Patient will perform grooming with modified independence. 2.  Patient will perform bathing with modified independence. 3.  Patient will perform upper body dressing and lower body dressing with modified independence. 4.  Patient will perform toilet transfers with modified independence using RW and good safety awareness. 5.  Patient will perform all aspects of toileting with modified independence. 6.  Patient will participate in upper extremity therapeutic exercise/activities with modified independence for 10 minutes. 7.  Patient will utilize energy conservation techniques during functional activities with verbal cues. Prior Level of Function: Mod I w/ ADLs and functional mobility w/o AD Outcome: Progressing Towards Goal 
 OCCUPATIONAL THERAPY EVALUATION Patient: Andre Gee (47 y.o. female) Date: 4/1/2020 Primary Diagnosis: Intertrochanteric fracture ([address] Mook Sorto Procedure(s) (LRB): 
RIGHT SHORT INTRATROCHANTERIC FEMORAL NAIL INSERTION (Right) 3 Days Post-Op Precautions:   Fall, WBAT 
PLOF: see above ASSESSMENT : 
Nursing/Jody cleared for pt to participate in OT evaluation and tx session. Based on the objective data described below, the patient presents with decreased strength BUE MMT 4/5, BUE AROM WFL, fair functional activity tolerance , Balance: sitting static good, sitting dynamic fair+, standing static good, standing dynamic ,fair- and fair safety awareness, which is inhibiting ability to perform ADLs and functional transfers independently.  Based upon clinical judgement, patient requires assist with functional mobility: bedside commode transfer: CGA using RW with verbal cues for safety awareness, UB w/ SBA seated LB bathing w/ CGA using RW in stance, UB dress and LB dress with SBA, toileting with SBA, grooming with SBA and self feeding with SBA. Patient educated on role of OT and POC; patient verbalized understanding. Pt. Instructed on safety awareness with importance to utilize call bell to request assist with functional transfers to prevent falls, patient verbalized understanding and provided accurate demonstration. Dry erase communication board updated for accuracy w/ carryover for bedside commode transfer using RW w/ 1 person assist. 
 
Patient will benefit from skilled intervention to address the above impairments. Patient's rehabilitation potential is considered to be Excellent Factors which may influence rehabilitation potential include:  
[x]             None noted []             Mental ability/status []             Medical condition []             Home/family situation and support systems []             Safety awareness []             Pain tolerance/management 
[]             Other: PLAN : 
Recommendations and Planned Interventions:  
[x]               Self Care Training                  [x]      Therapeutic Activities [x]               Functional Mobility Training   []      Cognitive Retraining 
[x]               Therapeutic Exercises           [x]      Endurance Activities [x]               Balance Training                    []      Neuromuscular Re-Education []               Visual/Perceptual Training     [x]      Home Safety Training 
[x]               Patient Education                   [x]      Family Training/Education []               Other (comment): Frequency/Duration: Patient will be followed by occupational therapy 1-2 times per day/4-7 days per week to address goals. Discharge Recommendations: Home Health Further Equipment Recommendations for Discharge: bedside commode, rolling walker, and wheelchair SUBJECTIVE:  
Patient stated My husbands caregivers have said that they will help me too, he is going to pay them a little extra.  OBJECTIVE DATA SUMMARY:  
 
Past Medical History:  
Diagnosis Date Alcoholic hepatitis Alcoholic pancreatitis Anemia Bacteremia due to Klebsiella pneumoniae 06/30/2017 Bipolar disorder ([facility] Dr. Tyson Lenz [facility]) [facility] ([facility] with Chronic Abdominal Pain Compression fracture of lumbar vertebra (HCC) L4, L5   
 Depression Dr. Tyson Lenz [facility]) Elevated LFTs ETOH abuse Fatty liver GERD (gastroesophageal reflux disease) Hyperlipidemia Hypertension Hypothyroidism Lower GI bleeding Morbid obesity ([facility] Obstructive sleep apnea No CPAP after Gastric Bypass in 2012 Seizures ([facility] ETOH Withdrawal Seizures Substance induced mood disorder ([facility] Vitamin D deficiency Past Surgical History:  
Procedure Laterality Date BIOPSY LIVER  08/15/2012 Lap Left hepatic liver wedge by Dr. Ziggy Monk; BX Revealed: Hepatic Steatosis, AVM w/ associated Subcapsular Fibrosis. COLONOSCOPY N/A 1/17/2017 COLONOSCOPY performed by Malick Grant MD at [facility] ENDOSCOPY DISKECTOMY, ANTERIOR, WITH D  8/1995, 11/1997 HX ABDOMINAL LAPAROSCOPY  12/02/2014 Laparoscopic Gastrostomy w/ Partial Gastrectomy for assistance in placement of Endoscopic Retrograde Cholangiopancreatography & Diagnostic Lap. HX CARPAL TUNNEL RELEASE    
 HX CHOLECYSTECTOMY  09/16/2014 Dr. Ziggy Monk HX COLONOSCOPY  05/12/2012 Colonoscopy by Dr. Jairo Charles. Tami Ruff: Bx Revealed Colon Polyps (Tubular Adenoma), Hemorrhoids. HX GASTRIC BYPASS  08/15/2012 Lap Christian-en-y HX HEMORRHOIDECTOMY  04/30/2015 Dr. Seble Gaixola. Kayleen Olivarez HX HYSTERECTOMY  2006 [address] [facility] [address] BONE Right 2010 Wrist  
 
Barriers to Learning/Limitations: None Compensate with: visual, verbal, tactile, kinesthetic cues/model Home Situation:  
Home Situation Home Environment: Private residence # Steps to Enter: 0 One/Two Story Residence: One story Living Alone: No 
Support Systems: Family member(s) Patient Expects to be Discharged to[de-identified] Private residence Current DME Used/Available at Home: Commode, bedside, Shower chair, Grab bars, Wheelchair Tub or Shower Type: Shower [x]  Right hand dominant   []  Left hand dominant Cognitive/Behavioral Status: 
Neurologic State: Alert Orientation Level: Oriented X4 Cognition: Follows commands Safety/Judgement: Fall prevention Skin: Right hip surgical incision with bandage Edema: s/p R hip sx Vision/Perceptual:  appears intact Coordination: BUE Coordination: Within functional limits(BUEs) Fine Motor Skills-Upper: Left Intact; Right Intact Gross Motor Skills-Upper: Left Intact; Right Intact Balance: 
Sitting: Impaired Sitting - Static: Good (unsupported) Sitting - Dynamic: Fair (occasional)(+) Standing: Impaired Standing - Static: Good Standing - Dynamic : Fair(-) Strength: BUE Strength: Generally decreased, functional(BUEs 4/5) Tone & Sensation: BUE Tone: Normal(BUEs) Sensation: Intact(BUEs) Range of Motion: BUE 
 
AROM: Within functional limits(BUEs) Functional Mobility and Transfers for ADLs: 
Bed Mobility: 
 
Transfers: 
Sit to Stand: Supervision Stand to Sit: Supervision Toilet Transfer : Contact guard assistance(bedside commode) ADL Assessment:  
Feeding: Modified independent Oral Facial Hygiene/Grooming: Setup Bathing: Stand-by assistance;Contact guard assistance Upper Body Dressing: Stand-by assistance Lower Body Dressing: Stand-by assistance Toileting: Stand by assistance Cognitive Retraining Safety/Judgement: Fall prevention Pain: 
Pain level pre-treatment: 6/10 Pain level post-treatment: 7/10 Pain Intervention(s): Medication (see MAR); Rest, Ice, Repositioning Response to intervention: Nurse notified, See doc flow Activity Tolerance:  
fair Please refer to the flowsheet for vital signs taken during this treatment. After treatment:  
[x] Patient left in no apparent distress sitting up in chair 
[] Patient left in no apparent distress in bed 
[x] Call bell left within reach [x] Nursing notified 
[] Caregiver present 
[] Bed alarm activated COMMUNICATION/EDUCATION:  
[x] Role of Occupational Therapy in the acute care setting 
[x] Home safety education was provided and the patient/caregiver indicated understanding. [x] Patient/family have participated as able in goal setting and plan of care. [x] Patient/family agree to work toward stated goals and plan of care. [] Patient understands intent and goals of therapy, but is neutral about his/her participation. [] Patient is unable to participate in goal setting and plan of care. Thank you for this referral. 
J Luis How Time Calculation: 35 mins Eval Complexity: History: MEDIUM Complexity : Expanded review of history including physical, cognitive and psychosocial  history ; Examination: MEDIUM Complexity : 3-5 performance deficits relating to physical, cognitive , or psychosocial skils that result in activity limitations and / or participation restrictions; Decision Making:MEDIUM Complexity : Patient may present with comorbidities that affect occupational performnce. Miniml to moderate modification of tasks or assistance (eg, physical or verbal ) with assesment(s) is necessary to enable patient to complete evaluation

## 2020-04-01 NOTE — PROGRESS NOTES
Problem: Mobility Impaired (Adult and Pediatric) Goal: *Acute Goals and Plan of Care (Insert Text) Description: Physical Therapy Goals Initiated 3/30/2020 and to be accomplished within 7 day(s) 1. Patient will move from supine to sit and sit to supine  in bed with modified independence. 2.  Patient will transfer from bed to chair and chair to bed with modified independence using the least restrictive device. 3.  Patient will perform sit to stand with modified independence. 4.  Patient will ambulate with modified independence for 50 feet with the least restrictive device. Outcome: Progressing Towards Goal 
 PHYSICAL THERAPY TREATMENT Patient: Josie Rincon (86 y.o. female) Date: 4/1/2020 Diagnosis: Intertrochanteric fracture ([facility] [address] Hailee Helm Intertrochanteric fracture ([facility] [address] Procedure(s) (LRB): 
RIGHT SHORT INTRATROCHANTERIC FEMORAL NAIL INSERTION (Right) 3 Days Post-Op Precautions: Fall, WBAT(RLE) PLOF: Independent ASSESSMENT: 
Seated EOB upon entry. Supervision with additional time for sit to stand to ww. Amb 2ft with ww and supervision. Reports needing to use bedside commode. Stand to sit to bedside commode with supervision. Supervision for sit to stand from bedside commode. Static standing balance 2 minutes for clean-up post; supervision for balance at ww with 1UE support. Amb additional 10ft with supervision and ww. Perseverates on weight of right ankle splint and possible removal pending. Seated in chair with supervision. Good safety awareness with transfers. BLE elevated in chair. Educated on need for RN assistance with mobility; verbalized understanding. Call bell in reach. Progression toward goals:  
[]      Improving appropriately and progressing toward goals [x]      Improving slowly and progressing toward goals 
[]      Not making progress toward goals and plan of care will be adjusted PLAN: 
 Patient continues to benefit from skilled intervention to address the above impairments. Continue treatment per established plan of care. Discharge Recommendations:  Lonnie Wheat; Home health if support available at home Further Equipment Recommendations for Discharge:  rolling walker SUBJECTIVE:  
Patient stated I'm suppose to get this splint off today.  OBJECTIVE DATA SUMMARY:  
Critical Behavior: 
Neurologic State: Alert Orientation Level: Oriented to person Cognition: Follows commands Psychosocial 
Patient Behaviors: Cooperative Functional Mobility: 
Transfers: 
Sit to Stand: Supervision; Additional time Stand to Sit: Supervision; Additional time Balance:  
Sitting: Impaired Sitting - Static: Good (unsupported) Sitting - Dynamic: Good (unsupported) Standing: Impaired Standing - Static: Good Standing - Dynamic : Fair Ambulation/Gait Training: 
Distance (ft): 12 Feet (ft) Assistive Device: Walker, rolling Ambulation - Level of Assistance: Supervision Neuro Re-Education: 
Seated unsupported 3 minutes Standing balance 2 minutes Therapeutic Exercises:  
Sit to stand x2 Pain: 
Pain level pre-treatment: 0/10 Pain level post-treatment: 0/10 Activity Tolerance:  
Fair After treatment:  
[x] Patient left in no apparent distress sitting up in chair 
[] Patient left in no apparent distress in bed 
[x] Call bell left within reach [x] Nursing notified 
[] Caregiver present 
[] Bed alarm activated 
[] SCDs applied COMMUNICATION/EDUCATION:  
[x]         Role of physical therapy in the acute care setting. [x]         Fall prevention education was provided and the patient/caregiver indicated understanding. [x]         Patient/family have participated as able in working toward goals and plan of care. [x]         Patient/family agree to work toward stated goals and plan of care.  
[]         Patient understands intent and goals of therapy, but is neutral about his/her participation. []         Patient is unable to participate in stated goals/plan of care: ongoing with therapy staff. Conchetta Hatchet, PT Time Calculation: 18 mins

## 2020-04-01 NOTE — PROGRESS NOTES
1110 Jessica called, dr Jace Olszewski wants to discharge pt today, pt said ortho was going to cut off splint today, left voice mail message for Jessica 1130 dr Jace Olszewski spoke w Dorla Riedel will stay on.  
 
1230 will change hip dressing before pt leaves. 1300 right hip dressing changed to 4x4 and tegaderm. Small amount of drainage, staples look good.

## 2020-04-01 NOTE — PROGRESS NOTES
Discharge instructions provided to pt on behalf of Jerardo Hernandez RN. Written prescriptions for lovenox and dilaudid given to the pt. Provided education on self administering lovenox. Pt stated she has done this before. Peripheral iv removed. Pt called someone to pick her up. EAT 1345.

## 2020-04-01 NOTE — PROGRESS NOTES
Delivered walker to pt in room. Signed delivery ticket fax'd back to [employer]. Confirmation obtained. Pt's mother in law to transport home. No steps to enter home.

## 2020-04-01 NOTE — PROGRESS NOTES
NUTRITION Patient/Family Education Record FACTORS THAT MAY INFLUENCE PATIENTS ABILITY TO LEARN: 
 []   Language barrier    []   Cultural needs   []   Motivation  
 []   Cognitive limitation    [x]   Physical - in splint for next 2 weeks   []   Education  
[]   Physiological factors   []   Hearing/vision/speaking impairment   []   Worship beliefs []   Financial limitations    []  Other:   [x]   No barriers limiting ability to learn Person Instructed: 
 [x]   Patient   []   Family   []  Other Preference for Learning: 
 [x]   Verbal   [x]   Written   []  Demonstration Patient educated on:  
[x] High protein 
[x] Healthy diet Spoke with patient about importance of protein for healing. Provided daily recommended protein requirements, easy meals due to decreased mobility related to splint, 2 day sample menus, healthy diet Goal: 
Patient will demonstrate understanding of modified diet by discharge Outcome:  
[x]  Patient verbalized understanding of education and willing to comply with recommendations. []  Patient declined education 
[]  Patient needs follow up education; scheduled date for follow up: 
[x]  Written information provided 
[]  RD contact information provided Giselle Fernandez

## 2020-04-01 NOTE — PROGRESS NOTES
Progress Note Post Operative Day: 3 Assessment: 1. Status post right femur intramedullary nail with Gamma 3 Arleth nail. PLAN:   
1. Mobilize. Continue P.T. 
2.  WBAT 3. Lovenox for DVT prophylaxis per ortho 4. Discharge Planning to rehab at discharge HPI: Africa Isbell is a 62 y.o. female patient without new complaints status post TFN for Intertrochanteric fracture ([address] Jody Soriano 3/28/2020. No new orthopaedic changes. Blood pressure 125/85, pulse 75, temperature 97.7 °F (36.5 °C), resp. rate 20, height 5' 6\" (1.676 m), weight 78.4 kg (172 lb 13.5 oz), SpO2 95 %, not currently breastfeeding. CBC w/Diff Lab Results Component Value Date/Time WBC 6.8 04/01/2020 09:10 AM  
 RBC 2.57 (L) 04/01/2020 09:10 AM  
 HCT 24.0 (L) 04/01/2020 09:10 AM  
  
 
 
Physical Assessment: 
General: in no apparent distress Extremities:  Neurovascular intact Dressing:  Dry DVT Exam:   No exam evidence to suggest DVT. Compartments soft and NT. Radiology:  
Initial images demonstrate a displaced fracture of the intertrochanteric right 
femur. Subsequently, a femoral medullary bettie and dynamic femoral neck screw are 
positioned. There is superficial subcutaneous emphysema. Hunter Ruiz PA-C 
4/1/2020 Office [phone] Cell [phone]

## 2020-04-01 NOTE — PROGRESS NOTES
Acknowledged duplicate PT orders. Patient already on PT caseload. Please refer to PT evaluation for discharge recommendations and plan of care.

## 2020-04-15 NOTE — ED NOTES
History:  
Patient's triage note is reviewed Patient with chief complaint chest pain abdominal pain back pain Constant Sharp pain Started last night No radiation History of hysterectomy Patient also had hip replacement after fracture last 2 weeks ago Patient's vitals are stable in the emergency department with normal temperature normal pulse increased respiratory rate pulse ox of 100% on room air. Patient's nursing no substernal pain radiating to back of the abdomen feels heavy further notes reviewed as well Patient's pain scale is 10 out of 10 at 2 PM 
Patient's multiple allergies are none appreciated Patient's home medication include Tylenol Lovenox that was discontinued on 618 patient is with Lexapro, Vistaril, Keppra, Synthroid Patient's medical history includes hyperlipidemia anemia obesity bipolar disease alcoholic hepatitis seizure history Social history is history Surgical history is for discectomy, liver biopsy, hysterectomy, tonsillectomy, colonoscopy, hemorrhoidectomy, gastric bypass Social history is no smoking no alcohol no drugs Patient's chart review shows patient had been admitted on the 28th for close hip fracture Patient was signed out to me by Dr. Nassar Failing at 1400 hrs. Awaiting second troponin EKG and basic labs and d-dimer patient chest pain elevated d-dimer recent surgery concern for PE 
 
--- Vitals: 
Patient Vitals for the past 12 hrs: 
 Temp Pulse Resp BP SpO2  
04/15/20 1445  97 18 156/84 99 % 04/15/20 1430  (!) 107 24 131/81 100 % 04/15/20 1400  (!) 102  130/78 100 % 04/15/20 1345  97 (!) 36 122/90 100 % 04/15/20 1330  (!) 101 27 123/82 100 % 04/15/20 1303 98.1 °F (36.7 °C) (!) 113 (!) 35 (!) 151/132 98 % Medications ordered:  
Medications HYDROmorphone (DILAUDID) syringe 0.5 mg (0.5 mg IntraVENous Given 4/15/20 1330) ondansetron (ZOFRAN) injection 4 mg (4 mg IntraVENous Given 4/15/20 1330) HYDROmorphone (DILAUDID) syringe 0.5 mg (0.5 mg IntraVENous Given 4/15/20 1405) iopamidoL (ISOVUE-370) 76 % injection 100 mL (80 mL IntraVENous Given 4/15/20 1620)  
0.9% sodium chloride infusion 100 mL (0 mL IntraVENous IV Completed 4/15/20 1621) Progress notes, Consult notes or Re-evaluation:  
I have evaluate the patient at bedside myself at [phone] Patient awake alert tachycardic to 113 respiration rate from 20 to 30/min Blood pressure is stable pulse ox is stable Patient still has substernal chest pain abdominal back pain states started last night after she ran her medication she did call her primary care physician was called in the medication but she was not feeling better and she decided to come in. Patient has no recent trauma no fall no new hip pain no fever chills no vomiting diarrhea There is no focal neurological deficits in the emergency department 
-- 
Patient does state that she does take Lovenox that was prescribed to her once a day. There is no calf pain no leg swelling. There is no exertional chest pain or shortness of breath recently. --- Patient's chest x-ray with no acute process. 
 
=== At 1430 I did place a right antecubital 18-gauge Angiocath with a CTA because of elevated d-dimer vascular probe was used sterile prep was used Nurse Clarice Brush was at bedside this IV access was placed on my cervical they could not get access peripherally without using ultrasound guidance. --- Patient's EKG today done at 1:09 PM 
 
Patient with heart rate of 111, QRS normal, QTC 2497 ms, normal axis, poor R wave progression, no acute sign of ST elevation MI or any dysrhythmia. Patient's EKG today is compared with March 28, 2020 poor R wave in the anterior wall is present patient QTC that was 430 ms otherwise there is no other acute morphological changes appreciated. --- 
 
 
Diagnostic Study Results Labs - Recent Results (from the past 12 hour(s)) EKG, 12 LEAD, INITIAL Collection Time: 04/15/20  1:00 PM  
Result Value Ref Range Ventricular Rate 111 BPM  
 Atrial Rate 111 BPM  
 P-R Interval 114 ms QRS Duration 76 ms  
 Q-T Interval 366 ms  
 QTC Calculation (Bezet) 497 ms Calculated P Axis 57 degrees Calculated T Axis 48 degrees Diagnosis Sinus tachycardia Low voltage QRS Abnormal ECG When compared with ECG of 28-MAR-2020 23:41, No significant change was found Confirmed by Anand Barnes ([id number]) on 4/15/2020 2:53:30 PM 
  
CBC WITH AUTOMATED DIFF Collection Time: 04/15/20  1:25 PM  
Result Value Ref Range WBC 6.5 4.6 - 13.2 K/uL  
 RBC 3.50 (L) 4.20 - 5.30 M/uL  
 HGB 10.7 (L) 12.0 - 16.0 g/dL HCT 34.1 (L) 35.0 - 45.0 % MCV 97.4 (H) 74.0 - 97.0 FL  
 MCH 30.6 24.0 - 34.0 PG  
 MCHC 31.4 31.0 - 37.0 g/dL  
 RDW 15.9 (H) 11.6 - 14.5 % PLATELET 577 (H) 982 - 420 K/uL MPV 9.4 9.2 - 11.8 FL  
 NEUTROPHILS 85 (H) 40 - 73 % LYMPHOCYTES 10 (L) 21 - 52 % MONOCYTES 5 3 - 10 % EOSINOPHILS 0 0 - 5 % BASOPHILS 0 0 - 2 %  
 ABS. NEUTROPHILS 5.5 1.8 - 8.0 K/UL  
 ABS. LYMPHOCYTES 0.7 (L) 0.9 - 3.6 K/UL  
 ABS. MONOCYTES 0.3 0.05 - 1.2 K/UL  
 ABS. EOSINOPHILS 0.0 0.0 - 0.4 K/UL  
 ABS. BASOPHILS 0.0 0.0 - 0.1 K/UL  
 DF AUTOMATED    
D DIMER Collection Time: 04/15/20  1:25 PM  
Result Value Ref Range D DIMER 2.77 (H) <0.46 ug/ml(FEU) CARDIAC PANEL,(CK, CKMB & TROPONIN) Collection Time: 04/15/20  2:15 PM  
Result Value Ref Range CK 53 26 - 192 U/L  
 CK - MB <1.0 <3.6 ng/ml CK-MB Index  0.0 - 4.0 % CALCULATION NOT PERFORMED WHEN RESULT IS BELOW LINEAR LIMIT Troponin-I, QT <0.02 0.0 - 0.045 NG/ML  
LIPASE Collection Time: 04/15/20  2:15 PM  
Result Value Ref Range Lipase 13 (L) 73 - 630 U/L  
METABOLIC PANEL, COMPREHENSIVE Collection Time: 04/15/20  2:15 PM  
Result Value Ref Range Sodium 141 136 - 145 mmol/L Potassium 3.6 3.5 - 5.5 mmol/L  Chloride 106 100 - 111 mmol/L  
 CO2 23 21 - 32 mmol/L  
 Anion gap 12 3.0 - 18 mmol/L Glucose 219 (H) 74 - 99 mg/dL BUN 6 (L) 7.0 - 18 MG/DL Creatinine 0.64 0.6 - 1.3 MG/DL  
 BUN/Creatinine ratio 9 (L) 12 - 20 GFR est AA >60 >60 ml/min/1.73m2 GFR est non-AA >60 >60 ml/min/1.73m2 Calcium 8.0 (L) 8.5 - 10.1 MG/DL Bilirubin, total 0.8 0.2 - 1.0 MG/DL  
 ALT (SGPT) 20 13 - 56 U/L  
 AST (SGOT) 34 10 - 38 U/L Alk. phosphatase 391 (H) 45 - 117 U/L Protein, total 5.8 (L) 6.4 - 8.2 g/dL Albumin 1.9 (L) 3.4 - 5.0 g/dL Globulin 3.9 2.0 - 4.0 g/dL A-G Ratio 0.5 (L) 0.8 - 1.7 NT-PRO BNP Collection Time: 04/15/20  2:15 PM  
Result Value Ref Range NT pro- 0 - 900 PG/ML Radiologic Studies -  
CTA CHEST W OR W WO CONT Final Result IMPRESSION:  
  
No evidence of a central pulmonary embolism or aortic dissection. Evaluation for  
peripheral pulmonary emboli are somewhat limited by breathing motion artifact. Ectasia of the ascending thoracic aorta with mild dilatation the aortic root at  
4.1 cm. Enlarged pulmonary artery suggesting pulmonary hypertension. XR CHEST PORT Final Result IMPRESSION:  
  
No acute radiographic cardiopulmonary abnormality. CT Results  (Last 48 hours) 04/15/20 1622  CTA [facility] CONT Final result Impression:  IMPRESSION:  
   
No evidence of a central pulmonary embolism or aortic dissection. Evaluation for  
peripheral pulmonary emboli are somewhat limited by breathing motion artifact. Ectasia of the ascending thoracic aorta with mild dilatation the aortic root at  
4.1 cm. Enlarged pulmonary artery suggesting pulmonary hypertension. Narrative:  EXAM: CTA chest  
   
INDICATION: Chest pain COMPARISON: None TECHNIQUE: Axial CT imaging from the thoracic inlet through the diaphragm with  
intravenous contrast. Coronal and sagittal MIP reformats were generated. One or more dose reduction techniques were used on this CT: automated exposure control,  
adjustment of the mAs and/or kVp according to patient size, and iterative  
reconstruction techniques. The specific techniques used on this CT exam have  
been documented in the patient's electronic medical record. Digital Imaging and  
Communications in Medicine (DICOM) format image data are available to  
nonaffiliated external healthcare facilities or entities on a secure, media  
free, reciprocally searchable basis with patient authorization for at least a  
12-month period after this study. _______________ FINDINGS:  
   
EXAM QUALITY: Overall exam quality is satisfactory. Pulmonary arterial  
enhancement is adequate with suboptimal breath hold and moderate breathing  
motion artifacts present limiting evaluation. PULMONARY ARTERIES: No evidence of central pulmonary embolism. LYMPH Nodes: No enlarged lymph nodes seen. PLEURA: There are no pleural effusions. HEART: Cardiac size is mildly enlarged. Is no pericardial effusion. Moderate  
calcific coronary artery disease present. VASCULATURE/MEDIASTINUM: There is ectasia of the ascending thoracic aorta at 4  
cm. Aortic root measures 4.1 cm and is mildly dilated. There is no aortic  
dissection. Mild calcific atherosclerotic present. There is an enlarged  
pulmonary artery at 3.9 cm suggesting pulmonary hypertension. LUNGS: No suspicious nodule or mass. No abnormal opacities. AIRWAY: Normal.  
   
UPPER ABDOMEN: There is hepatic steatosis. Liver is enlarged. Right adrenal is  
normal. There is thickening of the left adrenal with suggestion of a 12 mm right  
adrenal nodule. Visualized kidneys, spleen, and pancreas are unremarkable. Gastric bypass changes present. OTHER: No acute or aggressive osseous abnormalities identified. There is an old  
healed fracture of the sternum.  There is diffuse idiopathic skeletal  
 hyperostosis. As osteopenia.  
   
_______________ CXR Results  (Last 48 hours) 04/15/20 1332  XR CHEST PORT Final result Impression:  IMPRESSION:  
   
No acute radiographic cardiopulmonary abnormality. Narrative:  EXAM: XR CHEST PORT  
   
CLINICAL INDICATION/HISTORY: chest pain, rad to back and upper abd  
-Additional: None COMPARISON: March 28, 2020 TECHNIQUE: Frontal view of the chest  
   
_______________ FINDINGS:  
   
HEART AND MEDIASTINUM: Normal cardiac size and mediastinal contours. LUNGS AND PLEURAL SPACES: No focal pneumonic consolidation, pneumothorax, or  
pleural effusion. BONY THORAX AND SOFT TISSUES: No acute osseous abnormality  
   
_______________ Discharge Note: 
7:03 PM 
The pt is ready for discharge. The pt's signs, symptoms, diagnosis, and discharge instructions have been discussed and pt has conveyed their understanding. The pt is to follow up as recommended or return to ER should their symptoms worsen. Plan has been discussed and pt is in agreement. 7:03 PM 
Patient with no acute distress no complaint of chest pain or shortness of breath CT scan result discussed Patient should follow-up with her PCP she already has a prescription that is waiting for she has a family member waiting for hospital she is very comfortable with discharge. Nurse and son was at bedside as well. Discharge Clinical Impression: 1. Acute chest pain 2. Hx of fracture of hip Disposition: 
Home It should be noted that I will be the provider of record for this patient Calvin Fisher MD 
04/15/20 Follow-up Information Follow up With Specialties Details Why Contact Info Marli Vergara MD Internal Medicine   [address] [facility] [address] [id number] [phone] [facility] EMERGENCY DEPT Emergency Medicine  If symptoms worsen [address] 
[phone]

## 2020-04-15 NOTE — ED NOTES
I have reviewed discharge instructions with the patient. The patient verbalized understanding. Assisted patient to wheelchair and out of care area. Mother in law at ER entrance to take patient home

## 2020-04-15 NOTE — DISCHARGE INSTRUCTIONS

## 2020-04-15 NOTE — ED NOTES
Patient updated on plan of care. All questions answered .  Patient resting in position of comfort on stretcher in NAD

## 2020-04-15 NOTE — ED NOTES
Patient difficult stick. Provider aware. Instructed by Dr. Loris Dancer to hold obtaining large bore IV until after D dimer is resulted

## 2020-04-15 NOTE — ED PROVIDER NOTES
HPI  
 
78-year-old female with recent right hip replacement surgery last month presents by EMS for 8 out of 10 central chest pain that radiates to her back and upper abdomen which is been constant since 1030 this morning and feels like a heaviness on top of her chest.  Patient states she has continued to have right hip pain since her surgery. Patient reports a history of pancreatitis but states this is not similar to that. Patient reported no relief with aspirin dose and 1 sublingual nitroglycerin given by EMS. Past Medical History:  
Diagnosis Date  Alcoholic hepatitis  Alcoholic pancreatitis  Anemia  Bacteremia due to Klebsiella pneumoniae 06/30/2017  Bipolar disorder ([facility].) Dr. Caty Reid [facility])  Chronic pancreatitis ([facility] with Chronic Abdominal Pain  Compression fracture of lumbar vertebra (HCC) L4, L5   
 Depression Dr. Caty Reid [facility])  Elevated LFTs  ETOH abuse  Fatty liver  GERD (gastroesophageal reflux disease)  Hyperlipidemia  Hypertension  Hypothyroidism  Lower GI bleeding  Morbid obesity ([facility]  Obstructive sleep apnea No CPAP after Gastric Bypass in 2012  Seizures ([facility] ETOH Withdrawal Seizures  Substance induced mood disorder ([facility]  Vitamin D deficiency Past Surgical History:  
Procedure Laterality Date  BIOPSY LIVER  08/15/2012 Lap Left hepatic liver wedge by Dr. Rayo Dumont; BX Revealed: Hepatic Steatosis, AVM w/ associated Subcapsular Fibrosis.  COLONOSCOPY N/A 1/17/2017 COLONOSCOPY performed by Michelle Carty MD at [facility] ENDOSCOPY  DISKECTOMY, ANTERIOR, WITH D  8/1995, 11/1997  HX ABDOMINAL LAPAROSCOPY  12/02/2014 Laparoscopic Gastrostomy w/ Partial Gastrectomy for assistance in placement of Endoscopic Retrograde Cholangiopancreatography & Diagnostic Lap.  HX CARPAL TUNNEL RELEASE  HX CHOLECYSTECTOMY  09/16/2014 Dr. Leti Barakat  HX COLONOSCOPY  05/12/2012 Colonoscopy by Dr. Bolivar Samaniego. Janett Gibbs: Bx Revealed Colon Polyps (Tubular Adenoma), Hemorrhoids.  HX GASTRIC BYPASS  08/15/2012 Lap Christian-en-y  HX HEMORRHOIDECTOMY  04/30/2015 Dr. Tamiko Strong. Ornabeellavonne Gray  HX HYSTERECTOMY  2006 [address] [place] BONE Right 2010 Wrist  
 
   
Family History:  
Problem Relation Age of Onset  No Known Problems Mother  Cancer Father Thoat Cancer  Cancer Sister Breast Cancer  Obesity Brother  Cancer Brother Throat Cancer Social History Socioeconomic History  Marital status:  Spouse name: Not on file  Number of children: Not on file  Years of education: Not on file  Highest education level: Not on file Occupational History  Not on file Social Needs  Financial resource strain: Not on file  Food insecurity Worry: Not on file Inability: Not on file  Transportation needs Medical: Not on file Non-medical: Not on file Tobacco Use  Smoking status: Never Smoker  Smokeless tobacco: Never Used Substance and Sexual Activity  Alcohol use: Not Currently Alcohol/week: 0.0 standard drinks Comment: 6 pack of beer a week- 1/2/17 last use, Sober 5/11/19  Drug use: No  
 Sexual activity: Yes  
  Partners: Male Birth control/protection: Condom Lifestyle  Physical activity Days per week: Not on file Minutes per session: Not on file  Stress: Not on file Relationships  Social connections Talks on phone: Not on file Gets together: Not on file Attends Denominational service: Not on file Active member of club or organization: Not on file Attends meetings of clubs or organizations: Not on file Relationship status: Not on file  Intimate partner violence Fear of current or ex partner: Not on file Emotionally abused: Not on file Physically abused: Not on file Forced sexual activity: Not on file Other Topics Concern [address] Service Not Asked  Blood Transfusions Not Asked  Caffeine Concern Not Asked  Occupational Exposure Not Asked Deborah Stairs Hazards Not Asked  Sleep Concern Not Asked  Stress Concern Not Asked  Weight Concern Not Asked  Special Diet Not Asked  Back Care Not Asked  Exercise Not Asked  Bike Helmet Not Asked  Seat Belt Not Asked  Self-Exams Not Asked Social History Narrative  Not on file ALLERGIES: Codeine; Lamictal [lamotrigine]; Morphine; Pcn [penicillins]; Vancomycin; Doxycycline; Fentanyl; Percocet [oxycodone-acetaminophen]; Tetracycline; and Tomato Review of Systems Constitutional: Negative for chills and fever. HENT: Negative for ear pain and sore throat. Eyes: Negative for pain and visual disturbance. Respiratory: Negative for cough and shortness of breath. Cardiovascular: Positive for chest pain. Negative for palpitations. Gastrointestinal: Positive for abdominal pain. Negative for diarrhea, nausea and vomiting. Genitourinary: Negative for flank pain. Musculoskeletal: Positive for arthralgias and back pain. Negative for neck pain. Neurological: Negative for syncope and headaches. Psychiatric/Behavioral: Negative for agitation. The patient is not nervous/anxious. Vitals:  
 04/15/20 1303 BP: (!) 151/132 Pulse: (!) 113 Resp: (!) 35 Temp: 98.1 °F (36.7 °C) SpO2: 98% Weight: 78 kg (172 lb) Height: 5' 6\" (1.676 m) Physical Exam 
Vitals signs and nursing note reviewed. Constitutional:   
   General: She is in acute distress. Appearance: Normal appearance. She is well-developed. She is not ill-appearing, toxic-appearing or diaphoretic. HENT:  
   Head: Normocephalic and atraumatic. Mouth/Throat:  
   Mouth: Mucous membranes are moist.  
Eyes:  
   General: No scleral icterus. Extraocular Movements: Extraocular movements intact. Conjunctiva/sclera: Conjunctivae normal.  
   Pupils: Pupils are equal, round, and reactive to light. Neck: Musculoskeletal: Normal range of motion and neck supple. Trachea: No tracheal deviation. Cardiovascular:  
   Rate and Rhythm: Normal rate and regular rhythm. Pulses: Normal pulses. Heart sounds: No murmur. Pulmonary:  
   Effort: Pulmonary effort is normal. No respiratory distress. Breath sounds: Normal breath sounds. Abdominal:  
   Palpations: Abdomen is soft. Tenderness: There is no abdominal tenderness. Musculoskeletal: Normal range of motion. General: Tenderness (R hip) present. No deformity. Right lower leg: No edema. Left lower leg: No edema. Skin: 
   General: Skin is warm and dry. Capillary Refill: Capillary refill takes less than 2 seconds. Findings: No rash. Neurological:  
   General: No focal deficit present. Mental Status: She is alert and oriented to person, place, and time. Mental status is at baseline. Cranial Nerves: No cranial nerve deficit. Sensory: No sensory deficit. Motor: No weakness. Comments: No gross neuro deficit Psychiatric:     
   Mood and Affect: Mood normal.  
 
  
Labs Reviewed CBC WITH AUTOMATED DIFF  
METABOLIC PANEL, COMPREHENSIVE  
NT-PRO BNP  
TROPONIN I  
LIPASE  
D DIMER URINALYSIS W/ RFLX MICROSCOPIC  
 
XR CHEST PORT    (Results Pending) Medications - No data to display 
 
ecg time 1300 Sinus tachycardia rate 111. Normal axis. Borderline prolonged QTC. Nonspecific STT wave changes. No acute ST elevation. No STEMI. 
 
MDM  
 
41-year-old female presents by EMS for constant, heavy chest pain radiating to her back and of her stomach since 1030 this morning in the setting of recent right hip replacement surgery.   On arrival the patient is hypertensive, tachycardic and tachypneic, afebrile satting 90% on room air. She states most of the pain she is having at this time is in her hip, likely exacerbated by EMS transfer. Differential includes ACS, pulmonary embolus, dissection, GERD, MSK pain, anxiety, intra-abdominal pathology, post-op pain, etc.  
 
Patient reported allergies to morphine and fentanyl a rash and anaphylaxis but stated she had no allergies to Dilaudid. He was given aspirin and nitro without effect in route, patient will be given small dose of Dilaudid for symptomatic pain control. ED Course as of Apr 18 0208 Wed Apr 15, 2020  
1702 Patient's labs are reviewed, troponin normal CPK normal BNP normal lipase normal patient's CBC at baselineD-dimer is elevated [id number] metabolic panel with slightly elevated glucose of 219, patient's alk phos of 391 patient's bilirubin, AST ALT normal  
 [AS] [facility] [AS] ED Course User Index [AS] Jim Holloway MD  
 
 
Procedures Dispo: Signed out to Dr. Sarah Garzon pending labs, imaging and repeat trop and ecg.

## 2020-08-18 NOTE — ED PROVIDER NOTES
[address] EMERGENCY DEPT The patient denies any contributing fall, trauma or overuse. No bowel or bladder incontinence. 10:57 AM 
 
Date: 8/18/2020 Patient Name: Tracy Stratton History of Presenting Illness Chief Complaint Patient presents with  
 Hip Pain  
 
 
62 y.o. female with noted past medical history who presents to the emergency department with left hip pain that radiates down the left leg with some numbness and tingling as well. The patient has been seen for this by her primary care doctor and diagnosed with sciatica and given prednisone without much relief. Is unclear whether she received any other type of medication such as an anti-inflammatory muscle relaxant. She does walk with a walker and states that last night she fell with her walker and fell straight forward but still has some worsening pain of her left hip with that. As per fire rescue came to the patient's home to bring her to the emergency department, the patient was able to stand and bear weight on the left leg and on both legs without much difficulty. She admits that yesterday after she fell she was able to get up and walk her own. Patient denies any other associated signs or symptoms. Patient denies any other complaints. Nursing notes regarding the HPI and triage nursing notes were reviewed. Prior medical records were reviewed. Current Outpatient Medications Medication Sig Dispense Refill  therapeutic multivitamin-minerals (THERAGRAN-M) tablet Take 1 Tab by mouth daily.  ondansetron (ZOFRAN ODT) 4 mg disintegrating tablet Take 4 mg by mouth every eight (8) hours as needed.  thiamine HCL (B-1) 100 mg tablet Take 100 mg by mouth daily.  alendronate (FOSAMAX) 70 mg tablet Take 70 mg by mouth every seven (7) days.  cholecalciferol (VITAMIN D3) (1000 Units /25 mcg) tablet Take 1,000 Units by mouth daily.  FLUoxetine (PROzac) 40 mg capsule Take 40 mg by mouth daily.  folic acid 579 mcg tablet Take 0.8 mg by mouth daily.  OLANZapine (ZyPREXA) 10 mg tablet Take 15 mg by mouth nightly.  levothyroxine (SYNTHROID) 50 mcg tablet Take 50 mcg by mouth daily.  levETIRAcetam (KEPPRA XR) 750 mg ER tablet Take 1 Tab by mouth nightly.  acetaminophen (TYLENOL) 325 mg tablet Take 2 Tabs by mouth every four (4) hours as needed for Pain. 20 Tab 0  
 pantoprazole (PROTONIX) 40 mg tablet Take 1 Tab by mouth Before breakfast and dinner. 60 Tab 0  
 hydrOXYzine pamoate (VISTARIL) 50 mg capsule Take 50 mg by mouth two (2) times a day.  ferrous sulfate (FEOSOL) 325 mg (65 mg iron) tablet Take 325 mg by mouth daily.  biotin 10,000 mcg cap Take 1 Cap by mouth.  calcium citrate-vitamin d3 (CITRACAL+D) 315-200 mg-unit tab Take 1 Tab by mouth two (2) times daily (with meals).  ergocalciferol (ERGOCALCIFEROL) 50,000 unit capsule Take 50,000 Units by mouth every seven (7) days.  ascorbic acid (VITAMIN C) 500 mg tablet Take 1,000 mg by mouth daily.  cyanocobalamin (VITAMIN B-12) 1,000 mcg Subl 1,000 mcg by SubLINGual route daily. Past History Past Medical History: 
Past Medical History:  
Diagnosis Date  Alcoholic hepatitis  Alcoholic pancreatitis  Anemia  Bacteremia due to Klebsiella pneumoniae 06/30/2017  Bipolar disorder ([facility] Dr. Lexi Ramirez [facility])  Chronic pancreatitis ([facility] with Chronic Abdominal Pain  Compression fracture of lumbar vertebra (HCC) L4, L5   
 Depression Dr. Lexi Ramirez [facility])  Elevated LFTs  ETOH abuse  Fatty liver  GERD (gastroesophageal reflux disease)  Hyperlipidemia  Hypertension  Hypothyroidism  Lower GI bleeding  Morbid obesity ([facility]  Obstructive sleep apnea No CPAP after Gastric Bypass in 2012  Seizures ([facility] ETOH Withdrawal Seizures  Substance induced mood disorder ([facility]  Vitamin D deficiency Past Surgical History: 
Past Surgical History:  
Procedure Laterality Date  BIOPSY LIVER  08/15/2012 Lap Left hepatic liver wedge by Dr. Jonathan Barrientos; BX Revealed: Hepatic Steatosis, AVM w/ associated Subcapsular Fibrosis.  COLONOSCOPY N/A 1/17/2017 COLONOSCOPY performed by Binh Yang MD at [facility] ENDOSCOPY  DISKECTOMY, ANTERIOR, WITH D  8/1995, 11/1997  HX ABDOMINAL LAPAROSCOPY  12/02/2014 Laparoscopic Gastrostomy w/ Partial Gastrectomy for assistance in placement of Endoscopic Retrograde Cholangiopancreatography & Diagnostic Lap.  HX CARPAL TUNNEL RELEASE  HX CHOLECYSTECTOMY  09/16/2014 Dr. Jonathan Barrientos  HX COLONOSCOPY  05/12/2012 Colonoscopy by Dr. Hamida Gonsalez. José Newton: Bx Revealed Colon Polyps (Tubular Adenoma), Hemorrhoids.  HX GASTRIC BYPASS  08/15/2012 Lap Christian-en-y  HX HEMORRHOIDECTOMY  04/30/2015 Dr. Barrera Jeff. [place]  HX HYSTERECTOMY  2006 [address] BONE Right 2010 Wrist  
 
 
Family History: 
Family History Problem Relation Age of Onset  No Known Problems Mother  Cancer Father Thoat Cancer  Cancer Sister Breast Cancer  Obesity Brother  Cancer Brother Throat Cancer Social History: 
Social History Tobacco Use  Smoking status: Never Smoker  Smokeless tobacco: Never Used Substance Use Topics  Alcohol use: Not Currently Alcohol/week: 0.0 standard drinks Comment: 6 pack of beer a week- 1/2/17 last use, Sober 5/11/19  Drug use: No  
 
 
Allergies: Allergies Allergen Reactions  Codeine Rash  Lamictal [Lamotrigine] Anaphylaxis  Morphine Anaphylaxis Per patient, has tolerated dilaudid in the past.  
 Pcn [Penicillins] Anaphylaxis  Vancomycin Rash  Doxycycline Rash  Fentanyl Rash  Percocet [Oxycodone-Acetaminophen] Hives and Itching  Tetracycline Rash  Tomato Hives Patient's primary care provider (as noted in EPIC):  Sonya Araya MD 
 
Review of Systems Visit Vitals /72 (BP 1 Location: Left arm) Pulse (!) 115 Temp 98.9 °F (37.2 °C) Resp 22 Ht 5' 6\" (1.676 m) Wt 78 kg (172 lb) SpO2 100% BMI 27.76 kg/m² PHYSICAL EXAM: 
 
CONSTITUTIONAL:  Alert, in no apparent distress;  well developed;  well nourished. HEAD:  Normocephalic, atraumatic. EYES:  EOMI. Non-icteric sclera. Normal conjunctiva. ENTM:  Nose:  no rhinorrhea. Throat:  no erythema or exudate, mucous membranes moist. 
NECK:  No JVD. Supple RESPIRATORY:  Chest clear, equal breath sounds, good air movement. CARDIOVASCULAR:  Regular rate and rhythm. No murmurs, rubs, or gallops. GI:  Normal bowel sounds, abdomen soft and non-tender. No rebound or guarding. BACK:  Reproducible paralumbar tenderness to palpation. No vertebral bony point tenderness or step off. No rash, lesions, bruising. Normal perianal sensation. Positive left straight leg raise. UPPER EXT:  Normal inspection. LOWER EXT:  No edema, no calf tenderness. Distal pulses intact. NEURO:  Moves all four extremities, and grossly normal motor exam. 
SKIN:  No rashes;  Normal for age. PSYCH:  Alert and normal affect. Diagnostic Study Results Abnormal lab results from this emergency department encounter: 
Labs Reviewed - No data to display Lab values for this patient within approximately the last 12 hours: 
No results found for this or any previous visit (from the past 12 hour(s)). Radiologist and cardiologist interpretations if available at time of this note: Xr Spine Lumb Min 4 V Result Date: 8/18/2020 EXAM: XR SPINE LUMB MIN 4 V INDICATION: Lumbar back pain after fall COMPARISON: CT of the abdomen and pelvis dated 8/24/2019 FINDINGS: 5 separate views of the lumbar spine. Mild compression deformity of the T12 vertebral body superior endplate again noted. Straightening of usual lumbar lordosis is noted with trace retrolisthesis of L3 on L4. Diffuse spondylosis, greatest across the L3-S1 segments. Lower lumbar predominant facet joint osteoarthritis without evidence of pars interarticularis defect. Partial visualization of right hip screw. Prior cholecystectomy. Phleboliths project over the pelvis. IMPRESSION: 1. Chronic superior endplate M67 compression deformity, similar to prior CT of 8/24/2019. 2. Lower lumbar predominant spondylosis and facet joint osteoarthritis, as previously. Xr Hip Lt W Or Wo Pelv 2-3 Vws Result Date: 8/18/2020 EXAM: LEFT HIP RADIOGRAPHS CLINICAL INDICATION/HISTORY: Left hip pain, trauma-Additional: None COMPARISON: Right hip radiographs 3/29/2020; CT scan abdomen/pelvis 8/24/2019 TECHNIQUE: AP pelvis and frog-leg lateral view of left hip. _______________ FINDINGS: BONES: There are mildly displaced fractures of the superior and inferior left pubic rami as well as likely the paramedian right pubic body and inferior right pubic ramus which is overlapped and foreshortened in appearance on the provided images. Alignment at the left hip joint is within normal limits. No evidence of left hip fracture. Lower lumbar spondylosis. SOFT TISSUES: Unremarkable. _______________ IMPRESSION: 1. Mildly displaced left-sided superior and inferior pubic ramus fractures. 2. Mildly displaced paramedian right pubic body fracture and inferior right pubic ramus fractures. 3. No evidence of left hip fracture Medication(s) ordered for patient during this emergency visit encounter: 
Medications - No data to display Medical Decision Making I am the first provider for this patient. I reviewed the vital signs, available nursing notes, past medical history, past surgical history, family history and social history. Vital Signs:  Reviewed the patient's vital signs. ED COURSE:   
 
IMPRESSION AND MEDICAL DECISION MAKING: 
Based upon the patients presentation with noted HPI and PE, along with the work up done in the emergency department, I believe that the patient is having sciatica. DIAGNOSIS: 
1. Sciatica, left SPECIFIC PATIENT INSTRUCTIONS FROM THE PHYSICIAN WHO TREATED YOU IN THE ER TODAY: 
1. Return if any concerns or worsening of condition(s) 2. Ibuprofen, flexeril, and steroid as prescribed until finished. 3. Follow up with your doctor in the next 2-4 days for reevaluation. Patient is improved, resting quietly and comfortably. The patient will be discharged home. The patient was reassured that these symptoms do not appear to represent a serious or life threatening condition at this time. Warning signs of worsening condition were discussed and understood by the patient. Based on patient's age, coexisting illness, exam, and the results of this ED evaluation, the decision to treat as an outpatient was made. Based on the information available at time of discharge, acute pathology requiring immediate intervention was deemed relative unlikely. While it is impossible to completely exclude the possibility of underlying serious disease or worsening of condition, I feel the relative likelihood is extremely low. I discussed this uncertainty with the patient, who understood ED evaluation and treatment and felt comfortable with the outpatient treatment plan. All questions regarding care, test results, and follow up were answered. The patient is stable and appropriate to discharge. They understand that they should return to the emergency department for any new or worsening symptoms. I stressed the importance of follow up for repeat assessment and possibly further evaluation/treatment.  
 
Dictation disclaimer:  Please note that this dictation was completed with Dragon, the computer voice recognition software. Quite often unanticipated grammatical, syntax, homophones, and other interpretive errors are inadvertently transcribed by the computer software. Please disregard these errors. Please excuse any errors that have escaped final proofreading. Coding Diagnoses Clinical Impression: 1. Left sided sciatica Disposition Disposition: Discharge. DEMETRI Morrow Board Certified Emergency Physician Provider Attestation: If a scribe was utilized in generation of this patient record, I personally performed the services described in the documentation, reviewed the documentation, as recorded by the scribe in my presence, and it accurately records the patient's history of presenting illness, review of systems, patient physical examination, and procedures performed by me as the attending physician. DEMETRI Morrow Board Certified Emergency Physician 8/18/2020. 
10:57 AM

## 2020-08-18 NOTE — ED TRIAGE NOTES
Pt arrived via ems. Pt had fall week ago fell out of bed onto right hip. Pt had right hip replacement in march. Pt fell yesterday face forward, lower lip swollen, dried blood noted to lips no active bleeding noted, pt states her \"teeth have been jacked up\" pt unsure if she broke any teeth with fall yesterday. Scab noted to left knee, faint bruise noted to left hip. Pt c/o left hip pain with numbness and tingling down left hip, pt reports hx sciatica. Pt able to torres. Pt alert oriented. Pt placed on cardiac monitor. Bed in low locked position. Call bell with in reach, side rail up x2. Will continue to monitor.

## 2020-08-18 NOTE — DISCHARGE INSTRUCTIONS
SPECIFIC PATIENT INSTRUCTIONS FROM THE PHYSICIAN WHO TREATED YOU IN THE ER TODAY:  1. Return if any concerns or worsening of condition(s)  2. Ibuprofen, flexeril, and steroid as prescribed until finished. 3. Follow up with your doctor in the next 2-4 days for reevaluation. Patient Education        Preventing Falls: Care Instructions  Your Care Instructions    Getting around your home safely can be a challenge if you have injuries or health problems that make it easy for you to fall. Loose rugs and furniture in walkways are among the dangers for many older people who have problems walking or who have poor eyesight. People who have conditions such as arthritis, osteoporosis, or dementia also have to be careful not to fall. You can make your home safer with a few simple measures. Follow-up care is a key part of your treatment and safety. Be sure to make and go to all appointments, and call your doctor if you are having problems. It's also a good idea to know your test results and keep a list of the medicines you take. How can you care for yourself at home? Taking care of yourself  · You may get dizzy if you do not drink enough water. To prevent dehydration, drink plenty of fluids, enough so that your urine is light yellow or clear like water. Choose water and other caffeine-free clear liquids. If you have kidney, heart, or liver disease and have to limit fluids, talk with your doctor before you increase the amount of fluids you drink. · Exercise regularly to improve your strength, muscle tone, and balance. Walk if you can. Swimming may be a good choice if you cannot walk easily. · Have your vision and hearing checked each year or any time you notice a change. If you have trouble seeing and hearing, you might not be able to avoid objects and could lose your balance. · Know the side effects of the medicines you take. Ask your doctor or pharmacist whether the medicines you take can affect your balance. Sleeping pills or sedatives can affect your balance. · Limit the amount of alcohol you drink. Alcohol can impair your balance and other senses. · Ask your doctor whether calluses or corns on your feet need to be removed. If you wear loose-fitting shoes because of calluses or corns, you can lose your balance and fall. · Talk to your doctor if you have numbness in your feet. Preventing falls at home  · Remove raised doorway thresholds, throw rugs, and clutter. Repair loose carpet or raised areas in the floor. · Move furniture and electrical cords to keep them out of walking paths. · Use nonskid floor wax, and wipe up spills right away, especially on ceramic tile floors. · If you use a walker or cane, put rubber tips on it. If you use crutches, clean the bottoms of them regularly with an abrasive pad, such as steel wool. · Keep your house well lit, especially Corita Bal, and outside walkways. Use night-lights in areas such as hallways and bathrooms. Add extra light switches or use remote switches (such as switches that go on or off when you clap your hands) to make it easier to turn lights on if you have to get up during the night. · Install sturdy handrails on stairways. · Move items in your cabinets so that the things you use a lot are on the lower shelves (about waist level). · Keep a cordless phone and a flashlight with new batteries by your bed. If possible, put a phone in each of the main rooms of your house, or carry a cell phone in case you fall and cannot reach a phone. Or, you can wear a device around your neck or wrist. You push a button that sends a signal for help. · Wear low-heeled shoes that fit well and give your feet good support. Use footwear with nonskid soles. Check the heels and soles of your shoes for wear. Repair or replace worn heels or soles. · Do not wear socks without shoes on wood floors. · Walk on the grass when the sidewalks are slippery.  If you live in an area that gets snow and ice in the winter, sprinkle salt on slippery steps and sidewalks. Preventing falls in the bath  · Install grab bars and nonskid mats inside and outside your shower or tub and near the toilet and sinks. · Use shower chairs and bath benches. · Use a hand-held shower head that will allow you to sit while showering. · Get into a tub or shower by putting the weaker leg in first. Get out of a tub or shower with your strong side first.  · Repair loose toilet seats and consider installing a raised toilet seat to make getting on and off the toilet easier. · Keep your bathroom door unlocked while you are in the shower. Where can you learn more? Go to http://salvatore-izabela.info/. Enter [id number] [id number] in the search box to learn more about \"Preventing Falls: Care Instructions. \"  Current as of: March 16, 2018  Content Version: 11.8  © 6961-9060 [employer]. Care instructions adapted under license by [employer] (which disclaims liability or warranty for this information). If you have questions about a medical condition or this instruction, always ask your healthcare professional. [place] [address] any warranty or liability for your use of this information. Patient Education        Sciatica: Care Instructions  Your Care Instructions     Sciatica (say \"ogi-KJ-ft-kuh\") is an irritation of one of the sciatic nerves, which come from the spinal cord in the lower back. The sciatic nerves and their branches extend down through the buttock to the foot. Sciatica can develop when an injured disc in the back irritates or presses against a spinal nerve root. Its main symptom is pain, numbness, or weakness that is often worse in the leg or foot than in the back. Sciatica often will improve and go away with time. Early treatment usually includes medicines and exercises to relieve pain. Follow-up care is a key part of your treatment and safety.  Be sure to make and go to all appointments, and call your doctor if you are having problems. It's also a good idea to know your test results and keep a list of the medicines you take. How can you care for yourself at home? · Take pain medicines exactly as directed. ? If the doctor gave you a prescription medicine for pain, take it as prescribed. ? If you are not taking a prescription pain medicine, ask your doctor if you can take an over-the-counter medicine. · Use heat or ice to relieve pain. ? To apply heat, put a warm water bottle, heating pad set on low, or warm cloth on your back. Do not go to sleep with a heating pad on your skin. ? To use ice, put ice or a cold pack on the area for 10 to 20 minutes at a time. Put a thin cloth between the ice and your skin. · Avoid sitting if possible, unless it feels better than standing. · Alternate lying down with short walks. Increase your walking distance as you are able to without making your symptoms worse. · Do not do anything that makes your symptoms worse. When should you call for help? [id number] anytime you think you may need emergency care. For example, call if:  · You are unable to move a leg at all. Call your doctor now or seek immediate medical care if:  · You have new or worse symptoms in your legs or buttocks. Symptoms may include:  ? Numbness or tingling. ? Weakness. ? Pain. · You lose bladder or bowel control. Watch closely for changes in your health, and be sure to contact your doctor if:  · You are not getting better as expected. Where can you learn more? Go to http://salvatore-izabela.info/  Enter Z239 in the search box to learn more about \"Sciatica: Care Instructions. \"  Current as of: March 2, 2020               Content Version: 12.5  © 7217-8153 [employer]. Care instructions adapted under license by [employer] (which disclaims liability or warranty for this information).  If you have questions about a medical condition or this instruction, always ask your healthcare professional. [address] any warranty or liability for your use of this information. Patient Education        Broken Pelvis: Care Instructions  Your Care Instructions     The pelvis is the ring of bones between your hips. It connects to the spine and to the leg bones at the hip joints. Blood vessels, nerves, and muscles run through the pelvic ring and can be affected by a break. A broken pelvis also can affect the organs in your pelvic area. A broken pelvis may need a few months to heal. You may have had surgery to repair your pelvis, depending on where it was broken and how bad the break was. Your doctor may have put metal screws, pins, or a bettie in your pelvis to fix the break. In some cases, surgery is not needed. While your pelvis heals, you will need to keep weight off the hips. Once you are able to walk, a walker or crutches can help you get around. You can help your pelvis heal with care at home. Your doctor may prescribe medicine to relieve pain and prevent blood clots. You heal best when you take good care of yourself. Eat a variety of healthy foods, and don't smoke. Follow-up care is a key part of your treatment and safety. Be sure to make and go to all appointments, and call your doctor if you are having problems. It's also a good idea to know your test results and keep a list of the medicines you take. How can you care for yourself at home? · Put ice or a cold pack on the painful area for 10 to 20 minutes at a time. Try to do this every 1 to 2 hours for the next 3 days (when you are awake). Put a thin cloth between the ice and your skin. · Be safe with medicines. Take pain medicines exactly as directed. ? If the doctor gave you a prescription medicine for pain, take it as prescribed. ? If you are not taking a prescription pain medicine, ask your doctor if you can take an over-the-counter medicine.   · Put only as much weight on each leg as your doctor tells you to. He or she may advise you to use crutches, a walker, or a cane to help you walk. · Avoid constipation. ? Include fruits, vegetables, beans, and whole grains in your diet each day. These foods are high in fiber. ? Drink plenty of fluids, enough so that your urine is light yellow or clear like water. If you have kidney, heart, or liver disease and have to limit fluids, talk with your doctor before you increase the amount of fluids you drink. ? Get some exercise every day, once you are able to walk and your doctor tells you it is okay to exercise. Build up slowly to 30 to 60 minutes a day on 5 or more days of the week. ? Take a fiber supplement, such as Citrucel or Metamucil, every day if needed. Read and follow all instructions on the label. ? Schedule time each day for a bowel movement. Having a daily routine may help. Take your time and do not strain when having a bowel movement. When should you call for help? [id number] anytime you think you may need emergency care. For example, call if:  · You have chest pain, are short of breath, or you cough up blood. Call your doctor now or seek immediate medical care if:  · You have new or worse pain. · Your foot is cool or pale or changes color. · You have tingling, weakness, or numbness in your foot and toes. · You have signs of a blood clot in your leg (called a deep vein thrombosis), such as:  ? Pain in your calf, back of the knee, thigh, or groin. ? Redness or swelling in your leg. Watch closely for changes in your health, and be sure to contact your doctor if:  · You do not get better as expected. Where can you learn more? Go to http://salvatore-izabela.info/  Enter T713 in the search box to learn more about \"Broken Pelvis: Care Instructions. \"  Current as of: March 2, 2020               Content Version: 12.5  © 9030-9954 Healthwise, Incorporated.    Care instructions adapted under license by [employer] (which disclaims liability or warranty for this information). If you have questions about a medical condition or this instruction, always ask your healthcare professional. [place] [address] any warranty or liability for your use of this information. [employer] Guide Activation    Thank you for requesting access to [employer]. Please follow the instructions below to securely access and download your online medical record. [employer] Guide allows you to send messages to your doctor, view your test results, renew your prescriptions, schedule appointments, and more. How Do I Sign Up? In your internet browser, go to [email or web address]. Click on the First Time User? Click Here link in the Sign In box. You will see the New Member Sign Up page. Enter your TripFlick [employer] Guide Access Code exactly as it appears below. You will not need to use this code after you´ve completed the sign-up process. If you do not sign up before the expiration date, you must request a new code. [employer] Guide Access Code: [email or web address]  Expires: 3/28/2019  2:27 PM (This is the date your Trip[employer] Guide access code will )    Enter the last four digits of your Social Security Number (xxxx) and Date of Birth (mm/dd/yyyy) as indicated and click Submit. You will be taken to the next sign-up page. Create a TripFlick Travel Guide ID. This will be your TripFlick Travel Guide login ID and cannot be changed, so think of one that is secure and easy to remember. Create a TripFlick Travel Guide password. You can change your password at any time. Enter your Password Reset Question and Answer. This can be used at a later time if you forget your password. Enter your e-mail address. You will receive e-mail notification when new information is available in [address] Click Sign Up. You can now view and download portions of your medical record. Click the [place] link to download a portable copy of your medical information.     Additional Information    If you have questions, please visit the Frequently Asked Questions section of the [employer] website at [email or web address] Remember, [employer] is NOT to be used for urgent needs. For medical emergencies, dial 911.

## 2020-08-18 NOTE — ED NOTES
Pt specifically stated she is able to take Norco for pain, despite documented allergies to percocet/morphine/codeine. MD aware.

## 2020-08-18 NOTE — ED NOTES
Pt wheeled to lobby. Pt has wallet/phone with her. Pt denies Head/neck pain. Will f/u with ortho for pelvic fractures

## 2020-08-19 PROBLEM — I46.9 CARDIAC ARREST (HCC): Status: ACTIVE | Noted: 2020-01-01

## 2020-08-19 PROBLEM — J96.00 ACUTE RESPIRATORY FAILURE (HCC): Status: ACTIVE | Noted: 2020-01-01

## 2020-08-19 NOTE — PROGRESS NOTES
Responded to Code Blue, patient being bagged when I arrived. Placed patient to vent and secured tube. ABG done and given to Dr René Brown. Vent settings appropriate and alarms set. Patient to go to CT scan.

## 2020-08-19 NOTE — PROGRESS NOTES
responded to Code Bue for  [employer], who is a 62 y.o.,female, The  provided the following Interventions: 
Provided crisis pastoral care and pastoral support. Offered prayers on behalf for the patient. Chart reviewed. The following outcomes were achieved: 
 maintained family contact with son and then daughter who were present in family room as well as pt husbands family. Pt is newly  to non-audiable person who uses 24 hour care at home. [place] is pt 's mother. The adult children prefer to communicate with the family and [place], the mother in law, has been constantly updated but also encouraged to speak with Vivian Peterson the adult son (he is  and [place] [employer]).  arranged for a family visit for both adult daughter and son (Marija Gomez and Vivian Peterson) for about 25 minutes. The  had shared expectations and bridging for next steps. The adult children are presently in the family care room adjacent to ED until further notice. Pastoral counseling and care was given throughout.  also spoke and updated  via his aid and a communication tool where the aid read what the  wanted to communicate. All of the family thanked  for the care. Assessment: 
There are no spiritual or Spiritism issues which require intervention at this time. Plan: 
Chaplains will continue to follow and will provide pastoral care on an as needed/requested basis.  recommends bedside caregivers page  on duty if patient shows signs of acute spiritual or emotional distress. Chaplain Fay Meigs Clinical Certified  Spiritual Care  
[phone]

## 2020-08-19 NOTE — ED NOTES
Assumed care at this time Report received from [facility] . Pt unresponsive, is attempting to breath over the vent. Pupils 3 mm fixed. Skin pale,cool and dry. Resp via vent ,see RT note. Breath sounds clear . And soft non tender BSx 4 Jackson intact draining cloudy yellow urine 875 ml drained from Bag.

## 2020-08-19 NOTE — PROGRESS NOTES
responded to Code Orquidea for  Isha Nascimento, who is a 62 y.o.,female, The  provided the following Interventions: 
Provided crisis pastoral care and pastoral support. Offered prayers on behalf for the patient. Chart reviewed. The following outcomes were achieved: 
 created start of family care with mother of patient Dirk Galan [phone]. Updated patient demo with best number with registration. Mother reports pt  is inaudible. Assessment: 
There are no spiritual or Taoist issues which require intervention at this time. Plan: 
Chaplains will continue to follow and will provide pastoral care on an as needed/requested basis.  recommends bedside caregivers page  on duty if patient shows signs of acute spiritual or emotional distress. Chaplain Loulou Rush Clinical Certified  Spiritual Care  
[phone]

## 2020-08-19 NOTE — PROGRESS NOTES
conducted a Follow up consultation and Spiritual Assessment for Donaldo Soto, who is a 62 y.o.,female. The  provided the following Interventions: 
Continued the relationship of care and support. Listened empathically. Offered prayer and assurance of continued prayer on patients behalf. Chart reviewed. The following outcomes were achieved: 
Patients family present and on phone expressed gratitude for 's visit. Assessment: 
There are no further spiritual or Anabaptism issues which require Spiritual Care Services interventions at this time. Plan: 
Chaplains will continue to follow and will provide pastoral care on an as needed/requested basis.  recommends bedside caregivers page  on duty if patient shows signs of acute spiritual or emotional distress.  Melvi Perez Clinical Certified  Spiritual Care  
[phone]

## 2020-08-19 NOTE — PROGRESS NOTES
Patient taken to CT. While being prepped to be moved from CT table patient lost pulses and CPR began. CPR done and ROSC regained, patient placed back on transport ventilator and brought back to ED where she was placed back to ventilator.

## 2020-08-19 NOTE — REMOTE MONITORING
Attempted to contact RN regarding sepsis bundle. Marlene Smith RN [address] [facility] Nurse [phone]

## 2020-08-19 NOTE — ED NOTES
Pt remains unresponsive. Pupils 3 mm and fixed. Pt with intermittent total body spasms. Skin pale ,cool and dry./Resp remains on vent with no changes in settings. Breath sounds clear all fields. Monitor reveals NSR with frequent unifocal PVCs. Pt occasionally attempting to breath over vent Abd soft non distented. BS x 4. Jackson draining 665 ml of clear yellow urine. Right sided Central line infusing medication per orders site asymptomatic.  I/O intact to LLE

## 2020-08-19 NOTE — ED TRIAGE NOTES
EMS called for patient having syncopal episode while speaking with family. EMS arrived patient was apneic and in pea. Patient given 3 rounds of epi and shocked twice. ROSC achieved. Patient was intubated in field. Patient arrived to ED in sinus tachycardia and breathing over vent.  Dr. Emiliana Cortez at  Bedside upon arrival

## 2020-08-19 NOTE — ED NOTES
-1540 pt off the floor in CT, Code blue called and CPR started. -9161, CPR continued Provider Madhu Baker in [address] along with code team, bilateral breath sounds noted per Madhu Baker. 
-1545-Verbal order from provider Solomon 300 mg amiodarone IV given. CPR continues. Pulse check. V-fib, no pulse found. - 4203 Verbal order from provider Madhu Baker pt shocked at 150 jules then CPR continued. -1547 Per verbal order by provider Madhu Baker 1 mg of Epi given IV. 
-1548-pulse check, PEA, CPR continued. -1550-femoral pulse found - pt transport back to ED to room 14.

## 2020-08-19 NOTE — ED NOTES
TIME OUT for central line. Patient and location verified with Dr. Maryjane Traore and this RN. Check list completed.

## 2020-08-19 NOTE — ED NOTES
Pt with 2 runs of V tac lasting 3 -5 seconds Pt with increased irritability with intermittent total body spasms . After moving pt to place her on the cooling blanket. ERMD aware NO further orders at this tiem

## 2020-08-19 NOTE — REMOTE MONITORING
Attempted to contact RN regarding sepsis bundle. Sid Amaya RN [address] [facility] Nurse [phone]

## 2020-08-19 NOTE — PROGRESS NOTES
conducted a Follow up consultation and Spiritual Assessment for Ann-Marie Meraz, who is a 62 y.o.,female. The  provided the following Interventions: 
Continued the relationship of care and support. Listened empathically. Offered prayer and assurance of continued prayer on patients behalf. Chart reviewed. The following outcomes were achieved: 
Patient family expressed gratitude for 's visit. Assessment: 
There are no further spiritual or Confucianist issues which require Spiritual Care Services interventions at this time. Plan: 
Chaplains will continue to follow and will provide pastoral care on an as needed/requested basis.  recommends bedside caregivers page  on duty if patient shows signs of acute spiritual or emotional distress.  [place] Other Clinical Certified  Spiritual Care  
[phone]

## 2020-08-19 NOTE — ED PROVIDER NOTES
Date: 8/19/2020 Patient Name: Ana Laura Borrego History of Presenting Illness Chief Complaint Patient presents with  Cardiac arrest  
 
 
History Provided By: Patient HPI/Chief Complaint: (Context):who presents with Coronary EMS note and discussion Patient was awake but short of breath and syncope when patient was received by EMS patient was in full cardiac arrest 20-minute downtime from their arriving to patient getting to the emergency department Patient did get 3 epinephrine 2 shocks amiodarone was not given patient remained sinus tach in the emergency department on the arrival of the patient to the emergency department I been also made aware that patient was here yesterday and was here for pelvic injury was discharged subsequently Patient was seen yesterday for hip pain history of sciatica History is limited as patient is post cardiac arrest and unresponsive PCP: Shruthi Christian MD 
 
Current Facility-Administered Medications Medication Dose Route Frequency Provider Last Rate Last Dose  sodium chloride (NS) flush 5-10 mL  5-10 mL IntraVENous PRN Johnathan Rojas MD      
 amiodarone (NEXTERONE) 360 mg in dextrose 200 mL (1.8 mg/mL) infusion  1 mg/min IntraVENous CONTINUOUS Johnathan Rojas MD 33.3 mL/hr at 08/19/20 1417 1 mg/min at 08/19/20 1417  levoFLOXacin (LEVAQUIN) 750 mg in D5W IVPB  750 mg IntraVENous NOW Johnathan Rojas MD   Stopped at 08/19/20 1538  potassium chloride 10 mEq in 100 ml IVPB  10 mEq IntraVENous Q1H Johnathan Rojas MD      
 magnesium sulfate 2 g/50 ml IVPB (premix or compounded)  2 g IntraVENous NOW Johnathan Rojas MD      
 
Current Outpatient Medications Medication Sig Dispense Refill  alendronate (FOSAMAX) 70 mg tablet Take 70 mg by mouth every seven (7) days.  cholecalciferol (VITAMIN D3) (1000 Units /25 mcg) tablet Take 1,000 Units by mouth daily.  FLUoxetine (PROzac) 40 mg capsule Take 40 mg by mouth daily.  folic acid 377 mcg tablet Take 0.8 mg by mouth daily.  therapeutic multivitamin-minerals (THERAGRAN-M) tablet Take 1 Tab by mouth daily.  OLANZapine (ZyPREXA) 10 mg tablet Take 15 mg by mouth nightly.  ondansetron (ZOFRAN ODT) 4 mg disintegrating tablet Take 4 mg by mouth every eight (8) hours as needed.  thiamine HCL (B-1) 100 mg tablet Take 100 mg by mouth daily.  levothyroxine (SYNTHROID) 50 mcg tablet Take 50 mcg by mouth daily.  ibuprofen (MOTRIN) 800 mg tablet Take 1 Tab by mouth every eight (8) hours for 5 days. 15 Tab 0  cyclobenzaprine (FLEXERIL) 5 mg tablet Take 1 Tab by mouth three (3) times daily. 9 Tab 0  predniSONE (DELTASONE) 20 mg tablet Take 60 mg by mouth daily for 5 days. With Breakfast 15 Tab 0  
 levETIRAcetam (KEPPRA XR) 750 mg ER tablet Take 1 Tab by mouth nightly.  acetaminophen (TYLENOL) 325 mg tablet Take 2 Tabs by mouth every four (4) hours as needed for Pain. 20 Tab 0  
 pantoprazole (PROTONIX) 40 mg tablet Take 1 Tab by mouth Before breakfast and dinner. 60 Tab 0  
 hydrOXYzine pamoate (VISTARIL) 50 mg capsule Take 50 mg by mouth two (2) times a day.  ferrous sulfate (FEOSOL) 325 mg (65 mg iron) tablet Take 325 mg by mouth daily.  biotin 10,000 mcg cap Take 1 Cap by mouth.  calcium citrate-vitamin d3 (CITRACAL+D) 315-200 mg-unit tab Take 1 Tab by mouth two (2) times daily (with meals).  ergocalciferol (ERGOCALCIFEROL) 50,000 unit capsule Take 50,000 Units by mouth every seven (7) days.  ascorbic acid (VITAMIN C) 500 mg tablet Take 1,000 mg by mouth daily.  cyanocobalamin (VITAMIN B-12) 1,000 mcg Subl 1,000 mcg by SubLINGual route daily. Past History Past Medical History: 
Past Medical History:  
Diagnosis Date  Alcoholic hepatitis  Alcoholic pancreatitis  Anemia  Bacteremia due to Klebsiella pneumoniae 06/30/2017  Bipolar disorder ([facility] Dr. Noah Walker [facility])  Chronic pancreatitis ([facility] with Chronic Abdominal Pain  Compression fracture of lumbar vertebra (HCC) L4, L5   
 Depression Dr. Zamora [facility])  Elevated LFTs  ETOH abuse  Fatty liver  GERD (gastroesophageal reflux disease)  Hyperlipidemia  Hypertension  Hypothyroidism  Lower GI bleeding  Morbid obesity ([facility]  Obstructive sleep apnea No CPAP after Gastric Bypass in 2012  Seizures ([facility] ETOH Withdrawal Seizures  Substance induced mood disorder ([facility]  Vitamin D deficiency Past Surgical History: 
Past Surgical History:  
Procedure Laterality Date  BIOPSY LIVER  08/15/2012 Lap Left hepatic liver wedge by Dr. Camejo Session; BX Revealed: Hepatic Steatosis, AVM w/ associated Subcapsular Fibrosis.  COLONOSCOPY N/A 1/17/2017 COLONOSCOPY performed by Deena Carson MD at [facility] ENDOSCOPY  DISKECTOMY, ANTERIOR, WITH D  8/1995, 11/1997  HX ABDOMINAL LAPAROSCOPY  12/02/2014 Laparoscopic Gastrostomy w/ Partial Gastrectomy for assistance in placement of Endoscopic Retrograde Cholangiopancreatography & Diagnostic Lap.  HX CARPAL TUNNEL RELEASE  HX CHOLECYSTECTOMY  09/16/2014 Dr. Nell Marx  HX COLONOSCOPY  05/12/2012 Colonoscopy by Dr. Darline Christine. Nat Ancelmo: Bx Revealed Colon Polyps (Tubular Adenoma), Hemorrhoids.  HX GASTRIC BYPASS  08/15/2012 Lap Christian-en-y  HX HEMORRHOIDECTOMY  04/30/2015 Dr. Corina Pradhan. Kaela Snider  HX HYSTERECTOMY  2006 [facility] [address] BONE Right 2010 Wrist  
 
 
Family History: 
Family History Problem Relation Age of Onset  No Known Problems Mother  Cancer Father Thoat Cancer  Cancer Sister Breast Cancer  Obesity Brother  Cancer Brother Throat Cancer Social History: 
Social History Tobacco Use  
  Smoking status: Never Smoker  Smokeless tobacco: Never Used Substance Use Topics  Alcohol use: Not Currently Alcohol/week: 0.0 standard drinks Comment: 6 pack of beer a week- 1/2/17 last use, Sober 5/11/19  Drug use: No  
 
 
Allergies: Allergies Allergen Reactions  Codeine Rash  Lamictal [Lamotrigine] Anaphylaxis  Morphine Anaphylaxis Per patient, has tolerated dilaudid in the past.  
 Pcn [Penicillins] Anaphylaxis  Vancomycin Rash  Doxycycline Rash  Fentanyl Rash  Percocet [Oxycodone-Acetaminophen] Hives and Itching  Tetracycline Rash  Tomato Hives Review of Systems Review of Systems Unable to perform ROS: Acuity of condition Physical Exam  
 
Physical Exam 
Vitals signs and nursing note reviewed. Constitutional:   
   General: She is not in acute distress. Appearance: She is well-developed. She is ill-appearing and toxic-appearing. HENT:  
   Head: Normocephalic and atraumatic. Right Ear: External ear normal.  
   Left Ear: External ear normal.  
   Nose: Nose normal.  
Eyes:  
   General: No scleral icterus. Conjunctiva/sclera: Conjunctivae normal.  
   Comments: 3 mm bilaterally unreactive Neck: Musculoskeletal: Neck supple. Thyroid: No thyromegaly. Vascular: No JVD. Trachea: No tracheal deviation. Cardiovascular:  
   Rate and Rhythm: Tachycardia present. Rhythm irregular. Heart sounds: No murmur. No friction rub. Pulmonary:  
   Effort: Respiratory distress present. Breath sounds: No stridor. No wheezing or rales. Chest:  
   Chest wall: No tenderness. Abdominal:  
   General: Bowel sounds are normal. There is no distension. Palpations: Abdomen is soft. Tenderness: There is no abdominal tenderness. There is no guarding or rebound. Musculoskeletal:     
   General: No tenderness. Lymphadenopathy:  
   Cervical: No cervical adenopathy.   
Skin: 
 General: Skin is warm and dry. Capillary Refill: Capillary refill takes less than 2 seconds. Medical Decision Making I am the first provider for this patient. I reviewed the vital signs, available nursing notes, past medical history, past surgical history, family history and social history. Provider Notes (Medical Decision Making): Patient received via EMS Patient post arrest resuscitated Patient is she was seen yesterday with pelvic fracture I will check abdomen CT Concern for shortness of breath and syncope I will check a CTA chest as well and unclear of the head injury I will add a head CT as well Patient is EKG is regular I will get a repeat EKG subsequent to patient getting few minutes of resuscitation patient will be started on sepsis protocol I will have to reevaluate patient once patient labs are back patient did go into ventricular tachycardia dysrhythmia I did start amiodarone in the emergency department I will have patient admitted to ICU as patient remained stable and the blood pressure is well in control At home the source of patient's deterioration This is my initial note and I will continue to monitor observe and update this note as patient's labs return. They are unable to get an IV access so that is appropriate for this patient's level of care I will start a central line the emergency department this will be emergent line. Vital Signs-Reviewed the patient's vital signs. Pulse Oximetry Analysis -100%, intubated Cardiac Monitor: 
Rate/Rhythm:134/irrgular HR/PVC EKG: Interpreted by the EP: 
Patient is time of EKG is 1406, 134, atrial flutter with variable block is appreciated There is PQRS that I can observe I will repeat the EKG when patient's rhythm is slightly improved I am unclear if this is a flutter versus stress artifact. Vitals:  
 08/19/20 1558 08/19/20 1559 08/19/20 1600 08/19/20 1610 BP:   118/90 120/89 Pulse: 97 96 97 97 Resp:      
Temp:    (!) 36.8 °F (2.7 °C) SpO2: 94% 93% 93% 94% Records Reviewed: Nursing Notes and Old Medical Records ED Course:  
 Patient's ABG reviewed patient is significantly elevated with PO2 They will be reduced to discuss with respiratory in the emergency department. -- 
3:33 PM 
I discussed information in front  with the son who does not live with them ETE he stated that patient lives with her  she takes care of him there is home health as well. No other information was provided have communicated her criticality at this point and the intubation and central line procedure that was done. 
 
 ------------------ For Hospitalized Patients: 
 
1. Hospitalization Decision Time: The decision to hospitalize the patient was made by Dr. Shilpa Quiroz. 2. Aspirin: Aspirin was given  
--- Patient's information is been discussed with intensivist Dr. Edu Arroyo as well he agreed with the hypothermia protocol Patient placed on the 
Discussed information nurse as well Patient information was also discussed with the cardiology team, Lilliana Mullins 
Agree with the hypothermia agree the dysrhythmia is possible because of patient's symptoms agree with the drips agree that currently aspirin is adequate and they will continue to monitor observe evaluate and further treat the patient and possibly do cardiac cath in the patient with a look for patient stability. 5:58 PM 
 
-------------- 
 
CRITICAL CARE NOTE : 
 
3:34 PM 
 
 
IMPENDING DETERIORATION -Airway, Respiratory, Cardiovascular, CNS, Metabolic, Renal and Hepatic ASSOCIATED RISK FACTORS - Hypotension, Shock, Dysrhythmia and Metabolic changes MANAGEMENT- Bedside Assessment INTERPRETATION -  Xrays, CT Scan, Blood Gases and Blood Pressure INTERVENTIONS - hemodynamic mngmt, Neurologic interventions  and Metobolic interventions CASE REVIEW - Hospitalist 
 
TREATMENT RESPONSE -Stable PERFORMED BY - Self NOTES   : 
 
 I have spent 40 minutes of critical care time involved in lab review, consultations with specialist, family decision- making, bedside attention and documentation. During this entire length of time I was immediately available to the patient . Nevaeh Lindsay MD 
---- 
3:34 PM - I suspect that this patient has an active infection. 3:34 PM - The patient met criteria for severe sepsis at this time. PROVIDER SEPSIS PHYSICAL EXAM EVAL Vital signs reviewed (see nursing documentation for further details): Vitals:  
 08/19/20 1558 08/19/20 1559 08/19/20 1600 08/19/20 1610 BP:   118/90 120/89 Pulse: 97 96 97 97 Resp:      
Temp:    (!) 36.8 °F (2.7 °C) SpO2: 94% 93% 93% 94% Cardiac exam:Regular Rate Pulmonary exam:Normal 
 
Peripheral pulses:Normal 
 
Capillary refill:Normal 
 
Skin exam:pink Exam performed byManohar Carbajal MD 
Patient was sudden onset of cardiac arrest 
Concern for COVID versus MI versus dysrhythmia causing patient's current symptoms Patient is high risk for coronavirus infection is placed in isolation and testing is sent 
---- Also called into room, 6:20 PM 
Patient reported in the emergency department patient had few minutes of CPR, patient circulation subsequently nursing with him when patient was starting to probe was V. fib Patient continues to be on amiodarone, also has been given a bolus, patient is hypothermic protocol started all the patient has instability and concern of worsening of symptoms I will go ahead and consult intensivist again for further management recommendation for this patient. Diagnostic Study Results Orders Placed This Encounter  SEPSIS BUNDLE INITIATED IN ED (REQUIRED) Standing Status:   Standing Number of Occurrences:   1  CULTURE, BLOOD Standing Status:   Standing Number of Occurrences:   1  CULTURE, BLOOD Standing Status:   Standing Number of Occurrences:   1  CULTURE, URINE  
 Standing Status:   Standing Number of Occurrences:   1 Order Specific Question:   Reason for Culture Answer:   Eval of sepsis without a clear source  CT HEAD WO CONT Standing Status:   Standing Number of Occurrences:   1 Order Specific Question:   Transport Answer:   Eugene Dewey [5] Order Specific Question:   Reason for Exam  
  Answer:   ams/sob/post cardiac arrest  
 CTA CHEST W OR W WO CONT Standing Status:   Standing Number of Occurrences:   1 Order Specific Question:   Transport Answer:   Eugene Dewey [5] Order Specific Question:   Reason for Exam  
  Answer:   ams/sob/post cardiac arrest  
  Order Specific Question:   Does patient have history of Renal Disease? Answer:   No  
 CT ABD PELV W CONT Standing Status:   Standing Number of Occurrences:   1 Order Specific Question:   Transport Answer:   Eugene Dewey [5] Order Specific Question:   Reason for Exam  
  Answer:   ams/sob/post cardiac arrest/24-hour old pelvic fracture concern retroperitoneal hematoma Order Specific Question: This order utilizes IV contrast.  What additional contrast is needed? Answer:   None Order Specific Question:   Does patient have history of Renal Disease? Answer:   No  
 URINALYSIS W/ RFLX MICROSCOPIC Standing Status:   Standing Number of Occurrences:   1  METABOLIC PANEL, COMPREHENSIVE Standing Status:   Standing Number of Occurrences:   1  CBC WITH AUTOMATED DIFF Standing Status:   Standing Number of Occurrences:   1  
 BLOOD GAS, ARTERIAL Standing Status:   Standing Number of Occurrences:   1  CARDIAC PANEL,(CK, CKMB & TROPONIN) Standing Status:   Standing Number of Occurrences:   1  
 PT Standing Status:   Standing Number of Occurrences:   1  URINE MICROSCOPIC ONLY Standing Status:   Standing   Number of Occurrences:   1  
 POC LACTIC ACID  
 Standing Status:   Standing Number of Occurrences:   1  
 POC LACTIC ACID If initial lactic acid is greater than 2.0. Please repeat lactic acid 2 hours after the initial one. Standing Status:   Standing Number of Occurrences:   1  VITAL SIGNS - PER UNIT ROUTINE  
  PER UNIT ROUTINE Standing Status:   Standing Number of Occurrences:   1  
 STRICT I & O Standing Status:   Standing Number of Occurrences:   1  
 NEUROLOGIC STATUS ASSESSMENT - PER UNIT ROUTINE  
  PER UNIT ROUTINE Standing Status:   Standing Number of Occurrences:   1  
 IP CONSULT TO INTENSIVIST Standing Status:   Standing Number of Occurrences:   1 Order Specific Question:   Reason for Consult: Answer:   Intubated patient post cardiac arrest  
  Order Specific Question:   Did you call or speak to the consulting provider? Answer:   No  
  Order Specific Question:   Consult To Answer:   Manage  DROPLET PLUS Standing Status:   Standing Number of Occurrences:   1  
 POC G3  
  Standing Status:   Standing Number of Occurrences:   1  
 POC LACTIC ACID Standing Status:   Standing Number of Occurrences:   1  EKG, 12 LEAD, INITIAL Standing Status:   Standing Number of Occurrences:   1 Order Specific Question:   Reason for Exam: Answer:   meets SIRS criteria  EKG, 12 LEAD, SUBSEQUENT Standing Status:   Standing Number of Occurrences:   1 Order Specific Question:   Reason for Exam: Answer:   nstemi/cardiac arrest  
 SALINE LOCK IV ONE TIME STAT Standing Status:   Standing Number of Occurrences:   1  
 GASTRIC TUBE TO SUCTION Standing Status:   Standing Number of Occurrences:   1  
 sodium chloride (NS) flush 5-10 mL  sodium chloride 0.9 % bolus infusion 2,340 mL  amiodarone (NEXTERONE) 360 mg in dextrose 200 mL (1.8 mg/mL) infusion Order Specific Question:   Hold and Contact Physician for: Answer:   SBP less than 90 mmHg Order Specific Question:   Hold and Contact Physician for: Answer:   HR <60bpm  
 DISCONTD: iopamidoL (ISOVUE 300) 61 % contrast injection 100 mL  iopamidoL (ISOVUE-370) 76 % injection 100 mL  levoFLOXacin (LEVAQUIN) 750 mg in D5W IVPB Order Specific Question:   Antibiotic Indications Answer:   Urinary Tract Infection  amiodarone (CORDARONE) injection 300 mg  EPINEPHrine (ADRENALIN) 1 mg/mL injection 1 mg  potassium chloride 10 mEq in 100 ml IVPB  magnesium sulfate 2 g/50 ml IVPB (premix or compounded)  IP CONSULT TO HOSPITALIST Standing Status:   Standing Number of Occurrences:   1 Order Specific Question:   Reason for Consult: Answer:   TO ADMIT Comments:   Post cardiac arrest  
  Order Specific Question:   Did you call or speak to the consulting provider? Answer:   No  
  Order Specific Question:   Consult To Answer:   Manage admit to ICU Order Specific Question:   Schedule When? Answer:   TODAY Labs - Recent Results (from the past 12 hour(s)) POC G3 Collection Time: 08/19/20  2:19 PM  
Result Value Ref Range Device: VENT    
 FIO2 (POC) 100 % pH (POC) 7.35 7.35 - 7.45    
 pCO2 (POC) 39.5 35.0 - 45.0 MMHG  
 pO2 (POC) 553 (H) 80 - 100 MMHG  
 HCO3 (POC) 21.8 (L) 22 - 26 MMOL/L  
 sO2 (POC) 100 (H) 92 - 97 % Base deficit (POC) 4 mmol/L Mode ASSIST CONTROL Tidal volume 450 ml Set Rate 14 bpm  
 PEEP/CPAP (POC) 7 cmH2O  
 PIP (POC) 17 Allens test (POC) YES Inspiratory Time 0.90 sec Nitric-ppm (POC) 0 ppm  
 Total resp. rate 15 Site LEFT RADIAL Specimen type (POC) ARTERIAL Performed by Tj Pedraza Volume control plus YES    
URINALYSIS W/ RFLX MICROSCOPIC Collection Time: 08/19/20  2:20 PM  
Result Value Ref Range Color YELLOW Appearance CLEAR Specific gravity 1.020 1.005 - 1.030    
 pH (UA) 6.5 5.0 - 8.0 Protein 100 (A) NEG mg/dL Glucose Negative NEG mg/dL Ketone Negative NEG mg/dL Bilirubin Negative NEG Blood LARGE (A) NEG Urobilinogen 0.2 0.2 - 1.0 EU/dL Nitrites Negative NEG Leukocyte Esterase Negative NEG METABOLIC PANEL, COMPREHENSIVE Collection Time: 08/19/20  2:20 PM  
Result Value Ref Range Sodium 137 136 - 145 mmol/L Potassium 3.0 (L) 3.5 - 5.5 mmol/L Chloride 105 100 - 111 mmol/L  
 CO2 22 21 - 32 mmol/L Anion gap 10 3.0 - 18 mmol/L Glucose 309 (H) 74 - 99 mg/dL BUN 5 (L) 7.0 - 18 MG/DL Creatinine 0.78 0.6 - 1.3 MG/DL  
 BUN/Creatinine ratio 6 (L) 12 - 20 GFR est AA >60 >60 ml/min/1.73m2 GFR est non-AA >60 >60 ml/min/1.73m2 Calcium 7.2 (L) 8.5 - 10.1 MG/DL Bilirubin, total 1.0 0.2 - 1.0 MG/DL  
 ALT (SGPT) 30 13 - 56 U/L  
 AST (SGOT) 63 (H) 10 - 38 U/L Alk. phosphatase 461 (H) 45 - 117 U/L Protein, total 5.3 (L) 6.4 - 8.2 g/dL Albumin 1.5 (L) 3.4 - 5.0 g/dL Globulin 3.8 2.0 - 4.0 g/dL A-G Ratio 0.4 (L) 0.8 - 1.7    
CBC WITH AUTOMATED DIFF Collection Time: 08/19/20  2:20 PM  
Result Value Ref Range WBC 14.1 (H) 4.6 - 13.2 K/uL  
 RBC 3.16 (L) 4.20 - 5.30 M/uL  
 HGB 10.0 (L) 12.0 - 16.0 g/dL HCT 30.7 (L) 35.0 - 45.0 % MCV 97.2 (H) 74.0 - 97.0 FL  
 MCH 31.6 24.0 - 34.0 PG  
 MCHC 32.6 31.0 - 37.0 g/dL  
 RDW 14.3 11.6 - 14.5 % PLATELET 486 085 - 391 K/uL MPV 9.2 9.2 - 11.8 FL  
 NEUTROPHILS 85 (H) 40 - 73 % LYMPHOCYTES 10 (L) 21 - 52 % MONOCYTES 5 3 - 10 % EOSINOPHILS 0 0 - 5 % BASOPHILS 0 0 - 2 %  
 ABS. NEUTROPHILS 12.0 (H) 1.8 - 8.0 K/UL  
 ABS. LYMPHOCYTES 1.4 0.9 - 3.6 K/UL  
 ABS. MONOCYTES 0.7 0.05 - 1.2 K/UL  
 ABS. EOSINOPHILS 0.0 0.0 - 0.4 K/UL  
 ABS. BASOPHILS 0.0 0.0 - 0.1 K/UL  
 DF AUTOMATED CARDIAC PANEL,(CK, CKMB & TROPONIN) Collection Time: 08/19/20  2:20 PM  
Result Value Ref Range CK - MB 1.0 <3.6 ng/ml CK-MB Index 1.0 0.0 - 4.0 %  CK 99 26 - 192 U/L  
 Troponin-I, QT 0.40 (H) 0.0 - 0.045 NG/ML  
URINE MICROSCOPIC ONLY Collection Time: 08/19/20  2:20 PM  
Result Value Ref Range WBC 0 to 3 0 - 4 /hpf  
 RBC 0 to 3 0 - 5 /hpf Epithelial cells 1+ 0 - 5 /lpf Bacteria Negative NEG /hpf POC LACTIC ACID Collection Time: 08/19/20  2:25 PM  
Result Value Ref Range Lactic Acid (POC) 6.51 (HH) 0.40 - 2.00 mmol/L PROTHROMBIN TIME + INR Collection Time: 08/19/20  3:00 PM  
Result Value Ref Range Prothrombin time 22.2 (H) 11.5 - 15.2 sec INR 2.0 (H) 0.8 - 1.2 EKG, 12 LEAD, INITIAL Collection Time: 08/19/20  4:08 PM  
Result Value Ref Range Ventricular Rate 98 BPM  
 Atrial Rate 98 BPM  
 P-R Interval 146 ms  
 QRS Duration 78 ms Q-T Interval 454 ms QTC Calculation (Bezet) 579 ms Calculated P Axis 28 degrees Calculated R Axis 3 degrees Calculated T Axis 41 degrees Diagnosis Critical Test Result: Long QTc Normal sinus rhythm Prolonged QT Abnormal ECG When compared with ECG of 15-APR-2020 13:00, 
T wave amplitude has increased in Anterolateral leads Radiologic Studies -  
CTA CHEST W OR W WO CONT Final Result IMPRESSION:  
  
No evidence of pulmonary embolism. Fractures of the bilateral anterior second through seventh ribs, likely sequela  
of CPR. No pneumothorax. Cardiomegaly. Enlarged main pulmonary artery, may be seen in setting of  
pulmonary hypertension. CT ABD PELV W CONT Final Result IMPRESSION:  
  
1. Prior gastric bypass with short segment (3 cm) enteric intussusception from  
the jejunal jejunal anastomosis, which is commonly transient. No findings of  
upstream obstruction. Otherwise, No acute intra-abdominal or intrapelvic  
obstructive or inflammatory process. 2. Bilateral pelvic fractures, to include bilateral vertically oriented sacral  
alar fractures, which may be associated with sacral insufficiency fractures  
related to osteoporosis. 3. Hepatomegaly and steatosis. 4. Jackson catheter in place with intraluminal gas almost certainly procedural  
etiology. 5. Right femoral ORIF hardware with nonunited proximal fracture. Right hip  
superficial soft tissue 3.6 cm ovoid collection, probably unresolved  
seroma/hematoma from ORIF placement. CT HEAD WO CONT Final Result IMPRESSION:  
  
  
1. No acute intracranial abnormality. CT Results  (Last 48 hours) 08/19/20 1548  CTA [address] Final result Impression:  IMPRESSION:  
   
No evidence of pulmonary embolism. Fractures of the bilateral anterior second through seventh ribs, likely sequela  
of CPR. No pneumothorax. Cardiomegaly. Enlarged main pulmonary artery, may be seen in setting of  
pulmonary hypertension. Narrative:  EXAM: CTA Chest  
   
INDICATION: Shortness of breath. Possible PE. Cardiac arrest.  
   
COMPARISON: 4/15/2020 TECHNIQUE: Axial CT imaging from the thoracic inlet through the diaphragm with  
intravenous contrast utilizing CTA study for pulmonary artery evaluation. Coronal and sagittal MIP reformations were generated at a separate workstation. One or more dose reduction techniques were used on this CT: automated exposure  
control, adjustment of the mAs and/or kVp according to patient size, and  
iterative reconstruction techniques. The specific techniques used on this CT  
exam have been documented in the patient's electronic medical record. Digital  
Imaging and Communications in Medicine (DICOM) format image data are available  
to nonaffiliated external healthcare facilities or entities on a secure, media  
free, reciprocally searchable basis with patient authorization for at least a  
12-month period after this study. _______________ FINDINGS:  
   
EXAM QUALITY: Overall exam quality is adequate. Pulmonary arterial enhancement  
is adequate. The breath hold is satisfactory. PULMONARY ARTERIES: No convincing evidence of pulmonary embolism. MEDIASTINUM: Central venous catheter tip at the caval atrial junction. Main  
pulmonary artery measures 4.2 cm in transverse diameter. Cardiomegaly. No  
evidence of right heart strain. Aorta is unremarkable. No pericardial effusion. LYMPH NODES: No enlarged mediastinal or hilar nodes by size criteria. AIRWAY: Endotracheal tube in the midthoracic trachea. LUNGS: Bibasilar dependent atelectasis. PLEURA: No pleural effusion or pneumothorax. UPPER ABDOMEN: Visualized upper abdomen is unremarkable. Dom Octave OTHER: No acute or aggressive osseous abnormalities identified. Fractures of  
the bilateral anterior second through seventh ribs. Old appearing sternal  
insufficiency fracture. _______________  
   
  
 08/19/20 1547  CT ABD PELV W CONT Final result Impression:  IMPRESSION:  
   
1. Prior gastric bypass with short segment (3 cm) enteric intussusception from  
the jejunal jejunal anastomosis, which is commonly transient. No findings of  
upstream obstruction. Otherwise, No acute intra-abdominal or intrapelvic  
obstructive or inflammatory process. 2. Bilateral pelvic fractures, to include bilateral vertically oriented sacral  
alar fractures, which may be associated with sacral insufficiency fractures  
related to osteoporosis. 3. Hepatomegaly and steatosis. 4. Jackson catheter in place with intraluminal gas almost certainly procedural  
etiology. 5. Right femoral ORIF hardware with nonunited proximal fracture. Right hip  
superficial soft tissue 3.6 cm ovoid collection, probably unresolved  
seroma/hematoma from ORIF placement. Narrative:  EXAM: CT of the abdomen and pelvis INDICATION: Altered mental status, shortness of breath COMPARISON: Left hip x-ray from 8/18/2020 TECHNIQUE: Axial CT imaging of the abdomen and pelvis was performed with intravenous contrast. Multiplanar reformats were generated. One or more dose  
reduction techniques were used on this CT: automated exposure control,  
adjustment of the mAs and/or kVp according to patient size, and iterative  
reconstruction techniques. The specific techniques used on this CT exam have  
been documented in the patient's electronic medical record. Digital Imaging and  
Communications in Medicine (DICOM) format image data are available to  
nonaffiliated external healthcare facilities or entities on a secure, media  
free, reciprocally searchable basis with patient authorization for at least a  
12-month period after this study. _______________ FINDINGS:  
   
LOWER CHEST: Please see separate dictated CT of the chest from same day LIVER, BILIARY: Hepatomegaly with mild diffuse parenchymal low attenuation, in  
keeping with steatosis. No acute or suspicious hepatic abnormality. No biliary  
dilation. Prior cholecystectomy PANCREAS: Atrophic with scattered coarse calcifications, suggestive of sequela  
of chronic pancreatitis. Otherwise, no acute or suspicious abnormality SPLEEN: Normal.  
   
ADRENALS: Normal.  
   
KIDNEYS, URETERS, BLADDER: Isoenhancing bilateral kidneys without  
hydronephrosis, benefit fluid or induration. Right interpolar subcentimeter  
hypodense lesion, probably benign cyst. Jackson catheter in place with dependent  
procedural gas. GASTROINTESTINAL TRACT: Status post gastric bypass. Short segment  
intussusception of the exiting small bowel loop from the distal jejunal  
anastomosis measuring 3 cm, without upstream obstruction, almost certainly  
transient. No dilatation. No corrina bowel wall thickening. LYMPH NODES: No enlarged lymph nodes. PELVIC ORGANS: Prior hysterectomy VASCULATURE: Unremarkable.   
   
OSSEOUS: Chronic mild superior T12 endplate compression deformity, stable to  
4/15/2020 CT of the chest.   
 > Nonacute mildly displaced left superior and inferior pubic rami fractures,  
paramedian right pubic body and inferior right pubic ramus fracture, as  
identified on prior days left hip x-ray. Bilateral vertically oriented sacral  
alar fractures, with cortical buckling. Prior right femoral ORIF hardware with  
nonunited transfer trochanteric fracture. OTHER: Mild diffuse soft tissue anasarca with right lateral hip superficial soft  
tissue 3.6 cm ovoid partial fat containing structure, probably unresolved  
hematoma/seroma.  
   
_______________  
   
  
 08/19/20 1535  CT HEAD WO CONT Final result Impression:  IMPRESSION:  
   
   
1. No acute intracranial abnormality. Narrative:  EXAM: CT head INDICATION: Syncopal episode with cardiac arrest  
   
COMPARISON: CT head 11/27/2019 TECHNIQUE: Axial CT imaging of the head was performed without intravenous  
contrast. Standard multiplanar coronal and sagittal reformatted images were  
obtained and are included in interpretation. One or more dose reduction techniques were used on this CT: automated exposure  
control, adjustment of the mAs and/or kVp according to patient size, and  
iterative reconstruction techniques. The specific techniques used on this CT  
exam have been documented in the patient's electronic medical record. Digital  
Imaging and Communications in Medicine (DICOM) format image data are available  
to nonaffiliated external healthcare facilities or entities on a secure, media  
free, reciprocally searchable basis with patient authorization for at least a  
12-month period after this study. _______________ FINDINGS:  
   
BRAIN AND POSTERIOR FOSSA: The sulci, folia, ventricles and basal cisterns are  
within normal limits for the patient?s age. There is no intracranial hemorrhage,  
mass effect, or midline shift. Gray-white matter differentiation appears within  
normal limits. EXTRA-AXIAL SPACES AND MENINGES: No acute extra-axial fluid collection. CALVARIUM: Intact. SINUSES: Clear. OTHER: None.  
   
_______________ CXR Results  (Last 48 hours) None Discharge Clinical Impression: 1. Cardiac arrest with successful resuscitation ([facility] 2. Syncope and collapse 3. Anemia, unspecified type 4. Hypokalemia 5. Elevated troponin 6. QT prolongation Disposition: 
Admit It should be noted that I will be the provider of record for this patient Brian Reilly MD 
 
 
Follow-up Information None Current Discharge Medication List

## 2020-08-19 NOTE — ED NOTES
In CT, while moving patient over to stretcher, patient cardiac rhythm changed to what appeared to be VFIB. This RN felt for a pulse. No pulse palpable. CPR started. Suzan CT Tech, called clay shetty to CT.

## 2020-08-20 PROBLEM — R57.9 SHOCK (HCC): Status: ACTIVE | Noted: 2020-01-01

## 2020-08-20 NOTE — PROGRESS NOTES
Called to bedside to transport patient to 2604. Patient transported with 2 RNs, on continuous monitoring and transport ventilator. Patient's vital signs remained stable throughout transport. 0405: Unable to obtain ABG this AM. Patient has frequent jerking movements and after 2 attempts this RT was unable to obtain ABG. RN at bedside drawing morning labs off central line. Sample received for VBG.

## 2020-08-20 NOTE — CONSULTS
[facility] Pulmonary, Critical Care, and Sleep Medicine Name: Nicolle Castellanos MRN: [id number] : 1960 Hospital: [address] [place] [address] Date: 2020 Critical Care Initial Patient Consult Requesting MD:                                                  Reason for CC Consult: 
IMPRESSION:  
Principal Problem: 
  Cardiac arrest ([facility] (2020) 
  Active Problems: 
  Acute respiratory failure ([facility] (2020) 
  Sepsis ([facility] (2017) Overview: By Respiratory rate and Leukocytosis 
  
  Shock ([facility] (2020) 
  
  Hyperlipidemia () 
  
  Bipolar disorder (HCC) () RECOMMENDATIONS:  
· Neuro-  patient sedated with propofol after multiple cardiacarrests and losing consciousness enroute  Myoclonic activity obtunded on no sedation eyes open no one home. DNR after speaking with family Death likely spoke with family this morning  plans to visit · Cardiac-  Had primary cardiac event likely V fib arrest multiple times stimulated either by myocardial ischemia or arrythmia which will likely be fatal.  Echo ordered cardiology consult and no input as of yet. Primary event is cardiac no problem oxygenating or ventiating intubated for loss of mental status. Pulmonary- intubated for airway protection no prior illness easy to oxygenate and ventilate Myoclonic activity continues despite keppra, demerol and multiple meds Not seizure activity hypoxemia to muscles · Renal-  LENNY worsening little urine output as expected Replace electroytes  Follow urine output ·  
· Heme-  H/H stable chronically anemic  no active bleeding · ID-  ni infection currently and no abx CXR relatively clear PaO2 536 No abx at this point · · . · DVT prophylaxis  systemically anticoagulated ·  
· GI- start gi prophylaxis no feeds at this point Prognosis horrible myoclonic activity with greater than 90% mortality  Family aware hopefully family visit prior to death Subjective/History: Genaro Zepeda is a 62 y.o. female who had altered mental status at home. EMS was summoned and she had cardiac arrest in the field. ACLS protocols were begun and she obtained ROSC. She had multiple other episodes of cardiac arrest in the field and ACLS was continued. She was intubated and arrived to the ER. It is unknown if she had chest pain or SOB prior to her altered mental status. There was no fever. In the ER she again had Cardiac Arrest while she was in the CT Scanner. She will be admitted to the ICU for ongoing management. PMH Alcoholic hepatitis Obesity Bipolar disorder Depression HERMELINAD 
ETOH seizures Alcohol abuse Fatty liver Pancreatitis Priorzkleb pneumonia FMHx cancer PSHx:  Prior alcohol abuse nonsmoker HMEDS fosamax, folic acid, zyprexa, synthroid, flexeril, prednisone, keppra, protonix, vistaril [facility] [facility] ALLERGY codeine, morphine, lamictal, doxycycline, vancomycin, tetracycline. , PCN Review of Systems: 
{unable ro obtain on mechanical ventilation Objective:  
Vital Signs:   
Visit Vitals /86 Pulse 87 Temp (!) 94.4 °F (34.7 °C) Resp 26 Wt 73.1 kg (161 lb 2.5 oz) SpO2 95% BMI 26.01 kg/m² O2 Device: Endotracheal tube Temp (24hrs), Av.5 °F (16.4 °C), Min:35 °F (1.7 °C), Max:98.9 °F (37.2 °C) Intake/Output:  
Last shift:      lasix 80 bid Last 3 shifts: 1901 -  0700 In: 3115.9 [I.V.:3115.9] Out: 375 [Urine:375] Intake/Output Summary (Last 24 hours) at 2020 5032 Last data filed at 2020 0800 Gross per 24 hour Intake 3254.47 ml Output 875 ml Net 2379.47 ml Ventilator Settings: 
Mode Rate Tidal Volume Pressure FiO2 PEEP PRVC                        22                 400                                       100                            8 
 
 
Peak airway pressure: 33 Minute ventilation:   
 
Physical Exam: General:  Intubated sedated and on mechanical ventilation does not open eyes to commands. Head:  Normocephalic, without obvious abnormality, atraumatic. Eyes:  Conjunctivae/corneas clear. PERRL, Nose: Nares normal. Septum midline. Mucosa normal. No drainage or sinus tenderness. NG tube Throat: Lips, mucosa, and tongue normal. Teeth and gums normal. ETT on place Neck: Supple, symmetrical, trachea midline, no adenopathy, thyroid: no enlargment/tenderness/nodules, no carotid bruit and no JVD. Back:   Symmetric, no curvature. ROM normal.  
Lungs:   Clear to auscultation bilaterally. Chest wall:  No tenderness or deformity. Heart:  Regular regular tachycardic, no murmur, click, rub or gallop. Abdomen:   Soft, non-tender. Obese decreased bowel sounds normal. No masses,  morbid obese Extremities: Extremities normal, atraumatic, no cyanosis or  2 plus edema. Pulses: 2+ and symmetric all extremities. Skin: Skin color, texture, turgor normal. No rashes or lesions Neurologic: Responds only to painful stimuli Data:  
Labs reviewed  ABG looks good bit 100% Telemetry:nsr Imaging: 
I have personally reviewed the patients radiographs and have reviewed the reports: 
Car relatively clear Best practice : 
Glycemic control [facility]. Ventilated patients- aim to keep peak plateau pressure 79-80QV H2O. Sress ulcer prophylaxis DVT prophylaxis Total critical care time exclusive of procedures: 55 minutes Mary Escobar.  Sarah Pressley MD

## 2020-08-20 NOTE — PROGRESS NOTES
Pharmacy Monitoring: Levothyroxine 7-Day Hold Per P&T policy, orders for IV levothyroxine for continuation of home therapy will be delayed 7 hospital days. If patient can take oral medication before hospital day 8, pharmacy will automatically convert the patient to oral therapy. If IV begins on day 8, pharmacy will monitor to convert the patient to oral as an automatic interchange as soon as the patient is taking oral medication. Please note that IV formulation is approximately 75% of the oral dose. Patient is on 50 mcg po daily at home. IV dose will be 37.5 mcg daily. Please call pharmacy for questions.  
 
Thanks, 
Corina Hughes, PHARMD

## 2020-08-20 NOTE — H&P
History and Physical 
 
Patient: Carlos Parikh               Sex: female          DOA: 8/19/2020 YOB: 1960      Age:  62 y.o.        LOS:  LOS: 1 day HPI:  
 
Carlos Parikh is a 62 y.o. female who had altered mental status at home. EMS was summoned and she had cardiac arrest in the field. ACLS protocols were begun and she obtained ROSC. She had multiple other episodes of cardiac arrest in the field and ACLS was continued. She was intubated and arrived to the ER. It is unknown if she had chest pain or SOB prior to her altered mental status. There was no fever. In the ER she again had Cardiac Arrest while she was in the CT Scanner. She will be admitted to the ICU for ongoing management. Past Medical History:  
Diagnosis Date  Alcoholic hepatitis  Alcoholic pancreatitis  Anemia  Bacteremia due to Klebsiella pneumoniae 06/30/2017  Bipolar disorder ([facility])  Chronic pancreatitis ([facility] with Chronic Abdominal Pain  Compression fracture of lumbar vertebra (HCC) L4, L5   
 [facility]  [facility]  Elevated LFTs  ETOH abuse  Fatty liver  GERD (gastroesophageal reflux disease)  Hyperlipidemia  Hypertension  Hypothyroidism  Lower GI bleeding  Morbid [facility]  Obstructive sleep apnea No CPAP after Gastric Bypass in 2012  Seizures ([facility] ETOH Withdrawal Seizures  Substance induced mood disorder ([facility]  Vitamin D deficiency Social History: 
 Tobacco use:  Unknown Alcohol use:   Unknown Occupation:   Unknown Family History: 
  Unknown Review of Systems Constitutional:  No fever or weight loss HEENT:  No headache or visual changes Cardiovascular:  Cardiac arrest as above Respiratory:  Acute respiratory failure as above GI:  No nausea or vomitting. No diarrhea :  No hematuria or dysuria Skin:  No rashes or moles Neuro:  No seizures or syncope Hematological:  No bruising or bleeding Endocrine:  No diabetes or thyroid disease Physical Exam:  
  
Visit Vitals /86 Pulse 86 Temp (!) 91.1 °F (32.8 °C) Resp 27 Wt 73.1 kg (161 lb 2.5 oz) SpO2 100% BMI 26.01 kg/m² Physical Exam: 
 
Gen:  Quiet on the ventilator, not interacting HEENT:  Normal cephalic atraumatic, patient is orally intubated Neck:  Supple, No JVD Lungs:  Clear bilaterally, no wheeze, no rales, normal effort Heart:  Regular Rate and Rhythm, normal S1 and S2, no edema Abdomen:  Soft, non tender, normal bowel sounds, no guarding. Extremities:  Well perfused, no cyanosis or edema Neurological:  Sedated, she has jerking-type movements. Normal tone Skin:  No rashes or moles Mental Status:  Unable to assess Laboratory Studies: 
 
BMP:  
Lab Results Component Value Date/Time  08/20/2020 04:00 AM  
 K 3.1 (L) 08/20/2020 04:00 AM  
  08/20/2020 04:00 AM  
 CO2 23 08/20/2020 04:00 AM  
 AGAP 12 08/20/2020 04:00 AM  
  (H) 08/20/2020 04:00 AM  
 BUN 6 (L) 08/20/2020 04:00 AM  
 CREA 0.84 08/20/2020 04:00 AM  
 GFRAA >60 08/20/2020 04:00 AM  
 GFRNA >60 08/20/2020 04:00 AM  
 
CBC:  
Lab Results Component Value Date/Time WBC 18.1 (H) 08/20/2020 04:00 AM  
 HGB 10.2 (L) 08/20/2020 04:00 AM  
 HCT 31.1 (L) 08/20/2020 04:00 AM  
  08/20/2020 04:00 AM  
 
 
Assessment/Plan Principal Problem: 
  Cardiac arrest ([facility] [address] (8/19/2020) Active Problems: 
  Acute respiratory failure ([address] (8/19/2020) Sepsis ([facility] [address] (1/9/2017) Overview: By Respiratory rate and Leukocytosis Shock ([address] (8/20/2020) Hyperlipidemia () Bipolar disorder ([address] () PLAN: 
 
Supportive care on the ventilator BP support with vasopressor Continue with antibiotics Follow cultures Potentially seizing, will follow Follow WBC count Renal function Critical care consulted

## 2020-08-20 NOTE — PROGRESS NOTES
conducted a Follow up consultation and Spiritual Assessment for Junior Kelly, who is a 62 y.o.,female. The  provided the following Interventions: 
Continued the relationship of care and support with family on site. Listened empathically. Offered prayer and assurance of continued prayer on patients behalf. Chart reviewed. The following outcomes were achieved: 
Patient's expressed gratitude for 's phone check in. Pt adult son, Vivian Peterson, expressed his desire for his mom to be at peace and is going to communicate this with pt's . Currently, pt  wishes for all efforts to keep life. Assessment: 
There are no further spiritual or Mosque issues which require Spiritual Care Services interventions at this time. Plan: 
Chaplains will continue to follow and will provide pastoral care on an as needed/requested basis.  recommends bedside caregivers page  on duty if patient shows signs of acute spiritual or emotional distress. Chaplain Fay Meigs Clinical Certified  Spiritual Care  
[phone]

## 2020-08-20 NOTE — PROGRESS NOTES
Progress Note Patient: Hector Gomez               Sex: female          DOA: 8/19/2020 YOB: 1960      Age:  62 y.o.        LOS:  LOS: 1 day CHIEF COMPLAINT:  Cardiac arrest 
 
Subjective:  
 
Patient is quiet on the ventilator Not interactive Having jerking movements Objective:  
  
Visit Vitals /86 Pulse 90 Temp (!) 93.6 °F (34.2 °C) Resp 21 Wt 73.1 kg (161 lb 2.5 oz) SpO2 100% BMI 26.01 kg/m² Physical Exam: 
Gen:  Patient is not communicating. Tolerating the ventilator well. Lungs:  Good aeration bilaterally with mechanical ventilator. No rales. No rhonchi Heart:  Regular Rate and Rhythm. No murmur or gallop. No JVD. No edema. Abdomen:  Soft. No distention. No guarding or rigidity Extremities: Good perfusion bilaterally. No edema Neurological: Normal tone. No seizure activity. Not interacting Lab/Data Reviewed: 
BMP:  
Lab Results Component Value Date/Time  08/20/2020 04:00 AM  
 K 3.1 (L) 08/20/2020 04:00 AM  
  08/20/2020 04:00 AM  
 CO2 23 08/20/2020 04:00 AM  
 AGAP 12 08/20/2020 04:00 AM  
  (H) 08/20/2020 04:00 AM  
 BUN 6 (L) 08/20/2020 04:00 AM  
 CREA 0.84 08/20/2020 04:00 AM  
 GFRAA >60 08/20/2020 04:00 AM  
 GFRNA >60 08/20/2020 04:00 AM  
 
CBC:  
Lab Results Component Value Date/Time WBC 18.1 (H) 08/20/2020 04:00 AM  
 HGB 10.2 (L) 08/20/2020 04:00 AM  
 HCT 31.1 (L) 08/20/2020 04:00 AM  
  08/20/2020 04:00 AM  
 
 
 
 
Assessment/Plan Principal Problem: 
  Cardiac arrest ([address] (8/19/2020) Active Problems: 
  Acute respiratory failure ([address] (8/19/2020) Sepsis ([address] (1/9/2017) Overview: By Respiratory rate and Leukocytosis Shock ([address] (8/20/2020) Hyperlipidemia () Bipolar disorder ([address] () Plan: 
Monitor cardiac rhythm BP control Continue antibiotics Follow cultures Critical Care consulted Follow renal function DVT prophylaxis

## 2020-08-20 NOTE — CONSULTS
[facility] - Consult Note Consultation request by Dr. Heber Virgen for advice/opinion related to evaluating cardiac arrest 
 
Date of  Admission: 8/19/2020  2:02 PM  
Primary Care Physician:  Adis Gallo MD 
 
 
 
I saw, evaluated, interviewed and examined the patient personally. I agree with the findings and plan of care as documented below with PA-C note Patient with cardiopulmonary arrest.  EMT was called because of syncopal episode but was found to have a agonal breathing on arrival.  Initial rhythm was PEA arrest and subsequently had wide-complex tachycardia and V. fib arrest requiring cardioversion and amiodarone infusion EKG 15 minutes after IV amiodarone infusion showed prolonged QT interval.  Patient may have a baseline prolonged QT interval because of possibly being on antipsychotic medications. Cardiac arrhythmia could be the reason for her initial cardiopulmonary arrest however unclear. Do not suspect ischemia is initial insult as cardiac biomarkers do not suggest cardiac injury Currently patient is intubated. Physical exam suggests likely anoxic may be irreversible brain injury Echo has been ordered Further cardiac work-up depending on neurological improvement. Tyler Knight MD  
 
 
 
 Assessment:  
 
-S/p multiple cardiac arrests 8/19/2020. Per EMS report, they were initially called after pt \"passed out\" but stopped breathing on engine arrival.  Initial rhythm PEA.   Subsequently has had VT/VF arrests, currently on Amiodarone infusion. 
-Prolonged QTc 
-Indeterminate troponin, 0.40 - 0.58 - 0.36, demand in setting of multiple cardiac arrests 
-Echo 04/2017: Mildly dilated LV with EF 26%, normal diastolic function with mild left atrial dilatation, mildly dilated R heart with normal ventricular systolic function, mild to moderate tricuspid regurgitation with a mildly elevated PAP estimated at 43 mmHg 
-Hx pulmonary hypertension 
-Hx HERMELINDA 
-Hx HTN 
 -Hx hypothyroidism 
-Hx Christian-en-Y gastric bypass -Hypoalbuminemia, albumin 1.5 on 8/19/2020 
-Hx EtOH abuse 
-Hx chronic alcoholic pancreatitis, lipase 13 in 04/2020. CT abd/pelvis 8/19/2020 revealed atrophic pancreas with scattered coarse calcifications, suggestive of chronic pancreatitis. -Hx bipolar disorder Plan:  
 
-Will check Echocardiogram. 
-Pending repeat Echocardiogram from this AM, re-eval QTc. 
-Recommend electrolyte replacement to keep K > 4 and Mg > 1.8. -Will check thyroid studies, pt with Hx hypothyroidism. 
-Supportive care per ICU team, appreciate assistance. History of Present Illness: This is a 62 y.o. female admitted for Cardiac arrest ([facility] [I46.9] Acute respiratory failure ([facility] [J96.00]. Patient complains of:  quincy Gabriel is a 62 y.o. female with PMHx as described above, who family initially called EMS due to syncope while pt was talking to family. However, just before medics arrived, pt was noted to stop breathing. EMS initiated ACLS protocol. Initial rhythm was PEA. IO was established, and pt was intubated by medics  Rhythm eventually changed to VT and pt was shocked x 2 in the field. She was given 3 rounds Epi by medics. Pt with approx. 20-25 minutes downtime in field per medic documentation. Pt transported to [facility] for further management. Pt subsequently went into VF while transferring to [facility] in [place] (approx. 10 minute downtime according to RN documentation) and had another brief episode of VF x 2 minutes in ER during which CPR was initiated. Pt currently intubated and sedated, which limits further history at this time. Cardiac risk factors: hypertension Review of Symptoms:   
 
Review of systems not obtained due to patient factors. Past Medical History:  
 
Past Medical History:  
Diagnosis Date  Alcoholic hepatitis  Alcoholic pancreatitis  Anemia  Bacteremia due to Klebsiella pneumoniae 06/30/2017  Bipolar disorder ([facility] Dr. Lexi Ramirez [facility])  Chronic pancreatitis ([facility] with Chronic Abdominal Pain  Compression fracture of lumbar vertebra (HCC) L4, L5   
 Depression Dr. Lexi Ramirez [facility])  Elevated LFTs  ETOH abuse  Fatty liver  GERD (gastroesophageal reflux disease)  Hyperlipidemia  Hypertension  Hypothyroidism  Lower GI bleeding  Morbid obesity ([facility]  Obstructive sleep apnea No CPAP after Gastric Bypass in 2012  Seizures ([facility] ETOH Withdrawal Seizures  Substance induced mood disorder ([facility]  Vitamin D deficiency Social History:  
 
Social History Socioeconomic History  Marital status:  Spouse name: Not on file  Number of children: Not on file  Years of education: Not on file  Highest education level: Not on file Tobacco Use  Smoking status: Never Smoker  Smokeless tobacco: Never Used Substance and Sexual Activity  Alcohol use: Not Currently Alcohol/week: 0.0 standard drinks Comment: 6 pack of beer a week- 1/2/17 last use, Sober 5/11/19  Drug use: No  
 Sexual activity: Yes  
  Partners: Male Birth control/protection: Condom Other Topics Concern Family History:  
 
Family History Problem Relation Age of Onset  No Known Problems Mother  Cancer Father Thoat Cancer  Cancer Sister Breast Cancer  Obesity Brother  Cancer Brother Throat Cancer Medications: Allergies Allergen Reactions  Codeine Rash  Lamictal [Lamotrigine] Anaphylaxis  Morphine Anaphylaxis Per patient, has tolerated dilaudid in the past.  
 Pcn [Penicillins] Anaphylaxis  Vancomycin Rash  Doxycycline Rash  Fentanyl Rash  Percocet [Oxycodone-Acetaminophen] Hives and Itching  Tetracycline Rash  Tomato Hives Current Facility-Administered Medications Medication Dose Route Frequency  levETIRAcetam (KEPPRA) 500 mg/5 mL injection  naloxone (NARCAN) injection 0.4 mg  0.4 mg IntraVENous PRN  
 amiodarone (NEXTERONE) 360 mg in dextrose 200 mL (1.8 mg/mL) infusion  0.5 mg/min IntraVENous CONTINUOUS  
 potassium chloride 20 mEq in 50 ml IVPB  20 mEq IntraVENous Q2H  
 insulin glargine (LANTUS) injection 6 Units  6 Units SubCUTAneous DAILY  sodium chloride (NS) flush 5-10 mL  5-10 mL IntraVENous PRN  
 levETIRAcetam (KEPPRA) 750 mg in 0.9% sodium chloride 100 mL IVPB  750 mg IntraVENous Q12H  
 [START ON 8/27/2020] levothyroxine (SYNTHROID) injection 37.5 mcg  37.5 mcg IntraVENous Q24H  
 sodium chloride (NS) flush 5-40 mL  5-40 mL IntraVENous Q8H  
 sodium chloride (NS) flush 5-40 mL  5-40 mL IntraVENous PRN  
 acetaminophen (TYLENOL) tablet 650 mg  650 mg Oral Q6H PRN Or  
 acetaminophen (TYLENOL) suppository 650 mg  650 mg Rectal Q6H PRN  polyethylene glycol (MIRALAX) packet 17 g  17 g Oral DAILY PRN  promethazine (PHENERGAN) tablet 12.5 mg  12.5 mg Oral Q6H PRN Or  
 ondansetron (ZOFRAN) injection 4 mg  4 mg IntraVENous Q6H PRN  
 enoxaparin (LOVENOX) injection 40 mg  40 mg SubCUTAneous DAILY  meperidine (pf) (DEMEROL) injection 50 mg  50 mg IntraVENous Q6H PRN  propofol (DIPRIVAN) 10 mg/mL infusion  0-50 mcg/kg/min IntraVENous TITRATE  insulin lispro (HUMALOG) injection   SubCUTAneous Q6H  
 glucose chewable tablet 16 g  16 g Oral PRN  
 glucagon (GLUCAGEN) injection 1 mg  1 mg IntraMUSCular PRN  
 dextrose 10% infusion 125-250 mL  125-250 mL IntraVENous PRN  
 ELECTROLYTE REPLACEMENT PROTOCOL - Potassium Standard Dosing   1 Each Other PRN  
 ELECTROLYTE REPLACEMENT PROTOCOL - Magnesium   1 Each Other PRN  
 ELECTROLYTE REPLACEMENT PROTOCOL - Phosphorus  Standard Dosing  1 Each Other PRN  
 ELECTROLYTE REPLACEMENT PROTOCOL - Calcium   1 Each Other PRN Physical Exam:  
 
Visit Vitals /86 Pulse 74 Temp (!) 91.9 °F (33.3 °C) Resp 19 Wt 161 lb 2.5 oz (73.1 kg) SpO2 98% BMI 26.01 kg/m² BP Readings from Last 3 Encounters:  
08/20/20 151/86  
08/18/20 103/72  
04/15/20 118/81 Pulse Readings from Last 3 Encounters:  
08/20/20 74  
08/18/20 (!) 107  
04/15/20 97 Wt Readings from Last 3 Encounters:  
08/19/20 161 lb 2.5 oz (73.1 kg) 08/18/20 172 lb (78 kg) 04/15/20 172 lb (78 kg) General:  Intubated, sedated, intermittent jerking of bilateral upper and lower extremities, appears stated age Neck:  supple Lungs:  clear to auscultation bilaterally to anterolateral lung fields Heart:  Regular rate and rhythm Abdomen:  abdomen is soft without significant tenderness, masses, organomegaly or guarding Extremities:  Atraumatic, no significant edema Skin: Warm and dry. Neuro: unable to adequately assess due to condition. Intermittent jerking of bilateral upper and lower extremities as noted above. Psych: unable to adequately assess due to condition. Data Review:  
 
Recent Labs  
  08/20/20 
0400 08/19/20 
2230 08/19/20 
1420 WBC 18.1* 17.3* 14.1* HGB 10.2* 10.3* 10.0* HCT 31.1* 31.1* 30.7*  428* 386 Recent Labs  
  08/20/20 
0400 08/19/20 
2230 08/19/20 
1500 08/19/20 
1420  135*  --  137  
K 3.1* 4.0  --  3.0*  
 100  --  105 CO2 23 22  --  22 * 358*  --  309* BUN 6* 6*  --  5* CREA 0.84 0.94  --  0.78  
CA 7.5* 7.5*  --  7.2*  
MG 2.2 2.3  --   --   
PHOS 2.9 3.7  --   --   
ALB  --   --   --  1.5* ALT  --   --   --  30 INR 1.8* 1.8* 2.0*  --   
 
 
Results for orders placed or performed during the hospital encounter of 08/19/20 EKG, 12 LEAD, INITIAL Result Value Ref Range Ventricular Rate 98 BPM  
 Atrial Rate 98 BPM  
 P-R Interval 146 ms  
 QRS Duration 78 ms Q-T Interval 454 ms QTC Calculation (Bezet) 579 ms Calculated P Axis 28 degrees Calculated R Axis 3 degrees Calculated T Axis 41 degrees Diagnosis Critical Test Result: Long QTc Normal sinus rhythm Prolonged QT Abnormal ECG When compared with ECG of 15-APR-2020 13:00, 
T wave amplitude has increased in Anterolateral leads All Cardiac Markers in the last 24 hours:   
Lab Results Component Value Date/Time  (H) 08/20/2020 04:00 AM  
  (H) 08/19/2020 10:30 PM  
 CPK 99 08/19/2020 02:20 PM  
 CKMB 9.0 (H) 08/20/2020 04:00 AM  
 CKMB 6.1 (H) 08/19/2020 10:30 PM  
 CKMB 1.0 08/19/2020 02:20 PM  
 CKND1 2.0 08/20/2020 04:00 AM  
 CKND1 2.2 08/19/2020 10:30 PM  
 CKND1 1.0 08/19/2020 02:20 PM  
 TROIQ 0.36 (H) 08/20/2020 04:00 AM  
 TROIQ 0.58 (H) 08/19/2020 10:30 PM  
 TROIQ 0.40 (H) 08/19/2020 02:20 PM  
 TNIPOC 0.30 ([facility]) 08/19/2020 06:17 PM  
 
 
Last Lipid:   
Lab Results Component Value Date/Time Cholesterol, total 122 01/10/2017 04:50 AM  
 HDL Cholesterol 28 (L) 01/10/2017 04:50 AM  
 LDL, calculated 43 01/10/2017 04:50 AM  
 Triglyceride 255 (H) 01/10/2017 04:50 AM  
 CHOL/HDL Ratio 4.4 01/10/2017 04:50 AM  
 
 
Signed By: Mary Artis PA-C August 20, 2020

## 2020-08-20 NOTE — PROGRESS NOTES
Problem: Ventilator Management Goal: *Adequate oxygenation and ventilation Outcome: Progressing Towards Goal 
Goal: *Patient maintains clear airway/free of aspiration Outcome: Progressing Towards Goal 
Goal: *Absence of infection signs and symptoms Outcome: Progressing Towards Goal 
Goal: *Normal spontaneous ventilation Outcome: Progressing Towards Goal 
  
Problem: Patient Education: Go to Patient Education Activity Goal: Patient/Family Education Outcome: Progressing Towards Goal 
  
Problem: Falls - Risk of 
Goal: *Absence of Falls Description: Document Ramu Pearson Fall Risk and appropriate interventions in the flowsheet. Outcome: Progressing Towards Goal 
Note: Fall Risk Interventions: 
  
 
  
 
Medication Interventions: Bed/chair exit alarm Elimination Interventions: Bed/chair exit alarm, Call light in reach, Toileting schedule/hourly rounds History of Falls Interventions: Bed/chair exit alarm, Investigate reason for fall, Room close to nurse's station Problem: Patient Education: Go to Patient Education Activity Goal: Patient/Family Education Outcome: Progressing Towards Goal 
  
Problem: Pressure Injury - Risk of 
Goal: *Prevention of pressure injury Description: Document Fly Scale and appropriate interventions in the flowsheet. Outcome: Progressing Towards Goal 
Note: Pressure Injury Interventions: 
Sensory Interventions: Assess changes in LOC, Check visual cues for pain, Keep linens dry and wrinkle-free, Minimize linen layers, Monitor skin under medical devices, Pressure redistribution bed/mattress (bed type), Turn and reposition approx. every two hours (pillows and wedges if needed) Activity Interventions: Pressure redistribution bed/mattress(bed type) Mobility Interventions: HOB 30 degrees or less, Pressure redistribution bed/mattress (bed type), Turn and reposition approx. every two hours(pillow and wedges) Nutrition Interventions: Document food/fluid/supplement intake Friction and Shear Interventions: Foam dressings/transparent film/skin sealants, HOB 30 degrees or less, Lift sheet, Lift team/patient mobility team, Minimize layers Problem: Patient Education: Go to Patient Education Activity Goal: Patient/Family Education Outcome: Progressing Towards Goal

## 2020-08-20 NOTE — ROUTINE PROCESS
2130- received pt from ER via stretcher, moved to ICU bed and connected to monitor. Surface cooling pads placed on pt and machine started. Pt 93F. Pt bleeding from mouth and blood noted in OGT. Mouth care done. Morelos to SD. Esophageal temp probe connected to monitor and temp morelos connected to cooling machine. Pt having frequent twitching and jerking movements, decorticate posturing. Amiodarone Gtt check done with off going RN. 2200- assessment completed. IO removed. 2220- labs drawn from CVL per protocol. 2300- OGT not heard on auscultation, tube removed and coiling noted. Tooth noted to be floating in mouth, removed. NGT placed in rt nare to external leonard 57 cms and secured. Air heard on ascultation. Target temp of 89F reached. 2330- pt continuing to twitch and jerk, Dr. Desmond Olivarez called and given update, Demerol ordered. Discussed possible use of paralytic if demerol did not work. 2350- spoke to pharmacist and was informed that Demerol is on back order. Spoke to Dr. Desmond Olivarez again and suggested propofol, order received. Pt , suggested insulin gtt but Dr. Desmond Olivarez ordered SSI Q6h. Received orders for electrolyte protocol. 0007- propofol started at 10 mcg/kg/min. Reassessment done. 0110- Propofol titrated up to 40 mcg/kg/min, twitching slightly better but BP low. Dr. Vinny Sierra informed and levophed ordered. 0133- levophed started at 0.5 mcg/min. 36- dr. Desmond Olivarez called and informed of pt increase in twitching and jerking. this RN asked about paralytic discussed earlier, MD ordered an extra dose of Keppra 750 mg IVPB. 0400- reassessment done 0435- levophed titrated off, BP stable. 7937- spoke with Dr. Vinny Sierra about amiodarone gtt expiring after 12 hrs, orders received to continue gtt at current rate. Many attempts being made to get BP but NIBP cuff having a hard time reading BP d/t twitching. 2058- called son and gave update, questions answered. 0730- Bedside and Verbal shift change report given to Doctor Bel [address] and [address] (oncoming nurse) by Nilesh Hearn RN 
 (offgoing nurse). Report included the following information SBAR, Kardex, Intake/Output, MAR, Recent Results and Cardiac Rhythm NSR with prolonged QT and PVCs. Michael Higginbotham

## 2020-08-20 NOTE — PROGRESS NOTES
0730 - Bedside and Verbal shift change report received from Micha Thorpe (offgoing nurse) given to [employer] and Lizeth Jordan RN. Report included the following information SBAR, Kardex, Intake/Output, MAR and Cardiac Rhythm NSR w/ PVCs. Patient received continuous myoclonic activity , VSS on vent, hypothermic protocol, and sedation. Papito RN recommends A-line for more accurate BP reading as cuff is not giving good continuous readings. 0830 - Dr. Paola Grace at bedside and aware of BP issue. Anette Grace on phone with family. Family made aware of patient condition, family agreed to make patient DNR.  
 
Ernesto Councilman RN notified family of permission to visit patient. 65 - Patient's  and mother-in-law at bedside. 36 - Dr. Paola Grace at bedside with family. Patient made comfort care. Per discussion with family, patient planned to be extubated when they leave ICU. 
 
[facility] notified. 46 - Patient's family off floor. [address] [facility] RN at bedside. 1216 - Patient extubated 1221 -  Breathing irregular, BP not readable. O2 sats variable. Morphine given, will continue to monitor. [address] [facility] on site 1340 - No signs of distress, will continue to monitor. 1415 - No signs of distress, will continue to monitor. 1600 - No signs of distress, will continue to monitor. 1723 - Tachycardic, maintaining myoclonic movements, given ativan and dilaudid for discomfort and pain. Will continue to monitor. 1829 - tachycardic, respirations irregular, o2 in the 80s, given ativan and dilaudid for pain and discomfort. Will continue to monitor. 1930 - Bedside and Verbal shift change report given to Asnucion GLEZ (oncoming nurse) by [employer] (offgoing nurse). Report included the following information SBAR, Kardex, Intake/Output, MAR and Cardiac Rhythm NSR.

## 2020-08-20 NOTE — PROGRESS NOTES
Reason for Admission:   Cardiac Arrest 
            
RUR Score:   37% PCP: First and Last name:  
 Name of Practice: 
 Are you a current patient: Yes/No: 
 Approximate date of last visit:  
 Can you do a virtual visit with your PCP:  
          
Resources/supports as identified by patient/family:    
             
Top Challenges facing patient (as identified by patient/family and CM): Finances/Medication cost?       
           
Transportation? Support system or lack thereof? Living arrangements? Self-care/ADLs/Cognition? Current Advanced Directive/Advance Care Plan:  Has been made a DNR Plan for utilizing home health:     
              
Transition of Care Plan: Will defer CM interview with family at this time as pt is critically ill on a vent and prognosis very poor. Intensivist has spoken to the spouse who is planning to visit today. Pt is not expected to survive. Gaudencio Huerta RN Outcomes Manager 
[phone]

## 2020-08-20 NOTE — PROGRESS NOTES
Problem: Ventilator Management Goal: *Adequate oxygenation and ventilation Outcome: Progressing Towards Goal 
Goal: *Patient maintains clear airway/free of aspiration Outcome: Progressing Towards Goal 
Goal: *Absence of infection signs and symptoms Outcome: Progressing Towards Goal 
Goal: *Normal spontaneous ventilation Outcome: Progressing Towards Goal 
  
Problem: Patient Education: Go to Patient Education Activity Goal: Patient/Family Education Outcome: Progressing Towards Goal 
  
Problem: Falls - Risk of 
Goal: *Absence of Falls Description: Document Allison Duglas Fall Risk and appropriate interventions in the flowsheet. Outcome: Progressing Towards Goal 
Note: Fall Risk Interventions: 
  
 
  
 
Medication Interventions: Bed/chair exit alarm Elimination Interventions: Bed/chair exit alarm History of Falls Interventions: Bed/chair exit alarm, Investigate reason for fall, Room close to nurse's station Problem: Patient Education: Go to Patient Education Activity Goal: Patient/Family Education Outcome: Progressing Towards Goal 
  
Problem: Pressure Injury - Risk of 
Goal: *Prevention of pressure injury Description: Document Fly Scale and appropriate interventions in the flowsheet. Outcome: Progressing Towards Goal 
Note: Pressure Injury Interventions: 
Sensory Interventions: Assess changes in LOC, Check visual cues for pain, Keep linens dry and wrinkle-free, Minimize linen layers, Turn and reposition approx. every two hours (pillows and wedges if needed) Activity Interventions: Pressure redistribution bed/mattress(bed type) Mobility Interventions: HOB 30 degrees or less Nutrition Interventions: Document food/fluid/supplement intake Friction and Shear Interventions: Foam dressings/transparent film/skin sealants, HOB 30 degrees or less, Lift sheet, Lift team/patient mobility team, Minimize layers Problem: Patient Education: Go to Patient Education Activity Goal: Patient/Family Education Outcome: Progressing Towards Goal 
  
Problem: Induced Hypothermia Goal: *Achieves and maintains appropriate cooled core temperature for specified period of time Outcome: Progressing Towards Goal 
Goal: *Preservation of neurological status as evidenced by return to baseline neurological function upon rewarming Outcome: Progressing Towards Goal 
Goal: *Hemodynamically stable Outcome: Progressing Towards Goal 
Goal: *Absence of shivering Outcome: Progressing Towards Goal 
Goal: *Optimal pain control at patient's stated goal 
Outcome: Progressing Towards Goal 
Goal: *Absence of bleeding Outcome: Not Progressing Towards Goal 
Goal: *Labs within defined limits Outcome: Progressing Towards Goal 
Goal: *Skin integrity maintained Outcome: Progressing Towards Goal 
Goal: Interventions Outcome: Progressing Towards Goal 
  
Problem: Patient Education: Go to Patient Education Activity Goal: Patient/Family Education Outcome: Progressing Towards Goal 
  
Problem: Pain Goal: *Control of Pain Outcome: Progressing Towards Goal 
Goal: *PALLIATIVE CARE:  Alleviation of Pain Outcome: Progressing Towards Goal 
  
Problem: Patient Education: Go to Patient Education Activity Goal: Patient/Family Education Outcome: Progressing Towards Goal

## 2020-08-20 NOTE — ED NOTES
Pt report called to Michael Yost RN Pt transported to room via [facility] with monitor ,vent intact with cooling blanket in tow. Pt escorted by RN, RT and ER tech. Family aware that pt has been transferred to room

## 2020-08-21 NOTE — ROUTINE PROCESS
1900 assumed care from Bobbi Hammans and [address] [facility]. 2000 assessment complete. . Unresponsive at this time. Mouth care performed. 2025 son called for permission to come see mom when he gets to [facility]. Discussed with Mary(supervisor). Son will call when downstairs. 2150 report called to Valentino Edouard on [facility]. Patient being moved to [phone]. Will notify family 2153 family updated on patient

## 2020-08-21 NOTE — PROGRESS NOTES
8/21/2020 Chart reviewed. patient was compassionately extubated yesterday. On comfort measures. I understand mother in law and son (  non verbal)  Would like to visit again. Unsure if they will be able to. Francisco Valadez. Micky Espinosa RN, BSN [facility] Care Management [phone], Pager [phone] 
[email or web address]

## 2020-08-21 NOTE — PROGRESS NOTES
Progress Note Patient: Nithya Nava               Sex: female          DOA: 8/19/2020 YOB: 1960      Age:  62 y.o.        LOS:  LOS: 2 days CHIEF COMPLAINT:  Cardiac arrest, acute respiratory failure Subjective:  
 
Patient was compassionately extubated yesterday Comfort measures in place Objective:  
  
Visit Vitals BP 92/57 (BP 1 Location: Right arm, BP Patient Position: Supine) Pulse (!) 137 Temp (!) 101.7 °F (38.7 °C) Resp 24 Wt 73.1 kg (161 lb 2.5 oz) SpO2 (!) 85% BMI 26.01 kg/m² Physical Exam: 
Gen:  No distress, no complaint Lungs:  Clear bilaterally, no wheeze or rhonchi Heart:  Regular rate and rhythm, no murmurs or gallops Abdomen:  Soft, non-tender, normal bowel sounds Lab/Data Reviewed: All lab results for the last 24 hours reviewed. Assessment/Plan Principal Problem: 
  Cardiac arrest ([facility] (8/19/2020) Active Problems: 
  Acute respiratory failure ([address] (8/19/2020) Sepsis ([facility] (1/9/2017) Overview: By Respiratory rate and Leukocytosis Shock ([address] (8/20/2020) Hyperlipidemia () Bipolar disorder ([address] () Plan: 
Patient is dying. She does not appear to be in distress Continue with comfort measures.

## 2020-08-21 NOTE — PROGRESS NOTES
Patient mother in law Jeanette Valderrama called to get update of patient to provide to son (patients spouse who is nonverbal). Code provided [phone] for future calls Spoke with primary nurse True Palafox RN and received an update to provide to mother in law advising that plan was to keep patient comfortable. Mother in law was pleased with update Requesting to visit patient again. Visited on yesterday 8/20/20 in ICU, mother accompanying son because he is nonverbal.  Advised that visitation is approved for 1 time visit 30 min/ understanding was verbalized but requesting for another visit anyway. I advised I would inquire, however, if not approved, she is aware that we are able to do Zoom. She declined Zoom, as she wants to have a visit. Will follow back up

## 2020-08-21 NOTE — PROGRESS NOTES
8/21/2020 Chart reviewed. patient was compassionately extubated yesterday. On comfort measures. I understand mother in law and son (  non verbal)  Would like to visit again. Unsure if they will be able to. Sarah Springer. Juan R Sorto RN, BSN [facility] [phone], Pager [phone] 
[email or web address]

## 2020-08-21 NOTE — PROGRESS NOTES
Rec'd patient this am eyes closed . Labored respiration lungs diminished bilaterally . Elevated temp 1103 rec'd dilaudid 2mg IV help with restlessness Family involved Tom Code son nurse called   Updated the son . Positioned frequently for comfort . 1324 Ativan 2mg IV for dyspnea continues with labored respirations with sternal retractions 1836 Rec'd dilaudid 2mg IV Family  Called again checking this patient reassured patient medicated for comfort Bedside and Verbal shift change report given to Melba Quevedo (oncoming nurse) by Babak Layton (offgoing nurse). Report included the following information SBAR, Kardex, MAR and Quality Measures.

## 2020-08-21 NOTE — PROGRESS NOTES
TRANSFER - IN REPORT: 
 
Verbal report received from Asuncion GLEZ (name) on [facility]  being received from ICU (unit) for routine progression of care Report consisted of patients Situation, Background, Assessment and  
Recommendations(SBAR). Information from the following report(s) ED Summary, MAR and Quality Measures was reviewed with the receiving nurse. Opportunity for questions and clarification was provided. Assessment will be completed upon patients arrival to unit and care assumed. 2210: pt arrived on unit in company of Asuncion GLEZ and Mihai North. Pt is unresponsive. Respirations labored. Assessed and care assumed at this time. Jeni Jenkins RN at bedside Visit Vitals BP (!) 82/54 Pulse (!) 127 Temp (!) 102.2 °F (39 °C) Resp 26 Wt 73.1 kg (161 lb 2.5 oz) SpO2 (!) 84% BMI 26.01 kg/m² 2218: 2 mg IV D ilaudid Iv administred for comfort. Pt respiration is tachypniec and labored. 0000; Pt's son Cathy Drew at bedside. 0031: [facility] staff called this unit with inquiry about pt status. Pt still has a pulse and labored respirations. 0600; ativan IV adminsitered for comfort. Pt respirations elevated. 0715: Bedside shift change report given to [address] (oncoming nurse) by Oc Diop RN (offgoing nurse). Report included the following information SBAR, Intake/Output, MAR and Quality Measures. Opportunity for questions and clarification provided.

## 2020-08-21 NOTE — PROGRESS NOTES
Problem: Ventilator Management Goal: *Adequate oxygenation and ventilation Outcome: Progressing Towards Goal 
Goal: *Patient maintains clear airway/free of aspiration Outcome: Progressing Towards Goal 
Goal: *Absence of infection signs and symptoms Outcome: Progressing Towards Goal 
Goal: *Normal spontaneous ventilation Outcome: Progressing Towards Goal 
  
Problem: Falls - Risk of 
Goal: *Absence of Falls Description: Document Briana Evans Fall Risk and appropriate interventions in the flowsheet. Outcome: Progressing Towards Goal 
Note: Fall Risk Interventions: 
  
 
  
 
Medication Interventions: Bed/chair exit alarm Elimination Interventions: Bed/chair exit alarm, Toileting schedule/hourly rounds History of Falls Interventions: Bed/chair exit alarm Problem: Patient Education: Go to Patient Education Activity Goal: Patient/Family Education Outcome: Progressing Towards Goal 
  
Problem: Pressure Injury - Risk of 
Goal: *Prevention of pressure injury Description: Document Fly Scale and appropriate interventions in the flowsheet. Outcome: Progressing Towards Goal 
Note: Pressure Injury Interventions: 
Sensory Interventions: Turn and reposition approx. every two hours (pillows and wedges if needed) Moisture Interventions: Moisture barrier, Absorbent underpads Activity Interventions: Pressure redistribution bed/mattress(bed type) Mobility Interventions: Turn and reposition approx. every two hours(pillow and wedges) Nutrition Interventions: Document food/fluid/supplement intake Friction and Shear Interventions: HOB 30 degrees or less Problem: Patient Education: Go to Patient Education Activity Goal: Patient/Family Education Outcome: Progressing Towards Goal 
  
Problem: Induced Hypothermia Goal: *Achieves and maintains appropriate cooled core temperature for specified period of time Outcome: Progressing Towards Goal 
 Goal: *Preservation of neurological status as evidenced by return to baseline neurological function upon rewarming Outcome: Progressing Towards Goal 
Goal: *Hemodynamically stable Outcome: Progressing Towards Goal 
Goal: *Absence of shivering Outcome: Progressing Towards Goal 
Goal: *Labs within defined limits Outcome: Progressing Towards Goal 
  
Problem: Pain Goal: *Control of Pain Outcome: Progressing Towards Goal 
Goal: *PALLIATIVE CARE:  Alleviation of Pain Outcome: Progressing Towards Goal 
  
Problem: Patient Education: Go to Patient Education Activity Goal: Patient/Family Education Outcome: Progressing Towards Goal

## 2020-08-21 NOTE — PROGRESS NOTES
Problem: Falls - Risk of 
Goal: *Absence of Falls Description: Document Natalie Winder Fall Risk and appropriate interventions in the flowsheet. Outcome: Progressing Towards Goal 
Note: Fall Risk Interventions: 
  
 
  
 
Medication Interventions: Evaluate medications/consider consulting pharmacy Elimination Interventions: Call light in reach History of Falls Interventions: Evaluate medications/consider consulting pharmacy Problem: Patient Education: Go to Patient Education Activity Goal: Patient/Family Education Outcome: Progressing Towards Goal 
  
Problem: Pressure Injury - Risk of 
Goal: *Prevention of pressure injury Description: Document Fly Scale and appropriate interventions in the flowsheet. Outcome: Progressing Towards Goal 
Note: Pressure Injury Interventions: 
Sensory Interventions: Assess changes in LOC Activity Interventions: Pressure redistribution bed/mattress(bed type) Mobility Interventions: HOB 30 degrees or less Nutrition Interventions: Document food/fluid/supplement intake Friction and Shear Interventions: HOB 30 degrees or less Problem: Pain Goal: *Control of Pain Outcome: Progressing Towards Goal 
Goal: *PALLIATIVE CARE:  Alleviation of Pain Outcome: Progressing Towards Goal 
  
Problem: Patient Education: Go to Patient Education Activity Goal: Patient/Family Education Outcome: Progressing Towards Goal

## 2020-08-21 NOTE — PROGRESS NOTES
Problem: Ventilator Management Goal: *Adequate oxygenation and ventilation Outcome: Progressing Towards Goal 
Goal: *Patient maintains clear airway/free of aspiration Outcome: Progressing Towards Goal 
Goal: *Absence of infection signs and symptoms Outcome: Progressing Towards Goal 
Goal: *Normal spontaneous ventilation Outcome: Progressing Towards Goal 
  
Problem: Patient Education: Go to Patient Education Activity Goal: Patient/Family Education Outcome: Progressing Towards Goal 
  
Problem: Falls - Risk of 
Goal: *Absence of Falls Description: Document [place] Peter Fall Risk and appropriate interventions in the flowsheet. Outcome: Progressing Towards Goal 
Note: Fall Risk Interventions: 
  
 
  
 
Medication Interventions: Evaluate medications/consider consulting pharmacy Elimination Interventions: Call light in reach History of Falls Interventions: Evaluate medications/consider consulting pharmacy Problem: Patient Education: Go to Patient Education Activity Goal: Patient/Family Education Outcome: Progressing Towards Goal 
  
Problem: Pressure Injury - Risk of 
Goal: *Prevention of pressure injury Description: Document Fly Scale and appropriate interventions in the flowsheet. Outcome: Progressing Towards Goal 
Note: Pressure Injury Interventions: 
Sensory Interventions: Assess changes in LOC Activity Interventions: Pressure redistribution bed/mattress(bed type) Mobility Interventions: HOB 30 degrees or less Nutrition Interventions: Document food/fluid/supplement intake Friction and Shear Interventions: HOB 30 degrees or less Problem: Patient Education: Go to Patient Education Activity Goal: Patient/Family Education Outcome: Progressing Towards Goal 
  
Problem: Induced Hypothermia Goal: *Achieves and maintains appropriate cooled core temperature for specified period of time Outcome: Progressing Towards Goal 
 Goal: *Preservation of neurological status as evidenced by return to baseline neurological function upon rewarming Outcome: Progressing Towards Goal 
Goal: *Hemodynamically stable Outcome: Progressing Towards Goal 
Goal: *Absence of shivering Outcome: Progressing Towards Goal 
Goal: *Optimal pain control at patient's stated goal 
Outcome: Progressing Towards Goal 
Goal: *Absence of bleeding Outcome: Progressing Towards Goal 
Goal: *Labs within defined limits Outcome: Progressing Towards Goal 
Goal: *Skin integrity maintained Outcome: Progressing Towards Goal 
Goal: Interventions Outcome: Progressing Towards Goal 
  
Problem: Patient Education: Go to Patient Education Activity Goal: Patient/Family Education Outcome: Progressing Towards Goal 
  
Problem: Pain Goal: *Control of Pain Outcome: Progressing Towards Goal 
Goal: *PALLIATIVE CARE:  Alleviation of Pain Outcome: Progressing Towards Goal 
  
Problem: Patient Education: Go to Patient Education Activity Goal: Patient/Family Education Outcome: Progressing Towards Goal

## 2020-08-22 NOTE — PROGRESS NOTES
Internal Medicine Progress Note NAME: Akosua Bardales :  1960 MRM:  [id number] Date/Time: 2020 ASSESSMENT/PLAN: 
 
Principal Problem: 
  Cardiac arrest ([facility] (2020) Active Problems: Hyperlipidemia () Bipolar disorder ([facility] () Sepsis ([facility] (2017) Overview: By Respiratory rate and Leukocytosis Acute respiratory failure ([facility] (2020) Shock ([facility] (2020) Continue comfort measures No distress IP CONSULT TO INTENSIVIST 
IP CONSULT TO CARDIOLOGY Lab Review:  
 
Recent Labs  
  20 
0400 20 
2230 20 
1420 WBC 18.1* 17.3* 14.1* HGB 10.2* 10.3* 10.0* HCT 31.1* 31.1* 30.7*  428* 386 Recent Labs  
  20 
0400 20 
2230 20 
1500 20 
1420  135*  --  137  
K 3.1* 4.0  --  3.0*  
 100  --  105 CO2 23 22  --  22 * 358*  --  309* BUN 6* 6*  --  5* CREA 0.84 0.94  --  0.78  
CA 7.5* 7.5*  --  7.2*  
MG 2.2 2.3  --   --   
PHOS 2.9 3.7  --   --   
ALB  --   --   --  1.5* TBILI  --   --   --  1.0 ALT  --   --   --  30 INR 1.8* 1.8* 2.0*  --   
 
Lab Results Component Value Date/Time Glucose (POC) 340 (H) 2020 01:11 AM  
 Glucose (POC) 140 (H) 2020 11:48 AM  
 Glucose (POC) 153 (H) 2020 09:05 AM  
 Glucose (POC) 117 (H) 2020 09:27 PM  
 Glucose (POC) 184 (H) 2020 04:30 PM  
 
No results for input(s): PH, PCO2, PO2, HCO3, FIO2 in the last 72 hours. Recent Labs  
  20 
0400 20 
2230 20 
1500 INR 1.8* 1.8* 2.0* No results found for: SDES Lab Results Component Value Date/Time  Culture result: NO GROWTH 3 DAYS 2020 02:27 PM  
 Culture result: NO GROWTH 3 DAYS 2020 02:20 PM  
 Culture result: No growth (<1,000 CFU/ML) 2020 02:20 PM  
 
 
All Cardiac Markers in the last 24 hours: No results found for: CPK, CK, CKMMB, CKMB, RCK3, CKMBT, CKNDX, CKND1, MÓNICA, TROPT, TROIQ, RAN, TROPT, TNIPOC, BNP, BNPP Intervals noted Pt s/e @ bedside Subjective:  
 
Non historian non responding Objective:  
 
Vitals: 
Last 24hrs VS reviewed since prior progress note. Most recent are: 
 
Visit Vitals BP (!) 87/47 (BP 1 Location: Left arm, BP Patient Position: At rest) Pulse (!) 121 Temp (!) 100.7 °F (38.2 °C) Resp 20 Wt 73.1 kg (161 lb 2.5 oz) SpO2 (!) 87% BMI 26.01 kg/m² SpO2 Readings from Last 6 Encounters:  
08/22/20 (!) 87% 08/18/20 100% 04/15/20 99% 04/01/20 98% 01/10/20 100% 11/27/19 99% O2 Flow Rate (L/min): 3 l/min Intake/Output Summary (Last 24 hours) at 8/22/2020 1019 Last data filed at 8/22/2020 0501 Gross per 24 hour Intake  Output 400 ml Net -400 ml Physical Exam:  
Gen:  No distress, not respomdimg Lungs:  bilateral grunting sounds, mild  rhonchi Heart:  Regular rate and rhythm, no  gallops Abdomen:  Soft, non-tender, normal bowel sounds Peripheral edema Medications Reviewed: (see below) Lab Data Reviewed: (see below) 
 
______________________________________________________________________ Medications:  
 
Current Facility-Administered Medications Medication Dose Route Frequency  acetaminophen (TYLENOL) solution 650 mg  650 mg Oral Q4H PRN  
 LORazepam (INTENSOL) 2 mg/mL oral concentrate 1 mg  1 mg Oral Q1H PRN  
 LORazepam (ATIVAN) injection 2 mg  2 mg IntraVENous Q1H PRN  
 HYDROmorphone (DILAUDID) injection 2 mg  2 mg IntraVENous Q1H PRN  
 scopolamine (TRANSDERM-SCOP) 1 mg over 3 days 1 Patch  1 Patch TransDERmal Q72H Total time spent with patient: 25 minutes Care Plan discussed with: Care Manager, Nursing Staff and >50% of time spent in counseling and coordination of care Discussed:  Care Plan This document in whole or part of it has been produced using voice recognition software. Unrecognized errors in transcription may be present.  
 
Attending Physician: Radha Dodson MD

## 2020-08-22 NOTE — PROGRESS NOTES
0700 Bedside, Verbal and Written shift change report given to [address] (oncoming nurse) by Sedrick GLZE (offgoing nurse). Report included the following information SBAR, Kardex, Intake/Output, MAR and Recent Results. Received patient lying in bed with eyes closed, no discomfort nor distress noted at this time. 1200 Shift reassessment, pt condition unchanged, will continue to monitor. [id number] Dr. Austen Green made aware that patient is unable to take medication by mouth. New order received for Tylenol Suppository As needed. [address] with Megan, sister-in-law. She informed this nurse that they had a family meeting last evening and they have all agreed that they do not want Mrs. Lopez to have any fluids. This nurse informed Megan that the patient has no IVF infusing and that she is currently unable to take anything by mouth due to unresponsiveness. Megan and family are requesting one last visit to say their good bye's and to have the aziza present to bless her. Patient has a spouse with cerebral palsy, whom would like to say good bye as well. Will text Unit Director to inquire about protocol. 1440 Temp 102.3, tylenol suppository given as ordered. 1530 Temp 100.8 
   
1600  Shift reassessment, pt sleeping between care, will continue to monitor. [address], sister in law and family would like to do zoom tonight. This nurse will pass on to oncoming shift. 1900 Bedside, Verbal and Written shift change report given to [facility] (oncoming nurse) by Johanny Espinoza RN (offgoing nurse). Report included the following information SBAR, Kardex, Intake/Output, MAR and Recent Results. Skin assessment completed. 80  called and spoke with this nurse and was given an update.  will be in at 8:30 this evening to do Zoom with the family.

## 2020-08-22 NOTE — PROGRESS NOTES
1900  -- Bedside, Verbal and Written shift change report given to [address] (oncoming nurse) by Jo-Ann nurse). Report included the following information SBAR, Kardex, Intake/Output, MAR and Recent Results. Pt is Comfort Measures. 
   
2222 -- PRN Agitaton medication administered, pt tolerated with ease, will continue to monitor. 0030 -- MD MALISSA durbin, temp elevated 103.2, PRN medication order approved. PRN Agitaton medication administered, pt tolerated with ease, will continue to monitor. 0159 -- Vitals reassessed, temp WNL, 98, will continue to monitor. 0440 --  PRN temp medication (100.3) and  PRN Agitaton medication administered, pt tolerated with ease, will continue to monitor. 
   
0700  -- Bedside, Verbal and Written shift change report [facility] by HERBERT (offgoing nurse). Report included the following information SBAR, Kardex, Intake/Output, MAR and Recent Results. Skin assessment completed.

## 2020-08-22 NOTE — ROUTINE PROCESS
Patients mother-in-law called and requesting that the husbands email address be added to the patients chart. Email= [email or web address]

## 2020-08-23 NOTE — PROGRESS NOTES
Bedside shift report received from [address]: resting in bed, with agonal breathing noted; pt is on comfort measures; kept comfortable; for Zoom video with family and pastoral care at 2030: Zoom video connection with family, pastoral care and staff done : pt noted to have 0 respiration, no pulse ; Dr. Sweta Lyman informed; assessed pt and pronounced ; eye prep done : Pastoral care notified : [facility] notified; pt not a candidate for organ donation; said will contact [facility]ank 2336: family notified by Dr Sweta Lyman

## 2020-08-23 NOTE — PROGRESS NOTES
INTERIM UPDATE - 8146 EST on 2020 Called to assess an Unresponsive Patient who was recently placed on [address] [facility]. Patient did not respond to visual, auditory, tactile, or nociceptive stimuli. Patient did not initiate any self-motivated movement. Patient did not exhibit radial, carotid, or femoral pulses. Patient did not have any heart, lung, or bowel sounds. Pupils were dilated and fixed. Patient did not exhibit Corneal Reflex. Ms. Connor Tubbs was pronounced  at 2244 EST on 2020 by Jayden Ellington D.O. Family was not present at bedside. Notably, review of Patient's Chart showed that Patient had a traumatic fall on 2020 and was seen in [address] [facility] ER on 2020 and was found to have a Pelvic Fracture. As this fall may have contributed to Patient's subsequent Cardiac Arrest, Patient's case was flagged and discussed with Medical Examiner. Patient's Fall could not be directly linked to the cause of death, but remained a possible contributing factor. As such, Medical Examiner stated that Patient should be placed on Medical Examiner (i.e. \"ME\") Hold and the Patient's chart would be more thoroughly evaluated on 2020. Details regarding persons of interest who were contacted, when, and details regarding Patient's final arrangements are described elsewhere.

## 2020-08-23 NOTE — PROGRESS NOTES
helped the patient Junior Kelly, who is a 62 y.o.,female, connect with their family with the Zoom Video Connection. The  provided the following Interventions: Prayer and blessing for the dying with anointing. The following outcomes were achieved: 
Patient expressed gratitude for 's visit and service for patient. Assessment: 
There are no further spiritual or Worship issues which require Spiritual Care Services interventions at this time. Plan: 
Chaplains will continue to follow and will provide pastoral care on an as needed/requested basis.  recommends bedside caregivers page  on duty if patient shows signs of acute spiritual or emotional distress. [facility] Staff  Spiritual Care  
[phone]

## 2020-08-23 NOTE — PROGRESS NOTES
Problem: Falls - Risk of 
Goal: *Absence of Falls Description: Document Abdifatah Marie Fall Risk and appropriate interventions in the flowsheet. Outcome: Progressing Towards Goal 
Note: Fall Risk Interventions: 
  
 
  
 
Medication Interventions: Evaluate medications/consider consulting pharmacy Elimination Interventions: Toileting schedule/hourly rounds History of Falls Interventions: Room close to nurse's station Problem: Pressure Injury - Risk of 
Goal: *Prevention of pressure injury Description: Document Fly Scale and appropriate interventions in the flowsheet. Outcome: Progressing Towards Goal 
Note: Pressure Injury Interventions: 
Sensory Interventions: Check visual cues for pain Moisture Interventions: Absorbent underpads, Internal/External urinary devices Activity Interventions: Pressure redistribution bed/mattress(bed type) Mobility Interventions: HOB 30 degrees or less, Pressure redistribution bed/mattress (bed type) Nutrition Interventions: Document food/fluid/supplement intake Friction and Shear Interventions: HOB 30 degrees or less Problem: Pain Goal: *PALLIATIVE CARE:  Alleviation of Pain Outcome: Progressing Towards Goal

## 2020-08-23 NOTE — DISCHARGE SUMMARY
Discharge / DEATH Summary [facility] service Patient ID: Manuel Brown, 62 y.o., female : 1960 Admit Date: 2020 
 was pronounced  at 2244 EST on 2020 PCP:  Aleshia Bateman MD 
 
Chief Complaint Patient presents with  Cardiac arrest  
 
 
Discharge Diagnosis:  
 
Hospital Problems  Date Reviewed: 1/10/2020 Codes Class Noted POA Shock ([facility] ICD-10-CM: R57.9 ICD-9-CM: 785.50  2020 Yes * (Principal) Cardiac arrest [facility]) ICD-10-CM: I46.9 ICD-9-CM: 427.5  2020 Unknown Acute respiratory failure ([facility] ICD-10-CM: J96.00 
ICD-9-CM: 518.81  2020 Unknown Sepsis ([facility] ICD-10-CM: A41.9 ICD-9-CM: 038.9, 995.91  2017 Yes Overview Signed 2020  8:09 AM by Mikel Li MD  
  By Respiratory rate and Leukocytosis Bipolar disorder ([facility] ICD-10-CM: F31.9 ICD-9-CM: 296.80  Unknown Yes Hyperlipidemia ICD-10-CM: E78.5 ICD-9-CM: 272.4  Unknown Yes HPI on Admission (per admitting physician): Manuel Brown is a 62 y.o. female who had altered mental status at home. EMS was summoned and she had cardiac arrest in the field. ACLS protocols were begun and she obtained ROSC. She had multiple other episodes of cardiac arrest in the field and ACLS was continued. She was intubated and arrived to the ER. It is unknown if she had chest pain or SOB prior to her altered mental status. There was no fever. In the ER she again had Cardiac Arrest while she was in the CT Scanner. She will be admitted to the ICU for ongoing management. For further details and initial management please refer to H/P. Hospital Course:   
 
Patient admitted with  Cardiac arrest ,   Acute respiratory failure and Sepsis. Intubated and mechanically ventilated. Admitted to ICU. Supportive care on the ventilator.   BP support with vasopressor , Continue with antibiotics started in ER . Followed the  Cultures. Followed WBC and other labs as ordered. Renal function. PCCM consulted . Blood CS on  reported negative to date. Cardiology consulted. Converted to comfort measures and  at the date and time mentioned above. By problems :  
 
-S/p multiple cardiac arrests 2020. Per chart ;  Per EMS report, they were initially called after pt \"passed out\" but stopped breathing on engine arrival.  Initial rhythm PEA. Subsequently has had VT/VF arrests, currently on Amiodarone infusion. Prolonged QTc noted. -Indeterminate troponin, marginal elevation , demand in setting of multiple cardiac arrests Patient had history of multiple medical problems , including ;  
-Hx pulmonary hypertension 
-Hx HERMELINDA 
-Hx HTN 
-Hx hypothyroidism 
-Hx Christian-en-Y gastric bypass -Hypoalbuminemia, albumin 1.5 on 2020 
-Hx EtOH abuse 
-Hx chronic alcoholic pancreatitis . -Hx bipolar disorder 
   
Discharge condition:  . Procedures: * No surgery found * Consultants:   
IP CONSULT TO INTENSIVIST 
IP CONSULT TO CARDIOLOGY Most Recent Labs: 
  
 
No results for input(s): WBC, HGB, HCT, PLT, HGBEXT, HCTEXT, PLTEXT in the last 72 hours. No results for input(s): NA, K, CL, CO2, GLU, BUN, CREA, CA, MG, PHOS, ALB, TBIL, TBILI, ALT, INR, INREXT in the last 72 hours. No lab exists for component: SGOT Lab Results Component Value Date/Time Glucose (POC) 340 (H) 2020 01:11 AM  
 Glucose (POC) 140 (H) 2020 11:48 AM  
 Glucose (POC) 153 (H) 2020 09:05 AM  
 Glucose (POC) 117 (H) 2020 09:27 PM  
 Glucose (POC) 184 (H) 2020 04:30 PM  
 
No results for input(s): PH, PCO2, PO2, HCO3, FIO2 in the last 72 hours. No results for input(s): INR, INREXT in the last 72 hours. No results found for: SDES Lab Results Component Value Date/Time  Culture result: NO GROWTH 4 DAYS 2020 02:27 PM  
 Culture result: NO GROWTH 4 DAYS 08/19/2020 02:20 PM  
 Culture result: No growth (<1,000 CFU/ML) 08/19/2020 02:20 PM  
 
 
All Cardiac Markers in the last 24 hours: No results found for: CPK, CK, CKMMB, CKMB, RCK3, CKMBT, CKNDX, CKND1, MÓNICA, TROPT, TROIQ, RAN, TROPT, TNIPOC, BNP, BNPP 
 
XR Results: 
Results from Hospital Encounter encounter on 08/19/20 XR ABD (KUB) Narrative EXAM: SUPINE ABDOMINAL RADIOGRAPH 
 
CLINICAL INDICATION/HISTORY: NGT placement 
-Additional: None COMPARISON: One day prior TECHNIQUE: Single supine view of the abdomen. 
 
_______________ FINDINGS: 
 
BOWEL GAS PATTERN: Limited study. Enteric tube tip within the stomach body. No 
evidence of obstruction. BONES: Unremarkable. OTHER: None. 
 
_______________ Impression IMPRESSION: 
 
Limited study. Enteric tube tip within the stomach body. No evidence of 
obstruction. CT Results: 
Results from Hospital Encounter encounter on 08/19/20 CT ABD PELV W CONT Narrative EXAM: CT of the abdomen and pelvis INDICATION: Altered mental status, shortness of breath COMPARISON: Left hip x-ray from 8/18/2020 TECHNIQUE: Axial CT imaging of the abdomen and pelvis was performed with 
intravenous contrast. Multiplanar reformats were generated. One or more dose 
reduction techniques were used on this CT: automated exposure control, 
adjustment of the mAs and/or kVp according to patient size, and iterative 
reconstruction techniques. The specific techniques used on this CT exam have 
been documented in the patient's electronic medical record. Digital Imaging and 
Communications in Medicine (DICOM) format image data are available to 
nonaffiliated external healthcare facilities or entities on a secure, media 
free, reciprocally searchable basis with patient authorization for at least a 
12-month period after this study. _______________ FINDINGS: 
 
 LOWER CHEST: Please see separate dictated CT of the chest from same day LIVER, BILIARY: Hepatomegaly with mild diffuse parenchymal low attenuation, in 
keeping with steatosis. No acute or suspicious hepatic abnormality. No biliary 
dilation. Prior cholecystectomy PANCREAS: Atrophic with scattered coarse calcifications, suggestive of sequela 
of chronic pancreatitis. Otherwise, no acute or suspicious abnormality SPLEEN: Normal. 
 
ADRENALS: Normal. 
 
KIDNEYS, URETERS, BLADDER: Isoenhancing bilateral kidneys without 
hydronephrosis, benefit fluid or induration. Right interpolar subcentimeter 
hypodense lesion, probably benign cyst. Jackson catheter in place with dependent 
procedural gas. GASTROINTESTINAL TRACT: Status post gastric bypass. Short segment 
intussusception of the exiting small bowel loop from the distal jejunal 
anastomosis measuring 3 cm, without upstream obstruction, almost certainly 
transient. No dilatation. No corrina bowel wall thickening. LYMPH NODES: No enlarged lymph nodes. PELVIC ORGANS: Prior hysterectomy VASCULATURE: Unremarkable. OSSEOUS: Chronic mild superior T12 endplate compression deformity, stable to 
4/15/2020 CT of the chest.  
  > Nonacute mildly displaced left superior and inferior pubic rami fractures, 
paramedian right pubic body and inferior right pubic ramus fracture, as 
identified on prior days left hip x-ray. Bilateral vertically oriented sacral 
alar fractures, with cortical buckling. Prior right femoral ORIF hardware with 
nonunited transfer trochanteric fracture. OTHER: Mild diffuse soft tissue anasarca with right lateral hip superficial soft 
tissue 3.6 cm ovoid partial fat containing structure, probably unresolved 
hematoma/seroma. 
 
_______________ Impression IMPRESSION: 
 
1. Prior gastric bypass with short segment (3 cm) enteric intussusception from 
the jejunal jejunal anastomosis, which is commonly transient.  No findings of 
 upstream obstruction. Otherwise, No acute intra-abdominal or intrapelvic 
obstructive or inflammatory process. 2. Bilateral pelvic fractures, to include bilateral vertically oriented sacral 
alar fractures, which may be associated with sacral insufficiency fractures 
related to osteoporosis. 3. Hepatomegaly and steatosis. 4. Jackson catheter in place with intraluminal gas almost certainly procedural 
etiology. 5. Right femoral ORIF hardware with nonunited proximal fracture. Right hip 
superficial soft tissue 3.6 cm ovoid collection, probably unresolved 
seroma/hematoma from ORIF placement. MRI Results: 
Results from Hospital Encounter encounter on 03/23/16 MRI ABD W MRCP W WO CONT Narrative Comparison: 10/21/2014, 3/24/2016 Indication: Acute pancreatitis. Evaluation for choledocholithiasis. Technique: Multiplanar, multisequence MRI acquisition of the abdomen with and 
without contrast. MRCP sequences were included. 
 
======== 
 
FINDINGS: 
 
LIVER: No evidence of mild diffuse hepatic steatosis. Normal liver contour. No 
suspicious lesion identified. BILIARY: No significant biliary dilation. CBD is mildly tortuous but without 
irregularity or appreciable filling defects. Gallbladder is absent. SPLEEN: Normal. 
 
PANCREAS: Parenchymal edema and moderate peripancreatic fluid signal which 
appears similar to the prior CT. Small cystic lesion in the pancreatic tail 
measuring approximately 1.4 cm, T1 hyperintense likely due to 
hemorrhagic/proteinaceous contents. No evidence of solid enhancement. There is 
no evidence of glandular necrosis at this time. Focal ductal dilation in the 
tail, otherwise normal appearance of the pancreatic duct. ADRENALS: Normal. 
 
KIDNEYS: Normal. 
 
LYMPH NODES: No technically enlarged nodes. GI TRACT: Status post gastric bypass. Otherwise unremarkable. VASCULATURE: Unremarkable. OTHER: Lung bases appear clear. No acute osseous findings. ======== Impression IMPRESSION: 
 
1. No evidence of choledocholithiasis. 2. Acute interstitial edematous pancreatitis without evidence of glandular 
necrosis. 3. Mild hepatic steatosis. Nuclear Medicine Results: No results found for this or any previous visit. US Results: 
Results from [place] encounter on 02/03/16 US RETROPERITONEUM COMP Narrative Retroperitoneal Ultrasound INDICATION: Gross hematuria. TECHNIQUE: Select images from retroperitoneal ultrasound provided for 
interpretation. COMPARISON:  Right upper quadrant ultrasound 11/18/2015. 2/17/2015 CT abdomen 
and pelvis FINDINGS: 
  
Right kidney: The right kidney measures 11.4 x 4.4 x 5.0 cm. Cortical 
echogenicity is normal. No hydronephrosis. No mass or cyst. Trace fluid in the 
right upper quadrant adjacent to the liver. Left kidney: The left kidney measures 11.1 x 6.9 x 4.8 cm. Cortical echogenicity 
is normal. No hydronephrosis. No mass or cyst.  
 
Bladder: The bladder is without diffuse wall thickening. 9 x 10 x 10 mm rounded 
echogenic structure within the lumen at the posterior right bladder wall. No 
definitive shadowing of the nodule. No abnormality seen in the bladder on prior CT. Spleen: No splenomegaly. Spleen measures 11 cm, within normal limits. Impression IMPRESSION: 
 
1. Unremarkable appearance of the kidneys. 2. Bladder calculus versus polypoid nodule in the bladder lumen. 3. Trace free fluid in the right upper quadrant. This document in whole or part of it has been produced using voice recognition software. Unrecognized errors in transcription may be present. Jake Fernandez MD 
Hospitalist 
[facility]. medical group. Hospitalist Division.  
August 23, 2020 
8:52 AM

## 2020-08-23 NOTE — PROGRESS NOTES
responded to the death of  Lonnie Shaver, who was a 62 y.o.,female, The  provided the following Interventions: 
Provided crisis spiritual support and grief interventions. Offered prayers on behalf of the patient. Chart reviewed. Plan: 
Chaplains will continue to follow and will provide spiritual care and grief support for the family. [address] [facility] Staff  Spiritual Care  
[phone]

## 2020-08-24 NOTE — PROGRESS NOTES
In chart to review for arrangements for remains  from the [facility], per last note of Dr Michelle Cabezas, patient on Medical Examiner hold and chart will be reviewed on 08/24/2020

## 2023-02-24 NOTE — PROGRESS NOTES
PT held due to low blood counts. Patient has stated her discharge plan is to return home. Patient experiencing some anxiety related to ETOH withdrawal. Case management following.   Jason Rouse RN 74

## 2023-06-23 NOTE — CDMP QUERY
Please clarify if this patient is being treated/managed for:    =>Metabolic/Toxic Encephalopathy in the setting of confusion, lethargy s/p taking \"high doses od librium\"  =>Other Explanation of clinical findings  =>Unable to Determine (no explanation of clinical findings)    The medical record reflects the following:    Risk:Pt being treated with high doses of librium and zyprexa    Clinical Indicators:Pt brought to ER. ER nurse documented that pt's speech was slurred and was unable to stand on arrival    Treatment: Narcan, IVF    Please clarify and document your clinical opinion in the progress notes and discharge summary including the definitive and/or presumptive diagnosis, (suspected or probable), related to the above clinical findings. Please include clinical findings supporting your diagnosis. If you DECLINE this query or would like to communicate with [facility], please utilize the \"[email or web address] message box\" at the TOP of the Progress Note on the right.       Thank you,  Mariam Pugh RN SUSAN  [phone] Statement Selected
